# Patient Record
Sex: FEMALE | Race: WHITE | NOT HISPANIC OR LATINO
[De-identification: names, ages, dates, MRNs, and addresses within clinical notes are randomized per-mention and may not be internally consistent; named-entity substitution may affect disease eponyms.]

---

## 2017-02-22 ENCOUNTER — APPOINTMENT (OUTPATIENT)
Dept: PULMONOLOGY | Facility: CLINIC | Age: 62
End: 2017-02-22

## 2017-02-22 VITALS
HEART RATE: 76 BPM | DIASTOLIC BLOOD PRESSURE: 78 MMHG | WEIGHT: 229 LBS | HEIGHT: 68 IN | OXYGEN SATURATION: 95 % | BODY MASS INDEX: 34.71 KG/M2 | SYSTOLIC BLOOD PRESSURE: 130 MMHG

## 2017-02-22 DIAGNOSIS — Z82.3 FAMILY HISTORY OF STROKE: ICD-10-CM

## 2017-02-22 DIAGNOSIS — Z86.19 PERSONAL HISTORY OF OTHER INFECTIOUS AND PARASITIC DISEASES: ICD-10-CM

## 2017-02-22 DIAGNOSIS — Z86.39 PERSONAL HISTORY OF OTHER ENDOCRINE, NUTRITIONAL AND METABOLIC DISEASE: ICD-10-CM

## 2017-02-27 ENCOUNTER — APPOINTMENT (OUTPATIENT)
Dept: THORACIC SURGERY | Facility: CLINIC | Age: 62
End: 2017-02-27

## 2017-02-27 VITALS
BODY MASS INDEX: 34.71 KG/M2 | SYSTOLIC BLOOD PRESSURE: 135 MMHG | DIASTOLIC BLOOD PRESSURE: 76 MMHG | HEART RATE: 76 BPM | RESPIRATION RATE: 16 BRPM | HEIGHT: 68 IN | WEIGHT: 229 LBS | OXYGEN SATURATION: 97 %

## 2017-02-27 RX ORDER — BETAMETHASONE DIPROPIONATE 0.5 MG/G
0.05 CREAM TOPICAL
Qty: 15 | Refills: 0 | Status: DISCONTINUED | COMMUNITY
Start: 2016-09-06

## 2017-02-27 RX ORDER — CLARITHROMYCIN 500 MG/1
500 TABLET, FILM COATED ORAL
Qty: 14 | Refills: 0 | Status: DISCONTINUED | COMMUNITY
Start: 2016-11-01

## 2017-02-27 RX ORDER — BACITRACIN 500 [USP'U]/G
500 OINTMENT OPHTHALMIC
Qty: 4 | Refills: 0 | Status: DISCONTINUED | COMMUNITY
Start: 2017-02-13

## 2017-02-27 RX ORDER — DICLOFENAC SODIUM 75 MG/1
75 TABLET, DELAYED RELEASE ORAL
Qty: 30 | Refills: 0 | Status: DISCONTINUED | COMMUNITY
Start: 2016-12-05

## 2017-02-27 RX ORDER — DIAZEPAM 10 MG/1
10 TABLET ORAL
Qty: 20 | Refills: 0 | Status: DISCONTINUED | COMMUNITY
Start: 2017-02-20

## 2017-02-27 RX ORDER — AZITHROMYCIN 250 MG/1
250 TABLET, FILM COATED ORAL
Qty: 6 | Refills: 0 | Status: DISCONTINUED | COMMUNITY
Start: 2016-11-15

## 2017-02-27 RX ORDER — CLINDAMYCIN HYDROCHLORIDE 300 MG/1
300 CAPSULE ORAL
Qty: 14 | Refills: 0 | Status: DISCONTINUED | COMMUNITY
Start: 2016-12-21

## 2017-02-27 RX ORDER — DIAZEPAM 5 MG/1
5 TABLET ORAL
Qty: 2 | Refills: 0 | Status: DISCONTINUED | COMMUNITY
Start: 2017-02-08

## 2017-02-27 RX ORDER — VALACYCLOVIR 500 MG/1
500 TABLET, FILM COATED ORAL
Qty: 30 | Refills: 0 | Status: DISCONTINUED | COMMUNITY
Start: 2017-02-16

## 2017-02-27 RX ORDER — CLOTRIMAZOLE 10 MG/G
1 CREAM TOPICAL
Qty: 15 | Refills: 0 | Status: DISCONTINUED | COMMUNITY
Start: 2016-09-06

## 2017-02-27 RX ORDER — ACETAMINOPHEN AND CODEINE 300; 30 MG/1; MG/1
300-30 TABLET ORAL
Qty: 30 | Refills: 0 | Status: DISCONTINUED | COMMUNITY
Start: 2016-11-28

## 2017-03-01 ENCOUNTER — RECORD ABSTRACTING (OUTPATIENT)
Age: 62
End: 2017-03-01

## 2017-03-15 ENCOUNTER — OUTPATIENT (OUTPATIENT)
Dept: OUTPATIENT SERVICES | Facility: HOSPITAL | Age: 62
LOS: 1 days | End: 2017-03-15
Payer: COMMERCIAL

## 2017-03-15 VITALS
WEIGHT: 229.28 LBS | HEART RATE: 80 BPM | HEIGHT: 68 IN | DIASTOLIC BLOOD PRESSURE: 88 MMHG | RESPIRATION RATE: 20 BRPM | SYSTOLIC BLOOD PRESSURE: 150 MMHG | TEMPERATURE: 98 F

## 2017-03-15 DIAGNOSIS — E03.9 HYPOTHYROIDISM, UNSPECIFIED: ICD-10-CM

## 2017-03-15 DIAGNOSIS — R91.1 SOLITARY PULMONARY NODULE: ICD-10-CM

## 2017-03-15 DIAGNOSIS — Z01.818 ENCOUNTER FOR OTHER PREPROCEDURAL EXAMINATION: ICD-10-CM

## 2017-03-15 DIAGNOSIS — Z98.890 OTHER SPECIFIED POSTPROCEDURAL STATES: Chronic | ICD-10-CM

## 2017-03-15 LAB
ALBUMIN SERPL ELPH-MCNC: 4.4 G/DL — SIGNIFICANT CHANGE UP (ref 3.3–5.2)
ALP SERPL-CCNC: 111 U/L — SIGNIFICANT CHANGE UP (ref 40–120)
ALT FLD-CCNC: 35 U/L — HIGH
ANION GAP SERPL CALC-SCNC: 15 MMOL/L — SIGNIFICANT CHANGE UP (ref 5–17)
APTT BLD: 33.2 SEC — SIGNIFICANT CHANGE UP (ref 27.5–37.4)
AST SERPL-CCNC: 24 U/L — SIGNIFICANT CHANGE UP
BASOPHILS # BLD AUTO: 0 K/UL — SIGNIFICANT CHANGE UP (ref 0–0.2)
BASOPHILS NFR BLD AUTO: 0.3 % — SIGNIFICANT CHANGE UP (ref 0–2)
BILIRUB SERPL-MCNC: 0.4 MG/DL — SIGNIFICANT CHANGE UP (ref 0.4–2)
BLD GP AB SCN SERPL QL: SIGNIFICANT CHANGE UP
BUN SERPL-MCNC: 17 MG/DL — SIGNIFICANT CHANGE UP (ref 8–20)
CALCIUM SERPL-MCNC: 9.1 MG/DL — SIGNIFICANT CHANGE UP (ref 8.6–10.2)
CHLORIDE SERPL-SCNC: 101 MMOL/L — SIGNIFICANT CHANGE UP (ref 98–107)
CO2 SERPL-SCNC: 26 MMOL/L — SIGNIFICANT CHANGE UP (ref 22–29)
CREAT SERPL-MCNC: 0.58 MG/DL — SIGNIFICANT CHANGE UP (ref 0.5–1.3)
EOSINOPHIL # BLD AUTO: 0.1 K/UL — SIGNIFICANT CHANGE UP (ref 0–0.5)
EOSINOPHIL NFR BLD AUTO: 1.3 % — SIGNIFICANT CHANGE UP (ref 0–6)
GLUCOSE SERPL-MCNC: 117 MG/DL — HIGH (ref 70–115)
HCT VFR BLD CALC: 39.4 % — SIGNIFICANT CHANGE UP (ref 37–47)
HGB BLD-MCNC: 13.2 G/DL — SIGNIFICANT CHANGE UP (ref 12–16)
INR BLD: 1.01 RATIO — SIGNIFICANT CHANGE UP (ref 0.88–1.16)
LYMPHOCYTES # BLD AUTO: 1.9 K/UL — SIGNIFICANT CHANGE UP (ref 1–4.8)
LYMPHOCYTES # BLD AUTO: 26.7 % — SIGNIFICANT CHANGE UP (ref 20–55)
MCHC RBC-ENTMCNC: 31.8 PG — HIGH (ref 27–31)
MCHC RBC-ENTMCNC: 33.5 G/DL — SIGNIFICANT CHANGE UP (ref 32–36)
MCV RBC AUTO: 94.9 FL — SIGNIFICANT CHANGE UP (ref 81–99)
MONOCYTES # BLD AUTO: 0.4 K/UL — SIGNIFICANT CHANGE UP (ref 0–0.8)
MONOCYTES NFR BLD AUTO: 5.8 % — SIGNIFICANT CHANGE UP (ref 3–10)
NEUTROPHILS # BLD AUTO: 4.7 K/UL — SIGNIFICANT CHANGE UP (ref 1.8–8)
NEUTROPHILS NFR BLD AUTO: 65.5 % — SIGNIFICANT CHANGE UP (ref 37–73)
PLATELET # BLD AUTO: 233 K/UL — SIGNIFICANT CHANGE UP (ref 150–400)
POTASSIUM SERPL-MCNC: 4.1 MMOL/L — SIGNIFICANT CHANGE UP (ref 3.5–5.3)
POTASSIUM SERPL-SCNC: 4.1 MMOL/L — SIGNIFICANT CHANGE UP (ref 3.5–5.3)
PROT SERPL-MCNC: 7.7 G/DL — SIGNIFICANT CHANGE UP (ref 6.6–8.7)
PROTHROM AB SERPL-ACNC: 11.1 SEC — SIGNIFICANT CHANGE UP (ref 10–13.1)
RBC # BLD: 4.15 M/UL — LOW (ref 4.4–5.2)
RBC # FLD: 12.9 % — SIGNIFICANT CHANGE UP (ref 11–15.6)
SODIUM SERPL-SCNC: 142 MMOL/L — SIGNIFICANT CHANGE UP (ref 135–145)
TYPE + AB SCN PNL BLD: SIGNIFICANT CHANGE UP
WBC # BLD: 7.1 K/UL — SIGNIFICANT CHANGE UP (ref 4.8–10.8)
WBC # FLD AUTO: 7.1 K/UL — SIGNIFICANT CHANGE UP (ref 4.8–10.8)

## 2017-03-15 RX ORDER — SODIUM CHLORIDE 9 MG/ML
3 INJECTION INTRAMUSCULAR; INTRAVENOUS; SUBCUTANEOUS EVERY 8 HOURS
Qty: 0 | Refills: 0 | Status: DISCONTINUED | OUTPATIENT
Start: 2017-03-24 | End: 2017-03-24

## 2017-03-15 RX ORDER — VANCOMYCIN HCL 1 G
1250 VIAL (EA) INTRAVENOUS ONCE
Qty: 0 | Refills: 0 | Status: COMPLETED | OUTPATIENT
Start: 2017-03-24 | End: 2017-03-24

## 2017-03-15 NOTE — H&P PST ADULT - ASSESSMENT
61 year old female former smoker with PMX, hypothyroidism and anxiety schedule for Flexible bronchoscopy right Vats Lung resection with video assisted thoracoscopic surgery.

## 2017-03-15 NOTE — H&P PST ADULT - NSANTHOSAYNRD_GEN_A_CORE
No. LAMAR screening performed.  STOP BANG Legend: 0-2 = LOW Risk; 3-4 = INTERMEDIATE Risk; 5-8 = HIGH Risk

## 2017-03-15 NOTE — H&P PST ADULT - FAMILY HISTORY
Father  Still living? No  Family history of stroke, Age at diagnosis: 61-70  Family history of hypertension, Age at diagnosis: Age Unknown     Mother  Still living? No  Family history of hypothyroidism, Age at diagnosis: Age Unknown

## 2017-03-15 NOTE — H&P PST ADULT - PROBLEM SELECTOR PLAN 1
Flexible bronchoscopy right Vats Lung resection with video assisted thoracoscopic surgery.  Cardiac clearance with DR. Nice

## 2017-03-15 NOTE — H&P PST ADULT - HISTORY OF PRESENT ILLNESS
61 year old female with PMX, hypothyroidism and anxiety schedule for Flexible bronchoscop, right Vats Lung resection with [place] assistaned thoracoscopic surgery. 61 year old female former smoker with PMX, hypothyroidism and anxiety schedule for Flexible bronchoscopy right Vats Lung resection with video assistanted thoracoscopic surgery.   Patient state in December 2016, during a work up for clearance for foot surgery. They found a spot on her Lung on routine x - ray. She had a Chest CT and PET scan in february that confirmed a pulmonary lesion. Patient denies any SOB, cough, chest pain or  recent weight loss.

## 2017-03-15 NOTE — PATIENT PROFILE ADULT. - LEARNING ASSESSMENT (PATIENT) ADDITIONAL COMMENTS
presurgical and surgicalscrub instructions, pain management education given - verbalized understanding

## 2017-03-16 DIAGNOSIS — R91.1 SOLITARY PULMONARY NODULE: ICD-10-CM

## 2017-03-16 DIAGNOSIS — Z01.818 ENCOUNTER FOR OTHER PREPROCEDURAL EXAMINATION: ICD-10-CM

## 2017-03-23 ENCOUNTER — RESULT REVIEW (OUTPATIENT)
Age: 62
End: 2017-03-23

## 2017-03-24 ENCOUNTER — APPOINTMENT (OUTPATIENT)
Dept: THORACIC SURGERY | Facility: HOSPITAL | Age: 62
End: 2017-03-24
Payer: MEDICAID

## 2017-03-24 ENCOUNTER — INPATIENT (INPATIENT)
Facility: HOSPITAL | Age: 62
LOS: 1 days | Discharge: HOME | DRG: 168 | End: 2017-03-26
Attending: THORACIC SURGERY (CARDIOTHORACIC VASCULAR SURGERY) | Admitting: THORACIC SURGERY (CARDIOTHORACIC VASCULAR SURGERY)
Payer: COMMERCIAL

## 2017-03-24 VITALS
RESPIRATION RATE: 16 BRPM | HEIGHT: 68 IN | SYSTOLIC BLOOD PRESSURE: 131 MMHG | WEIGHT: 229.06 LBS | OXYGEN SATURATION: 96 % | TEMPERATURE: 97 F | DIASTOLIC BLOOD PRESSURE: 72 MMHG | HEART RATE: 86 BPM

## 2017-03-24 DIAGNOSIS — Z98.890 OTHER SPECIFIED POSTPROCEDURAL STATES: Chronic | ICD-10-CM

## 2017-03-24 DIAGNOSIS — R91.1 SOLITARY PULMONARY NODULE: ICD-10-CM

## 2017-03-24 LAB
ABO RH CONFIRMATION: SIGNIFICANT CHANGE UP
HCT VFR BLD CALC: 38.4 % — SIGNIFICANT CHANGE UP (ref 37–47)
HGB BLD-MCNC: 12.9 G/DL — SIGNIFICANT CHANGE UP (ref 12–16)

## 2017-03-24 PROCEDURE — 32663 THORACOSCOPY W/LOBECTOMY: CPT

## 2017-03-24 PROCEDURE — 88331 PATH CONSLTJ SURG 1 BLK 1SPC: CPT | Mod: 26

## 2017-03-24 PROCEDURE — 85610 PROTHROMBIN TIME: CPT

## 2017-03-24 PROCEDURE — 86850 RBC ANTIBODY SCREEN: CPT

## 2017-03-24 PROCEDURE — 32674 THORACOSCOPY LYMPH NODE EXC: CPT | Mod: AS

## 2017-03-24 PROCEDURE — 71010: CPT | Mod: 26

## 2017-03-24 PROCEDURE — 80053 COMPREHEN METABOLIC PANEL: CPT

## 2017-03-24 PROCEDURE — 88334 PATH CONSLTJ SURG CYTO XM EA: CPT | Mod: 26,59

## 2017-03-24 PROCEDURE — 85730 THROMBOPLASTIN TIME PARTIAL: CPT

## 2017-03-24 PROCEDURE — 32668 THORACOSCOPY W/W RESECT DIAG: CPT | Mod: AS

## 2017-03-24 PROCEDURE — 86901 BLOOD TYPING SEROLOGIC RH(D): CPT

## 2017-03-24 PROCEDURE — 88307 TISSUE EXAM BY PATHOLOGIST: CPT | Mod: 26

## 2017-03-24 PROCEDURE — 86900 BLOOD TYPING SEROLOGIC ABO: CPT

## 2017-03-24 PROCEDURE — 86920 COMPATIBILITY TEST SPIN: CPT

## 2017-03-24 PROCEDURE — 88342 IMHCHEM/IMCYTCHM 1ST ANTB: CPT | Mod: 26

## 2017-03-24 PROCEDURE — 88309 TISSUE EXAM BY PATHOLOGIST: CPT | Mod: 26

## 2017-03-24 PROCEDURE — 32668 THORACOSCOPY W/W RESECT DIAG: CPT

## 2017-03-24 PROCEDURE — 85027 COMPLETE CBC AUTOMATED: CPT

## 2017-03-24 PROCEDURE — 88333 PATH CONSLTJ SURG CYTO XM 1: CPT | Mod: 26

## 2017-03-24 PROCEDURE — 88305 TISSUE EXAM BY PATHOLOGIST: CPT | Mod: 26

## 2017-03-24 PROCEDURE — 32663 THORACOSCOPY W/LOBECTOMY: CPT | Mod: AS

## 2017-03-24 PROCEDURE — 88341 IMHCHEM/IMCYTCHM EA ADD ANTB: CPT | Mod: 26

## 2017-03-24 PROCEDURE — 32674 THORACOSCOPY LYMPH NODE EXC: CPT

## 2017-03-24 RX ORDER — DOCUSATE SODIUM 100 MG
100 CAPSULE ORAL THREE TIMES A DAY
Qty: 0 | Refills: 0 | Status: DISCONTINUED | OUTPATIENT
Start: 2017-03-24 | End: 2017-03-26

## 2017-03-24 RX ORDER — ENOXAPARIN SODIUM 100 MG/ML
40 INJECTION SUBCUTANEOUS DAILY
Qty: 0 | Refills: 0 | Status: DISCONTINUED | OUTPATIENT
Start: 2017-03-25 | End: 2017-03-26

## 2017-03-24 RX ORDER — ASPIRIN/CALCIUM CARB/MAGNESIUM 324 MG
81 TABLET ORAL DAILY
Qty: 0 | Refills: 0 | Status: DISCONTINUED | OUTPATIENT
Start: 2017-03-25 | End: 2017-03-26

## 2017-03-24 RX ORDER — ONDANSETRON 8 MG/1
4 TABLET, FILM COATED ORAL EVERY 6 HOURS
Qty: 0 | Refills: 0 | Status: DISCONTINUED | OUTPATIENT
Start: 2017-03-24 | End: 2017-03-26

## 2017-03-24 RX ORDER — ACETAMINOPHEN 500 MG
650 TABLET ORAL EVERY 6 HOURS
Qty: 0 | Refills: 0 | Status: DISCONTINUED | OUTPATIENT
Start: 2017-03-24 | End: 2017-03-26

## 2017-03-24 RX ORDER — HYDROMORPHONE HYDROCHLORIDE 2 MG/ML
0.5 INJECTION INTRAMUSCULAR; INTRAVENOUS; SUBCUTANEOUS
Qty: 0 | Refills: 0 | Status: DISCONTINUED | OUTPATIENT
Start: 2017-03-24 | End: 2017-03-25

## 2017-03-24 RX ORDER — KETOROLAC TROMETHAMINE 30 MG/ML
30 SYRINGE (ML) INJECTION EVERY 6 HOURS
Qty: 0 | Refills: 0 | Status: DISCONTINUED | OUTPATIENT
Start: 2017-03-24 | End: 2017-03-25

## 2017-03-24 RX ORDER — IPRATROPIUM/ALBUTEROL SULFATE 18-103MCG
3 AEROSOL WITH ADAPTER (GRAM) INHALATION EVERY 6 HOURS
Qty: 0 | Refills: 0 | Status: DISCONTINUED | OUTPATIENT
Start: 2017-03-24 | End: 2017-03-26

## 2017-03-24 RX ORDER — FENTANYL CITRATE 50 UG/ML
30 INJECTION INTRAVENOUS
Qty: 0 | Refills: 0 | Status: DISCONTINUED | OUTPATIENT
Start: 2017-03-24 | End: 2017-03-25

## 2017-03-24 RX ORDER — PANTOPRAZOLE SODIUM 20 MG/1
40 TABLET, DELAYED RELEASE ORAL
Qty: 0 | Refills: 0 | Status: DISCONTINUED | OUTPATIENT
Start: 2017-03-24 | End: 2017-03-26

## 2017-03-24 RX ORDER — LEVOTHYROXINE SODIUM 125 MCG
175 TABLET ORAL DAILY
Qty: 0 | Refills: 0 | Status: DISCONTINUED | OUTPATIENT
Start: 2017-03-25 | End: 2017-03-26

## 2017-03-24 RX ORDER — NALOXONE HYDROCHLORIDE 4 MG/.1ML
0.1 SPRAY NASAL
Qty: 0 | Refills: 0 | Status: DISCONTINUED | OUTPATIENT
Start: 2017-03-24 | End: 2017-03-25

## 2017-03-24 RX ADMIN — Medication 100 MILLIGRAM(S): at 22:20

## 2017-03-24 RX ADMIN — HYDROMORPHONE HYDROCHLORIDE 0.5 MILLIGRAM(S): 2 INJECTION INTRAMUSCULAR; INTRAVENOUS; SUBCUTANEOUS at 16:30

## 2017-03-24 RX ADMIN — HYDROMORPHONE HYDROCHLORIDE 0.5 MILLIGRAM(S): 2 INJECTION INTRAMUSCULAR; INTRAVENOUS; SUBCUTANEOUS at 19:04

## 2017-03-24 RX ADMIN — FENTANYL CITRATE 30 MILLILITER(S): 50 INJECTION INTRAVENOUS at 19:49

## 2017-03-24 RX ADMIN — HYDROMORPHONE HYDROCHLORIDE 0.5 MILLIGRAM(S): 2 INJECTION INTRAMUSCULAR; INTRAVENOUS; SUBCUTANEOUS at 19:20

## 2017-03-24 RX ADMIN — FENTANYL CITRATE 30 MILLILITER(S): 50 INJECTION INTRAVENOUS at 16:31

## 2017-03-24 RX ADMIN — HYDROMORPHONE HYDROCHLORIDE 0.5 MILLIGRAM(S): 2 INJECTION INTRAMUSCULAR; INTRAVENOUS; SUBCUTANEOUS at 18:14

## 2017-03-24 RX ADMIN — FENTANYL CITRATE 30 MILLILITER(S): 50 INJECTION INTRAVENOUS at 19:02

## 2017-03-24 RX ADMIN — Medication 166.67 MILLIGRAM(S): at 12:38

## 2017-03-25 ENCOUNTER — TRANSCRIPTION ENCOUNTER (OUTPATIENT)
Age: 62
End: 2017-03-25

## 2017-03-25 LAB
ANION GAP SERPL CALC-SCNC: 14 MMOL/L — SIGNIFICANT CHANGE UP (ref 5–17)
BUN SERPL-MCNC: 12 MG/DL — SIGNIFICANT CHANGE UP (ref 8–20)
CALCIUM SERPL-MCNC: 8.3 MG/DL — LOW (ref 8.6–10.2)
CHLORIDE SERPL-SCNC: 101 MMOL/L — SIGNIFICANT CHANGE UP (ref 98–107)
CO2 SERPL-SCNC: 24 MMOL/L — SIGNIFICANT CHANGE UP (ref 22–29)
CREAT SERPL-MCNC: 0.55 MG/DL — SIGNIFICANT CHANGE UP (ref 0.5–1.3)
GLUCOSE SERPL-MCNC: 177 MG/DL — HIGH (ref 70–115)
POTASSIUM SERPL-MCNC: 4 MMOL/L — SIGNIFICANT CHANGE UP (ref 3.5–5.3)
POTASSIUM SERPL-SCNC: 4 MMOL/L — SIGNIFICANT CHANGE UP (ref 3.5–5.3)
SODIUM SERPL-SCNC: 139 MMOL/L — SIGNIFICANT CHANGE UP (ref 135–145)

## 2017-03-25 PROCEDURE — 71010: CPT | Mod: 26

## 2017-03-25 RX ORDER — ALPRAZOLAM 0.25 MG
0.25 TABLET ORAL AT BEDTIME
Qty: 0 | Refills: 0 | Status: DISCONTINUED | OUTPATIENT
Start: 2017-03-25 | End: 2017-03-26

## 2017-03-25 RX ORDER — FENTANYL CITRATE 50 UG/ML
30 INJECTION INTRAVENOUS
Qty: 0 | Refills: 0 | Status: DISCONTINUED | OUTPATIENT
Start: 2017-03-25 | End: 2017-03-26

## 2017-03-25 RX ORDER — ACETAMINOPHEN 500 MG
1000 TABLET ORAL ONCE
Qty: 0 | Refills: 0 | Status: COMPLETED | OUTPATIENT
Start: 2017-03-25 | End: 2017-03-25

## 2017-03-25 RX ORDER — KETOROLAC TROMETHAMINE 30 MG/ML
15 SYRINGE (ML) INJECTION EVERY 6 HOURS
Qty: 0 | Refills: 0 | Status: DISCONTINUED | OUTPATIENT
Start: 2017-03-25 | End: 2017-03-26

## 2017-03-25 RX ORDER — PSYLLIUM SEED (WITH DEXTROSE)
1 POWDER (GRAM) ORAL DAILY
Qty: 0 | Refills: 0 | Status: DISCONTINUED | OUTPATIENT
Start: 2017-03-25 | End: 2017-03-26

## 2017-03-25 RX ORDER — ALPRAZOLAM 0.25 MG
0.25 TABLET ORAL ONCE
Qty: 0 | Refills: 0 | Status: DISCONTINUED | OUTPATIENT
Start: 2017-03-25 | End: 2017-03-25

## 2017-03-25 RX ORDER — BENZOCAINE AND MENTHOL 5; 1 G/100ML; G/100ML
1 LIQUID ORAL EVERY 6 HOURS
Qty: 0 | Refills: 0 | Status: DISCONTINUED | OUTPATIENT
Start: 2017-03-25 | End: 2017-03-26

## 2017-03-25 RX ADMIN — Medication 15 MILLIGRAM(S): at 18:21

## 2017-03-25 RX ADMIN — Medication 81 MILLIGRAM(S): at 12:44

## 2017-03-25 RX ADMIN — Medication 0.25 MILLIGRAM(S): at 22:45

## 2017-03-25 RX ADMIN — Medication 100 MILLIGRAM(S): at 05:46

## 2017-03-25 RX ADMIN — Medication 15 MILLIGRAM(S): at 12:42

## 2017-03-25 RX ADMIN — ENOXAPARIN SODIUM 40 MILLIGRAM(S): 100 INJECTION SUBCUTANEOUS at 12:45

## 2017-03-25 RX ADMIN — Medication 175 MICROGRAM(S): at 05:46

## 2017-03-25 RX ADMIN — Medication 0.25 MILLIGRAM(S): at 03:45

## 2017-03-25 RX ADMIN — Medication 15 MILLIGRAM(S): at 13:20

## 2017-03-25 RX ADMIN — Medication 100 MILLIGRAM(S): at 22:45

## 2017-03-25 RX ADMIN — FENTANYL CITRATE 30 MILLILITER(S): 50 INJECTION INTRAVENOUS at 00:53

## 2017-03-25 RX ADMIN — Medication 15 MILLIGRAM(S): at 23:07

## 2017-03-25 RX ADMIN — PANTOPRAZOLE SODIUM 40 MILLIGRAM(S): 20 TABLET, DELAYED RELEASE ORAL at 05:46

## 2017-03-25 RX ADMIN — Medication 15 MILLIGRAM(S): at 19:00

## 2017-03-25 RX ADMIN — Medication 650 MILLIGRAM(S): at 00:45

## 2017-03-25 RX ADMIN — FENTANYL CITRATE 30 MILLILITER(S): 50 INJECTION INTRAVENOUS at 04:22

## 2017-03-25 RX ADMIN — Medication 650 MILLIGRAM(S): at 00:09

## 2017-03-25 RX ADMIN — BENZOCAINE AND MENTHOL 1 LOZENGE: 5; 1 LIQUID ORAL at 10:05

## 2017-03-25 RX ADMIN — Medication 1000 MILLIGRAM(S): at 13:20

## 2017-03-25 RX ADMIN — FENTANYL CITRATE 30 MILLILITER(S): 50 INJECTION INTRAVENOUS at 19:15

## 2017-03-25 RX ADMIN — FENTANYL CITRATE 30 MILLILITER(S): 50 INJECTION INTRAVENOUS at 07:29

## 2017-03-25 RX ADMIN — BENZOCAINE AND MENTHOL 1 LOZENGE: 5; 1 LIQUID ORAL at 22:45

## 2017-03-25 RX ADMIN — Medication 400 MILLIGRAM(S): at 12:42

## 2017-03-25 NOTE — PROGRESS NOTE ADULT - SUBJECTIVE AND OBJECTIVE BOX
Subjective: "I feel constipated, can you order me something?" Pt. lying in bed, denies any SOB or chest pain, NAD noted.    Tele: Sinus rhythm  Vital Signs Last 24 Hrs  T(C): 36.6, Max: 36.9 (03-24 @ 18:00)  T(F): 97.9, Max: 98.4 (03-24 @ 18:00)  HR: 96 (68 - 96)  BP: 122/68 (122/68 - 151/76)  RR: 16 (12 - 17)  SpO2: 97% (95% - 100%)                      25 Mar 2017 05:20    139    |  101    |  12.0   ----------------------------<  177    4.0     |  24.0   |  0.55     Ca    8.3        25 Mar 2017 05:20                               12.9   x     )-----------( x        ( 24 Mar 2017 23:29 )             38.4               CAPILLARY BLOOD GLUCOSE            CXR:    I&O's Detail    I & Os for current day (as of 25 Mar 2017 09:41)  =============================================  IN:    Total IN: 0 ml  ---------------------------------------------  OUT:    Voided: 800 ml    Chest Tube: 190 ml    Total OUT: 990 ml  ---------------------------------------------  Total NET: -990 ml      RIGHT CHEST TUBE:  [x] YES [ ] NO  OUTPUT: 70/190    per 24 hours    AIR LEAKS:  [ ] YES [x] NO    BOWEL MOVEMENT:  [ ] YES [x] NO      MEDICATIONS  (STANDING):  docusate sodium 100milliGRAM(s) Oral three times a day  enoxaparin Injectable 40milliGRAM(s) SubCutaneous daily  levothyroxine 175MICROGram(s) Oral daily  aspirin enteric coated 81milliGRAM(s) Oral daily  pantoprazole    Tablet 40milliGRAM(s) Oral before breakfast    MEDICATIONS  (PRN):  ondansetron Injectable 4milliGRAM(s) IV Push every 6 hours PRN Nausea  ketorolac   Injectable 30milliGRAM(s) IV Push every 6 hours PRN Moderate Pain  acetaminophen   Tablet. 650milliGRAM(s) Oral every 6 hours PRN Mild Pain (1 - 3)  ALBUTerol/ipratropium for Nebulization 3milliLiter(s) Nebulizer every 6 hours PRN Shortness of Breath and/or Wheezing  oxyCODONE  5 mG/acetaminophen 325 mG 1Tablet(s) Oral every 4 hours PRN Moderate Pain (4 - 6)      Physical Exam:  Neuro: A&Ox4, no focal deficit noted.  Pulm: Clear bilaterally  CV: RRR, +S1, +S2.  Abd: soft, non-tender, non-distended, +BS, +BM 3/23/17.  Extremities: MAEx4, trace edema X2BLE, +PP, - calf tenderness.  Incision(s): Right thoracotomy incision CDI with dressing in place.           PAST MEDICAL & SURGICAL HISTORY:  Anxiety  Hypothyroid  S/P bunionectomy: with revision

## 2017-03-25 NOTE — PROGRESS NOTE ADULT - SUBJECTIVE AND OBJECTIVE BOX
Anesthesia Post op Note  62 y/o SP VATS Lobectomy with GETA  No anesthetic issues evident.  VSS Pain well controlled no N/V reported

## 2017-03-25 NOTE — PROGRESS NOTE ADULT - ASSESSMENT
61 year old female with PMH of hypothyroid & anxiety who failed cardiac clearance for left foot surgery.  Pt. is s/p right video assisted thoracic surgery.

## 2017-03-26 ENCOUNTER — TRANSCRIPTION ENCOUNTER (OUTPATIENT)
Age: 62
End: 2017-03-26

## 2017-03-26 VITALS
TEMPERATURE: 98 F | OXYGEN SATURATION: 97 % | HEART RATE: 68 BPM | SYSTOLIC BLOOD PRESSURE: 122 MMHG | DIASTOLIC BLOOD PRESSURE: 70 MMHG | RESPIRATION RATE: 16 BRPM

## 2017-03-26 LAB
ALBUMIN SERPL ELPH-MCNC: 3.6 G/DL — SIGNIFICANT CHANGE UP (ref 3.3–5.2)
ALP SERPL-CCNC: 74 U/L — SIGNIFICANT CHANGE UP (ref 40–120)
ALT FLD-CCNC: 28 U/L — SIGNIFICANT CHANGE UP
ANION GAP SERPL CALC-SCNC: 13 MMOL/L — SIGNIFICANT CHANGE UP (ref 5–17)
AST SERPL-CCNC: 19 U/L — SIGNIFICANT CHANGE UP
BILIRUB SERPL-MCNC: 0.5 MG/DL — SIGNIFICANT CHANGE UP (ref 0.4–2)
BUN SERPL-MCNC: 19 MG/DL — SIGNIFICANT CHANGE UP (ref 8–20)
CALCIUM SERPL-MCNC: 8.2 MG/DL — LOW (ref 8.6–10.2)
CHLORIDE SERPL-SCNC: 104 MMOL/L — SIGNIFICANT CHANGE UP (ref 98–107)
CO2 SERPL-SCNC: 26 MMOL/L — SIGNIFICANT CHANGE UP (ref 22–29)
CREAT SERPL-MCNC: 0.63 MG/DL — SIGNIFICANT CHANGE UP (ref 0.5–1.3)
GLUCOSE SERPL-MCNC: 114 MG/DL — SIGNIFICANT CHANGE UP (ref 70–115)
HCT VFR BLD CALC: 36.5 % — LOW (ref 37–47)
HGB BLD-MCNC: 11.7 G/DL — LOW (ref 12–16)
MAGNESIUM SERPL-MCNC: 2.1 MG/DL — SIGNIFICANT CHANGE UP (ref 1.8–2.5)
MCHC RBC-ENTMCNC: 31.1 PG — HIGH (ref 27–31)
MCHC RBC-ENTMCNC: 32.1 G/DL — SIGNIFICANT CHANGE UP (ref 32–36)
MCV RBC AUTO: 97.1 FL — SIGNIFICANT CHANGE UP (ref 81–99)
PHOSPHATE SERPL-MCNC: 2.4 MG/DL — SIGNIFICANT CHANGE UP (ref 2.4–4.7)
PLATELET # BLD AUTO: 177 K/UL — SIGNIFICANT CHANGE UP (ref 150–400)
POTASSIUM SERPL-MCNC: 3.9 MMOL/L — SIGNIFICANT CHANGE UP (ref 3.5–5.3)
POTASSIUM SERPL-SCNC: 3.9 MMOL/L — SIGNIFICANT CHANGE UP (ref 3.5–5.3)
PROT SERPL-MCNC: 6.6 G/DL — SIGNIFICANT CHANGE UP (ref 6.6–8.7)
RBC # BLD: 3.76 M/UL — LOW (ref 4.4–5.2)
RBC # FLD: 13.5 % — SIGNIFICANT CHANGE UP (ref 11–15.6)
SODIUM SERPL-SCNC: 143 MMOL/L — SIGNIFICANT CHANGE UP (ref 135–145)
WBC # BLD: 11 K/UL — HIGH (ref 4.8–10.8)
WBC # FLD AUTO: 11 K/UL — HIGH (ref 4.8–10.8)

## 2017-03-26 PROCEDURE — 88309 TISSUE EXAM BY PATHOLOGIST: CPT

## 2017-03-26 PROCEDURE — 88305 TISSUE EXAM BY PATHOLOGIST: CPT

## 2017-03-26 PROCEDURE — 85018 HEMOGLOBIN: CPT

## 2017-03-26 PROCEDURE — 83735 ASSAY OF MAGNESIUM: CPT

## 2017-03-26 PROCEDURE — 88334 PATH CONSLTJ SURG CYTO XM EA: CPT

## 2017-03-26 PROCEDURE — 80048 BASIC METABOLIC PNL TOTAL CA: CPT

## 2017-03-26 PROCEDURE — 36415 COLL VENOUS BLD VENIPUNCTURE: CPT

## 2017-03-26 PROCEDURE — 84100 ASSAY OF PHOSPHORUS: CPT

## 2017-03-26 PROCEDURE — 88333 PATH CONSLTJ SURG CYTO XM 1: CPT

## 2017-03-26 PROCEDURE — 71045 X-RAY EXAM CHEST 1 VIEW: CPT

## 2017-03-26 PROCEDURE — 85027 COMPLETE CBC AUTOMATED: CPT

## 2017-03-26 PROCEDURE — 88341 IMHCHEM/IMCYTCHM EA ADD ANTB: CPT

## 2017-03-26 PROCEDURE — 80053 COMPREHEN METABOLIC PANEL: CPT

## 2017-03-26 PROCEDURE — 88342 IMHCHEM/IMCYTCHM 1ST ANTB: CPT

## 2017-03-26 PROCEDURE — 71010: CPT | Mod: 26

## 2017-03-26 PROCEDURE — 88307 TISSUE EXAM BY PATHOLOGIST: CPT

## 2017-03-26 PROCEDURE — 71010: CPT | Mod: 26,77

## 2017-03-26 PROCEDURE — 88331 PATH CONSLTJ SURG 1 BLK 1SPC: CPT

## 2017-03-26 RX ORDER — DOCUSATE SODIUM 100 MG
1 CAPSULE ORAL
Qty: 0 | Refills: 0 | COMMUNITY
Start: 2017-03-26

## 2017-03-26 RX ORDER — GLUCOSAMINE/MSM/CHONDROITIN A 750-375MG
1 TABLET ORAL
Qty: 0 | Refills: 0 | COMMUNITY

## 2017-03-26 RX ORDER — PREGABALIN 225 MG/1
1 CAPSULE ORAL
Qty: 0 | Refills: 0 | COMMUNITY

## 2017-03-26 RX ORDER — OXYCODONE HYDROCHLORIDE 5 MG/1
1 TABLET ORAL
Qty: 50 | Refills: 0 | OUTPATIENT
Start: 2017-03-26

## 2017-03-26 RX ORDER — ACETAMINOPHEN 500 MG
2 TABLET ORAL
Qty: 0 | Refills: 0 | COMMUNITY
Start: 2017-03-26

## 2017-03-26 RX ORDER — OMEGA-3 ACID ETHYL ESTERS 1 G
1 CAPSULE ORAL
Qty: 0 | Refills: 0 | COMMUNITY

## 2017-03-26 RX ORDER — OXYCODONE HYDROCHLORIDE 5 MG/1
1 TABLET ORAL
Qty: 0 | Refills: 0 | COMMUNITY
Start: 2017-03-26

## 2017-03-26 RX ADMIN — Medication 15 MILLIGRAM(S): at 05:15

## 2017-03-26 RX ADMIN — FENTANYL CITRATE 30 MILLILITER(S): 50 INJECTION INTRAVENOUS at 04:16

## 2017-03-26 RX ADMIN — FENTANYL CITRATE 30 MILLILITER(S): 50 INJECTION INTRAVENOUS at 00:11

## 2017-03-26 RX ADMIN — Medication 15 MILLIGRAM(S): at 00:00

## 2017-03-26 RX ADMIN — Medication 100 MILLIGRAM(S): at 05:15

## 2017-03-26 RX ADMIN — Medication 175 MICROGRAM(S): at 05:15

## 2017-03-26 RX ADMIN — Medication 81 MILLIGRAM(S): at 11:15

## 2017-03-26 RX ADMIN — BENZOCAINE AND MENTHOL 1 LOZENGE: 5; 1 LIQUID ORAL at 05:15

## 2017-03-26 RX ADMIN — PANTOPRAZOLE SODIUM 40 MILLIGRAM(S): 20 TABLET, DELAYED RELEASE ORAL at 05:15

## 2017-03-26 RX ADMIN — Medication 15 MILLIGRAM(S): at 06:00

## 2017-03-26 RX ADMIN — FENTANYL CITRATE 30 MILLILITER(S): 50 INJECTION INTRAVENOUS at 07:14

## 2017-03-26 NOTE — DISCHARGE NOTE ADULT - CARE PROVIDERS DIRECT ADDRESSES
[email or web address]

## 2017-03-26 NOTE — PROGRESS NOTE ADULT - PROBLEM SELECTOR PLAN 1
Percocet PO PRN for pain management., d/c fentanyl PCA  Titrate oxygen to maintain SPO2 >90% on room air.  Encourage the use of incentive spirometry, cough & deep breath exercises, increase mobility, daily PT, OOB to chair.  D/C CT , follow up CXR  D/C home today
Percocet PO PRN for pain management.  Titrate oxygen to maintain SPO2 >90% on room air.  Encourage the use of incentive spirometry, cough & deep breath exercises, increase mobility, daily PT, OOB to chair.  Maintain right chest tube to water seal.  Repeat chest xray in morning, CBC & CMP in am.  Discussed plan with Dr. Bradley at bedside.

## 2017-03-26 NOTE — DISCHARGE NOTE ADULT - PLAN OF CARE
Recover from surgery Please come to ED or Call Cardio thoracic office at [phone] if Chest pain, Shortness of Breath, persistent Nausea & vomiting, oozing from wounds  Shower daily starting tomorrow  No driving until cleared by MD

## 2017-03-26 NOTE — DISCHARGE NOTE ADULT - NS AS ACTIVITY OBS
Walking-Outdoors allowed/Stairs allowed/No Heavy lifting/straining/Walking-Indoors allowed/Showering allowed

## 2017-03-26 NOTE — PROGRESS NOTE ADULT - SUBJECTIVE AND OBJECTIVE BOX
Subjective:  "  I did not sleep well last night"  Pt in bed NAD + CT no air leak     VITAL SIGNS  T(C): 36.7, Max: 37 (03-25 @ 11:00)  HR: 74 (72 - 89)  BP: 120/68 (104/52 - 130/68)  RR: 16 (16 - 18)  SpO2: 97% (94% - 97%)  Daily Weight in k.2 (26 Mar 2017 06:08)  Admit Wt: Drug Dosing Weight  Height (cm): 172.7 (24 Mar 2017 08:54)  Weight (kg): 103.9 (24 Mar 2017 08:54)  Telemetry:  SR    MEDICATIONS  ondansetron Injectable 4milliGRAM(s) IV Push every 6 hours PRN  docusate sodium 100milliGRAM(s) Oral three times a day  enoxaparin Injectable 40milliGRAM(s) SubCutaneous daily  acetaminophen   Tablet. 650milliGRAM(s) Oral every 6 hours PRN  levothyroxine 175MICROGram(s) Oral daily  aspirin enteric coated 81milliGRAM(s) Oral daily  ALBUTerol/ipratropium for Nebulization 3milliLiter(s) Nebulizer every 6 hours PRN  pantoprazole    Tablet 40milliGRAM(s) Oral before breakfast  benzocaine 15 mG/menthol 3.6 mG Lozenge 1Lozenge Oral every 6 hours PRN  psyllium Powder 1Packet(s) Oral daily  ALPRAZolam 0.25milliGRAM(s) Oral at bedtime PRN  ketorolac   Injectable 15milliGRAM(s) IV Push every 6 hours  oxyCODONE  5 mG/acetaminophen 325 mG 1Tablet(s) Oral every 6 hours PRN  oxyCODONE  5 mG/acetaminophen 325 mG 2Tablet(s) Oral every 6 hours PRN    MEDICATIONS  (PRN):  ondansetron Injectable 4milliGRAM(s) IV Push every 6 hours PRN Nausea  acetaminophen   Tablet. 650milliGRAM(s) Oral every 6 hours PRN Mild Pain (1 - 3)  ALBUTerol/ipratropium for Nebulization 3milliLiter(s) Nebulizer every 6 hours PRN Shortness of Breath and/or Wheezing  benzocaine 15 mG/menthol 3.6 mG Lozenge 1Lozenge Oral every 6 hours PRN Sore Throat  ALPRAZolam 0.25milliGRAM(s) Oral at bedtime PRN anxiety  oxyCODONE  5 mG/acetaminophen 325 mG 1Tablet(s) Oral every 6 hours PRN Moderate Pain (4 - 6)  oxyCODONE  5 mG/acetaminophen 325 mG 2Tablet(s) Oral every 6 hours PRN Severe Pain (7 - 10)        PHYSICAL EXAM  General: alert awake   Respiratory:  clear b/l decreased BS at base b/l   CV: RRR S1 S2   Abdomen: soft NT ND + BS   Extremities: no edema b/l LE   Incisions: Rt thoracic incision:  C/D/I + CT dressing C/D/I      Chest tubes: + Rt  x 24 hours   60 overnight       I&O's Detail    I & Os for current day (as of 26 Mar 2017 08:54)  =============================================  IN:    Oral Fluid: 480 ml    Total IN: 480 ml  ---------------------------------------------  OUT:    Voided: 950 ml    Chest Tube: 150 ml    Total OUT: 1100 ml  ---------------------------------------------  Total NET: -620 ml      LABS  26 Mar 2017 05:28    143    |  104    |  19.0   ----------------------------<  114    3.9     |  26.0   |  0.63     Ca    8.2        26 Mar 2017 05:28  Phos  2.4       26 Mar 2017 05:28  Mg     2.1       26 Mar 2017 05:28    TPro  6.6    /  Alb  3.6    /  TBili  0.5    /  DBili  x      /  AST  19     /  ALT  28     /  AlkPhos  74     26 Mar 2017 05:28                                 11.7   11.0  )-----------( 177      ( 26 Mar 2017 05:29 )             36.5                LIVER FUNCTIONS - ( 26 Mar 2017 05:28 )  Alb: 3.6 g/dL / Pro: 6.6 g/dL / ALK PHOS: 74 U/L / ALT: 28 U/L / AST: 19 U/L / GGT: x              CAPILLARY BLOOD GLUCOSE           Today's CXR:    PAST MEDICAL & SURGICAL HISTORY:  Anxiety  Hypothyroid  S/P bunionectomy: with revision

## 2017-03-26 NOTE — DISCHARGE NOTE ADULT - CARE PROVIDER_API CALL
Rohan Bradley), Surgery; Thoracic Surgery  [address] [place] [id number]  Phone: [phone]  Fax: [phone]

## 2017-03-26 NOTE — DISCHARGE NOTE ADULT - CARE PLAN
Principal Discharge DX:	Pulmonary nodule  Goal:	Recover from surgery  Instructions for follow-up, activity and diet:	Please come to ED or Call Cardio thoracic office at [phone] if Chest pain, Shortness of Breath, persistent Nausea & vomiting, oozing from wounds  Shower daily starting tomorrow  No driving until cleared by MD Principal Discharge DX:	Pulmonary nodule  Goal:	Recover from surgery  Instructions for follow-up, activity and diet:	Please come to ED or Call Cardio thoracic office at [phone] if Chest pain, Shortness of Breath, persistent Nausea & vomiting, oozing from wounds  Shower daily starting tomorrow  No driving until cleared by MD Principal Discharge DX:	Pulmonary nodule  Goal:	Recover from surgery  Instructions for follow-up, activity and diet:	Please come to ED or Call Cardio thoracic office at [phone] if Chest pain, Shortness of Breath, persistent Nausea & vomiting, oozing from wounds  Shower daily starting tomorrow  No driving until cleared by MD

## 2017-03-26 NOTE — DISCHARGE NOTE ADULT - PATIENT PORTAL LINK FT
“You can access the Follow[facility] Patient Portal, offered by [facility], by registering with the following website: [email or web address]”

## 2017-03-26 NOTE — PROGRESS NOTE ADULT - ASSESSMENT
ASSESSMENT  61 year old female with PMH of hypothyroid & anxiety who failed cardiac clearance for left foot surgery.  Pt. is s/p right video assisted thoracic surgery

## 2017-03-26 NOTE — DISCHARGE NOTE ADULT - MEDICATION SUMMARY - MEDICATIONS TO TAKE
I will START or STAY ON the medications listed below when I get home from the hospital:    acetaminophen 325 mg oral tablet  -- 2 tab(s) by mouth every 6 hours, As needed, Mild Pain (1 - 3)  -- Indication: For pain    acetaminophen-oxyCODONE 325 mg-5 mg oral tablet  -- 1 tab(s) by mouth every 6 hours, As needed, Moderate Pain (4 - 6) MDD:8  -- Indication: For pain    aspirin 81 mg oral tablet  -- 1 tab(s) by mouth 2 times a day  -- Indication: For Solitary pulmonary nodule    valACYclovir 500 mg oral tablet  -- 1 tab(s) by mouth every 12 hours, As Needed  -- Indication: For Antiviral    milk thistle oral capsule  --  by mouth once a day  -- Indication: For Herbal medicine    docusate sodium 100 mg oral capsule  -- 1 cap(s) by mouth 3 times a day  -- Indication: For Stool softener    levothyroxine 175 mcg (0.175 mg) oral tablet  -- 1 tab(s) by mouth once a day  -- Indication: For Hypothyroid

## 2017-03-31 LAB — SURGICAL PATHOLOGY FINAL REPORT - CH: SIGNIFICANT CHANGE UP

## 2017-04-03 ENCOUNTER — OUTPATIENT (OUTPATIENT)
Dept: OUTPATIENT SERVICES | Facility: HOSPITAL | Age: 62
LOS: 1 days | End: 2017-04-03
Payer: COMMERCIAL

## 2017-04-03 ENCOUNTER — APPOINTMENT (OUTPATIENT)
Dept: THORACIC SURGERY | Facility: CLINIC | Age: 62
End: 2017-04-03

## 2017-04-03 VITALS
BODY MASS INDEX: 34.71 KG/M2 | HEIGHT: 68 IN | SYSTOLIC BLOOD PRESSURE: 137 MMHG | HEART RATE: 76 BPM | DIASTOLIC BLOOD PRESSURE: 88 MMHG | RESPIRATION RATE: 18 BRPM | WEIGHT: 229 LBS | OXYGEN SATURATION: 99 %

## 2017-04-03 DIAGNOSIS — Z98.890 OTHER SPECIFIED POSTPROCEDURAL STATES: Chronic | ICD-10-CM

## 2017-04-03 DIAGNOSIS — R22.2 LOCALIZED SWELLING, MASS AND LUMP, TRUNK: ICD-10-CM

## 2017-04-03 PROCEDURE — 71046 X-RAY EXAM CHEST 2 VIEWS: CPT

## 2017-04-03 PROCEDURE — 71020: CPT | Mod: 26

## 2017-04-05 ENCOUNTER — TRANSCRIPTION ENCOUNTER (OUTPATIENT)
Age: 62
End: 2017-04-05

## 2017-04-06 ENCOUNTER — OUTPATIENT (OUTPATIENT)
Dept: OUTPATIENT SERVICES | Facility: HOSPITAL | Age: 62
LOS: 1 days | Discharge: HOME | End: 2017-04-06

## 2017-04-06 DIAGNOSIS — C34.90 MALIGNANT NEOPLASM OF UNSPECIFIED PART OF UNSPECIFIED BRONCHUS OR LUNG: ICD-10-CM

## 2017-04-06 DIAGNOSIS — Z98.890 OTHER SPECIFIED POSTPROCEDURAL STATES: Chronic | ICD-10-CM

## 2017-04-09 ENCOUNTER — RESULT REVIEW (OUTPATIENT)
Age: 62
End: 2017-04-09

## 2017-04-16 ENCOUNTER — RESULT REVIEW (OUTPATIENT)
Age: 62
End: 2017-04-16

## 2017-04-17 ENCOUNTER — FORM ENCOUNTER (OUTPATIENT)
Age: 62
End: 2017-04-17

## 2017-04-17 ENCOUNTER — APPOINTMENT (OUTPATIENT)
Dept: HEMATOLOGY ONCOLOGY | Facility: CLINIC | Age: 62
End: 2017-04-17

## 2017-04-17 VITALS
RESPIRATION RATE: 16 BRPM | SYSTOLIC BLOOD PRESSURE: 128 MMHG | HEIGHT: 68 IN | WEIGHT: 223.88 LBS | BODY MASS INDEX: 33.93 KG/M2 | DIASTOLIC BLOOD PRESSURE: 80 MMHG | TEMPERATURE: 98.6 F | HEART RATE: 83 BPM | OXYGEN SATURATION: 97 %

## 2017-04-17 DIAGNOSIS — Z80.1 FAMILY HISTORY OF MALIGNANT NEOPLASM OF TRACHEA, BRONCHUS AND LUNG: ICD-10-CM

## 2017-04-17 DIAGNOSIS — B37.2 CANDIDIASIS OF SKIN AND NAIL: ICD-10-CM

## 2017-04-17 DIAGNOSIS — Z87.891 PERSONAL HISTORY OF NICOTINE DEPENDENCE: ICD-10-CM

## 2017-04-17 DIAGNOSIS — Z78.9 OTHER SPECIFIED HEALTH STATUS: ICD-10-CM

## 2017-04-17 LAB
ALBUMIN SERPL ELPH-MCNC: 4.3 G/DL
ALP BLD-CCNC: 117 U/L
ALT SERPL-CCNC: 33 U/L
ANION GAP SERPL CALC-SCNC: 18 MMOL/L
APTT BLD: 33.3 SEC
AST SERPL-CCNC: 24 U/L
BASOPHILS # BLD AUTO: 0.1 K/UL — SIGNIFICANT CHANGE UP (ref 0–0.2)
BASOPHILS NFR BLD AUTO: 0.9 % — SIGNIFICANT CHANGE UP (ref 0–2)
BILIRUB SERPL-MCNC: 0.5 MG/DL
BUN SERPL-MCNC: 17 MG/DL
CALCIUM SERPL-MCNC: 9.9 MG/DL
CHLORIDE SERPL-SCNC: 99 MMOL/L
CO2 SERPL-SCNC: 22 MMOL/L
CREAT SERPL-MCNC: 0.69 MG/DL
EOSINOPHIL # BLD AUTO: 0.2 K/UL — SIGNIFICANT CHANGE UP (ref 0–0.5)
EOSINOPHIL NFR BLD AUTO: 3.6 % — SIGNIFICANT CHANGE UP (ref 0–6)
GLUCOSE SERPL-MCNC: 103 MG/DL
HCT VFR BLD CALC: 40.5 % — SIGNIFICANT CHANGE UP (ref 34.5–45)
HGB BLD-MCNC: 13.1 G/DL — SIGNIFICANT CHANGE UP (ref 11.5–15.5)
INR PPP: 0.92 RATIO
LYMPHOCYTES # BLD AUTO: 1.8 K/UL — SIGNIFICANT CHANGE UP (ref 1–3.3)
LYMPHOCYTES # BLD AUTO: 29 % — SIGNIFICANT CHANGE UP (ref 13–44)
MAGNESIUM SERPL-MCNC: 2.1 MG/DL
MCHC RBC-ENTMCNC: 30.4 PG — SIGNIFICANT CHANGE UP (ref 27–34)
MCHC RBC-ENTMCNC: 32.3 GM/DL — SIGNIFICANT CHANGE UP (ref 32–36)
MCV RBC AUTO: 94.2 FL — SIGNIFICANT CHANGE UP (ref 80–100)
MONOCYTES # BLD AUTO: 0.4 K/UL — SIGNIFICANT CHANGE UP (ref 0–0.9)
MONOCYTES NFR BLD AUTO: 7.4 % — SIGNIFICANT CHANGE UP (ref 2–14)
NEUTROPHILS # BLD AUTO: 3.6 K/UL — SIGNIFICANT CHANGE UP (ref 1.8–7.4)
NEUTROPHILS NFR BLD AUTO: 59 % — SIGNIFICANT CHANGE UP (ref 43–77)
PLATELET # BLD AUTO: 265 K/UL — SIGNIFICANT CHANGE UP (ref 150–400)
POTASSIUM SERPL-SCNC: 4.7 MMOL/L
PROT SERPL-MCNC: 7.3 G/DL
PT BLD: 10.4 SEC
RBC # BLD: 4.3 M/UL — SIGNIFICANT CHANGE UP (ref 3.8–5.2)
RBC # FLD: 12.1 % — SIGNIFICANT CHANGE UP (ref 10.3–14.5)
SODIUM SERPL-SCNC: 139 MMOL/L
WBC # BLD: 6.1 K/UL — SIGNIFICANT CHANGE UP (ref 3.8–10.5)
WBC # FLD AUTO: 6.1 K/UL — SIGNIFICANT CHANGE UP (ref 3.8–10.5)

## 2017-04-17 RX ORDER — OXYCODONE HYDROCHLORIDE AND ACETAMINOPHEN 5; 325 MG/1; MG/1
5-325 TABLET ORAL
Refills: 0 | Status: DISCONTINUED | COMMUNITY
End: 2017-04-17

## 2017-04-18 ENCOUNTER — OUTPATIENT (OUTPATIENT)
Dept: OUTPATIENT SERVICES | Facility: HOSPITAL | Age: 62
LOS: 1 days | End: 2017-04-18
Payer: MEDICAID

## 2017-04-18 ENCOUNTER — OUTPATIENT (OUTPATIENT)
Dept: OUTPATIENT SERVICES | Facility: HOSPITAL | Age: 62
LOS: 1 days | End: 2017-04-18

## 2017-04-18 DIAGNOSIS — Z98.890 OTHER SPECIFIED POSTPROCEDURAL STATES: Chronic | ICD-10-CM

## 2017-04-18 DIAGNOSIS — R06.09 OTHER FORMS OF DYSPNEA: ICD-10-CM

## 2017-04-18 DIAGNOSIS — G47.33 OBSTRUCTIVE SLEEP APNEA (ADULT) (PEDIATRIC): ICD-10-CM

## 2017-04-18 LAB
HBV CORE IGM SER QL: NONREACTIVE
HBV SURFACE AB SER QL: NONREACTIVE
HBV SURFACE AB SERPL IA-ACNC: <3 MIU/ML
HBV SURFACE AG SER QL: NONREACTIVE
HCV AB SER QL: NONREACTIVE
HCV S/CO RATIO: 0.13 S/CO

## 2017-04-18 PROCEDURE — 71020: CPT | Mod: 26

## 2017-04-19 ENCOUNTER — APPOINTMENT (OUTPATIENT)
Dept: PULMONOLOGY | Facility: CLINIC | Age: 62
End: 2017-04-19

## 2017-04-19 VITALS
HEART RATE: 85 BPM | BODY MASS INDEX: 34.82 KG/M2 | SYSTOLIC BLOOD PRESSURE: 140 MMHG | OXYGEN SATURATION: 96 % | WEIGHT: 229 LBS | DIASTOLIC BLOOD PRESSURE: 78 MMHG

## 2017-05-03 ENCOUNTER — OUTPATIENT (OUTPATIENT)
Dept: OUTPATIENT SERVICES | Facility: HOSPITAL | Age: 62
LOS: 1 days | End: 2017-05-03
Payer: COMMERCIAL

## 2017-05-03 ENCOUNTER — EMERGENCY (EMERGENCY)
Facility: HOSPITAL | Age: 62
LOS: 1 days | Discharge: HOME | End: 2017-05-03
Attending: EMERGENCY MEDICINE
Payer: COMMERCIAL

## 2017-05-03 VITALS — HEART RATE: 78 BPM | SYSTOLIC BLOOD PRESSURE: 132 MMHG | RESPIRATION RATE: 18 BRPM | DIASTOLIC BLOOD PRESSURE: 78 MMHG

## 2017-05-03 VITALS
OXYGEN SATURATION: 97 % | HEIGHT: 68 IN | SYSTOLIC BLOOD PRESSURE: 144 MMHG | DIASTOLIC BLOOD PRESSURE: 83 MMHG | HEART RATE: 83 BPM | WEIGHT: 229.06 LBS | RESPIRATION RATE: 18 BRPM | TEMPERATURE: 98 F

## 2017-05-03 DIAGNOSIS — G47.33 OBSTRUCTIVE SLEEP APNEA (ADULT) (PEDIATRIC): ICD-10-CM

## 2017-05-03 DIAGNOSIS — R07.89 OTHER CHEST PAIN: ICD-10-CM

## 2017-05-03 DIAGNOSIS — Z91.013 ALLERGY TO SEAFOOD: ICD-10-CM

## 2017-05-03 DIAGNOSIS — Z98.890 OTHER SPECIFIED POSTPROCEDURAL STATES: Chronic | ICD-10-CM

## 2017-05-03 DIAGNOSIS — Z79.82 LONG TERM (CURRENT) USE OF ASPIRIN: ICD-10-CM

## 2017-05-03 DIAGNOSIS — E03.9 HYPOTHYROIDISM, UNSPECIFIED: ICD-10-CM

## 2017-05-03 DIAGNOSIS — Z91.018 ALLERGY TO OTHER FOODS: ICD-10-CM

## 2017-05-03 DIAGNOSIS — M54.2 CERVICALGIA: ICD-10-CM

## 2017-05-03 DIAGNOSIS — Z88.0 ALLERGY STATUS TO PENICILLIN: ICD-10-CM

## 2017-05-03 DIAGNOSIS — Z79.899 OTHER LONG TERM (CURRENT) DRUG THERAPY: ICD-10-CM

## 2017-05-03 DIAGNOSIS — M79.1 MYALGIA: ICD-10-CM

## 2017-05-03 LAB
ANION GAP SERPL CALC-SCNC: 12 MMOL/L — SIGNIFICANT CHANGE UP (ref 5–17)
APTT BLD: 34.8 SEC — SIGNIFICANT CHANGE UP (ref 27.5–37.4)
BASOPHILS # BLD AUTO: 0 K/UL — SIGNIFICANT CHANGE UP (ref 0–0.2)
BASOPHILS NFR BLD AUTO: 0.2 % — SIGNIFICANT CHANGE UP (ref 0–2)
BUN SERPL-MCNC: 11 MG/DL — SIGNIFICANT CHANGE UP (ref 8–20)
CALCIUM SERPL-MCNC: 9.4 MG/DL — SIGNIFICANT CHANGE UP (ref 8.6–10.2)
CHLORIDE SERPL-SCNC: 100 MMOL/L — SIGNIFICANT CHANGE UP (ref 98–107)
CO2 SERPL-SCNC: 29 MMOL/L — SIGNIFICANT CHANGE UP (ref 22–29)
CREAT SERPL-MCNC: 0.55 MG/DL — SIGNIFICANT CHANGE UP (ref 0.5–1.3)
EOSINOPHIL # BLD AUTO: 0.1 K/UL — SIGNIFICANT CHANGE UP (ref 0–0.5)
EOSINOPHIL NFR BLD AUTO: 1.9 % — SIGNIFICANT CHANGE UP (ref 0–6)
GLUCOSE SERPL-MCNC: 105 MG/DL — SIGNIFICANT CHANGE UP (ref 70–115)
HCT VFR BLD CALC: 38.6 % — SIGNIFICANT CHANGE UP (ref 37–47)
HGB BLD-MCNC: 12.8 G/DL — SIGNIFICANT CHANGE UP (ref 12–16)
INR BLD: 1.04 RATIO — SIGNIFICANT CHANGE UP (ref 0.88–1.16)
LYMPHOCYTES # BLD AUTO: 1.5 K/UL — SIGNIFICANT CHANGE UP (ref 1–4.8)
LYMPHOCYTES # BLD AUTO: 24.5 % — SIGNIFICANT CHANGE UP (ref 20–55)
MCHC RBC-ENTMCNC: 31.2 PG — HIGH (ref 27–31)
MCHC RBC-ENTMCNC: 33.2 G/DL — SIGNIFICANT CHANGE UP (ref 32–36)
MCV RBC AUTO: 94.1 FL — SIGNIFICANT CHANGE UP (ref 81–99)
MONOCYTES # BLD AUTO: 0.3 K/UL — SIGNIFICANT CHANGE UP (ref 0–0.8)
MONOCYTES NFR BLD AUTO: 5.3 % — SIGNIFICANT CHANGE UP (ref 3–10)
NEUTROPHILS # BLD AUTO: 4.2 K/UL — SIGNIFICANT CHANGE UP (ref 1.8–8)
NEUTROPHILS NFR BLD AUTO: 67.8 % — SIGNIFICANT CHANGE UP (ref 37–73)
PLATELET # BLD AUTO: 218 K/UL — SIGNIFICANT CHANGE UP (ref 150–400)
POTASSIUM SERPL-MCNC: 3.8 MMOL/L — SIGNIFICANT CHANGE UP (ref 3.5–5.3)
POTASSIUM SERPL-SCNC: 3.8 MMOL/L — SIGNIFICANT CHANGE UP (ref 3.5–5.3)
PROTHROM AB SERPL-ACNC: 11.5 SEC — SIGNIFICANT CHANGE UP (ref 9.8–12.7)
RBC # BLD: 4.1 M/UL — LOW (ref 4.4–5.2)
RBC # FLD: 12.9 % — SIGNIFICANT CHANGE UP (ref 11–15.6)
SODIUM SERPL-SCNC: 141 MMOL/L — SIGNIFICANT CHANGE UP (ref 135–145)
TROPONIN T SERPL-MCNC: <0.01 NG/ML — SIGNIFICANT CHANGE UP (ref 0–0.06)
WBC # BLD: 6.2 K/UL — SIGNIFICANT CHANGE UP (ref 4.8–10.8)
WBC # FLD AUTO: 6.2 K/UL — SIGNIFICANT CHANGE UP (ref 4.8–10.8)

## 2017-05-03 PROCEDURE — 93005 ELECTROCARDIOGRAM TRACING: CPT

## 2017-05-03 PROCEDURE — 71010: CPT | Mod: 26

## 2017-05-03 PROCEDURE — 95806 SLEEP STUDY UNATT&RESP EFFT: CPT | Mod: 26

## 2017-05-03 PROCEDURE — 93010 ELECTROCARDIOGRAM REPORT: CPT

## 2017-05-03 PROCEDURE — 85027 COMPLETE CBC AUTOMATED: CPT

## 2017-05-03 PROCEDURE — 71045 X-RAY EXAM CHEST 1 VIEW: CPT

## 2017-05-03 PROCEDURE — 99285 EMERGENCY DEPT VISIT HI MDM: CPT

## 2017-05-03 PROCEDURE — 85610 PROTHROMBIN TIME: CPT

## 2017-05-03 PROCEDURE — 99283 EMERGENCY DEPT VISIT LOW MDM: CPT | Mod: 25

## 2017-05-03 PROCEDURE — 84484 ASSAY OF TROPONIN QUANT: CPT

## 2017-05-03 PROCEDURE — 85730 THROMBOPLASTIN TIME PARTIAL: CPT

## 2017-05-03 PROCEDURE — 95806 SLEEP STUDY UNATT&RESP EFFT: CPT

## 2017-05-03 PROCEDURE — 80048 BASIC METABOLIC PNL TOTAL CA: CPT

## 2017-05-03 NOTE — ED ADULT TRIAGE NOTE - CHIEF COMPLAINT QUOTE
Pt with hx of lobectomy by Dr. Bradley a couple of weeks ago. Pt c/o mid-sternal chest tightness which she describes may be "acid reflux" but feels different like a squeezing sensation.

## 2017-05-03 NOTE — ED ADULT NURSE NOTE - OBJECTIVE STATEMENT
1515  -  care of pt assumed at this time, ( pt assigned at 1212pm while this RN was in code ) a+ox 3 c/o sscp rad to l side of neck and to her back " in the wings area" + diaph , intermittently x a few weeks. denies nausea or sob. s/p lobectomy 5 weeks ago by Dr. Bradley, " since the procedure I've had a tight sensation in chest / neck " maybe it was the tube". bilateral clear breath sounds, r side incision well approximated , denies any drainage or fevers. no pedal edema noted.

## 2017-05-03 NOTE — ED PROVIDER NOTE - CARE PLAN
Principal Discharge DX:	Myalgia  Secondary Diagnosis:	Neck pain Principal Discharge DX:	Myalgia  Secondary Diagnosis:	Neck pain  Secondary Diagnosis:	Chest pain

## 2017-05-03 NOTE — ED PROVIDER NOTE - MEDICAL DECISION MAKING DETAILS
DDX considered: stable angina, aortic dissection, pericarditis, pneumonia, pe, pts, chest wall pain, esophageal spasm and abdominal emergencies. I have discussed with patient with negative troponin and normal ekg their 28 day cardiac risk and they have agreed to outpt cardio f/ut his week without fail. asa per day until then. return to the ed immediately for any intractable chest pain or sob. pt agrees to plan of care   repordicble pain over week  f/u with dr chavira without fail normal neuro exam presents

## 2017-05-03 NOTE — ED PROVIDER NOTE - OBJECTIVE STATEMENT
pt presents with intermittent achy tingling left sided neck pain into left chest wall x 2 weeks no hemoptysis. denies fever. denies HA or neck pain. no sob. no abd pain. no n/v/d. no urinary f/u/d. no back pain. no motor or sensory deficits. denies drug use. no recent travel. no rash. no other acute issues symptoms or concerns

## 2017-05-03 NOTE — ED ADULT TRIAGE NOTE - BSA (M2)
[facility]    [address] [place] [id number]    Phone:  [phone]    Fax:  [phone]       Thank You for choosing us for your health care visit. We are glad to serve you and happy to provide you with this summary of your visit. Please help us to ensure we have accurate records. If you find anything that needs to be changed, please let our staff know as soon as possible.          Your Demographic Information     Patient Name Sex Raciel Castro Male 1989       Ethnic Group Patient Race    Not of  or  Origin White      Your Visit Details     Date & Time Provider Department    2017 8:00 AM Robin Bob MD [facility]      Your Upcoming Appointment*(Max 10)     Friday May 12, 2017  8:30 AM CDT   Follow-up Visit with Robin Bob MD   [facility] ([facility])    [id number] [facility] [address] [phone]              Your To Do List     Future Orders Please Complete On or Around Expires    XR FINGER MIN 2 VW RIGHT        Conditions Discussed Today or Order-Related Diagnoses        Comments    Pain of right thumb    -  Primary       Your Vitals Were     Height Weight BMI Smoking Status          6' 6\" (1.981 m) 229 lb (103.9 kg) 26.46 kg/m2 Never Smoker        Medications Prescribed or Re-Ordered Today     None      Allergies     No Known Allergies            Patient Instructions     None       2.17

## 2017-06-02 ENCOUNTER — APPOINTMENT (OUTPATIENT)
Dept: PULMONOLOGY | Facility: CLINIC | Age: 62
End: 2017-06-02

## 2017-06-02 VITALS
OXYGEN SATURATION: 95 % | DIASTOLIC BLOOD PRESSURE: 74 MMHG | SYSTOLIC BLOOD PRESSURE: 136 MMHG | BODY MASS INDEX: 34.36 KG/M2 | HEART RATE: 93 BPM | WEIGHT: 226 LBS

## 2017-07-31 ENCOUNTER — APPOINTMENT (OUTPATIENT)
Dept: PULMONOLOGY | Facility: CLINIC | Age: 62
End: 2017-07-31
Payer: MEDICAID

## 2017-07-31 VITALS
BODY MASS INDEX: 34.82 KG/M2 | HEIGHT: 68 IN | OXYGEN SATURATION: 97 % | SYSTOLIC BLOOD PRESSURE: 130 MMHG | HEART RATE: 76 BPM | DIASTOLIC BLOOD PRESSURE: 80 MMHG

## 2017-07-31 VITALS — BODY MASS INDEX: 34.82 KG/M2 | WEIGHT: 229 LBS

## 2017-07-31 PROCEDURE — 94010 BREATHING CAPACITY TEST: CPT

## 2017-07-31 PROCEDURE — 99215 OFFICE O/P EST HI 40 MIN: CPT | Mod: 25

## 2017-08-09 ENCOUNTER — APPOINTMENT (OUTPATIENT)
Dept: CT IMAGING | Facility: CLINIC | Age: 62
End: 2017-08-09

## 2017-08-11 ENCOUNTER — OUTPATIENT (OUTPATIENT)
Dept: OUTPATIENT SERVICES | Facility: HOSPITAL | Age: 62
LOS: 1 days | Discharge: HOME | End: 2017-08-11

## 2017-08-11 DIAGNOSIS — C34.90 MALIGNANT NEOPLASM OF UNSPECIFIED PART OF UNSPECIFIED BRONCHUS OR LUNG: ICD-10-CM

## 2017-08-11 DIAGNOSIS — Z98.890 OTHER SPECIFIED POSTPROCEDURAL STATES: Chronic | ICD-10-CM

## 2017-08-13 ENCOUNTER — FORM ENCOUNTER (OUTPATIENT)
Age: 62
End: 2017-08-13

## 2017-08-14 ENCOUNTER — APPOINTMENT (OUTPATIENT)
Dept: CT IMAGING | Facility: CLINIC | Age: 62
End: 2017-08-14
Payer: MEDICAID

## 2017-08-14 ENCOUNTER — OUTPATIENT (OUTPATIENT)
Dept: OUTPATIENT SERVICES | Facility: HOSPITAL | Age: 62
LOS: 1 days | End: 2017-08-14
Payer: COMMERCIAL

## 2017-08-14 DIAGNOSIS — Z98.890 OTHER SPECIFIED POSTPROCEDURAL STATES: Chronic | ICD-10-CM

## 2017-08-14 DIAGNOSIS — Z00.8 ENCOUNTER FOR OTHER GENERAL EXAMINATION: ICD-10-CM

## 2017-08-14 DIAGNOSIS — C34.91 MALIGNANT NEOPLASM OF UNSPECIFIED PART OF RIGHT BRONCHUS OR LUNG: ICD-10-CM

## 2017-08-14 PROCEDURE — 71260 CT THORAX DX C+: CPT

## 2017-08-14 PROCEDURE — 71260 CT THORAX DX C+: CPT | Mod: 26

## 2017-08-14 PROCEDURE — 82565 ASSAY OF CREATININE: CPT

## 2017-08-16 ENCOUNTER — APPOINTMENT (OUTPATIENT)
Dept: HEMATOLOGY ONCOLOGY | Facility: CLINIC | Age: 62
End: 2017-08-16
Payer: MEDICAID

## 2017-08-16 ENCOUNTER — RESULT REVIEW (OUTPATIENT)
Age: 62
End: 2017-08-16

## 2017-08-16 VITALS
BODY MASS INDEX: 34.71 KG/M2 | SYSTOLIC BLOOD PRESSURE: 137 MMHG | RESPIRATION RATE: 16 BRPM | HEART RATE: 88 BPM | WEIGHT: 228.28 LBS | TEMPERATURE: 97.8 F | DIASTOLIC BLOOD PRESSURE: 86 MMHG | OXYGEN SATURATION: 97 %

## 2017-08-16 LAB
ALBUMIN SERPL ELPH-MCNC: 4.3 G/DL
ALP BLD-CCNC: 104 U/L
ALT SERPL-CCNC: 69 U/L
ANION GAP SERPL CALC-SCNC: 15 MMOL/L
AST SERPL-CCNC: 60 U/L
BASOPHILS # BLD AUTO: 0 K/UL — SIGNIFICANT CHANGE UP (ref 0–0.2)
BASOPHILS NFR BLD AUTO: 0.6 % — SIGNIFICANT CHANGE UP (ref 0–2)
BILIRUB SERPL-MCNC: 0.4 MG/DL
BUN SERPL-MCNC: 12 MG/DL
CALCIUM SERPL-MCNC: 9.3 MG/DL
CEA SERPL-MCNC: 4.2 NG/ML
CHLORIDE SERPL-SCNC: 104 MMOL/L
CO2 SERPL-SCNC: 24 MMOL/L
CREAT SERPL-MCNC: 0.82 MG/DL
EOSINOPHIL # BLD AUTO: 0.1 K/UL — SIGNIFICANT CHANGE UP (ref 0–0.5)
EOSINOPHIL NFR BLD AUTO: 1.7 % — SIGNIFICANT CHANGE UP (ref 0–6)
GLUCOSE SERPL-MCNC: 109 MG/DL
HCT VFR BLD CALC: 37.3 % — SIGNIFICANT CHANGE UP (ref 34.5–45)
HGB BLD-MCNC: 12 G/DL — SIGNIFICANT CHANGE UP (ref 11.5–15.5)
LYMPHOCYTES # BLD AUTO: 1.5 K/UL — SIGNIFICANT CHANGE UP (ref 1–3.3)
LYMPHOCYTES # BLD AUTO: 29.5 % — SIGNIFICANT CHANGE UP (ref 13–44)
MAGNESIUM SERPL-MCNC: 2 MG/DL
MCHC RBC-ENTMCNC: 30.5 PG — SIGNIFICANT CHANGE UP (ref 27–34)
MCHC RBC-ENTMCNC: 32.2 GM/DL — SIGNIFICANT CHANGE UP (ref 32–36)
MCV RBC AUTO: 94.8 FL — SIGNIFICANT CHANGE UP (ref 80–100)
MONOCYTES # BLD AUTO: 0.4 K/UL — SIGNIFICANT CHANGE UP (ref 0–0.9)
MONOCYTES NFR BLD AUTO: 7.7 % — SIGNIFICANT CHANGE UP (ref 2–14)
NEUTROPHILS # BLD AUTO: 3 K/UL — SIGNIFICANT CHANGE UP (ref 1.8–7.4)
NEUTROPHILS NFR BLD AUTO: 60.4 % — SIGNIFICANT CHANGE UP (ref 43–77)
PLATELET # BLD AUTO: 237 K/UL — SIGNIFICANT CHANGE UP (ref 150–400)
POTASSIUM SERPL-SCNC: 4.2 MMOL/L
PROT SERPL-MCNC: 7.2 G/DL
RBC # BLD: 3.94 M/UL — SIGNIFICANT CHANGE UP (ref 3.8–5.2)
RBC # FLD: 13.1 % — SIGNIFICANT CHANGE UP (ref 10.3–14.5)
SODIUM SERPL-SCNC: 143 MMOL/L
WBC # BLD: 5 K/UL — SIGNIFICANT CHANGE UP (ref 3.8–10.5)
WBC # FLD AUTO: 5 K/UL — SIGNIFICANT CHANGE UP (ref 3.8–10.5)

## 2017-08-16 PROCEDURE — 99215 OFFICE O/P EST HI 40 MIN: CPT

## 2017-08-16 RX ORDER — NYSTATIN 100000 [USP'U]/G
100000 CREAM TOPICAL TWICE DAILY
Qty: 1 | Refills: 0 | Status: DISCONTINUED | COMMUNITY
Start: 2017-04-17 | End: 2017-08-16

## 2017-08-18 ENCOUNTER — LABORATORY RESULT (OUTPATIENT)
Age: 62
End: 2017-08-18

## 2017-08-18 ENCOUNTER — APPOINTMENT (OUTPATIENT)
Dept: GASTROENTEROLOGY | Facility: CLINIC | Age: 62
End: 2017-08-18
Payer: MEDICAID

## 2017-08-18 VITALS
WEIGHT: 228 LBS | HEIGHT: 68 IN | SYSTOLIC BLOOD PRESSURE: 141 MMHG | RESPIRATION RATE: 16 BRPM | DIASTOLIC BLOOD PRESSURE: 96 MMHG | BODY MASS INDEX: 34.56 KG/M2 | HEART RATE: 72 BPM

## 2017-08-18 PROCEDURE — 99204 OFFICE O/P NEW MOD 45 MIN: CPT

## 2017-08-22 ENCOUNTER — APPOINTMENT (OUTPATIENT)
Dept: PULMONOLOGY | Facility: CLINIC | Age: 62
End: 2017-08-22
Payer: MEDICAID

## 2017-08-22 ENCOUNTER — FORM ENCOUNTER (OUTPATIENT)
Age: 62
End: 2017-08-22

## 2017-08-22 VITALS
OXYGEN SATURATION: 97 % | HEART RATE: 74 BPM | SYSTOLIC BLOOD PRESSURE: 120 MMHG | WEIGHT: 228 LBS | BODY MASS INDEX: 34.67 KG/M2 | DIASTOLIC BLOOD PRESSURE: 74 MMHG

## 2017-08-22 PROCEDURE — 99214 OFFICE O/P EST MOD 30 MIN: CPT

## 2017-08-23 ENCOUNTER — APPOINTMENT (OUTPATIENT)
Dept: NUCLEAR MEDICINE | Facility: CLINIC | Age: 62
End: 2017-08-23
Payer: MEDICAID

## 2017-08-23 ENCOUNTER — OUTPATIENT (OUTPATIENT)
Dept: OUTPATIENT SERVICES | Facility: HOSPITAL | Age: 62
LOS: 1 days | End: 2017-08-23

## 2017-08-23 ENCOUNTER — APPOINTMENT (OUTPATIENT)
Dept: HEMATOLOGY ONCOLOGY | Facility: CLINIC | Age: 62
End: 2017-08-23
Payer: MEDICAID

## 2017-08-23 VITALS
WEIGHT: 227.08 LBS | DIASTOLIC BLOOD PRESSURE: 87 MMHG | TEMPERATURE: 97.9 F | SYSTOLIC BLOOD PRESSURE: 142 MMHG | BODY MASS INDEX: 34.53 KG/M2 | HEART RATE: 85 BPM | RESPIRATION RATE: 16 BRPM | OXYGEN SATURATION: 100 %

## 2017-08-23 DIAGNOSIS — Z00.8 ENCOUNTER FOR OTHER GENERAL EXAMINATION: ICD-10-CM

## 2017-08-23 DIAGNOSIS — Z98.890 OTHER SPECIFIED POSTPROCEDURAL STATES: Chronic | ICD-10-CM

## 2017-08-23 PROCEDURE — 78999 UNLISTED MISC PX DX NUC MED: CPT | Mod: 26

## 2017-08-23 PROCEDURE — 78306 BONE IMAGING WHOLE BODY: CPT | Mod: 26

## 2017-08-23 PROCEDURE — 99215 OFFICE O/P EST HI 40 MIN: CPT

## 2017-08-29 ENCOUNTER — FORM ENCOUNTER (OUTPATIENT)
Age: 62
End: 2017-08-29

## 2017-08-30 ENCOUNTER — OUTPATIENT (OUTPATIENT)
Dept: OUTPATIENT SERVICES | Facility: HOSPITAL | Age: 62
LOS: 1 days | End: 2017-08-30

## 2017-08-30 ENCOUNTER — APPOINTMENT (OUTPATIENT)
Dept: NUCLEAR MEDICINE | Facility: CLINIC | Age: 62
End: 2017-08-30
Payer: MEDICAID

## 2017-08-30 DIAGNOSIS — Z00.8 ENCOUNTER FOR OTHER GENERAL EXAMINATION: ICD-10-CM

## 2017-08-30 DIAGNOSIS — Z98.890 OTHER SPECIFIED POSTPROCEDURAL STATES: Chronic | ICD-10-CM

## 2017-08-30 PROCEDURE — 78815 PET IMAGE W/CT SKULL-THIGH: CPT | Mod: 26,PS

## 2017-08-31 ENCOUNTER — APPOINTMENT (OUTPATIENT)
Dept: HEMATOLOGY ONCOLOGY | Facility: CLINIC | Age: 62
End: 2017-08-31
Payer: MEDICAID

## 2017-09-05 ENCOUNTER — APPOINTMENT (OUTPATIENT)
Dept: HEMATOLOGY ONCOLOGY | Facility: CLINIC | Age: 62
End: 2017-09-05
Payer: MEDICAID

## 2017-09-08 ENCOUNTER — OUTPATIENT (OUTPATIENT)
Dept: OUTPATIENT SERVICES | Facility: HOSPITAL | Age: 62
LOS: 1 days | Discharge: HOME | End: 2017-09-08

## 2017-09-08 ENCOUNTER — APPOINTMENT (OUTPATIENT)
Dept: OTOLARYNGOLOGY | Facility: CLINIC | Age: 62
End: 2017-09-08
Payer: MEDICAID

## 2017-09-08 VITALS
HEIGHT: 68 IN | BODY MASS INDEX: 34.4 KG/M2 | OXYGEN SATURATION: 98 % | DIASTOLIC BLOOD PRESSURE: 80 MMHG | SYSTOLIC BLOOD PRESSURE: 120 MMHG | HEART RATE: 83 BPM | RESPIRATION RATE: 16 BRPM | WEIGHT: 227 LBS

## 2017-09-08 DIAGNOSIS — K21.9 GASTRO-ESOPHAGEAL REFLUX DISEASE W/OUT ESOPHAGITIS: ICD-10-CM

## 2017-09-08 DIAGNOSIS — Z98.890 OTHER SPECIFIED POSTPROCEDURAL STATES: Chronic | ICD-10-CM

## 2017-09-08 DIAGNOSIS — F15.90 OTHER STIMULANT USE, UNSPECIFIED, UNCOMPLICATED: ICD-10-CM

## 2017-09-08 DIAGNOSIS — C34.90 MALIGNANT NEOPLASM OF UNSPECIFIED PART OF UNSPECIFIED BRONCHUS OR LUNG: ICD-10-CM

## 2017-09-08 PROCEDURE — 31575 DIAGNOSTIC LARYNGOSCOPY: CPT

## 2017-09-08 PROCEDURE — 99205 OFFICE O/P NEW HI 60 MIN: CPT | Mod: 25

## 2017-09-11 ENCOUNTER — RESULT REVIEW (OUTPATIENT)
Age: 62
End: 2017-09-11

## 2017-09-11 ENCOUNTER — APPOINTMENT (OUTPATIENT)
Dept: HEMATOLOGY ONCOLOGY | Facility: CLINIC | Age: 62
End: 2017-09-11
Payer: MEDICAID

## 2017-09-11 VITALS
TEMPERATURE: 98.1 F | SYSTOLIC BLOOD PRESSURE: 137 MMHG | WEIGHT: 229.17 LBS | DIASTOLIC BLOOD PRESSURE: 89 MMHG | OXYGEN SATURATION: 97 % | RESPIRATION RATE: 16 BRPM | BODY MASS INDEX: 34.85 KG/M2 | HEART RATE: 73 BPM

## 2017-09-11 VITALS — SYSTOLIC BLOOD PRESSURE: 139 MMHG | DIASTOLIC BLOOD PRESSURE: 77 MMHG

## 2017-09-11 LAB
BASOPHILS # BLD AUTO: 0 K/UL — SIGNIFICANT CHANGE UP (ref 0–0.2)
BASOPHILS NFR BLD AUTO: 0.6 % — SIGNIFICANT CHANGE UP (ref 0–2)
EOSINOPHIL # BLD AUTO: 0.1 K/UL — SIGNIFICANT CHANGE UP (ref 0–0.5)
EOSINOPHIL NFR BLD AUTO: 1.5 % — SIGNIFICANT CHANGE UP (ref 0–6)
HCT VFR BLD CALC: 36.8 % — SIGNIFICANT CHANGE UP (ref 34.5–45)
HGB BLD-MCNC: 11.8 G/DL — SIGNIFICANT CHANGE UP (ref 11.5–15.5)
LYMPHOCYTES # BLD AUTO: 1.7 K/UL — SIGNIFICANT CHANGE UP (ref 1–3.3)
LYMPHOCYTES # BLD AUTO: 25.9 % — SIGNIFICANT CHANGE UP (ref 13–44)
MCHC RBC-ENTMCNC: 30.9 PG — SIGNIFICANT CHANGE UP (ref 27–34)
MCHC RBC-ENTMCNC: 32.2 GM/DL — SIGNIFICANT CHANGE UP (ref 32–36)
MCV RBC AUTO: 95.9 FL — SIGNIFICANT CHANGE UP (ref 80–100)
MONOCYTES # BLD AUTO: 0.4 K/UL — SIGNIFICANT CHANGE UP (ref 0–0.9)
MONOCYTES NFR BLD AUTO: 6.2 % — SIGNIFICANT CHANGE UP (ref 2–14)
NEUTROPHILS # BLD AUTO: 4.3 K/UL — SIGNIFICANT CHANGE UP (ref 1.8–7.4)
NEUTROPHILS NFR BLD AUTO: 65.8 % — SIGNIFICANT CHANGE UP (ref 43–77)
PLATELET # BLD AUTO: 226 K/UL — SIGNIFICANT CHANGE UP (ref 150–400)
RBC # BLD: 3.83 M/UL — SIGNIFICANT CHANGE UP (ref 3.8–5.2)
RBC # FLD: 12.8 % — SIGNIFICANT CHANGE UP (ref 10.3–14.5)
WBC # BLD: 6.6 K/UL — SIGNIFICANT CHANGE UP (ref 3.8–10.5)
WBC # FLD AUTO: 6.6 K/UL — SIGNIFICANT CHANGE UP (ref 3.8–10.5)

## 2017-09-11 PROCEDURE — 99215 OFFICE O/P EST HI 40 MIN: CPT

## 2017-09-12 LAB
ALBUMIN SERPL ELPH-MCNC: 4.1 G/DL
ALP BLD-CCNC: 116 U/L
ALT SERPL-CCNC: 73 U/L
ANION GAP SERPL CALC-SCNC: 12 MMOL/L
AST SERPL-CCNC: 48 U/L
BILIRUB SERPL-MCNC: 0.5 MG/DL
BUN SERPL-MCNC: 14 MG/DL
CALCIUM SERPL-MCNC: 8.9 MG/DL
CHLORIDE SERPL-SCNC: 105 MMOL/L
CO2 SERPL-SCNC: 26 MMOL/L
CREAT SERPL-MCNC: 0.85 MG/DL
GLUCOSE SERPL-MCNC: 116 MG/DL
MAGNESIUM SERPL-MCNC: 2 MG/DL
POTASSIUM SERPL-SCNC: 4.4 MMOL/L
PROT SERPL-MCNC: 7 G/DL
SODIUM SERPL-SCNC: 143 MMOL/L

## 2017-09-19 ENCOUNTER — OUTPATIENT (OUTPATIENT)
Dept: OUTPATIENT SERVICES | Facility: HOSPITAL | Age: 62
LOS: 1 days | End: 2017-09-19
Payer: COMMERCIAL

## 2017-09-19 VITALS
DIASTOLIC BLOOD PRESSURE: 83 MMHG | WEIGHT: 229.28 LBS | HEART RATE: 75 BPM | TEMPERATURE: 99 F | HEIGHT: 68 IN | RESPIRATION RATE: 18 BRPM | SYSTOLIC BLOOD PRESSURE: 147 MMHG

## 2017-09-19 DIAGNOSIS — C34.90 MALIGNANT NEOPLASM OF UNSPECIFIED PART OF UNSPECIFIED BRONCHUS OR LUNG: ICD-10-CM

## 2017-09-19 DIAGNOSIS — Z98.890 OTHER SPECIFIED POSTPROCEDURAL STATES: Chronic | ICD-10-CM

## 2017-09-19 DIAGNOSIS — Z01.818 ENCOUNTER FOR OTHER PREPROCEDURAL EXAMINATION: ICD-10-CM

## 2017-09-19 DIAGNOSIS — Z90.2 ACQUIRED ABSENCE OF LUNG [PART OF]: Chronic | ICD-10-CM

## 2017-09-19 LAB
ALBUMIN SERPL ELPH-MCNC: 4.5 G/DL — SIGNIFICANT CHANGE UP (ref 3.3–5.2)
ALP SERPL-CCNC: 117 U/L — SIGNIFICANT CHANGE UP (ref 40–120)
ALT FLD-CCNC: 77 U/L — HIGH
ANION GAP SERPL CALC-SCNC: 12 MMOL/L — SIGNIFICANT CHANGE UP (ref 5–17)
APTT BLD: 33 SEC — SIGNIFICANT CHANGE UP (ref 27.5–37.4)
AST SERPL-CCNC: 71 U/L — HIGH
BILIRUB SERPL-MCNC: 0.6 MG/DL — SIGNIFICANT CHANGE UP (ref 0.4–2)
BUN SERPL-MCNC: 13 MG/DL — SIGNIFICANT CHANGE UP (ref 8–20)
CALCIUM SERPL-MCNC: 9.2 MG/DL — SIGNIFICANT CHANGE UP (ref 8.6–10.2)
CHLORIDE SERPL-SCNC: 101 MMOL/L — SIGNIFICANT CHANGE UP (ref 98–107)
CO2 SERPL-SCNC: 27 MMOL/L — SIGNIFICANT CHANGE UP (ref 22–29)
CREAT SERPL-MCNC: 0.6 MG/DL — SIGNIFICANT CHANGE UP (ref 0.5–1.3)
GLUCOSE SERPL-MCNC: 94 MG/DL — SIGNIFICANT CHANGE UP (ref 70–115)
HCT VFR BLD CALC: 37.1 % — SIGNIFICANT CHANGE UP (ref 37–47)
HGB BLD-MCNC: 12.3 G/DL — SIGNIFICANT CHANGE UP (ref 12–16)
INR BLD: 0.97 RATIO — SIGNIFICANT CHANGE UP (ref 0.88–1.16)
MCHC RBC-ENTMCNC: 31.1 PG — HIGH (ref 27–31)
MCHC RBC-ENTMCNC: 33.2 G/DL — SIGNIFICANT CHANGE UP (ref 32–36)
MCV RBC AUTO: 93.9 FL — SIGNIFICANT CHANGE UP (ref 81–99)
PLATELET # BLD AUTO: 236 K/UL — SIGNIFICANT CHANGE UP (ref 150–400)
POTASSIUM SERPL-MCNC: 4.6 MMOL/L — SIGNIFICANT CHANGE UP (ref 3.5–5.3)
POTASSIUM SERPL-SCNC: 4.6 MMOL/L — SIGNIFICANT CHANGE UP (ref 3.5–5.3)
PROT SERPL-MCNC: 7.7 G/DL — SIGNIFICANT CHANGE UP (ref 6.6–8.7)
PROTHROM AB SERPL-ACNC: 10.7 SEC — SIGNIFICANT CHANGE UP (ref 9.8–12.7)
RBC # BLD: 3.95 M/UL — LOW (ref 4.4–5.2)
RBC # FLD: 13.3 % — SIGNIFICANT CHANGE UP (ref 11–15.6)
SODIUM SERPL-SCNC: 140 MMOL/L — SIGNIFICANT CHANGE UP (ref 135–145)
WBC # BLD: 6.9 K/UL — SIGNIFICANT CHANGE UP (ref 4.8–10.8)
WBC # FLD AUTO: 6.9 K/UL — SIGNIFICANT CHANGE UP (ref 4.8–10.8)

## 2017-09-19 PROCEDURE — 85730 THROMBOPLASTIN TIME PARTIAL: CPT

## 2017-09-19 PROCEDURE — G0463: CPT

## 2017-09-19 PROCEDURE — 85027 COMPLETE CBC AUTOMATED: CPT

## 2017-09-19 PROCEDURE — 80053 COMPREHEN METABOLIC PANEL: CPT

## 2017-09-19 PROCEDURE — 36415 COLL VENOUS BLD VENIPUNCTURE: CPT

## 2017-09-19 PROCEDURE — 85610 PROTHROMBIN TIME: CPT

## 2017-09-19 RX ORDER — SODIUM CHLORIDE 9 MG/ML
3 INJECTION INTRAMUSCULAR; INTRAVENOUS; SUBCUTANEOUS ONCE
Qty: 0 | Refills: 0 | Status: DISCONTINUED | OUTPATIENT
Start: 2017-09-29 | End: 2017-10-14

## 2017-09-19 NOTE — H&P PST ADULT - MUSCULOSKELETAL
details… no joint swelling/ROM intact/no joint warmth/no calf tenderness/no joint erythema/normal strength/normal detailed exam

## 2017-09-19 NOTE — H&P PST ADULT - NEGATIVE OPHTHALMOLOGIC SYMPTOMS
no discharge R/no pain L/no blurred vision R/no irritation L/no scleral injection R/no photophobia/no lacrimation R/no blurred vision L/no discharge L/no pain R/no irritation R/no loss of vision L/no scleral injection L/no lacrimation L/no diplopia

## 2017-09-19 NOTE — H&P PST ADULT - NEGATIVE GENERAL GENITOURINARY SYMPTOMS
normal urinary frequency/no dysuria/normal libido/no nocturia/no incontinence/no bladder infections/no hematuria/no renal colic/no flank pain R/no urinary hesitancy/no flank pain L/no urine discoloration/no gas in urine

## 2017-09-19 NOTE — H&P PST ADULT - PSH
S/P bunionectomy  with revision S/P bunionectomy  with revision  Status post lobectomy of lung  3/2017

## 2017-09-19 NOTE — H&P PST ADULT - HISTORY OF PRESENT ILLNESS
62 y/o female 60 y/o female with h/o lung cancer patient had a right lower lobectomy with mediastinal lymph node dissection  and recent follow up had PET/Ct scan which showed possible metastatic disease patient now presents for Ct guided bone biopsy

## 2017-09-19 NOTE — H&P PST ADULT - NEGATIVE MUSCULOSKELETAL SYMPTOMS
no myalgia/no muscle cramps/no joint swelling/no muscle weakness/no back pain/no leg pain R/no arthritis/no stiffness/no arm pain L/no leg pain L/no neck pain/no arm pain R/no arthralgia

## 2017-09-19 NOTE — H&P PST ADULT - NEGATIVE ENMT SYMPTOMS
no abnormal taste sensation/no gum bleeding/no dry mouth/no nasal obstruction/no nasal discharge/no nasal congestion/no recurrent cold sores/no hearing difficulty/no tinnitus/no throat pain/no ear pain/no sinus symptoms/no post-nasal discharge/no dysphagia/no vertigo

## 2017-09-19 NOTE — H&P PST ADULT - NEGATIVE GASTROINTESTINAL SYMPTOMS
no diarrhea/no hematochezia/no steatorrhea/no jaundice/no hiccoughs/no nausea/no constipation/no abdominal pain/no melena/no vomiting/no change in bowel habits/no flatulence

## 2017-09-19 NOTE — H&P PST ADULT - NEGATIVE SKIN SYMPTOMS
no rash/no itching/no dryness/no hair loss/no brittle nails/no change in size/color of mole/no tumor/no pitted nails

## 2017-09-19 NOTE — H&P PST ADULT - NEGATIVE PSYCHIATRIC SYMPTOMS
no memory loss/no suicidal ideation/no visual hallucinations/no hyperactivity/no paranoia/no auditory hallucinations/no mood swings/no agitation/no depression/no insomnia

## 2017-09-19 NOTE — H&P PST ADULT - NEGATIVE FEMALE-SPECIFIC SYMPTOMS
no amenorrhea/no spotting/no pelvic pain/no irregular menses/not applicable/no vaginal discharge/no dysmenorrhea/no menorrhagia/no abnormal vaginal bleeding

## 2017-09-19 NOTE — H&P PST ADULT - NEUROLOGICAL DETAILS
responds to verbal commands/sensation intact/deep reflexes intact/cranial nerves intact/responds to pain/no spontaneous movement/superficial reflexes intact/normal strength/alert and oriented x 3

## 2017-09-19 NOTE — H&P PST ADULT - NEGATIVE CARDIOVASCULAR SYMPTOMS
no chest pain/no dyspnea on exertion/no orthopnea/no claudication/no peripheral edema/no paroxysmal nocturnal dyspnea/no palpitations

## 2017-09-19 NOTE — H&P PST ADULT - GASTROINTESTINAL DETAILS
no masses palpable/nontender/bowel sounds normal/no rebound tenderness/no guarding/no distention/normal/no rigidity/no organomegaly/soft/no bruit

## 2017-09-19 NOTE — H&P PST ADULT - NEGATIVE NEUROLOGICAL SYMPTOMS
no difficulty walking/no focal seizures/no confusion/no generalized seizures/no tremors/no headache/no paresthesias/no weakness/no vertigo/no loss of sensation/no facial palsy/no syncope/no transient paralysis/no loss of consciousness

## 2017-09-19 NOTE — H&P PST ADULT - NEGATIVE GENERAL SYMPTOMS
no malaise/no weight gain/no chills/no sweating/no anorexia/no polyuria/no fever/no weight loss/no polydipsia/no polyphagia/no fatigue

## 2017-09-19 NOTE — H&P PST ADULT - NEGATIVE BREAST SYMPTOMS
no nipple discharge R/no nipple discharge L/no breast lump L/no breast tenderness L/no breast tenderness R/no breast lump R

## 2017-09-22 PROBLEM — K21.9 LPRD (LARYNGOPHARYNGEAL REFLUX DISEASE): Status: ACTIVE | Noted: 2017-09-08

## 2017-09-28 ENCOUNTER — FORM ENCOUNTER (OUTPATIENT)
Age: 62
End: 2017-09-28

## 2017-09-29 ENCOUNTER — OUTPATIENT (OUTPATIENT)
Dept: INPATIENT UNIT | Facility: HOSPITAL | Age: 62
LOS: 1 days | End: 2017-09-29
Payer: COMMERCIAL

## 2017-09-29 ENCOUNTER — RESULT REVIEW (OUTPATIENT)
Age: 62
End: 2017-09-29

## 2017-09-29 VITALS
DIASTOLIC BLOOD PRESSURE: 69 MMHG | WEIGHT: 229.28 LBS | SYSTOLIC BLOOD PRESSURE: 142 MMHG | OXYGEN SATURATION: 99 % | HEIGHT: 68 IN | TEMPERATURE: 97 F | RESPIRATION RATE: 16 BRPM | HEART RATE: 72 BPM

## 2017-09-29 VITALS
RESPIRATION RATE: 14 BRPM | DIASTOLIC BLOOD PRESSURE: 68 MMHG | HEART RATE: 64 BPM | SYSTOLIC BLOOD PRESSURE: 135 MMHG | OXYGEN SATURATION: 100 %

## 2017-09-29 DIAGNOSIS — C34.91 MALIGNANT NEOPLASM OF UNSPECIFIED PART OF RIGHT BRONCHUS OR LUNG: ICD-10-CM

## 2017-09-29 DIAGNOSIS — Z90.2 ACQUIRED ABSENCE OF LUNG [PART OF]: Chronic | ICD-10-CM

## 2017-09-29 DIAGNOSIS — Z98.890 OTHER SPECIFIED POSTPROCEDURAL STATES: Chronic | ICD-10-CM

## 2017-09-29 PROCEDURE — 38221 DX BONE MARROW BIOPSIES: CPT

## 2017-09-29 PROCEDURE — 77012 CT SCAN FOR NEEDLE BIOPSY: CPT | Mod: 26

## 2017-09-29 PROCEDURE — 88305 TISSUE EXAM BY PATHOLOGIST: CPT | Mod: 26

## 2017-09-29 PROCEDURE — 77012 CT SCAN FOR NEEDLE BIOPSY: CPT

## 2017-09-29 PROCEDURE — 88305 TISSUE EXAM BY PATHOLOGIST: CPT

## 2017-09-29 PROCEDURE — 20225 BONE BIOPSY TROCAR/NDL DEEP: CPT

## 2017-09-29 NOTE — ASU DISCHARGE PLAN (ADULT/PEDIATRIC). - MEDICATION SUMMARY - MEDICATIONS TO TAKE
I will START or STAY ON the medications listed below when I get home from the hospital:    aspirin 81 mg oral tablet  -- 1 tab(s) by mouth 2 times a day  -- Indication: For per PMD    valACYclovir 500 mg oral tablet  -- 1 tab(s) by mouth every 12 hours, As Needed  -- Indication: For per PMD    milk thistle oral capsule  --  by mouth once a day  -- Indication: For per PMD    levothyroxine 175 mcg (0.175 mg) oral tablet  -- 1 tab(s) by mouth once a day  -- Indication: For per PMD

## 2017-10-02 LAB — SURGICAL PATHOLOGY FINAL REPORT - CH: SIGNIFICANT CHANGE UP

## 2017-10-03 ENCOUNTER — APPOINTMENT (OUTPATIENT)
Dept: GASTROENTEROLOGY | Facility: GI CENTER | Age: 62
End: 2017-10-03

## 2017-10-04 ENCOUNTER — APPOINTMENT (OUTPATIENT)
Dept: HEMATOLOGY ONCOLOGY | Facility: CLINIC | Age: 62
End: 2017-10-04
Payer: MEDICAID

## 2017-10-04 VITALS
DIASTOLIC BLOOD PRESSURE: 80 MMHG | TEMPERATURE: 97.4 F | BODY MASS INDEX: 34.9 KG/M2 | OXYGEN SATURATION: 96 % | SYSTOLIC BLOOD PRESSURE: 147 MMHG | WEIGHT: 229.5 LBS | HEART RATE: 85 BPM

## 2017-10-04 PROCEDURE — 99215 OFFICE O/P EST HI 40 MIN: CPT

## 2017-11-30 ENCOUNTER — APPOINTMENT (OUTPATIENT)
Dept: GASTROENTEROLOGY | Facility: CLINIC | Age: 62
End: 2017-11-30
Payer: MEDICAID

## 2017-11-30 VITALS
DIASTOLIC BLOOD PRESSURE: 87 MMHG | RESPIRATION RATE: 16 BRPM | HEIGHT: 68 IN | WEIGHT: 232 LBS | HEART RATE: 80 BPM | BODY MASS INDEX: 35.16 KG/M2 | SYSTOLIC BLOOD PRESSURE: 152 MMHG

## 2017-11-30 VITALS
WEIGHT: 230 LBS | BODY MASS INDEX: 34.86 KG/M2 | DIASTOLIC BLOOD PRESSURE: 80 MMHG | HEIGHT: 68 IN | SYSTOLIC BLOOD PRESSURE: 147 MMHG | HEART RATE: 84 BPM | RESPIRATION RATE: 12 BRPM

## 2017-11-30 DIAGNOSIS — G89.29 RIGHT LOWER QUADRANT PAIN: ICD-10-CM

## 2017-11-30 DIAGNOSIS — Z85.118 PERSONAL HISTORY OF OTHER MALIGNANT NEOPLASM OF BRONCHUS AND LUNG: ICD-10-CM

## 2017-11-30 DIAGNOSIS — C34.31 MALIGNANT NEOPLASM OF LOWER LOBE, RIGHT BRONCHUS OR LUNG: ICD-10-CM

## 2017-11-30 DIAGNOSIS — R10.31 RIGHT LOWER QUADRANT PAIN: ICD-10-CM

## 2017-11-30 PROCEDURE — 99214 OFFICE O/P EST MOD 30 MIN: CPT

## 2017-12-05 ENCOUNTER — FORM ENCOUNTER (OUTPATIENT)
Age: 62
End: 2017-12-05

## 2017-12-05 ENCOUNTER — OUTPATIENT (OUTPATIENT)
Dept: OUTPATIENT SERVICES | Facility: HOSPITAL | Age: 62
LOS: 1 days | Discharge: HOME | End: 2017-12-05

## 2017-12-05 DIAGNOSIS — C34.90 MALIGNANT NEOPLASM OF UNSPECIFIED PART OF UNSPECIFIED BRONCHUS OR LUNG: ICD-10-CM

## 2017-12-05 DIAGNOSIS — Z98.890 OTHER SPECIFIED POSTPROCEDURAL STATES: Chronic | ICD-10-CM

## 2017-12-05 DIAGNOSIS — Z90.2 ACQUIRED ABSENCE OF LUNG [PART OF]: Chronic | ICD-10-CM

## 2017-12-06 ENCOUNTER — APPOINTMENT (OUTPATIENT)
Dept: CT IMAGING | Facility: CLINIC | Age: 62
End: 2017-12-06

## 2017-12-06 ENCOUNTER — APPOINTMENT (OUTPATIENT)
Dept: NUCLEAR MEDICINE | Facility: CLINIC | Age: 62
End: 2017-12-06
Payer: MEDICAID

## 2017-12-06 ENCOUNTER — OUTPATIENT (OUTPATIENT)
Dept: OUTPATIENT SERVICES | Facility: HOSPITAL | Age: 62
LOS: 1 days | End: 2017-12-06

## 2017-12-06 DIAGNOSIS — Z98.890 OTHER SPECIFIED POSTPROCEDURAL STATES: Chronic | ICD-10-CM

## 2017-12-06 DIAGNOSIS — Z90.2 ACQUIRED ABSENCE OF LUNG [PART OF]: Chronic | ICD-10-CM

## 2017-12-06 DIAGNOSIS — Z00.8 ENCOUNTER FOR OTHER GENERAL EXAMINATION: ICD-10-CM

## 2017-12-06 PROCEDURE — 70491 CT SOFT TISSUE NECK W/DYE: CPT | Mod: 26

## 2017-12-06 PROCEDURE — 78815 PET IMAGE W/CT SKULL-THIGH: CPT | Mod: 26,PS

## 2017-12-11 ENCOUNTER — APPOINTMENT (OUTPATIENT)
Dept: HEMATOLOGY ONCOLOGY | Facility: CLINIC | Age: 62
End: 2017-12-11
Payer: MEDICAID

## 2017-12-11 ENCOUNTER — RESULT REVIEW (OUTPATIENT)
Age: 62
End: 2017-12-11

## 2017-12-11 VITALS
TEMPERATURE: 97.5 F | OXYGEN SATURATION: 96 % | WEIGHT: 223 LBS | SYSTOLIC BLOOD PRESSURE: 129 MMHG | DIASTOLIC BLOOD PRESSURE: 80 MMHG | BODY MASS INDEX: 33.91 KG/M2 | HEART RATE: 84 BPM

## 2017-12-11 LAB
BASOPHILS # BLD AUTO: 0.1 K/UL — SIGNIFICANT CHANGE UP (ref 0–0.2)
BASOPHILS NFR BLD AUTO: 1 % — SIGNIFICANT CHANGE UP (ref 0–2)
EOSINOPHIL # BLD AUTO: 0.1 K/UL — SIGNIFICANT CHANGE UP (ref 0–0.5)
EOSINOPHIL NFR BLD AUTO: 0.9 % — SIGNIFICANT CHANGE UP (ref 0–6)
HCT VFR BLD CALC: 35.3 % — SIGNIFICANT CHANGE UP (ref 34.5–45)
HGB BLD-MCNC: 11.3 G/DL — LOW (ref 11.5–15.5)
LYMPHOCYTES # BLD AUTO: 1.7 K/UL — SIGNIFICANT CHANGE UP (ref 1–3.3)
LYMPHOCYTES # BLD AUTO: 24.7 % — SIGNIFICANT CHANGE UP (ref 13–44)
MCHC RBC-ENTMCNC: 30.1 PG — SIGNIFICANT CHANGE UP (ref 27–34)
MCHC RBC-ENTMCNC: 32 GM/DL — SIGNIFICANT CHANGE UP (ref 32–36)
MCV RBC AUTO: 94.1 FL — SIGNIFICANT CHANGE UP (ref 80–100)
MONOCYTES # BLD AUTO: 0.5 K/UL — SIGNIFICANT CHANGE UP (ref 0–0.9)
MONOCYTES NFR BLD AUTO: 7.2 % — SIGNIFICANT CHANGE UP (ref 2–14)
NEUTROPHILS # BLD AUTO: 4.7 K/UL — SIGNIFICANT CHANGE UP (ref 1.8–7.4)
NEUTROPHILS NFR BLD AUTO: 66.2 % — SIGNIFICANT CHANGE UP (ref 43–77)
PLATELET # BLD AUTO: 248 K/UL — SIGNIFICANT CHANGE UP (ref 150–400)
RBC # BLD: 3.75 M/UL — LOW (ref 3.8–5.2)
RBC # FLD: 12.6 % — SIGNIFICANT CHANGE UP (ref 10.3–14.5)
WBC # BLD: 7 K/UL — SIGNIFICANT CHANGE UP (ref 3.8–10.5)
WBC # FLD AUTO: 7 K/UL — SIGNIFICANT CHANGE UP (ref 3.8–10.5)

## 2017-12-11 PROCEDURE — 99215 OFFICE O/P EST HI 40 MIN: CPT

## 2017-12-12 LAB
ALBUMIN SERPL ELPH-MCNC: 4.3 G/DL
ALP BLD-CCNC: 118 U/L
ALT SERPL-CCNC: 79 U/L
ANION GAP SERPL CALC-SCNC: 14 MMOL/L
AST SERPL-CCNC: 41 U/L
BILIRUB SERPL-MCNC: 0.5 MG/DL
BUN SERPL-MCNC: 14 MG/DL
CALCIUM SERPL-MCNC: 9.4 MG/DL
CEA SERPL-MCNC: 4.5 NG/ML
CHLORIDE SERPL-SCNC: 102 MMOL/L
CO2 SERPL-SCNC: 27 MMOL/L
CREAT SERPL-MCNC: 0.7 MG/DL
GLUCOSE SERPL-MCNC: 110 MG/DL
MAGNESIUM SERPL-MCNC: 2 MG/DL
POTASSIUM SERPL-SCNC: 4.8 MMOL/L
PROT SERPL-MCNC: 7.1 G/DL
SODIUM SERPL-SCNC: 143 MMOL/L

## 2017-12-17 ENCOUNTER — FORM ENCOUNTER (OUTPATIENT)
Age: 62
End: 2017-12-17

## 2017-12-18 ENCOUNTER — OUTPATIENT (OUTPATIENT)
Dept: OUTPATIENT SERVICES | Facility: HOSPITAL | Age: 62
LOS: 1 days | End: 2017-12-18

## 2017-12-18 ENCOUNTER — APPOINTMENT (OUTPATIENT)
Dept: ULTRASOUND IMAGING | Facility: CLINIC | Age: 62
End: 2017-12-18
Payer: MEDICAID

## 2017-12-18 DIAGNOSIS — Z98.890 OTHER SPECIFIED POSTPROCEDURAL STATES: Chronic | ICD-10-CM

## 2017-12-18 DIAGNOSIS — Z90.2 ACQUIRED ABSENCE OF LUNG [PART OF]: Chronic | ICD-10-CM

## 2017-12-18 DIAGNOSIS — Z00.8 ENCOUNTER FOR OTHER GENERAL EXAMINATION: ICD-10-CM

## 2017-12-18 PROCEDURE — 76536 US EXAM OF HEAD AND NECK: CPT | Mod: 26

## 2018-01-16 ENCOUNTER — OUTPATIENT (OUTPATIENT)
Dept: OUTPATIENT SERVICES | Facility: HOSPITAL | Age: 63
LOS: 1 days | Discharge: HOME | End: 2018-01-16

## 2018-01-16 DIAGNOSIS — C34.90 MALIGNANT NEOPLASM OF UNSPECIFIED PART OF UNSPECIFIED BRONCHUS OR LUNG: ICD-10-CM

## 2018-01-16 DIAGNOSIS — Z90.2 ACQUIRED ABSENCE OF LUNG [PART OF]: Chronic | ICD-10-CM

## 2018-01-16 DIAGNOSIS — Z98.890 OTHER SPECIFIED POSTPROCEDURAL STATES: Chronic | ICD-10-CM

## 2018-01-18 ENCOUNTER — APPOINTMENT (OUTPATIENT)
Dept: HEMATOLOGY ONCOLOGY | Facility: CLINIC | Age: 63
End: 2018-01-18

## 2018-01-29 ENCOUNTER — RESULT REVIEW (OUTPATIENT)
Age: 63
End: 2018-01-29

## 2018-01-29 ENCOUNTER — APPOINTMENT (OUTPATIENT)
Dept: GASTROENTEROLOGY | Facility: GI CENTER | Age: 63
End: 2018-01-29
Payer: MEDICAID

## 2018-01-29 ENCOUNTER — OUTPATIENT (OUTPATIENT)
Dept: OUTPATIENT SERVICES | Facility: HOSPITAL | Age: 63
LOS: 1 days | End: 2018-01-29
Payer: COMMERCIAL

## 2018-01-29 VITALS
HEART RATE: 80 BPM | WEIGHT: 223 LBS | SYSTOLIC BLOOD PRESSURE: 130 MMHG | RESPIRATION RATE: 12 BRPM | HEIGHT: 68 IN | TEMPERATURE: 98 F | BODY MASS INDEX: 33.8 KG/M2 | DIASTOLIC BLOOD PRESSURE: 80 MMHG

## 2018-01-29 DIAGNOSIS — K52.9 NONINFECTIVE GASTROENTERITIS AND COLITIS, UNSPECIFIED: ICD-10-CM

## 2018-01-29 DIAGNOSIS — Z98.890 OTHER SPECIFIED POSTPROCEDURAL STATES: Chronic | ICD-10-CM

## 2018-01-29 DIAGNOSIS — Z90.2 ACQUIRED ABSENCE OF LUNG [PART OF]: Chronic | ICD-10-CM

## 2018-01-29 PROCEDURE — 45380 COLONOSCOPY AND BIOPSY: CPT

## 2018-01-29 PROCEDURE — 88305 TISSUE EXAM BY PATHOLOGIST: CPT

## 2018-01-29 PROCEDURE — 88305 TISSUE EXAM BY PATHOLOGIST: CPT | Mod: 26

## 2018-01-31 LAB — SURGICAL PATHOLOGY FINAL REPORT - CH: SIGNIFICANT CHANGE UP

## 2018-03-01 ENCOUNTER — OUTPATIENT (OUTPATIENT)
Dept: OUTPATIENT SERVICES | Facility: HOSPITAL | Age: 63
LOS: 1 days | Discharge: HOME | End: 2018-03-01

## 2018-03-01 DIAGNOSIS — Z90.2 ACQUIRED ABSENCE OF LUNG [PART OF]: Chronic | ICD-10-CM

## 2018-03-01 DIAGNOSIS — C34.90 MALIGNANT NEOPLASM OF UNSPECIFIED PART OF UNSPECIFIED BRONCHUS OR LUNG: ICD-10-CM

## 2018-03-01 DIAGNOSIS — Z98.890 OTHER SPECIFIED POSTPROCEDURAL STATES: Chronic | ICD-10-CM

## 2018-03-04 ENCOUNTER — FORM ENCOUNTER (OUTPATIENT)
Age: 63
End: 2018-03-04

## 2018-03-05 ENCOUNTER — OUTPATIENT (OUTPATIENT)
Dept: OUTPATIENT SERVICES | Facility: HOSPITAL | Age: 63
LOS: 1 days | End: 2018-03-05

## 2018-03-05 ENCOUNTER — APPOINTMENT (OUTPATIENT)
Dept: NUCLEAR MEDICINE | Facility: CLINIC | Age: 63
End: 2018-03-05
Payer: MEDICAID

## 2018-03-05 DIAGNOSIS — Z90.2 ACQUIRED ABSENCE OF LUNG [PART OF]: Chronic | ICD-10-CM

## 2018-03-05 DIAGNOSIS — Z00.8 ENCOUNTER FOR OTHER GENERAL EXAMINATION: ICD-10-CM

## 2018-03-05 DIAGNOSIS — Z98.890 OTHER SPECIFIED POSTPROCEDURAL STATES: Chronic | ICD-10-CM

## 2018-03-05 PROCEDURE — 78815 PET IMAGE W/CT SKULL-THIGH: CPT | Mod: 26,PS

## 2018-03-06 ENCOUNTER — RECORD ABSTRACTING (OUTPATIENT)
Age: 63
End: 2018-03-06

## 2018-03-07 ENCOUNTER — RECORD ABSTRACTING (OUTPATIENT)
Age: 63
End: 2018-03-07

## 2018-03-07 ENCOUNTER — RESULT REVIEW (OUTPATIENT)
Age: 63
End: 2018-03-07

## 2018-03-07 ENCOUNTER — APPOINTMENT (OUTPATIENT)
Dept: HEMATOLOGY ONCOLOGY | Facility: CLINIC | Age: 63
End: 2018-03-07
Payer: MEDICAID

## 2018-03-07 VITALS
OXYGEN SATURATION: 95 % | TEMPERATURE: 97.9 F | DIASTOLIC BLOOD PRESSURE: 86 MMHG | SYSTOLIC BLOOD PRESSURE: 156 MMHG | HEART RATE: 80 BPM | HEIGHT: 68 IN | WEIGHT: 238.87 LBS | BODY MASS INDEX: 36.2 KG/M2

## 2018-03-07 LAB
BASOPHILS # BLD AUTO: 0.1 K/UL — SIGNIFICANT CHANGE UP (ref 0–0.2)
BASOPHILS NFR BLD AUTO: 0.8 % — SIGNIFICANT CHANGE UP (ref 0–2)
EOSINOPHIL # BLD AUTO: 0.1 K/UL — SIGNIFICANT CHANGE UP (ref 0–0.5)
EOSINOPHIL NFR BLD AUTO: 1 % — SIGNIFICANT CHANGE UP (ref 0–6)
HCT VFR BLD CALC: 36.3 % — SIGNIFICANT CHANGE UP (ref 34.5–45)
HGB BLD-MCNC: 12.1 G/DL — SIGNIFICANT CHANGE UP (ref 11.5–15.5)
LYMPHOCYTES # BLD AUTO: 1.5 K/UL — SIGNIFICANT CHANGE UP (ref 1–3.3)
LYMPHOCYTES # BLD AUTO: 22.2 % — SIGNIFICANT CHANGE UP (ref 13–44)
MCHC RBC-ENTMCNC: 31.8 PG — SIGNIFICANT CHANGE UP (ref 27–34)
MCHC RBC-ENTMCNC: 33.4 GM/DL — SIGNIFICANT CHANGE UP (ref 32–36)
MCV RBC AUTO: 95 FL — SIGNIFICANT CHANGE UP (ref 80–100)
MONOCYTES # BLD AUTO: 0.4 K/UL — SIGNIFICANT CHANGE UP (ref 0–0.9)
MONOCYTES NFR BLD AUTO: 6.3 % — SIGNIFICANT CHANGE UP (ref 2–14)
NEUTROPHILS # BLD AUTO: 4.9 K/UL — SIGNIFICANT CHANGE UP (ref 1.8–7.4)
NEUTROPHILS NFR BLD AUTO: 69.8 % — SIGNIFICANT CHANGE UP (ref 43–77)
PLATELET # BLD AUTO: 212 K/UL — SIGNIFICANT CHANGE UP (ref 150–400)
RBC # BLD: 3.82 M/UL — SIGNIFICANT CHANGE UP (ref 3.8–5.2)
RBC # FLD: 14.1 % — SIGNIFICANT CHANGE UP (ref 10.3–14.5)
WBC # BLD: 7 K/UL — SIGNIFICANT CHANGE UP (ref 3.8–10.5)
WBC # FLD AUTO: 7 K/UL — SIGNIFICANT CHANGE UP (ref 3.8–10.5)

## 2018-03-07 PROCEDURE — 99215 OFFICE O/P EST HI 40 MIN: CPT

## 2018-03-08 LAB
ALBUMIN SERPL ELPH-MCNC: 4.1 G/DL
ALP BLD-CCNC: 123 U/L
ALT SERPL-CCNC: 101 U/L
ANION GAP SERPL CALC-SCNC: 14 MMOL/L
AST SERPL-CCNC: 65 U/L
BILIRUB SERPL-MCNC: 0.4 MG/DL
BUN SERPL-MCNC: 12 MG/DL
CALCIUM SERPL-MCNC: 9.3 MG/DL
CHLORIDE SERPL-SCNC: 105 MMOL/L
CO2 SERPL-SCNC: 24 MMOL/L
CREAT SERPL-MCNC: 0.73 MG/DL
GLUCOSE SERPL-MCNC: 152 MG/DL
MAGNESIUM SERPL-MCNC: 1.8 MG/DL
POTASSIUM SERPL-SCNC: 4.4 MMOL/L
PROT SERPL-MCNC: 7 G/DL
SODIUM SERPL-SCNC: 143 MMOL/L

## 2018-07-25 PROBLEM — Z78.9 ALCOHOL USE: Status: ACTIVE | Noted: 2017-04-17

## 2018-08-08 PROBLEM — F41.9 ANXIETY DISORDER, UNSPECIFIED: Chronic | Status: ACTIVE | Noted: 2017-03-15

## 2018-08-08 PROBLEM — E03.9 HYPOTHYROIDISM, UNSPECIFIED: Chronic | Status: ACTIVE | Noted: 2017-03-15

## 2018-08-08 PROBLEM — G47.33 OBSTRUCTIVE SLEEP APNEA (ADULT) (PEDIATRIC): Chronic | Status: ACTIVE | Noted: 2017-09-19

## 2018-09-11 ENCOUNTER — OUTPATIENT (OUTPATIENT)
Dept: OUTPATIENT SERVICES | Facility: HOSPITAL | Age: 63
LOS: 1 days | Discharge: HOME | End: 2018-09-11

## 2018-09-11 DIAGNOSIS — Z98.890 OTHER SPECIFIED POSTPROCEDURAL STATES: Chronic | ICD-10-CM

## 2018-09-11 DIAGNOSIS — Z90.2 ACQUIRED ABSENCE OF LUNG [PART OF]: Chronic | ICD-10-CM

## 2018-09-11 DIAGNOSIS — C34.90 MALIGNANT NEOPLASM OF UNSPECIFIED PART OF UNSPECIFIED BRONCHUS OR LUNG: ICD-10-CM

## 2018-09-17 ENCOUNTER — APPOINTMENT (OUTPATIENT)
Dept: HEMATOLOGY ONCOLOGY | Facility: CLINIC | Age: 63
End: 2018-09-17

## 2018-10-01 ENCOUNTER — RECORD ABSTRACTING (OUTPATIENT)
Age: 63
End: 2018-10-01

## 2018-10-01 ENCOUNTER — APPOINTMENT (OUTPATIENT)
Dept: HEMATOLOGY ONCOLOGY | Facility: CLINIC | Age: 63
End: 2018-10-01

## 2018-10-22 ENCOUNTER — OUTPATIENT (OUTPATIENT)
Dept: OUTPATIENT SERVICES | Facility: HOSPITAL | Age: 63
LOS: 1 days | Discharge: HOME | End: 2018-10-22

## 2018-10-22 DIAGNOSIS — C34.90 MALIGNANT NEOPLASM OF UNSPECIFIED PART OF UNSPECIFIED BRONCHUS OR LUNG: ICD-10-CM

## 2018-10-22 DIAGNOSIS — Z90.2 ACQUIRED ABSENCE OF LUNG [PART OF]: Chronic | ICD-10-CM

## 2018-10-22 DIAGNOSIS — Z98.890 OTHER SPECIFIED POSTPROCEDURAL STATES: Chronic | ICD-10-CM

## 2018-10-24 ENCOUNTER — FORM ENCOUNTER (OUTPATIENT)
Age: 63
End: 2018-10-24

## 2018-10-25 ENCOUNTER — OUTPATIENT (OUTPATIENT)
Dept: OUTPATIENT SERVICES | Facility: HOSPITAL | Age: 63
LOS: 1 days | End: 2018-10-25

## 2018-10-25 ENCOUNTER — APPOINTMENT (OUTPATIENT)
Dept: CT IMAGING | Facility: CLINIC | Age: 63
End: 2018-10-25
Payer: MEDICAID

## 2018-10-25 DIAGNOSIS — C34.91 MALIGNANT NEOPLASM OF UNSPECIFIED PART OF RIGHT BRONCHUS OR LUNG: ICD-10-CM

## 2018-10-25 DIAGNOSIS — Z90.2 ACQUIRED ABSENCE OF LUNG [PART OF]: Chronic | ICD-10-CM

## 2018-10-25 DIAGNOSIS — Z98.890 OTHER SPECIFIED POSTPROCEDURAL STATES: Chronic | ICD-10-CM

## 2018-10-25 PROCEDURE — 71260 CT THORAX DX C+: CPT | Mod: 26

## 2018-10-29 ENCOUNTER — RESULT REVIEW (OUTPATIENT)
Age: 63
End: 2018-10-29

## 2018-10-29 ENCOUNTER — APPOINTMENT (OUTPATIENT)
Dept: HEMATOLOGY ONCOLOGY | Facility: CLINIC | Age: 63
End: 2018-10-29
Payer: MEDICAID

## 2018-10-29 ENCOUNTER — RECORD ABSTRACTING (OUTPATIENT)
Age: 63
End: 2018-10-29

## 2018-10-29 ENCOUNTER — APPOINTMENT (OUTPATIENT)
Dept: HEMATOLOGY ONCOLOGY | Facility: CLINIC | Age: 63
End: 2018-10-29

## 2018-10-29 VITALS
WEIGHT: 232.69 LBS | HEART RATE: 77 BPM | SYSTOLIC BLOOD PRESSURE: 135 MMHG | TEMPERATURE: 98.2 F | BODY MASS INDEX: 35.27 KG/M2 | DIASTOLIC BLOOD PRESSURE: 82 MMHG | OXYGEN SATURATION: 97 % | HEIGHT: 68 IN

## 2018-10-29 LAB
BASOPHILS # BLD AUTO: 0.1 K/UL — SIGNIFICANT CHANGE UP (ref 0–0.2)
BASOPHILS NFR BLD AUTO: 1 % — SIGNIFICANT CHANGE UP (ref 0–2)
EOSINOPHIL # BLD AUTO: 0.1 K/UL — SIGNIFICANT CHANGE UP (ref 0–0.5)
EOSINOPHIL NFR BLD AUTO: 1.7 % — SIGNIFICANT CHANGE UP (ref 0–6)
HCT VFR BLD CALC: 36.2 % — SIGNIFICANT CHANGE UP (ref 34.5–45)
HGB BLD-MCNC: 11.5 G/DL — SIGNIFICANT CHANGE UP (ref 11.5–15.5)
LYMPHOCYTES # BLD AUTO: 1.5 K/UL — SIGNIFICANT CHANGE UP (ref 1–3.3)
LYMPHOCYTES # BLD AUTO: 21.6 % — SIGNIFICANT CHANGE UP (ref 13–44)
MCHC RBC-ENTMCNC: 29.2 PG — SIGNIFICANT CHANGE UP (ref 27–34)
MCHC RBC-ENTMCNC: 31.9 GM/DL — LOW (ref 32–36)
MCV RBC AUTO: 91.8 FL — SIGNIFICANT CHANGE UP (ref 80–100)
MONOCYTES # BLD AUTO: 0.4 K/UL — SIGNIFICANT CHANGE UP (ref 0–0.9)
MONOCYTES NFR BLD AUTO: 5.7 % — SIGNIFICANT CHANGE UP (ref 2–14)
NEUTROPHILS # BLD AUTO: 4.9 K/UL — SIGNIFICANT CHANGE UP (ref 1.8–7.4)
NEUTROPHILS NFR BLD AUTO: 70.1 % — SIGNIFICANT CHANGE UP (ref 43–77)
PLATELET # BLD AUTO: 238 K/UL — SIGNIFICANT CHANGE UP (ref 150–400)
RBC # BLD: 3.94 M/UL — SIGNIFICANT CHANGE UP (ref 3.8–5.2)
RBC # FLD: 13.6 % — SIGNIFICANT CHANGE UP (ref 10.3–14.5)
WBC # BLD: 7 K/UL — SIGNIFICANT CHANGE UP (ref 3.8–10.5)
WBC # FLD AUTO: 7 K/UL — SIGNIFICANT CHANGE UP (ref 3.8–10.5)

## 2018-10-29 PROCEDURE — 99215 OFFICE O/P EST HI 40 MIN: CPT

## 2018-10-30 LAB
ALBUMIN SERPL ELPH-MCNC: 4.3 G/DL
ALP BLD-CCNC: 123 U/L
ALT SERPL-CCNC: 40 U/L
ANION GAP SERPL CALC-SCNC: 11 MMOL/L
AST SERPL-CCNC: 26 U/L
BILIRUB SERPL-MCNC: 0.5 MG/DL
BUN SERPL-MCNC: 14 MG/DL
CALCIUM SERPL-MCNC: 8.9 MG/DL
CHLORIDE SERPL-SCNC: 104 MMOL/L
CO2 SERPL-SCNC: 27 MMOL/L
CREAT SERPL-MCNC: 0.75 MG/DL
GLUCOSE SERPL-MCNC: 139 MG/DL
MAGNESIUM SERPL-MCNC: 2 MG/DL
POTASSIUM SERPL-SCNC: 5 MMOL/L
PROT SERPL-MCNC: 6.7 G/DL
SODIUM SERPL-SCNC: 142 MMOL/L

## 2018-11-02 NOTE — H&P PST ADULT - ENDOCRINE
54-year-old female seen in September 2014 for screening colonoscopy with a family history of colonic neoplasia.  Her examination was negative at that time.  Repeat screening examination is due in 2019.Patient reports that for 12-18 months she has had occasional abdominal symptoms though these have increased in frequency since September, now occurring approximately every 2 weeks.  Symptoms begin shortly after meals with a burning epigastric pain which then moved to the lower abdomen where the pain become sharp “like gas pains”.  She then experiences increase and flatulence followed by watery diarrhea that will last for 1-2 days.  There has been no blood in the stool.  She denies any fever, chills or unintentional weight loss.  She reports nausea with extreme lower abdominal pain but no emesis. Patient takes occasional Advil but otherwise was on no medications until recently when her primary care provider initiated proton pump inhibitor therapy.Patient has been under increased stress over the last 2 months.  She has been unable to identify any triggering factors/foods to her symptoms.  Laboratory studies performed at her primary care provider's office in August were within normal limits, including TSH.  
details…

## 2018-11-11 ENCOUNTER — FORM ENCOUNTER (OUTPATIENT)
Age: 63
End: 2018-11-11

## 2018-11-12 ENCOUNTER — APPOINTMENT (OUTPATIENT)
Dept: NUCLEAR MEDICINE | Facility: CLINIC | Age: 63
End: 2018-11-12
Payer: MEDICAID

## 2018-11-12 ENCOUNTER — OUTPATIENT (OUTPATIENT)
Dept: OUTPATIENT SERVICES | Facility: HOSPITAL | Age: 63
LOS: 1 days | End: 2018-11-12

## 2018-11-12 ENCOUNTER — APPOINTMENT (OUTPATIENT)
Dept: ULTRASOUND IMAGING | Facility: CLINIC | Age: 63
End: 2018-11-12
Payer: MEDICAID

## 2018-11-12 DIAGNOSIS — C34.91 MALIGNANT NEOPLASM OF UNSPECIFIED PART OF RIGHT BRONCHUS OR LUNG: ICD-10-CM

## 2018-11-12 DIAGNOSIS — Z90.2 ACQUIRED ABSENCE OF LUNG [PART OF]: Chronic | ICD-10-CM

## 2018-11-12 DIAGNOSIS — Z98.890 OTHER SPECIFIED POSTPROCEDURAL STATES: Chronic | ICD-10-CM

## 2018-11-12 PROCEDURE — 78815 PET IMAGE W/CT SKULL-THIGH: CPT | Mod: 26,PS

## 2018-11-12 PROCEDURE — 76536 US EXAM OF HEAD AND NECK: CPT | Mod: 26

## 2018-11-14 ENCOUNTER — APPOINTMENT (OUTPATIENT)
Dept: PULMONOLOGY | Facility: CLINIC | Age: 63
End: 2018-11-14
Payer: MEDICAID

## 2018-11-14 VITALS — WEIGHT: 232 LBS | BODY MASS INDEX: 35.28 KG/M2

## 2018-11-14 VITALS — SYSTOLIC BLOOD PRESSURE: 120 MMHG | DIASTOLIC BLOOD PRESSURE: 70 MMHG | HEART RATE: 74 BPM | OXYGEN SATURATION: 95 %

## 2018-11-14 PROCEDURE — 94727 GAS DIL/WSHOT DETER LNG VOL: CPT

## 2018-11-14 PROCEDURE — 94729 DIFFUSING CAPACITY: CPT

## 2018-11-14 PROCEDURE — 99214 OFFICE O/P EST MOD 30 MIN: CPT | Mod: 25

## 2018-11-14 PROCEDURE — 85018 HEMOGLOBIN: CPT | Mod: QW

## 2018-11-14 PROCEDURE — 94010 BREATHING CAPACITY TEST: CPT

## 2018-11-15 ENCOUNTER — RESULT REVIEW (OUTPATIENT)
Age: 63
End: 2018-11-15

## 2018-11-15 ENCOUNTER — LABORATORY RESULT (OUTPATIENT)
Age: 63
End: 2018-11-15

## 2018-11-15 ENCOUNTER — APPOINTMENT (OUTPATIENT)
Dept: HEMATOLOGY ONCOLOGY | Facility: CLINIC | Age: 63
End: 2018-11-15
Payer: MEDICAID

## 2018-11-15 VITALS
TEMPERATURE: 98 F | OXYGEN SATURATION: 95 % | BODY MASS INDEX: 35.16 KG/M2 | SYSTOLIC BLOOD PRESSURE: 124 MMHG | WEIGHT: 232 LBS | DIASTOLIC BLOOD PRESSURE: 83 MMHG | HEIGHT: 68 IN | HEART RATE: 76 BPM

## 2018-11-15 LAB
BASOPHILS # BLD AUTO: 0.1 K/UL — SIGNIFICANT CHANGE UP (ref 0–0.2)
BASOPHILS NFR BLD AUTO: 0.8 % — SIGNIFICANT CHANGE UP (ref 0–2)
EOSINOPHIL # BLD AUTO: 0.1 K/UL — SIGNIFICANT CHANGE UP (ref 0–0.5)
EOSINOPHIL NFR BLD AUTO: 1.3 % — SIGNIFICANT CHANGE UP (ref 0–6)
HCT VFR BLD CALC: 37.6 % — SIGNIFICANT CHANGE UP (ref 34.5–45)
HGB BLD-MCNC: 12.3 G/DL — SIGNIFICANT CHANGE UP (ref 11.5–15.5)
LYMPHOCYTES # BLD AUTO: 1.6 K/UL — SIGNIFICANT CHANGE UP (ref 1–3.3)
LYMPHOCYTES # BLD AUTO: 22.8 % — SIGNIFICANT CHANGE UP (ref 13–44)
MCHC RBC-ENTMCNC: 29.9 PG — SIGNIFICANT CHANGE UP (ref 27–34)
MCHC RBC-ENTMCNC: 32.7 GM/DL — SIGNIFICANT CHANGE UP (ref 32–36)
MCV RBC AUTO: 91.4 FL — SIGNIFICANT CHANGE UP (ref 80–100)
MONOCYTES # BLD AUTO: 0.4 K/UL — SIGNIFICANT CHANGE UP (ref 0–0.9)
MONOCYTES NFR BLD AUTO: 6 % — SIGNIFICANT CHANGE UP (ref 2–14)
NEUTROPHILS # BLD AUTO: 4.7 K/UL — SIGNIFICANT CHANGE UP (ref 1.8–7.4)
NEUTROPHILS NFR BLD AUTO: 69.1 % — SIGNIFICANT CHANGE UP (ref 43–77)
PLATELET # BLD AUTO: 204 K/UL — SIGNIFICANT CHANGE UP (ref 150–400)
RBC # BLD: 4.11 M/UL — SIGNIFICANT CHANGE UP (ref 3.8–5.2)
RBC # FLD: 13.1 % — SIGNIFICANT CHANGE UP (ref 10.3–14.5)
WBC # BLD: 6.9 K/UL — SIGNIFICANT CHANGE UP (ref 3.8–10.5)
WBC # FLD AUTO: 6.9 K/UL — SIGNIFICANT CHANGE UP (ref 3.8–10.5)

## 2018-11-15 PROCEDURE — 99215 OFFICE O/P EST HI 40 MIN: CPT

## 2018-11-15 NOTE — PHYSICAL EXAM
[Restricted in physically strenuous activity but ambulatory and able to carry out work of a light or sedentary nature] : Status 1- Restricted in physically strenuous activity but ambulatory and able to carry out work of a light or sedentary nature, e.g., light house work, office work [Normal] : affect appropriate [de-identified] : right side thorax 3 incisions well healed.   [de-identified] : candidiasis rash under right breast.

## 2018-11-15 NOTE — REVIEW OF SYSTEMS
[Shortness Of Breath] : shortness of breath [Cough] : cough [Negative] : Allergic/Immunologic [FreeTextEntry6] : with exertion [FreeTextEntry9] : foot surgery healed

## 2018-11-15 NOTE — HISTORY OF PRESENT ILLNESS
[T: ___] : T[unfilled] [N: ___] : N[unfilled] [AJCC Stage: ____] : AJCC Stage: [unfilled] [de-identified] : The patient was diagnosed with adenocarcinoma of the lung in March 2017 at the age of 61.  She presented with an incidental finding of a 2 cm RLL pulmonary nodule on routine CXR, prior to foot surgery. On February 17, 2017, a CT chest showed a lobulated lesion in the superior segment right lower lobe abutting the fissure along its anterior margin which measured 2.2 x 2.0 cm.  On February 24, 2017 a PET/CT showed the lobulated opacity in the superior segment of the right lower lobe, 1.9 x 1.9 cm with SUV 2.8.  On March 24, 2017 Dr. Bradley performed a right lower lobectomy and mediastinal lymph node dissection.  The tumor size from the right lower lobe wedge resection was 1.8 x 1.7 x 1.6cm.  Pathology showed adenocarcinoma, acinar type, moderately to  poorly differentiated, 1.8 x 1.7 x 1.6 cm with clear margins.  Mediastinal lymph nodes were negative for tumor.  Immunohistochemistry studies showed were positive for:  CK7, Napsin A and TTF-1; and negative for: CDX-2, CK20 and mammaglobin.  Smoking history since age 14, and quit 4 years ago, ~50 PY history.  Drinks ~ 2 glasses wine per day.   [de-identified] : Adenocarcinoma of lung [de-identified] : The patient presents for follow up.  Reports she is doing well.  + chronic non productive cough.  + OBREGON but no SOB at rest. 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 Randolph. No fevers, no chills. No hemoptysis. No changes in bowel habits. No fever or chills.  \par    \par 10/25/18 CT Chest: Right lower lobectomy with unchanged nodularity along the resection sutures. Since 8/14/17, unchanged mixed lytic and sclerotic lesion in the thoracici spine and sternum .\par \par 9/18/18 Bone Density: Normal

## 2018-11-15 NOTE — ADDENDUM
[FreeTextEntry1] : I, Dea Christiansen, acted solely as a scribe for Dr. Elvira Joseph on this date 11/14/18.

## 2018-11-15 NOTE — RESULTS/DATA
[FreeTextEntry1] : 11/12/18 US:   Nodule 1:   1.1 x 1.0 x 1.0 cm Right mid-upper pole nodule, unchanged in size Recommendation: Follow-up ultrasound.     Nodule 2:   1.4 x 1.0 x 1.1 cm Left upper pole nodule, unchanged in size.   Recommendation: Nodule does not meet size threshold for FNA or Follow-up.\par \par 11/9/18 PET:  HEAD/NECK: Unchanged asymmetric FDG avidity right thyroid lobe (SUV 7.1;  image 57), previous SUV 6.4.   LUNGS: Stable postsurgical changes of right lower lobe lobectomy.  Rounded, non-FDG avid soft tissue nodule adjacent to suture margin, 1.6 x  0.8 cm (image 87) unchanged. 0.4 cm left upper lobe groundglass nodule  (image 69), unchanged. No new FDG avid foci.    BONES:   New mild FDG avidity at T10 (SUV 4.9; image 121).  SOFT TISSUES:  No abnormal avidity.  \par \par 10/25/18 CT Chest: Right lower lobectomy with unchanged nodularity along the resection sutures. Since 8/14/17, unchanged mixed lytic and sclerotic lesion in the thoracici spine and sternum .\par \par 9/18/18 Bone Density: Normal\par \par 3/5/18 PET/CT: Unchanged focal right thyroid lobe FDG avidity (SUV 6.4) previous SUV 5.2. Unchanged symmetric FDG avidity in the bilateral tonsils. 1.2 x 0.6 cm mildly FDG avid left axillary lymph node is unchanged in size (SUV 2.4), previous SUV 2.6.  Stable postsurgical changes of right lower lobe wedge resection. Unchanged rounded soft tissue density without FDG avidity associated with the suture margin (SUV 2.3), previous SUV 2.5. No FDG avid foci. No new pulmonary nodules.   Unchanged areas of sclerosis involving the sternum, and several vertebral bodies including L4 as well as scattered foci in the pelvis, with unchanged low-grade FDG avidity. For example, at T7 (SUV 3.8) previous SUV 3.8. Unchanged non-FDG avid sclerotic focus in the left anterior sixth rib. \par \par 12/6/17 PET/CT: Previously seen small ill-defined focus along the right lateral posterior tongue is not appreciated on the current exam. Fairly symmetrical FDG activity in bilateral tonsillar regions is less prominent, right tonsil 5.6 (previous SUV 7.4), left tonsil 5.4 (previous SUV 6.9). There is no enlarged or hypermetabolic cervical lymph node. Again seen is diffuse asymmetrically increased right thyroid lobe activity, which is not significantly changed from prior exam, SUV 5.2 (previous SUV 6.4). For reference, left thyroid demonstrates SUV 3.5. A small mildly hypermetabolic left axillary lymph node measuring approximately 1.4 cm is not significantly changed, SUV 2.6 (previous SUV 2.7). Mediastinal, bilateral hilar, and right axillary regions demonstrate physiologic tracer activity. Patchy sclerosis of the sternum, vertebral bodies, and pelvis with variable \par low-level FDG activity is not significantly changed. For example :T7 vertebra SUV 3.4 (previous SUV 3.8). Non-FDG avid dense distal sternal sclerosis and tiny sclerotic focus in the left sixth rib anteriorly are also not significantly changed compared to prior study.\par \par 12/6/17 CT Neck: No abnormal enhancement within the bilateral palatine tonsils or right posterolateral tongue. No cervical lymphadenopathy. \par \par 9/29/17 Pathology - spine biopsy - L4:  bone with normocellular bone marrow, negative for metastatic carcinoma \par \par 8/30/17 PET/CT:  Small  ill-defined  focus  of  FDG  activity  along  the  right  lateral posterior  tongue,  SUV  4.6.  Prominent  fairly  symmetrical  FDG activity  in  bilateral  tonsillar  regions,  right  tonsil  SUV  7.4  and  left tonsil  SUV  6.9.  Diffuse  asymmetrically  increased  activity  in  the  right  thyroid  lobe, SUV  6.4  vs.  left  thyroid  SUV  4.0.  No  CT  correlate.  A  1.4  cm  FDG  avid  left  axillary  lymph  node,  unchanged  from  prior  PET CT  (SUV  2.7).  Linear  soft  tissue  activity  along  the  lateral  right  chest  wall,  new  compared to  prior  PET/CT,  likely  postsurgical.  A  3  mm  FDG  avid  left  posterior  chest wall  soft  tissue  nodule  at  the  level  of  the  left  fourth  rib,  unchanged  from prior  PET  CT  (previous  SUV  2.1).  Ill  defined  non  FDG  avid  ground  glass  opacity in  the  right  lower  lobe,  not  seen  on  prior  chest  CT  and  too  small  for  PET characterization.  Patchy sclerosis  of  the  sternum,  vertebral  bodies,  and pelvis  with  variable  low-level  FDG  activity.  \par \par 8/23/17 Bone scan:  There are several foci of abnormal radiopharmaceutical uptake in the sternum, mid and lower thoracic spine, lower lumbar spine, left upper sacrum, left upper medial iliac bone adjacent to the sacrum, and left superior pubic ramus. There are degenerative changes in the spine and major joints. There is physiologic distribution of \par radiopharmaceutical throughout the remainder of the imaged osseous structures.\par \par 8/14/17 CT:  No endobronchial lesions are noted. Patient is status post right lower lobectomy. A 0.5 cm groundglass nodule in the apical segment of the left upper lobe is unchanged when compared to previous exam. No pleural effusions are noted. Below the diaphragm, visualized portions of the abdomen demonstrate diffuse decreased attenuation of the liver suggestive of fatty infiltration. Visualized osseous structures demonstrate patchy areas of sclerosis.\par \par 9/14/17 March 24, 2017 Right lower lobectomy by bronchoscopy, and mediastinal lymph node dissection (Dr. Bradley):  The tumor size from the right lower lobe wedge resection was 1.8 x 1.7 x 1.6cm.  Pathology  showed adenocarcinoma, acinar type, moderately to  poorly differentiated, 1.8 x 1.7 x 1.6 cm. with clear margins.  Mediastinal lymph nodes were negative for tumor.  Immunohistochemistry studies showed were positive for:  CK7, Napsin A and TTF-1; and negative for: CDX-2, CK20 and mammaglobin.   pT1aN0\par \par February 24, 2017 PET/CT:  Lobulated opacity in the superior segment of the right lower lobe, just behind the major fissure measuring 1.9 x 1.9 cm with SUV 2.8.  Left axillary lymph nodes measures 1.5 x 1.0cm with SUV 2.6.  A tiny subcutaneous nodule at the posterior left chest wall at the level of the fourth rib measures 0.8 x 0.7cm with SUV 2.1.  \par \par February 17, 2017 CT chest:  Lobulated lesion in the superior segment right lower lobe abutting the fissure along its anterior margin.  The measures 2.2 x 2.0 cm.  A small vascular branch is noted abutting its posterior margin.  \par

## 2018-11-16 LAB
ALBUMIN SERPL ELPH-MCNC: 4.4 G/DL
ALP BLD-CCNC: 120 U/L
ALT SERPL-CCNC: 51 U/L
ANION GAP SERPL CALC-SCNC: 12 MMOL/L
AST SERPL-CCNC: 37 U/L
BILIRUB SERPL-MCNC: 0.5 MG/DL
BUN SERPL-MCNC: 10 MG/DL
CALCIUM SERPL-MCNC: 9.2 MG/DL
CHLORIDE SERPL-SCNC: 102 MMOL/L
CO2 SERPL-SCNC: 27 MMOL/L
CREAT SERPL-MCNC: 0.72 MG/DL
GLUCOSE SERPL-MCNC: 128 MG/DL
MAGNESIUM SERPL-MCNC: 2 MG/DL
POTASSIUM SERPL-SCNC: 4.7 MMOL/L
PROT SERPL-MCNC: 6.8 G/DL
SODIUM SERPL-SCNC: 141 MMOL/L

## 2019-01-01 ENCOUNTER — RESULT REVIEW (OUTPATIENT)
Age: 64
End: 2019-01-01

## 2019-01-01 ENCOUNTER — OUTPATIENT (OUTPATIENT)
Dept: OUTPATIENT SERVICES | Facility: HOSPITAL | Age: 64
LOS: 1 days | Discharge: HOME | End: 2019-01-01

## 2019-01-01 ENCOUNTER — APPOINTMENT (OUTPATIENT)
Dept: ENDOCRINOLOGY | Facility: CLINIC | Age: 64
End: 2019-01-01
Payer: MEDICAID

## 2019-01-01 ENCOUNTER — APPOINTMENT (OUTPATIENT)
Dept: HEMATOLOGY ONCOLOGY | Facility: CLINIC | Age: 64
End: 2019-01-01
Payer: COMMERCIAL

## 2019-01-01 ENCOUNTER — APPOINTMENT (OUTPATIENT)
Dept: ULTRASOUND IMAGING | Facility: CLINIC | Age: 64
End: 2019-01-01

## 2019-01-01 ENCOUNTER — APPOINTMENT (OUTPATIENT)
Dept: INTERNAL MEDICINE | Facility: CLINIC | Age: 64
End: 2019-01-01
Payer: COMMERCIAL

## 2019-01-01 ENCOUNTER — LABORATORY RESULT (OUTPATIENT)
Age: 64
End: 2019-01-01

## 2019-01-01 ENCOUNTER — APPOINTMENT (OUTPATIENT)
Dept: CT IMAGING | Facility: CLINIC | Age: 64
End: 2019-01-01

## 2019-01-01 ENCOUNTER — FORM ENCOUNTER (OUTPATIENT)
Age: 64
End: 2019-01-01

## 2019-01-01 ENCOUNTER — OUTPATIENT (OUTPATIENT)
Dept: OUTPATIENT SERVICES | Facility: HOSPITAL | Age: 64
LOS: 1 days | End: 2019-01-01
Payer: COMMERCIAL

## 2019-01-01 VITALS
WEIGHT: 221 LBS | SYSTOLIC BLOOD PRESSURE: 139 MMHG | OXYGEN SATURATION: 96 % | HEIGHT: 68 IN | TEMPERATURE: 97.9 F | HEART RATE: 76 BPM | BODY MASS INDEX: 33.49 KG/M2 | DIASTOLIC BLOOD PRESSURE: 81 MMHG

## 2019-01-01 VITALS
BODY MASS INDEX: 33.19 KG/M2 | OXYGEN SATURATION: 99 % | DIASTOLIC BLOOD PRESSURE: 76 MMHG | HEIGHT: 68 IN | WEIGHT: 219 LBS | TEMPERATURE: 98.2 F | HEART RATE: 80 BPM | SYSTOLIC BLOOD PRESSURE: 136 MMHG

## 2019-01-01 VITALS
DIASTOLIC BLOOD PRESSURE: 86 MMHG | OXYGEN SATURATION: 98 % | SYSTOLIC BLOOD PRESSURE: 140 MMHG | HEART RATE: 74 BPM | HEIGHT: 68 IN | RESPIRATION RATE: 13 BRPM | WEIGHT: 214 LBS | BODY MASS INDEX: 32.43 KG/M2

## 2019-01-01 VITALS
HEIGHT: 68 IN | BODY MASS INDEX: 32.43 KG/M2 | DIASTOLIC BLOOD PRESSURE: 80 MMHG | HEART RATE: 76 BPM | SYSTOLIC BLOOD PRESSURE: 132 MMHG | WEIGHT: 214 LBS

## 2019-01-01 DIAGNOSIS — Z98.890 OTHER SPECIFIED POSTPROCEDURAL STATES: Chronic | ICD-10-CM

## 2019-01-01 DIAGNOSIS — C34.91 MALIGNANT NEOPLASM OF UNSPECIFIED PART OF RIGHT BRONCHUS OR LUNG: ICD-10-CM

## 2019-01-01 DIAGNOSIS — Z00.00 ENCOUNTER FOR GENERAL ADULT MEDICAL EXAMINATION W/OUT ABNORMAL FINDINGS: ICD-10-CM

## 2019-01-01 DIAGNOSIS — C34.90 MALIGNANT NEOPLASM OF UNSPECIFIED PART OF UNSPECIFIED BRONCHUS OR LUNG: ICD-10-CM

## 2019-01-01 DIAGNOSIS — E78.00 PURE HYPERCHOLESTEROLEMIA, UNSPECIFIED: ICD-10-CM

## 2019-01-01 DIAGNOSIS — Z90.2 ACQUIRED ABSENCE OF LUNG [PART OF]: Chronic | ICD-10-CM

## 2019-01-01 DIAGNOSIS — Z01.811 ENCOUNTER FOR PREPROCEDURAL RESPIRATORY EXAMINATION: ICD-10-CM

## 2019-01-01 DIAGNOSIS — Z80.8 FAMILY HISTORY OF MALIGNANT NEOPLASM OF OTHER ORGANS OR SYSTEMS: ICD-10-CM

## 2019-01-01 LAB
ALBUMIN SERPL ELPH-MCNC: 4.4 G/DL
ALBUMIN SERPL ELPH-MCNC: 4.5 G/DL
ALP BLD-CCNC: 113 U/L
ALP BLD-CCNC: 119 U/L
ALT SERPL-CCNC: 29 U/L
ALT SERPL-CCNC: 29 U/L
ANION GAP SERPL CALC-SCNC: 11 MMOL/L
ANION GAP SERPL CALC-SCNC: 14 MMOL/L
AST SERPL-CCNC: 23 U/L
AST SERPL-CCNC: 25 U/L
BASOPHILS # BLD AUTO: 0 K/UL — SIGNIFICANT CHANGE UP (ref 0–0.2)
BASOPHILS # BLD AUTO: 0.1 K/UL — SIGNIFICANT CHANGE UP (ref 0–0.2)
BASOPHILS NFR BLD AUTO: 0.6 % — SIGNIFICANT CHANGE UP (ref 0–2)
BASOPHILS NFR BLD AUTO: 0.8 % — SIGNIFICANT CHANGE UP (ref 0–2)
BILIRUB SERPL-MCNC: 0.4 MG/DL
BILIRUB SERPL-MCNC: 0.5 MG/DL
BUN SERPL-MCNC: 12 MG/DL
BUN SERPL-MCNC: 16 MG/DL
CALCIUM SERPL-MCNC: 9.2 MG/DL
CALCIUM SERPL-MCNC: 9.6 MG/DL
CHLORIDE SERPL-SCNC: 102 MMOL/L
CHLORIDE SERPL-SCNC: 106 MMOL/L
CO2 SERPL-SCNC: 26 MMOL/L
CO2 SERPL-SCNC: 27 MMOL/L
CREAT SERPL-MCNC: 0.63 MG/DL
CREAT SERPL-MCNC: 0.67 MG/DL
EOSINOPHIL # BLD AUTO: 0.1 K/UL — SIGNIFICANT CHANGE UP (ref 0–0.5)
EOSINOPHIL # BLD AUTO: 0.1 K/UL — SIGNIFICANT CHANGE UP (ref 0–0.5)
EOSINOPHIL NFR BLD AUTO: 1.6 % — SIGNIFICANT CHANGE UP (ref 0–6)
EOSINOPHIL NFR BLD AUTO: 1.9 % — SIGNIFICANT CHANGE UP (ref 0–6)
GLUCOSE SERPL-MCNC: 95 MG/DL
GLUCOSE SERPL-MCNC: 98 MG/DL
HCT VFR BLD CALC: 36.7 % — SIGNIFICANT CHANGE UP (ref 34.5–45)
HCT VFR BLD CALC: 37 % — SIGNIFICANT CHANGE UP (ref 34.5–45)
HGB BLD-MCNC: 12.2 G/DL — SIGNIFICANT CHANGE UP (ref 11.5–15.5)
HGB BLD-MCNC: 12.3 G/DL — SIGNIFICANT CHANGE UP (ref 11.5–15.5)
LYMPHOCYTES # BLD AUTO: 1.8 K/UL — SIGNIFICANT CHANGE UP (ref 1–3.3)
LYMPHOCYTES # BLD AUTO: 2.2 K/UL — SIGNIFICANT CHANGE UP (ref 1–3.3)
LYMPHOCYTES # BLD AUTO: 24.9 % — SIGNIFICANT CHANGE UP (ref 13–44)
LYMPHOCYTES # BLD AUTO: 32.5 % — SIGNIFICANT CHANGE UP (ref 13–44)
MAGNESIUM SERPL-MCNC: 2 MG/DL
MAGNESIUM SERPL-MCNC: 2.1 MG/DL
MCHC RBC-ENTMCNC: 31.4 PG — SIGNIFICANT CHANGE UP (ref 27–34)
MCHC RBC-ENTMCNC: 31.9 PG — SIGNIFICANT CHANGE UP (ref 27–34)
MCHC RBC-ENTMCNC: 32.8 G/DL — SIGNIFICANT CHANGE UP (ref 32–36)
MCHC RBC-ENTMCNC: 33.5 G/DL — SIGNIFICANT CHANGE UP (ref 32–36)
MCV RBC AUTO: 95.1 FL — SIGNIFICANT CHANGE UP (ref 80–100)
MCV RBC AUTO: 95.8 FL — SIGNIFICANT CHANGE UP (ref 80–100)
MONOCYTES # BLD AUTO: 0.5 K/UL — SIGNIFICANT CHANGE UP (ref 0–0.9)
MONOCYTES # BLD AUTO: 0.6 K/UL — SIGNIFICANT CHANGE UP (ref 0–0.9)
MONOCYTES NFR BLD AUTO: 7.6 % — SIGNIFICANT CHANGE UP (ref 2–14)
MONOCYTES NFR BLD AUTO: 8 % — SIGNIFICANT CHANGE UP (ref 2–14)
NEUTROPHILS # BLD AUTO: 4 K/UL — SIGNIFICANT CHANGE UP (ref 1.8–7.4)
NEUTROPHILS # BLD AUTO: 4.6 K/UL — SIGNIFICANT CHANGE UP (ref 1.8–7.4)
NEUTROPHILS NFR BLD AUTO: 57.1 % — SIGNIFICANT CHANGE UP (ref 43–77)
NEUTROPHILS NFR BLD AUTO: 65 % — SIGNIFICANT CHANGE UP (ref 43–77)
PLATELET # BLD AUTO: 246 K/UL — SIGNIFICANT CHANGE UP (ref 150–400)
PLATELET # BLD AUTO: 248 K/UL — SIGNIFICANT CHANGE UP (ref 150–400)
POTASSIUM SERPL-SCNC: 4.6 MMOL/L
POTASSIUM SERPL-SCNC: 4.9 MMOL/L
PROT SERPL-MCNC: 7 G/DL
PROT SERPL-MCNC: 7.1 G/DL
RBC # BLD: 3.86 M/UL — SIGNIFICANT CHANGE UP (ref 3.8–5.2)
RBC # BLD: 3.87 M/UL — SIGNIFICANT CHANGE UP (ref 3.8–5.2)
RBC # FLD: 12.5 % — SIGNIFICANT CHANGE UP (ref 10.3–14.5)
RBC # FLD: 12.9 % — SIGNIFICANT CHANGE UP (ref 10.3–14.5)
SODIUM SERPL-SCNC: 140 MMOL/L
SODIUM SERPL-SCNC: 145 MMOL/L
T4 FREE SERPL-MCNC: 1.3 NG/DL
TSH SERPL-ACNC: 0.77 UIU/ML
WBC # BLD: 6.9 K/UL — SIGNIFICANT CHANGE UP (ref 3.8–10.5)
WBC # BLD: 7.2 K/UL — SIGNIFICANT CHANGE UP (ref 3.8–10.5)
WBC # FLD AUTO: 6.9 K/UL — SIGNIFICANT CHANGE UP (ref 3.8–10.5)
WBC # FLD AUTO: 7.2 K/UL — SIGNIFICANT CHANGE UP (ref 3.8–10.5)

## 2019-01-01 PROCEDURE — 99215 OFFICE O/P EST HI 40 MIN: CPT

## 2019-01-01 PROCEDURE — G0447 BEHAVIOR COUNSEL OBESITY 15M: CPT

## 2019-01-01 PROCEDURE — 76536 US EXAM OF HEAD AND NECK: CPT | Mod: 26

## 2019-01-01 PROCEDURE — 99386 PREV VISIT NEW AGE 40-64: CPT | Mod: 25

## 2019-01-01 PROCEDURE — 74177 CT ABD & PELVIS W/CONTRAST: CPT | Mod: 26

## 2019-01-01 PROCEDURE — 71260 CT THORAX DX C+: CPT | Mod: 26

## 2019-01-01 PROCEDURE — 99204 OFFICE O/P NEW MOD 45 MIN: CPT

## 2019-01-01 PROCEDURE — G0442 ANNUAL ALCOHOL SCREEN 15 MIN: CPT

## 2019-01-01 PROCEDURE — G0444 DEPRESSION SCREEN ANNUAL: CPT

## 2019-01-01 RX ORDER — NEOMYCIN/BACITRACIN/POLYMYXINB 3.5-400-5K
500 OINTMENT (GRAM) TOPICAL
Refills: 0 | Status: DISCONTINUED | COMMUNITY
End: 2019-01-01

## 2019-01-01 RX ORDER — DIAZEPAM 10 MG/1
10 TABLET ORAL
Qty: 30 | Refills: 0 | Status: DISCONTINUED | COMMUNITY
Start: 2017-09-11 | End: 2019-01-01

## 2019-01-01 RX ORDER — LEVOTHYROXINE SODIUM 0.17 MG/1
175 TABLET ORAL
Refills: 0 | Status: DISCONTINUED | COMMUNITY
End: 2019-01-01

## 2019-01-01 RX ORDER — IBUPROFEN 600 MG/1
600 TABLET, FILM COATED ORAL
Qty: 12 | Refills: 0 | Status: DISCONTINUED | COMMUNITY
Start: 2017-08-07 | End: 2019-01-01

## 2019-01-01 RX ORDER — UBIQUINOL 100 MG
CAPSULE ORAL
Refills: 0 | Status: DISCONTINUED | COMMUNITY
End: 2019-01-01

## 2019-01-01 RX ORDER — HYOSCYAMINE SULFATE 0.12 MG/1
0.12 TABLET ORAL
Qty: 90 | Refills: 0 | Status: DISCONTINUED | COMMUNITY
Start: 2017-11-30 | End: 2019-01-01

## 2019-02-26 ENCOUNTER — APPOINTMENT (OUTPATIENT)
Dept: PULMONOLOGY | Facility: CLINIC | Age: 64
End: 2019-02-26
Payer: MEDICAID

## 2019-02-26 VITALS — HEART RATE: 90 BPM | DIASTOLIC BLOOD PRESSURE: 82 MMHG | OXYGEN SATURATION: 95 % | SYSTOLIC BLOOD PRESSURE: 134 MMHG

## 2019-02-26 DIAGNOSIS — G47.33 OBSTRUCTIVE SLEEP APNEA (ADULT) (PEDIATRIC): ICD-10-CM

## 2019-02-26 PROCEDURE — 99214 OFFICE O/P EST MOD 30 MIN: CPT | Mod: 25

## 2019-02-26 PROCEDURE — 94010 BREATHING CAPACITY TEST: CPT

## 2019-02-26 RX ORDER — TRAMADOL HYDROCHLORIDE 50 MG/1
50 TABLET, COATED ORAL
Qty: 10 | Refills: 0 | Status: DISCONTINUED | COMMUNITY
Start: 2017-08-07 | End: 2019-02-26

## 2019-02-26 RX ORDER — NAPROXEN 500 MG/1
500 TABLET ORAL
Qty: 7 | Refills: 0 | Status: DISCONTINUED | COMMUNITY
Start: 2017-09-20 | End: 2019-02-26

## 2019-02-26 RX ORDER — OMEPRAZOLE 40 MG/1
40 CAPSULE, DELAYED RELEASE ORAL
Qty: 30 | Refills: 5 | Status: DISCONTINUED | COMMUNITY
Start: 2017-09-08 | End: 2019-02-26

## 2019-02-26 NOTE — CONSULT LETTER
[Dear  ___] : Dear  [unfilled], [Consult Letter:] : I had the pleasure of evaluating your patient, [unfilled]. [Please see my note below.] : Please see my note below. [Consult Closing:] : Thank you very much for allowing me to participate in the care of this patient.  If you have any questions, please do not hesitate to contact me. [Sincerely,] : Sincerely, [Rosalio Duran MD] : Rosalio Duran MD

## 2019-02-26 NOTE — ASSESSMENT
[FreeTextEntry1] : Lung Cancer resected for cure\par Moderate LAMAR\par Cough from GERD - resolved\par Compliant with and benefitting from nocturnal Nocturnal Full face autopap\par Dyspnea and funny chest sensation despite normal lung function - obesity and deconditioning

## 2019-02-26 NOTE — HISTORY OF PRESENT ILLNESS
[Snoring] : snoring [Frequent Nocturnal Awakening] : frequent nocturnal awakening [Daytime Somnolence] : daytime somnolence [ESS: ___] : ESS score [unfilled] [Unintentional Sleep While Inactive] : unintentional sleep while inactive [Awakes Unrefreshed] : awakening unrefreshed [FreeTextEntry1] : 60 yo overweight female former 60 pack year smoker (1.5 PPD for 40 years; DC in 2013) with incidental finding of a 2 cm RLL superior pulmonary nodule on routine CXR prior to foot surgery.\par Denies chest pain, fever, cough, sputum, orthopnea, sweat, weight loss or pedal edema\par Chronic, stable, moderate 2 flight OBREGON relieved with 4 minutes of rest. \par Post resection on 3/24/17 of a RLL Stage IA lung Ca (lobectomy).\par Mild recent cough\par CXR on 4/18/17: Post RLL lobectomy (not atelectasis) no infilt. \par CT of 8/14/17 OK but bone met to be R'd/O - Seeing Dr Joseph tomorrow (8/23/17\par \par 11/14/18\par The patient was diagnosed with adenocarcinoma of the lung in March 2017 at the age of 61\par On March 24, 2017 Dr. Bradley performed a right lower lobectomy and mediastinal lymph node dissection. \par Dx: Adenocarcinoma; TMN stage: T1a,NO\par 10/25/18 CT Chest: Right lower lobectomy with unchanged nodularity along the resection sutures. Since 8/14/17, unchanged mixed lytic and sclerotic lesion in the thoracici spine and sternum. \par Bone Biopsy was negative \par Chronic non-productive cough and OBREGON\par \par 2/26/19\par Compliance down due to torn mask\par OBREGON\par Weight gain and no exercise

## 2019-02-26 NOTE — PHYSICAL EXAM
[Normal Conjunctiva] : the conjunctiva exhibited no abnormalities [Normal Oropharynx] : normal oropharynx [Neck Appearance] : the appearance of the neck was normal [Jugular Venous Distention Increased] : there was no jugular-venous distention [Thyroid Diffuse Enlargement] : the thyroid was not enlarged [Heart Sounds] : normal S1 and S2 [Arterial Pulses Normal] : the arterial pulses were normal [Edema] : no peripheral edema present [Respiration, Rhythm And Depth] : normal respiratory rhythm and effort [Auscultation Breath Sounds / Voice Sounds] : lungs were clear to auscultation bilaterally [Lungs Percussion] : the lungs were normal to percussion [Bowel Sounds] : normal bowel sounds [Abdomen Soft] : soft [Abdomen Tenderness] : non-tender [Abnormal Walk] : normal gait [Nail Clubbing] : no clubbing of the fingernails [Cyanosis, Localized] : no localized cyanosis [No Focal Deficits] : no focal deficits [Oriented To Time, Place, And Person] : oriented to person, place, and time [Impaired Insight] : insight and judgment were intact [Affect] : the affect was normal [Memory Recent] : recent memory was not impaired [Skin Turgor] : normal skin turgor [] : no rash [FreeTextEntry1] : obese

## 2019-05-09 ENCOUNTER — OUTPATIENT (OUTPATIENT)
Dept: OUTPATIENT SERVICES | Facility: HOSPITAL | Age: 64
LOS: 1 days | Discharge: HOME | End: 2019-05-09

## 2019-05-09 DIAGNOSIS — Z98.890 OTHER SPECIFIED POSTPROCEDURAL STATES: Chronic | ICD-10-CM

## 2019-05-09 DIAGNOSIS — Z90.2 ACQUIRED ABSENCE OF LUNG [PART OF]: Chronic | ICD-10-CM

## 2019-05-09 DIAGNOSIS — C34.90 MALIGNANT NEOPLASM OF UNSPECIFIED PART OF UNSPECIFIED BRONCHUS OR LUNG: ICD-10-CM

## 2019-05-13 ENCOUNTER — FORM ENCOUNTER (OUTPATIENT)
Age: 64
End: 2019-05-13

## 2019-05-13 ENCOUNTER — APPOINTMENT (OUTPATIENT)
Dept: HEMATOLOGY ONCOLOGY | Facility: CLINIC | Age: 64
End: 2019-05-13

## 2019-05-14 ENCOUNTER — APPOINTMENT (OUTPATIENT)
Dept: ULTRASOUND IMAGING | Facility: CLINIC | Age: 64
End: 2019-05-14

## 2019-05-14 ENCOUNTER — OUTPATIENT (OUTPATIENT)
Dept: OUTPATIENT SERVICES | Facility: HOSPITAL | Age: 64
LOS: 1 days | End: 2019-05-14
Payer: MEDICAID

## 2019-05-14 DIAGNOSIS — Z98.890 OTHER SPECIFIED POSTPROCEDURAL STATES: Chronic | ICD-10-CM

## 2019-05-14 DIAGNOSIS — Z90.2 ACQUIRED ABSENCE OF LUNG [PART OF]: Chronic | ICD-10-CM

## 2019-05-14 DIAGNOSIS — E04.1 NONTOXIC SINGLE THYROID NODULE: ICD-10-CM

## 2019-05-14 PROCEDURE — 76536 US EXAM OF HEAD AND NECK: CPT | Mod: 26
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
## 2019-05-15 ENCOUNTER — FORM ENCOUNTER (OUTPATIENT)
Age: 64
End: 2019-05-15

## 2019-05-16 ENCOUNTER — OUTPATIENT (OUTPATIENT)
Dept: OUTPATIENT SERVICES | Facility: HOSPITAL | Age: 64
LOS: 1 days | End: 2019-05-16

## 2019-05-16 ENCOUNTER — APPOINTMENT (OUTPATIENT)
Dept: MRI IMAGING | Facility: CLINIC | Age: 64
End: 2019-05-16
Payer: MEDICAID

## 2019-05-16 DIAGNOSIS — Z98.890 OTHER SPECIFIED POSTPROCEDURAL STATES: Chronic | ICD-10-CM

## 2019-05-16 DIAGNOSIS — M89.9 DISORDER OF BONE, UNSPECIFIED: ICD-10-CM

## 2019-05-16 DIAGNOSIS — Z90.2 ACQUIRED ABSENCE OF LUNG [PART OF]: Chronic | ICD-10-CM

## 2019-05-16 PROCEDURE — 72157 MRI CHEST SPINE W/O & W/DYE: CPT | Mod: 26

## 2019-05-16 PROCEDURE — 72158 MRI LUMBAR SPINE W/O & W/DYE: CPT | Mod: 26

## 2019-05-17 ENCOUNTER — OTHER (OUTPATIENT)
Age: 64
End: 2019-05-17

## 2019-05-20 ENCOUNTER — APPOINTMENT (OUTPATIENT)
Dept: HEMATOLOGY ONCOLOGY | Facility: CLINIC | Age: 64
End: 2019-05-20
Payer: COMMERCIAL

## 2019-06-03 ENCOUNTER — TRANSCRIPTION ENCOUNTER (OUTPATIENT)
Age: 64
End: 2019-06-03

## 2019-06-03 PROBLEM — K14.8 TONGUE LESION: Status: ACTIVE | Noted: 2017-10-04

## 2019-06-03 PROBLEM — R91.1 PULMONARY NODULE SEEN ON IMAGING STUDY: Status: ACTIVE | Noted: 2017-02-22

## 2019-06-05 ENCOUNTER — APPOINTMENT (OUTPATIENT)
Dept: HEMATOLOGY ONCOLOGY | Facility: CLINIC | Age: 64
End: 2019-06-05
Payer: COMMERCIAL

## 2019-06-05 ENCOUNTER — RESULT REVIEW (OUTPATIENT)
Age: 64
End: 2019-06-05

## 2019-06-05 VITALS
WEIGHT: 225 LBS | OXYGEN SATURATION: 97 % | DIASTOLIC BLOOD PRESSURE: 76 MMHG | BODY MASS INDEX: 34.1 KG/M2 | HEIGHT: 68 IN | SYSTOLIC BLOOD PRESSURE: 123 MMHG | HEART RATE: 86 BPM | TEMPERATURE: 98.1 F

## 2019-06-05 DIAGNOSIS — K14.8 OTHER DISEASES OF TONGUE: ICD-10-CM

## 2019-06-05 DIAGNOSIS — R06.09 OTHER FORMS OF DYSPNEA: ICD-10-CM

## 2019-06-05 DIAGNOSIS — R91.1 SOLITARY PULMONARY NODULE: ICD-10-CM

## 2019-06-05 LAB
BASOPHILS # BLD AUTO: 0.1 K/UL — SIGNIFICANT CHANGE UP (ref 0–0.2)
BASOPHILS NFR BLD AUTO: 0.9 % — SIGNIFICANT CHANGE UP (ref 0–2)
EOSINOPHIL # BLD AUTO: 0.1 K/UL — SIGNIFICANT CHANGE UP (ref 0–0.5)
EOSINOPHIL NFR BLD AUTO: 1.1 % — SIGNIFICANT CHANGE UP (ref 0–6)
HCT VFR BLD CALC: 37.9 % — SIGNIFICANT CHANGE UP (ref 34.5–45)
HGB BLD-MCNC: 12.3 G/DL — SIGNIFICANT CHANGE UP (ref 11.5–15.5)
LYMPHOCYTES # BLD AUTO: 1.6 K/UL — SIGNIFICANT CHANGE UP (ref 1–3.3)
LYMPHOCYTES # BLD AUTO: 27.1 % — SIGNIFICANT CHANGE UP (ref 13–44)
MCHC RBC-ENTMCNC: 30.1 PG — SIGNIFICANT CHANGE UP (ref 27–34)
MCHC RBC-ENTMCNC: 32.5 G/DL — SIGNIFICANT CHANGE UP (ref 32–36)
MCV RBC AUTO: 92.5 FL — SIGNIFICANT CHANGE UP (ref 80–100)
MONOCYTES # BLD AUTO: 0.4 K/UL — SIGNIFICANT CHANGE UP (ref 0–0.9)
MONOCYTES NFR BLD AUTO: 6.1 % — SIGNIFICANT CHANGE UP (ref 2–14)
NEUTROPHILS # BLD AUTO: 3.9 K/UL — SIGNIFICANT CHANGE UP (ref 1.8–7.4)
NEUTROPHILS NFR BLD AUTO: 64.9 % — SIGNIFICANT CHANGE UP (ref 43–77)
PLATELET # BLD AUTO: 234 K/UL — SIGNIFICANT CHANGE UP (ref 150–400)
RBC # BLD: 4.1 M/UL — SIGNIFICANT CHANGE UP (ref 3.8–5.2)
RBC # FLD: 14 % — SIGNIFICANT CHANGE UP (ref 10.3–14.5)
WBC # BLD: 6 K/UL — SIGNIFICANT CHANGE UP (ref 3.8–10.5)
WBC # FLD AUTO: 6 K/UL — SIGNIFICANT CHANGE UP (ref 3.8–10.5)

## 2019-06-05 PROCEDURE — 99215 OFFICE O/P EST HI 40 MIN: CPT

## 2019-06-05 NOTE — RESULTS/DATA
[FreeTextEntry1] : 5/16/19 MRI Spine:\par THORACIC SPINE: Mild patchy ill-defined intermediate T1 signal within the thoracic spine related to red marrow reconversion or posttreatement change. No focal osseous lesion is demonstrated. Continue follow up is recommended. THere is no abnormal osseous enhancement or osseous edema on the STIR sequence. No correlate to the PET/CT abnormality is demonstrated at the T10 vertebral body. \par LUMBAR SPINE: Nonenhancing hypointense T1 and T2 signal within the L4 vertebral body and along the posterior inferior margin of the L1 vertebral body corresponding  to areas of non-FDG avid sclerosis on prior PET/CT. Findings are seen in the background of mild patchy intermediate T1 signal throughout the imaged lumbar spine and pelvis which may be related to posttreatment change and/or red marrow reconversion. \par Multilevel lumbar spondylosis. At L4/L4 there is severe spinal canal narrowing, there is effacement of the L4/L5 left lateral recess secondary to prominent exophytic left ligamentum flavum hypertrophy. \par \par \par 5/16/19 bone scan:  No evidence of metastatic disease.\par \par 5/14/19 US Thyroid: Heterogenous thyroid gland with bilateral nodules. US guided FNA biopsy of the 1.1 cm right lobe nodule 1.4 cm left lobe nodule is suggested. \par \par 11/12/18 US:   Nodule 1:   1.1 x 1.0 x 1.0 cm Right mid-upper pole nodule, unchanged in size Recommendation: Follow-up ultrasound.     Nodule 2:   1.4 x 1.0 x 1.1 cm Left upper pole nodule, unchanged in size.   Recommendation: Nodule does not meet size threshold for FNA or Follow-up.\par \par 11/9/18 PET:  HEAD/NECK: Unchanged asymmetric FDG avidity right thyroid lobe (SUV 7.1;  image 57), previous SUV 6.4.   LUNGS: Stable postsurgical changes of right lower lobe lobectomy.  Rounded, non-FDG avid soft tissue nodule adjacent to suture margin, 1.6 x  0.8 cm (image 87) unchanged. 0.4 cm left upper lobe groundglass nodule  (image 69), unchanged. No new FDG avid foci.    BONES:   New mild FDG avidity at T10 (SUV 4.9; image 121).  SOFT TISSUES:  No abnormal avidity.  \par \par 10/25/18 CT Chest: Right lower lobectomy with unchanged nodularity along the resection sutures. Since 8/14/17, unchanged mixed lytic and sclerotic lesion in the thoracici spine and sternum .\par \par 9/18/18 Bone Density: Normal\par \par 3/5/18 PET/CT: Unchanged focal right thyroid lobe FDG avidity (SUV 6.4) previous SUV 5.2. Unchanged symmetric FDG avidity in the bilateral tonsils. 1.2 x 0.6 cm mildly FDG avid left axillary lymph node is unchanged in size (SUV 2.4), previous SUV 2.6.  Stable postsurgical changes of right lower lobe wedge resection. Unchanged rounded soft tissue density without FDG avidity associated with the suture margin (SUV 2.3), previous SUV 2.5. No FDG avid foci. No new pulmonary nodules.   Unchanged areas of sclerosis involving the sternum, and several vertebral bodies including L4 as well as scattered foci in the pelvis, with unchanged low-grade FDG avidity. For example, at T7 (SUV 3.8) previous SUV 3.8. Unchanged non-FDG avid sclerotic focus in the left anterior sixth rib. \par \par 12/6/17 PET/CT: Previously seen small ill-defined focus along the right lateral posterior tongue is not appreciated on the current exam. Fairly symmetrical FDG activity in bilateral tonsillar regions is less prominent, right tonsil 5.6 (previous SUV 7.4), left tonsil 5.4 (previous SUV 6.9). There is no enlarged or hypermetabolic cervical lymph node. Again seen is diffuse asymmetrically increased right thyroid lobe activity, which is not significantly changed from prior exam, SUV 5.2 (previous SUV 6.4). For reference, left thyroid demonstrates SUV 3.5. A small mildly hypermetabolic left axillary lymph node measuring approximately 1.4 cm is not significantly changed, SUV 2.6 (previous SUV 2.7). Mediastinal, bilateral hilar, and right axillary regions demonstrate physiologic tracer activity. Patchy sclerosis of the sternum, vertebral bodies, and pelvis with variable \par low-level FDG activity is not significantly changed. For example :T7 vertebra SUV 3.4 (previous SUV 3.8). Non-FDG avid dense distal sternal sclerosis and tiny sclerotic focus in the left sixth rib anteriorly are also not significantly changed compared to prior study.\par \par 12/6/17 CT Neck: No abnormal enhancement within the bilateral palatine tonsils or right posterolateral tongue. No cervical lymphadenopathy. \par \par 9/29/17 Pathology - spine biopsy - L4:  bone with normocellular bone marrow, negative for metastatic carcinoma \par \par 8/30/17 PET/CT:  Small  ill-defined  focus  of  FDG  activity  along  the  right  lateral posterior  tongue,  SUV  4.6.  Prominent  fairly  symmetrical  FDG activity  in  bilateral  tonsillar  regions,  right  tonsil  SUV  7.4  and  left tonsil  SUV  6.9.  Diffuse  asymmetrically  increased  activity  in  the  right  thyroid  lobe, SUV  6.4  vs.  left  thyroid  SUV  4.0.  No  CT  correlate.  A  1.4  cm  FDG  avid  left  axillary  lymph  node,  unchanged  from  prior  PET CT  (SUV  2.7).  Linear  soft  tissue  activity  along  the  lateral  right  chest  wall,  new  compared to  prior  PET/CT,  likely  postsurgical.  A  3  mm  FDG  avid  left  posterior  chest wall  soft  tissue  nodule  at  the  level  of  the  left  fourth  rib,  unchanged  from prior  PET  CT  (previous  SUV  2.1).  Ill  defined  non  FDG  avid  ground  glass  opacity in  the  right  lower  lobe,  not  seen  on  prior  chest  CT  and  too  small  for  PET characterization.  Patchy sclerosis  of  the  sternum,  vertebral  bodies,  and pelvis  with  variable  low-level  FDG  activity.  \par \par 8/23/17 Bone scan:  There are several foci of abnormal radiopharmaceutical uptake in the sternum, mid and lower thoracic spine, lower lumbar spine, left upper sacrum, left upper medial iliac bone adjacent to the sacrum, and left superior pubic ramus. There are degenerative changes in the spine and major joints. There is physiologic distribution of \par radiopharmaceutical throughout the remainder of the imaged osseous structures.\par \par 8/14/17 CT:  No endobronchial lesions are noted. Patient is status post right lower lobectomy. A 0.5 cm groundglass nodule in the apical segment of the left upper lobe is unchanged when compared to previous exam. No pleural effusions are noted. Below the diaphragm, visualized portions of the abdomen demonstrate diffuse decreased attenuation of the liver suggestive of fatty infiltration. Visualized osseous structures demonstrate patchy areas of sclerosis.\par \par 9/14/17 March 24, 2017 Right lower lobectomy by bronchoscopy, and mediastinal lymph node dissection (Dr. Bradley):  The tumor size from the right lower lobe wedge resection was 1.8 x 1.7 x 1.6cm.  Pathology  showed adenocarcinoma, acinar type, moderately to  poorly differentiated, 1.8 x 1.7 x 1.6 cm. with clear margins.  Mediastinal lymph nodes were negative for tumor.  Immunohistochemistry studies showed were positive for:  CK7, Napsin A and TTF-1; and negative for: CDX-2, CK20 and mammaglobin.   pT1aN0\par \par February 24, 2017 PET/CT:  Lobulated opacity in the superior segment of the right lower lobe, just behind the major fissure measuring 1.9 x 1.9 cm with SUV 2.8.  Left axillary lymph nodes measures 1.5 x 1.0cm with SUV 2.6.  A tiny subcutaneous nodule at the posterior left chest wall at the level of the fourth rib measures 0.8 x 0.7cm with SUV 2.1.  \par \par February 17, 2017 CT chest:  Lobulated lesion in the superior segment right lower lobe abutting the fissure along its anterior margin.  The measures 2.2 x 2.0 cm.  A small vascular branch is noted abutting its posterior margin.  \par

## 2019-06-05 NOTE — HISTORY OF PRESENT ILLNESS
[T: ___] : T[unfilled] [AJCC Stage: ____] : AJCC Stage: [unfilled] [N: ___] : N[unfilled] [de-identified] : The patient was diagnosed with adenocarcinoma of the lung in March 2017 at the age of 61.  She presented with an incidental finding of a 2 cm RLL pulmonary nodule on routine CXR, prior to foot surgery. On February 17, 2017, a CT chest showed a lobulated lesion in the superior segment right lower lobe abutting the fissure along its anterior margin which measured 2.2 x 2.0 cm.  On February 24, 2017 a PET/CT showed the lobulated opacity in the superior segment of the right lower lobe, 1.9 x 1.9 cm with SUV 2.8.  On March 24, 2017 Dr. Bradley performed a right lower lobectomy and mediastinal lymph node dissection.  The tumor size from the right lower lobe wedge resection was 1.8 x 1.7 x 1.6cm.  Pathology showed adenocarcinoma, acinar type, moderately to  poorly differentiated, 1.8 x 1.7 x 1.6 cm with clear margins.  Mediastinal lymph nodes were negative for tumor.  Immunohistochemistry studies showed were positive for:  CK7, Napsin A and TTF-1; and negative for: CDX-2, CK20 and mammaglobin.  Smoking history since age 14, and quit 4 years ago, ~50 PY history.  Drinks ~ 2 glasses wine per day.   [de-identified] : Adenocarcinoma of lung [de-identified] : The patient presents for follow up.  Reports she is doing well.  Intentional healthy weight loss on [employer] diet. She has no chest pain, shortness of breath, headache, dizziness, GI or urinary complaints. No other complaints today. Health maintenance reviewed, patient advised to have yearly mammogram, gynecologic exam and colonoscopy as recommended by GI.

## 2019-06-05 NOTE — PHYSICAL EXAM
[Restricted in physically strenuous activity but ambulatory and able to carry out work of a light or sedentary nature] : Status 1- Restricted in physically strenuous activity but ambulatory and able to carry out work of a light or sedentary nature, e.g., light house work, office work [Normal] : clear to auscultation bilaterally, no dullness, no wheezing

## 2019-06-05 NOTE — ADDENDUM
[FreeTextEntry1] : I, Dea Christiansen, acted solely as a scribe for Dr. Elvira Joseph on this date 6/5/19.

## 2019-06-06 LAB
ALBUMIN SERPL ELPH-MCNC: 4.4 G/DL
ALP BLD-CCNC: 112 U/L
ALT SERPL-CCNC: 65 U/L
ANION GAP SERPL CALC-SCNC: 14 MMOL/L
AST SERPL-CCNC: 41 U/L
BILIRUB SERPL-MCNC: 0.6 MG/DL
BUN SERPL-MCNC: 16 MG/DL
CALCIUM SERPL-MCNC: 9.3 MG/DL
CHLORIDE SERPL-SCNC: 105 MMOL/L
CO2 SERPL-SCNC: 23 MMOL/L
CREAT SERPL-MCNC: 0.74 MG/DL
GLUCOSE SERPL-MCNC: 109 MG/DL
MAGNESIUM SERPL-MCNC: 1.9 MG/DL
POTASSIUM SERPL-SCNC: 4.5 MMOL/L
PROT SERPL-MCNC: 6.9 G/DL
SODIUM SERPL-SCNC: 142 MMOL/L

## 2019-06-19 ENCOUNTER — APPOINTMENT (OUTPATIENT)
Dept: FAMILY MEDICINE | Facility: CLINIC | Age: 64
End: 2019-06-19

## 2019-08-26 ENCOUNTER — APPOINTMENT (OUTPATIENT)
Dept: PULMONOLOGY | Facility: CLINIC | Age: 64
End: 2019-08-26

## 2019-10-25 NOTE — RESULTS/DATA
[FreeTextEntry1] : 10/16/19 MRI Brain: T2 hyperintensities in a pattern seen in associated with vasospastic phenomena, see discussion above.\par \par 5/16/19 MRI Spine:\par THORACIC SPINE: Mild patchy ill-defined intermediate T1 signal within the thoracic spine related to red marrow reconversion or posttreatement change. No focal osseous lesion is demonstrated. Continue follow up is recommended. THere is no abnormal osseous enhancement or osseous edema on the STIR sequence. No correlate to the PET/CT abnormality is demonstrated at the T10 vertebral body. \par LUMBAR SPINE: Nonenhancing hypointense T1 and T2 signal within the L4 vertebral body and along the posterior inferior margin of the L1 vertebral body corresponding  to areas of non-FDG avid sclerosis on prior PET/CT. Findings are seen in the background of mild patchy intermediate T1 signal throughout the imaged lumbar spine and pelvis which may be related to posttreatment change and/or red marrow reconversion. \par Multilevel lumbar spondylosis. At L4/L4 there is severe spinal canal narrowing, there is effacement of the L4/L5 left lateral recess secondary to prominent exophytic left ligamentum flavum hypertrophy. \par \par \par 5/16/19 bone scan:  No evidence of metastatic disease.\par \par 5/14/19 US Thyroid: Heterogenous thyroid gland with bilateral nodules. US guided FNA biopsy of the 1.1 cm right lobe nodule 1.4 cm left lobe nodule is suggested. \par \par 11/12/18 US:   Nodule 1:   1.1 x 1.0 x 1.0 cm Right mid-upper pole nodule, unchanged in size Recommendation: Follow-up ultrasound.     Nodule 2:   1.4 x 1.0 x 1.1 cm Left upper pole nodule, unchanged in size.   Recommendation: Nodule does not meet size threshold for FNA or Follow-up.\par \par 11/9/18 PET:  HEAD/NECK: Unchanged asymmetric FDG avidity right thyroid lobe (SUV 7.1;  image 57), previous SUV 6.4.   LUNGS: Stable postsurgical changes of right lower lobe lobectomy.  Rounded, non-FDG avid soft tissue nodule adjacent to suture margin, 1.6 x  0.8 cm (image 87) unchanged. 0.4 cm left upper lobe groundglass nodule  (image 69), unchanged. No new FDG avid foci.    BONES:   New mild FDG avidity at T10 (SUV 4.9; image 121).  SOFT TISSUES:  No abnormal avidity.  \par \par 10/25/18 CT Chest: Right lower lobectomy with unchanged nodularity along the resection sutures. Since 8/14/17, unchanged mixed lytic and sclerotic lesion in the thoracici spine and sternum .\par \par 9/18/18 Bone Density: Normal\par \par 3/5/18 PET/CT: Unchanged focal right thyroid lobe FDG avidity (SUV 6.4) previous SUV 5.2. Unchanged symmetric FDG avidity in the bilateral tonsils. 1.2 x 0.6 cm mildly FDG avid left axillary lymph node is unchanged in size (SUV 2.4), previous SUV 2.6.  Stable postsurgical changes of right lower lobe wedge resection. Unchanged rounded soft tissue density without FDG avidity associated with the suture margin (SUV 2.3), previous SUV 2.5. No FDG avid foci. No new pulmonary nodules.   Unchanged areas of sclerosis involving the sternum, and several vertebral bodies including L4 as well as scattered foci in the pelvis, with unchanged low-grade FDG avidity. For example, at T7 (SUV 3.8) previous SUV 3.8. Unchanged non-FDG avid sclerotic focus in the left anterior sixth rib. \par \par 12/6/17 PET/CT: Previously seen small ill-defined focus along the right lateral posterior tongue is not appreciated on the current exam. Fairly symmetrical FDG activity in bilateral tonsillar regions is less prominent, right tonsil 5.6 (previous SUV 7.4), left tonsil 5.4 (previous SUV 6.9). There is no enlarged or hypermetabolic cervical lymph node. Again seen is diffuse asymmetrically increased right thyroid lobe activity, which is not significantly changed from prior exam, SUV 5.2 (previous SUV 6.4). For reference, left thyroid demonstrates SUV 3.5. A small mildly hypermetabolic left axillary lymph node measuring approximately 1.4 cm is not significantly changed, SUV 2.6 (previous SUV 2.7). Mediastinal, bilateral hilar, and right axillary regions demonstrate physiologic tracer activity. Patchy sclerosis of the sternum, vertebral bodies, and pelvis with variable \par low-level FDG activity is not significantly changed. For example :T7 vertebra SUV 3.4 (previous SUV 3.8). Non-FDG avid dense distal sternal sclerosis and tiny sclerotic focus in the left sixth rib anteriorly are also not significantly changed compared to prior study.\par \par 12/6/17 CT Neck: No abnormal enhancement within the bilateral palatine tonsils or right posterolateral tongue. No cervical lymphadenopathy. \par \par 9/29/17 Pathology - spine biopsy - L4:  bone with normocellular bone marrow, negative for metastatic carcinoma \par \par 8/30/17 PET/CT:  Small  ill-defined  focus  of  FDG  activity  along  the  right  lateral posterior  tongue,  SUV  4.6.  Prominent  fairly  symmetrical  FDG activity  in  bilateral  tonsillar  regions,  right  tonsil  SUV  7.4  and  left tonsil  SUV  6.9.  Diffuse  asymmetrically  increased  activity  in  the  right  thyroid  lobe, SUV  6.4  vs.  left  thyroid  SUV  4.0.  No  CT  correlate.  A  1.4  cm  FDG  avid  left  axillary  lymph  node,  unchanged  from  prior  PET CT  (SUV  2.7).  Linear  soft  tissue  activity  along  the  lateral  right  chest  wall,  new  compared to  prior  PET/CT,  likely  postsurgical.  A  3  mm  FDG  avid  left  posterior  chest wall  soft  tissue  nodule  at  the  level  of  the  left  fourth  rib,  unchanged  from prior  PET  CT  (previous  SUV  2.1).  Ill  defined  non  FDG  avid  ground  glass  opacity in  the  right  lower  lobe,  not  seen  on  prior  chest  CT  and  too  small  for  PET characterization.  Patchy sclerosis  of  the  sternum,  vertebral  bodies,  and pelvis  with  variable  low-level  FDG  activity.  \par \par 8/23/17 Bone scan:  There are several foci of abnormal radiopharmaceutical uptake in the sternum, mid and lower thoracic spine, lower lumbar spine, left upper sacrum, left upper medial iliac bone adjacent to the sacrum, and left superior pubic ramus. There are degenerative changes in the spine and major joints. There is physiologic distribution of \par radiopharmaceutical throughout the remainder of the imaged osseous structures.\par \par 8/14/17 CT:  No endobronchial lesions are noted. Patient is status post right lower lobectomy. A 0.5 cm groundglass nodule in the apical segment of the left upper lobe is unchanged when compared to previous exam. No pleural effusions are noted. Below the diaphragm, visualized portions of the abdomen demonstrate diffuse decreased attenuation of the liver suggestive of fatty infiltration. Visualized osseous structures demonstrate patchy areas of sclerosis.\par \par 9/14/17 March 24, 2017 Right lower lobectomy by bronchoscopy, and mediastinal lymph node dissection (Dr. Bradley):  The tumor size from the right lower lobe wedge resection was 1.8 x 1.7 x 1.6cm.  Pathology  showed adenocarcinoma, acinar type, moderately to  poorly differentiated, 1.8 x 1.7 x 1.6 cm. with clear margins.  Mediastinal lymph nodes were negative for tumor.  Immunohistochemistry studies showed were positive for:  CK7, Napsin A and TTF-1; and negative for: CDX-2, CK20 and mammaglobin.   pT1aN0\par \par February 24, 2017 PET/CT:  Lobulated opacity in the superior segment of the right lower lobe, just behind the major fissure measuring 1.9 x 1.9 cm with SUV 2.8.  Left axillary lymph nodes measures 1.5 x 1.0cm with SUV 2.6.  A tiny subcutaneous nodule at the posterior left chest wall at the level of the fourth rib measures 0.8 x 0.7cm with SUV 2.1.  \par \par February 17, 2017 CT chest:  Lobulated lesion in the superior segment right lower lobe abutting the fissure along its anterior margin.  The measures 2.2 x 2.0 cm.  A small vascular branch is noted abutting its posterior margin.  \par

## 2019-10-25 NOTE — HISTORY OF PRESENT ILLNESS
[T: ___] : T[unfilled] [N: ___] : N[unfilled] [AJCC Stage: ____] : AJCC Stage: [unfilled] [de-identified] : The patient presents for follow up.  Reports she is doing well.  Intentional healthy weight loss on Optavia diet. She has no chest pain, shortness of breath, headache, dizziness, GI or urinary complaints. No other complaints today. Health maintenance reviewed, patient advised to have yearly mammogram, gynecologic exam and colonoscopy as recommended by GI.  [de-identified] : The patient was diagnosed with adenocarcinoma of the lung in March 2017 at the age of 61.  She presented with an incidental finding of a 2 cm RLL pulmonary nodule on routine CXR, prior to foot surgery. On February 17, 2017, a CT chest showed a lobulated lesion in the superior segment right lower lobe abutting the fissure along its anterior margin which measured 2.2 x 2.0 cm.  On February 24, 2017 a PET/CT showed the lobulated opacity in the superior segment of the right lower lobe, 1.9 x 1.9 cm with SUV 2.8.  On March 24, 2017 Dr. Bradley performed a right lower lobectomy and mediastinal lymph node dissection.  The tumor size from the right lower lobe wedge resection was 1.8 x 1.7 x 1.6cm.  Pathology showed adenocarcinoma, acinar type, moderately to  poorly differentiated, 1.8 x 1.7 x 1.6 cm with clear margins.  Mediastinal lymph nodes were negative for tumor.  Immunohistochemistry studies showed were positive for:  CK7, Napsin A and TTF-1; and negative for: CDX-2, CK20 and mammaglobin.  Smoking history since age 14, and quit 4 years ago, ~50 PY history.  Drinks ~ 2 glasses wine per day.   [de-identified] : Adenocarcinoma of lung

## 2019-10-31 PROBLEM — Z01.811 PREOP RESPIRATORY EXAM: Status: RESOLVED | Noted: 2017-07-31 | Resolved: 2019-01-01

## 2019-10-31 PROBLEM — E78.00 HYPERCHOLESTEREMIA: Status: ACTIVE | Noted: 2019-01-01

## 2019-10-31 NOTE — COUNSELING
[Potential consequences of obesity discussed] : Potential consequences of obesity discussed [Benefits of weight loss discussed] : Benefits of weight loss discussed [Encouraged to maintain food diary] : Encouraged to maintain food diary [Encouraged to increase physical activity] : Encouraged to increase physical activity [Encouraged to use exercise tracking device] : Encouraged to use exercise tracking device [Target Wt Loss Goal ___] : Weight Loss Goals: Target weight loss goal [unfilled] lbs [Weigh Self Weekly] : weigh self weekly [Decrease Portions] : decrease portions [____ min/wk Activity] : [unfilled] min/wk activity [Keep Food Diary] : keep food diary [Good understanding] : Patient has a good understanding of disease, goals and obesity follow-up plan [FreeTextEntry4] : 15

## 2019-10-31 NOTE — HISTORY OF PRESENT ILLNESS
[FreeTextEntry1] : Initial office visit [de-identified] : NADER is a 64 year F whom is here today for an initial office visit and well check\par Today, pt reports feeling well and is w/o complaints. 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 Former smoker (Quit 2014).\par \par Follows with the following Physicians: \par -OBGYN: Dr. Lord ([phone])\par -Onc: Dr. Vu ([phone])\par -Pulm: Dr. Duran ([phone])\par -Derm: Dr. Massey ([phone])\par \par -Vaccines: Declines all vaccines despite education. \par -DEXA: 9/2018. Due 2020\par -Mammogram: 2016. Given today. \par -Pap Smear: 2016. \par -Colonoscopy: 2016\par \par -Lung CA: S/p Resection RLL 2017. 5/16/19 Bone scan: No evidence of metastatic disease.\par -Thyroid Nodule: 5/14/19 US Thyroid: Heterogenous thyroid gland with bilateral nodules. US guided FNA biopsy of the 1.1 cm right lobe nodule and 1.4 cm left lobe nodule suggested as per Onc. Pt already has referral to Endo (Has first appointment tomorrow).

## 2019-10-31 NOTE — HEALTH RISK ASSESSMENT
[Very Good] : ~his/her~  mood as very good [] : Yes [Yes] : Yes [2 - 4 times a month (2 pts)] : 2-4 times a month (2 points) [1 or 2 (0 pts)] : 1 or 2 (0 points) [Never (0 pts)] : Never (0 points) [No] : In the past 12 months have you used drugs other than those required for medical reasons? No [No falls in past year] : Patient reported no falls in the past year [1] : 2) Feeling down, depressed, or hopeless for several days (1) [Patient reported mammogram was normal] : Patient reported mammogram was normal [Patient reported PAP Smear was normal] : Patient reported PAP Smear was normal [Patient reported bone density results were normal] : Patient reported bone density results were normal [Patient reported colonoscopy was normal] : Patient reported colonoscopy was normal [Hepatitis C test offered] : Hepatitis C test offered [None] : None [With Family] : lives with family [Employed] : employed [College] : College [] :  [# Of Children ___] : has [unfilled] children [Fully functional (bathing, dressing, toileting, transferring, walking, feeding)] : Fully functional (bathing, dressing, toileting, transferring, walking, feeding) [Fully functional (using the telephone, shopping, preparing meals, housekeeping, doing laundry, using] : Fully functional and needs no help or supervision to perform IADLs (using the telephone, shopping, preparing meals, housekeeping, doing laundry, using transportation, managing medications and managing finances) [Reviewed no changes] : Reviewed no changes [YearQuit] : 2014 [Audit-CScore] : 2 [[id number]] : 2 [TRB9Yehnq] : 12 [Reports changes in hearing] : Reports no changes in hearing [Reports changes in vision] : Reports no changes in vision [MammogramDate] : 01/16 [PapSmearDate] : 01/16 [BoneDensityDate] : 09/18 [ColonoscopyDate] : 01/16 [HepatitisCDate] : 10/19 [AdvancecareDate] : 10/19

## 2019-10-31 NOTE — ASSESSMENT
[FreeTextEntry1] : NADER is a 64 year F whom is here today for an initial office visit and well check\par Today, pt reports feeling well and is w/o complaints. 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 Former smoker (Quit 2014).\par \par Follows with the following Physicians: \par -OBGYN: Dr. Lord ([phone])\par -Onc: Dr. Vu ([phone])\par -Pulm: Dr. Duran ([phone])\par -Derm: Dr. Massey ([phone])\par \par -Vaccines: Declines all vaccines despite education. \par -DEXA: 9/2018. Due 2020\par -Mammogram: 2016. Given today. \par -Pap Smear: 2016. \par -Colonoscopy: 2016\par \par -Lung CA: S/p Resection RLL 2017. 5/16/19 Bone scan: No evidence of metastatic disease.\par -Thyroid Nodule: 5/14/19 US Thyroid: Heterogenous thyroid gland with bilateral nodules. US guided FNA biopsy of the 1.1 cm right lobe nodule and 1.4 cm left lobe nodule suggested as per Onc. Pt already has referral to Endo (Has first appointment tomorrow). \par \par -Labs & EKG from 10/2019 niko in with pt. Labs included CBC, CMP, A1c, Lipid panel, TSH all WNL except for elevated TSH. Pt has f/u w/ endo tomorrow\par -No new recs at this time\par -RTO 6mo for routine labs and to discuss management of anxiety and referral to dietary

## 2019-11-01 PROBLEM — Z80.8 FAMILY HISTORY OF MALIGNANT NEOPLASM OF THYROID: Status: ACTIVE | Noted: 2019-01-01

## 2019-11-01 NOTE — H&P PST ADULT - OPHTHALMOLOGIC
Patient Denies latex allergy.  Medications reviewed with patient.  Tobacco use verified.  Allergies verified.    REFERRING MD: Matthew Diallo PA-C  PRIMARY MEDICAL DOCTOR: Cathie Valdez MD  DATE OF INJURY/SURGERY/ONSET: 8/30/19  WORK RELATED: NO  PLACE OF EMPLOYMENT: STUDENT    Patient is here for left knee Pre op.   details…

## 2019-11-01 NOTE — REVIEW OF SYSTEMS
[Fatigue] : fatigue [Nocturia] : nocturia [Joint Pain] : joint pain [Anxiety] : anxiety [Blurry Vision] : no blurred vision [Dysphagia] : no dysphagia [Neck Pain] : no neck pain [Palpitations] : no palpitations [Shortness Of Breath] : no shortness of breath [Constipation] : no constipation [Hair Loss] : no hair loss [Tremors] : no tremors [Cold Intolerance] : cold tolerant

## 2019-11-01 NOTE — REASON FOR VISIT
[Initial Evaluation] : an initial evaluation [FreeTextEntry1] : Referred her by Dr. Joseph for evaluation of thyroid nodules and hypothyroidism

## 2019-11-01 NOTE — HISTORY OF PRESENT ILLNESS
[FreeTextEntry1] : Patient has a history of adenocarcinoma of the lung s/p resection in 2017.\par Imaging incidentally showed bilateral thyroid nodules.  PET scan showed diffuse uptake in right lobe, but does have a long standing hypothyroidism.  She reports that about 5 years ago she had 2 thyroid biopsies done, which were benign (one at [facility] and one at another MD office in [place]).   She denies any associated neck pain or dysphagia.  No history of XRT. \par \par She has longstanding moderate primary hypothyroidism diagnosed in her 20s.  She has improved with the use of LT4.  For the past month she has been on Synthroid 175 mcg daily and LT3 5 mcg daily.

## 2019-11-01 NOTE — PHYSICAL EXAM
[No Acute Distress] : no acute distress [Healthy Appearance] : healthy appearance [Normal Sclera/Conjunctiva] : normal sclera/conjunctiva [No Proptosis] : no proptosis [No Neck Mass] : no neck mass was observed [No LAD] : no lymphadenopathy [Thyroid Not Enlarged] : the thyroid was not enlarged [No Thyroid Nodules] : there were no palpable thyroid nodules [Normal Rate and Effort] : normal respiratory rhythm and effort [Clear to Auscultation] : lungs were clear to auscultation bilaterally [Normal Rate] : heart rate was normal  [Normal S1, S2] : normal S1 and S2 [Regular Rhythm] : with a regular rhythm [Murmurs] : no murmurs [No Edema] : there was no peripheral edema [Normal Gait] : normal gait [No Clubbing, Cyanosis] : no clubbing  or cyanosis of the fingernails [No Rash] : no rash [Acanthosis Nigricans] : no acanthosis nigricans [No Tremors] : no tremors [Normal Insight/Judgement] : insight and judgment were intact [Normal Affect] : the affect was normal [Normal Mood] : the mood was normal [de-identified] : Thyroid is firm in texture.   [de-identified] : biceps DTRs 2+ b/l

## 2019-11-01 NOTE — DATA REVIEWED
[FreeTextEntry1] : Thyroid US 3/14/2019:\par Right Lobe: 4.4 x 2.0 x 2.0 cm. Heterogeneous. There is a 1.1 x 0.8 x 0.9 cm solid mid gland nodule. \par Left Lobe: 4.3 x 1.3 x 1.3 cm. Heterogeneous. There is a 1.4 x 1.0 x 0.9 cm solid upper pole nodule. Just inferior \par to the left lobe are two perithyroidal lymph nodes or parathyroid lesions measuring up to 9 x 4 x 4 mm.

## 2019-11-01 NOTE — CONSULT LETTER
[Dear  ___] : Dear  [unfilled], [Consult Letter:] : I had the pleasure of evaluating your patient, [unfilled]. [Please see my note below.] : Please see my note below. [Consult Closing:] : Thank you very much for allowing me to participate in the care of this patient.  If you have any questions, please do not hesitate to contact me. [Sincerely,] : Sincerely, [FreeTextEntry3] : Jackson English MD, FACE\par  [DrBharti  ___] : Dr. PIERCE

## 2019-11-01 NOTE — ASSESSMENT
[FreeTextEntry1] : 64 year old female with longstanding hypothyroidism, likely due to underlying Hashimoto's Thyroiditis as well as multinodular goiter containing a right mid pole 1.1 cm nodule and a left upper pole 1.4 cm nodule, which were biopsied in the past with benign Cytology.\par \par 1.  Thyroid nodules-  will obtain old cytology records.  If both nodules were in fact biopsied and are stable, then further sampling would not be indicated at this time.  If the nodules have enlarged and were not clearly sampled, then will arrange for US guided FNA.  The diffuse uptake in the right lobe on PET is likely due to underlying Hashimoto's Thyroiditis.  \par 2.  Hypothyroidism-  currently euthyroid.  Continue Synthroid and LT3.  Patient is interested in possibly coming off LT3 in the future, which we can consider based on her next TFTs.  \par

## 2019-11-21 NOTE — HISTORY OF PRESENT ILLNESS
[T: ___] : T[unfilled] [N: ___] : N[unfilled] [AJCC Stage: ____] : AJCC Stage: [unfilled] [de-identified] : The patient was diagnosed with adenocarcinoma of the lung in March 2017 at the age of 61.  She presented with an incidental finding of a 2 cm RLL pulmonary nodule on routine CXR, prior to foot surgery. On February 17, 2017, a CT chest showed a lobulated lesion in the superior segment right lower lobe abutting the fissure along its anterior margin which measured 2.2 x 2.0 cm.  On February 24, 2017 a PET/CT showed the lobulated opacity in the superior segment of the right lower lobe, 1.9 x 1.9 cm with SUV 2.8.  On March 24, 2017 Dr. Bradley performed a right lower lobectomy and mediastinal lymph node dissection.  The tumor size from the right lower lobe wedge resection was 1.8 x 1.7 x 1.6cm.  Pathology showed adenocarcinoma, acinar type, moderately to  poorly differentiated, 1.8 x 1.7 x 1.6 cm with clear margins.  Mediastinal lymph nodes were negative for tumor.  Immunohistochemistry studies showed were positive for:  CK7, Napsin A and TTF-1; and negative for: CDX-2, CK20 and mammaglobin.  Smoking history since age 14, and quit 4 years ago, ~50 PY history.  Drinks ~ 2 glasses wine per day.   [de-identified] : Adenocarcinoma of lung [de-identified] : The patient presents for follow up.  Reports she is doing well.  Intentional healthy weight loss on Optavia diet. She has no chest pain, shortness of breath, headache, dizziness, GI or urinary complaints.  Health maintenance reviewed, patient advised to have yearly mammogram, gynecologic exam as recommended by GI. No other complaints today.

## 2019-11-21 NOTE — RESULTS/DATA
[FreeTextEntry1] : 11/12/19 CT C/A/P: Stable nodule adjacent to suture material in the right lung. No adenopathy in the thorax, abdomen or pelvis. Stable sclerotic areas within vertebral bodies and sternum.\par \par 11/12/19 US Thyroid: Thyroiditis. Left upper pole nodule without suspicious features unchanged from 12/18/2017\par \par 10/16/19 MRI Brain: T2 hyperintensities in a pattern seen in associated with vasospastic phenomena, see discussion above.\par \par 5/16/19 MRI Spine:\par THORACIC SPINE: Mild patchy ill-defined intermediate T1 signal within the thoracic spine related to red marrow reconversion or posttreatement change. No focal osseous lesion is demonstrated. Continue follow up is recommended. THere is no abnormal osseous enhancement or osseous edema on the STIR sequence. No correlate to the PET/CT abnormality is demonstrated at the T10 vertebral body. \par LUMBAR SPINE: Nonenhancing hypointense T1 and T2 signal within the L4 vertebral body and along the posterior inferior margin of the L1 vertebral body corresponding  to areas of non-FDG avid sclerosis on prior PET/CT. Findings are seen in the background of mild patchy intermediate T1 signal throughout the imaged lumbar spine and pelvis which may be related to posttreatment change and/or red marrow reconversion. \par Multilevel lumbar spondylosis. At L4/L4 there is severe spinal canal narrowing, there is effacement of the L4/L5 left lateral recess secondary to prominent exophytic left ligamentum flavum hypertrophy. \par \par \par 5/16/19 bone scan:  No evidence of metastatic disease.\par \par 5/14/19 US Thyroid: Heterogenous thyroid gland with bilateral nodules. US guided FNA biopsy of the 1.1 cm right lobe nodule 1.4 cm left lobe nodule is suggested. \par \par 11/12/18 US:   Nodule 1:   1.1 x 1.0 x 1.0 cm Right mid-upper pole nodule, unchanged in size Recommendation: Follow-up ultrasound.     Nodule 2:   1.4 x 1.0 x 1.1 cm Left upper pole nodule, unchanged in size.   Recommendation: Nodule does not meet size threshold for FNA or Follow-up.\par \par 11/9/18 PET:  HEAD/NECK: Unchanged asymmetric FDG avidity right thyroid lobe (SUV 7.1;  image 57), previous SUV 6.4.   LUNGS: Stable postsurgical changes of right lower lobe lobectomy.  Rounded, non-FDG avid soft tissue nodule adjacent to suture margin, 1.6 x  0.8 cm (image 87) unchanged. 0.4 cm left upper lobe groundglass nodule  (image 69), unchanged. No new FDG avid foci.    BONES:   New mild FDG avidity at T10 (SUV 4.9; image 121).  SOFT TISSUES:  No abnormal avidity.  \par \par 10/25/18 CT Chest: Right lower lobectomy with unchanged nodularity along the resection sutures. Since 8/14/17, unchanged mixed lytic and sclerotic lesion in the thoracici spine and sternum .\par \par 9/18/18 Bone Density: Normal\par \par 3/5/18 PET/CT: Unchanged focal right thyroid lobe FDG avidity (SUV 6.4) previous SUV 5.2. Unchanged symmetric FDG avidity in the bilateral tonsils. 1.2 x 0.6 cm mildly FDG avid left axillary lymph node is unchanged in size (SUV 2.4), previous SUV 2.6.  Stable postsurgical changes of right lower lobe wedge resection. Unchanged rounded soft tissue density without FDG avidity associated with the suture margin (SUV 2.3), previous SUV 2.5. No FDG avid foci. No new pulmonary nodules.   Unchanged areas of sclerosis involving the sternum, and several vertebral bodies including L4 as well as scattered foci in the pelvis, with unchanged low-grade FDG avidity. For example, at T7 (SUV 3.8) previous SUV 3.8. Unchanged non-FDG avid sclerotic focus in the left anterior sixth rib. \par \par 12/6/17 PET/CT: Previously seen small ill-defined focus along the right lateral posterior tongue is not appreciated on the current exam. Fairly symmetrical FDG activity in bilateral tonsillar regions is less prominent, right tonsil 5.6 (previous SUV 7.4), left tonsil 5.4 (previous SUV 6.9). There is no enlarged or hypermetabolic cervical lymph node. Again seen is diffuse asymmetrically increased right thyroid lobe activity, which is not significantly changed from prior exam, SUV 5.2 (previous SUV 6.4). For reference, left thyroid demonstrates SUV 3.5. A small mildly hypermetabolic left axillary lymph node measuring approximately 1.4 cm is not significantly changed, SUV 2.6 (previous SUV 2.7). Mediastinal, bilateral hilar, and right axillary regions demonstrate physiologic tracer activity. Patchy sclerosis of the sternum, vertebral bodies, and pelvis with variable \par low-level FDG activity is not significantly changed. For example :T7 vertebra SUV 3.4 (previous SUV 3.8). Non-FDG avid dense distal sternal sclerosis and tiny sclerotic focus in the left sixth rib anteriorly are also not significantly changed compared to prior study.\par \par 12/6/17 CT Neck: No abnormal enhancement within the bilateral palatine tonsils or right posterolateral tongue. No cervical lymphadenopathy. \par \par 9/29/17 Pathology - spine biopsy - L4:  bone with normocellular bone marrow, negative for metastatic carcinoma \par \par 8/30/17 PET/CT:  Small  ill-defined  focus  of  FDG  activity  along  the  right  lateral posterior  tongue,  SUV  4.6.  Prominent  fairly  symmetrical  FDG activity  in  bilateral  tonsillar  regions,  right  tonsil  SUV  7.4  and  left tonsil  SUV  6.9.  Diffuse  asymmetrically  increased  activity  in  the  right  thyroid  lobe, SUV  6.4  vs.  left  thyroid  SUV  4.0.  No  CT  correlate.  A  1.4  cm  FDG  avid  left  axillary  lymph  node,  unchanged  from  prior  PET CT  (SUV  2.7).  Linear  soft  tissue  activity  along  the  lateral  right  chest  wall,  new  compared to  prior  PET/CT,  likely  postsurgical.  A  3  mm  FDG  avid  left  posterior  chest wall  soft  tissue  nodule  at  the  level  of  the  left  fourth  rib,  unchanged  from prior  PET  CT  (previous  SUV  2.1).  Ill  defined  non  FDG  avid  ground  glass  opacity in  the  right  lower  lobe,  not  seen  on  prior  chest  CT  and  too  small  for  PET characterization.  Patchy sclerosis  of  the  sternum,  vertebral  bodies,  and pelvis  with  variable  low-level  FDG  activity.  \par \par 8/23/17 Bone scan:  There are several foci of abnormal radiopharmaceutical uptake in the sternum, mid and lower thoracic spine, lower lumbar spine, left upper sacrum, left upper medial iliac bone adjacent to the sacrum, and left superior pubic ramus. There are degenerative changes in the spine and major joints. There is physiologic distribution of \par radiopharmaceutical throughout the remainder of the imaged osseous structures.\par \par 8/14/17 CT:  No endobronchial lesions are noted. Patient is status post right lower lobectomy. A 0.5 cm groundglass nodule in the apical segment of the left upper lobe is unchanged when compared to previous exam. No pleural effusions are noted. Below the diaphragm, visualized portions of the abdomen demonstrate diffuse decreased attenuation of the liver suggestive of fatty infiltration. Visualized osseous structures demonstrate patchy areas of sclerosis.\par \par 9/14/17 March 24, 2017 Right lower lobectomy by bronchoscopy, and mediastinal lymph node dissection (Dr. Bradley):  The tumor size from the right lower lobe wedge resection was 1.8 x 1.7 x 1.6cm.  Pathology  showed adenocarcinoma, acinar type, moderately to  poorly differentiated, 1.8 x 1.7 x 1.6 cm. with clear margins.  Mediastinal lymph nodes were negative for tumor.  Immunohistochemistry studies showed were positive for:  CK7, Napsin A and TTF-1; and negative for: CDX-2, CK20 and mammaglobin.   pT1aN0\par \par February 24, 2017 PET/CT:  Lobulated opacity in the superior segment of the right lower lobe, just behind the major fissure measuring 1.9 x 1.9 cm with SUV 2.8.  Left axillary lymph nodes measures 1.5 x 1.0cm with SUV 2.6.  A tiny subcutaneous nodule at the posterior left chest wall at the level of the fourth rib measures 0.8 x 0.7cm with SUV 2.1.  \par \par February 17, 2017 CT chest:  Lobulated lesion in the superior segment right lower lobe abutting the fissure along its anterior margin.  The measures 2.2 x 2.0 cm.  A small vascular branch is noted abutting its posterior margin.  \par

## 2020-01-01 ENCOUNTER — EMERGENCY (EMERGENCY)
Facility: HOSPITAL | Age: 65
LOS: 1 days | Discharge: HOME | End: 2020-01-01
Attending: EMERGENCY MEDICINE
Payer: COMMERCIAL

## 2020-01-01 ENCOUNTER — INPATIENT (INPATIENT)
Facility: HOSPITAL | Age: 65
LOS: 11 days | Discharge: NURSING, REHAB OR LONG-TERM CARE | DRG: 542 | End: 2020-07-20
Attending: HOSPITALIST | Admitting: INTERNAL MEDICINE
Payer: COMMERCIAL

## 2020-01-01 ENCOUNTER — RX RENEWAL (OUTPATIENT)
Age: 65
End: 2020-01-01

## 2020-01-01 ENCOUNTER — APPOINTMENT (OUTPATIENT)
Dept: HEMATOLOGY ONCOLOGY | Facility: CLINIC | Age: 65
End: 2020-01-01

## 2020-01-01 ENCOUNTER — APPOINTMENT (OUTPATIENT)
Dept: DISASTER EMERGENCY | Facility: CLINIC | Age: 65
End: 2020-01-01

## 2020-01-01 ENCOUNTER — TRANSCRIPTION ENCOUNTER (OUTPATIENT)
Age: 65
End: 2020-01-01

## 2020-01-01 ENCOUNTER — OUTPATIENT (OUTPATIENT)
Dept: OUTPATIENT SERVICES | Facility: HOSPITAL | Age: 65
LOS: 1 days | Discharge: HOME | End: 2020-01-01

## 2020-01-01 ENCOUNTER — LABORATORY RESULT (OUTPATIENT)
Age: 65
End: 2020-01-01

## 2020-01-01 ENCOUNTER — RESULT REVIEW (OUTPATIENT)
Age: 65
End: 2020-01-01

## 2020-01-01 ENCOUNTER — APPOINTMENT (OUTPATIENT)
Dept: CT IMAGING | Facility: CLINIC | Age: 65
End: 2020-01-01
Payer: MEDICAID

## 2020-01-01 ENCOUNTER — APPOINTMENT (OUTPATIENT)
Age: 65
End: 2020-01-01

## 2020-01-01 ENCOUNTER — OUTPATIENT (OUTPATIENT)
Dept: OUTPATIENT SERVICES | Facility: HOSPITAL | Age: 65
LOS: 1 days | End: 2020-01-01
Payer: COMMERCIAL

## 2020-01-01 ENCOUNTER — APPOINTMENT (OUTPATIENT)
Dept: RADIATION ONCOLOGY | Facility: CLINIC | Age: 65
End: 2020-01-01

## 2020-01-01 ENCOUNTER — APPOINTMENT (OUTPATIENT)
Dept: MRI IMAGING | Facility: CLINIC | Age: 65
End: 2020-01-01

## 2020-01-01 ENCOUNTER — APPOINTMENT (OUTPATIENT)
Dept: HEMATOLOGY ONCOLOGY | Facility: CLINIC | Age: 65
End: 2020-01-01
Payer: MEDICAID

## 2020-01-01 ENCOUNTER — APPOINTMENT (OUTPATIENT)
Dept: NUCLEAR MEDICINE | Facility: CLINIC | Age: 65
End: 2020-01-01
Payer: MEDICAID

## 2020-01-01 ENCOUNTER — NON-APPOINTMENT (OUTPATIENT)
Age: 65
End: 2020-01-01

## 2020-01-01 ENCOUNTER — OUTPATIENT (OUTPATIENT)
Dept: OUTPATIENT SERVICES | Facility: HOSPITAL | Age: 65
LOS: 1 days | Discharge: HOME | End: 2020-01-01
Payer: MEDICAID

## 2020-01-01 ENCOUNTER — APPOINTMENT (OUTPATIENT)
Dept: ORTHOPEDIC SURGERY | Facility: CLINIC | Age: 65
End: 2020-01-01

## 2020-01-01 ENCOUNTER — APPOINTMENT (OUTPATIENT)
Dept: INTERNAL MEDICINE | Facility: CLINIC | Age: 65
End: 2020-01-01
Payer: MEDICAID

## 2020-01-01 ENCOUNTER — APPOINTMENT (OUTPATIENT)
Dept: INTERNAL MEDICINE | Facility: CLINIC | Age: 65
End: 2020-01-01

## 2020-01-01 ENCOUNTER — APPOINTMENT (OUTPATIENT)
Dept: ENDOCRINOLOGY | Facility: CLINIC | Age: 65
End: 2020-01-01
Payer: MEDICAID

## 2020-01-01 ENCOUNTER — INPATIENT (INPATIENT)
Facility: HOSPITAL | Age: 65
LOS: 3 days | Discharge: HOME | DRG: 543 | End: 2020-08-12
Attending: HOSPITALIST | Admitting: HOSPITALIST
Payer: COMMERCIAL

## 2020-01-01 ENCOUNTER — APPOINTMENT (OUTPATIENT)
Dept: INTERVENTIONAL RADIOLOGY/VASCULAR | Facility: CLINIC | Age: 65
End: 2020-01-01

## 2020-01-01 ENCOUNTER — INPATIENT (INPATIENT)
Facility: HOSPITAL | Age: 65
LOS: 3 days | Discharge: ANOTHER HOSPITAL | DRG: 299 | End: 2020-09-10
Attending: INTERNAL MEDICINE | Admitting: HOSPITALIST
Payer: COMMERCIAL

## 2020-01-01 ENCOUNTER — APPOINTMENT (OUTPATIENT)
Dept: ORTHOPEDIC SURGERY | Facility: CLINIC | Age: 65
End: 2020-01-01
Payer: MEDICAID

## 2020-01-01 ENCOUNTER — INPATIENT (INPATIENT)
Facility: HOSPITAL | Age: 65
LOS: 14 days | Discharge: NURSING, REHAB OR LONG-TERM CARE | End: 2020-09-25
Attending: INTERNAL MEDICINE | Admitting: INTERNAL MEDICINE
Payer: MEDICAID

## 2020-01-01 ENCOUNTER — APPOINTMENT (OUTPATIENT)
Dept: ORTHOPEDIC SURGERY | Facility: HOSPITAL | Age: 65
End: 2020-01-01

## 2020-01-01 ENCOUNTER — INPATIENT (INPATIENT)
Facility: HOSPITAL | Age: 65
LOS: 0 days | Discharge: ANOTHER HOSPITAL | End: 2020-08-14
Attending: HOSPITALIST | Admitting: HOSPITALIST
Payer: MEDICAID

## 2020-01-01 ENCOUNTER — APPOINTMENT (OUTPATIENT)
Dept: RADIATION ONCOLOGY | Facility: CLINIC | Age: 65
End: 2020-01-01
Payer: MEDICAID

## 2020-01-01 ENCOUNTER — INPATIENT (INPATIENT)
Facility: HOSPITAL | Age: 65
LOS: 13 days | Discharge: NURSING, REHAB OR LONG-TERM CARE | DRG: 469 | End: 2020-08-28
Attending: INTERNAL MEDICINE | Admitting: HOSPITALIST
Payer: COMMERCIAL

## 2020-01-01 ENCOUNTER — EMERGENCY (EMERGENCY)
Facility: HOSPITAL | Age: 65
LOS: 1 days | End: 2020-01-01
Payer: COMMERCIAL

## 2020-01-01 VITALS
SYSTOLIC BLOOD PRESSURE: 121 MMHG | HEART RATE: 98 BPM | DIASTOLIC BLOOD PRESSURE: 76 MMHG | OXYGEN SATURATION: 98 % | TEMPERATURE: 98 F | RESPIRATION RATE: 18 BRPM

## 2020-01-01 VITALS
DIASTOLIC BLOOD PRESSURE: 59 MMHG | WEIGHT: 194.01 LBS | TEMPERATURE: 99 F | OXYGEN SATURATION: 98 % | SYSTOLIC BLOOD PRESSURE: 128 MMHG | HEART RATE: 86 BPM | RESPIRATION RATE: 18 BRPM

## 2020-01-01 VITALS
TEMPERATURE: 97 F | RESPIRATION RATE: 18 BRPM | HEART RATE: 81 BPM | DIASTOLIC BLOOD PRESSURE: 78 MMHG | SYSTOLIC BLOOD PRESSURE: 138 MMHG | HEIGHT: 68 IN | WEIGHT: 218.26 LBS

## 2020-01-01 VITALS
WEIGHT: 212.97 LBS | DIASTOLIC BLOOD PRESSURE: 82 MMHG | RESPIRATION RATE: 18 BRPM | TEMPERATURE: 97 F | HEART RATE: 87 BPM | SYSTOLIC BLOOD PRESSURE: 132 MMHG | HEIGHT: 68 IN | OXYGEN SATURATION: 97 %

## 2020-01-01 VITALS
RESPIRATION RATE: 14 BRPM | DIASTOLIC BLOOD PRESSURE: 70 MMHG | OXYGEN SATURATION: 96 % | BODY MASS INDEX: 32.89 KG/M2 | HEIGHT: 68 IN | HEART RATE: 95 BPM | SYSTOLIC BLOOD PRESSURE: 126 MMHG | WEIGHT: 217 LBS

## 2020-01-01 VITALS
DIASTOLIC BLOOD PRESSURE: 80 MMHG | BODY MASS INDEX: 33.95 KG/M2 | WEIGHT: 224 LBS | HEIGHT: 68 IN | OXYGEN SATURATION: 98 % | HEART RATE: 76 BPM | SYSTOLIC BLOOD PRESSURE: 140 MMHG

## 2020-01-01 VITALS
DIASTOLIC BLOOD PRESSURE: 84 MMHG | HEIGHT: 68 IN | HEART RATE: 88 BPM | SYSTOLIC BLOOD PRESSURE: 129 MMHG | RESPIRATION RATE: 18 BRPM | OXYGEN SATURATION: 96 % | WEIGHT: 179.9 LBS | TEMPERATURE: 98 F

## 2020-01-01 VITALS
SYSTOLIC BLOOD PRESSURE: 136 MMHG | HEART RATE: 80 BPM | OXYGEN SATURATION: 97 % | DIASTOLIC BLOOD PRESSURE: 76 MMHG | RESPIRATION RATE: 19 BRPM

## 2020-01-01 VITALS
SYSTOLIC BLOOD PRESSURE: 137 MMHG | HEART RATE: 88 BPM | HEIGHT: 68 IN | WEIGHT: 212.97 LBS | RESPIRATION RATE: 16 BRPM | TEMPERATURE: 98 F | DIASTOLIC BLOOD PRESSURE: 89 MMHG | OXYGEN SATURATION: 99 %

## 2020-01-01 VITALS
DIASTOLIC BLOOD PRESSURE: 64 MMHG | HEIGHT: 68 IN | HEART RATE: 102 BPM | SYSTOLIC BLOOD PRESSURE: 126 MMHG | OXYGEN SATURATION: 97 % | WEIGHT: 217 LBS | RESPIRATION RATE: 14 BRPM | BODY MASS INDEX: 32.89 KG/M2

## 2020-01-01 VITALS — DIASTOLIC BLOOD PRESSURE: 86 MMHG | OXYGEN SATURATION: 100 % | SYSTOLIC BLOOD PRESSURE: 131 MMHG | HEART RATE: 84 BPM

## 2020-01-01 VITALS
RESPIRATION RATE: 16 BRPM | HEART RATE: 97 BPM | DIASTOLIC BLOOD PRESSURE: 78 MMHG | SYSTOLIC BLOOD PRESSURE: 138 MMHG | OXYGEN SATURATION: 96 %

## 2020-01-01 VITALS
HEART RATE: 93 BPM | DIASTOLIC BLOOD PRESSURE: 73 MMHG | SYSTOLIC BLOOD PRESSURE: 133 MMHG | OXYGEN SATURATION: 93 % | TEMPERATURE: 98 F | RESPIRATION RATE: 20 BRPM

## 2020-01-01 VITALS
HEART RATE: 84 BPM | HEIGHT: 68 IN | DIASTOLIC BLOOD PRESSURE: 74 MMHG | RESPIRATION RATE: 18 BRPM | TEMPERATURE: 98 F | WEIGHT: 216.93 LBS | OXYGEN SATURATION: 98 % | SYSTOLIC BLOOD PRESSURE: 152 MMHG

## 2020-01-01 VITALS
TEMPERATURE: 98 F | OXYGEN SATURATION: 95 % | RESPIRATION RATE: 18 BRPM | HEART RATE: 94 BPM | DIASTOLIC BLOOD PRESSURE: 83 MMHG | SYSTOLIC BLOOD PRESSURE: 138 MMHG

## 2020-01-01 VITALS
DIASTOLIC BLOOD PRESSURE: 80 MMHG | TEMPERATURE: 98 F | SYSTOLIC BLOOD PRESSURE: 149 MMHG | HEART RATE: 83 BPM | RESPIRATION RATE: 20 BRPM | OXYGEN SATURATION: 98 %

## 2020-01-01 VITALS
DIASTOLIC BLOOD PRESSURE: 66 MMHG | RESPIRATION RATE: 18 BRPM | OXYGEN SATURATION: 98 % | TEMPERATURE: 98 F | SYSTOLIC BLOOD PRESSURE: 133 MMHG | HEART RATE: 87 BPM

## 2020-01-01 VITALS
DIASTOLIC BLOOD PRESSURE: 75 MMHG | HEART RATE: 115 BPM | WEIGHT: 199.96 LBS | TEMPERATURE: 98 F | SYSTOLIC BLOOD PRESSURE: 131 MMHG | RESPIRATION RATE: 28 BRPM | HEIGHT: 68 IN | OXYGEN SATURATION: 100 %

## 2020-01-01 VITALS
HEIGHT: 68 IN | HEART RATE: 82 BPM | RESPIRATION RATE: 18 BRPM | DIASTOLIC BLOOD PRESSURE: 84 MMHG | OXYGEN SATURATION: 98 % | SYSTOLIC BLOOD PRESSURE: 129 MMHG | TEMPERATURE: 98 F

## 2020-01-01 VITALS
HEART RATE: 95 BPM | SYSTOLIC BLOOD PRESSURE: 162 MMHG | OXYGEN SATURATION: 97 % | HEIGHT: 68 IN | WEIGHT: 187.39 LBS | RESPIRATION RATE: 20 BRPM | DIASTOLIC BLOOD PRESSURE: 89 MMHG | TEMPERATURE: 99 F

## 2020-01-01 VITALS
RESPIRATION RATE: 18 BRPM | SYSTOLIC BLOOD PRESSURE: 155 MMHG | OXYGEN SATURATION: 97 % | TEMPERATURE: 98 F | HEART RATE: 96 BPM | HEIGHT: 67.32 IN | DIASTOLIC BLOOD PRESSURE: 87 MMHG

## 2020-01-01 VITALS
HEART RATE: 90 BPM | OXYGEN SATURATION: 93 % | RESPIRATION RATE: 18 BRPM | SYSTOLIC BLOOD PRESSURE: 116 MMHG | DIASTOLIC BLOOD PRESSURE: 76 MMHG | TEMPERATURE: 98 F

## 2020-01-01 VITALS
RESPIRATION RATE: 20 BRPM | TEMPERATURE: 99 F | SYSTOLIC BLOOD PRESSURE: 141 MMHG | OXYGEN SATURATION: 100 % | DIASTOLIC BLOOD PRESSURE: 79 MMHG | HEART RATE: 89 BPM

## 2020-01-01 VITALS — OXYGEN SATURATION: 99 % | RESPIRATION RATE: 16 BRPM

## 2020-01-01 VITALS — WEIGHT: 195.11 LBS

## 2020-01-01 DIAGNOSIS — T14.8XXA OTHER INJURY OF UNSPECIFIED BODY REGION, INITIAL ENCOUNTER: ICD-10-CM

## 2020-01-01 DIAGNOSIS — Z29.9 ENCOUNTER FOR PROPHYLACTIC MEASURES, UNSPECIFIED: ICD-10-CM

## 2020-01-01 DIAGNOSIS — C34.90 MALIGNANT NEOPLASM OF UNSPECIFIED PART OF UNSPECIFIED BRONCHUS OR LUNG: ICD-10-CM

## 2020-01-01 DIAGNOSIS — F41.9 ANXIETY DISORDER, UNSPECIFIED: ICD-10-CM

## 2020-01-01 DIAGNOSIS — R74.8 ABNORMAL LEVELS OF OTHER SERUM ENZYMES: ICD-10-CM

## 2020-01-01 DIAGNOSIS — Z90.2 ACQUIRED ABSENCE OF LUNG [PART OF]: Chronic | ICD-10-CM

## 2020-01-01 DIAGNOSIS — Z13.89 ENCOUNTER FOR SCREENING FOR OTHER DISORDER: ICD-10-CM

## 2020-01-01 DIAGNOSIS — G89.3 NEOPLASM RELATED PAIN (ACUTE) (CHRONIC): ICD-10-CM

## 2020-01-01 DIAGNOSIS — Z01.818 ENCOUNTER FOR OTHER PREPROCEDURAL EXAMINATION: ICD-10-CM

## 2020-01-01 DIAGNOSIS — Z02.9 ENCOUNTER FOR ADMINISTRATIVE EXAMINATIONS, UNSPECIFIED: ICD-10-CM

## 2020-01-01 DIAGNOSIS — C34.91 MALIGNANT NEOPLASM OF UNSPECIFIED PART OF RIGHT BRONCHUS OR LUNG: ICD-10-CM

## 2020-01-01 DIAGNOSIS — D69.6 THROMBOCYTOPENIA, UNSPECIFIED: ICD-10-CM

## 2020-01-01 DIAGNOSIS — Z98.890 OTHER SPECIFIED POSTPROCEDURAL STATES: Chronic | ICD-10-CM

## 2020-01-01 DIAGNOSIS — Z87.09 PERSONAL HISTORY OF OTHER DISEASES OF THE RESPIRATORY SYSTEM: ICD-10-CM

## 2020-01-01 DIAGNOSIS — G93.49 OTHER ENCEPHALOPATHY: ICD-10-CM

## 2020-01-01 DIAGNOSIS — Z96.642 PRESENCE OF LEFT ARTIFICIAL HIP JOINT: Chronic | ICD-10-CM

## 2020-01-01 DIAGNOSIS — R11.2 NAUSEA WITH VOMITING, UNSPECIFIED: ICD-10-CM

## 2020-01-01 DIAGNOSIS — E83.52 HYPERCALCEMIA: ICD-10-CM

## 2020-01-01 DIAGNOSIS — Z96.642 PRESENCE OF LEFT ARTIFICIAL HIP JOINT: ICD-10-CM

## 2020-01-01 DIAGNOSIS — R07.89 OTHER CHEST PAIN: ICD-10-CM

## 2020-01-01 DIAGNOSIS — Z51.5 ENCOUNTER FOR PALLIATIVE CARE: ICD-10-CM

## 2020-01-01 DIAGNOSIS — Z71.89 OTHER SPECIFIED COUNSELING: ICD-10-CM

## 2020-01-01 DIAGNOSIS — D64.9 ANEMIA, UNSPECIFIED: ICD-10-CM

## 2020-01-01 DIAGNOSIS — Z51.11 ENCOUNTER FOR ANTINEOPLASTIC CHEMOTHERAPY: ICD-10-CM

## 2020-01-01 DIAGNOSIS — J93.9 PNEUMOTHORAX, UNSPECIFIED: ICD-10-CM

## 2020-01-01 DIAGNOSIS — K59.00 CONSTIPATION, UNSPECIFIED: ICD-10-CM

## 2020-01-01 DIAGNOSIS — R21 RASH AND OTHER NONSPECIFIC SKIN ERUPTION: ICD-10-CM

## 2020-01-01 DIAGNOSIS — E03.9 HYPOTHYROIDISM, UNSPECIFIED: ICD-10-CM

## 2020-01-01 DIAGNOSIS — M84.452A PATHOLOGICAL FRACTURE, LEFT FEMUR, INITIAL ENCOUNTER FOR FRACTURE: ICD-10-CM

## 2020-01-01 DIAGNOSIS — M25.552 PAIN IN LEFT HIP: ICD-10-CM

## 2020-01-01 DIAGNOSIS — I82.409 ACUTE EMBOLISM AND THROMBOSIS OF UNSPECIFIED DEEP VEINS OF UNSPECIFIED LOWER EXTREMITY: ICD-10-CM

## 2020-01-01 DIAGNOSIS — I82.4Z9 ACUTE EMBOLISM AND THROMBOSIS OF UNSPECIFIED DEEP VEINS OF UNSPECIFIED DISTAL LOWER EXTREMITY: ICD-10-CM

## 2020-01-01 DIAGNOSIS — M54.5 LOW BACK PAIN: ICD-10-CM

## 2020-01-01 DIAGNOSIS — J96.01 ACUTE RESPIRATORY FAILURE WITH HYPOXIA: ICD-10-CM

## 2020-01-01 DIAGNOSIS — R00.0 TACHYCARDIA, UNSPECIFIED: ICD-10-CM

## 2020-01-01 DIAGNOSIS — D70.9 NEUTROPENIA, UNSPECIFIED: ICD-10-CM

## 2020-01-01 DIAGNOSIS — R09.89 OTHER SPECIFIED SYMPTOMS AND SIGNS INVOLVING THE CIRCULATORY AND RESPIRATORY SYSTEMS: ICD-10-CM

## 2020-01-01 DIAGNOSIS — R52 PAIN, UNSPECIFIED: ICD-10-CM

## 2020-01-01 DIAGNOSIS — Z79.899 OTHER LONG TERM (CURRENT) DRUG THERAPY: ICD-10-CM

## 2020-01-01 DIAGNOSIS — E83.39 OTHER DISORDERS OF PHOSPHORUS METABOLISM: ICD-10-CM

## 2020-01-01 DIAGNOSIS — I82.402 ACUTE EMBOLISM AND THROMBOSIS OF UNSPECIFIED DEEP VEINS OF LEFT LOWER EXTREMITY: ICD-10-CM

## 2020-01-01 DIAGNOSIS — C79.51 SECONDARY MALIGNANT NEOPLASM OF BONE: ICD-10-CM

## 2020-01-01 DIAGNOSIS — C79.9 SECONDARY MALIGNANT NEOPLASM OF UNSPECIFIED SITE: ICD-10-CM

## 2020-01-01 DIAGNOSIS — N39.0 URINARY TRACT INFECTION, SITE NOT SPECIFIED: ICD-10-CM

## 2020-01-01 DIAGNOSIS — Z45.2 ENCOUNTER FOR ADJUSTMENT AND MANAGEMENT OF VASCULAR ACCESS DEVICE: ICD-10-CM

## 2020-01-01 DIAGNOSIS — M79.605 PAIN IN LEFT LEG: ICD-10-CM

## 2020-01-01 DIAGNOSIS — F41.1 GENERALIZED ANXIETY DISORDER: ICD-10-CM

## 2020-01-01 DIAGNOSIS — M89.9 DISORDER OF BONE, UNSPECIFIED: ICD-10-CM

## 2020-01-01 DIAGNOSIS — M84.40XA PATHOLOGICAL FRACTURE, UNSPECIFIED SITE, INITIAL ENCOUNTER FOR FRACTURE: ICD-10-CM

## 2020-01-01 DIAGNOSIS — J96.21 ACUTE AND CHRONIC RESPIRATORY FAILURE WITH HYPOXIA: ICD-10-CM

## 2020-01-01 DIAGNOSIS — R65.10 SYSTEMIC INFLAMMATORY RESPONSE SYNDROME (SIRS) OF NON-INFECTIOUS ORIGIN WITHOUT ACUTE ORGAN DYSFUNCTION: ICD-10-CM

## 2020-01-01 DIAGNOSIS — K59.03 DRUG INDUCED CONSTIPATION: ICD-10-CM

## 2020-01-01 DIAGNOSIS — E04.2 NONTOXIC MULTINODULAR GOITER: ICD-10-CM

## 2020-01-01 DIAGNOSIS — R07.82 INTERCOSTAL PAIN: ICD-10-CM

## 2020-01-01 DIAGNOSIS — Z91.89 OTHER SPECIFIED PERSONAL RISK FACTORS, NOT ELSEWHERE CLASSIFIED: ICD-10-CM

## 2020-01-01 DIAGNOSIS — M79.651 PAIN IN RIGHT THIGH: ICD-10-CM

## 2020-01-01 DIAGNOSIS — M62.830 MUSCLE SPASM OF BACK: ICD-10-CM

## 2020-01-01 DIAGNOSIS — M54.12 RADICULOPATHY, CERVICAL REGION: ICD-10-CM

## 2020-01-01 DIAGNOSIS — S72.009A FRACTURE OF UNSPECIFIED PART OF NECK OF UNSPECIFIED FEMUR, INITIAL ENCOUNTER FOR CLOSED FRACTURE: ICD-10-CM

## 2020-01-01 LAB
-  AMIKACIN: SIGNIFICANT CHANGE UP
-  AMOXICILLIN/CLAVULANIC ACID: SIGNIFICANT CHANGE UP
-  AMPICILLIN/SULBACTAM: SIGNIFICANT CHANGE UP
-  AMPICILLIN: SIGNIFICANT CHANGE UP
-  AMPICILLIN: SIGNIFICANT CHANGE UP
-  AZTREONAM: SIGNIFICANT CHANGE UP
-  CEFAZOLIN: SIGNIFICANT CHANGE UP
-  CEFEPIME: SIGNIFICANT CHANGE UP
-  CEFOXITIN: SIGNIFICANT CHANGE UP
-  CEFTRIAXONE: SIGNIFICANT CHANGE UP
-  CIPROFLOXACIN: SIGNIFICANT CHANGE UP
-  CIPROFLOXACIN: SIGNIFICANT CHANGE UP
-  DAPTOMYCIN: SIGNIFICANT CHANGE UP
-  ERTAPENEM: SIGNIFICANT CHANGE UP
-  GENTAMICIN: SIGNIFICANT CHANGE UP
-  IMIPENEM: SIGNIFICANT CHANGE UP
-  LEVOFLOXACIN: SIGNIFICANT CHANGE UP
-  LEVOFLOXACIN: SIGNIFICANT CHANGE UP
-  LINEZOLID: SIGNIFICANT CHANGE UP
-  MEROPENEM: SIGNIFICANT CHANGE UP
-  NITROFURANTOIN: SIGNIFICANT CHANGE UP
-  NITROFURANTOIN: SIGNIFICANT CHANGE UP
-  PENICILLIN: SIGNIFICANT CHANGE UP
-  PIPERACILLIN/TAZOBACTAM: SIGNIFICANT CHANGE UP
-  RIFAMPIN: SIGNIFICANT CHANGE UP
-  TETRACYCLINE: SIGNIFICANT CHANGE UP
-  TIGECYCLINE: SIGNIFICANT CHANGE UP
-  TOBRAMYCIN: SIGNIFICANT CHANGE UP
-  TRIMETHOPRIM/SULFAMETHOXAZOLE: SIGNIFICANT CHANGE UP
-  VANCOMYCIN: SIGNIFICANT CHANGE UP
24R-OH-CALCIDIOL SERPL-MCNC: 33.4 NG/ML — SIGNIFICANT CHANGE UP (ref 30–80)
ALBUMIN FLD-MCNC: 2 G/DL — SIGNIFICANT CHANGE UP
ALBUMIN SERPL ELPH-MCNC: 2.4 G/DL — LOW (ref 3.3–5)
ALBUMIN SERPL ELPH-MCNC: 2.5 G/DL — LOW (ref 3.3–5)
ALBUMIN SERPL ELPH-MCNC: 2.7 G/DL — LOW (ref 3.3–5)
ALBUMIN SERPL ELPH-MCNC: 2.8 G/DL — LOW (ref 3.3–5)
ALBUMIN SERPL ELPH-MCNC: 2.9 G/DL — LOW (ref 3.3–5)
ALBUMIN SERPL ELPH-MCNC: 3 G/DL — LOW (ref 3.3–5)
ALBUMIN SERPL ELPH-MCNC: 3 G/DL — LOW (ref 3.3–5)
ALBUMIN SERPL ELPH-MCNC: 3.3 G/DL — SIGNIFICANT CHANGE UP (ref 3.3–5)
ALBUMIN SERPL ELPH-MCNC: 3.3 G/DL — SIGNIFICANT CHANGE UP (ref 3.3–5.2)
ALBUMIN SERPL ELPH-MCNC: 3.4 G/DL — SIGNIFICANT CHANGE UP (ref 3.3–5)
ALBUMIN SERPL ELPH-MCNC: 3.5 G/DL — SIGNIFICANT CHANGE UP (ref 3.3–5)
ALBUMIN SERPL ELPH-MCNC: 3.5 G/DL — SIGNIFICANT CHANGE UP (ref 3.3–5)
ALBUMIN SERPL ELPH-MCNC: 3.6 G/DL — SIGNIFICANT CHANGE UP (ref 3.3–5)
ALBUMIN SERPL ELPH-MCNC: 3.6 G/DL — SIGNIFICANT CHANGE UP (ref 3.3–5.2)
ALBUMIN SERPL ELPH-MCNC: 3.7 G/DL — SIGNIFICANT CHANGE UP (ref 3.3–5)
ALBUMIN SERPL ELPH-MCNC: 3.7 G/DL — SIGNIFICANT CHANGE UP (ref 3.3–5.2)
ALBUMIN SERPL ELPH-MCNC: 3.8 G/DL — SIGNIFICANT CHANGE UP (ref 3.3–5.2)
ALBUMIN SERPL ELPH-MCNC: 3.8 G/DL — SIGNIFICANT CHANGE UP (ref 3.3–5.2)
ALBUMIN SERPL ELPH-MCNC: 4.2 G/DL — SIGNIFICANT CHANGE UP (ref 3.3–5.2)
ALBUMIN SERPL ELPH-MCNC: 4.2 G/DL — SIGNIFICANT CHANGE UP (ref 3.3–5.2)
ALP SERPL-CCNC: 103 U/L — SIGNIFICANT CHANGE UP (ref 40–120)
ALP SERPL-CCNC: 110 U/L — SIGNIFICANT CHANGE UP (ref 40–120)
ALP SERPL-CCNC: 114 U/L — SIGNIFICANT CHANGE UP (ref 40–120)
ALP SERPL-CCNC: 126 U/L — HIGH (ref 40–120)
ALP SERPL-CCNC: 126 U/L — HIGH (ref 40–120)
ALP SERPL-CCNC: 127 U/L — HIGH (ref 40–120)
ALP SERPL-CCNC: 131 U/L — HIGH (ref 40–120)
ALP SERPL-CCNC: 132 U/L — HIGH (ref 40–120)
ALP SERPL-CCNC: 136 U/L — HIGH (ref 40–120)
ALP SERPL-CCNC: 141 U/L — HIGH (ref 40–120)
ALP SERPL-CCNC: 141 U/L — HIGH (ref 40–120)
ALP SERPL-CCNC: 145 U/L — HIGH (ref 40–120)
ALP SERPL-CCNC: 149 U/L — HIGH (ref 40–120)
ALP SERPL-CCNC: 149 U/L — HIGH (ref 40–120)
ALP SERPL-CCNC: 150 U/L — HIGH (ref 40–120)
ALP SERPL-CCNC: 151 U/L — HIGH (ref 40–120)
ALP SERPL-CCNC: 155 U/L — HIGH (ref 40–120)
ALP SERPL-CCNC: 157 U/L — HIGH (ref 40–120)
ALP SERPL-CCNC: 159 U/L — HIGH (ref 40–120)
ALP SERPL-CCNC: 162 U/L — HIGH (ref 40–120)
ALP SERPL-CCNC: 165 U/L — HIGH (ref 40–120)
ALP SERPL-CCNC: 168 U/L — HIGH (ref 40–120)
ALP SERPL-CCNC: 173 U/L — HIGH (ref 40–120)
ALP SERPL-CCNC: 173 U/L — HIGH (ref 40–120)
ALP SERPL-CCNC: 178 U/L — HIGH (ref 40–120)
ALP SERPL-CCNC: 179 U/L — HIGH (ref 40–120)
ALP SERPL-CCNC: 97 U/L — SIGNIFICANT CHANGE UP (ref 40–120)
ALT FLD-CCNC: 10 U/L — SIGNIFICANT CHANGE UP (ref 10–45)
ALT FLD-CCNC: 11 U/L — SIGNIFICANT CHANGE UP (ref 10–45)
ALT FLD-CCNC: 11 U/L — SIGNIFICANT CHANGE UP (ref 4–33)
ALT FLD-CCNC: 12 U/L — SIGNIFICANT CHANGE UP (ref 10–45)
ALT FLD-CCNC: 12 U/L — SIGNIFICANT CHANGE UP (ref 4–33)
ALT FLD-CCNC: 12 U/L — SIGNIFICANT CHANGE UP (ref 4–33)
ALT FLD-CCNC: 14 U/L — SIGNIFICANT CHANGE UP
ALT FLD-CCNC: 14 U/L — SIGNIFICANT CHANGE UP (ref 4–33)
ALT FLD-CCNC: 14 U/L — SIGNIFICANT CHANGE UP (ref 4–33)
ALT FLD-CCNC: 15 U/L — SIGNIFICANT CHANGE UP
ALT FLD-CCNC: 18 U/L — SIGNIFICANT CHANGE UP (ref 4–33)
ALT FLD-CCNC: 18 U/L — SIGNIFICANT CHANGE UP (ref 4–33)
ALT FLD-CCNC: 19 U/L — SIGNIFICANT CHANGE UP (ref 10–45)
ALT FLD-CCNC: 21 U/L — SIGNIFICANT CHANGE UP
ALT FLD-CCNC: 23 U/L — SIGNIFICANT CHANGE UP (ref 10–45)
ALT FLD-CCNC: 24 U/L — SIGNIFICANT CHANGE UP (ref 10–45)
ALT FLD-CCNC: 26 U/L — SIGNIFICANT CHANGE UP (ref 10–45)
ALT FLD-CCNC: 27 U/L — SIGNIFICANT CHANGE UP
ALT FLD-CCNC: 28 U/L — SIGNIFICANT CHANGE UP (ref 10–45)
ALT FLD-CCNC: 30 U/L — SIGNIFICANT CHANGE UP (ref 10–45)
ALT FLD-CCNC: 33 U/L — HIGH
ALT FLD-CCNC: 33 U/L — SIGNIFICANT CHANGE UP (ref 10–45)
ALT FLD-CCNC: 35 U/L — HIGH
ALT FLD-CCNC: 35 U/L — SIGNIFICANT CHANGE UP (ref 10–45)
ALT FLD-CCNC: 37 U/L — SIGNIFICANT CHANGE UP (ref 10–45)
ALT FLD-CCNC: 39 U/L — SIGNIFICANT CHANGE UP (ref 10–45)
ALT FLD-CCNC: 42 U/L — SIGNIFICANT CHANGE UP (ref 10–45)
ALT FLD-CCNC: 44 U/L — SIGNIFICANT CHANGE UP (ref 10–45)
ALT FLD-CCNC: 46 U/L — HIGH
ANION GAP SERPL CALC-SCNC: 10 MMOL/L — SIGNIFICANT CHANGE UP (ref 5–17)
ANION GAP SERPL CALC-SCNC: 11 MMO/L — SIGNIFICANT CHANGE UP (ref 7–14)
ANION GAP SERPL CALC-SCNC: 11 MMO/L — SIGNIFICANT CHANGE UP (ref 7–14)
ANION GAP SERPL CALC-SCNC: 11 MMOL/L — SIGNIFICANT CHANGE UP (ref 5–17)
ANION GAP SERPL CALC-SCNC: 12 MMO/L — SIGNIFICANT CHANGE UP (ref 7–14)
ANION GAP SERPL CALC-SCNC: 12 MMOL/L — SIGNIFICANT CHANGE UP (ref 5–17)
ANION GAP SERPL CALC-SCNC: 13 MMO/L — SIGNIFICANT CHANGE UP (ref 7–14)
ANION GAP SERPL CALC-SCNC: 13 MMO/L — SIGNIFICANT CHANGE UP (ref 7–14)
ANION GAP SERPL CALC-SCNC: 13 MMOL/L — SIGNIFICANT CHANGE UP (ref 5–17)
ANION GAP SERPL CALC-SCNC: 14 MMO/L — SIGNIFICANT CHANGE UP (ref 7–14)
ANION GAP SERPL CALC-SCNC: 14 MMOL/L — SIGNIFICANT CHANGE UP (ref 5–17)
ANION GAP SERPL CALC-SCNC: 15 MMO/L — HIGH (ref 7–14)
ANION GAP SERPL CALC-SCNC: 15 MMO/L — HIGH (ref 7–14)
ANION GAP SERPL CALC-SCNC: 15 MMOL/L — SIGNIFICANT CHANGE UP (ref 5–17)
ANION GAP SERPL CALC-SCNC: 16 MMO/L — HIGH (ref 7–14)
ANION GAP SERPL CALC-SCNC: 8 MMOL/L — SIGNIFICANT CHANGE UP (ref 5–17)
ANION GAP SERPL CALC-SCNC: 8 MMOL/L — SIGNIFICANT CHANGE UP (ref 5–17)
ANION GAP SERPL CALC-SCNC: 9 MMOL/L — SIGNIFICANT CHANGE UP (ref 5–17)
ANION GAP SERPL CALC-SCNC: 9 MMOL/L — SIGNIFICANT CHANGE UP (ref 5–17)
ANISOCYTOSIS BLD QL: SLIGHT — SIGNIFICANT CHANGE UP
APPEARANCE UR: ABNORMAL
APPEARANCE UR: ABNORMAL
APPEARANCE UR: CLEAR — SIGNIFICANT CHANGE UP
APPEARANCE UR: CLEAR — SIGNIFICANT CHANGE UP
APTT BLD: 126 SEC — CRITICAL HIGH (ref 27.5–35.5)
APTT BLD: 24.3 SEC — LOW (ref 27.5–35.5)
APTT BLD: 24.3 SEC — LOW (ref 27.5–35.5)
APTT BLD: 24.5 SEC — LOW (ref 27.5–35.5)
APTT BLD: 28.2 SEC — SIGNIFICANT CHANGE UP (ref 27.5–35.5)
APTT BLD: 30.1 SEC — SIGNIFICANT CHANGE UP (ref 27.5–35.5)
APTT BLD: 30.6 SEC — SIGNIFICANT CHANGE UP (ref 27.5–35.5)
APTT BLD: 30.8 SEC — SIGNIFICANT CHANGE UP (ref 27.5–35.5)
APTT BLD: 31.9 SEC — SIGNIFICANT CHANGE UP (ref 27.5–35.5)
APTT BLD: 32 SEC — SIGNIFICANT CHANGE UP (ref 27.5–35.5)
APTT BLD: 32.9 SEC — SIGNIFICANT CHANGE UP (ref 27.5–35.5)
APTT BLD: 32.9 SEC — SIGNIFICANT CHANGE UP (ref 27.5–35.5)
APTT BLD: 35 SEC — SIGNIFICANT CHANGE UP (ref 27.5–36.3)
APTT BLD: 35.6 SEC — HIGH (ref 27.5–35.5)
APTT BLD: 40.7 SEC — HIGH (ref 27.5–35.5)
APTT BLD: 62 SEC — HIGH (ref 27.5–35.5)
APTT BLD: 70.3 SEC — HIGH (ref 27.5–35.5)
APTT BLD: 76.3 SEC — HIGH (ref 27.5–35.5)
APTT BLD: 80.4 SEC — HIGH (ref 27.5–35.5)
APTT BLD: 83.8 SEC — HIGH (ref 27.5–35.5)
APTT BLD: 88.2 SEC — HIGH (ref 27.5–35.5)
AST SERPL-CCNC: 11 U/L — SIGNIFICANT CHANGE UP (ref 4–32)
AST SERPL-CCNC: 12 U/L — SIGNIFICANT CHANGE UP (ref 10–40)
AST SERPL-CCNC: 12 U/L — SIGNIFICANT CHANGE UP (ref 4–32)
AST SERPL-CCNC: 13 U/L — SIGNIFICANT CHANGE UP
AST SERPL-CCNC: 13 U/L — SIGNIFICANT CHANGE UP (ref 10–40)
AST SERPL-CCNC: 13 U/L — SIGNIFICANT CHANGE UP (ref 4–32)
AST SERPL-CCNC: 14 U/L — SIGNIFICANT CHANGE UP (ref 4–32)
AST SERPL-CCNC: 14 U/L — SIGNIFICANT CHANGE UP (ref 4–32)
AST SERPL-CCNC: 15 U/L — SIGNIFICANT CHANGE UP (ref 4–32)
AST SERPL-CCNC: 16 U/L — SIGNIFICANT CHANGE UP
AST SERPL-CCNC: 16 U/L — SIGNIFICANT CHANGE UP
AST SERPL-CCNC: 16 U/L — SIGNIFICANT CHANGE UP (ref 4–32)
AST SERPL-CCNC: 18 U/L — SIGNIFICANT CHANGE UP (ref 10–40)
AST SERPL-CCNC: 18 U/L — SIGNIFICANT CHANGE UP (ref 10–40)
AST SERPL-CCNC: 20 U/L — SIGNIFICANT CHANGE UP
AST SERPL-CCNC: 21 U/L — SIGNIFICANT CHANGE UP
AST SERPL-CCNC: 21 U/L — SIGNIFICANT CHANGE UP (ref 10–40)
AST SERPL-CCNC: 21 U/L — SIGNIFICANT CHANGE UP (ref 10–40)
AST SERPL-CCNC: 22 U/L — SIGNIFICANT CHANGE UP (ref 10–40)
AST SERPL-CCNC: 23 U/L — SIGNIFICANT CHANGE UP
AST SERPL-CCNC: 23 U/L — SIGNIFICANT CHANGE UP (ref 10–40)
AST SERPL-CCNC: 24 U/L — SIGNIFICANT CHANGE UP (ref 10–40)
AST SERPL-CCNC: 24 U/L — SIGNIFICANT CHANGE UP (ref 10–40)
AST SERPL-CCNC: 25 U/L — SIGNIFICANT CHANGE UP (ref 10–40)
AST SERPL-CCNC: 25 U/L — SIGNIFICANT CHANGE UP (ref 10–40)
AST SERPL-CCNC: 26 U/L — SIGNIFICANT CHANGE UP
AST SERPL-CCNC: 31 U/L — SIGNIFICANT CHANGE UP (ref 10–40)
AST SERPL-CCNC: 34 U/L — SIGNIFICANT CHANGE UP (ref 10–40)
AST SERPL-CCNC: 39 U/L — SIGNIFICANT CHANGE UP (ref 10–40)
B PERT IGG+IGM PNL SER: ABNORMAL
BACTERIA # UR AUTO: ABNORMAL
BACTERIA # UR AUTO: NEGATIVE — SIGNIFICANT CHANGE UP
BACTERIA # UR AUTO: NEGATIVE — SIGNIFICANT CHANGE UP
BASE EXCESS BLDA CALC-SCNC: 4.1 MMOL/L — HIGH (ref -2–2)
BASE EXCESS BLDV CALC-SCNC: 5.7 MMOL/L — HIGH (ref -2–2)
BASOPHILS # BLD AUTO: 0 K/UL — SIGNIFICANT CHANGE UP (ref 0–0.2)
BASOPHILS # BLD AUTO: 0.01 K/UL — SIGNIFICANT CHANGE UP (ref 0–0.2)
BASOPHILS # BLD AUTO: 0.02 K/UL — SIGNIFICANT CHANGE UP (ref 0–0.2)
BASOPHILS # BLD AUTO: 0.03 K/UL — SIGNIFICANT CHANGE UP (ref 0–0.2)
BASOPHILS # BLD AUTO: 0.04 K/UL — SIGNIFICANT CHANGE UP (ref 0–0.2)
BASOPHILS # BLD AUTO: 0.04 K/UL — SIGNIFICANT CHANGE UP (ref 0–0.2)
BASOPHILS # BLD AUTO: 0.05 K/UL — SIGNIFICANT CHANGE UP (ref 0–0.2)
BASOPHILS # BLD AUTO: 0.06 K/UL — SIGNIFICANT CHANGE UP (ref 0–0.2)
BASOPHILS # BLD AUTO: 0.06 K/UL — SIGNIFICANT CHANGE UP (ref 0–0.2)
BASOPHILS # BLD AUTO: 0.1 K/UL — SIGNIFICANT CHANGE UP (ref 0–0.2)
BASOPHILS NFR BLD AUTO: 0 % — SIGNIFICANT CHANGE UP (ref 0–2)
BASOPHILS NFR BLD AUTO: 0.2 % — SIGNIFICANT CHANGE UP (ref 0–2)
BASOPHILS NFR BLD AUTO: 0.3 % — SIGNIFICANT CHANGE UP (ref 0–2)
BASOPHILS NFR BLD AUTO: 0.4 % — SIGNIFICANT CHANGE UP (ref 0–2)
BASOPHILS NFR BLD AUTO: 0.6 % — SIGNIFICANT CHANGE UP (ref 0–2)
BASOPHILS NFR BLD AUTO: 0.6 % — SIGNIFICANT CHANGE UP (ref 0–2)
BASOPHILS NFR BLD AUTO: 0.7 % — SIGNIFICANT CHANGE UP (ref 0–2)
BASOPHILS NFR BLD AUTO: 0.8 % — SIGNIFICANT CHANGE UP (ref 0–2)
BASOPHILS NFR SPEC: 0 % — SIGNIFICANT CHANGE UP (ref 0–2)
BILIRUB SERPL-MCNC: 0.4 MG/DL — SIGNIFICANT CHANGE UP (ref 0.4–2)
BILIRUB SERPL-MCNC: 0.5 MG/DL — SIGNIFICANT CHANGE UP (ref 0.2–1.2)
BILIRUB SERPL-MCNC: 0.5 MG/DL — SIGNIFICANT CHANGE UP (ref 0.4–2)
BILIRUB SERPL-MCNC: 0.6 MG/DL — SIGNIFICANT CHANGE UP (ref 0.2–1.2)
BILIRUB SERPL-MCNC: 0.6 MG/DL — SIGNIFICANT CHANGE UP (ref 0.4–2)
BILIRUB SERPL-MCNC: 0.7 MG/DL — SIGNIFICANT CHANGE UP (ref 0.2–1.2)
BILIRUB SERPL-MCNC: 0.7 MG/DL — SIGNIFICANT CHANGE UP (ref 0.4–2)
BILIRUB SERPL-MCNC: 0.8 MG/DL — SIGNIFICANT CHANGE UP (ref 0.2–1.2)
BILIRUB SERPL-MCNC: 0.8 MG/DL — SIGNIFICANT CHANGE UP (ref 0.4–2)
BILIRUB SERPL-MCNC: 0.9 MG/DL — SIGNIFICANT CHANGE UP (ref 0.4–2)
BILIRUB SERPL-MCNC: 0.9 MG/DL — SIGNIFICANT CHANGE UP (ref 0.4–2)
BILIRUB SERPL-MCNC: 1 MG/DL — SIGNIFICANT CHANGE UP (ref 0.2–1.2)
BILIRUB SERPL-MCNC: 1 MG/DL — SIGNIFICANT CHANGE UP (ref 0.2–1.2)
BILIRUB SERPL-MCNC: 1.2 MG/DL — SIGNIFICANT CHANGE UP (ref 0.2–1.2)
BILIRUB SERPL-MCNC: 1.7 MG/DL — HIGH (ref 0.2–1.2)
BILIRUB SERPL-MCNC: 1.8 MG/DL — HIGH (ref 0.2–1.2)
BILIRUB UR-MCNC: NEGATIVE — SIGNIFICANT CHANGE UP
BLASTS # FLD: 0 % — SIGNIFICANT CHANGE UP (ref 0–0)
BLASTS # FLD: 1.7 % — HIGH (ref 0–0)
BLD GP AB SCN SERPL QL: NEGATIVE — SIGNIFICANT CHANGE UP
BLD GP AB SCN SERPL QL: SIGNIFICANT CHANGE UP
BLOOD GAS COMMENTS ARTERIAL: SIGNIFICANT CHANGE UP
BUN SERPL-MCNC: 10 MG/DL — SIGNIFICANT CHANGE UP (ref 7–23)
BUN SERPL-MCNC: 10 MG/DL — SIGNIFICANT CHANGE UP (ref 8–20)
BUN SERPL-MCNC: 11 MG/DL — SIGNIFICANT CHANGE UP (ref 7–23)
BUN SERPL-MCNC: 11 MG/DL — SIGNIFICANT CHANGE UP (ref 8–20)
BUN SERPL-MCNC: 12 MG/DL — SIGNIFICANT CHANGE UP (ref 7–23)
BUN SERPL-MCNC: 12 MG/DL — SIGNIFICANT CHANGE UP (ref 8–20)
BUN SERPL-MCNC: 13 MG/DL — SIGNIFICANT CHANGE UP (ref 7–23)
BUN SERPL-MCNC: 13 MG/DL — SIGNIFICANT CHANGE UP (ref 8–20)
BUN SERPL-MCNC: 17 MG/DL — SIGNIFICANT CHANGE UP (ref 8–20)
BUN SERPL-MCNC: 19 MG/DL — SIGNIFICANT CHANGE UP (ref 8–20)
BUN SERPL-MCNC: 20 MG/DL — SIGNIFICANT CHANGE UP (ref 7–23)
BUN SERPL-MCNC: 22 MG/DL — HIGH (ref 8–20)
BUN SERPL-MCNC: 23 MG/DL — SIGNIFICANT CHANGE UP (ref 7–23)
BUN SERPL-MCNC: 24 MG/DL — HIGH (ref 8–20)
BUN SERPL-MCNC: 24 MG/DL — HIGH (ref 8–20)
BUN SERPL-MCNC: 29 MG/DL — HIGH (ref 8–20)
BUN SERPL-MCNC: 4 MG/DL — LOW (ref 7–23)
BUN SERPL-MCNC: 5 MG/DL — LOW (ref 7–23)
BUN SERPL-MCNC: 6 MG/DL — LOW (ref 7–23)
BUN SERPL-MCNC: 6 MG/DL — LOW (ref 7–23)
BUN SERPL-MCNC: 7 MG/DL — SIGNIFICANT CHANGE UP (ref 7–23)
BUN SERPL-MCNC: 8 MG/DL — SIGNIFICANT CHANGE UP (ref 7–23)
BUN SERPL-MCNC: 9 MG/DL — SIGNIFICANT CHANGE UP (ref 7–23)
BUN SERPL-MCNC: 9 MG/DL — SIGNIFICANT CHANGE UP (ref 7–23)
BUN SERPL-MCNC: <4 MG/DL — LOW (ref 7–23)
CA-I SERPL-SCNC: 1.22 MMOL/L — SIGNIFICANT CHANGE UP (ref 1.12–1.3)
CALCIUM SERPL-MCNC: 10 MG/DL — SIGNIFICANT CHANGE UP (ref 8.4–10.5)
CALCIUM SERPL-MCNC: 10.1 MG/DL — SIGNIFICANT CHANGE UP (ref 8.4–10.5)
CALCIUM SERPL-MCNC: 10.2 MG/DL — SIGNIFICANT CHANGE UP (ref 8.4–10.5)
CALCIUM SERPL-MCNC: 10.3 MG/DL — HIGH (ref 8.6–10.2)
CALCIUM SERPL-MCNC: 10.7 MG/DL — HIGH (ref 8.4–10.5)
CALCIUM SERPL-MCNC: 10.8 MG/DL — HIGH (ref 8.4–10.5)
CALCIUM SERPL-MCNC: 11 MG/DL — HIGH (ref 8.4–10.5)
CALCIUM SERPL-MCNC: 8 MG/DL — LOW (ref 8.4–10.5)
CALCIUM SERPL-MCNC: 8 MG/DL — LOW (ref 8.4–10.5)
CALCIUM SERPL-MCNC: 8.1 MG/DL — LOW (ref 8.4–10.5)
CALCIUM SERPL-MCNC: 8.2 MG/DL — LOW (ref 8.4–10.5)
CALCIUM SERPL-MCNC: 8.2 MG/DL — LOW (ref 8.4–10.5)
CALCIUM SERPL-MCNC: 8.3 MG/DL — LOW (ref 8.4–10.5)
CALCIUM SERPL-MCNC: 8.4 MG/DL — SIGNIFICANT CHANGE UP (ref 8.4–10.5)
CALCIUM SERPL-MCNC: 8.4 MG/DL — SIGNIFICANT CHANGE UP (ref 8.4–10.5)
CALCIUM SERPL-MCNC: 8.5 MG/DL — SIGNIFICANT CHANGE UP (ref 8.4–10.5)
CALCIUM SERPL-MCNC: 8.7 MG/DL — SIGNIFICANT CHANGE UP (ref 8.6–10.2)
CALCIUM SERPL-MCNC: 8.8 MG/DL — SIGNIFICANT CHANGE UP (ref 8.4–10.5)
CALCIUM SERPL-MCNC: 8.9 MG/DL — SIGNIFICANT CHANGE UP (ref 8.4–10.5)
CALCIUM SERPL-MCNC: 8.9 MG/DL — SIGNIFICANT CHANGE UP (ref 8.4–10.5)
CALCIUM SERPL-MCNC: 8.9 MG/DL — SIGNIFICANT CHANGE UP (ref 8.6–10.2)
CALCIUM SERPL-MCNC: 8.9 MG/DL — SIGNIFICANT CHANGE UP (ref 8.6–10.2)
CALCIUM SERPL-MCNC: 9 MG/DL — SIGNIFICANT CHANGE UP (ref 8.4–10.5)
CALCIUM SERPL-MCNC: 9 MG/DL — SIGNIFICANT CHANGE UP (ref 8.6–10.2)
CALCIUM SERPL-MCNC: 9 MG/DL — SIGNIFICANT CHANGE UP (ref 8.6–10.2)
CALCIUM SERPL-MCNC: 9.1 MG/DL — SIGNIFICANT CHANGE UP (ref 8.6–10.2)
CALCIUM SERPL-MCNC: 9.3 MG/DL — SIGNIFICANT CHANGE UP (ref 8.4–10.5)
CALCIUM SERPL-MCNC: 9.3 MG/DL — SIGNIFICANT CHANGE UP (ref 8.6–10.2)
CALCIUM SERPL-MCNC: 9.3 MG/DL — SIGNIFICANT CHANGE UP (ref 8.6–10.2)
CALCIUM SERPL-MCNC: 9.4 MG/DL — SIGNIFICANT CHANGE UP (ref 8.4–10.5)
CALCIUM SERPL-MCNC: 9.4 MG/DL — SIGNIFICANT CHANGE UP (ref 8.4–10.5)
CALCIUM SERPL-MCNC: 9.4 MG/DL — SIGNIFICANT CHANGE UP (ref 8.6–10.2)
CALCIUM SERPL-MCNC: 9.5 MG/DL — SIGNIFICANT CHANGE UP (ref 8.6–10.2)
CALCIUM SERPL-MCNC: 9.6 MG/DL — SIGNIFICANT CHANGE UP (ref 8.4–10.5)
CALCIUM SERPL-MCNC: 9.6 MG/DL — SIGNIFICANT CHANGE UP (ref 8.6–10.2)
CALCIUM SERPL-MCNC: 9.7 MG/DL — SIGNIFICANT CHANGE UP (ref 8.4–10.5)
CALCIUM SERPL-MCNC: 9.8 MG/DL — SIGNIFICANT CHANGE UP (ref 8.4–10.5)
CALCIUM SERPL-MCNC: 9.9 MG/DL — SIGNIFICANT CHANGE UP (ref 8.4–10.5)
CHLORIDE BLDV-SCNC: 103 MMOL/L — SIGNIFICANT CHANGE UP (ref 96–108)
CHLORIDE SERPL-SCNC: 101 MMOL/L — SIGNIFICANT CHANGE UP (ref 98–107)
CHLORIDE SERPL-SCNC: 102 MMOL/L — SIGNIFICANT CHANGE UP (ref 96–108)
CHLORIDE SERPL-SCNC: 102 MMOL/L — SIGNIFICANT CHANGE UP (ref 96–108)
CHLORIDE SERPL-SCNC: 102 MMOL/L — SIGNIFICANT CHANGE UP (ref 98–107)
CHLORIDE SERPL-SCNC: 103 MMOL/L — SIGNIFICANT CHANGE UP (ref 96–108)
CHLORIDE SERPL-SCNC: 103 MMOL/L — SIGNIFICANT CHANGE UP (ref 96–108)
CHLORIDE SERPL-SCNC: 104 MMOL/L — SIGNIFICANT CHANGE UP (ref 96–108)
CHLORIDE SERPL-SCNC: 105 MMOL/L — SIGNIFICANT CHANGE UP (ref 96–108)
CHLORIDE SERPL-SCNC: 92 MMOL/L — LOW (ref 98–107)
CHLORIDE SERPL-SCNC: 93 MMOL/L — LOW (ref 96–108)
CHLORIDE SERPL-SCNC: 93 MMOL/L — LOW (ref 98–107)
CHLORIDE SERPL-SCNC: 94 MMOL/L — LOW (ref 98–107)
CHLORIDE SERPL-SCNC: 95 MMOL/L — LOW (ref 96–108)
CHLORIDE SERPL-SCNC: 95 MMOL/L — LOW (ref 98–107)
CHLORIDE SERPL-SCNC: 95 MMOL/L — LOW (ref 98–107)
CHLORIDE SERPL-SCNC: 96 MMOL/L — LOW (ref 98–107)
CHLORIDE SERPL-SCNC: 96 MMOL/L — SIGNIFICANT CHANGE UP (ref 96–108)
CHLORIDE SERPL-SCNC: 97 MMOL/L — LOW (ref 98–107)
CHLORIDE SERPL-SCNC: 97 MMOL/L — SIGNIFICANT CHANGE UP (ref 96–108)
CHLORIDE SERPL-SCNC: 97 MMOL/L — SIGNIFICANT CHANGE UP (ref 96–108)
CHLORIDE SERPL-SCNC: 98 MMOL/L — SIGNIFICANT CHANGE UP (ref 96–108)
CHLORIDE SERPL-SCNC: 98 MMOL/L — SIGNIFICANT CHANGE UP (ref 96–108)
CHLORIDE SERPL-SCNC: 98 MMOL/L — SIGNIFICANT CHANGE UP (ref 98–107)
CHLORIDE SERPL-SCNC: 98 MMOL/L — SIGNIFICANT CHANGE UP (ref 98–107)
CHLORIDE SERPL-SCNC: 99 MMOL/L — SIGNIFICANT CHANGE UP (ref 96–108)
CHLORIDE SERPL-SCNC: 99 MMOL/L — SIGNIFICANT CHANGE UP (ref 98–107)
CHLORIDE SERPL-SCNC: 99 MMOL/L — SIGNIFICANT CHANGE UP (ref 98–107)
CK MB CFR SERPL CALC: 1.1 NG/ML — SIGNIFICANT CHANGE UP (ref 0–3.8)
CK MB CFR SERPL CALC: 1.3 NG/ML — SIGNIFICANT CHANGE UP (ref 0–3.8)
CO2 BLDV-SCNC: 33 MMOL/L — HIGH (ref 22–30)
CO2 SERPL-SCNC: 24 MMOL/L — SIGNIFICANT CHANGE UP (ref 22–29)
CO2 SERPL-SCNC: 24 MMOL/L — SIGNIFICANT CHANGE UP (ref 22–31)
CO2 SERPL-SCNC: 25 MMOL/L — SIGNIFICANT CHANGE UP (ref 22–29)
CO2 SERPL-SCNC: 25 MMOL/L — SIGNIFICANT CHANGE UP (ref 22–31)
CO2 SERPL-SCNC: 26 MMOL/L — SIGNIFICANT CHANGE UP (ref 22–29)
CO2 SERPL-SCNC: 26 MMOL/L — SIGNIFICANT CHANGE UP (ref 22–31)
CO2 SERPL-SCNC: 27 MMOL/L — SIGNIFICANT CHANGE UP (ref 22–29)
CO2 SERPL-SCNC: 27 MMOL/L — SIGNIFICANT CHANGE UP (ref 22–29)
CO2 SERPL-SCNC: 27 MMOL/L — SIGNIFICANT CHANGE UP (ref 22–31)
CO2 SERPL-SCNC: 28 MMOL/L — SIGNIFICANT CHANGE UP (ref 22–29)
CO2 SERPL-SCNC: 28 MMOL/L — SIGNIFICANT CHANGE UP (ref 22–31)
CO2 SERPL-SCNC: 29 MMOL/L — SIGNIFICANT CHANGE UP (ref 22–29)
CO2 SERPL-SCNC: 29 MMOL/L — SIGNIFICANT CHANGE UP (ref 22–31)
CO2 SERPL-SCNC: 30 MMOL/L — HIGH (ref 22–29)
CO2 SERPL-SCNC: 30 MMOL/L — SIGNIFICANT CHANGE UP (ref 22–31)
CO2 SERPL-SCNC: 31 MMOL/L — HIGH (ref 22–29)
CO2 SERPL-SCNC: 34 MMOL/L — HIGH (ref 22–29)
COLOR FLD: SIGNIFICANT CHANGE UP
COLOR SPEC: ABNORMAL
COLOR SPEC: YELLOW — SIGNIFICANT CHANGE UP
CREAT SERPL-MCNC: 0.25 MG/DL — LOW (ref 0.5–1.3)
CREAT SERPL-MCNC: 0.29 MG/DL — LOW (ref 0.5–1.3)
CREAT SERPL-MCNC: 0.3 MG/DL — LOW (ref 0.5–1.3)
CREAT SERPL-MCNC: 0.31 MG/DL — LOW (ref 0.5–1.3)
CREAT SERPL-MCNC: 0.32 MG/DL — LOW (ref 0.5–1.3)
CREAT SERPL-MCNC: 0.33 MG/DL — LOW (ref 0.5–1.3)
CREAT SERPL-MCNC: 0.34 MG/DL — LOW (ref 0.5–1.3)
CREAT SERPL-MCNC: 0.35 MG/DL — LOW (ref 0.5–1.3)
CREAT SERPL-MCNC: 0.36 MG/DL — LOW (ref 0.5–1.3)
CREAT SERPL-MCNC: 0.37 MG/DL — LOW (ref 0.5–1.3)
CREAT SERPL-MCNC: 0.37 MG/DL — LOW (ref 0.5–1.3)
CREAT SERPL-MCNC: 0.38 MG/DL — LOW (ref 0.5–1.3)
CREAT SERPL-MCNC: 0.39 MG/DL — LOW (ref 0.5–1.3)
CREAT SERPL-MCNC: 0.4 MG/DL — LOW (ref 0.5–1.3)
CREAT SERPL-MCNC: 0.41 MG/DL — LOW (ref 0.5–1.3)
CREAT SERPL-MCNC: 0.43 MG/DL — LOW (ref 0.5–1.3)
CREAT SERPL-MCNC: 0.43 MG/DL — LOW (ref 0.5–1.3)
CREAT SERPL-MCNC: 0.46 MG/DL — LOW (ref 0.5–1.3)
CREAT SERPL-MCNC: 0.47 MG/DL — LOW (ref 0.5–1.3)
CREAT SERPL-MCNC: 0.48 MG/DL — LOW (ref 0.5–1.3)
CREAT SERPL-MCNC: 0.51 MG/DL — SIGNIFICANT CHANGE UP (ref 0.5–1.3)
CREAT SERPL-MCNC: 0.56 MG/DL — SIGNIFICANT CHANGE UP (ref 0.5–1.3)
CREAT SERPL-MCNC: 0.58 MG/DL — SIGNIFICANT CHANGE UP (ref 0.5–1.3)
CREAT SERPL-MCNC: 0.67 MG/DL — SIGNIFICANT CHANGE UP (ref 0.5–1.3)
CREAT SERPL-MCNC: 0.69 MG/DL — SIGNIFICANT CHANGE UP (ref 0.5–1.3)
CRP SERPL-MCNC: 22.72 MG/DL — HIGH (ref 0–0.4)
CULTURE RESULTS: NO GROWTH — SIGNIFICANT CHANGE UP
CULTURE RESULTS: SIGNIFICANT CHANGE UP
DIFF PNL FLD: NEGATIVE — SIGNIFICANT CHANGE UP
ELLIPTOCYTES BLD QL SMEAR: SLIGHT — SIGNIFICANT CHANGE UP
EOSINOPHIL # BLD AUTO: 0 K/UL — SIGNIFICANT CHANGE UP (ref 0–0.5)
EOSINOPHIL # BLD AUTO: 0.01 K/UL — SIGNIFICANT CHANGE UP (ref 0–0.5)
EOSINOPHIL # BLD AUTO: 0.02 K/UL — SIGNIFICANT CHANGE UP (ref 0–0.5)
EOSINOPHIL # BLD AUTO: 0.03 K/UL — SIGNIFICANT CHANGE UP (ref 0–0.5)
EOSINOPHIL # BLD AUTO: 0.04 K/UL — SIGNIFICANT CHANGE UP (ref 0–0.5)
EOSINOPHIL # BLD AUTO: 0.05 K/UL — SIGNIFICANT CHANGE UP (ref 0–0.5)
EOSINOPHIL # BLD AUTO: 0.05 K/UL — SIGNIFICANT CHANGE UP (ref 0–0.5)
EOSINOPHIL # BLD AUTO: 0.06 K/UL — SIGNIFICANT CHANGE UP (ref 0–0.5)
EOSINOPHIL # BLD AUTO: 0.06 K/UL — SIGNIFICANT CHANGE UP (ref 0–0.5)
EOSINOPHIL # BLD AUTO: 0.07 K/UL — SIGNIFICANT CHANGE UP (ref 0–0.5)
EOSINOPHIL # BLD AUTO: 0.09 K/UL — SIGNIFICANT CHANGE UP (ref 0–0.5)
EOSINOPHIL # FLD: 1 % — SIGNIFICANT CHANGE UP
EOSINOPHIL NFR BLD AUTO: 0 % — SIGNIFICANT CHANGE UP (ref 0–6)
EOSINOPHIL NFR BLD AUTO: 0.1 % — SIGNIFICANT CHANGE UP (ref 0–6)
EOSINOPHIL NFR BLD AUTO: 0.2 % — SIGNIFICANT CHANGE UP (ref 0–6)
EOSINOPHIL NFR BLD AUTO: 0.3 % — SIGNIFICANT CHANGE UP (ref 0–6)
EOSINOPHIL NFR BLD AUTO: 0.3 % — SIGNIFICANT CHANGE UP (ref 0–6)
EOSINOPHIL NFR BLD AUTO: 0.4 % — SIGNIFICANT CHANGE UP (ref 0–6)
EOSINOPHIL NFR BLD AUTO: 0.5 % — SIGNIFICANT CHANGE UP (ref 0–6)
EOSINOPHIL NFR BLD AUTO: 0.8 % — SIGNIFICANT CHANGE UP (ref 0–6)
EOSINOPHIL NFR BLD AUTO: 0.9 % — SIGNIFICANT CHANGE UP (ref 0–6)
EOSINOPHIL NFR BLD AUTO: 1 % — SIGNIFICANT CHANGE UP (ref 0–6)
EOSINOPHIL NFR BLD AUTO: 1 % — SIGNIFICANT CHANGE UP (ref 0–6)
EOSINOPHIL NFR FLD: 0 % — SIGNIFICANT CHANGE UP (ref 0–6)
EPI CELLS # UR: 5 /HPF — SIGNIFICANT CHANGE UP
EPI CELLS # UR: SIGNIFICANT CHANGE UP
EPI CELLS # UR: SIGNIFICANT CHANGE UP
ERYTHROCYTE [SEDIMENTATION RATE] IN BLOOD: 58 MM/HR — HIGH (ref 0–20)
FLUID INTAKE SUBSTANCE CLASS: SIGNIFICANT CHANGE UP
FLUID SEGMENTED GRANULOCYTES: 47 % — SIGNIFICANT CHANGE UP
FOLATE SERPL-MCNC: 15.7 NG/ML — SIGNIFICANT CHANGE UP
GAS PNL BLDA: SIGNIFICANT CHANGE UP
GAS PNL BLDA: SIGNIFICANT CHANGE UP
GAS PNL BLDV: 137 MMOL/L — SIGNIFICANT CHANGE UP (ref 135–145)
GAS PNL BLDV: SIGNIFICANT CHANGE UP
GAS PNL BLDV: SIGNIFICANT CHANGE UP
GIANT PLATELETS BLD QL SMEAR: PRESENT — SIGNIFICANT CHANGE UP
GLUCOSE BLDC GLUCOMTR-MCNC: 118 MG/DL — HIGH (ref 70–99)
GLUCOSE BLDC GLUCOMTR-MCNC: 123 MG/DL — HIGH (ref 70–99)
GLUCOSE BLDV-MCNC: 82 MG/DL — SIGNIFICANT CHANGE UP (ref 70–99)
GLUCOSE FLD-MCNC: 49 MG/DL — SIGNIFICANT CHANGE UP
GLUCOSE SERPL-MCNC: 100 MG/DL — HIGH (ref 70–99)
GLUCOSE SERPL-MCNC: 102 MG/DL — HIGH (ref 70–99)
GLUCOSE SERPL-MCNC: 102 MG/DL — HIGH (ref 70–99)
GLUCOSE SERPL-MCNC: 103 MG/DL — HIGH (ref 70–99)
GLUCOSE SERPL-MCNC: 105 MG/DL — HIGH (ref 70–99)
GLUCOSE SERPL-MCNC: 105 MG/DL — HIGH (ref 70–99)
GLUCOSE SERPL-MCNC: 106 MG/DL — HIGH (ref 70–99)
GLUCOSE SERPL-MCNC: 106 MG/DL — HIGH (ref 70–99)
GLUCOSE SERPL-MCNC: 107 MG/DL — HIGH (ref 70–99)
GLUCOSE SERPL-MCNC: 108 MG/DL — HIGH (ref 70–99)
GLUCOSE SERPL-MCNC: 109 MG/DL — HIGH (ref 70–99)
GLUCOSE SERPL-MCNC: 109 MG/DL — HIGH (ref 70–99)
GLUCOSE SERPL-MCNC: 110 MG/DL — HIGH (ref 70–99)
GLUCOSE SERPL-MCNC: 110 MG/DL — HIGH (ref 70–99)
GLUCOSE SERPL-MCNC: 111 MG/DL — HIGH (ref 70–99)
GLUCOSE SERPL-MCNC: 111 MG/DL — HIGH (ref 70–99)
GLUCOSE SERPL-MCNC: 113 MG/DL — HIGH (ref 70–99)
GLUCOSE SERPL-MCNC: 115 MG/DL — HIGH (ref 70–99)
GLUCOSE SERPL-MCNC: 119 MG/DL — HIGH (ref 70–99)
GLUCOSE SERPL-MCNC: 120 MG/DL — HIGH (ref 70–99)
GLUCOSE SERPL-MCNC: 120 MG/DL — HIGH (ref 70–99)
GLUCOSE SERPL-MCNC: 123 MG/DL — HIGH (ref 70–99)
GLUCOSE SERPL-MCNC: 124 MG/DL — HIGH (ref 70–99)
GLUCOSE SERPL-MCNC: 124 MG/DL — HIGH (ref 70–99)
GLUCOSE SERPL-MCNC: 127 MG/DL — HIGH (ref 70–99)
GLUCOSE SERPL-MCNC: 130 MG/DL — HIGH (ref 70–99)
GLUCOSE SERPL-MCNC: 131 MG/DL — HIGH (ref 70–99)
GLUCOSE SERPL-MCNC: 134 MG/DL — HIGH (ref 70–99)
GLUCOSE SERPL-MCNC: 135 MG/DL — HIGH (ref 70–99)
GLUCOSE SERPL-MCNC: 144 MG/DL — HIGH (ref 70–99)
GLUCOSE SERPL-MCNC: 145 MG/DL — HIGH (ref 70–99)
GLUCOSE SERPL-MCNC: 146 MG/DL — HIGH (ref 70–99)
GLUCOSE SERPL-MCNC: 149 MG/DL — HIGH (ref 70–99)
GLUCOSE SERPL-MCNC: 156 MG/DL — HIGH (ref 70–99)
GLUCOSE SERPL-MCNC: 157 MG/DL — HIGH (ref 70–99)
GLUCOSE SERPL-MCNC: 165 MG/DL — HIGH (ref 70–99)
GLUCOSE SERPL-MCNC: 165 MG/DL — HIGH (ref 70–99)
GLUCOSE SERPL-MCNC: 175 MG/DL — HIGH (ref 70–99)
GLUCOSE SERPL-MCNC: 85 MG/DL — SIGNIFICANT CHANGE UP (ref 70–99)
GLUCOSE SERPL-MCNC: 91 MG/DL — SIGNIFICANT CHANGE UP (ref 70–99)
GLUCOSE SERPL-MCNC: 92 MG/DL — SIGNIFICANT CHANGE UP (ref 70–99)
GLUCOSE SERPL-MCNC: 93 MG/DL — SIGNIFICANT CHANGE UP (ref 70–99)
GLUCOSE SERPL-MCNC: 93 MG/DL — SIGNIFICANT CHANGE UP (ref 70–99)
GLUCOSE SERPL-MCNC: 94 MG/DL — SIGNIFICANT CHANGE UP (ref 70–99)
GLUCOSE SERPL-MCNC: 98 MG/DL — SIGNIFICANT CHANGE UP (ref 70–99)
GLUCOSE UR QL: NEGATIVE MG/DL — SIGNIFICANT CHANGE UP
GLUCOSE UR QL: NEGATIVE MG/DL — SIGNIFICANT CHANGE UP
GLUCOSE UR QL: NEGATIVE — SIGNIFICANT CHANGE UP
GLUCOSE UR QL: NEGATIVE — SIGNIFICANT CHANGE UP
GRAM STN FLD: SIGNIFICANT CHANGE UP
HCO3 BLDA-SCNC: 28 MMOL/L — HIGH (ref 20–26)
HCO3 BLDV-SCNC: 31 MMOL/L — HIGH (ref 21–29)
HCT VFR BLD CALC: 24.2 % — LOW (ref 34.5–45)
HCT VFR BLD CALC: 24.3 % — LOW (ref 34.5–45)
HCT VFR BLD CALC: 24.9 % — LOW (ref 34.5–45)
HCT VFR BLD CALC: 26.2 % — LOW (ref 34.5–45)
HCT VFR BLD CALC: 26.3 % — LOW (ref 34.5–45)
HCT VFR BLD CALC: 27.3 % — LOW (ref 34.5–45)
HCT VFR BLD CALC: 27.3 % — LOW (ref 34.5–45)
HCT VFR BLD CALC: 27.6 % — LOW (ref 34.5–45)
HCT VFR BLD CALC: 27.9 % — LOW (ref 34.5–45)
HCT VFR BLD CALC: 28 % — LOW (ref 34.5–45)
HCT VFR BLD CALC: 28.1 % — LOW (ref 34.5–45)
HCT VFR BLD CALC: 28.1 % — LOW (ref 34.5–45)
HCT VFR BLD CALC: 28.4 % — LOW (ref 34.5–45)
HCT VFR BLD CALC: 28.5 % — LOW (ref 34.5–45)
HCT VFR BLD CALC: 28.7 % — LOW (ref 34.5–45)
HCT VFR BLD CALC: 29.2 % — LOW (ref 34.5–45)
HCT VFR BLD CALC: 29.3 % — LOW (ref 34.5–45)
HCT VFR BLD CALC: 29.4 % — LOW (ref 34.5–45)
HCT VFR BLD CALC: 29.5 % — LOW (ref 34.5–45)
HCT VFR BLD CALC: 29.9 % — LOW (ref 34.5–45)
HCT VFR BLD CALC: 30.4 % — LOW (ref 34.5–45)
HCT VFR BLD CALC: 30.6 % — LOW (ref 34.5–45)
HCT VFR BLD CALC: 30.8 % — LOW (ref 34.5–45)
HCT VFR BLD CALC: 30.9 % — LOW (ref 34.5–45)
HCT VFR BLD CALC: 31.1 % — LOW (ref 34.5–45)
HCT VFR BLD CALC: 31.3 % — LOW (ref 34.5–45)
HCT VFR BLD CALC: 31.3 % — LOW (ref 34.5–45)
HCT VFR BLD CALC: 32.1 % — LOW (ref 34.5–45)
HCT VFR BLD CALC: 32.7 % — LOW (ref 34.5–45)
HCT VFR BLD CALC: 32.8 % — LOW (ref 34.5–45)
HCT VFR BLD CALC: 33.6 % — LOW (ref 34.5–45)
HCT VFR BLD CALC: 33.9 % — LOW (ref 34.5–45)
HCT VFR BLD CALC: 34.1 % — LOW (ref 34.5–45)
HCT VFR BLD CALC: 34.1 % — LOW (ref 34.5–45)
HCT VFR BLD CALC: 34.4 % — LOW (ref 34.5–45)
HCT VFR BLD CALC: 34.7 % — SIGNIFICANT CHANGE UP (ref 34.5–45)
HCT VFR BLD CALC: 34.8 % — SIGNIFICANT CHANGE UP (ref 34.5–45)
HCT VFR BLD CALC: 35 % — SIGNIFICANT CHANGE UP (ref 34.5–45)
HCT VFR BLD CALC: 35 % — SIGNIFICANT CHANGE UP (ref 34.5–45)
HCT VFR BLD CALC: 35.4 % — SIGNIFICANT CHANGE UP (ref 34.5–45)
HCT VFR BLD CALC: 35.6 % — SIGNIFICANT CHANGE UP (ref 34.5–45)
HCT VFR BLD CALC: 35.7 % — SIGNIFICANT CHANGE UP (ref 34.5–45)
HCT VFR BLD CALC: 36.7 % — SIGNIFICANT CHANGE UP (ref 34.5–45)
HCT VFR BLD CALC: 36.9 % — SIGNIFICANT CHANGE UP (ref 34.5–45)
HCT VFR BLD CALC: 36.9 % — SIGNIFICANT CHANGE UP (ref 34.5–45)
HCT VFR BLD CALC: 37.1 % — SIGNIFICANT CHANGE UP (ref 34.5–45)
HCT VFR BLD CALC: 37.4 % — SIGNIFICANT CHANGE UP (ref 34.5–45)
HCT VFR BLD CALC: 37.5 % — SIGNIFICANT CHANGE UP (ref 34.5–45)
HCT VFR BLD CALC: 37.6 % — SIGNIFICANT CHANGE UP (ref 34.5–45)
HCT VFR BLDA CALC: 27 % — LOW (ref 39–50)
HCV AB S/CO SERPL IA: 0.11 S/CO — SIGNIFICANT CHANGE UP (ref 0–0.99)
HCV AB SERPL-IMP: SIGNIFICANT CHANGE UP
HGB BLD CALC-MCNC: 8.7 G/DL — LOW (ref 11.5–15.5)
HGB BLD-MCNC: 10 G/DL — LOW (ref 11.5–15.5)
HGB BLD-MCNC: 10 G/DL — LOW (ref 11.5–15.5)
HGB BLD-MCNC: 10.1 G/DL — LOW (ref 11.5–15.5)
HGB BLD-MCNC: 10.2 G/DL — LOW (ref 11.5–15.5)
HGB BLD-MCNC: 10.3 G/DL — LOW (ref 11.5–15.5)
HGB BLD-MCNC: 10.4 G/DL — LOW (ref 11.5–15.5)
HGB BLD-MCNC: 10.5 G/DL — LOW (ref 11.5–15.5)
HGB BLD-MCNC: 10.6 G/DL — LOW (ref 11.5–15.5)
HGB BLD-MCNC: 10.7 G/DL — LOW (ref 11.5–15.5)
HGB BLD-MCNC: 10.8 G/DL — LOW (ref 11.5–15.5)
HGB BLD-MCNC: 11 G/DL — LOW (ref 11.5–15.5)
HGB BLD-MCNC: 11.1 G/DL — LOW (ref 11.5–15.5)
HGB BLD-MCNC: 11.2 G/DL — LOW (ref 11.5–15.5)
HGB BLD-MCNC: 11.2 G/DL — LOW (ref 11.5–15.5)
HGB BLD-MCNC: 11.4 G/DL — LOW (ref 11.5–15.5)
HGB BLD-MCNC: 11.4 G/DL — LOW (ref 11.5–15.5)
HGB BLD-MCNC: 11.7 G/DL — SIGNIFICANT CHANGE UP (ref 11.5–15.5)
HGB BLD-MCNC: 11.8 G/DL — SIGNIFICANT CHANGE UP (ref 11.5–15.5)
HGB BLD-MCNC: 11.9 G/DL — SIGNIFICANT CHANGE UP (ref 11.5–15.5)
HGB BLD-MCNC: 12.2 G/DL — SIGNIFICANT CHANGE UP (ref 11.5–15.5)
HGB BLD-MCNC: 7.4 G/DL — LOW (ref 11.5–15.5)
HGB BLD-MCNC: 7.4 G/DL — LOW (ref 11.5–15.5)
HGB BLD-MCNC: 7.6 G/DL — LOW (ref 11.5–15.5)
HGB BLD-MCNC: 7.8 G/DL — LOW (ref 11.5–15.5)
HGB BLD-MCNC: 8.4 G/DL — LOW (ref 11.5–15.5)
HGB BLD-MCNC: 8.7 G/DL — LOW (ref 11.5–15.5)
HGB BLD-MCNC: 8.8 G/DL — LOW (ref 11.5–15.5)
HGB BLD-MCNC: 8.8 G/DL — LOW (ref 11.5–15.5)
HGB BLD-MCNC: 8.9 G/DL — LOW (ref 11.5–15.5)
HGB BLD-MCNC: 8.9 G/DL — LOW (ref 11.5–15.5)
HGB BLD-MCNC: 9.1 G/DL — LOW (ref 11.5–15.5)
HGB BLD-MCNC: 9.1 G/DL — LOW (ref 11.5–15.5)
HGB BLD-MCNC: 9.2 G/DL — LOW (ref 11.5–15.5)
HGB BLD-MCNC: 9.2 G/DL — LOW (ref 11.5–15.5)
HGB BLD-MCNC: 9.3 G/DL — LOW (ref 11.5–15.5)
HGB BLD-MCNC: 9.5 G/DL — LOW (ref 11.5–15.5)
HGB BLD-MCNC: 9.5 G/DL — LOW (ref 11.5–15.5)
HGB BLD-MCNC: 9.6 G/DL — LOW (ref 11.5–15.5)
HOROWITZ INDEX BLDA+IHG-RTO: SIGNIFICANT CHANGE UP
HYALINE CASTS # UR AUTO: 1 /LPF — SIGNIFICANT CHANGE UP (ref 0–2)
HYPOCHROMIA BLD QL: SLIGHT — SIGNIFICANT CHANGE UP
HYPOCHROMIA BLD QL: SLIGHT — SIGNIFICANT CHANGE UP
IMM GRANULOCYTES NFR BLD AUTO: 0.5 % — SIGNIFICANT CHANGE UP (ref 0–1.5)
IMM GRANULOCYTES NFR BLD AUTO: 0.6 % — SIGNIFICANT CHANGE UP (ref 0–1.5)
IMM GRANULOCYTES NFR BLD AUTO: 0.7 % — SIGNIFICANT CHANGE UP (ref 0–1.5)
IMM GRANULOCYTES NFR BLD AUTO: 1.3 % — SIGNIFICANT CHANGE UP (ref 0–1.5)
IMM GRANULOCYTES NFR BLD AUTO: 1.7 % — HIGH (ref 0–1.5)
IMM GRANULOCYTES NFR BLD AUTO: 2 % — HIGH (ref 0–1.5)
IMM GRANULOCYTES NFR BLD AUTO: 2.3 % — HIGH (ref 0–1.5)
IMM GRANULOCYTES NFR BLD AUTO: 3.5 % — HIGH (ref 0–1.5)
IMM GRANULOCYTES NFR BLD AUTO: 4.3 % — HIGH (ref 0–1.5)
IMM GRANULOCYTES NFR BLD AUTO: 4.4 % — HIGH (ref 0–1.5)
IMM GRANULOCYTES NFR BLD AUTO: 4.5 % — HIGH (ref 0–1.5)
IMM GRANULOCYTES NFR BLD AUTO: 4.6 % — HIGH (ref 0–1.5)
IMM GRANULOCYTES NFR BLD AUTO: 4.7 % — HIGH (ref 0–1.5)
IMM GRANULOCYTES NFR BLD AUTO: 5 % — HIGH (ref 0–1.5)
IMM GRANULOCYTES NFR BLD AUTO: 5.3 % — HIGH (ref 0–1.5)
IMM GRANULOCYTES NFR BLD AUTO: 5.3 % — HIGH (ref 0–1.5)
IMM GRANULOCYTES NFR BLD AUTO: 5.6 % — HIGH (ref 0–1.5)
IMM GRANULOCYTES NFR BLD AUTO: 5.6 % — HIGH (ref 0–1.5)
IMM GRANULOCYTES NFR BLD AUTO: 6.1 % — HIGH (ref 0–1.5)
IMM GRANULOCYTES NFR BLD AUTO: 6.4 % — HIGH (ref 0–1.5)
INR BLD: 0.96 RATIO — SIGNIFICANT CHANGE UP (ref 0.88–1.16)
INR BLD: 0.99 RATIO — SIGNIFICANT CHANGE UP (ref 0.88–1.16)
INR BLD: 1 RATIO — SIGNIFICANT CHANGE UP (ref 0.88–1.16)
INR BLD: 1.01 RATIO — SIGNIFICANT CHANGE UP (ref 0.88–1.16)
INR BLD: 1.04 RATIO — SIGNIFICANT CHANGE UP (ref 0.88–1.16)
INR BLD: 1.04 RATIO — SIGNIFICANT CHANGE UP (ref 0.88–1.16)
INR BLD: 1.05 RATIO — SIGNIFICANT CHANGE UP (ref 0.88–1.16)
INR BLD: 1.09 RATIO — SIGNIFICANT CHANGE UP (ref 0.88–1.16)
INR BLD: 1.1 RATIO — SIGNIFICANT CHANGE UP (ref 0.88–1.16)
INR BLD: 1.1 RATIO — SIGNIFICANT CHANGE UP (ref 0.88–1.16)
INR BLD: 1.13 RATIO — SIGNIFICANT CHANGE UP (ref 0.88–1.16)
INR BLD: 1.15 RATIO — SIGNIFICANT CHANGE UP (ref 0.88–1.16)
INR BLD: 1.15 RATIO — SIGNIFICANT CHANGE UP (ref 0.88–1.16)
INR BLD: 1.19 RATIO — HIGH (ref 0.88–1.16)
INR BLD: 1.24 RATIO — HIGH (ref 0.88–1.16)
INR BLD: 2.14 RATIO — HIGH (ref 0.88–1.16)
KETONES UR-MCNC: NEGATIVE — SIGNIFICANT CHANGE UP
LACTATE BLDV-MCNC: 1.2 MMOL/L — SIGNIFICANT CHANGE UP (ref 0.7–2)
LACTATE BLDV-MCNC: 2 MMOL/L — SIGNIFICANT CHANGE UP (ref 0.7–2)
LACTATE SERPL-SCNC: 0.8 MMOL/L — SIGNIFICANT CHANGE UP (ref 0.7–2)
LDH SERPL L TO P-CCNC: 175 U/L — SIGNIFICANT CHANGE UP (ref 50–242)
LDH SERPL L TO P-CCNC: 411 U/L — SIGNIFICANT CHANGE UP
LEUKOCYTE ESTERASE UR-ACNC: ABNORMAL
LEUKOCYTE ESTERASE UR-ACNC: ABNORMAL
LEUKOCYTE ESTERASE UR-ACNC: NEGATIVE — SIGNIFICANT CHANGE UP
LEUKOCYTE ESTERASE UR-ACNC: NEGATIVE — SIGNIFICANT CHANGE UP
LYMPHOCYTES # BLD AUTO: 0.13 K/UL — LOW (ref 1–3.3)
LYMPHOCYTES # BLD AUTO: 0.23 K/UL — LOW (ref 1–3.3)
LYMPHOCYTES # BLD AUTO: 0.24 K/UL — LOW (ref 1–3.3)
LYMPHOCYTES # BLD AUTO: 0.25 K/UL — LOW (ref 1–3.3)
LYMPHOCYTES # BLD AUTO: 0.27 K/UL — LOW (ref 1–3.3)
LYMPHOCYTES # BLD AUTO: 0.28 K/UL — LOW (ref 1–3.3)
LYMPHOCYTES # BLD AUTO: 0.29 K/UL — LOW (ref 1–3.3)
LYMPHOCYTES # BLD AUTO: 0.37 K/UL — LOW (ref 1–3.3)
LYMPHOCYTES # BLD AUTO: 0.37 K/UL — LOW (ref 1–3.3)
LYMPHOCYTES # BLD AUTO: 0.4 K/UL — LOW (ref 1–3.3)
LYMPHOCYTES # BLD AUTO: 0.41 K/UL — LOW (ref 1–3.3)
LYMPHOCYTES # BLD AUTO: 0.44 K/UL — LOW (ref 1–3.3)
LYMPHOCYTES # BLD AUTO: 0.49 K/UL — LOW (ref 1–3.3)
LYMPHOCYTES # BLD AUTO: 0.54 K/UL — LOW (ref 1–3.3)
LYMPHOCYTES # BLD AUTO: 0.58 K/UL — LOW (ref 1–3.3)
LYMPHOCYTES # BLD AUTO: 0.58 K/UL — LOW (ref 1–3.3)
LYMPHOCYTES # BLD AUTO: 0.6 K/UL — LOW (ref 1–3.3)
LYMPHOCYTES # BLD AUTO: 0.63 K/UL — LOW (ref 1–3.3)
LYMPHOCYTES # BLD AUTO: 0.66 K/UL — LOW (ref 1–3.3)
LYMPHOCYTES # BLD AUTO: 0.74 K/UL — LOW (ref 1–3.3)
LYMPHOCYTES # BLD AUTO: 0.74 K/UL — LOW (ref 1–3.3)
LYMPHOCYTES # BLD AUTO: 0.77 K/UL — LOW (ref 1–3.3)
LYMPHOCYTES # BLD AUTO: 0.8 K/UL — LOW (ref 1–3.3)
LYMPHOCYTES # BLD AUTO: 0.8 K/UL — LOW (ref 1–3.3)
LYMPHOCYTES # BLD AUTO: 1.14 K/UL — SIGNIFICANT CHANGE UP (ref 1–3.3)
LYMPHOCYTES # BLD AUTO: 1.3 K/UL — SIGNIFICANT CHANGE UP (ref 1–3.3)
LYMPHOCYTES # BLD AUTO: 1.46 K/UL — SIGNIFICANT CHANGE UP (ref 1–3.3)
LYMPHOCYTES # BLD AUTO: 11 % — LOW (ref 13–44)
LYMPHOCYTES # BLD AUTO: 14 % — SIGNIFICANT CHANGE UP (ref 13–44)
LYMPHOCYTES # BLD AUTO: 15 % — SIGNIFICANT CHANGE UP (ref 13–44)
LYMPHOCYTES # BLD AUTO: 16.7 % — SIGNIFICANT CHANGE UP (ref 13–44)
LYMPHOCYTES # BLD AUTO: 19 % — SIGNIFICANT CHANGE UP (ref 13–44)
LYMPHOCYTES # BLD AUTO: 19.5 % — SIGNIFICANT CHANGE UP (ref 13–44)
LYMPHOCYTES # BLD AUTO: 2.4 % — LOW (ref 13–44)
LYMPHOCYTES # BLD AUTO: 3.2 % — LOW (ref 13–44)
LYMPHOCYTES # BLD AUTO: 3.5 % — LOW (ref 13–44)
LYMPHOCYTES # BLD AUTO: 3.5 % — LOW (ref 13–44)
LYMPHOCYTES # BLD AUTO: 3.7 % — LOW (ref 13–44)
LYMPHOCYTES # BLD AUTO: 3.9 % — LOW (ref 13–44)
LYMPHOCYTES # BLD AUTO: 4.1 % — LOW (ref 13–44)
LYMPHOCYTES # BLD AUTO: 4.4 % — LOW (ref 13–44)
LYMPHOCYTES # BLD AUTO: 4.4 % — LOW (ref 13–44)
LYMPHOCYTES # BLD AUTO: 4.5 % — LOW (ref 13–44)
LYMPHOCYTES # BLD AUTO: 4.6 % — LOW (ref 13–44)
LYMPHOCYTES # BLD AUTO: 5.2 % — LOW (ref 13–44)
LYMPHOCYTES # BLD AUTO: 5.5 % — LOW (ref 13–44)
LYMPHOCYTES # BLD AUTO: 6.2 % — LOW (ref 13–44)
LYMPHOCYTES # BLD AUTO: 6.3 % — LOW (ref 13–44)
LYMPHOCYTES # BLD AUTO: 6.4 % — LOW (ref 13–44)
LYMPHOCYTES # BLD AUTO: 6.4 % — LOW (ref 13–44)
LYMPHOCYTES # BLD AUTO: 7.3 % — LOW (ref 13–44)
LYMPHOCYTES # BLD AUTO: 8 % — LOW (ref 13–44)
LYMPHOCYTES # BLD AUTO: 8.4 % — LOW (ref 13–44)
LYMPHOCYTES # BLD AUTO: 8.7 % — LOW (ref 13–44)
LYMPHOCYTES # FLD: 38 % — SIGNIFICANT CHANGE UP
LYMPHOCYTES NFR SPEC AUTO: 2.7 % — LOW (ref 13–44)
LYMPHOCYTES NFR SPEC AUTO: 4.4 % — LOW (ref 13–44)
LYMPHOCYTES NFR SPEC AUTO: 6.9 % — LOW (ref 13–44)
MACROCYTES BLD QL: SLIGHT — SIGNIFICANT CHANGE UP
MAGNESIUM SERPL-MCNC: 1.5 MG/DL — LOW (ref 1.6–2.6)
MAGNESIUM SERPL-MCNC: 1.7 MG/DL — SIGNIFICANT CHANGE UP (ref 1.6–2.6)
MAGNESIUM SERPL-MCNC: 1.8 MG/DL — SIGNIFICANT CHANGE UP (ref 1.6–2.6)
MAGNESIUM SERPL-MCNC: 1.8 MG/DL — SIGNIFICANT CHANGE UP (ref 1.6–2.6)
MAGNESIUM SERPL-MCNC: 1.9 MG/DL — SIGNIFICANT CHANGE UP (ref 1.6–2.6)
MAGNESIUM SERPL-MCNC: 2 MG/DL — SIGNIFICANT CHANGE UP (ref 1.6–2.6)
MAGNESIUM SERPL-MCNC: 2.1 MG/DL — SIGNIFICANT CHANGE UP (ref 1.6–2.6)
MAGNESIUM SERPL-MCNC: 2.1 MG/DL — SIGNIFICANT CHANGE UP (ref 1.6–2.6)
MAGNESIUM SERPL-MCNC: 2.2 MG/DL — SIGNIFICANT CHANGE UP (ref 1.6–2.6)
MANUAL DIF COMMENT BLD-IMP: SIGNIFICANT CHANGE UP
MANUAL SMEAR VERIFICATION: SIGNIFICANT CHANGE UP
MCHC RBC-ENTMCNC: 26.7 PG — LOW (ref 27–34)
MCHC RBC-ENTMCNC: 27.1 PG — SIGNIFICANT CHANGE UP (ref 27–34)
MCHC RBC-ENTMCNC: 27.2 PG — SIGNIFICANT CHANGE UP (ref 27–34)
MCHC RBC-ENTMCNC: 27.2 PG — SIGNIFICANT CHANGE UP (ref 27–34)
MCHC RBC-ENTMCNC: 27.4 PG — SIGNIFICANT CHANGE UP (ref 27–34)
MCHC RBC-ENTMCNC: 27.4 PG — SIGNIFICANT CHANGE UP (ref 27–34)
MCHC RBC-ENTMCNC: 27.5 PG — SIGNIFICANT CHANGE UP (ref 27–34)
MCHC RBC-ENTMCNC: 27.6 PG — SIGNIFICANT CHANGE UP (ref 27–34)
MCHC RBC-ENTMCNC: 27.7 PG — SIGNIFICANT CHANGE UP (ref 27–34)
MCHC RBC-ENTMCNC: 27.7 PG — SIGNIFICANT CHANGE UP (ref 27–34)
MCHC RBC-ENTMCNC: 27.8 PG — SIGNIFICANT CHANGE UP (ref 27–34)
MCHC RBC-ENTMCNC: 27.9 PG — SIGNIFICANT CHANGE UP (ref 27–34)
MCHC RBC-ENTMCNC: 28.1 PG — SIGNIFICANT CHANGE UP (ref 27–34)
MCHC RBC-ENTMCNC: 28.1 PG — SIGNIFICANT CHANGE UP (ref 27–34)
MCHC RBC-ENTMCNC: 28.2 PG — SIGNIFICANT CHANGE UP (ref 27–34)
MCHC RBC-ENTMCNC: 28.3 PG — SIGNIFICANT CHANGE UP (ref 27–34)
MCHC RBC-ENTMCNC: 28.4 PG — SIGNIFICANT CHANGE UP (ref 27–34)
MCHC RBC-ENTMCNC: 28.5 PG — SIGNIFICANT CHANGE UP (ref 27–34)
MCHC RBC-ENTMCNC: 28.6 PG — SIGNIFICANT CHANGE UP (ref 27–34)
MCHC RBC-ENTMCNC: 28.6 PG — SIGNIFICANT CHANGE UP (ref 27–34)
MCHC RBC-ENTMCNC: 28.7 PG — SIGNIFICANT CHANGE UP (ref 27–34)
MCHC RBC-ENTMCNC: 28.8 PG — SIGNIFICANT CHANGE UP (ref 27–34)
MCHC RBC-ENTMCNC: 28.9 PG — SIGNIFICANT CHANGE UP (ref 27–34)
MCHC RBC-ENTMCNC: 28.9 PG — SIGNIFICANT CHANGE UP (ref 27–34)
MCHC RBC-ENTMCNC: 29.1 PG — SIGNIFICANT CHANGE UP (ref 27–34)
MCHC RBC-ENTMCNC: 29.1 PG — SIGNIFICANT CHANGE UP (ref 27–34)
MCHC RBC-ENTMCNC: 29.3 PG — SIGNIFICANT CHANGE UP (ref 27–34)
MCHC RBC-ENTMCNC: 29.6 % — LOW (ref 32–36)
MCHC RBC-ENTMCNC: 29.6 % — LOW (ref 32–36)
MCHC RBC-ENTMCNC: 29.7 % — LOW (ref 32–36)
MCHC RBC-ENTMCNC: 29.7 % — LOW (ref 32–36)
MCHC RBC-ENTMCNC: 29.8 % — LOW (ref 32–36)
MCHC RBC-ENTMCNC: 29.8 % — LOW (ref 32–36)
MCHC RBC-ENTMCNC: 29.8 GM/DL — LOW (ref 32–36)
MCHC RBC-ENTMCNC: 29.8 PG — SIGNIFICANT CHANGE UP (ref 27–34)
MCHC RBC-ENTMCNC: 29.9 % — LOW (ref 32–36)
MCHC RBC-ENTMCNC: 30 GM/DL — LOW (ref 32–36)
MCHC RBC-ENTMCNC: 30.1 % — LOW (ref 32–36)
MCHC RBC-ENTMCNC: 30.3 % — LOW (ref 32–36)
MCHC RBC-ENTMCNC: 30.4 GM/DL — LOW (ref 32–36)
MCHC RBC-ENTMCNC: 30.4 GM/DL — LOW (ref 32–36)
MCHC RBC-ENTMCNC: 30.5 GM/DL — LOW (ref 32–36)
MCHC RBC-ENTMCNC: 30.6 % — LOW (ref 32–36)
MCHC RBC-ENTMCNC: 31 GM/DL — LOW (ref 32–36)
MCHC RBC-ENTMCNC: 31.1 % — LOW (ref 32–36)
MCHC RBC-ENTMCNC: 31.2 % — LOW (ref 32–36)
MCHC RBC-ENTMCNC: 31.2 GM/DL — LOW (ref 32–36)
MCHC RBC-ENTMCNC: 31.5 GM/DL — LOW (ref 32–36)
MCHC RBC-ENTMCNC: 31.6 GM/DL — LOW (ref 32–36)
MCHC RBC-ENTMCNC: 31.7 GM/DL — LOW (ref 32–36)
MCHC RBC-ENTMCNC: 31.9 GM/DL — LOW (ref 32–36)
MCHC RBC-ENTMCNC: 31.9 GM/DL — LOW (ref 32–36)
MCHC RBC-ENTMCNC: 32 GM/DL — SIGNIFICANT CHANGE UP (ref 32–36)
MCHC RBC-ENTMCNC: 32 GM/DL — SIGNIFICANT CHANGE UP (ref 32–36)
MCHC RBC-ENTMCNC: 32.2 GM/DL — SIGNIFICANT CHANGE UP (ref 32–36)
MCHC RBC-ENTMCNC: 32.2 GM/DL — SIGNIFICANT CHANGE UP (ref 32–36)
MCHC RBC-ENTMCNC: 32.3 GM/DL — SIGNIFICANT CHANGE UP (ref 32–36)
MCHC RBC-ENTMCNC: 32.4 GM/DL — SIGNIFICANT CHANGE UP (ref 32–36)
MCHC RBC-ENTMCNC: 32.5 G/DL — SIGNIFICANT CHANGE UP (ref 32–36)
MCHC RBC-ENTMCNC: 32.6 GM/DL — SIGNIFICANT CHANGE UP (ref 32–36)
MCHC RBC-ENTMCNC: 32.7 GM/DL — SIGNIFICANT CHANGE UP (ref 32–36)
MCHC RBC-ENTMCNC: 32.8 GM/DL — SIGNIFICANT CHANGE UP (ref 32–36)
MCHC RBC-ENTMCNC: 32.9 GM/DL — SIGNIFICANT CHANGE UP (ref 32–36)
MCHC RBC-ENTMCNC: 33.1 GM/DL — SIGNIFICANT CHANGE UP (ref 32–36)
MCHC RBC-ENTMCNC: 33.7 GM/DL — SIGNIFICANT CHANGE UP (ref 32–36)
MCV RBC AUTO: 84 FL — SIGNIFICANT CHANGE UP (ref 80–100)
MCV RBC AUTO: 84.1 FL — SIGNIFICANT CHANGE UP (ref 80–100)
MCV RBC AUTO: 84.6 FL — SIGNIFICANT CHANGE UP (ref 80–100)
MCV RBC AUTO: 85.6 FL — SIGNIFICANT CHANGE UP (ref 80–100)
MCV RBC AUTO: 85.9 FL — SIGNIFICANT CHANGE UP (ref 80–100)
MCV RBC AUTO: 86.3 FL — SIGNIFICANT CHANGE UP (ref 80–100)
MCV RBC AUTO: 86.7 FL — SIGNIFICANT CHANGE UP (ref 80–100)
MCV RBC AUTO: 86.9 FL — SIGNIFICANT CHANGE UP (ref 80–100)
MCV RBC AUTO: 87.2 FL — SIGNIFICANT CHANGE UP (ref 80–100)
MCV RBC AUTO: 87.8 FL — SIGNIFICANT CHANGE UP (ref 80–100)
MCV RBC AUTO: 87.8 FL — SIGNIFICANT CHANGE UP (ref 80–100)
MCV RBC AUTO: 87.9 FL — SIGNIFICANT CHANGE UP (ref 80–100)
MCV RBC AUTO: 87.9 FL — SIGNIFICANT CHANGE UP (ref 80–100)
MCV RBC AUTO: 88.2 FL — SIGNIFICANT CHANGE UP (ref 80–100)
MCV RBC AUTO: 88.3 FL — SIGNIFICANT CHANGE UP (ref 80–100)
MCV RBC AUTO: 88.6 FL — SIGNIFICANT CHANGE UP (ref 80–100)
MCV RBC AUTO: 88.6 FL — SIGNIFICANT CHANGE UP (ref 80–100)
MCV RBC AUTO: 88.8 FL — SIGNIFICANT CHANGE UP (ref 80–100)
MCV RBC AUTO: 88.8 FL — SIGNIFICANT CHANGE UP (ref 80–100)
MCV RBC AUTO: 88.9 FL — SIGNIFICANT CHANGE UP (ref 80–100)
MCV RBC AUTO: 89.3 FL — SIGNIFICANT CHANGE UP (ref 80–100)
MCV RBC AUTO: 89.7 FL — SIGNIFICANT CHANGE UP (ref 80–100)
MCV RBC AUTO: 89.9 FL — SIGNIFICANT CHANGE UP (ref 80–100)
MCV RBC AUTO: 90 FL — SIGNIFICANT CHANGE UP (ref 80–100)
MCV RBC AUTO: 90.1 FL — SIGNIFICANT CHANGE UP (ref 80–100)
MCV RBC AUTO: 90.6 FL — SIGNIFICANT CHANGE UP (ref 80–100)
MCV RBC AUTO: 90.7 FL — SIGNIFICANT CHANGE UP (ref 80–100)
MCV RBC AUTO: 91 FL — SIGNIFICANT CHANGE UP (ref 80–100)
MCV RBC AUTO: 91.1 FL — SIGNIFICANT CHANGE UP (ref 80–100)
MCV RBC AUTO: 91.2 FL — SIGNIFICANT CHANGE UP (ref 80–100)
MCV RBC AUTO: 91.3 FL — SIGNIFICANT CHANGE UP (ref 80–100)
MCV RBC AUTO: 91.3 FL — SIGNIFICANT CHANGE UP (ref 80–100)
MCV RBC AUTO: 91.4 FL — SIGNIFICANT CHANGE UP (ref 80–100)
MCV RBC AUTO: 91.5 FL — SIGNIFICANT CHANGE UP (ref 80–100)
MCV RBC AUTO: 91.7 FL — SIGNIFICANT CHANGE UP (ref 80–100)
MCV RBC AUTO: 92.3 FL — SIGNIFICANT CHANGE UP (ref 80–100)
MCV RBC AUTO: 92.5 FL — SIGNIFICANT CHANGE UP (ref 80–100)
MCV RBC AUTO: 92.5 FL — SIGNIFICANT CHANGE UP (ref 80–100)
MCV RBC AUTO: 92.7 FL — SIGNIFICANT CHANGE UP (ref 80–100)
MCV RBC AUTO: 92.8 FL — SIGNIFICANT CHANGE UP (ref 80–100)
MCV RBC AUTO: 93.4 FL — SIGNIFICANT CHANGE UP (ref 80–100)
MCV RBC AUTO: 94.6 FL — SIGNIFICANT CHANGE UP (ref 80–100)
METAMYELOCYTES # FLD: 0.9 % — SIGNIFICANT CHANGE UP (ref 0–1)
METAMYELOCYTES # FLD: 1 % — HIGH (ref 0–0)
METAMYELOCYTES # FLD: 1 % — HIGH (ref 0–0)
METAMYELOCYTES # FLD: 1.7 % — HIGH (ref 0–0)
METAMYELOCYTES # FLD: 1.8 % — HIGH (ref 0–1)
METAMYELOCYTES # FLD: 2.6 % — HIGH (ref 0–1)
METAMYELOCYTES # FLD: 4 % — HIGH (ref 0–0)
METAMYELOCYTES # FLD: 4.4 % — HIGH (ref 0–0)
METHOD TYPE: SIGNIFICANT CHANGE UP
METHOD TYPE: SIGNIFICANT CHANGE UP
MICROCYTES BLD QL: SLIGHT — SIGNIFICANT CHANGE UP
MONOCYTES # BLD AUTO: 0.06 K/UL — SIGNIFICANT CHANGE UP (ref 0–0.9)
MONOCYTES # BLD AUTO: 0.1 K/UL — SIGNIFICANT CHANGE UP (ref 0–0.9)
MONOCYTES # BLD AUTO: 0.12 K/UL — SIGNIFICANT CHANGE UP (ref 0–0.9)
MONOCYTES # BLD AUTO: 0.12 K/UL — SIGNIFICANT CHANGE UP (ref 0–0.9)
MONOCYTES # BLD AUTO: 0.16 K/UL — SIGNIFICANT CHANGE UP (ref 0–0.9)
MONOCYTES # BLD AUTO: 0.17 K/UL — SIGNIFICANT CHANGE UP (ref 0–0.9)
MONOCYTES # BLD AUTO: 0.24 K/UL — SIGNIFICANT CHANGE UP (ref 0–0.9)
MONOCYTES # BLD AUTO: 0.3 K/UL — SIGNIFICANT CHANGE UP (ref 0–0.9)
MONOCYTES # BLD AUTO: 0.48 K/UL — SIGNIFICANT CHANGE UP (ref 0–0.9)
MONOCYTES # BLD AUTO: 0.48 K/UL — SIGNIFICANT CHANGE UP (ref 0–0.9)
MONOCYTES # BLD AUTO: 0.59 K/UL — SIGNIFICANT CHANGE UP (ref 0–0.9)
MONOCYTES # BLD AUTO: 0.6 K/UL — SIGNIFICANT CHANGE UP (ref 0–0.9)
MONOCYTES # BLD AUTO: 0.6 K/UL — SIGNIFICANT CHANGE UP (ref 0–0.9)
MONOCYTES # BLD AUTO: 0.61 K/UL — SIGNIFICANT CHANGE UP (ref 0–0.9)
MONOCYTES # BLD AUTO: 0.64 K/UL — SIGNIFICANT CHANGE UP (ref 0–0.9)
MONOCYTES # BLD AUTO: 0.65 K/UL — SIGNIFICANT CHANGE UP (ref 0–0.9)
MONOCYTES # BLD AUTO: 0.72 K/UL — SIGNIFICANT CHANGE UP (ref 0–0.9)
MONOCYTES # BLD AUTO: 0.76 K/UL — SIGNIFICANT CHANGE UP (ref 0–0.9)
MONOCYTES # BLD AUTO: 0.79 K/UL — SIGNIFICANT CHANGE UP (ref 0–0.9)
MONOCYTES # BLD AUTO: 0.79 K/UL — SIGNIFICANT CHANGE UP (ref 0–0.9)
MONOCYTES # BLD AUTO: 0.82 K/UL — SIGNIFICANT CHANGE UP (ref 0–0.9)
MONOCYTES # BLD AUTO: 0.83 K/UL — SIGNIFICANT CHANGE UP (ref 0–0.9)
MONOCYTES # BLD AUTO: 0.84 K/UL — SIGNIFICANT CHANGE UP (ref 0–0.9)
MONOCYTES # BLD AUTO: 0.84 K/UL — SIGNIFICANT CHANGE UP (ref 0–0.9)
MONOCYTES # BLD AUTO: 0.85 K/UL — SIGNIFICANT CHANGE UP (ref 0–0.9)
MONOCYTES # BLD AUTO: 0.86 K/UL — SIGNIFICANT CHANGE UP (ref 0–0.9)
MONOCYTES # BLD AUTO: 0.88 K/UL — SIGNIFICANT CHANGE UP (ref 0–0.9)
MONOCYTES # BLD AUTO: 0.93 K/UL — HIGH (ref 0–0.9)
MONOCYTES # BLD AUTO: 0.95 K/UL — HIGH (ref 0–0.9)
MONOCYTES NFR BLD AUTO: 0.9 % — LOW (ref 2–14)
MONOCYTES NFR BLD AUTO: 10.5 % — SIGNIFICANT CHANGE UP (ref 2–14)
MONOCYTES NFR BLD AUTO: 11.9 % — SIGNIFICANT CHANGE UP (ref 2–14)
MONOCYTES NFR BLD AUTO: 12.3 % — SIGNIFICANT CHANGE UP (ref 2–14)
MONOCYTES NFR BLD AUTO: 3.3 % — SIGNIFICANT CHANGE UP (ref 2–14)
MONOCYTES NFR BLD AUTO: 3.6 % — SIGNIFICANT CHANGE UP (ref 2–14)
MONOCYTES NFR BLD AUTO: 3.8 % — SIGNIFICANT CHANGE UP (ref 2–14)
MONOCYTES NFR BLD AUTO: 4 % — SIGNIFICANT CHANGE UP (ref 2–14)
MONOCYTES NFR BLD AUTO: 4.4 % — SIGNIFICANT CHANGE UP (ref 2–14)
MONOCYTES NFR BLD AUTO: 4.6 % — SIGNIFICANT CHANGE UP (ref 2–14)
MONOCYTES NFR BLD AUTO: 5.2 % — SIGNIFICANT CHANGE UP (ref 2–14)
MONOCYTES NFR BLD AUTO: 5.5 % — SIGNIFICANT CHANGE UP (ref 2–14)
MONOCYTES NFR BLD AUTO: 5.8 % — SIGNIFICANT CHANGE UP (ref 2–14)
MONOCYTES NFR BLD AUTO: 5.9 % — SIGNIFICANT CHANGE UP (ref 2–14)
MONOCYTES NFR BLD AUTO: 6.1 % — SIGNIFICANT CHANGE UP (ref 2–14)
MONOCYTES NFR BLD AUTO: 6.3 % — SIGNIFICANT CHANGE UP (ref 2–14)
MONOCYTES NFR BLD AUTO: 6.3 % — SIGNIFICANT CHANGE UP (ref 2–14)
MONOCYTES NFR BLD AUTO: 6.4 % — SIGNIFICANT CHANGE UP (ref 2–14)
MONOCYTES NFR BLD AUTO: 6.7 % — SIGNIFICANT CHANGE UP (ref 2–14)
MONOCYTES NFR BLD AUTO: 7 % — SIGNIFICANT CHANGE UP (ref 2–14)
MONOCYTES NFR BLD AUTO: 7.1 % — SIGNIFICANT CHANGE UP (ref 2–14)
MONOCYTES NFR BLD AUTO: 7.7 % — SIGNIFICANT CHANGE UP (ref 2–14)
MONOCYTES NFR BLD AUTO: 8.5 % — SIGNIFICANT CHANGE UP (ref 2–14)
MONOCYTES NFR BLD AUTO: 8.7 % — SIGNIFICANT CHANGE UP (ref 2–14)
MONOCYTES NFR BLD: 4.4 % — SIGNIFICANT CHANGE UP (ref 2–9)
MONOCYTES NFR BLD: 7 % — SIGNIFICANT CHANGE UP (ref 2–9)
MONOCYTES NFR BLD: 7.2 % — SIGNIFICANT CHANGE UP (ref 2–9)
MONOS+MACROS # FLD: 14 % — SIGNIFICANT CHANGE UP
MYELOCYTES NFR BLD: 0 % — SIGNIFICANT CHANGE UP (ref 0–0)
MYELOCYTES NFR BLD: 0.9 % — HIGH (ref 0–0)
MYELOCYTES NFR BLD: 1 % — HIGH (ref 0–0)
MYELOCYTES NFR BLD: 1.7 % — HIGH (ref 0–0)
MYELOCYTES NFR BLD: 1.8 % — HIGH (ref 0–0)
MYELOCYTES NFR BLD: 3 % — HIGH (ref 0–0)
MYELOCYTES NFR BLD: 5.3 % — HIGH (ref 0–0)
NEUTROPHIL AB SER-ACNC: 83.8 % — HIGH (ref 43–77)
NEUTROPHIL AB SER-ACNC: 84.3 % — HIGH (ref 43–77)
NEUTROPHIL AB SER-ACNC: 86.8 % — HIGH (ref 43–77)
NEUTROPHILS # BLD AUTO: 1.14 K/UL — LOW (ref 1.8–7.4)
NEUTROPHILS # BLD AUTO: 1.3 K/UL — LOW (ref 1.8–7.4)
NEUTROPHILS # BLD AUTO: 1.34 K/UL — LOW (ref 1.8–7.4)
NEUTROPHILS # BLD AUTO: 1.46 K/UL — LOW (ref 1.8–7.4)
NEUTROPHILS # BLD AUTO: 10.12 K/UL — HIGH (ref 1.8–7.4)
NEUTROPHILS # BLD AUTO: 10.29 K/UL — HIGH (ref 1.8–7.4)
NEUTROPHILS # BLD AUTO: 10.46 K/UL — HIGH (ref 1.8–7.4)
NEUTROPHILS # BLD AUTO: 11.05 K/UL — HIGH (ref 1.8–7.4)
NEUTROPHILS # BLD AUTO: 11.14 K/UL — HIGH (ref 1.8–7.4)
NEUTROPHILS # BLD AUTO: 11.71 K/UL — HIGH (ref 1.8–7.4)
NEUTROPHILS # BLD AUTO: 11.88 K/UL — HIGH (ref 1.8–7.4)
NEUTROPHILS # BLD AUTO: 12.4 K/UL — HIGH (ref 1.8–7.4)
NEUTROPHILS # BLD AUTO: 14.3 K/UL — HIGH (ref 1.8–7.4)
NEUTROPHILS # BLD AUTO: 2.74 K/UL — SIGNIFICANT CHANGE UP (ref 1.8–7.4)
NEUTROPHILS # BLD AUTO: 3.24 K/UL — SIGNIFICANT CHANGE UP (ref 1.8–7.4)
NEUTROPHILS # BLD AUTO: 4.6 K/UL — SIGNIFICANT CHANGE UP (ref 1.8–7.4)
NEUTROPHILS # BLD AUTO: 4.72 K/UL — SIGNIFICANT CHANGE UP (ref 1.8–7.4)
NEUTROPHILS # BLD AUTO: 5.09 K/UL — SIGNIFICANT CHANGE UP (ref 1.8–7.4)
NEUTROPHILS # BLD AUTO: 5.26 K/UL — SIGNIFICANT CHANGE UP (ref 1.8–7.4)
NEUTROPHILS # BLD AUTO: 5.61 K/UL — SIGNIFICANT CHANGE UP (ref 1.8–7.4)
NEUTROPHILS # BLD AUTO: 5.85 K/UL — SIGNIFICANT CHANGE UP (ref 1.8–7.4)
NEUTROPHILS # BLD AUTO: 6.89 K/UL — SIGNIFICANT CHANGE UP (ref 1.8–7.4)
NEUTROPHILS # BLD AUTO: 7.64 K/UL — HIGH (ref 1.8–7.4)
NEUTROPHILS # BLD AUTO: 7.76 K/UL — HIGH (ref 1.8–7.4)
NEUTROPHILS # BLD AUTO: 8.01 K/UL — HIGH (ref 1.8–7.4)
NEUTROPHILS # BLD AUTO: 8.01 K/UL — HIGH (ref 1.8–7.4)
NEUTROPHILS # BLD AUTO: 8.03 K/UL — HIGH (ref 1.8–7.4)
NEUTROPHILS # BLD AUTO: 9.24 K/UL — HIGH (ref 1.8–7.4)
NEUTROPHILS # BLD AUTO: 9.56 K/UL — HIGH (ref 1.8–7.4)
NEUTROPHILS NFR BLD AUTO: 60 % — SIGNIFICANT CHANGE UP (ref 43–77)
NEUTROPHILS NFR BLD AUTO: 60 % — SIGNIFICANT CHANGE UP (ref 43–77)
NEUTROPHILS NFR BLD AUTO: 65 % — SIGNIFICANT CHANGE UP (ref 43–77)
NEUTROPHILS NFR BLD AUTO: 69 % — SIGNIFICANT CHANGE UP (ref 43–77)
NEUTROPHILS NFR BLD AUTO: 70.3 % — SIGNIFICANT CHANGE UP (ref 43–77)
NEUTROPHILS NFR BLD AUTO: 71.5 % — SIGNIFICANT CHANGE UP (ref 43–77)
NEUTROPHILS NFR BLD AUTO: 73.4 % — SIGNIFICANT CHANGE UP (ref 43–77)
NEUTROPHILS NFR BLD AUTO: 74.4 % — SIGNIFICANT CHANGE UP (ref 43–77)
NEUTROPHILS NFR BLD AUTO: 77.7 % — HIGH (ref 43–77)
NEUTROPHILS NFR BLD AUTO: 78.1 % — HIGH (ref 43–77)
NEUTROPHILS NFR BLD AUTO: 79.1 % — HIGH (ref 43–77)
NEUTROPHILS NFR BLD AUTO: 81 % — HIGH (ref 43–77)
NEUTROPHILS NFR BLD AUTO: 81.4 % — HIGH (ref 43–77)
NEUTROPHILS NFR BLD AUTO: 82.8 % — HIGH (ref 43–77)
NEUTROPHILS NFR BLD AUTO: 82.9 % — HIGH (ref 43–77)
NEUTROPHILS NFR BLD AUTO: 83 % — HIGH (ref 43–77)
NEUTROPHILS NFR BLD AUTO: 83.5 % — HIGH (ref 43–77)
NEUTROPHILS NFR BLD AUTO: 83.6 % — HIGH (ref 43–77)
NEUTROPHILS NFR BLD AUTO: 84.3 % — HIGH (ref 43–77)
NEUTROPHILS NFR BLD AUTO: 84.7 % — HIGH (ref 43–77)
NEUTROPHILS NFR BLD AUTO: 87.3 % — HIGH (ref 43–77)
NEUTROPHILS NFR BLD AUTO: 87.4 % — HIGH (ref 43–77)
NEUTROPHILS NFR BLD AUTO: 87.5 % — HIGH (ref 43–77)
NEUTROPHILS NFR BLD AUTO: 88.6 % — HIGH (ref 43–77)
NEUTROPHILS NFR BLD AUTO: 90.8 % — HIGH (ref 43–77)
NEUTROPHILS NFR BLD AUTO: 92 % — HIGH (ref 43–77)
NEUTROPHILS NFR BLD AUTO: 92.9 % — HIGH (ref 43–77)
NEUTS BAND # BLD: 0 % — SIGNIFICANT CHANGE UP (ref 0–6)
NEUTS BAND # BLD: 0.9 % — SIGNIFICANT CHANGE UP (ref 0–6)
NEUTS BAND # BLD: 0.9 % — SIGNIFICANT CHANGE UP (ref 0–6)
NEUTS BAND # BLD: 14 % — HIGH (ref 0–8)
NEUTS BAND # BLD: 3.5 % — SIGNIFICANT CHANGE UP (ref 0–8)
NEUTS BAND # BLD: 4.3 % — SIGNIFICANT CHANGE UP (ref 0–8)
NEUTS BAND # BLD: 6 % — SIGNIFICANT CHANGE UP (ref 0–8)
NEUTS BAND # BLD: 6 % — SIGNIFICANT CHANGE UP (ref 0–8)
NEUTS BAND # BLD: 8 % — SIGNIFICANT CHANGE UP (ref 0–8)
NEUTS HYPERSEG # BLD: PRESENT — SIGNIFICANT CHANGE UP
NITRITE UR-MCNC: NEGATIVE — SIGNIFICANT CHANGE UP
NITRITE UR-MCNC: POSITIVE
NON-GYNECOLOGICAL CYTOLOGY STUDY: SIGNIFICANT CHANGE UP
NRBC # BLD: 0 /100 WBCS — SIGNIFICANT CHANGE UP (ref 0–0)
NRBC # BLD: 1 /100 WBCS — HIGH (ref 0–0)
NRBC # BLD: 1 /100 — HIGH (ref 0–0)
NRBC # BLD: 1 /100WBC — SIGNIFICANT CHANGE UP
NRBC # BLD: 2 /100 WBCS — HIGH (ref 0–0)
NRBC # BLD: 2 /100 — HIGH (ref 0–0)
NRBC # BLD: 2 /100 — HIGH (ref 0–0)
NRBC # BLD: 3 /100 WBCS — HIGH (ref 0–0)
NRBC # BLD: 4 /100 WBCS — HIGH (ref 0–0)
NRBC # BLD: 4 /100 — HIGH (ref 0–0)
NRBC # FLD: 0 K/UL — SIGNIFICANT CHANGE UP (ref 0–0)
NRBC # FLD: 0.03 K/UL — SIGNIFICANT CHANGE UP (ref 0–0)
NRBC # FLD: 0.04 K/UL — SIGNIFICANT CHANGE UP (ref 0–0)
OB PNL STL: NEGATIVE — SIGNIFICANT CHANGE UP
ORGANISM # SPEC MICROSCOPIC CNT: SIGNIFICANT CHANGE UP
OTHER - HEMATOLOGY %: 0 — SIGNIFICANT CHANGE UP
OTHER CELLS CSF MANUAL: 12 ML/DL — LOW (ref 16–22)
OVALOCYTES BLD QL SMEAR: SLIGHT — SIGNIFICANT CHANGE UP
PCO2 BLDA: 53 MMHG — HIGH (ref 35–45)
PCO2 BLDV: 55 MMHG — HIGH (ref 35–50)
PH BLDA: 7.37 — SIGNIFICANT CHANGE UP (ref 7.35–7.45)
PH BLDV: 7.38 — SIGNIFICANT CHANGE UP (ref 7.35–7.45)
PH FLD: 7.29 — SIGNIFICANT CHANGE UP
PH UR: 6.5 — SIGNIFICANT CHANGE UP (ref 5–8)
PH UR: 6.5 — SIGNIFICANT CHANGE UP (ref 5–8)
PH UR: 7 — SIGNIFICANT CHANGE UP (ref 5–8)
PH UR: 8 — SIGNIFICANT CHANGE UP (ref 5–8)
PHOSPHATE SERPL-MCNC: 3.1 MG/DL — SIGNIFICANT CHANGE UP (ref 2.5–4.5)
PHOSPHATE SERPL-MCNC: 3.3 MG/DL — SIGNIFICANT CHANGE UP (ref 2.5–4.5)
PHOSPHATE SERPL-MCNC: 3.4 MG/DL — SIGNIFICANT CHANGE UP (ref 2.5–4.5)
PHOSPHATE SERPL-MCNC: 3.4 MG/DL — SIGNIFICANT CHANGE UP (ref 2.5–4.5)
PHOSPHATE SERPL-MCNC: 3.5 MG/DL — SIGNIFICANT CHANGE UP (ref 2.5–4.5)
PHOSPHATE SERPL-MCNC: 3.5 MG/DL — SIGNIFICANT CHANGE UP (ref 2.5–4.5)
PHOSPHATE SERPL-MCNC: 3.6 MG/DL — SIGNIFICANT CHANGE UP (ref 2.5–4.5)
PHOSPHATE SERPL-MCNC: 3.8 MG/DL — SIGNIFICANT CHANGE UP (ref 2.5–4.5)
PHOSPHATE SERPL-MCNC: 3.8 MG/DL — SIGNIFICANT CHANGE UP (ref 2.5–4.5)
PHOSPHATE SERPL-MCNC: 3.9 MG/DL — SIGNIFICANT CHANGE UP (ref 2.5–4.5)
PHOSPHATE SERPL-MCNC: 4 MG/DL — SIGNIFICANT CHANGE UP (ref 2.5–4.5)
PHOSPHATE SERPL-MCNC: 4.1 MG/DL — SIGNIFICANT CHANGE UP (ref 2.5–4.5)
PHOSPHATE SERPL-MCNC: 4.1 MG/DL — SIGNIFICANT CHANGE UP (ref 2.5–4.5)
PHOSPHATE SERPL-MCNC: 4.2 MG/DL — SIGNIFICANT CHANGE UP (ref 2.4–4.7)
PHOSPHATE SERPL-MCNC: 4.3 MG/DL — SIGNIFICANT CHANGE UP (ref 2.5–4.5)
PHOSPHATE SERPL-MCNC: 4.3 MG/DL — SIGNIFICANT CHANGE UP (ref 2.5–4.5)
PHOSPHATE SERPL-MCNC: 4.4 MG/DL — SIGNIFICANT CHANGE UP (ref 2.5–4.5)
PHOSPHATE SERPL-MCNC: 4.6 MG/DL — HIGH (ref 2.5–4.5)
PHOSPHATE SERPL-MCNC: 4.6 MG/DL — HIGH (ref 2.5–4.5)
PHOSPHATE SERPL-MCNC: 4.7 MG/DL — HIGH (ref 2.5–4.5)
PHOSPHATE SERPL-MCNC: 4.7 MG/DL — HIGH (ref 2.5–4.5)
PHOSPHATE SERPL-MCNC: 4.8 MG/DL — HIGH (ref 2.5–4.5)
PHOSPHATE SERPL-MCNC: 5 MG/DL — HIGH (ref 2.5–4.5)
PHOSPHATE SERPL-MCNC: 5.5 MG/DL — HIGH (ref 2.5–4.5)
PLAT MORPH BLD: NORMAL — SIGNIFICANT CHANGE UP
PLATELET # BLD AUTO: 105 K/UL — LOW (ref 150–400)
PLATELET # BLD AUTO: 119 K/UL — LOW (ref 150–400)
PLATELET # BLD AUTO: 121 K/UL — LOW (ref 150–400)
PLATELET # BLD AUTO: 127 K/UL — LOW (ref 150–400)
PLATELET # BLD AUTO: 129 K/UL — LOW (ref 150–400)
PLATELET # BLD AUTO: 136 K/UL — LOW (ref 150–400)
PLATELET # BLD AUTO: 185 K/UL — SIGNIFICANT CHANGE UP (ref 150–400)
PLATELET # BLD AUTO: 219 K/UL — SIGNIFICANT CHANGE UP (ref 150–400)
PLATELET # BLD AUTO: 235 K/UL — SIGNIFICANT CHANGE UP (ref 150–400)
PLATELET # BLD AUTO: 245 K/UL — SIGNIFICANT CHANGE UP (ref 150–400)
PLATELET # BLD AUTO: 252 K/UL — SIGNIFICANT CHANGE UP (ref 150–400)
PLATELET # BLD AUTO: 252 K/UL — SIGNIFICANT CHANGE UP (ref 150–400)
PLATELET # BLD AUTO: 254 K/UL — SIGNIFICANT CHANGE UP (ref 150–400)
PLATELET # BLD AUTO: 258 K/UL — SIGNIFICANT CHANGE UP (ref 150–400)
PLATELET # BLD AUTO: 261 K/UL — SIGNIFICANT CHANGE UP (ref 150–400)
PLATELET # BLD AUTO: 262 K/UL — SIGNIFICANT CHANGE UP (ref 150–400)
PLATELET # BLD AUTO: 265 K/UL — SIGNIFICANT CHANGE UP (ref 150–400)
PLATELET # BLD AUTO: 266 K/UL — SIGNIFICANT CHANGE UP (ref 150–400)
PLATELET # BLD AUTO: 268 K/UL — SIGNIFICANT CHANGE UP (ref 150–400)
PLATELET # BLD AUTO: 271 K/UL — SIGNIFICANT CHANGE UP (ref 150–400)
PLATELET # BLD AUTO: 274 K/UL — SIGNIFICANT CHANGE UP (ref 150–400)
PLATELET # BLD AUTO: 276 K/UL — SIGNIFICANT CHANGE UP (ref 150–400)
PLATELET # BLD AUTO: 277 K/UL — SIGNIFICANT CHANGE UP (ref 150–400)
PLATELET # BLD AUTO: 286 K/UL — SIGNIFICANT CHANGE UP (ref 150–400)
PLATELET # BLD AUTO: 289 K/UL — SIGNIFICANT CHANGE UP (ref 150–400)
PLATELET # BLD AUTO: 289 K/UL — SIGNIFICANT CHANGE UP (ref 150–400)
PLATELET # BLD AUTO: 294 K/UL — SIGNIFICANT CHANGE UP (ref 150–400)
PLATELET # BLD AUTO: 306 K/UL — SIGNIFICANT CHANGE UP (ref 150–400)
PLATELET # BLD AUTO: 311 K/UL — SIGNIFICANT CHANGE UP (ref 150–400)
PLATELET # BLD AUTO: 321 K/UL — SIGNIFICANT CHANGE UP (ref 150–400)
PLATELET # BLD AUTO: 324 K/UL — SIGNIFICANT CHANGE UP (ref 150–400)
PLATELET # BLD AUTO: 335 K/UL — SIGNIFICANT CHANGE UP (ref 150–400)
PLATELET # BLD AUTO: 352 K/UL — SIGNIFICANT CHANGE UP (ref 150–400)
PLATELET # BLD AUTO: 355 K/UL — SIGNIFICANT CHANGE UP (ref 150–400)
PLATELET # BLD AUTO: 366 K/UL — SIGNIFICANT CHANGE UP (ref 150–400)
PLATELET # BLD AUTO: 443 K/UL — HIGH (ref 150–400)
PLATELET # BLD AUTO: 48 K/UL — LOW (ref 150–400)
PLATELET # BLD AUTO: 49 K/UL — LOW (ref 150–400)
PLATELET # BLD AUTO: 53 K/UL — LOW (ref 150–400)
PLATELET # BLD AUTO: 55 K/UL — LOW (ref 150–400)
PLATELET # BLD AUTO: 56 K/UL — LOW (ref 150–400)
PLATELET # BLD AUTO: 56 K/UL — LOW (ref 150–400)
PLATELET # BLD AUTO: 62 K/UL — LOW (ref 150–400)
PLATELET # BLD AUTO: 62 K/UL — LOW (ref 150–400)
PLATELET # BLD AUTO: 73 K/UL — LOW (ref 150–400)
PLATELET # BLD AUTO: 80 K/UL — LOW (ref 150–400)
PLATELET # BLD AUTO: 83 K/UL — LOW (ref 150–400)
PLATELET # BLD AUTO: 86 K/UL — LOW (ref 150–400)
PLATELET # BLD AUTO: 99 K/UL — LOW (ref 150–400)
PLATELET COUNT - ESTIMATE: ABNORMAL
PLATELET COUNT - ESTIMATE: NORMAL — SIGNIFICANT CHANGE UP
PMV BLD: 10.4 FL — SIGNIFICANT CHANGE UP (ref 7–13)
PMV BLD: 8.8 FL — SIGNIFICANT CHANGE UP (ref 7–13)
PMV BLD: 8.9 FL — SIGNIFICANT CHANGE UP (ref 7–13)
PMV BLD: 8.9 FL — SIGNIFICANT CHANGE UP (ref 7–13)
PMV BLD: 9 FL — SIGNIFICANT CHANGE UP (ref 7–13)
PMV BLD: 9 FL — SIGNIFICANT CHANGE UP (ref 7–13)
PMV BLD: 9.1 FL — SIGNIFICANT CHANGE UP (ref 7–13)
PMV BLD: 9.1 FL — SIGNIFICANT CHANGE UP (ref 7–13)
PMV BLD: 9.2 FL — SIGNIFICANT CHANGE UP (ref 7–13)
PMV BLD: 9.5 FL — SIGNIFICANT CHANGE UP (ref 7–13)
PMV BLD: 9.6 FL — SIGNIFICANT CHANGE UP (ref 7–13)
PMV BLD: 9.6 FL — SIGNIFICANT CHANGE UP (ref 7–13)
PO2 BLDA: 81 MMHG — LOW (ref 83–108)
PO2 BLDV: 139 MMHG — HIGH (ref 25–45)
POIKILOCYTOSIS BLD QL AUTO: SLIGHT — SIGNIFICANT CHANGE UP
POLYCHROMASIA BLD QL SMEAR: SIGNIFICANT CHANGE UP
POLYCHROMASIA BLD QL SMEAR: SIGNIFICANT CHANGE UP
POLYCHROMASIA BLD QL SMEAR: SLIGHT — SIGNIFICANT CHANGE UP
POTASSIUM BLDV-SCNC: 3.4 MMOL/L — LOW (ref 3.5–5.3)
POTASSIUM SERPL-MCNC: 3.3 MMOL/L — LOW (ref 3.5–5.3)
POTASSIUM SERPL-MCNC: 3.4 MMOL/L — LOW (ref 3.5–5.3)
POTASSIUM SERPL-MCNC: 3.4 MMOL/L — LOW (ref 3.5–5.3)
POTASSIUM SERPL-MCNC: 3.5 MMOL/L — SIGNIFICANT CHANGE UP (ref 3.5–5.3)
POTASSIUM SERPL-MCNC: 3.6 MMOL/L — SIGNIFICANT CHANGE UP (ref 3.5–5.3)
POTASSIUM SERPL-MCNC: 3.7 MMOL/L — SIGNIFICANT CHANGE UP (ref 3.5–5.3)
POTASSIUM SERPL-MCNC: 3.7 MMOL/L — SIGNIFICANT CHANGE UP (ref 3.5–5.3)
POTASSIUM SERPL-MCNC: 3.8 MMOL/L — SIGNIFICANT CHANGE UP (ref 3.5–5.3)
POTASSIUM SERPL-MCNC: 3.9 MMOL/L — SIGNIFICANT CHANGE UP (ref 3.5–5.3)
POTASSIUM SERPL-MCNC: 4 MMOL/L — SIGNIFICANT CHANGE UP (ref 3.5–5.3)
POTASSIUM SERPL-MCNC: 4.1 MMOL/L — SIGNIFICANT CHANGE UP (ref 3.5–5.3)
POTASSIUM SERPL-MCNC: 4.2 MMOL/L — SIGNIFICANT CHANGE UP (ref 3.5–5.3)
POTASSIUM SERPL-MCNC: 4.3 MMOL/L — SIGNIFICANT CHANGE UP (ref 3.5–5.3)
POTASSIUM SERPL-MCNC: 4.3 MMOL/L — SIGNIFICANT CHANGE UP (ref 3.5–5.3)
POTASSIUM SERPL-MCNC: 4.4 MMOL/L — SIGNIFICANT CHANGE UP (ref 3.5–5.3)
POTASSIUM SERPL-MCNC: 4.5 MMOL/L — SIGNIFICANT CHANGE UP (ref 3.5–5.3)
POTASSIUM SERPL-MCNC: 4.6 MMOL/L — SIGNIFICANT CHANGE UP (ref 3.5–5.3)
POTASSIUM SERPL-MCNC: 4.7 MMOL/L — SIGNIFICANT CHANGE UP (ref 3.5–5.3)
POTASSIUM SERPL-MCNC: 4.8 MMOL/L — SIGNIFICANT CHANGE UP (ref 3.5–5.3)
POTASSIUM SERPL-MCNC: 5 MMOL/L — SIGNIFICANT CHANGE UP (ref 3.5–5.3)
POTASSIUM SERPL-MCNC: 5 MMOL/L — SIGNIFICANT CHANGE UP (ref 3.5–5.3)
POTASSIUM SERPL-MCNC: 5.1 MMOL/L — SIGNIFICANT CHANGE UP (ref 3.5–5.3)
POTASSIUM SERPL-MCNC: 5.3 MMOL/L — SIGNIFICANT CHANGE UP (ref 3.5–5.3)
POTASSIUM SERPL-SCNC: 3.3 MMOL/L — LOW (ref 3.5–5.3)
POTASSIUM SERPL-SCNC: 3.4 MMOL/L — LOW (ref 3.5–5.3)
POTASSIUM SERPL-SCNC: 3.4 MMOL/L — LOW (ref 3.5–5.3)
POTASSIUM SERPL-SCNC: 3.5 MMOL/L — SIGNIFICANT CHANGE UP (ref 3.5–5.3)
POTASSIUM SERPL-SCNC: 3.6 MMOL/L — SIGNIFICANT CHANGE UP (ref 3.5–5.3)
POTASSIUM SERPL-SCNC: 3.7 MMOL/L — SIGNIFICANT CHANGE UP (ref 3.5–5.3)
POTASSIUM SERPL-SCNC: 3.7 MMOL/L — SIGNIFICANT CHANGE UP (ref 3.5–5.3)
POTASSIUM SERPL-SCNC: 3.8 MMOL/L — SIGNIFICANT CHANGE UP (ref 3.5–5.3)
POTASSIUM SERPL-SCNC: 3.9 MMOL/L — SIGNIFICANT CHANGE UP (ref 3.5–5.3)
POTASSIUM SERPL-SCNC: 4 MMOL/L — SIGNIFICANT CHANGE UP (ref 3.5–5.3)
POTASSIUM SERPL-SCNC: 4.1 MMOL/L — SIGNIFICANT CHANGE UP (ref 3.5–5.3)
POTASSIUM SERPL-SCNC: 4.2 MMOL/L — SIGNIFICANT CHANGE UP (ref 3.5–5.3)
POTASSIUM SERPL-SCNC: 4.3 MMOL/L — SIGNIFICANT CHANGE UP (ref 3.5–5.3)
POTASSIUM SERPL-SCNC: 4.3 MMOL/L — SIGNIFICANT CHANGE UP (ref 3.5–5.3)
POTASSIUM SERPL-SCNC: 4.4 MMOL/L — SIGNIFICANT CHANGE UP (ref 3.5–5.3)
POTASSIUM SERPL-SCNC: 4.5 MMOL/L — SIGNIFICANT CHANGE UP (ref 3.5–5.3)
POTASSIUM SERPL-SCNC: 4.6 MMOL/L — SIGNIFICANT CHANGE UP (ref 3.5–5.3)
POTASSIUM SERPL-SCNC: 4.7 MMOL/L — SIGNIFICANT CHANGE UP (ref 3.5–5.3)
POTASSIUM SERPL-SCNC: 4.8 MMOL/L — SIGNIFICANT CHANGE UP (ref 3.5–5.3)
POTASSIUM SERPL-SCNC: 5 MMOL/L — SIGNIFICANT CHANGE UP (ref 3.5–5.3)
POTASSIUM SERPL-SCNC: 5 MMOL/L — SIGNIFICANT CHANGE UP (ref 3.5–5.3)
POTASSIUM SERPL-SCNC: 5.1 MMOL/L — SIGNIFICANT CHANGE UP (ref 3.5–5.3)
POTASSIUM SERPL-SCNC: 5.3 MMOL/L — SIGNIFICANT CHANGE UP (ref 3.5–5.3)
PROMYELOCYTES # FLD: 0 % — SIGNIFICANT CHANGE UP (ref 0–0)
PROMYELOCYTES # FLD: 0.9 % — HIGH (ref 0–0)
PROMYELOCYTES # FLD: 2.6 % — HIGH (ref 0–0)
PROT FLD-MCNC: 3.4 G/DL — SIGNIFICANT CHANGE UP
PROT SERPL-MCNC: 4.5 G/DL — LOW (ref 6–8.3)
PROT SERPL-MCNC: 4.6 G/DL — LOW (ref 6–8.3)
PROT SERPL-MCNC: 4.7 G/DL — LOW (ref 6–8.3)
PROT SERPL-MCNC: 4.8 G/DL — LOW (ref 6–8.3)
PROT SERPL-MCNC: 4.9 G/DL — LOW (ref 6–8.3)
PROT SERPL-MCNC: 5.1 G/DL — LOW (ref 6–8.3)
PROT SERPL-MCNC: 5.1 G/DL — LOW (ref 6–8.3)
PROT SERPL-MCNC: 5.4 G/DL — LOW (ref 6–8.3)
PROT SERPL-MCNC: 5.5 G/DL — LOW (ref 6.6–8.7)
PROT SERPL-MCNC: 5.5 G/DL — LOW (ref 6–8.3)
PROT SERPL-MCNC: 5.7 G/DL — LOW (ref 6–8.3)
PROT SERPL-MCNC: 5.8 G/DL — LOW (ref 6.6–8.7)
PROT SERPL-MCNC: 5.8 G/DL — LOW (ref 6–8.3)
PROT SERPL-MCNC: 5.8 G/DL — LOW (ref 6–8.3)
PROT SERPL-MCNC: 6.1 G/DL — LOW (ref 6.6–8.7)
PROT SERPL-MCNC: 6.1 G/DL — SIGNIFICANT CHANGE UP (ref 6–8.3)
PROT SERPL-MCNC: 6.1 G/DL — SIGNIFICANT CHANGE UP (ref 6–8.3)
PROT SERPL-MCNC: 6.2 G/DL — SIGNIFICANT CHANGE UP (ref 6–8.3)
PROT SERPL-MCNC: 6.3 G/DL — SIGNIFICANT CHANGE UP (ref 6–8.3)
PROT SERPL-MCNC: 6.5 G/DL — SIGNIFICANT CHANGE UP (ref 6–8.3)
PROT SERPL-MCNC: 7 G/DL — SIGNIFICANT CHANGE UP (ref 6.6–8.7)
PROT SERPL-MCNC: 7.3 G/DL — SIGNIFICANT CHANGE UP (ref 6.6–8.7)
PROT SERPL-MCNC: 7.3 G/DL — SIGNIFICANT CHANGE UP (ref 6.6–8.7)
PROT SERPL-MCNC: 7.3 G/DL — SIGNIFICANT CHANGE UP (ref 6–8.3)
PROT SERPL-MCNC: 7.4 G/DL — SIGNIFICANT CHANGE UP (ref 6–8.3)
PROT SERPL-MCNC: 7.5 G/DL — SIGNIFICANT CHANGE UP (ref 6–8.3)
PROT SERPL-MCNC: 7.6 G/DL — SIGNIFICANT CHANGE UP (ref 6.6–8.7)
PROT UR-MCNC: ABNORMAL
PROT UR-MCNC: ABNORMAL
PROT UR-MCNC: NEGATIVE MG/DL — SIGNIFICANT CHANGE UP
PROT UR-MCNC: NEGATIVE MG/DL — SIGNIFICANT CHANGE UP
PROTHROM AB SERPL-ACNC: 11.2 SEC — SIGNIFICANT CHANGE UP (ref 10.6–13.6)
PROTHROM AB SERPL-ACNC: 11.6 SEC — SIGNIFICANT CHANGE UP (ref 10.6–13.6)
PROTHROM AB SERPL-ACNC: 11.8 SEC — SIGNIFICANT CHANGE UP (ref 10.6–13.6)
PROTHROM AB SERPL-ACNC: 12 SEC — SIGNIFICANT CHANGE UP (ref 10.6–13.6)
PROTHROM AB SERPL-ACNC: 12.1 SEC — SIGNIFICANT CHANGE UP (ref 10.6–13.6)
PROTHROM AB SERPL-ACNC: 12.3 SEC — SIGNIFICANT CHANGE UP (ref 10.6–13.6)
PROTHROM AB SERPL-ACNC: 12.4 SEC — SIGNIFICANT CHANGE UP (ref 10.6–13.6)
PROTHROM AB SERPL-ACNC: 12.4 SEC — SIGNIFICANT CHANGE UP (ref 10–12.9)
PROTHROM AB SERPL-ACNC: 12.9 SEC — SIGNIFICANT CHANGE UP (ref 10.6–13.6)
PROTHROM AB SERPL-ACNC: 13 SEC — SIGNIFICANT CHANGE UP (ref 10.6–13.6)
PROTHROM AB SERPL-ACNC: 13.4 SEC — SIGNIFICANT CHANGE UP (ref 10.6–13.6)
PROTHROM AB SERPL-ACNC: 13.6 SEC — SIGNIFICANT CHANGE UP (ref 10.6–13.6)
PROTHROM AB SERPL-ACNC: 13.6 SEC — SIGNIFICANT CHANGE UP (ref 10.6–13.6)
PROTHROM AB SERPL-ACNC: 14.1 SEC — HIGH (ref 10.6–13.6)
PROTHROM AB SERPL-ACNC: 14.2 SEC — HIGH (ref 10.6–13.6)
PROTHROM AB SERPL-ACNC: 24.6 SEC — HIGH (ref 10.6–13.6)
PTH RELATED PROT SERPL-MCNC: <2 — SIGNIFICANT CHANGE UP
PTH-INTACT SERPL-MCNC: 1.2 PG/ML — LOW (ref 15–65)
RBC # BLD: 2.59 M/UL — LOW (ref 3.8–5.2)
RBC # BLD: 2.65 M/UL — LOW (ref 3.8–5.2)
RBC # BLD: 2.73 M/UL — LOW (ref 3.8–5.2)
RBC # BLD: 2.84 M/UL — LOW (ref 3.8–5.2)
RBC # BLD: 3.03 M/UL — LOW (ref 3.8–5.2)
RBC # BLD: 3.03 M/UL — LOW (ref 3.8–5.2)
RBC # BLD: 3.06 M/UL — LOW (ref 3.8–5.2)
RBC # BLD: 3.07 M/UL — LOW (ref 3.8–5.2)
RBC # BLD: 3.1 M/UL — LOW (ref 3.8–5.2)
RBC # BLD: 3.16 M/UL — LOW (ref 3.8–5.2)
RBC # BLD: 3.2 M/UL — LOW (ref 3.8–5.2)
RBC # BLD: 3.2 M/UL — LOW (ref 3.8–5.2)
RBC # BLD: 3.22 M/UL — LOW (ref 3.8–5.2)
RBC # BLD: 3.23 M/UL — LOW (ref 3.8–5.2)
RBC # BLD: 3.26 M/UL — LOW (ref 3.8–5.2)
RBC # BLD: 3.28 M/UL — LOW (ref 3.8–5.2)
RBC # BLD: 3.31 M/UL — LOW (ref 3.8–5.2)
RBC # BLD: 3.33 M/UL — LOW (ref 3.8–5.2)
RBC # BLD: 3.33 M/UL — LOW (ref 3.8–5.2)
RBC # BLD: 3.34 M/UL — LOW (ref 3.8–5.2)
RBC # BLD: 3.34 M/UL — LOW (ref 3.8–5.2)
RBC # BLD: 3.41 M/UL — LOW (ref 3.8–5.2)
RBC # BLD: 3.42 M/UL — LOW (ref 3.8–5.2)
RBC # BLD: 3.46 M/UL — LOW (ref 3.8–5.2)
RBC # BLD: 3.56 M/UL — LOW (ref 3.8–5.2)
RBC # BLD: 3.58 M/UL — LOW (ref 3.8–5.2)
RBC # BLD: 3.62 M/UL — LOW (ref 3.8–5.2)
RBC # BLD: 3.68 M/UL — LOW (ref 3.8–5.2)
RBC # BLD: 3.68 M/UL — LOW (ref 3.8–5.2)
RBC # BLD: 3.7 M/UL — LOW (ref 3.8–5.2)
RBC # BLD: 3.73 M/UL — LOW (ref 3.8–5.2)
RBC # BLD: 3.73 M/UL — LOW (ref 3.8–5.2)
RBC # BLD: 3.75 M/UL — LOW (ref 3.8–5.2)
RBC # BLD: 3.87 M/UL — SIGNIFICANT CHANGE UP (ref 3.8–5.2)
RBC # BLD: 3.87 M/UL — SIGNIFICANT CHANGE UP (ref 3.8–5.2)
RBC # BLD: 3.9 M/UL — SIGNIFICANT CHANGE UP (ref 3.8–5.2)
RBC # BLD: 3.91 M/UL — SIGNIFICANT CHANGE UP (ref 3.8–5.2)
RBC # BLD: 3.92 M/UL — SIGNIFICANT CHANGE UP (ref 3.8–5.2)
RBC # BLD: 3.92 M/UL — SIGNIFICANT CHANGE UP (ref 3.8–5.2)
RBC # BLD: 3.94 M/UL — SIGNIFICANT CHANGE UP (ref 3.8–5.2)
RBC # BLD: 3.95 M/UL — SIGNIFICANT CHANGE UP (ref 3.8–5.2)
RBC # BLD: 3.96 M/UL — SIGNIFICANT CHANGE UP (ref 3.8–5.2)
RBC # BLD: 4.02 M/UL — SIGNIFICANT CHANGE UP (ref 3.8–5.2)
RBC # BLD: 4.08 M/UL — SIGNIFICANT CHANGE UP (ref 3.8–5.2)
RBC # BLD: 4.13 M/UL — SIGNIFICANT CHANGE UP (ref 3.8–5.2)
RBC # BLD: 4.13 M/UL — SIGNIFICANT CHANGE UP (ref 3.8–5.2)
RBC # BLD: 4.14 M/UL — SIGNIFICANT CHANGE UP (ref 3.8–5.2)
RBC # BLD: 4.14 M/UL — SIGNIFICANT CHANGE UP (ref 3.8–5.2)
RBC # BLD: 4.39 M/UL — SIGNIFICANT CHANGE UP (ref 3.8–5.2)
RBC # FLD: 12.3 % — SIGNIFICANT CHANGE UP (ref 10.3–14.5)
RBC # FLD: 12.3 % — SIGNIFICANT CHANGE UP (ref 10.3–14.5)
RBC # FLD: 12.5 % — SIGNIFICANT CHANGE UP (ref 10.3–14.5)
RBC # FLD: 12.6 % — SIGNIFICANT CHANGE UP (ref 10.3–14.5)
RBC # FLD: 12.6 % — SIGNIFICANT CHANGE UP (ref 10.3–14.5)
RBC # FLD: 13.1 % — SIGNIFICANT CHANGE UP (ref 10.3–14.5)
RBC # FLD: 13.2 % — SIGNIFICANT CHANGE UP (ref 10.3–14.5)
RBC # FLD: 13.4 % — SIGNIFICANT CHANGE UP (ref 10.3–14.5)
RBC # FLD: 13.4 % — SIGNIFICANT CHANGE UP (ref 10.3–14.5)
RBC # FLD: 13.5 % — SIGNIFICANT CHANGE UP (ref 10.3–14.5)
RBC # FLD: 13.6 % — SIGNIFICANT CHANGE UP (ref 10.3–14.5)
RBC # FLD: 14 % — SIGNIFICANT CHANGE UP (ref 10.3–14.5)
RBC # FLD: 14.3 % — SIGNIFICANT CHANGE UP (ref 10.3–14.5)
RBC # FLD: 14.4 % — SIGNIFICANT CHANGE UP (ref 10.3–14.5)
RBC # FLD: 14.5 % — SIGNIFICANT CHANGE UP (ref 10.3–14.5)
RBC # FLD: 14.6 % — HIGH (ref 10.3–14.5)
RBC # FLD: 14.7 % — HIGH (ref 10.3–14.5)
RBC # FLD: 14.8 % — HIGH (ref 10.3–14.5)
RBC # FLD: 14.9 % — HIGH (ref 10.3–14.5)
RBC # FLD: 14.9 % — HIGH (ref 10.3–14.5)
RBC # FLD: 15 % — HIGH (ref 10.3–14.5)
RBC # FLD: 15.1 % — HIGH (ref 10.3–14.5)
RBC # FLD: 15.3 % — HIGH (ref 10.3–14.5)
RBC # FLD: 16.3 % — HIGH (ref 10.3–14.5)
RBC # FLD: 16.5 % — HIGH (ref 10.3–14.5)
RBC # FLD: 16.5 % — HIGH (ref 10.3–14.5)
RBC # FLD: 16.6 % — HIGH (ref 10.3–14.5)
RBC # FLD: 16.7 % — HIGH (ref 10.3–14.5)
RBC # FLD: 16.7 % — HIGH (ref 10.3–14.5)
RBC # FLD: 16.9 % — HIGH (ref 10.3–14.5)
RBC # FLD: 16.9 % — HIGH (ref 10.3–14.5)
RBC # FLD: 17 % — HIGH (ref 10.3–14.5)
RBC # FLD: 17 % — HIGH (ref 10.3–14.5)
RBC # FLD: 17.1 % — HIGH (ref 10.3–14.5)
RBC # FLD: 17.2 % — HIGH (ref 10.3–14.5)
RBC # FLD: 17.3 % — HIGH (ref 10.3–14.5)
RBC # FLD: 17.4 % — HIGH (ref 10.3–14.5)
RBC # FLD: 17.5 % — HIGH (ref 10.3–14.5)
RBC # FLD: 17.5 % — HIGH (ref 10.3–14.5)
RBC # FLD: 17.6 % — HIGH (ref 10.3–14.5)
RBC # FLD: 17.9 % — HIGH (ref 10.3–14.5)
RBC BLD AUTO: ABNORMAL
RBC BLD AUTO: NORMAL — SIGNIFICANT CHANGE UP
RBC BLD AUTO: SIGNIFICANT CHANGE UP
RBC CASTS # UR COMP ASSIST: 1 /HPF — SIGNIFICANT CHANGE UP (ref 0–4)
RBC CASTS # UR COMP ASSIST: NEGATIVE /HPF — SIGNIFICANT CHANGE UP (ref 0–4)
RBC CASTS # UR COMP ASSIST: SIGNIFICANT CHANGE UP /HPF (ref 0–4)
RCV VOL RI: HIGH /UL (ref 0–0)
RH IG SCN BLD-IMP: POSITIVE — SIGNIFICANT CHANGE UP
SAO2 % BLDA: 96 % — SIGNIFICANT CHANGE UP (ref 95–99)
SAO2 % BLDV: 99 % — HIGH (ref 67–88)
SARS-COV-2 IGG SERPL QL IA: NEGATIVE — SIGNIFICANT CHANGE UP
SARS-COV-2 IGM SERPL IA-ACNC: 0.08 INDEX — SIGNIFICANT CHANGE UP
SARS-COV-2 IGM SERPL IA-ACNC: <0.1 INDEX — SIGNIFICANT CHANGE UP
SARS-COV-2 IGM SERPL IA-ACNC: <0.1 INDEX — SIGNIFICANT CHANGE UP
SARS-COV-2 N GENE NPH QL NAA+PROBE: NOT DETECTED
SARS-COV-2 N GENE NPH QL NAA+PROBE: NOT DETECTED
SARS-COV-2 RNA SPEC QL NAA+PROBE: SIGNIFICANT CHANGE UP
SMUDGE CELLS # BLD: PRESENT — SIGNIFICANT CHANGE UP
SODIUM SERPL-SCNC: 131 MMOL/L — LOW (ref 135–145)
SODIUM SERPL-SCNC: 132 MMOL/L — LOW (ref 135–145)
SODIUM SERPL-SCNC: 133 MMOL/L — LOW (ref 135–145)
SODIUM SERPL-SCNC: 133 MMOL/L — LOW (ref 135–145)
SODIUM SERPL-SCNC: 134 MMOL/L — LOW (ref 135–145)
SODIUM SERPL-SCNC: 135 MMOL/L — SIGNIFICANT CHANGE UP (ref 135–145)
SODIUM SERPL-SCNC: 135 MMOL/L — SIGNIFICANT CHANGE UP (ref 135–145)
SODIUM SERPL-SCNC: 136 MMOL/L — SIGNIFICANT CHANGE UP (ref 135–145)
SODIUM SERPL-SCNC: 137 MMOL/L — SIGNIFICANT CHANGE UP (ref 135–145)
SODIUM SERPL-SCNC: 138 MMOL/L — SIGNIFICANT CHANGE UP (ref 135–145)
SODIUM SERPL-SCNC: 139 MMOL/L — SIGNIFICANT CHANGE UP (ref 135–145)
SODIUM SERPL-SCNC: 140 MMOL/L — SIGNIFICANT CHANGE UP (ref 135–145)
SODIUM SERPL-SCNC: 141 MMOL/L — SIGNIFICANT CHANGE UP (ref 135–145)
SODIUM SERPL-SCNC: 142 MMOL/L — SIGNIFICANT CHANGE UP (ref 135–145)
SP GR SPEC: 1.01 — SIGNIFICANT CHANGE UP (ref 1.01–1.02)
SP GR SPEC: 1.03 — HIGH (ref 1.01–1.02)
SPECIMEN SOURCE: SIGNIFICANT CHANGE UP
SURGICAL PATHOLOGY STUDY: SIGNIFICANT CHANGE UP
SURGICAL PATHOLOGY STUDY: SIGNIFICANT CHANGE UP
T3 SERPL-MCNC: 52 NG/DL — LOW (ref 80–200)
T3 SERPL-MCNC: 94 NG/DL — SIGNIFICANT CHANGE UP
T4 AB SER-ACNC: 5.6 UG/DL — SIGNIFICANT CHANGE UP (ref 4.6–12)
T4 FREE SERPL-MCNC: 1.4 NG/DL — SIGNIFICANT CHANGE UP (ref 0.9–1.8)
TOTAL NUCLEATED CELL COUNT, BODY FLUID: 98 /UL — SIGNIFICANT CHANGE UP
TOXIC GRANULES BLD QL SMEAR: PRESENT — SIGNIFICANT CHANGE UP
TRI-PHOS CRY UR QL COMP ASSIST: ABNORMAL
TRIGL FLD-MCNC: 90 MG/DL — SIGNIFICANT CHANGE UP
TROPONIN T, HIGH SENSITIVITY RESULT: 16 NG/L — SIGNIFICANT CHANGE UP (ref 0–51)
TROPONIN T, HIGH SENSITIVITY RESULT: 21 NG/L — SIGNIFICANT CHANGE UP (ref 0–51)
TROPONIN T, HIGH SENSITIVITY RESULT: 24 NG/L — SIGNIFICANT CHANGE UP (ref 0–51)
TSH SERPL-MCNC: 0.24 UIU/ML — LOW (ref 0.27–4.2)
TSH SERPL-MCNC: 1.64 UIU/ML — SIGNIFICANT CHANGE UP (ref 0.27–4.2)
TUBE TYPE: SIGNIFICANT CHANGE UP
UROBILINOGEN FLD QL: 1 MG/DL
UROBILINOGEN FLD QL: 1 MG/DL
UROBILINOGEN FLD QL: ABNORMAL
UROBILINOGEN FLD QL: ABNORMAL
VARIANT LYMPHS # BLD: 0 % — SIGNIFICANT CHANGE UP
VARIANT LYMPHS # BLD: 0.9 % — SIGNIFICANT CHANGE UP
VARIANT LYMPHS # BLD: 1.8 % — SIGNIFICANT CHANGE UP
VIT B12 SERPL-MCNC: >2000 PG/ML — HIGH (ref 232–1245)
WBC # BLD: 1.5 K/UL — LOW (ref 3.8–10.5)
WBC # BLD: 1.68 K/UL — LOW (ref 3.8–10.5)
WBC # BLD: 1.75 K/UL — LOW (ref 3.8–10.5)
WBC # BLD: 1.79 K/UL — LOW (ref 3.8–10.5)
WBC # BLD: 10.13 K/UL — SIGNIFICANT CHANGE UP (ref 3.8–10.5)
WBC # BLD: 10.43 K/UL — SIGNIFICANT CHANGE UP (ref 3.8–10.5)
WBC # BLD: 11.75 K/UL — HIGH (ref 3.8–10.5)
WBC # BLD: 11.78 K/UL — HIGH (ref 3.8–10.5)
WBC # BLD: 12.49 K/UL — HIGH (ref 3.8–10.5)
WBC # BLD: 12.61 K/UL — HIGH (ref 3.8–10.5)
WBC # BLD: 13.1 K/UL — HIGH (ref 3.8–10.5)
WBC # BLD: 13.43 K/UL — HIGH (ref 3.8–10.5)
WBC # BLD: 14.02 K/UL — HIGH (ref 3.8–10.5)
WBC # BLD: 14.03 K/UL — HIGH (ref 3.8–10.5)
WBC # BLD: 14.17 K/UL — HIGH (ref 3.8–10.5)
WBC # BLD: 15.4 K/UL — HIGH (ref 3.8–10.5)
WBC # BLD: 3.15 K/UL — LOW (ref 3.8–10.5)
WBC # BLD: 3.18 K/UL — LOW (ref 3.8–10.5)
WBC # BLD: 3.35 K/UL — LOW (ref 3.8–10.5)
WBC # BLD: 3.38 K/UL — LOW (ref 3.8–10.5)
WBC # BLD: 3.69 K/UL — LOW (ref 3.8–10.5)
WBC # BLD: 4.31 K/UL — SIGNIFICANT CHANGE UP (ref 3.8–10.5)
WBC # BLD: 4.7 K/UL — SIGNIFICANT CHANGE UP (ref 3.8–10.5)
WBC # BLD: 5.2 K/UL — SIGNIFICANT CHANGE UP (ref 3.8–10.5)
WBC # BLD: 5.22 K/UL — SIGNIFICANT CHANGE UP (ref 3.8–10.5)
WBC # BLD: 5.3 K/UL — SIGNIFICANT CHANGE UP (ref 3.8–10.5)
WBC # BLD: 5.44 K/UL — SIGNIFICANT CHANGE UP (ref 3.8–10.5)
WBC # BLD: 5.49 K/UL — SIGNIFICANT CHANGE UP (ref 3.8–10.5)
WBC # BLD: 5.74 K/UL — SIGNIFICANT CHANGE UP (ref 3.8–10.5)
WBC # BLD: 5.86 K/UL — SIGNIFICANT CHANGE UP (ref 3.8–10.5)
WBC # BLD: 5.94 K/UL — SIGNIFICANT CHANGE UP (ref 3.8–10.5)
WBC # BLD: 5.95 K/UL — SIGNIFICANT CHANGE UP (ref 3.8–10.5)
WBC # BLD: 6.37 K/UL — SIGNIFICANT CHANGE UP (ref 3.8–10.5)
WBC # BLD: 6.43 K/UL — SIGNIFICANT CHANGE UP (ref 3.8–10.5)
WBC # BLD: 6.45 K/UL — SIGNIFICANT CHANGE UP (ref 3.8–10.5)
WBC # BLD: 6.5 K/UL — SIGNIFICANT CHANGE UP (ref 3.8–10.5)
WBC # BLD: 6.84 K/UL — SIGNIFICANT CHANGE UP (ref 3.8–10.5)
WBC # BLD: 6.88 K/UL — SIGNIFICANT CHANGE UP (ref 3.8–10.5)
WBC # BLD: 6.99 K/UL — SIGNIFICANT CHANGE UP (ref 3.8–10.5)
WBC # BLD: 7.29 K/UL — SIGNIFICANT CHANGE UP (ref 3.8–10.5)
WBC # BLD: 7.49 K/UL — SIGNIFICANT CHANGE UP (ref 3.8–10.5)
WBC # BLD: 7.55 K/UL — SIGNIFICANT CHANGE UP (ref 3.8–10.5)
WBC # BLD: 7.66 K/UL — SIGNIFICANT CHANGE UP (ref 3.8–10.5)
WBC # BLD: 7.97 K/UL — SIGNIFICANT CHANGE UP (ref 3.8–10.5)
WBC # BLD: 8.43 K/UL — SIGNIFICANT CHANGE UP (ref 3.8–10.5)
WBC # BLD: 8.87 K/UL — SIGNIFICANT CHANGE UP (ref 3.8–10.5)
WBC # BLD: 9.18 K/UL — SIGNIFICANT CHANGE UP (ref 3.8–10.5)
WBC # BLD: 9.23 K/UL — SIGNIFICANT CHANGE UP (ref 3.8–10.5)
WBC # BLD: 9.88 K/UL — SIGNIFICANT CHANGE UP (ref 3.8–10.5)
WBC # FLD AUTO: 1.5 K/UL — LOW (ref 3.8–10.5)
WBC # FLD AUTO: 1.68 K/UL — LOW (ref 3.8–10.5)
WBC # FLD AUTO: 1.75 K/UL — LOW (ref 3.8–10.5)
WBC # FLD AUTO: 1.79 K/UL — LOW (ref 3.8–10.5)
WBC # FLD AUTO: 10.13 K/UL — SIGNIFICANT CHANGE UP (ref 3.8–10.5)
WBC # FLD AUTO: 10.43 K/UL — SIGNIFICANT CHANGE UP (ref 3.8–10.5)
WBC # FLD AUTO: 11.75 K/UL — HIGH (ref 3.8–10.5)
WBC # FLD AUTO: 11.78 K/UL — HIGH (ref 3.8–10.5)
WBC # FLD AUTO: 12.49 K/UL — HIGH (ref 3.8–10.5)
WBC # FLD AUTO: 12.61 K/UL — HIGH (ref 3.8–10.5)
WBC # FLD AUTO: 13.1 K/UL — HIGH (ref 3.8–10.5)
WBC # FLD AUTO: 13.43 K/UL — HIGH (ref 3.8–10.5)
WBC # FLD AUTO: 14.02 K/UL — HIGH (ref 3.8–10.5)
WBC # FLD AUTO: 14.03 K/UL — HIGH (ref 3.8–10.5)
WBC # FLD AUTO: 14.17 K/UL — HIGH (ref 3.8–10.5)
WBC # FLD AUTO: 15.4 K/UL — HIGH (ref 3.8–10.5)
WBC # FLD AUTO: 3.15 K/UL — LOW (ref 3.8–10.5)
WBC # FLD AUTO: 3.18 K/UL — LOW (ref 3.8–10.5)
WBC # FLD AUTO: 3.35 K/UL — LOW (ref 3.8–10.5)
WBC # FLD AUTO: 3.38 K/UL — LOW (ref 3.8–10.5)
WBC # FLD AUTO: 3.69 K/UL — LOW (ref 3.8–10.5)
WBC # FLD AUTO: 4.31 K/UL — SIGNIFICANT CHANGE UP (ref 3.8–10.5)
WBC # FLD AUTO: 4.7 K/UL — SIGNIFICANT CHANGE UP (ref 3.8–10.5)
WBC # FLD AUTO: 5.2 K/UL — SIGNIFICANT CHANGE UP (ref 3.8–10.5)
WBC # FLD AUTO: 5.22 K/UL — SIGNIFICANT CHANGE UP (ref 3.8–10.5)
WBC # FLD AUTO: 5.3 K/UL — SIGNIFICANT CHANGE UP (ref 3.8–10.5)
WBC # FLD AUTO: 5.44 K/UL — SIGNIFICANT CHANGE UP (ref 3.8–10.5)
WBC # FLD AUTO: 5.49 K/UL — SIGNIFICANT CHANGE UP (ref 3.8–10.5)
WBC # FLD AUTO: 5.74 K/UL — SIGNIFICANT CHANGE UP (ref 3.8–10.5)
WBC # FLD AUTO: 5.86 K/UL — SIGNIFICANT CHANGE UP (ref 3.8–10.5)
WBC # FLD AUTO: 5.94 K/UL — SIGNIFICANT CHANGE UP (ref 3.8–10.5)
WBC # FLD AUTO: 5.95 K/UL — SIGNIFICANT CHANGE UP (ref 3.8–10.5)
WBC # FLD AUTO: 6.37 K/UL — SIGNIFICANT CHANGE UP (ref 3.8–10.5)
WBC # FLD AUTO: 6.43 K/UL — SIGNIFICANT CHANGE UP (ref 3.8–10.5)
WBC # FLD AUTO: 6.45 K/UL — SIGNIFICANT CHANGE UP (ref 3.8–10.5)
WBC # FLD AUTO: 6.5 K/UL — SIGNIFICANT CHANGE UP (ref 3.8–10.5)
WBC # FLD AUTO: 6.84 K/UL — SIGNIFICANT CHANGE UP (ref 3.8–10.5)
WBC # FLD AUTO: 6.88 K/UL — SIGNIFICANT CHANGE UP (ref 3.8–10.5)
WBC # FLD AUTO: 6.99 K/UL — SIGNIFICANT CHANGE UP (ref 3.8–10.5)
WBC # FLD AUTO: 7.29 K/UL — SIGNIFICANT CHANGE UP (ref 3.8–10.5)
WBC # FLD AUTO: 7.49 K/UL — SIGNIFICANT CHANGE UP (ref 3.8–10.5)
WBC # FLD AUTO: 7.55 K/UL — SIGNIFICANT CHANGE UP (ref 3.8–10.5)
WBC # FLD AUTO: 7.66 K/UL — SIGNIFICANT CHANGE UP (ref 3.8–10.5)
WBC # FLD AUTO: 7.97 K/UL — SIGNIFICANT CHANGE UP (ref 3.8–10.5)
WBC # FLD AUTO: 8.43 K/UL — SIGNIFICANT CHANGE UP (ref 3.8–10.5)
WBC # FLD AUTO: 8.87 K/UL — SIGNIFICANT CHANGE UP (ref 3.8–10.5)
WBC # FLD AUTO: 9.18 K/UL — SIGNIFICANT CHANGE UP (ref 3.8–10.5)
WBC # FLD AUTO: 9.23 K/UL — SIGNIFICANT CHANGE UP (ref 3.8–10.5)
WBC # FLD AUTO: 9.88 K/UL — SIGNIFICANT CHANGE UP (ref 3.8–10.5)
WBC UR QL: 2 /HPF — SIGNIFICANT CHANGE UP (ref 0–5)
WBC UR QL: NEGATIVE — SIGNIFICANT CHANGE UP
WBC UR QL: SIGNIFICANT CHANGE UP

## 2020-01-01 PROCEDURE — 86769 SARS-COV-2 COVID-19 ANTIBODY: CPT

## 2020-01-01 PROCEDURE — 99233 SBSQ HOSP IP/OBS HIGH 50: CPT

## 2020-01-01 PROCEDURE — 99232 SBSQ HOSP IP/OBS MODERATE 35: CPT

## 2020-01-01 PROCEDURE — 78306 BONE IMAGING WHOLE BODY: CPT | Mod: 26

## 2020-01-01 PROCEDURE — 99223 1ST HOSP IP/OBS HIGH 75: CPT | Mod: GC

## 2020-01-01 PROCEDURE — 73502 X-RAY EXAM HIP UNI 2-3 VIEWS: CPT

## 2020-01-01 PROCEDURE — 99233 SBSQ HOSP IP/OBS HIGH 50: CPT | Mod: GC

## 2020-01-01 PROCEDURE — 99358 PROLONG SERVICE W/O CONTACT: CPT

## 2020-01-01 PROCEDURE — 78815 PET IMAGE W/CT SKULL-THIGH: CPT | Mod: PI

## 2020-01-01 PROCEDURE — 72149 MRI LUMBAR SPINE W/DYE: CPT

## 2020-01-01 PROCEDURE — 73000 X-RAY EXAM OF COLLAR BONE: CPT | Mod: 26,LT

## 2020-01-01 PROCEDURE — 71275 CT ANGIOGRAPHY CHEST: CPT

## 2020-01-01 PROCEDURE — 83735 ASSAY OF MAGNESIUM: CPT

## 2020-01-01 PROCEDURE — 93010 ELECTROCARDIOGRAM REPORT: CPT

## 2020-01-01 PROCEDURE — 85027 COMPLETE CBC AUTOMATED: CPT

## 2020-01-01 PROCEDURE — 93970 EXTREMITY STUDY: CPT | Mod: 26

## 2020-01-01 PROCEDURE — 88112 CYTOPATH CELL ENHANCE TECH: CPT | Mod: 26

## 2020-01-01 PROCEDURE — 99213 OFFICE O/P EST LOW 20 MIN: CPT

## 2020-01-01 PROCEDURE — 99285 EMERGENCY DEPT VISIT HI MDM: CPT

## 2020-01-01 PROCEDURE — 88341 IMHCHEM/IMCYTCHM EA ADD ANTB: CPT | Mod: 26

## 2020-01-01 PROCEDURE — 72192 CT PELVIS W/O DYE: CPT

## 2020-01-01 PROCEDURE — 99441: CPT

## 2020-01-01 PROCEDURE — 81001 URINALYSIS AUTO W/SCOPE: CPT

## 2020-01-01 PROCEDURE — 77280 THER RAD SIMULAJ FIELD SMPL: CPT | Mod: 26

## 2020-01-01 PROCEDURE — 71046 X-RAY EXAM CHEST 2 VIEWS: CPT | Mod: 26

## 2020-01-01 PROCEDURE — 36415 COLL VENOUS BLD VENIPUNCTURE: CPT

## 2020-01-01 PROCEDURE — 73552 X-RAY EXAM OF FEMUR 2/>: CPT

## 2020-01-01 PROCEDURE — 88305 TISSUE EXAM BY PATHOLOGIST: CPT

## 2020-01-01 PROCEDURE — 93010 ELECTROCARDIOGRAM REPORT: CPT | Mod: 77

## 2020-01-01 PROCEDURE — 73552 X-RAY EXAM OF FEMUR 2/>: CPT | Mod: 26,LT

## 2020-01-01 PROCEDURE — 73562 X-RAY EXAM OF KNEE 3: CPT | Mod: 26,LT

## 2020-01-01 PROCEDURE — 77307 TELETHX ISODOSE PLAN CPLX: CPT | Mod: 26

## 2020-01-01 PROCEDURE — 88305 TISSUE EXAM BY PATHOLOGIST: CPT | Mod: 26

## 2020-01-01 PROCEDURE — 96375 TX/PRO/DX INJ NEW DRUG ADDON: CPT

## 2020-01-01 PROCEDURE — 99223 1ST HOSP IP/OBS HIGH 75: CPT

## 2020-01-01 PROCEDURE — 72142 MRI NECK SPINE W/DYE: CPT | Mod: 26

## 2020-01-01 PROCEDURE — 99232 SBSQ HOSP IP/OBS MODERATE 35: CPT | Mod: GC

## 2020-01-01 PROCEDURE — 77290 THER RAD SIMULAJ FIELD CPLX: CPT | Mod: 26

## 2020-01-01 PROCEDURE — 73502 X-RAY EXAM HIP UNI 2-3 VIEWS: CPT | Mod: 26,LT

## 2020-01-01 PROCEDURE — 85610 PROTHROMBIN TIME: CPT

## 2020-01-01 PROCEDURE — 73501 X-RAY EXAM HIP UNI 1 VIEW: CPT | Mod: 26,RT

## 2020-01-01 PROCEDURE — U0003: CPT

## 2020-01-01 PROCEDURE — 85730 THROMBOPLASTIN TIME PARTIAL: CPT

## 2020-01-01 PROCEDURE — 71046 X-RAY EXAM CHEST 2 VIEWS: CPT

## 2020-01-01 PROCEDURE — 99255 IP/OBS CONSLTJ NEW/EST HI 80: CPT

## 2020-01-01 PROCEDURE — 99222 1ST HOSP IP/OBS MODERATE 55: CPT

## 2020-01-01 PROCEDURE — 99218: CPT

## 2020-01-01 PROCEDURE — 99497 ADVNCD CARE PLAN 30 MIN: CPT

## 2020-01-01 PROCEDURE — 99239 HOSP IP/OBS DSCHRG MGMT >30: CPT

## 2020-01-01 PROCEDURE — 71275 CT ANGIOGRAPHY CHEST: CPT | Mod: 26

## 2020-01-01 PROCEDURE — 96374 THER/PROPH/DIAG INJ IV PUSH: CPT

## 2020-01-01 PROCEDURE — 93306 TTE W/DOPPLER COMPLETE: CPT | Mod: 26

## 2020-01-01 PROCEDURE — 97163 PT EVAL HIGH COMPLEX 45 MIN: CPT

## 2020-01-01 PROCEDURE — 99024 POSTOP FOLLOW-UP VISIT: CPT

## 2020-01-01 PROCEDURE — 93005 ELECTROCARDIOGRAM TRACING: CPT

## 2020-01-01 PROCEDURE — 76377 3D RENDER W/INTRP POSTPROCES: CPT | Mod: 26

## 2020-01-01 PROCEDURE — 77012 CT SCAN FOR NEEDLE BIOPSY: CPT

## 2020-01-01 PROCEDURE — 74018 RADEX ABDOMEN 1 VIEW: CPT

## 2020-01-01 PROCEDURE — 99497 ADVNCD CARE PLAN 30 MIN: CPT | Mod: 25

## 2020-01-01 PROCEDURE — 80053 COMPREHEN METABOLIC PANEL: CPT

## 2020-01-01 PROCEDURE — 94760 N-INVAS EAR/PLS OXIMETRY 1: CPT

## 2020-01-01 PROCEDURE — 36600 WITHDRAWAL OF ARTERIAL BLOOD: CPT

## 2020-01-01 PROCEDURE — 99204 OFFICE O/P NEW MOD 45 MIN: CPT | Mod: 95

## 2020-01-01 PROCEDURE — 77334 RADIATION TREATMENT AID(S): CPT | Mod: 26

## 2020-01-01 PROCEDURE — 70552 MRI BRAIN STEM W/DYE: CPT

## 2020-01-01 PROCEDURE — 99284 EMERGENCY DEPT VISIT MOD MDM: CPT

## 2020-01-01 PROCEDURE — 97530 THERAPEUTIC ACTIVITIES: CPT

## 2020-01-01 PROCEDURE — 70450 CT HEAD/BRAIN W/O DYE: CPT | Mod: 26

## 2020-01-01 PROCEDURE — 71045 X-RAY EXAM CHEST 1 VIEW: CPT | Mod: 26

## 2020-01-01 PROCEDURE — 97116 GAIT TRAINING THERAPY: CPT

## 2020-01-01 PROCEDURE — 86850 RBC ANTIBODY SCREEN: CPT

## 2020-01-01 PROCEDURE — 99217: CPT

## 2020-01-01 PROCEDURE — 70450 CT HEAD/BRAIN W/O DYE: CPT

## 2020-01-01 PROCEDURE — 99498 ADVNCD CARE PLAN ADDL 30 MIN: CPT | Mod: 25

## 2020-01-01 PROCEDURE — 99284 EMERGENCY DEPT VISIT MOD MDM: CPT | Mod: 25

## 2020-01-01 PROCEDURE — A9552B: CUSTOM

## 2020-01-01 PROCEDURE — 99214 OFFICE O/P EST MOD 30 MIN: CPT

## 2020-01-01 PROCEDURE — 86900 BLOOD TYPING SEROLOGIC ABO: CPT

## 2020-01-01 PROCEDURE — 99231 SBSQ HOSP IP/OBS SF/LOW 25: CPT

## 2020-01-01 PROCEDURE — 76377 3D RENDER W/INTRP POSTPROCES: CPT

## 2020-01-01 PROCEDURE — 99239 HOSP IP/OBS DSCHRG MGMT >30: CPT | Mod: GC

## 2020-01-01 PROCEDURE — 32405: CPT | Mod: RT

## 2020-01-01 PROCEDURE — 73700 CT LOWER EXTREMITY W/O DYE: CPT | Mod: 26,LT

## 2020-01-01 PROCEDURE — 99442: CPT

## 2020-01-01 PROCEDURE — 82803 BLOOD GASES ANY COMBINATION: CPT

## 2020-01-01 PROCEDURE — 86901 BLOOD TYPING SEROLOGIC RH(D): CPT

## 2020-01-01 PROCEDURE — 72192 CT PELVIS W/O DYE: CPT | Mod: 26,59

## 2020-01-01 PROCEDURE — 74018 RADEX ABDOMEN 1 VIEW: CPT | Mod: 26

## 2020-01-01 PROCEDURE — G9005: CPT

## 2020-01-01 PROCEDURE — 72192 CT PELVIS W/O DYE: CPT | Mod: 26

## 2020-01-01 PROCEDURE — 32555 ASPIRATE PLEURA W/ IMAGING: CPT | Mod: GC

## 2020-01-01 PROCEDURE — 88307 TISSUE EXAM BY PATHOLOGIST: CPT | Mod: 26

## 2020-01-01 PROCEDURE — 80048 BASIC METABOLIC PNL TOTAL CA: CPT

## 2020-01-01 PROCEDURE — 72170 X-RAY EXAM OF PELVIS: CPT | Mod: 26,59

## 2020-01-01 PROCEDURE — 73562 X-RAY EXAM OF KNEE 3: CPT

## 2020-01-01 PROCEDURE — 27365 RESECT FEMUR/KNEE TUMOR: CPT | Mod: LT

## 2020-01-01 PROCEDURE — 73552 X-RAY EXAM OF FEMUR 2/>: CPT | Mod: 26,RT

## 2020-01-01 PROCEDURE — 77262 THER RADIOLOGY TX PLNG INTRM: CPT

## 2020-01-01 PROCEDURE — 70551 MRI BRAIN STEM W/O DYE: CPT

## 2020-01-01 PROCEDURE — 71045 X-RAY EXAM CHEST 1 VIEW: CPT

## 2020-01-01 PROCEDURE — G0378: CPT

## 2020-01-01 PROCEDURE — 77427 RADIATION TX MANAGEMENT X5: CPT

## 2020-01-01 PROCEDURE — 70552 MRI BRAIN STEM W/DYE: CPT | Mod: 26

## 2020-01-01 PROCEDURE — 99233 SBSQ HOSP IP/OBS HIGH 50: CPT | Mod: GC,25

## 2020-01-01 PROCEDURE — 73000 X-RAY EXAM OF COLLAR BONE: CPT

## 2020-01-01 PROCEDURE — 72170 X-RAY EXAM OF PELVIS: CPT | Mod: 26

## 2020-01-01 PROCEDURE — 99443: CPT

## 2020-01-01 PROCEDURE — 72147 MRI CHEST SPINE W/DYE: CPT | Mod: 26

## 2020-01-01 PROCEDURE — 76604 US EXAM CHEST: CPT | Mod: 26

## 2020-01-01 PROCEDURE — 88342 IMHCHEM/IMCYTCHM 1ST ANTB: CPT | Mod: 26

## 2020-01-01 PROCEDURE — 71250 CT THORAX DX C-: CPT

## 2020-01-01 PROCEDURE — G0463: CPT

## 2020-01-01 PROCEDURE — 73700 CT LOWER EXTREMITY W/O DYE: CPT

## 2020-01-01 PROCEDURE — 88311 DECALCIFY TISSUE: CPT | Mod: 26

## 2020-01-01 PROCEDURE — 72149 MRI LUMBAR SPINE W/DYE: CPT | Mod: 26

## 2020-01-01 PROCEDURE — 99283 EMERGENCY DEPT VISIT LOW MDM: CPT

## 2020-01-01 PROCEDURE — 27130 TOTAL HIP ARTHROPLASTY: CPT | Mod: LT

## 2020-01-01 PROCEDURE — 27076 RESECT HIP TUM INCL ACETABUL: CPT | Mod: LT

## 2020-01-01 PROCEDURE — 78830 RP LOCLZJ TUM SPECT W/CT 1: CPT | Mod: 26

## 2020-01-01 PROCEDURE — 99255 IP/OBS CONSLTJ NEW/EST HI 80: CPT | Mod: GC

## 2020-01-01 PROCEDURE — 74177 CT ABD & PELVIS W/CONTRAST: CPT | Mod: 26

## 2020-01-01 PROCEDURE — 99221 1ST HOSP IP/OBS SF/LOW 40: CPT | Mod: 57

## 2020-01-01 PROCEDURE — 70551 MRI BRAIN STEM W/O DYE: CPT | Mod: 26

## 2020-01-01 PROCEDURE — 72157 MRI CHEST SPINE W/O & W/DYE: CPT | Mod: 26

## 2020-01-01 PROCEDURE — 72147 MRI CHEST SPINE W/DYE: CPT

## 2020-01-01 PROCEDURE — 71250 CT THORAX DX C-: CPT | Mod: 26

## 2020-01-01 PROCEDURE — 97110 THERAPEUTIC EXERCISES: CPT

## 2020-01-01 PROCEDURE — 85060 BLOOD SMEAR INTERPRETATION: CPT

## 2020-01-01 PROCEDURE — 77263 THER RADIOLOGY TX PLNG CPLX: CPT

## 2020-01-01 PROCEDURE — 84100 ASSAY OF PHOSPHORUS: CPT

## 2020-01-01 PROCEDURE — 72158 MRI LUMBAR SPINE W/O & W/DYE: CPT | Mod: 26

## 2020-01-01 PROCEDURE — 99285 EMERGENCY DEPT VISIT HI MDM: CPT | Mod: 25

## 2020-01-01 PROCEDURE — 99254 IP/OBS CNSLTJ NEW/EST MOD 60: CPT | Mod: GC

## 2020-01-01 PROCEDURE — 86803 HEPATITIS C AB TEST: CPT

## 2020-01-01 PROCEDURE — 99251: CPT

## 2020-01-01 PROCEDURE — 99253 IP/OBS CNSLTJ NEW/EST LOW 45: CPT

## 2020-01-01 PROCEDURE — 72141 MRI NECK SPINE W/O DYE: CPT | Mod: 26

## 2020-01-01 PROCEDURE — 99223 1ST HOSP IP/OBS HIGH 75: CPT | Mod: AI

## 2020-01-01 RX ORDER — POTASSIUM CHLORIDE 20 MEQ
20 PACKET (EA) ORAL ONCE
Refills: 0 | Status: COMPLETED | OUTPATIENT
Start: 2020-01-01 | End: 2020-01-01

## 2020-01-01 RX ORDER — SENNA PLUS 8.6 MG/1
2 TABLET ORAL AT BEDTIME
Refills: 0 | Status: DISCONTINUED | OUTPATIENT
Start: 2020-01-01 | End: 2020-01-01

## 2020-01-01 RX ORDER — SENNA PLUS 8.6 MG/1
1 TABLET ORAL
Qty: 0 | Refills: 0 | DISCHARGE
Start: 2020-01-01

## 2020-01-01 RX ORDER — DEXAMETHASONE 0.5 MG/5ML
2 ELIXIR ORAL
Refills: 0 | Status: DISCONTINUED | OUTPATIENT
Start: 2020-01-01 | End: 2020-01-01

## 2020-01-01 RX ORDER — LACTULOSE 10 G/15ML
200 SOLUTION ORAL ONCE
Refills: 0 | Status: COMPLETED | OUTPATIENT
Start: 2020-01-01 | End: 2020-01-01

## 2020-01-01 RX ORDER — HYDROMORPHONE HYDROCHLORIDE 2 MG/ML
0.5 INJECTION INTRAMUSCULAR; INTRAVENOUS; SUBCUTANEOUS ONCE
Refills: 0 | Status: DISCONTINUED | OUTPATIENT
Start: 2020-01-01 | End: 2020-01-01

## 2020-01-01 RX ORDER — FENTANYL CITRATE 50 UG/ML
1 INJECTION INTRAVENOUS
Qty: 0 | Refills: 0 | DISCHARGE
Start: 2020-01-01

## 2020-01-01 RX ORDER — KETOROLAC TROMETHAMINE 30 MG/ML
15 SYRINGE (ML) INJECTION ONCE
Refills: 0 | Status: DISCONTINUED | OUTPATIENT
Start: 2020-01-01 | End: 2020-01-01

## 2020-01-01 RX ORDER — LIOTHYRONINE SODIUM 25 UG/1
5 TABLET ORAL DAILY
Refills: 0 | Status: DISCONTINUED | OUTPATIENT
Start: 2020-01-01 | End: 2020-01-01

## 2020-01-01 RX ORDER — LIDOCAINE 4 G/100G
1 CREAM TOPICAL
Qty: 0 | Refills: 0 | DISCHARGE
Start: 2020-01-01

## 2020-01-01 RX ORDER — LEVOTHYROXINE SODIUM 125 MCG
1 TABLET ORAL
Qty: 0 | Refills: 0 | DISCHARGE

## 2020-01-01 RX ORDER — MORPHINE SULFATE 50 MG/1
1 CAPSULE, EXTENDED RELEASE ORAL
Qty: 0 | Refills: 0 | DISCHARGE
Start: 2020-01-01

## 2020-01-01 RX ORDER — SENNA PLUS 8.6 MG/1
1 TABLET ORAL
Refills: 0 | Status: DISCONTINUED | OUTPATIENT
Start: 2020-01-01 | End: 2020-01-01

## 2020-01-01 RX ORDER — HYDROMORPHONE HYDROCHLORIDE 2 MG/ML
1 INJECTION INTRAMUSCULAR; INTRAVENOUS; SUBCUTANEOUS
Qty: 0 | Refills: 0 | DISCHARGE
Start: 2020-01-01

## 2020-01-01 RX ORDER — HYDROMORPHONE HYDROCHLORIDE 2 MG/ML
1 INJECTION INTRAMUSCULAR; INTRAVENOUS; SUBCUTANEOUS EVERY 4 HOURS
Refills: 0 | Status: DISCONTINUED | OUTPATIENT
Start: 2020-01-01 | End: 2020-01-01

## 2020-01-01 RX ORDER — FENTANYL 50 UG/H
50 PATCH, EXTENDED RELEASE TRANSDERMAL
Qty: 10 | Refills: 0 | Status: ACTIVE | COMMUNITY
Start: 2020-01-01 | End: 1900-01-01

## 2020-01-01 RX ORDER — ENOXAPARIN SODIUM 100 MG/ML
40 INJECTION SUBCUTANEOUS DAILY
Refills: 0 | Status: DISCONTINUED | OUTPATIENT
Start: 2020-01-01 | End: 2020-01-01

## 2020-01-01 RX ORDER — HYDROMORPHONE HYDROCHLORIDE 2 MG/ML
1 INJECTION INTRAMUSCULAR; INTRAVENOUS; SUBCUTANEOUS EVERY 6 HOURS
Refills: 0 | Status: DISCONTINUED | OUTPATIENT
Start: 2020-01-01 | End: 2020-01-01

## 2020-01-01 RX ORDER — POTASSIUM CHLORIDE 20 MEQ
40 PACKET (EA) ORAL ONCE
Refills: 0 | Status: COMPLETED | OUTPATIENT
Start: 2020-01-01 | End: 2020-01-01

## 2020-01-01 RX ORDER — LANOLIN ALCOHOL/MO/W.PET/CERES
3 CREAM (GRAM) TOPICAL AT BEDTIME
Refills: 0 | Status: DISCONTINUED | OUTPATIENT
Start: 2020-01-01 | End: 2020-01-01

## 2020-01-01 RX ORDER — ALPRAZOLAM 0.25 MG
1 TABLET ORAL
Qty: 0 | Refills: 0 | DISCHARGE
Start: 2020-01-01

## 2020-01-01 RX ORDER — POLYETHYLENE GLYCOL 3350 17 G/17G
17 POWDER, FOR SOLUTION ORAL DAILY
Refills: 0 | Status: DISCONTINUED | OUTPATIENT
Start: 2020-01-01 | End: 2020-01-01

## 2020-01-01 RX ORDER — LIOTHYRONINE SODIUM 25 UG/1
1 TABLET ORAL
Qty: 0 | Refills: 0 | DISCHARGE
Start: 2020-01-01

## 2020-01-01 RX ORDER — AZTREONAM 2 G
1000 VIAL (EA) INJECTION EVERY 8 HOURS
Refills: 0 | Status: DISCONTINUED | OUTPATIENT
Start: 2020-01-01 | End: 2020-01-01

## 2020-01-01 RX ORDER — ATORVASTATIN CALCIUM 80 MG/1
1 TABLET, FILM COATED ORAL
Qty: 0 | Refills: 0 | DISCHARGE
Start: 2020-01-01

## 2020-01-01 RX ORDER — HYDROMORPHONE HYDROCHLORIDE 2 MG/ML
4 INJECTION INTRAMUSCULAR; INTRAVENOUS; SUBCUTANEOUS EVERY 6 HOURS
Refills: 0 | Status: DISCONTINUED | OUTPATIENT
Start: 2020-01-01 | End: 2020-01-01

## 2020-01-01 RX ORDER — FENTANYL CITRATE 50 UG/ML
1 INJECTION INTRAVENOUS
Refills: 0 | Status: DISCONTINUED | OUTPATIENT
Start: 2020-01-01 | End: 2020-01-01

## 2020-01-01 RX ORDER — ONDANSETRON 8 MG/1
1 TABLET, FILM COATED ORAL
Qty: 0 | Refills: 0 | DISCHARGE

## 2020-01-01 RX ORDER — SODIUM CHLORIDE 0.65 %
1 AEROSOL, SPRAY (ML) NASAL EVERY 6 HOURS
Refills: 0 | Status: DISCONTINUED | OUTPATIENT
Start: 2020-01-01 | End: 2020-01-01

## 2020-01-01 RX ORDER — HYDROMORPHONE HYDROCHLORIDE 2 MG/ML
4 INJECTION INTRAMUSCULAR; INTRAVENOUS; SUBCUTANEOUS EVERY 4 HOURS
Refills: 0 | Status: DISCONTINUED | OUTPATIENT
Start: 2020-01-01 | End: 2020-01-01

## 2020-01-01 RX ORDER — METOPROLOL TARTRATE 50 MG
1 TABLET ORAL
Qty: 0 | Refills: 0 | DISCHARGE
Start: 2020-01-01

## 2020-01-01 RX ORDER — MORPHINE SULFATE 50 MG/1
2 CAPSULE, EXTENDED RELEASE ORAL ONCE
Refills: 0 | Status: DISCONTINUED | OUTPATIENT
Start: 2020-01-01 | End: 2020-01-01

## 2020-01-01 RX ORDER — HYDROMORPHONE HYDROCHLORIDE 2 MG/ML
6 INJECTION INTRAMUSCULAR; INTRAVENOUS; SUBCUTANEOUS
Refills: 0 | Status: DISCONTINUED | OUTPATIENT
Start: 2020-01-01 | End: 2020-01-01

## 2020-01-01 RX ORDER — VANCOMYCIN HCL 1 G
1500 VIAL (EA) INTRAVENOUS EVERY 12 HOURS
Refills: 0 | Status: DISCONTINUED | OUTPATIENT
Start: 2020-01-01 | End: 2020-01-01

## 2020-01-01 RX ORDER — LIDOCAINE HCL 20 MG/ML
10 VIAL (ML) INJECTION ONCE
Refills: 0 | Status: COMPLETED | OUTPATIENT
Start: 2020-01-01 | End: 2020-01-01

## 2020-01-01 RX ORDER — HEPARIN SODIUM 5000 [USP'U]/ML
3500 INJECTION INTRAVENOUS; SUBCUTANEOUS EVERY 6 HOURS
Refills: 0 | Status: DISCONTINUED | OUTPATIENT
Start: 2020-01-01 | End: 2020-01-01

## 2020-01-01 RX ORDER — CHLORHEXIDINE GLUCONATE, 0.12% ORAL RINSE 1.2 MG/ML
0.12 SOLUTION DENTAL
Qty: 473 | Refills: 0 | Status: DISCONTINUED | COMMUNITY
Start: 2019-01-01

## 2020-01-01 RX ORDER — SODIUM CHLORIDE 9 MG/ML
1000 INJECTION INTRAMUSCULAR; INTRAVENOUS; SUBCUTANEOUS
Refills: 0 | Status: DISCONTINUED | OUTPATIENT
Start: 2020-01-01 | End: 2020-01-01

## 2020-01-01 RX ORDER — METOPROLOL TARTRATE 50 MG
25 TABLET ORAL ONCE
Refills: 0 | Status: COMPLETED | OUTPATIENT
Start: 2020-01-01 | End: 2020-01-01

## 2020-01-01 RX ORDER — ALPRAZOLAM 0.25 MG
0.25 TABLET ORAL
Refills: 0 | Status: DISCONTINUED | OUTPATIENT
Start: 2020-01-01 | End: 2020-01-01

## 2020-01-01 RX ORDER — HYDROMORPHONE HYDROCHLORIDE 2 MG/ML
4 INJECTION INTRAMUSCULAR; INTRAVENOUS; SUBCUTANEOUS
Refills: 0 | Status: DISCONTINUED | OUTPATIENT
Start: 2020-01-01 | End: 2020-01-01

## 2020-01-01 RX ORDER — POLYETHYLENE GLYCOL 3350 17 G/17G
17 POWDER, FOR SOLUTION ORAL
Qty: 0 | Refills: 0 | DISCHARGE
Start: 2020-01-01

## 2020-01-01 RX ORDER — DIPHENHYDRAMINE HCL 50 MG
25 CAPSULE ORAL EVERY 6 HOURS
Refills: 0 | Status: DISCONTINUED | OUTPATIENT
Start: 2020-01-01 | End: 2020-01-01

## 2020-01-01 RX ORDER — HYDROMORPHONE HYDROCHLORIDE 2 MG/ML
3 INJECTION INTRAMUSCULAR; INTRAVENOUS; SUBCUTANEOUS EVERY 4 HOURS
Refills: 0 | Status: DISCONTINUED | OUTPATIENT
Start: 2020-01-01 | End: 2020-01-01

## 2020-01-01 RX ORDER — ACETAMINOPHEN 500 MG
1000 TABLET ORAL ONCE
Refills: 0 | Status: COMPLETED | OUTPATIENT
Start: 2020-01-01 | End: 2020-01-01

## 2020-01-01 RX ORDER — METHADONE HYDROCHLORIDE 40 MG/1
1 TABLET ORAL
Qty: 0 | Refills: 0 | DISCHARGE
Start: 2020-01-01

## 2020-01-01 RX ORDER — NALOXONE HYDROCHLORIDE 4 MG/.1ML
0 SPRAY NASAL
Qty: 0 | Refills: 0 | DISCHARGE
Start: 2020-01-01

## 2020-01-01 RX ORDER — SIMETHICONE 80 MG/1
80 TABLET, CHEWABLE ORAL ONCE
Refills: 0 | Status: COMPLETED | OUTPATIENT
Start: 2020-01-01 | End: 2020-01-01

## 2020-01-01 RX ORDER — IBUPROFEN 200 MG
600 TABLET ORAL THREE TIMES A DAY
Refills: 0 | Status: DISCONTINUED | OUTPATIENT
Start: 2020-01-01 | End: 2020-01-01

## 2020-01-01 RX ORDER — CELECOXIB 200 MG/1
1 CAPSULE ORAL
Qty: 0 | Refills: 0 | DISCHARGE
Start: 2020-01-01

## 2020-01-01 RX ORDER — DEXAMETHASONE 0.5 MG/5ML
2 ELIXIR ORAL DAILY
Refills: 0 | Status: DISCONTINUED | OUTPATIENT
Start: 2020-01-01 | End: 2020-01-01

## 2020-01-01 RX ORDER — CIPROFLOXACIN LACTATE 400MG/40ML
500 VIAL (ML) INTRAVENOUS EVERY 12 HOURS
Refills: 0 | Status: COMPLETED | OUTPATIENT
Start: 2020-01-01 | End: 2020-01-01

## 2020-01-01 RX ORDER — DIPHENHYDRAMINE HCL 50 MG
25 CAPSULE ORAL ONCE
Refills: 0 | Status: COMPLETED | OUTPATIENT
Start: 2020-01-01 | End: 2020-01-01

## 2020-01-01 RX ORDER — DIAZEPAM 5 MG
10 TABLET ORAL ONCE
Refills: 0 | Status: DISCONTINUED | OUTPATIENT
Start: 2020-01-01 | End: 2020-01-01

## 2020-01-01 RX ORDER — HYDROMORPHONE HYDROCHLORIDE 2 MG/ML
2 INJECTION INTRAMUSCULAR; INTRAVENOUS; SUBCUTANEOUS EVERY 6 HOURS
Refills: 0 | Status: DISCONTINUED | OUTPATIENT
Start: 2020-01-01 | End: 2020-01-01

## 2020-01-01 RX ORDER — MINERAL OIL
133 OIL (ML) MISCELLANEOUS ONCE
Refills: 0 | Status: COMPLETED | OUTPATIENT
Start: 2020-01-01 | End: 2020-01-01

## 2020-01-01 RX ORDER — LIOTHYRONINE SODIUM 25 UG/1
1 TABLET ORAL
Qty: 0 | Refills: 0 | DISCHARGE

## 2020-01-01 RX ORDER — NYSTATIN CREAM 100000 [USP'U]/G
1 CREAM TOPICAL
Refills: 0 | Status: DISCONTINUED | OUTPATIENT
Start: 2020-01-01 | End: 2020-01-01

## 2020-01-01 RX ORDER — FOLIC ACID 0.8 MG
1 TABLET ORAL
Qty: 0 | Refills: 0 | DISCHARGE

## 2020-01-01 RX ORDER — ACETAMINOPHEN 500 MG
650 TABLET ORAL ONCE
Refills: 0 | Status: COMPLETED | OUTPATIENT
Start: 2020-01-01 | End: 2020-01-01

## 2020-01-01 RX ORDER — HYDROMORPHONE HYDROCHLORIDE 2 MG/ML
1 INJECTION INTRAMUSCULAR; INTRAVENOUS; SUBCUTANEOUS
Qty: 0 | Refills: 0 | DISCHARGE

## 2020-01-01 RX ORDER — LEVOTHYROXINE SODIUM 125 MCG
175 TABLET ORAL DAILY
Refills: 0 | Status: DISCONTINUED | OUTPATIENT
Start: 2020-01-01 | End: 2020-01-01

## 2020-01-01 RX ORDER — RIVAROXABAN 15 MG-20MG
1 KIT ORAL
Qty: 0 | Refills: 0 | DISCHARGE
Start: 2020-01-01 | End: 2020-01-01

## 2020-01-01 RX ORDER — MORPHINE SULFATE 50 MG/1
4 CAPSULE, EXTENDED RELEASE ORAL ONCE
Refills: 0 | Status: DISCONTINUED | OUTPATIENT
Start: 2020-01-01 | End: 2020-01-01

## 2020-01-01 RX ORDER — RIVAROXABAN 15 MG-20MG
1 KIT ORAL
Qty: 0 | Refills: 0 | DISCHARGE
Start: 2020-01-01

## 2020-01-01 RX ORDER — LIOTHYRONINE SODIUM 5 UG/1
5 TABLET ORAL DAILY
Qty: 90 | Refills: 1 | Status: ACTIVE | COMMUNITY
Start: 1900-01-01 | End: 1900-01-01

## 2020-01-01 RX ORDER — HEPARIN SODIUM 5000 [USP'U]/ML
7000 INJECTION INTRAVENOUS; SUBCUTANEOUS ONCE
Refills: 0 | Status: COMPLETED | OUTPATIENT
Start: 2020-01-01 | End: 2020-01-01

## 2020-01-01 RX ORDER — ALPRAZOLAM 0.25 MG
0.25 TABLET ORAL AT BEDTIME
Refills: 0 | Status: DISCONTINUED | OUTPATIENT
Start: 2020-01-01 | End: 2020-01-01

## 2020-01-01 RX ORDER — LACTULOSE 10 G/15ML
20 SOLUTION ORAL
Refills: 0 | Status: DISCONTINUED | OUTPATIENT
Start: 2020-01-01 | End: 2020-01-01

## 2020-01-01 RX ORDER — HYDROMORPHONE HYDROCHLORIDE 2 MG/ML
0.5 INJECTION INTRAMUSCULAR; INTRAVENOUS; SUBCUTANEOUS
Refills: 0 | Status: DISCONTINUED | OUTPATIENT
Start: 2020-01-01 | End: 2020-01-01

## 2020-01-01 RX ORDER — AZTREONAM 2 G
1000 VIAL (EA) INJECTION ONCE
Refills: 0 | Status: COMPLETED | OUTPATIENT
Start: 2020-01-01 | End: 2020-01-01

## 2020-01-01 RX ORDER — HYDROMORPHONE HYDROCHLORIDE 2 MG/ML
1 INJECTION INTRAMUSCULAR; INTRAVENOUS; SUBCUTANEOUS ONCE
Refills: 0 | Status: DISCONTINUED | OUTPATIENT
Start: 2020-01-01 | End: 2020-01-01

## 2020-01-01 RX ORDER — KETOROLAC TROMETHAMINE 30 MG/ML
15 SYRINGE (ML) INJECTION EVERY 6 HOURS
Refills: 0 | Status: DISCONTINUED | OUTPATIENT
Start: 2020-01-01 | End: 2020-01-01

## 2020-01-01 RX ORDER — SODIUM CHLORIDE 9 MG/ML
1000 INJECTION, SOLUTION INTRAVENOUS
Refills: 0 | Status: DISCONTINUED | OUTPATIENT
Start: 2020-01-01 | End: 2020-01-01

## 2020-01-01 RX ORDER — OXYCODONE HYDROCHLORIDE 5 MG/1
5 TABLET ORAL EVERY 4 HOURS
Refills: 0 | Status: DISCONTINUED | OUTPATIENT
Start: 2020-01-01 | End: 2020-01-01

## 2020-01-01 RX ORDER — ENOXAPARIN SODIUM 100 MG/ML
90 INJECTION SUBCUTANEOUS
Qty: 0 | Refills: 0 | DISCHARGE
Start: 2020-01-01

## 2020-01-01 RX ORDER — FOLIC ACID 1 MG/1
1 TABLET ORAL DAILY
Qty: 90 | Refills: 1 | Status: ACTIVE | COMMUNITY
Start: 2020-01-01 | End: 1900-01-01

## 2020-01-01 RX ORDER — ALPRAZOLAM 0.25 MG/1
0.25 TABLET ORAL
Qty: 30 | Refills: 0 | Status: ACTIVE | COMMUNITY
Start: 2020-01-01 | End: 1900-01-01

## 2020-01-01 RX ORDER — HYDROMORPHONE HYDROCHLORIDE 2 MG/ML
30 INJECTION INTRAMUSCULAR; INTRAVENOUS; SUBCUTANEOUS
Refills: 0 | Status: DISCONTINUED | OUTPATIENT
Start: 2020-01-01 | End: 2020-01-01

## 2020-01-01 RX ORDER — HYDROMORPHONE HYDROCHLORIDE 2 MG/ML
1 INJECTION INTRAMUSCULAR; INTRAVENOUS; SUBCUTANEOUS
Refills: 0 | Status: DISCONTINUED | OUTPATIENT
Start: 2020-01-01 | End: 2020-01-01

## 2020-01-01 RX ORDER — HYDROMORPHONE HYDROCHLORIDE 2 MG/ML
2 INJECTION INTRAMUSCULAR; INTRAVENOUS; SUBCUTANEOUS EVERY 4 HOURS
Refills: 0 | Status: DISCONTINUED | OUTPATIENT
Start: 2020-01-01 | End: 2020-01-01

## 2020-01-01 RX ORDER — SODIUM CHLORIDE 9 MG/ML
2000 INJECTION INTRAMUSCULAR; INTRAVENOUS; SUBCUTANEOUS ONCE
Refills: 0 | Status: COMPLETED | OUTPATIENT
Start: 2020-01-01 | End: 2020-01-01

## 2020-01-01 RX ORDER — ALPRAZOLAM 0.25 MG
1 TABLET ORAL ONCE
Refills: 0 | Status: DISCONTINUED | OUTPATIENT
Start: 2020-01-01 | End: 2020-01-01

## 2020-01-01 RX ORDER — ASPIRIN/CALCIUM CARB/MAGNESIUM 324 MG
81 TABLET ORAL DAILY
Refills: 0 | Status: DISCONTINUED | OUTPATIENT
Start: 2020-01-01 | End: 2020-01-01

## 2020-01-01 RX ORDER — LEVOTHYROXINE SODIUM 175 UG/1
175 TABLET ORAL DAILY
Qty: 30 | Refills: 3 | Status: ACTIVE | COMMUNITY
Start: 2019-01-01 | End: 1900-01-01

## 2020-01-01 RX ORDER — MAGNESIUM HYDROXIDE 400 MG/1
30 TABLET, CHEWABLE ORAL
Qty: 0 | Refills: 0 | DISCHARGE
Start: 2020-01-01

## 2020-01-01 RX ORDER — POTASSIUM CHLORIDE 20 MEQ
10 PACKET (EA) ORAL
Refills: 0 | Status: COMPLETED | OUTPATIENT
Start: 2020-01-01 | End: 2020-01-01

## 2020-01-01 RX ORDER — ALPRAZOLAM 0.25 MG
1 TABLET ORAL
Qty: 0 | Refills: 0 | DISCHARGE
Start: 2020-01-01 | End: 2020-01-01

## 2020-01-01 RX ORDER — SODIUM CHLORIDE 9 MG/ML
500 INJECTION INTRAMUSCULAR; INTRAVENOUS; SUBCUTANEOUS ONCE
Refills: 0 | Status: COMPLETED | OUTPATIENT
Start: 2020-01-01 | End: 2020-01-01

## 2020-01-01 RX ORDER — FOLIC ACID 0.8 MG
1 TABLET ORAL DAILY
Refills: 0 | Status: DISCONTINUED | OUTPATIENT
Start: 2020-01-01 | End: 2020-01-01

## 2020-01-01 RX ORDER — SIMETHICONE 80 MG/1
80 TABLET, CHEWABLE ORAL EVERY 6 HOURS
Refills: 0 | Status: DISCONTINUED | OUTPATIENT
Start: 2020-01-01 | End: 2020-01-01

## 2020-01-01 RX ORDER — MAGNESIUM HYDROXIDE 400 MG/1
30 TABLET, CHEWABLE ORAL DAILY
Refills: 0 | Status: DISCONTINUED | OUTPATIENT
Start: 2020-01-01 | End: 2020-01-01

## 2020-01-01 RX ORDER — METOCLOPRAMIDE HCL 10 MG
5 TABLET ORAL EVERY 6 HOURS
Refills: 0 | Status: DISCONTINUED | OUTPATIENT
Start: 2020-01-01 | End: 2020-01-01

## 2020-01-01 RX ORDER — LIDOCAINE 4 G/100G
1 CREAM TOPICAL DAILY
Refills: 0 | Status: DISCONTINUED | OUTPATIENT
Start: 2020-01-01 | End: 2020-01-01

## 2020-01-01 RX ORDER — DIAZEPAM 5 MG/1
5 TABLET ORAL
Qty: 10 | Refills: 0 | Status: DISCONTINUED | COMMUNITY
Start: 2020-01-01 | End: 2020-01-01

## 2020-01-01 RX ORDER — ASPIRIN ENTERIC COATED TABLETS 81 MG 81 MG/1
81 TABLET, DELAYED RELEASE ORAL
Refills: 0 | Status: DISCONTINUED | COMMUNITY
End: 2020-01-01

## 2020-01-01 RX ORDER — METOPROLOL TARTRATE 50 MG
25 TABLET ORAL
Refills: 0 | Status: DISCONTINUED | OUTPATIENT
Start: 2020-01-01 | End: 2020-01-01

## 2020-01-01 RX ORDER — ATORVASTATIN CALCIUM 80 MG/1
10 TABLET, FILM COATED ORAL AT BEDTIME
Refills: 0 | Status: DISCONTINUED | OUTPATIENT
Start: 2020-01-01 | End: 2020-01-01

## 2020-01-01 RX ORDER — AMIODARONE HYDROCHLORIDE 400 MG/1
TABLET ORAL
Refills: 0 | Status: DISCONTINUED | OUTPATIENT
Start: 2020-01-01 | End: 2020-01-01

## 2020-01-01 RX ORDER — DEXAMETHASONE 0.5 MG/5ML
4 ELIXIR ORAL EVERY 6 HOURS
Refills: 0 | Status: DISCONTINUED | OUTPATIENT
Start: 2020-01-01 | End: 2020-01-01

## 2020-01-01 RX ORDER — TIZANIDINE 4 MG/1
4 TABLET ORAL EVERY 8 HOURS
Refills: 0 | Status: DISCONTINUED | OUTPATIENT
Start: 2020-01-01 | End: 2020-01-01

## 2020-01-01 RX ORDER — LANOLIN ALCOHOL/MO/W.PET/CERES
1 CREAM (GRAM) TOPICAL AT BEDTIME
Refills: 0 | Status: COMPLETED | OUTPATIENT
Start: 2020-01-01 | End: 2020-01-01

## 2020-01-01 RX ORDER — GABAPENTIN 400 MG/1
300 CAPSULE ORAL
Refills: 0 | Status: DISCONTINUED | OUTPATIENT
Start: 2020-01-01 | End: 2020-01-01

## 2020-01-01 RX ORDER — HYDROMORPHONE HYDROCHLORIDE 2 MG/ML
2 INJECTION INTRAMUSCULAR; INTRAVENOUS; SUBCUTANEOUS ONCE
Refills: 0 | Status: DISCONTINUED | OUTPATIENT
Start: 2020-01-01 | End: 2020-01-01

## 2020-01-01 RX ORDER — NALOXONE HYDROCHLORIDE 4 MG/.1ML
1 SPRAY NASAL
Refills: 0 | Status: DISCONTINUED | OUTPATIENT
Start: 2020-01-01 | End: 2020-01-01

## 2020-01-01 RX ORDER — LEVOTHYROXINE SODIUM 125 MCG
100 TABLET ORAL DAILY
Refills: 0 | Status: DISCONTINUED | OUTPATIENT
Start: 2020-01-01 | End: 2020-01-01

## 2020-01-01 RX ORDER — POTASSIUM CHLORIDE 20 MEQ
10 PACKET (EA) ORAL ONCE
Refills: 0 | Status: COMPLETED | OUTPATIENT
Start: 2020-01-01 | End: 2020-01-01

## 2020-01-01 RX ORDER — RIVAROXABAN 15 MG-20MG
10 KIT ORAL DAILY
Refills: 0 | Status: DISCONTINUED | OUTPATIENT
Start: 2020-01-01 | End: 2020-01-01

## 2020-01-01 RX ORDER — PANTOPRAZOLE SODIUM 20 MG/1
40 TABLET, DELAYED RELEASE ORAL
Refills: 0 | Status: DISCONTINUED | OUTPATIENT
Start: 2020-01-01 | End: 2020-01-01

## 2020-01-01 RX ORDER — HEPARIN SODIUM 5000 [USP'U]/ML
7000 INJECTION INTRAVENOUS; SUBCUTANEOUS EVERY 6 HOURS
Refills: 0 | Status: DISCONTINUED | OUTPATIENT
Start: 2020-01-01 | End: 2020-01-01

## 2020-01-01 RX ORDER — PROCHLORPERAZINE MALEATE 10 MG/1
10 TABLET ORAL EVERY 6 HOURS
Qty: 120 | Refills: 0 | Status: ACTIVE | COMMUNITY
Start: 2020-01-01 | End: 1900-01-01

## 2020-01-01 RX ORDER — ACETAMINOPHEN 500 MG
650 TABLET ORAL EVERY 6 HOURS
Refills: 0 | Status: DISCONTINUED | OUTPATIENT
Start: 2020-01-01 | End: 2020-01-01

## 2020-01-01 RX ORDER — POLYETHYLENE GLYCOL 3350 17 G/17G
17 POWDER, FOR SOLUTION ORAL
Qty: 510 | Refills: 0
Start: 2020-01-01 | End: 2020-01-01

## 2020-01-01 RX ORDER — LIDOCAINE 4 G/100G
2 CREAM TOPICAL DAILY
Refills: 0 | Status: DISCONTINUED | OUTPATIENT
Start: 2020-01-01 | End: 2020-01-01

## 2020-01-01 RX ORDER — DEXAMETHASONE 0.5 MG/5ML
2 ELIXIR ORAL
Qty: 64 | Refills: 0
Start: 2020-01-01 | End: 2020-01-01

## 2020-01-01 RX ORDER — ONDANSETRON 8 MG/1
0 TABLET, FILM COATED ORAL
Qty: 0 | Refills: 0 | DISCHARGE
Start: 2020-01-01

## 2020-01-01 RX ORDER — L.ACIDOPH/B.ANIMALIS/B.LONGUM 15B CELL
1 CAPSULE ORAL
Qty: 0 | Refills: 0 | DISCHARGE

## 2020-01-01 RX ORDER — TIZANIDINE 4 MG/1
2 TABLET ORAL EVERY 8 HOURS
Refills: 0 | Status: DISCONTINUED | OUTPATIENT
Start: 2020-01-01 | End: 2020-01-01

## 2020-01-01 RX ORDER — TRAMADOL HYDROCHLORIDE 50 MG/1
1 TABLET ORAL
Qty: 30 | Refills: 0
Start: 2020-01-01

## 2020-01-01 RX ORDER — MAGNESIUM SULFATE 500 MG/ML
1 VIAL (ML) INJECTION ONCE
Refills: 0 | Status: DISCONTINUED | OUTPATIENT
Start: 2020-01-01 | End: 2020-01-01

## 2020-01-01 RX ORDER — ALPRAZOLAM 0.25 MG
0.5 TABLET ORAL AT BEDTIME
Refills: 0 | Status: DISCONTINUED | OUTPATIENT
Start: 2020-01-01 | End: 2020-01-01

## 2020-01-01 RX ORDER — MAGNESIUM SULFATE 500 MG/ML
2 VIAL (ML) INJECTION ONCE
Refills: 0 | Status: COMPLETED | OUTPATIENT
Start: 2020-01-01 | End: 2020-01-01

## 2020-01-01 RX ORDER — CHLORHEXIDINE GLUCONATE 213 G/1000ML
1 SOLUTION TOPICAL ONCE
Refills: 0 | Status: COMPLETED | OUTPATIENT
Start: 2020-01-01 | End: 2020-01-01

## 2020-01-01 RX ORDER — METOPROLOL TARTRATE 50 MG
50 TABLET ORAL DAILY
Refills: 0 | Status: DISCONTINUED | OUTPATIENT
Start: 2020-01-01 | End: 2020-01-01

## 2020-01-01 RX ORDER — LANOLIN ALCOHOL/MO/W.PET/CERES
5 CREAM (GRAM) TOPICAL AT BEDTIME
Refills: 0 | Status: DISCONTINUED | OUTPATIENT
Start: 2020-01-01 | End: 2020-01-01

## 2020-01-01 RX ORDER — CELECOXIB 200 MG/1
200 CAPSULE ORAL
Refills: 0 | Status: DISCONTINUED | OUTPATIENT
Start: 2020-01-01 | End: 2020-01-01

## 2020-01-01 RX ORDER — HYDROMORPHONE HYDROCHLORIDE 2 MG/ML
2 INJECTION INTRAMUSCULAR; INTRAVENOUS; SUBCUTANEOUS
Refills: 0 | Status: DISCONTINUED | OUTPATIENT
Start: 2020-01-01 | End: 2020-01-01

## 2020-01-01 RX ORDER — ATORVASTATIN CALCIUM 10 MG/1
10 TABLET, FILM COATED ORAL
Qty: 90 | Refills: 0 | Status: ACTIVE | COMMUNITY
Start: 2019-01-01 | End: 1900-01-01

## 2020-01-01 RX ORDER — NALOXONE HYDROCHLORIDE 4 MG/.1ML
0.2 SPRAY NASAL
Qty: 0 | Refills: 0 | DISCHARGE
Start: 2020-01-01

## 2020-01-01 RX ORDER — ALPRAZOLAM 0.25 MG
0.25 TABLET ORAL THREE TIMES A DAY
Refills: 0 | Status: DISCONTINUED | OUTPATIENT
Start: 2020-01-01 | End: 2020-01-01

## 2020-01-01 RX ORDER — ACETAMINOPHEN 500 MG
975 TABLET ORAL ONCE
Refills: 0 | Status: COMPLETED | OUTPATIENT
Start: 2020-01-01 | End: 2020-01-01

## 2020-01-01 RX ORDER — PANTOPRAZOLE SODIUM 20 MG/1
1 TABLET, DELAYED RELEASE ORAL
Qty: 0 | Refills: 0 | DISCHARGE

## 2020-01-01 RX ORDER — SENNA PLUS 8.6 MG/1
1 TABLET ORAL ONCE
Refills: 0 | Status: COMPLETED | OUTPATIENT
Start: 2020-01-01 | End: 2020-01-01

## 2020-01-01 RX ORDER — ALPRAZOLAM 0.25 MG
1 TABLET ORAL
Qty: 0 | Refills: 0 | DISCHARGE

## 2020-01-01 RX ORDER — DIPHENHYDRAMINE HCL 50 MG
25 CAPSULE ORAL EVERY 4 HOURS
Refills: 0 | Status: DISCONTINUED | OUTPATIENT
Start: 2020-01-01 | End: 2020-01-01

## 2020-01-01 RX ORDER — ENOXAPARIN SODIUM 100 MG/ML
40 INJECTION SUBCUTANEOUS AT BEDTIME
Refills: 0 | Status: DISCONTINUED | OUTPATIENT
Start: 2020-01-01 | End: 2020-01-01

## 2020-01-01 RX ORDER — HEPARIN SODIUM 5000 [USP'U]/ML
7000 INJECTION INTRAVENOUS; SUBCUTANEOUS ONCE
Refills: 0 | Status: DISCONTINUED | OUTPATIENT
Start: 2020-01-01 | End: 2020-01-01

## 2020-01-01 RX ORDER — SENNA PLUS 8.6 MG/1
2 TABLET ORAL
Qty: 0 | Refills: 0 | DISCHARGE
Start: 2020-01-01

## 2020-01-01 RX ORDER — METOPROLOL TARTRATE 50 MG
5 TABLET ORAL ONCE
Refills: 0 | Status: COMPLETED | OUTPATIENT
Start: 2020-01-01 | End: 2020-01-01

## 2020-01-01 RX ORDER — CALCIUM GLUCONATE 100 MG/ML
2 VIAL (ML) INTRAVENOUS ONCE
Refills: 0 | Status: COMPLETED | OUTPATIENT
Start: 2020-01-01 | End: 2020-01-01

## 2020-01-01 RX ORDER — LACTULOSE 10 G/15ML
200 SOLUTION ORAL DAILY
Refills: 0 | Status: DISCONTINUED | OUTPATIENT
Start: 2020-01-01 | End: 2020-01-01

## 2020-01-01 RX ORDER — HYDROMORPHONE HYDROCHLORIDE 2 MG/ML
6 INJECTION INTRAMUSCULAR; INTRAVENOUS; SUBCUTANEOUS EVERY 4 HOURS
Refills: 0 | Status: DISCONTINUED | OUTPATIENT
Start: 2020-01-01 | End: 2020-01-01

## 2020-01-01 RX ORDER — HYDROMORPHONE HYDROCHLORIDE 2 MG/ML
3 INJECTION INTRAMUSCULAR; INTRAVENOUS; SUBCUTANEOUS
Qty: 0 | Refills: 0 | DISCHARGE
Start: 2020-01-01

## 2020-01-01 RX ORDER — HYDROMORPHONE HYDROCHLORIDE 2 MG/ML
0 INJECTION INTRAMUSCULAR; INTRAVENOUS; SUBCUTANEOUS
Qty: 0 | Refills: 0 | DISCHARGE
Start: 2020-01-01

## 2020-01-01 RX ORDER — CLINDAMYCIN HYDROCHLORIDE 150 MG/1
150 CAPSULE ORAL
Qty: 28 | Refills: 0 | Status: DISCONTINUED | COMMUNITY
Start: 2019-01-01

## 2020-01-01 RX ORDER — HEPARIN SODIUM 5000 [USP'U]/ML
INJECTION INTRAVENOUS; SUBCUTANEOUS
Qty: 25000 | Refills: 0 | Status: DISCONTINUED | OUTPATIENT
Start: 2020-01-01 | End: 2020-01-01

## 2020-01-01 RX ORDER — LACTULOSE 10 G/15ML
30 SOLUTION ORAL
Qty: 0 | Refills: 0 | DISCHARGE
Start: 2020-01-01

## 2020-01-01 RX ORDER — ENOXAPARIN SODIUM 100 MG/ML
90 INJECTION SUBCUTANEOUS
Refills: 0 | Status: DISCONTINUED | OUTPATIENT
Start: 2020-01-01 | End: 2020-01-01

## 2020-01-01 RX ORDER — HYDROMORPHONE HYDROCHLORIDE 2 MG/ML
3 INJECTION INTRAMUSCULAR; INTRAVENOUS; SUBCUTANEOUS
Refills: 0 | Status: DISCONTINUED | OUTPATIENT
Start: 2020-01-01 | End: 2020-01-01

## 2020-01-01 RX ORDER — CYCLOBENZAPRINE HYDROCHLORIDE 10 MG/1
10 TABLET, FILM COATED ORAL
Qty: 30 | Refills: 0 | Status: ACTIVE | COMMUNITY
Start: 2020-01-01 | End: 1900-01-01

## 2020-01-01 RX ORDER — ACETAMINOPHEN 500 MG
2 TABLET ORAL
Qty: 0 | Refills: 0 | DISCHARGE
Start: 2020-01-01

## 2020-01-01 RX ORDER — TRAMADOL HYDROCHLORIDE 50 MG/1
50 TABLET ORAL ONCE
Refills: 0 | Status: DISCONTINUED | OUTPATIENT
Start: 2020-01-01 | End: 2020-01-01

## 2020-01-01 RX ORDER — NALOXONE HYDROCHLORIDE 4 MG/.1ML
1 SPRAY NASAL
Qty: 0 | Refills: 0 | DISCHARGE

## 2020-01-01 RX ORDER — PREDNISONE 20 MG/1
20 TABLET ORAL
Qty: 12 | Refills: 0 | Status: DISCONTINUED | COMMUNITY
Start: 2020-01-01 | End: 2020-01-01

## 2020-01-01 RX ORDER — METOPROLOL TARTRATE 50 MG
12.5 TABLET ORAL ONCE
Refills: 0 | Status: COMPLETED | OUTPATIENT
Start: 2020-01-01 | End: 2020-01-01

## 2020-01-01 RX ORDER — IPRATROPIUM/ALBUTEROL SULFATE 18-103MCG
3 AEROSOL WITH ADAPTER (GRAM) INHALATION
Qty: 0 | Refills: 0 | DISCHARGE
Start: 2020-01-01

## 2020-01-01 RX ORDER — HYDROMORPHONE HYDROCHLORIDE 2 MG/ML
3 INJECTION INTRAMUSCULAR; INTRAVENOUS; SUBCUTANEOUS
Qty: 0 | Refills: 0 | DISCHARGE

## 2020-01-01 RX ORDER — DEXAMETHASONE 0.5 MG/5ML
8 ELIXIR ORAL ONCE
Refills: 0 | Status: COMPLETED | OUTPATIENT
Start: 2020-01-01 | End: 2020-01-01

## 2020-01-01 RX ORDER — SODIUM CHLORIDE 9 MG/ML
500 INJECTION INTRAMUSCULAR; INTRAVENOUS; SUBCUTANEOUS ONCE
Refills: 0 | Status: DISCONTINUED | OUTPATIENT
Start: 2020-01-01 | End: 2020-01-01

## 2020-01-01 RX ORDER — MORPHINE SULFATE 15 MG/1
15 TABLET ORAL
Qty: 56 | Refills: 0 | Status: ACTIVE | COMMUNITY
Start: 2020-01-01 | End: 1900-01-01

## 2020-01-01 RX ORDER — DOXYCYCLINE 100 MG/1
100 CAPSULE ORAL
Qty: 2 | Refills: 0 | Status: DISCONTINUED | COMMUNITY
Start: 2020-01-01 | End: 2020-01-01

## 2020-01-01 RX ORDER — MAGNESIUM SULFATE 500 MG/ML
1 VIAL (ML) INJECTION ONCE
Refills: 0 | Status: COMPLETED | OUTPATIENT
Start: 2020-01-01 | End: 2020-01-01

## 2020-01-01 RX ORDER — POLYETHYLENE GLYCOL 3350 17 G/17G
17 POWDER, FOR SOLUTION ORAL
Refills: 0 | Status: DISCONTINUED | OUTPATIENT
Start: 2020-01-01 | End: 2020-01-01

## 2020-01-01 RX ORDER — POLYETHYLENE GLYCOL 3350 17 G/17G
17 POWDER, FOR SOLUTION ORAL AT BEDTIME
Refills: 0 | Status: DISCONTINUED | OUTPATIENT
Start: 2020-01-01 | End: 2020-01-01

## 2020-01-01 RX ORDER — VALACYCLOVIR 500 MG/1
1 TABLET, FILM COATED ORAL
Qty: 0 | Refills: 0 | DISCHARGE

## 2020-01-01 RX ORDER — ENOXAPARIN SODIUM 100 MG/ML
1 INJECTION SUBCUTANEOUS
Qty: 0 | Refills: 0 | DISCHARGE
Start: 2020-01-01

## 2020-01-01 RX ORDER — OXYCODONE HYDROCHLORIDE 5 MG/1
5 TABLET ORAL ONCE
Refills: 0 | Status: DISCONTINUED | OUTPATIENT
Start: 2020-01-01 | End: 2020-01-01

## 2020-01-01 RX ORDER — PANTOPRAZOLE SODIUM 20 MG/1
1 TABLET, DELAYED RELEASE ORAL
Qty: 0 | Refills: 0 | DISCHARGE
Start: 2020-01-01

## 2020-01-01 RX ORDER — GABAPENTIN 400 MG/1
300 CAPSULE ORAL EVERY 8 HOURS
Refills: 0 | Status: DISCONTINUED | OUTPATIENT
Start: 2020-01-01 | End: 2020-01-01

## 2020-01-01 RX ORDER — HEPARIN SODIUM 5000 [USP'U]/ML
1400 INJECTION INTRAVENOUS; SUBCUTANEOUS
Qty: 25000 | Refills: 0 | Status: DISCONTINUED | OUTPATIENT
Start: 2020-01-01 | End: 2020-01-01

## 2020-01-01 RX ORDER — ASPIRIN/CALCIUM CARB/MAGNESIUM 324 MG
1 TABLET ORAL
Qty: 0 | Refills: 0 | DISCHARGE

## 2020-01-01 RX ORDER — METHYLNALTREXONE BROMIDE 12 MG/.6ML
12 INJECTION, SOLUTION SUBCUTANEOUS ONCE
Refills: 0 | Status: COMPLETED | OUTPATIENT
Start: 2020-01-01 | End: 2020-01-01

## 2020-01-01 RX ORDER — AMIODARONE HYDROCHLORIDE 400 MG/1
400 TABLET ORAL EVERY 8 HOURS
Refills: 0 | Status: DISCONTINUED | OUTPATIENT
Start: 2020-01-01 | End: 2020-01-01

## 2020-01-01 RX ORDER — HYDROMORPHONE HYDROCHLORIDE 2 MG/ML
6 INJECTION INTRAMUSCULAR; INTRAVENOUS; SUBCUTANEOUS EVERY 6 HOURS
Refills: 0 | Status: DISCONTINUED | OUTPATIENT
Start: 2020-01-01 | End: 2020-01-01

## 2020-01-01 RX ORDER — MORPHINE SULFATE 50 MG/1
30 CAPSULE, EXTENDED RELEASE ORAL
Refills: 0 | Status: DISCONTINUED | OUTPATIENT
Start: 2020-01-01 | End: 2020-01-01

## 2020-01-01 RX ORDER — ATORVASTATIN CALCIUM 80 MG/1
20 TABLET, FILM COATED ORAL AT BEDTIME
Refills: 0 | Status: DISCONTINUED | OUTPATIENT
Start: 2020-01-01 | End: 2020-01-01

## 2020-01-01 RX ORDER — VANCOMYCIN HCL 1 G
1250 VIAL (EA) INTRAVENOUS ONCE
Refills: 0 | Status: COMPLETED | OUTPATIENT
Start: 2020-01-01 | End: 2020-01-01

## 2020-01-01 RX ORDER — IPRATROPIUM/ALBUTEROL SULFATE 18-103MCG
3 AEROSOL WITH ADAPTER (GRAM) INHALATION EVERY 6 HOURS
Refills: 0 | Status: DISCONTINUED | OUTPATIENT
Start: 2020-01-01 | End: 2020-01-01

## 2020-01-01 RX ORDER — HYDROMORPHONE HYDROCHLORIDE 2 MG/ML
2 INJECTION INTRAMUSCULAR; INTRAVENOUS; SUBCUTANEOUS
Qty: 180 | Refills: 0
Start: 2020-01-01 | End: 2020-01-01

## 2020-01-01 RX ORDER — LIDOCAINE 4 G/100G
0 CREAM TOPICAL
Qty: 0 | Refills: 0 | DISCHARGE
Start: 2020-01-01

## 2020-01-01 RX ORDER — ALPRAZOLAM 0.25 MG
1 TABLET ORAL
Qty: 60 | Refills: 0
Start: 2020-01-01 | End: 2020-01-01

## 2020-01-01 RX ORDER — ENOXAPARIN SODIUM 100 MG/ML
40 INJECTION SUBCUTANEOUS ONCE
Refills: 0 | Status: COMPLETED | OUTPATIENT
Start: 2020-01-01 | End: 2020-01-01

## 2020-01-01 RX ORDER — FILGRASTIM 480MCG/1.6
480 VIAL (ML) INJECTION ONCE
Refills: 0 | Status: COMPLETED | OUTPATIENT
Start: 2020-01-01 | End: 2020-01-01

## 2020-01-01 RX ORDER — SIMETHICONE 80 MG/1
1 TABLET, CHEWABLE ORAL
Qty: 0 | Refills: 0 | DISCHARGE
Start: 2020-01-01

## 2020-01-01 RX ORDER — CELECOXIB 200 MG/1
200 CAPSULE ORAL EVERY 12 HOURS
Refills: 0 | Status: DISCONTINUED | OUTPATIENT
Start: 2020-01-01 | End: 2020-01-01

## 2020-01-01 RX ORDER — LABETALOL HCL 100 MG
5 TABLET ORAL ONCE
Refills: 0 | Status: COMPLETED | OUTPATIENT
Start: 2020-01-01 | End: 2020-01-01

## 2020-01-01 RX ORDER — NALOXONE HYDROCHLORIDE 4 MG/.1ML
0.1 SPRAY NASAL
Refills: 0 | Status: DISCONTINUED | OUTPATIENT
Start: 2020-01-01 | End: 2020-01-01

## 2020-01-01 RX ORDER — ONDANSETRON 8 MG/1
4 TABLET, FILM COATED ORAL EVERY 6 HOURS
Refills: 0 | Status: DISCONTINUED | OUTPATIENT
Start: 2020-01-01 | End: 2020-01-01

## 2020-01-01 RX ORDER — LANOLIN ALCOHOL/MO/W.PET/CERES
1 CREAM (GRAM) TOPICAL
Qty: 0 | Refills: 0 | DISCHARGE
Start: 2020-01-01

## 2020-01-01 RX ORDER — CHLORHEXIDINE GLUCONATE 213 G/1000ML
1 SOLUTION TOPICAL DAILY
Refills: 0 | Status: DISCONTINUED | OUTPATIENT
Start: 2020-01-01 | End: 2020-01-01

## 2020-01-01 RX ORDER — VANCOMYCIN HCL 1 G
1000 VIAL (EA) INTRAVENOUS ONCE
Refills: 0 | Status: COMPLETED | OUTPATIENT
Start: 2020-01-01 | End: 2020-01-01

## 2020-01-01 RX ORDER — METHADONE HYDROCHLORIDE 40 MG/1
5 TABLET ORAL
Refills: 0 | Status: DISCONTINUED | OUTPATIENT
Start: 2020-01-01 | End: 2020-01-01

## 2020-01-01 RX ORDER — DIPHENHYDRAMINE HCL 50 MG
1 CAPSULE ORAL
Qty: 0 | Refills: 0 | DISCHARGE
Start: 2020-01-01

## 2020-01-01 RX ORDER — MORPHINE SULFATE 50 MG/1
30 CAPSULE, EXTENDED RELEASE ORAL EVERY 6 HOURS
Refills: 0 | Status: DISCONTINUED | OUTPATIENT
Start: 2020-01-01 | End: 2020-01-01

## 2020-01-01 RX ORDER — DEXAMETHASONE 0.5 MG/5ML
2 ELIXIR ORAL
Qty: 0 | Refills: 0 | DISCHARGE
Start: 2020-01-01

## 2020-01-01 RX ORDER — HYDROMORPHONE HYDROCHLORIDE 2 MG/ML
4 INJECTION INTRAMUSCULAR; INTRAVENOUS; SUBCUTANEOUS AT BEDTIME
Refills: 0 | Status: DISCONTINUED | OUTPATIENT
Start: 2020-01-01 | End: 2020-01-01

## 2020-01-01 RX ORDER — NAPROXEN 500 MG/1
500 TABLET ORAL
Qty: 60 | Refills: 0 | Status: ACTIVE | COMMUNITY
Start: 2020-01-01 | End: 1900-01-01

## 2020-01-01 RX ORDER — DEXAMETHASONE 0.5 MG/5ML
1 ELIXIR ORAL
Qty: 0 | Refills: 0 | DISCHARGE
Start: 2020-01-01

## 2020-01-01 RX ORDER — LACTULOSE 10 G/15ML
30 SOLUTION ORAL
Qty: 0 | Refills: 0 | DISCHARGE

## 2020-01-01 RX ORDER — LEVOTHYROXINE SODIUM 125 MCG
1 TABLET ORAL
Qty: 0 | Refills: 0 | DISCHARGE
Start: 2020-01-01

## 2020-01-01 RX ORDER — AMIODARONE HYDROCHLORIDE 400 MG/1
150 TABLET ORAL ONCE
Refills: 0 | Status: COMPLETED | OUTPATIENT
Start: 2020-01-01 | End: 2020-01-01

## 2020-01-01 RX ORDER — FENTANYL CITRATE 50 UG/ML
50 INJECTION INTRAVENOUS
Refills: 0 | Status: DISCONTINUED | OUTPATIENT
Start: 2020-01-01 | End: 2020-01-01

## 2020-01-01 RX ORDER — MILK THISTLE 180 MG
0 CAPSULE ORAL
Qty: 0 | Refills: 0 | DISCHARGE

## 2020-01-01 RX ORDER — NALOXEGOL OXALATE 12.5 MG/1
1 TABLET, FILM COATED ORAL
Qty: 0 | Refills: 0 | DISCHARGE
Start: 2020-01-01

## 2020-01-01 RX ORDER — NALOXEGOL OXALATE 12.5 MG/1
25 TABLET, FILM COATED ORAL DAILY
Refills: 0 | Status: DISCONTINUED | OUTPATIENT
Start: 2020-01-01 | End: 2020-01-01

## 2020-01-01 RX ORDER — MULTIVIT WITH MIN/MFOLATE/K2 340-15/3 G
1 POWDER (GRAM) ORAL ONCE
Refills: 0 | Status: COMPLETED | OUTPATIENT
Start: 2020-01-01 | End: 2020-01-01

## 2020-01-01 RX ORDER — GABAPENTIN 400 MG/1
1 CAPSULE ORAL
Qty: 0 | Refills: 0 | DISCHARGE
Start: 2020-01-01

## 2020-01-01 RX ORDER — FENTANYL CITRATE 50 UG/ML
1 INJECTION INTRAVENOUS ONCE
Refills: 0 | Status: DISCONTINUED | OUTPATIENT
Start: 2020-01-01 | End: 2020-01-01

## 2020-01-01 RX ORDER — LACTULOSE 10 G/15ML
0 SOLUTION ORAL
Qty: 0 | Refills: 0 | DISCHARGE
Start: 2020-01-01

## 2020-01-01 RX ORDER — METOCLOPRAMIDE HCL 10 MG
0 TABLET ORAL
Qty: 0 | Refills: 0 | DISCHARGE
Start: 2020-01-01

## 2020-01-01 RX ORDER — HYDROMORPHONE HYDROCHLORIDE 2 MG/ML
1 INJECTION INTRAMUSCULAR; INTRAVENOUS; SUBCUTANEOUS DAILY
Refills: 0 | Status: DISCONTINUED | OUTPATIENT
Start: 2020-01-01 | End: 2020-01-01

## 2020-01-01 RX ORDER — ENOXAPARIN SODIUM 100 MG/ML
40 INJECTION SUBCUTANEOUS
Qty: 1200 | Refills: 0
Start: 2020-01-01 | End: 2020-01-01

## 2020-01-01 RX ORDER — AZTREONAM 2 G
VIAL (EA) INJECTION
Refills: 0 | Status: DISCONTINUED | OUTPATIENT
Start: 2020-01-01 | End: 2020-01-01

## 2020-01-01 RX ORDER — ONDANSETRON 8 MG/1
4 TABLET, FILM COATED ORAL ONCE
Refills: 0 | Status: DISCONTINUED | OUTPATIENT
Start: 2020-01-01 | End: 2020-01-01

## 2020-01-01 RX ORDER — MINERAL OIL
133 OIL (ML) MISCELLANEOUS ONCE
Refills: 0 | Status: DISCONTINUED | OUTPATIENT
Start: 2020-01-01 | End: 2020-01-01

## 2020-01-01 RX ORDER — SODIUM CHLORIDE 0.65 %
1 AEROSOL, SPRAY (ML) NASAL
Qty: 0 | Refills: 0 | DISCHARGE
Start: 2020-01-01

## 2020-01-01 RX ORDER — TIZANIDINE 4 MG/1
1 TABLET ORAL
Qty: 0 | Refills: 0 | DISCHARGE
Start: 2020-01-01

## 2020-01-01 RX ORDER — HYDROMORPHONE HYDROCHLORIDE 2 MG/ML
3 INJECTION INTRAMUSCULAR; INTRAVENOUS; SUBCUTANEOUS
Qty: 50 | Refills: 0
Start: 2020-01-01

## 2020-01-01 RX ORDER — ALPRAZOLAM 0.25 MG
0.25 TABLET ORAL ONCE
Refills: 0 | Status: DISCONTINUED | OUTPATIENT
Start: 2020-01-01 | End: 2020-01-01

## 2020-01-01 RX ORDER — NALOXONE HYDROCHLORIDE 4 MG/.1ML
0.2 SPRAY NASAL
Refills: 0 | Status: DISCONTINUED | OUTPATIENT
Start: 2020-01-01 | End: 2020-01-01

## 2020-01-01 RX ORDER — DIAZEPAM 5 MG
5 TABLET ORAL ONCE
Refills: 0 | Status: DISCONTINUED | OUTPATIENT
Start: 2020-01-01 | End: 2020-01-01

## 2020-01-01 RX ORDER — ATORVASTATIN CALCIUM 80 MG/1
1 TABLET, FILM COATED ORAL
Qty: 0 | Refills: 0 | DISCHARGE

## 2020-01-01 RX ORDER — LEVOFLOXACIN 500 MG/1
500 TABLET, FILM COATED ORAL DAILY
Qty: 5 | Refills: 0 | Status: DISCONTINUED | COMMUNITY
Start: 2020-01-01 | End: 2020-01-01

## 2020-01-01 RX ORDER — HYDROMORPHONE HYDROCHLORIDE 2 MG/ML
3 INJECTION INTRAMUSCULAR; INTRAVENOUS; SUBCUTANEOUS ONCE
Refills: 0 | Status: DISCONTINUED | OUTPATIENT
Start: 2020-01-01 | End: 2020-01-01

## 2020-01-01 RX ORDER — ALBUTEROL SULFATE 90 UG/1
108 (90 BASE) INHALANT RESPIRATORY (INHALATION)
Qty: 1 | Refills: 1 | Status: ACTIVE | COMMUNITY
Start: 2020-01-01 | End: 1900-01-01

## 2020-01-01 RX ORDER — ONDANSETRON 8 MG/1
8 TABLET, ORALLY DISINTEGRATING ORAL EVERY 8 HOURS
Qty: 90 | Refills: 0 | Status: ACTIVE | COMMUNITY
Start: 2020-01-01 | End: 1900-01-01

## 2020-01-01 RX ORDER — ACETAMINOPHEN 500 MG
975 TABLET ORAL EVERY 8 HOURS
Refills: 0 | Status: DISCONTINUED | OUTPATIENT
Start: 2020-01-01 | End: 2020-01-01

## 2020-01-01 RX ORDER — LIDOCAINE 4 G/100G
1 CREAM TOPICAL
Qty: 3 | Refills: 0
Start: 2020-01-01 | End: 2020-01-01

## 2020-01-01 RX ORDER — HYDROMORPHONE HYDROCHLORIDE 2 MG/ML
1 INJECTION INTRAMUSCULAR; INTRAVENOUS; SUBCUTANEOUS
Qty: 90 | Refills: 0
Start: 2020-01-01 | End: 2020-01-01

## 2020-01-01 RX ADMIN — Medication 3 MILLIGRAM(S): at 21:19

## 2020-01-01 RX ADMIN — MORPHINE SULFATE 4 MILLIGRAM(S): 50 CAPSULE, EXTENDED RELEASE ORAL at 17:24

## 2020-01-01 RX ADMIN — HYDROMORPHONE HYDROCHLORIDE 1 MILLIGRAM(S): 2 INJECTION INTRAMUSCULAR; INTRAVENOUS; SUBCUTANEOUS at 15:21

## 2020-01-01 RX ADMIN — Medication 25 MILLIGRAM(S): at 20:43

## 2020-01-01 RX ADMIN — Medication 10 MILLIGRAM(S): at 17:17

## 2020-01-01 RX ADMIN — Medication 975 MILLIGRAM(S): at 17:34

## 2020-01-01 RX ADMIN — Medication 50 MILLIGRAM(S): at 05:47

## 2020-01-01 RX ADMIN — CELECOXIB 200 MILLIGRAM(S): 200 CAPSULE ORAL at 18:16

## 2020-01-01 RX ADMIN — Medication 975 MILLIGRAM(S): at 22:00

## 2020-01-01 RX ADMIN — HYDROMORPHONE HYDROCHLORIDE 6 MILLIGRAM(S): 2 INJECTION INTRAMUSCULAR; INTRAVENOUS; SUBCUTANEOUS at 21:39

## 2020-01-01 RX ADMIN — FENTANYL CITRATE 1 PATCH: 50 INJECTION INTRAVENOUS at 07:35

## 2020-01-01 RX ADMIN — HYDROMORPHONE HYDROCHLORIDE 4 MILLIGRAM(S): 2 INJECTION INTRAMUSCULAR; INTRAVENOUS; SUBCUTANEOUS at 06:54

## 2020-01-01 RX ADMIN — Medication 0.25 MILLIGRAM(S): at 10:17

## 2020-01-01 RX ADMIN — SENNA PLUS 1 TABLET(S): 8.6 TABLET ORAL at 06:48

## 2020-01-01 RX ADMIN — Medication 0.25 MILLIGRAM(S): at 17:13

## 2020-01-01 RX ADMIN — RIVAROXABAN 10 MILLIGRAM(S): KIT at 13:59

## 2020-01-01 RX ADMIN — PANTOPRAZOLE SODIUM 40 MILLIGRAM(S): 20 TABLET, DELAYED RELEASE ORAL at 06:01

## 2020-01-01 RX ADMIN — HYDROMORPHONE HYDROCHLORIDE 3 MILLIGRAM(S): 2 INJECTION INTRAMUSCULAR; INTRAVENOUS; SUBCUTANEOUS at 02:43

## 2020-01-01 RX ADMIN — Medication 1 SPRAY(S): at 18:39

## 2020-01-01 RX ADMIN — Medication 650 MILLIGRAM(S): at 11:22

## 2020-01-01 RX ADMIN — HYDROMORPHONE HYDROCHLORIDE 3 MILLIGRAM(S): 2 INJECTION INTRAMUSCULAR; INTRAVENOUS; SUBCUTANEOUS at 11:08

## 2020-01-01 RX ADMIN — PANTOPRAZOLE SODIUM 40 MILLIGRAM(S): 20 TABLET, DELAYED RELEASE ORAL at 05:25

## 2020-01-01 RX ADMIN — Medication 0.25 MILLIGRAM(S): at 07:20

## 2020-01-01 RX ADMIN — Medication 175 MICROGRAM(S): at 05:07

## 2020-01-01 RX ADMIN — LIDOCAINE 1 PATCH: 4 CREAM TOPICAL at 23:51

## 2020-01-01 RX ADMIN — HYDROMORPHONE HYDROCHLORIDE 3 MILLIGRAM(S): 2 INJECTION INTRAMUSCULAR; INTRAVENOUS; SUBCUTANEOUS at 20:15

## 2020-01-01 RX ADMIN — NALOXEGOL OXALATE 25 MILLIGRAM(S): 12.5 TABLET, FILM COATED ORAL at 11:20

## 2020-01-01 RX ADMIN — LACTULOSE 200 GRAM(S): 10 SOLUTION ORAL at 17:43

## 2020-01-01 RX ADMIN — LIOTHYRONINE SODIUM 5 MICROGRAM(S): 25 TABLET ORAL at 06:01

## 2020-01-01 RX ADMIN — LIOTHYRONINE SODIUM 5 MICROGRAM(S): 25 TABLET ORAL at 05:11

## 2020-01-01 RX ADMIN — HYDROMORPHONE HYDROCHLORIDE 4 MILLIGRAM(S): 2 INJECTION INTRAMUSCULAR; INTRAVENOUS; SUBCUTANEOUS at 05:54

## 2020-01-01 RX ADMIN — Medication 975 MILLIGRAM(S): at 18:51

## 2020-01-01 RX ADMIN — FENTANYL CITRATE 1 PATCH: 50 INJECTION INTRAVENOUS at 05:59

## 2020-01-01 RX ADMIN — Medication 50 MILLIGRAM(S): at 05:08

## 2020-01-01 RX ADMIN — MAGNESIUM HYDROXIDE 30 MILLILITER(S): 400 TABLET, CHEWABLE ORAL at 12:52

## 2020-01-01 RX ADMIN — Medication 600 MILLIGRAM(S): at 14:31

## 2020-01-01 RX ADMIN — Medication 50 MILLIGRAM(S): at 21:20

## 2020-01-01 RX ADMIN — Medication 4 MILLIGRAM(S): at 05:13

## 2020-01-01 RX ADMIN — FENTANYL CITRATE 1 PATCH: 50 INJECTION INTRAVENOUS at 05:36

## 2020-01-01 RX ADMIN — HYDROMORPHONE HYDROCHLORIDE 1 MILLIGRAM(S): 2 INJECTION INTRAMUSCULAR; INTRAVENOUS; SUBCUTANEOUS at 15:11

## 2020-01-01 RX ADMIN — ENOXAPARIN SODIUM 40 MILLIGRAM(S): 100 INJECTION SUBCUTANEOUS at 11:59

## 2020-01-01 RX ADMIN — PANTOPRAZOLE SODIUM 40 MILLIGRAM(S): 20 TABLET, DELAYED RELEASE ORAL at 05:27

## 2020-01-01 RX ADMIN — HYDROMORPHONE HYDROCHLORIDE 3 MILLIGRAM(S): 2 INJECTION INTRAMUSCULAR; INTRAVENOUS; SUBCUTANEOUS at 14:38

## 2020-01-01 RX ADMIN — HYDROMORPHONE HYDROCHLORIDE 3 MILLIGRAM(S): 2 INJECTION INTRAMUSCULAR; INTRAVENOUS; SUBCUTANEOUS at 11:31

## 2020-01-01 RX ADMIN — HYDROMORPHONE HYDROCHLORIDE 1 MILLIGRAM(S): 2 INJECTION INTRAMUSCULAR; INTRAVENOUS; SUBCUTANEOUS at 17:54

## 2020-01-01 RX ADMIN — HYDROMORPHONE HYDROCHLORIDE 1 MILLIGRAM(S): 2 INJECTION INTRAMUSCULAR; INTRAVENOUS; SUBCUTANEOUS at 11:27

## 2020-01-01 RX ADMIN — HYDROMORPHONE HYDROCHLORIDE 3 MILLIGRAM(S): 2 INJECTION INTRAMUSCULAR; INTRAVENOUS; SUBCUTANEOUS at 11:29

## 2020-01-01 RX ADMIN — LIOTHYRONINE SODIUM 5 MICROGRAM(S): 25 TABLET ORAL at 12:33

## 2020-01-01 RX ADMIN — Medication 0.25 MILLIGRAM(S): at 20:39

## 2020-01-01 RX ADMIN — HYDROMORPHONE HYDROCHLORIDE 3 MILLIGRAM(S): 2 INJECTION INTRAMUSCULAR; INTRAVENOUS; SUBCUTANEOUS at 15:26

## 2020-01-01 RX ADMIN — HYDROMORPHONE HYDROCHLORIDE 3 MILLIGRAM(S): 2 INJECTION INTRAMUSCULAR; INTRAVENOUS; SUBCUTANEOUS at 22:25

## 2020-01-01 RX ADMIN — GABAPENTIN 300 MILLIGRAM(S): 400 CAPSULE ORAL at 06:27

## 2020-01-01 RX ADMIN — HYDROMORPHONE HYDROCHLORIDE 4 MILLIGRAM(S): 2 INJECTION INTRAMUSCULAR; INTRAVENOUS; SUBCUTANEOUS at 13:24

## 2020-01-01 RX ADMIN — HYDROMORPHONE HYDROCHLORIDE 4 MILLIGRAM(S): 2 INJECTION INTRAMUSCULAR; INTRAVENOUS; SUBCUTANEOUS at 23:30

## 2020-01-01 RX ADMIN — Medication 4 MILLIGRAM(S): at 17:34

## 2020-01-01 RX ADMIN — FENTANYL CITRATE 1 PATCH: 50 INJECTION INTRAVENOUS at 20:00

## 2020-01-01 RX ADMIN — HYDROMORPHONE HYDROCHLORIDE 3 MILLIGRAM(S): 2 INJECTION INTRAMUSCULAR; INTRAVENOUS; SUBCUTANEOUS at 08:59

## 2020-01-01 RX ADMIN — HYDROMORPHONE HYDROCHLORIDE 6 MILLIGRAM(S): 2 INJECTION INTRAMUSCULAR; INTRAVENOUS; SUBCUTANEOUS at 18:47

## 2020-01-01 RX ADMIN — HYDROMORPHONE HYDROCHLORIDE 2 MILLIGRAM(S): 2 INJECTION INTRAMUSCULAR; INTRAVENOUS; SUBCUTANEOUS at 06:01

## 2020-01-01 RX ADMIN — LIDOCAINE 2 PATCH: 4 CREAM TOPICAL at 11:47

## 2020-01-01 RX ADMIN — Medication 0.25 MILLIGRAM(S): at 06:38

## 2020-01-01 RX ADMIN — Medication 175 MICROGRAM(S): at 05:39

## 2020-01-01 RX ADMIN — HYDROMORPHONE HYDROCHLORIDE 3 MILLIGRAM(S): 2 INJECTION INTRAMUSCULAR; INTRAVENOUS; SUBCUTANEOUS at 15:35

## 2020-01-01 RX ADMIN — Medication 175 MICROGRAM(S): at 05:27

## 2020-01-01 RX ADMIN — HYDROMORPHONE HYDROCHLORIDE 2 MILLIGRAM(S): 2 INJECTION INTRAMUSCULAR; INTRAVENOUS; SUBCUTANEOUS at 13:59

## 2020-01-01 RX ADMIN — LACTULOSE 20 GRAM(S): 10 SOLUTION ORAL at 07:32

## 2020-01-01 RX ADMIN — FENTANYL CITRATE 1 PATCH: 50 INJECTION INTRAVENOUS at 19:22

## 2020-01-01 RX ADMIN — SENNA PLUS 2 TABLET(S): 8.6 TABLET ORAL at 21:42

## 2020-01-01 RX ADMIN — CELECOXIB 200 MILLIGRAM(S): 200 CAPSULE ORAL at 06:19

## 2020-01-01 RX ADMIN — HYDROMORPHONE HYDROCHLORIDE 6 MILLIGRAM(S): 2 INJECTION INTRAMUSCULAR; INTRAVENOUS; SUBCUTANEOUS at 02:29

## 2020-01-01 RX ADMIN — ENOXAPARIN SODIUM 40 MILLIGRAM(S): 100 INJECTION SUBCUTANEOUS at 12:37

## 2020-01-01 RX ADMIN — CELECOXIB 200 MILLIGRAM(S): 200 CAPSULE ORAL at 06:33

## 2020-01-01 RX ADMIN — LIDOCAINE 2 PATCH: 4 CREAM TOPICAL at 23:25

## 2020-01-01 RX ADMIN — MORPHINE SULFATE 4 MILLIGRAM(S): 50 CAPSULE, EXTENDED RELEASE ORAL at 09:30

## 2020-01-01 RX ADMIN — FENTANYL CITRATE 1 PATCH: 50 INJECTION INTRAVENOUS at 18:54

## 2020-01-01 RX ADMIN — PANTOPRAZOLE SODIUM 40 MILLIGRAM(S): 20 TABLET, DELAYED RELEASE ORAL at 04:54

## 2020-01-01 RX ADMIN — Medication 975 MILLIGRAM(S): at 17:45

## 2020-01-01 RX ADMIN — ENOXAPARIN SODIUM 90 MILLIGRAM(S): 100 INJECTION SUBCUTANEOUS at 05:18

## 2020-01-01 RX ADMIN — SODIUM CHLORIDE 75 MILLILITER(S): 9 INJECTION INTRAMUSCULAR; INTRAVENOUS; SUBCUTANEOUS at 00:36

## 2020-01-01 RX ADMIN — HYDROMORPHONE HYDROCHLORIDE 6 MILLIGRAM(S): 2 INJECTION INTRAMUSCULAR; INTRAVENOUS; SUBCUTANEOUS at 23:19

## 2020-01-01 RX ADMIN — Medication 650 MILLIGRAM(S): at 14:21

## 2020-01-01 RX ADMIN — HYDROMORPHONE HYDROCHLORIDE 1 MILLIGRAM(S): 2 INJECTION INTRAMUSCULAR; INTRAVENOUS; SUBCUTANEOUS at 16:46

## 2020-01-01 RX ADMIN — Medication 175 MICROGRAM(S): at 05:33

## 2020-01-01 RX ADMIN — HYDROMORPHONE HYDROCHLORIDE 3 MILLIGRAM(S): 2 INJECTION INTRAMUSCULAR; INTRAVENOUS; SUBCUTANEOUS at 11:51

## 2020-01-01 RX ADMIN — CELECOXIB 200 MILLIGRAM(S): 200 CAPSULE ORAL at 22:08

## 2020-01-01 RX ADMIN — HYDROMORPHONE HYDROCHLORIDE 1 MILLIGRAM(S): 2 INJECTION INTRAMUSCULAR; INTRAVENOUS; SUBCUTANEOUS at 06:40

## 2020-01-01 RX ADMIN — FENTANYL CITRATE 1 PATCH: 50 INJECTION INTRAVENOUS at 15:37

## 2020-01-01 RX ADMIN — Medication 975 MILLIGRAM(S): at 01:52

## 2020-01-01 RX ADMIN — Medication 500 MILLIGRAM(S): at 17:05

## 2020-01-01 RX ADMIN — Medication 10 MILLIGRAM(S): at 09:44

## 2020-01-01 RX ADMIN — FENTANYL CITRATE 1 PATCH: 50 INJECTION INTRAVENOUS at 06:41

## 2020-01-01 RX ADMIN — HYDROMORPHONE HYDROCHLORIDE 3 MILLIGRAM(S): 2 INJECTION INTRAMUSCULAR; INTRAVENOUS; SUBCUTANEOUS at 22:47

## 2020-01-01 RX ADMIN — HYDROMORPHONE HYDROCHLORIDE 1 MILLIGRAM(S): 2 INJECTION INTRAMUSCULAR; INTRAVENOUS; SUBCUTANEOUS at 09:21

## 2020-01-01 RX ADMIN — Medication 3 MILLIGRAM(S): at 22:42

## 2020-01-01 RX ADMIN — Medication 1000 MILLIGRAM(S): at 21:41

## 2020-01-01 RX ADMIN — HYDROMORPHONE HYDROCHLORIDE 6 MILLIGRAM(S): 2 INJECTION INTRAMUSCULAR; INTRAVENOUS; SUBCUTANEOUS at 18:12

## 2020-01-01 RX ADMIN — Medication 975 MILLIGRAM(S): at 01:00

## 2020-01-01 RX ADMIN — FENTANYL CITRATE 1 PATCH: 50 INJECTION INTRAVENOUS at 20:47

## 2020-01-01 RX ADMIN — HYDROMORPHONE HYDROCHLORIDE 1 MILLIGRAM(S): 2 INJECTION INTRAMUSCULAR; INTRAVENOUS; SUBCUTANEOUS at 21:08

## 2020-01-01 RX ADMIN — Medication 0.5 MILLIGRAM(S): at 21:00

## 2020-01-01 RX ADMIN — Medication 500 MILLIGRAM(S): at 05:25

## 2020-01-01 RX ADMIN — Medication 975 MILLIGRAM(S): at 11:56

## 2020-01-01 RX ADMIN — HYDROMORPHONE HYDROCHLORIDE 3 MILLIGRAM(S): 2 INJECTION INTRAMUSCULAR; INTRAVENOUS; SUBCUTANEOUS at 10:55

## 2020-01-01 RX ADMIN — Medication 975 MILLIGRAM(S): at 14:54

## 2020-01-01 RX ADMIN — Medication 2 MILLIGRAM(S): at 17:19

## 2020-01-01 RX ADMIN — GABAPENTIN 300 MILLIGRAM(S): 400 CAPSULE ORAL at 22:32

## 2020-01-01 RX ADMIN — Medication 100 GRAM(S): at 12:18

## 2020-01-01 RX ADMIN — HYDROMORPHONE HYDROCHLORIDE 1 MILLIGRAM(S): 2 INJECTION INTRAMUSCULAR; INTRAVENOUS; SUBCUTANEOUS at 22:08

## 2020-01-01 RX ADMIN — HYDROMORPHONE HYDROCHLORIDE 4 MILLIGRAM(S): 2 INJECTION INTRAMUSCULAR; INTRAVENOUS; SUBCUTANEOUS at 15:04

## 2020-01-01 RX ADMIN — HYDROMORPHONE HYDROCHLORIDE 2 MILLIGRAM(S): 2 INJECTION INTRAMUSCULAR; INTRAVENOUS; SUBCUTANEOUS at 07:21

## 2020-01-01 RX ADMIN — HYDROMORPHONE HYDROCHLORIDE 6 MILLIGRAM(S): 2 INJECTION INTRAMUSCULAR; INTRAVENOUS; SUBCUTANEOUS at 05:02

## 2020-01-01 RX ADMIN — HYDROMORPHONE HYDROCHLORIDE 3 MILLIGRAM(S): 2 INJECTION INTRAMUSCULAR; INTRAVENOUS; SUBCUTANEOUS at 20:28

## 2020-01-01 RX ADMIN — Medication 175 MICROGRAM(S): at 08:04

## 2020-01-01 RX ADMIN — ENOXAPARIN SODIUM 40 MILLIGRAM(S): 100 INJECTION SUBCUTANEOUS at 01:06

## 2020-01-01 RX ADMIN — FENTANYL CITRATE 1 PATCH: 50 INJECTION INTRAVENOUS at 06:27

## 2020-01-01 RX ADMIN — Medication 50 MILLIGRAM(S): at 06:21

## 2020-01-01 RX ADMIN — Medication 175 MICROGRAM(S): at 06:38

## 2020-01-01 RX ADMIN — FENTANYL CITRATE 1 PATCH: 50 INJECTION INTRAVENOUS at 21:16

## 2020-01-01 RX ADMIN — LIOTHYRONINE SODIUM 5 MICROGRAM(S): 25 TABLET ORAL at 06:03

## 2020-01-01 RX ADMIN — HYDROMORPHONE HYDROCHLORIDE 1 MILLIGRAM(S): 2 INJECTION INTRAMUSCULAR; INTRAVENOUS; SUBCUTANEOUS at 18:00

## 2020-01-01 RX ADMIN — HYDROMORPHONE HYDROCHLORIDE 30 MILLILITER(S): 2 INJECTION INTRAMUSCULAR; INTRAVENOUS; SUBCUTANEOUS at 06:14

## 2020-01-01 RX ADMIN — HYDROMORPHONE HYDROCHLORIDE 3 MILLIGRAM(S): 2 INJECTION INTRAMUSCULAR; INTRAVENOUS; SUBCUTANEOUS at 23:45

## 2020-01-01 RX ADMIN — HYDROMORPHONE HYDROCHLORIDE 3 MILLIGRAM(S): 2 INJECTION INTRAMUSCULAR; INTRAVENOUS; SUBCUTANEOUS at 15:08

## 2020-01-01 RX ADMIN — Medication 2 MILLIGRAM(S): at 05:38

## 2020-01-01 RX ADMIN — Medication 175 MICROGRAM(S): at 05:47

## 2020-01-01 RX ADMIN — Medication 50 MILLIGRAM(S): at 06:26

## 2020-01-01 RX ADMIN — GABAPENTIN 300 MILLIGRAM(S): 400 CAPSULE ORAL at 21:26

## 2020-01-01 RX ADMIN — HYDROMORPHONE HYDROCHLORIDE 6 MILLIGRAM(S): 2 INJECTION INTRAMUSCULAR; INTRAVENOUS; SUBCUTANEOUS at 16:19

## 2020-01-01 RX ADMIN — Medication 175 MICROGRAM(S): at 05:58

## 2020-01-01 RX ADMIN — Medication 600 MILLIGRAM(S): at 21:42

## 2020-01-01 RX ADMIN — Medication 40 MILLIEQUIVALENT(S): at 08:50

## 2020-01-01 RX ADMIN — GABAPENTIN 300 MILLIGRAM(S): 400 CAPSULE ORAL at 22:59

## 2020-01-01 RX ADMIN — HYDROMORPHONE HYDROCHLORIDE 3 MILLIGRAM(S): 2 INJECTION INTRAMUSCULAR; INTRAVENOUS; SUBCUTANEOUS at 16:08

## 2020-01-01 RX ADMIN — HYDROMORPHONE HYDROCHLORIDE 3 MILLIGRAM(S): 2 INJECTION INTRAMUSCULAR; INTRAVENOUS; SUBCUTANEOUS at 13:10

## 2020-01-01 RX ADMIN — Medication 975 MILLIGRAM(S): at 00:21

## 2020-01-01 RX ADMIN — SODIUM CHLORIDE 100 MILLILITER(S): 9 INJECTION INTRAMUSCULAR; INTRAVENOUS; SUBCUTANEOUS at 10:15

## 2020-01-01 RX ADMIN — LIOTHYRONINE SODIUM 5 MICROGRAM(S): 25 TABLET ORAL at 05:07

## 2020-01-01 RX ADMIN — HYDROMORPHONE HYDROCHLORIDE 6 MILLIGRAM(S): 2 INJECTION INTRAMUSCULAR; INTRAVENOUS; SUBCUTANEOUS at 06:18

## 2020-01-01 RX ADMIN — HYDROMORPHONE HYDROCHLORIDE 0.5 MILLIGRAM(S): 2 INJECTION INTRAMUSCULAR; INTRAVENOUS; SUBCUTANEOUS at 06:05

## 2020-01-01 RX ADMIN — METHADONE HYDROCHLORIDE 5 MILLIGRAM(S): 40 TABLET ORAL at 18:04

## 2020-01-01 RX ADMIN — Medication 3 MILLIGRAM(S): at 21:45

## 2020-01-01 RX ADMIN — METHADONE HYDROCHLORIDE 5 MILLIGRAM(S): 40 TABLET ORAL at 06:21

## 2020-01-01 RX ADMIN — CELECOXIB 200 MILLIGRAM(S): 200 CAPSULE ORAL at 23:10

## 2020-01-01 RX ADMIN — Medication 4 MILLIGRAM(S): at 06:57

## 2020-01-01 RX ADMIN — LACTULOSE 20 GRAM(S): 10 SOLUTION ORAL at 19:19

## 2020-01-01 RX ADMIN — HYDROMORPHONE HYDROCHLORIDE 4 MILLIGRAM(S): 2 INJECTION INTRAMUSCULAR; INTRAVENOUS; SUBCUTANEOUS at 17:53

## 2020-01-01 RX ADMIN — HYDROMORPHONE HYDROCHLORIDE 3 MILLIGRAM(S): 2 INJECTION INTRAMUSCULAR; INTRAVENOUS; SUBCUTANEOUS at 10:08

## 2020-01-01 RX ADMIN — HYDROMORPHONE HYDROCHLORIDE 3 MILLIGRAM(S): 2 INJECTION INTRAMUSCULAR; INTRAVENOUS; SUBCUTANEOUS at 16:54

## 2020-01-01 RX ADMIN — Medication 25 MILLIGRAM(S): at 12:36

## 2020-01-01 RX ADMIN — MORPHINE SULFATE 30 MILLIGRAM(S): 50 CAPSULE, EXTENDED RELEASE ORAL at 23:01

## 2020-01-01 RX ADMIN — HYDROMORPHONE HYDROCHLORIDE 6 MILLIGRAM(S): 2 INJECTION INTRAMUSCULAR; INTRAVENOUS; SUBCUTANEOUS at 23:00

## 2020-01-01 RX ADMIN — ONDANSETRON 4 MILLIGRAM(S): 8 TABLET, FILM COATED ORAL at 18:02

## 2020-01-01 RX ADMIN — CELECOXIB 200 MILLIGRAM(S): 200 CAPSULE ORAL at 16:43

## 2020-01-01 RX ADMIN — LIDOCAINE 1 PATCH: 4 CREAM TOPICAL at 11:35

## 2020-01-01 RX ADMIN — FENTANYL CITRATE 1 PATCH: 50 INJECTION INTRAVENOUS at 10:11

## 2020-01-01 RX ADMIN — ENOXAPARIN SODIUM 90 MILLIGRAM(S): 100 INJECTION SUBCUTANEOUS at 19:21

## 2020-01-01 RX ADMIN — LIDOCAINE 2 PATCH: 4 CREAM TOPICAL at 12:32

## 2020-01-01 RX ADMIN — LIDOCAINE 1 PATCH: 4 CREAM TOPICAL at 12:37

## 2020-01-01 RX ADMIN — LIDOCAINE 1 PATCH: 4 CREAM TOPICAL at 20:02

## 2020-01-01 RX ADMIN — MORPHINE SULFATE 30 MILLIGRAM(S): 50 CAPSULE, EXTENDED RELEASE ORAL at 05:59

## 2020-01-01 RX ADMIN — ENOXAPARIN SODIUM 90 MILLIGRAM(S): 100 INJECTION SUBCUTANEOUS at 11:35

## 2020-01-01 RX ADMIN — HYDROMORPHONE HYDROCHLORIDE 30 MILLILITER(S): 2 INJECTION INTRAMUSCULAR; INTRAVENOUS; SUBCUTANEOUS at 23:36

## 2020-01-01 RX ADMIN — HYDROMORPHONE HYDROCHLORIDE 1 MILLIGRAM(S): 2 INJECTION INTRAMUSCULAR; INTRAVENOUS; SUBCUTANEOUS at 08:48

## 2020-01-01 RX ADMIN — HYDROMORPHONE HYDROCHLORIDE 1 MILLIGRAM(S): 2 INJECTION INTRAMUSCULAR; INTRAVENOUS; SUBCUTANEOUS at 03:39

## 2020-01-01 RX ADMIN — HYDROMORPHONE HYDROCHLORIDE 1 MILLIGRAM(S): 2 INJECTION INTRAMUSCULAR; INTRAVENOUS; SUBCUTANEOUS at 13:00

## 2020-01-01 RX ADMIN — Medication 100 MICROGRAM(S): at 06:19

## 2020-01-01 RX ADMIN — ENOXAPARIN SODIUM 90 MILLIGRAM(S): 100 INJECTION SUBCUTANEOUS at 06:51

## 2020-01-01 RX ADMIN — GABAPENTIN 300 MILLIGRAM(S): 400 CAPSULE ORAL at 14:05

## 2020-01-01 RX ADMIN — Medication 600 MILLIGRAM(S): at 15:19

## 2020-01-01 RX ADMIN — LIOTHYRONINE SODIUM 5 MICROGRAM(S): 25 TABLET ORAL at 05:25

## 2020-01-01 RX ADMIN — ENOXAPARIN SODIUM 90 MILLIGRAM(S): 100 INJECTION SUBCUTANEOUS at 18:07

## 2020-01-01 RX ADMIN — HYDROMORPHONE HYDROCHLORIDE 6 MILLIGRAM(S): 2 INJECTION INTRAMUSCULAR; INTRAVENOUS; SUBCUTANEOUS at 15:49

## 2020-01-01 RX ADMIN — Medication 50 MILLIGRAM(S): at 05:58

## 2020-01-01 RX ADMIN — HYDROMORPHONE HYDROCHLORIDE 1 MILLIGRAM(S): 2 INJECTION INTRAMUSCULAR; INTRAVENOUS; SUBCUTANEOUS at 11:52

## 2020-01-01 RX ADMIN — LIDOCAINE 1 PATCH: 4 CREAM TOPICAL at 22:59

## 2020-01-01 RX ADMIN — Medication 50 MILLIGRAM(S): at 06:01

## 2020-01-01 RX ADMIN — METHADONE HYDROCHLORIDE 5 MILLIGRAM(S): 40 TABLET ORAL at 06:00

## 2020-01-01 RX ADMIN — Medication 1000 MILLIGRAM(S): at 07:27

## 2020-01-01 RX ADMIN — LIDOCAINE 1 PATCH: 4 CREAM TOPICAL at 20:22

## 2020-01-01 RX ADMIN — Medication 975 MILLIGRAM(S): at 15:57

## 2020-01-01 RX ADMIN — HYDROMORPHONE HYDROCHLORIDE 3 MILLIGRAM(S): 2 INJECTION INTRAMUSCULAR; INTRAVENOUS; SUBCUTANEOUS at 14:30

## 2020-01-01 RX ADMIN — CELECOXIB 200 MILLIGRAM(S): 200 CAPSULE ORAL at 17:01

## 2020-01-01 RX ADMIN — HYDROMORPHONE HYDROCHLORIDE 4 MILLIGRAM(S): 2 INJECTION INTRAMUSCULAR; INTRAVENOUS; SUBCUTANEOUS at 10:25

## 2020-01-01 RX ADMIN — FENTANYL CITRATE 1 PATCH: 50 INJECTION INTRAVENOUS at 18:34

## 2020-01-01 RX ADMIN — HYDROMORPHONE HYDROCHLORIDE 6 MILLIGRAM(S): 2 INJECTION INTRAMUSCULAR; INTRAVENOUS; SUBCUTANEOUS at 23:18

## 2020-01-01 RX ADMIN — HYDROMORPHONE HYDROCHLORIDE 1 MILLIGRAM(S): 2 INJECTION INTRAMUSCULAR; INTRAVENOUS; SUBCUTANEOUS at 23:11

## 2020-01-01 RX ADMIN — Medication 2 MILLIGRAM(S): at 17:34

## 2020-01-01 RX ADMIN — ENOXAPARIN SODIUM 90 MILLIGRAM(S): 100 INJECTION SUBCUTANEOUS at 23:02

## 2020-01-01 RX ADMIN — ENOXAPARIN SODIUM 90 MILLIGRAM(S): 100 INJECTION SUBCUTANEOUS at 11:30

## 2020-01-01 RX ADMIN — TIZANIDINE 2 MILLIGRAM(S): 4 TABLET ORAL at 05:17

## 2020-01-01 RX ADMIN — Medication 25 MILLIGRAM(S): at 17:45

## 2020-01-01 RX ADMIN — Medication 650 MILLIGRAM(S): at 11:25

## 2020-01-01 RX ADMIN — HYDROMORPHONE HYDROCHLORIDE 6 MILLIGRAM(S): 2 INJECTION INTRAMUSCULAR; INTRAVENOUS; SUBCUTANEOUS at 19:34

## 2020-01-01 RX ADMIN — LIOTHYRONINE SODIUM 5 MICROGRAM(S): 25 TABLET ORAL at 05:09

## 2020-01-01 RX ADMIN — METHADONE HYDROCHLORIDE 5 MILLIGRAM(S): 40 TABLET ORAL at 05:43

## 2020-01-01 RX ADMIN — HYDROMORPHONE HYDROCHLORIDE 3 MILLIGRAM(S): 2 INJECTION INTRAMUSCULAR; INTRAVENOUS; SUBCUTANEOUS at 13:17

## 2020-01-01 RX ADMIN — ENOXAPARIN SODIUM 90 MILLIGRAM(S): 100 INJECTION SUBCUTANEOUS at 06:47

## 2020-01-01 RX ADMIN — GABAPENTIN 300 MILLIGRAM(S): 400 CAPSULE ORAL at 21:02

## 2020-01-01 RX ADMIN — LIOTHYRONINE SODIUM 5 MICROGRAM(S): 25 TABLET ORAL at 05:37

## 2020-01-01 RX ADMIN — HYDROMORPHONE HYDROCHLORIDE 3 MILLIGRAM(S): 2 INJECTION INTRAMUSCULAR; INTRAVENOUS; SUBCUTANEOUS at 05:38

## 2020-01-01 RX ADMIN — GABAPENTIN 300 MILLIGRAM(S): 400 CAPSULE ORAL at 20:55

## 2020-01-01 RX ADMIN — FENTANYL CITRATE 1 PATCH: 50 INJECTION INTRAVENOUS at 06:11

## 2020-01-01 RX ADMIN — PANTOPRAZOLE SODIUM 40 MILLIGRAM(S): 20 TABLET, DELAYED RELEASE ORAL at 06:21

## 2020-01-01 RX ADMIN — Medication 0.25 MILLIGRAM(S): at 21:12

## 2020-01-01 RX ADMIN — Medication 175 MICROGRAM(S): at 06:21

## 2020-01-01 RX ADMIN — HYDROMORPHONE HYDROCHLORIDE 1 MILLIGRAM(S): 2 INJECTION INTRAMUSCULAR; INTRAVENOUS; SUBCUTANEOUS at 14:18

## 2020-01-01 RX ADMIN — Medication 975 MILLIGRAM(S): at 13:35

## 2020-01-01 RX ADMIN — ENOXAPARIN SODIUM 90 MILLIGRAM(S): 100 INJECTION SUBCUTANEOUS at 06:00

## 2020-01-01 RX ADMIN — LIDOCAINE 1 PATCH: 4 CREAM TOPICAL at 00:00

## 2020-01-01 RX ADMIN — POLYETHYLENE GLYCOL 3350 17 GRAM(S): 17 POWDER, FOR SOLUTION ORAL at 11:30

## 2020-01-01 RX ADMIN — Medication 175 MICROGRAM(S): at 05:09

## 2020-01-01 RX ADMIN — Medication 25 MILLIGRAM(S): at 05:33

## 2020-01-01 RX ADMIN — HYDROMORPHONE HYDROCHLORIDE 4 MILLIGRAM(S): 2 INJECTION INTRAMUSCULAR; INTRAVENOUS; SUBCUTANEOUS at 15:20

## 2020-01-01 RX ADMIN — Medication 50 MILLIGRAM(S): at 06:51

## 2020-01-01 RX ADMIN — NYSTATIN CREAM 1 APPLICATION(S): 100000 CREAM TOPICAL at 17:13

## 2020-01-01 RX ADMIN — CELECOXIB 200 MILLIGRAM(S): 200 CAPSULE ORAL at 23:00

## 2020-01-01 RX ADMIN — ENOXAPARIN SODIUM 90 MILLIGRAM(S): 100 INJECTION SUBCUTANEOUS at 17:21

## 2020-01-01 RX ADMIN — Medication 975 MILLIGRAM(S): at 22:44

## 2020-01-01 RX ADMIN — ONDANSETRON 4 MILLIGRAM(S): 8 TABLET, FILM COATED ORAL at 17:19

## 2020-01-01 RX ADMIN — HYDROMORPHONE HYDROCHLORIDE 3 MILLIGRAM(S): 2 INJECTION INTRAMUSCULAR; INTRAVENOUS; SUBCUTANEOUS at 23:30

## 2020-01-01 RX ADMIN — Medication 3 MILLIGRAM(S): at 22:47

## 2020-01-01 RX ADMIN — RIVAROXABAN 10 MILLIGRAM(S): KIT at 11:26

## 2020-01-01 RX ADMIN — Medication 15 MILLIGRAM(S): at 09:37

## 2020-01-01 RX ADMIN — HYDROMORPHONE HYDROCHLORIDE 4 MILLIGRAM(S): 2 INJECTION INTRAMUSCULAR; INTRAVENOUS; SUBCUTANEOUS at 07:57

## 2020-01-01 RX ADMIN — CELECOXIB 200 MILLIGRAM(S): 200 CAPSULE ORAL at 05:12

## 2020-01-01 RX ADMIN — METHADONE HYDROCHLORIDE 5 MILLIGRAM(S): 40 TABLET ORAL at 07:09

## 2020-01-01 RX ADMIN — HYDROMORPHONE HYDROCHLORIDE 3 MILLIGRAM(S): 2 INJECTION INTRAMUSCULAR; INTRAVENOUS; SUBCUTANEOUS at 15:20

## 2020-01-01 RX ADMIN — POLYETHYLENE GLYCOL 3350 17 GRAM(S): 17 POWDER, FOR SOLUTION ORAL at 18:08

## 2020-01-01 RX ADMIN — Medication 100 MICROGRAM(S): at 06:39

## 2020-01-01 RX ADMIN — HYDROMORPHONE HYDROCHLORIDE 1 MILLIGRAM(S): 2 INJECTION INTRAMUSCULAR; INTRAVENOUS; SUBCUTANEOUS at 19:47

## 2020-01-01 RX ADMIN — HYDROMORPHONE HYDROCHLORIDE 3 MILLIGRAM(S): 2 INJECTION INTRAMUSCULAR; INTRAVENOUS; SUBCUTANEOUS at 15:44

## 2020-01-01 RX ADMIN — Medication 4 MILLIGRAM(S): at 18:00

## 2020-01-01 RX ADMIN — ENOXAPARIN SODIUM 90 MILLIGRAM(S): 100 INJECTION SUBCUTANEOUS at 06:21

## 2020-01-01 RX ADMIN — Medication 175 MICROGRAM(S): at 05:37

## 2020-01-01 RX ADMIN — Medication 50 GRAM(S): at 09:16

## 2020-01-01 RX ADMIN — CELECOXIB 200 MILLIGRAM(S): 200 CAPSULE ORAL at 22:07

## 2020-01-01 RX ADMIN — Medication 50 MILLIGRAM(S): at 05:33

## 2020-01-01 RX ADMIN — HYDROMORPHONE HYDROCHLORIDE 2 MILLIGRAM(S): 2 INJECTION INTRAMUSCULAR; INTRAVENOUS; SUBCUTANEOUS at 08:51

## 2020-01-01 RX ADMIN — HYDROMORPHONE HYDROCHLORIDE 1 MILLIGRAM(S): 2 INJECTION INTRAMUSCULAR; INTRAVENOUS; SUBCUTANEOUS at 06:28

## 2020-01-01 RX ADMIN — PANTOPRAZOLE SODIUM 40 MILLIGRAM(S): 20 TABLET, DELAYED RELEASE ORAL at 05:09

## 2020-01-01 RX ADMIN — Medication 5 MILLIGRAM(S): at 20:58

## 2020-01-01 RX ADMIN — HYDROMORPHONE HYDROCHLORIDE 6 MILLIGRAM(S): 2 INJECTION INTRAMUSCULAR; INTRAVENOUS; SUBCUTANEOUS at 05:45

## 2020-01-01 RX ADMIN — MORPHINE SULFATE 30 MILLIGRAM(S): 50 CAPSULE, EXTENDED RELEASE ORAL at 00:01

## 2020-01-01 RX ADMIN — Medication 0.25 MILLIGRAM(S): at 18:24

## 2020-01-01 RX ADMIN — Medication 975 MILLIGRAM(S): at 07:30

## 2020-01-01 RX ADMIN — TIZANIDINE 4 MILLIGRAM(S): 4 TABLET ORAL at 21:38

## 2020-01-01 RX ADMIN — HYDROMORPHONE HYDROCHLORIDE 3 MILLIGRAM(S): 2 INJECTION INTRAMUSCULAR; INTRAVENOUS; SUBCUTANEOUS at 19:00

## 2020-01-01 RX ADMIN — Medication 1 ENEMA: at 23:40

## 2020-01-01 RX ADMIN — Medication 400 MILLIGRAM(S): at 06:48

## 2020-01-01 RX ADMIN — HEPARIN SODIUM 1400 UNIT(S)/HR: 5000 INJECTION INTRAVENOUS; SUBCUTANEOUS at 12:10

## 2020-01-01 RX ADMIN — Medication 975 MILLIGRAM(S): at 14:16

## 2020-01-01 RX ADMIN — Medication 25 MILLIGRAM(S): at 13:05

## 2020-01-01 RX ADMIN — Medication 81 MILLIGRAM(S): at 08:45

## 2020-01-01 RX ADMIN — HYDROMORPHONE HYDROCHLORIDE 1 MILLIGRAM(S): 2 INJECTION INTRAMUSCULAR; INTRAVENOUS; SUBCUTANEOUS at 09:48

## 2020-01-01 RX ADMIN — HEPARIN SODIUM 1400 UNIT(S)/HR: 5000 INJECTION INTRAVENOUS; SUBCUTANEOUS at 07:35

## 2020-01-01 RX ADMIN — Medication 0.5 MILLIGRAM(S): at 01:06

## 2020-01-01 RX ADMIN — ENOXAPARIN SODIUM 90 MILLIGRAM(S): 100 INJECTION SUBCUTANEOUS at 05:43

## 2020-01-01 RX ADMIN — HYDROMORPHONE HYDROCHLORIDE 30 MILLILITER(S): 2 INJECTION INTRAMUSCULAR; INTRAVENOUS; SUBCUTANEOUS at 07:23

## 2020-01-01 RX ADMIN — HYDROMORPHONE HYDROCHLORIDE 1 MILLIGRAM(S): 2 INJECTION INTRAMUSCULAR; INTRAVENOUS; SUBCUTANEOUS at 14:47

## 2020-01-01 RX ADMIN — Medication 2 MILLIGRAM(S): at 17:41

## 2020-01-01 RX ADMIN — ATORVASTATIN CALCIUM 10 MILLIGRAM(S): 80 TABLET, FILM COATED ORAL at 23:13

## 2020-01-01 RX ADMIN — Medication 1000 MILLIGRAM(S): at 20:07

## 2020-01-01 RX ADMIN — GABAPENTIN 300 MILLIGRAM(S): 400 CAPSULE ORAL at 06:31

## 2020-01-01 RX ADMIN — HYDROMORPHONE HYDROCHLORIDE 3 MILLIGRAM(S): 2 INJECTION INTRAMUSCULAR; INTRAVENOUS; SUBCUTANEOUS at 11:04

## 2020-01-01 RX ADMIN — HEPARIN SODIUM 1600 UNIT(S)/HR: 5000 INJECTION INTRAVENOUS; SUBCUTANEOUS at 19:40

## 2020-01-01 RX ADMIN — Medication 175 MICROGRAM(S): at 05:31

## 2020-01-01 RX ADMIN — HYDROMORPHONE HYDROCHLORIDE 2 MILLIGRAM(S): 2 INJECTION INTRAMUSCULAR; INTRAVENOUS; SUBCUTANEOUS at 13:52

## 2020-01-01 RX ADMIN — NALOXEGOL OXALATE 25 MILLIGRAM(S): 12.5 TABLET, FILM COATED ORAL at 16:38

## 2020-01-01 RX ADMIN — METHADONE HYDROCHLORIDE 5 MILLIGRAM(S): 40 TABLET ORAL at 05:21

## 2020-01-01 RX ADMIN — Medication 4 MILLIGRAM(S): at 08:48

## 2020-01-01 RX ADMIN — HYDROMORPHONE HYDROCHLORIDE 3 MILLIGRAM(S): 2 INJECTION INTRAMUSCULAR; INTRAVENOUS; SUBCUTANEOUS at 06:07

## 2020-01-01 RX ADMIN — Medication 2 MILLIGRAM(S): at 05:59

## 2020-01-01 RX ADMIN — Medication 2 MILLIGRAM(S): at 05:47

## 2020-01-01 RX ADMIN — FENTANYL CITRATE 1 PATCH: 50 INJECTION INTRAVENOUS at 11:20

## 2020-01-01 RX ADMIN — Medication 975 MILLIGRAM(S): at 07:50

## 2020-01-01 RX ADMIN — Medication 4 MILLIGRAM(S): at 12:36

## 2020-01-01 RX ADMIN — Medication 0.25 MILLIGRAM(S): at 13:05

## 2020-01-01 RX ADMIN — FENTANYL CITRATE 1 PATCH: 50 INJECTION INTRAVENOUS at 19:39

## 2020-01-01 RX ADMIN — LIDOCAINE 1 PATCH: 4 CREAM TOPICAL at 11:11

## 2020-01-01 RX ADMIN — ONDANSETRON 4 MILLIGRAM(S): 8 TABLET, FILM COATED ORAL at 13:37

## 2020-01-01 RX ADMIN — CELECOXIB 200 MILLIGRAM(S): 200 CAPSULE ORAL at 18:31

## 2020-01-01 RX ADMIN — Medication 175 MICROGRAM(S): at 06:33

## 2020-01-01 RX ADMIN — GABAPENTIN 300 MILLIGRAM(S): 400 CAPSULE ORAL at 06:20

## 2020-01-01 RX ADMIN — HYDROMORPHONE HYDROCHLORIDE 4 MILLIGRAM(S): 2 INJECTION INTRAMUSCULAR; INTRAVENOUS; SUBCUTANEOUS at 22:03

## 2020-01-01 RX ADMIN — Medication 975 MILLIGRAM(S): at 07:03

## 2020-01-01 RX ADMIN — HYDROMORPHONE HYDROCHLORIDE 2 MILLIGRAM(S): 2 INJECTION INTRAMUSCULAR; INTRAVENOUS; SUBCUTANEOUS at 06:50

## 2020-01-01 RX ADMIN — Medication 50 MILLIGRAM(S): at 14:34

## 2020-01-01 RX ADMIN — Medication 975 MILLIGRAM(S): at 15:29

## 2020-01-01 RX ADMIN — HYDROMORPHONE HYDROCHLORIDE 1 MILLIGRAM(S): 2 INJECTION INTRAMUSCULAR; INTRAVENOUS; SUBCUTANEOUS at 21:45

## 2020-01-01 RX ADMIN — ENOXAPARIN SODIUM 90 MILLIGRAM(S): 100 INJECTION SUBCUTANEOUS at 18:33

## 2020-01-01 RX ADMIN — OXYCODONE HYDROCHLORIDE 5 MILLIGRAM(S): 5 TABLET ORAL at 06:19

## 2020-01-01 RX ADMIN — Medication 2 MILLIGRAM(S): at 18:07

## 2020-01-01 RX ADMIN — LIOTHYRONINE SODIUM 5 MICROGRAM(S): 25 TABLET ORAL at 05:33

## 2020-01-01 RX ADMIN — HYDROMORPHONE HYDROCHLORIDE 2 MILLIGRAM(S): 2 INJECTION INTRAMUSCULAR; INTRAVENOUS; SUBCUTANEOUS at 12:53

## 2020-01-01 RX ADMIN — PANTOPRAZOLE SODIUM 40 MILLIGRAM(S): 20 TABLET, DELAYED RELEASE ORAL at 05:03

## 2020-01-01 RX ADMIN — LIOTHYRONINE SODIUM 5 MICROGRAM(S): 25 TABLET ORAL at 06:21

## 2020-01-01 RX ADMIN — LIDOCAINE 1 PATCH: 4 CREAM TOPICAL at 22:31

## 2020-01-01 RX ADMIN — HYDROMORPHONE HYDROCHLORIDE 3 MILLIGRAM(S): 2 INJECTION INTRAMUSCULAR; INTRAVENOUS; SUBCUTANEOUS at 19:41

## 2020-01-01 RX ADMIN — HYDROMORPHONE HYDROCHLORIDE 3 MILLIGRAM(S): 2 INJECTION INTRAMUSCULAR; INTRAVENOUS; SUBCUTANEOUS at 05:19

## 2020-01-01 RX ADMIN — FENTANYL CITRATE 1 PATCH: 50 INJECTION INTRAVENOUS at 19:44

## 2020-01-01 RX ADMIN — HYDROMORPHONE HYDROCHLORIDE 1 MILLIGRAM(S): 2 INJECTION INTRAMUSCULAR; INTRAVENOUS; SUBCUTANEOUS at 15:02

## 2020-01-01 RX ADMIN — HYDROMORPHONE HYDROCHLORIDE 3 MILLIGRAM(S): 2 INJECTION INTRAMUSCULAR; INTRAVENOUS; SUBCUTANEOUS at 07:35

## 2020-01-01 RX ADMIN — Medication 1 MILLIGRAM(S): at 12:38

## 2020-01-01 RX ADMIN — Medication 975 MILLIGRAM(S): at 13:05

## 2020-01-01 RX ADMIN — FENTANYL CITRATE 1 PATCH: 50 INJECTION INTRAVENOUS at 07:59

## 2020-01-01 RX ADMIN — HYDROMORPHONE HYDROCHLORIDE 4 MILLIGRAM(S): 2 INJECTION INTRAMUSCULAR; INTRAVENOUS; SUBCUTANEOUS at 20:13

## 2020-01-01 RX ADMIN — Medication 15 MILLIGRAM(S): at 00:40

## 2020-01-01 RX ADMIN — Medication 0.25 MILLIGRAM(S): at 08:54

## 2020-01-01 RX ADMIN — Medication 50 MILLIGRAM(S): at 05:25

## 2020-01-01 RX ADMIN — HYDROMORPHONE HYDROCHLORIDE 6 MILLIGRAM(S): 2 INJECTION INTRAMUSCULAR; INTRAVENOUS; SUBCUTANEOUS at 20:46

## 2020-01-01 RX ADMIN — HYDROMORPHONE HYDROCHLORIDE 3 MILLIGRAM(S): 2 INJECTION INTRAMUSCULAR; INTRAVENOUS; SUBCUTANEOUS at 06:35

## 2020-01-01 RX ADMIN — METHADONE HYDROCHLORIDE 5 MILLIGRAM(S): 40 TABLET ORAL at 17:21

## 2020-01-01 RX ADMIN — Medication 133 MILLILITER(S): at 16:15

## 2020-01-01 RX ADMIN — PANTOPRAZOLE SODIUM 40 MILLIGRAM(S): 20 TABLET, DELAYED RELEASE ORAL at 06:47

## 2020-01-01 RX ADMIN — Medication 4 MILLIGRAM(S): at 05:58

## 2020-01-01 RX ADMIN — METHADONE HYDROCHLORIDE 5 MILLIGRAM(S): 40 TABLET ORAL at 06:52

## 2020-01-01 RX ADMIN — HYDROMORPHONE HYDROCHLORIDE 2 MILLIGRAM(S): 2 INJECTION INTRAMUSCULAR; INTRAVENOUS; SUBCUTANEOUS at 14:25

## 2020-01-01 RX ADMIN — GABAPENTIN 300 MILLIGRAM(S): 400 CAPSULE ORAL at 05:06

## 2020-01-01 RX ADMIN — ENOXAPARIN SODIUM 90 MILLIGRAM(S): 100 INJECTION SUBCUTANEOUS at 22:21

## 2020-01-01 RX ADMIN — HYDROMORPHONE HYDROCHLORIDE 4 MILLIGRAM(S): 2 INJECTION INTRAMUSCULAR; INTRAVENOUS; SUBCUTANEOUS at 20:43

## 2020-01-01 RX ADMIN — Medication 175 MICROGRAM(S): at 01:49

## 2020-01-01 RX ADMIN — HYDROMORPHONE HYDROCHLORIDE 3 MILLIGRAM(S): 2 INJECTION INTRAMUSCULAR; INTRAVENOUS; SUBCUTANEOUS at 16:36

## 2020-01-01 RX ADMIN — Medication 25 MILLIGRAM(S): at 08:45

## 2020-01-01 RX ADMIN — LIOTHYRONINE SODIUM 5 MICROGRAM(S): 25 TABLET ORAL at 07:02

## 2020-01-01 RX ADMIN — FENTANYL CITRATE 1 PATCH: 50 INJECTION INTRAVENOUS at 20:01

## 2020-01-01 RX ADMIN — METHADONE HYDROCHLORIDE 5 MILLIGRAM(S): 40 TABLET ORAL at 06:34

## 2020-01-01 RX ADMIN — ENOXAPARIN SODIUM 90 MILLIGRAM(S): 100 INJECTION SUBCUTANEOUS at 05:38

## 2020-01-01 RX ADMIN — AMIODARONE HYDROCHLORIDE 400 MILLIGRAM(S): 400 TABLET ORAL at 21:20

## 2020-01-01 RX ADMIN — FENTANYL CITRATE 1 PATCH: 50 INJECTION INTRAVENOUS at 20:51

## 2020-01-01 RX ADMIN — SENNA PLUS 2 TABLET(S): 8.6 TABLET ORAL at 22:45

## 2020-01-01 RX ADMIN — Medication 3 MILLIGRAM(S): at 21:56

## 2020-01-01 RX ADMIN — HYDROMORPHONE HYDROCHLORIDE 1 MILLIGRAM(S): 2 INJECTION INTRAMUSCULAR; INTRAVENOUS; SUBCUTANEOUS at 06:03

## 2020-01-01 RX ADMIN — METHADONE HYDROCHLORIDE 5 MILLIGRAM(S): 40 TABLET ORAL at 18:32

## 2020-01-01 RX ADMIN — NYSTATIN CREAM 1 APPLICATION(S): 100000 CREAM TOPICAL at 17:02

## 2020-01-01 RX ADMIN — FENTANYL CITRATE 1 PATCH: 50 INJECTION INTRAVENOUS at 09:20

## 2020-01-01 RX ADMIN — METHADONE HYDROCHLORIDE 5 MILLIGRAM(S): 40 TABLET ORAL at 19:19

## 2020-01-01 RX ADMIN — CELECOXIB 200 MILLIGRAM(S): 200 CAPSULE ORAL at 17:58

## 2020-01-01 RX ADMIN — HYDROMORPHONE HYDROCHLORIDE 3 MILLIGRAM(S): 2 INJECTION INTRAMUSCULAR; INTRAVENOUS; SUBCUTANEOUS at 14:34

## 2020-01-01 RX ADMIN — HYDROMORPHONE HYDROCHLORIDE 3 MILLIGRAM(S): 2 INJECTION INTRAMUSCULAR; INTRAVENOUS; SUBCUTANEOUS at 00:08

## 2020-01-01 RX ADMIN — Medication 50 MILLIGRAM(S): at 21:28

## 2020-01-01 RX ADMIN — Medication 50 MILLIGRAM(S): at 05:07

## 2020-01-01 RX ADMIN — FENTANYL CITRATE 1 PATCH: 50 INJECTION INTRAVENOUS at 06:39

## 2020-01-01 RX ADMIN — HYDROMORPHONE HYDROCHLORIDE 3 MILLIGRAM(S): 2 INJECTION INTRAMUSCULAR; INTRAVENOUS; SUBCUTANEOUS at 05:03

## 2020-01-01 RX ADMIN — FENTANYL CITRATE 1 PATCH: 50 INJECTION INTRAVENOUS at 20:41

## 2020-01-01 RX ADMIN — HYDROMORPHONE HYDROCHLORIDE 4 MILLIGRAM(S): 2 INJECTION INTRAMUSCULAR; INTRAVENOUS; SUBCUTANEOUS at 00:36

## 2020-01-01 RX ADMIN — GABAPENTIN 300 MILLIGRAM(S): 400 CAPSULE ORAL at 14:27

## 2020-01-01 RX ADMIN — HYDROMORPHONE HYDROCHLORIDE 3 MILLIGRAM(S): 2 INJECTION INTRAMUSCULAR; INTRAVENOUS; SUBCUTANEOUS at 13:36

## 2020-01-01 RX ADMIN — HYDROMORPHONE HYDROCHLORIDE 1 MILLIGRAM(S): 2 INJECTION INTRAMUSCULAR; INTRAVENOUS; SUBCUTANEOUS at 08:26

## 2020-01-01 RX ADMIN — MORPHINE SULFATE 4 MILLIGRAM(S): 50 CAPSULE, EXTENDED RELEASE ORAL at 21:17

## 2020-01-01 RX ADMIN — Medication 975 MILLIGRAM(S): at 23:46

## 2020-01-01 RX ADMIN — HYDROMORPHONE HYDROCHLORIDE 3 MILLIGRAM(S): 2 INJECTION INTRAMUSCULAR; INTRAVENOUS; SUBCUTANEOUS at 10:26

## 2020-01-01 RX ADMIN — Medication 50 MILLIGRAM(S): at 14:06

## 2020-01-01 RX ADMIN — FENTANYL CITRATE 1 PATCH: 50 INJECTION INTRAVENOUS at 17:07

## 2020-01-01 RX ADMIN — Medication 25 MILLIGRAM(S): at 05:31

## 2020-01-01 RX ADMIN — Medication 10 MILLILITER(S): at 18:05

## 2020-01-01 RX ADMIN — ENOXAPARIN SODIUM 90 MILLIGRAM(S): 100 INJECTION SUBCUTANEOUS at 17:35

## 2020-01-01 RX ADMIN — FENTANYL CITRATE 1 PATCH: 50 INJECTION INTRAVENOUS at 06:38

## 2020-01-01 RX ADMIN — Medication 81 MILLIGRAM(S): at 12:53

## 2020-01-01 RX ADMIN — LIDOCAINE 1 PATCH: 4 CREAM TOPICAL at 12:51

## 2020-01-01 RX ADMIN — Medication 0.25 MILLIGRAM(S): at 07:09

## 2020-01-01 RX ADMIN — Medication 600 MILLIGRAM(S): at 06:48

## 2020-01-01 RX ADMIN — HYDROMORPHONE HYDROCHLORIDE 3 MILLIGRAM(S): 2 INJECTION INTRAMUSCULAR; INTRAVENOUS; SUBCUTANEOUS at 03:43

## 2020-01-01 RX ADMIN — HYDROMORPHONE HYDROCHLORIDE 1 MILLIGRAM(S): 2 INJECTION INTRAMUSCULAR; INTRAVENOUS; SUBCUTANEOUS at 23:00

## 2020-01-01 RX ADMIN — Medication 2 MILLIGRAM(S): at 18:06

## 2020-01-01 RX ADMIN — TIZANIDINE 4 MILLIGRAM(S): 4 TABLET ORAL at 11:45

## 2020-01-01 RX ADMIN — Medication 4 MILLIGRAM(S): at 08:45

## 2020-01-01 RX ADMIN — POLYETHYLENE GLYCOL 3350 17 GRAM(S): 17 POWDER, FOR SOLUTION ORAL at 10:18

## 2020-01-01 RX ADMIN — MORPHINE SULFATE 30 MILLIGRAM(S): 50 CAPSULE, EXTENDED RELEASE ORAL at 05:02

## 2020-01-01 RX ADMIN — Medication 0.25 MILLIGRAM(S): at 23:25

## 2020-01-01 RX ADMIN — CELECOXIB 200 MILLIGRAM(S): 200 CAPSULE ORAL at 06:39

## 2020-01-01 RX ADMIN — Medication 600 MILLIGRAM(S): at 21:12

## 2020-01-01 RX ADMIN — GABAPENTIN 300 MILLIGRAM(S): 400 CAPSULE ORAL at 12:50

## 2020-01-01 RX ADMIN — Medication 25 MILLIGRAM(S): at 23:19

## 2020-01-01 RX ADMIN — Medication 2 MILLIGRAM(S): at 06:01

## 2020-01-01 RX ADMIN — HEPARIN SODIUM 1400 UNIT(S)/HR: 5000 INJECTION INTRAVENOUS; SUBCUTANEOUS at 18:55

## 2020-01-01 RX ADMIN — GABAPENTIN 300 MILLIGRAM(S): 400 CAPSULE ORAL at 23:38

## 2020-01-01 RX ADMIN — HYDROMORPHONE HYDROCHLORIDE 3 MILLIGRAM(S): 2 INJECTION INTRAMUSCULAR; INTRAVENOUS; SUBCUTANEOUS at 22:40

## 2020-01-01 RX ADMIN — HYDROMORPHONE HYDROCHLORIDE 2 MILLIGRAM(S): 2 INJECTION INTRAMUSCULAR; INTRAVENOUS; SUBCUTANEOUS at 14:10

## 2020-01-01 RX ADMIN — HYDROMORPHONE HYDROCHLORIDE 6 MILLIGRAM(S): 2 INJECTION INTRAMUSCULAR; INTRAVENOUS; SUBCUTANEOUS at 22:21

## 2020-01-01 RX ADMIN — HYDROMORPHONE HYDROCHLORIDE 3 MILLIGRAM(S): 2 INJECTION INTRAMUSCULAR; INTRAVENOUS; SUBCUTANEOUS at 19:36

## 2020-01-01 RX ADMIN — GABAPENTIN 300 MILLIGRAM(S): 400 CAPSULE ORAL at 06:57

## 2020-01-01 RX ADMIN — Medication 400 MILLIGRAM(S): at 18:41

## 2020-01-01 RX ADMIN — MORPHINE SULFATE 2 MILLIGRAM(S): 50 CAPSULE, EXTENDED RELEASE ORAL at 16:55

## 2020-01-01 RX ADMIN — HYDROMORPHONE HYDROCHLORIDE 3 MILLIGRAM(S): 2 INJECTION INTRAMUSCULAR; INTRAVENOUS; SUBCUTANEOUS at 06:46

## 2020-01-01 RX ADMIN — HYDROMORPHONE HYDROCHLORIDE 1 MILLIGRAM(S): 2 INJECTION INTRAMUSCULAR; INTRAVENOUS; SUBCUTANEOUS at 14:23

## 2020-01-01 RX ADMIN — HYDROMORPHONE HYDROCHLORIDE 4 MILLIGRAM(S): 2 INJECTION INTRAMUSCULAR; INTRAVENOUS; SUBCUTANEOUS at 20:30

## 2020-01-01 RX ADMIN — HYDROMORPHONE HYDROCHLORIDE 4 MILLIGRAM(S): 2 INJECTION INTRAMUSCULAR; INTRAVENOUS; SUBCUTANEOUS at 09:44

## 2020-01-01 RX ADMIN — SENNA PLUS 2 TABLET(S): 8.6 TABLET ORAL at 22:21

## 2020-01-01 RX ADMIN — HEPARIN SODIUM 1400 UNIT(S)/HR: 5000 INJECTION INTRAVENOUS; SUBCUTANEOUS at 19:38

## 2020-01-01 RX ADMIN — Medication 0.25 MILLIGRAM(S): at 14:27

## 2020-01-01 RX ADMIN — Medication 0.25 MILLIGRAM(S): at 10:12

## 2020-01-01 RX ADMIN — LIOTHYRONINE SODIUM 5 MICROGRAM(S): 25 TABLET ORAL at 06:32

## 2020-01-01 RX ADMIN — ONDANSETRON 4 MILLIGRAM(S): 8 TABLET, FILM COATED ORAL at 18:05

## 2020-01-01 RX ADMIN — LIDOCAINE 2 PATCH: 4 CREAM TOPICAL at 23:22

## 2020-01-01 RX ADMIN — HYDROMORPHONE HYDROCHLORIDE 1 MILLIGRAM(S): 2 INJECTION INTRAMUSCULAR; INTRAVENOUS; SUBCUTANEOUS at 22:50

## 2020-01-01 RX ADMIN — HYDROMORPHONE HYDROCHLORIDE 3 MILLIGRAM(S): 2 INJECTION INTRAMUSCULAR; INTRAVENOUS; SUBCUTANEOUS at 11:09

## 2020-01-01 RX ADMIN — Medication 50 MILLIGRAM(S): at 06:44

## 2020-01-01 RX ADMIN — HYDROMORPHONE HYDROCHLORIDE 3 MILLIGRAM(S): 2 INJECTION INTRAMUSCULAR; INTRAVENOUS; SUBCUTANEOUS at 02:02

## 2020-01-01 RX ADMIN — Medication 175 MICROGRAM(S): at 05:29

## 2020-01-01 RX ADMIN — NYSTATIN CREAM 1 APPLICATION(S): 100000 CREAM TOPICAL at 06:38

## 2020-01-01 RX ADMIN — HYDROMORPHONE HYDROCHLORIDE 3 MILLIGRAM(S): 2 INJECTION INTRAMUSCULAR; INTRAVENOUS; SUBCUTANEOUS at 21:30

## 2020-01-01 RX ADMIN — HYDROMORPHONE HYDROCHLORIDE 3 MILLIGRAM(S): 2 INJECTION INTRAMUSCULAR; INTRAVENOUS; SUBCUTANEOUS at 21:08

## 2020-01-01 RX ADMIN — FENTANYL CITRATE 1 PATCH: 50 INJECTION INTRAVENOUS at 22:12

## 2020-01-01 RX ADMIN — HYDROMORPHONE HYDROCHLORIDE 6 MILLIGRAM(S): 2 INJECTION INTRAMUSCULAR; INTRAVENOUS; SUBCUTANEOUS at 22:40

## 2020-01-01 RX ADMIN — Medication 300 MILLIGRAM(S): at 05:06

## 2020-01-01 RX ADMIN — RIVAROXABAN 10 MILLIGRAM(S): KIT at 11:21

## 2020-01-01 RX ADMIN — Medication 1 MILLIGRAM(S): at 12:48

## 2020-01-01 RX ADMIN — Medication 15 MILLIGRAM(S): at 00:08

## 2020-01-01 RX ADMIN — HYDROMORPHONE HYDROCHLORIDE 1 MILLIGRAM(S): 2 INJECTION INTRAMUSCULAR; INTRAVENOUS; SUBCUTANEOUS at 07:15

## 2020-01-01 RX ADMIN — HYDROMORPHONE HYDROCHLORIDE 3 MILLIGRAM(S): 2 INJECTION INTRAMUSCULAR; INTRAVENOUS; SUBCUTANEOUS at 20:46

## 2020-01-01 RX ADMIN — Medication 175 MICROGRAM(S): at 06:51

## 2020-01-01 RX ADMIN — Medication 1000 MILLIGRAM(S): at 22:41

## 2020-01-01 RX ADMIN — Medication 50 MILLIGRAM(S): at 05:43

## 2020-01-01 RX ADMIN — Medication 975 MILLIGRAM(S): at 00:52

## 2020-01-01 RX ADMIN — Medication 50 MILLIGRAM(S): at 05:19

## 2020-01-01 RX ADMIN — Medication 100 MILLIEQUIVALENT(S): at 09:58

## 2020-01-01 RX ADMIN — HYDROMORPHONE HYDROCHLORIDE 6 MILLIGRAM(S): 2 INJECTION INTRAMUSCULAR; INTRAVENOUS; SUBCUTANEOUS at 06:52

## 2020-01-01 RX ADMIN — Medication 975 MILLIGRAM(S): at 22:56

## 2020-01-01 RX ADMIN — LIOTHYRONINE SODIUM 5 MICROGRAM(S): 25 TABLET ORAL at 06:26

## 2020-01-01 RX ADMIN — Medication 50 MILLIGRAM(S): at 07:08

## 2020-01-01 RX ADMIN — HYDROMORPHONE HYDROCHLORIDE 2 MILLIGRAM(S): 2 INJECTION INTRAMUSCULAR; INTRAVENOUS; SUBCUTANEOUS at 21:50

## 2020-01-01 RX ADMIN — FENTANYL CITRATE 1 PATCH: 50 INJECTION INTRAVENOUS at 20:32

## 2020-01-01 RX ADMIN — CELECOXIB 200 MILLIGRAM(S): 200 CAPSULE ORAL at 13:17

## 2020-01-01 RX ADMIN — MORPHINE SULFATE 30 MILLIGRAM(S): 50 CAPSULE, EXTENDED RELEASE ORAL at 22:20

## 2020-01-01 RX ADMIN — HYDROMORPHONE HYDROCHLORIDE 3 MILLIGRAM(S): 2 INJECTION INTRAMUSCULAR; INTRAVENOUS; SUBCUTANEOUS at 15:00

## 2020-01-01 RX ADMIN — FENTANYL CITRATE 1 PATCH: 50 INJECTION INTRAVENOUS at 06:49

## 2020-01-01 RX ADMIN — HYDROMORPHONE HYDROCHLORIDE 3 MILLIGRAM(S): 2 INJECTION INTRAMUSCULAR; INTRAVENOUS; SUBCUTANEOUS at 02:04

## 2020-01-01 RX ADMIN — HYDROMORPHONE HYDROCHLORIDE 1 MILLIGRAM(S): 2 INJECTION INTRAMUSCULAR; INTRAVENOUS; SUBCUTANEOUS at 19:44

## 2020-01-01 RX ADMIN — Medication 5 MILLIGRAM(S): at 21:16

## 2020-01-01 RX ADMIN — HYDROMORPHONE HYDROCHLORIDE 1 MILLIGRAM(S): 2 INJECTION INTRAMUSCULAR; INTRAVENOUS; SUBCUTANEOUS at 19:59

## 2020-01-01 RX ADMIN — LIDOCAINE 1 PATCH: 4 CREAM TOPICAL at 23:00

## 2020-01-01 RX ADMIN — POLYETHYLENE GLYCOL 3350 17 GRAM(S): 17 POWDER, FOR SOLUTION ORAL at 05:20

## 2020-01-01 RX ADMIN — Medication 25 MILLIGRAM(S): at 22:35

## 2020-01-01 RX ADMIN — SODIUM CHLORIDE 2000 MILLILITER(S): 9 INJECTION INTRAMUSCULAR; INTRAVENOUS; SUBCUTANEOUS at 02:11

## 2020-01-01 RX ADMIN — Medication 50 MILLIGRAM(S): at 05:39

## 2020-01-01 RX ADMIN — HYDROMORPHONE HYDROCHLORIDE 3 MILLIGRAM(S): 2 INJECTION INTRAMUSCULAR; INTRAVENOUS; SUBCUTANEOUS at 22:46

## 2020-01-01 RX ADMIN — LIOTHYRONINE SODIUM 5 MICROGRAM(S): 25 TABLET ORAL at 05:47

## 2020-01-01 RX ADMIN — Medication 2 MILLIGRAM(S): at 18:49

## 2020-01-01 RX ADMIN — PANTOPRAZOLE SODIUM 40 MILLIGRAM(S): 20 TABLET, DELAYED RELEASE ORAL at 06:51

## 2020-01-01 RX ADMIN — ATORVASTATIN CALCIUM 20 MILLIGRAM(S): 80 TABLET, FILM COATED ORAL at 21:02

## 2020-01-01 RX ADMIN — Medication 133 MILLILITER(S): at 16:01

## 2020-01-01 RX ADMIN — AMIODARONE HYDROCHLORIDE 400 MILLIGRAM(S): 400 TABLET ORAL at 14:02

## 2020-01-01 RX ADMIN — ATORVASTATIN CALCIUM 20 MILLIGRAM(S): 80 TABLET, FILM COATED ORAL at 20:55

## 2020-01-01 RX ADMIN — Medication 3 MILLIGRAM(S): at 21:08

## 2020-01-01 RX ADMIN — FENTANYL CITRATE 1 PATCH: 50 INJECTION INTRAVENOUS at 19:05

## 2020-01-01 RX ADMIN — SENNA PLUS 2 TABLET(S): 8.6 TABLET ORAL at 21:12

## 2020-01-01 RX ADMIN — HYDROMORPHONE HYDROCHLORIDE 2 MILLIGRAM(S): 2 INJECTION INTRAMUSCULAR; INTRAVENOUS; SUBCUTANEOUS at 23:00

## 2020-01-01 RX ADMIN — HYDROMORPHONE HYDROCHLORIDE 1 MILLIGRAM(S): 2 INJECTION INTRAMUSCULAR; INTRAVENOUS; SUBCUTANEOUS at 14:45

## 2020-01-01 RX ADMIN — MORPHINE SULFATE 2 MILLIGRAM(S): 50 CAPSULE, EXTENDED RELEASE ORAL at 17:11

## 2020-01-01 RX ADMIN — Medication 25 MILLIGRAM(S): at 05:25

## 2020-01-01 RX ADMIN — RIVAROXABAN 10 MILLIGRAM(S): KIT at 11:42

## 2020-01-01 RX ADMIN — HYDROMORPHONE HYDROCHLORIDE 3 MILLIGRAM(S): 2 INJECTION INTRAMUSCULAR; INTRAVENOUS; SUBCUTANEOUS at 13:49

## 2020-01-01 RX ADMIN — HYDROMORPHONE HYDROCHLORIDE 3 MILLIGRAM(S): 2 INJECTION INTRAMUSCULAR; INTRAVENOUS; SUBCUTANEOUS at 09:39

## 2020-01-01 RX ADMIN — GABAPENTIN 300 MILLIGRAM(S): 400 CAPSULE ORAL at 14:58

## 2020-01-01 RX ADMIN — Medication 81 MILLIGRAM(S): at 12:36

## 2020-01-01 RX ADMIN — ATORVASTATIN CALCIUM 10 MILLIGRAM(S): 80 TABLET, FILM COATED ORAL at 20:34

## 2020-01-01 RX ADMIN — FENTANYL CITRATE 1 PATCH: 50 INJECTION INTRAVENOUS at 21:46

## 2020-01-01 RX ADMIN — HEPARIN SODIUM 1400 UNIT(S)/HR: 5000 INJECTION INTRAVENOUS; SUBCUTANEOUS at 02:37

## 2020-01-01 RX ADMIN — FENTANYL CITRATE 1 PATCH: 50 INJECTION INTRAVENOUS at 16:55

## 2020-01-01 RX ADMIN — HYDROMORPHONE HYDROCHLORIDE 3 MILLIGRAM(S): 2 INJECTION INTRAMUSCULAR; INTRAVENOUS; SUBCUTANEOUS at 07:00

## 2020-01-01 RX ADMIN — Medication 2 MILLIGRAM(S): at 06:44

## 2020-01-01 RX ADMIN — Medication 101.6 MILLIGRAM(S): at 06:07

## 2020-01-01 RX ADMIN — Medication 2 MILLIGRAM(S): at 06:27

## 2020-01-01 RX ADMIN — HYDROMORPHONE HYDROCHLORIDE 3 MILLIGRAM(S): 2 INJECTION INTRAMUSCULAR; INTRAVENOUS; SUBCUTANEOUS at 07:21

## 2020-01-01 RX ADMIN — Medication 4 MILLIGRAM(S): at 17:09

## 2020-01-01 RX ADMIN — Medication 600 MILLIGRAM(S): at 21:18

## 2020-01-01 RX ADMIN — HYDROMORPHONE HYDROCHLORIDE 1 MILLIGRAM(S): 2 INJECTION INTRAMUSCULAR; INTRAVENOUS; SUBCUTANEOUS at 15:35

## 2020-01-01 RX ADMIN — FENTANYL CITRATE 1 PATCH: 50 INJECTION INTRAVENOUS at 08:58

## 2020-01-01 RX ADMIN — SODIUM CHLORIDE 75 MILLILITER(S): 9 INJECTION INTRAMUSCULAR; INTRAVENOUS; SUBCUTANEOUS at 19:42

## 2020-01-01 RX ADMIN — HYDROMORPHONE HYDROCHLORIDE 6 MILLIGRAM(S): 2 INJECTION INTRAMUSCULAR; INTRAVENOUS; SUBCUTANEOUS at 13:51

## 2020-01-01 RX ADMIN — HYDROMORPHONE HYDROCHLORIDE 3 MILLIGRAM(S): 2 INJECTION INTRAMUSCULAR; INTRAVENOUS; SUBCUTANEOUS at 16:42

## 2020-01-01 RX ADMIN — GABAPENTIN 300 MILLIGRAM(S): 400 CAPSULE ORAL at 17:17

## 2020-01-01 RX ADMIN — MORPHINE SULFATE 2 MILLIGRAM(S): 50 CAPSULE, EXTENDED RELEASE ORAL at 15:35

## 2020-01-01 RX ADMIN — Medication 133 MILLILITER(S): at 17:58

## 2020-01-01 RX ADMIN — Medication 15 MILLIGRAM(S): at 13:17

## 2020-01-01 RX ADMIN — CELECOXIB 200 MILLIGRAM(S): 200 CAPSULE ORAL at 05:59

## 2020-01-01 RX ADMIN — LIOTHYRONINE SODIUM 5 MICROGRAM(S): 25 TABLET ORAL at 05:39

## 2020-01-01 RX ADMIN — FENTANYL CITRATE 1 PATCH: 50 INJECTION INTRAVENOUS at 19:30

## 2020-01-01 RX ADMIN — Medication 2 MILLIGRAM(S): at 06:51

## 2020-01-01 RX ADMIN — FENTANYL CITRATE 1 PATCH: 50 INJECTION INTRAVENOUS at 19:00

## 2020-01-01 RX ADMIN — HYDROMORPHONE HYDROCHLORIDE 3 MILLIGRAM(S): 2 INJECTION INTRAMUSCULAR; INTRAVENOUS; SUBCUTANEOUS at 15:42

## 2020-01-01 RX ADMIN — Medication 10 MILLIGRAM(S): at 18:05

## 2020-01-01 RX ADMIN — SENNA PLUS 2 TABLET(S): 8.6 TABLET ORAL at 23:01

## 2020-01-01 RX ADMIN — HYDROMORPHONE HYDROCHLORIDE 1 MILLIGRAM(S): 2 INJECTION INTRAMUSCULAR; INTRAVENOUS; SUBCUTANEOUS at 06:25

## 2020-01-01 RX ADMIN — FENTANYL CITRATE 1 PATCH: 50 INJECTION INTRAVENOUS at 14:23

## 2020-01-01 RX ADMIN — HYDROMORPHONE HYDROCHLORIDE 3 MILLIGRAM(S): 2 INJECTION INTRAMUSCULAR; INTRAVENOUS; SUBCUTANEOUS at 16:20

## 2020-01-01 RX ADMIN — Medication 4 MILLIGRAM(S): at 17:52

## 2020-01-01 RX ADMIN — Medication 975 MILLIGRAM(S): at 06:33

## 2020-01-01 RX ADMIN — HYDROMORPHONE HYDROCHLORIDE 4 MILLIGRAM(S): 2 INJECTION INTRAMUSCULAR; INTRAVENOUS; SUBCUTANEOUS at 22:32

## 2020-01-01 RX ADMIN — FENTANYL CITRATE 1 PATCH: 50 INJECTION INTRAVENOUS at 07:32

## 2020-01-01 RX ADMIN — HYDROMORPHONE HYDROCHLORIDE 1 MILLIGRAM(S): 2 INJECTION INTRAMUSCULAR; INTRAVENOUS; SUBCUTANEOUS at 15:03

## 2020-01-01 RX ADMIN — RIVAROXABAN 10 MILLIGRAM(S): KIT at 11:22

## 2020-01-01 RX ADMIN — HYDROMORPHONE HYDROCHLORIDE 1 MILLIGRAM(S): 2 INJECTION INTRAMUSCULAR; INTRAVENOUS; SUBCUTANEOUS at 13:49

## 2020-01-01 RX ADMIN — HYDROMORPHONE HYDROCHLORIDE 3 MILLIGRAM(S): 2 INJECTION INTRAMUSCULAR; INTRAVENOUS; SUBCUTANEOUS at 04:58

## 2020-01-01 RX ADMIN — PANTOPRAZOLE SODIUM 40 MILLIGRAM(S): 20 TABLET, DELAYED RELEASE ORAL at 05:39

## 2020-01-01 RX ADMIN — LIOTHYRONINE SODIUM 5 MICROGRAM(S): 25 TABLET ORAL at 06:51

## 2020-01-01 RX ADMIN — Medication 2 MILLIGRAM(S): at 07:30

## 2020-01-01 RX ADMIN — Medication 5 MILLIGRAM(S): at 02:36

## 2020-01-01 RX ADMIN — CELECOXIB 200 MILLIGRAM(S): 200 CAPSULE ORAL at 17:33

## 2020-01-01 RX ADMIN — ONDANSETRON 4 MILLIGRAM(S): 8 TABLET, FILM COATED ORAL at 15:11

## 2020-01-01 RX ADMIN — Medication 0.25 MILLIGRAM(S): at 17:02

## 2020-01-01 RX ADMIN — POLYETHYLENE GLYCOL 3350 17 GRAM(S): 17 POWDER, FOR SOLUTION ORAL at 18:30

## 2020-01-01 RX ADMIN — Medication 1 MILLIGRAM(S): at 22:16

## 2020-01-01 RX ADMIN — FENTANYL CITRATE 1 PATCH: 50 INJECTION INTRAVENOUS at 07:30

## 2020-01-01 RX ADMIN — HYDROMORPHONE HYDROCHLORIDE 1 MILLIGRAM(S): 2 INJECTION INTRAMUSCULAR; INTRAVENOUS; SUBCUTANEOUS at 05:25

## 2020-01-01 RX ADMIN — FENTANYL CITRATE 1 PATCH: 50 INJECTION INTRAVENOUS at 16:33

## 2020-01-01 RX ADMIN — HYDROMORPHONE HYDROCHLORIDE 3 MILLIGRAM(S): 2 INJECTION INTRAMUSCULAR; INTRAVENOUS; SUBCUTANEOUS at 10:23

## 2020-01-01 RX ADMIN — HYDROMORPHONE HYDROCHLORIDE 3 MILLIGRAM(S): 2 INJECTION INTRAMUSCULAR; INTRAVENOUS; SUBCUTANEOUS at 11:30

## 2020-01-01 RX ADMIN — Medication 4 MILLIGRAM(S): at 22:32

## 2020-01-01 RX ADMIN — METHADONE HYDROCHLORIDE 5 MILLIGRAM(S): 40 TABLET ORAL at 05:17

## 2020-01-01 RX ADMIN — SENNA PLUS 2 TABLET(S): 8.6 TABLET ORAL at 22:19

## 2020-01-01 RX ADMIN — LIOTHYRONINE SODIUM 5 MICROGRAM(S): 25 TABLET ORAL at 07:29

## 2020-01-01 RX ADMIN — FENTANYL CITRATE 1 PATCH: 50 INJECTION INTRAVENOUS at 07:15

## 2020-01-01 RX ADMIN — LIOTHYRONINE SODIUM 5 MICROGRAM(S): 25 TABLET ORAL at 06:33

## 2020-01-01 RX ADMIN — AMIODARONE HYDROCHLORIDE 600 MILLIGRAM(S): 400 TABLET ORAL at 15:08

## 2020-01-01 RX ADMIN — LIDOCAINE 1 PATCH: 4 CREAM TOPICAL at 12:49

## 2020-01-01 RX ADMIN — HYDROMORPHONE HYDROCHLORIDE 1 MILLIGRAM(S): 2 INJECTION INTRAMUSCULAR; INTRAVENOUS; SUBCUTANEOUS at 15:57

## 2020-01-01 RX ADMIN — Medication 1 ENEMA: at 06:39

## 2020-01-01 RX ADMIN — Medication 25 MILLIGRAM(S): at 06:00

## 2020-01-01 RX ADMIN — HYDROMORPHONE HYDROCHLORIDE 2 MILLIGRAM(S): 2 INJECTION INTRAMUSCULAR; INTRAVENOUS; SUBCUTANEOUS at 23:08

## 2020-01-01 RX ADMIN — MORPHINE SULFATE 30 MILLIGRAM(S): 50 CAPSULE, EXTENDED RELEASE ORAL at 20:34

## 2020-01-01 RX ADMIN — MAGNESIUM HYDROXIDE 30 MILLILITER(S): 400 TABLET, CHEWABLE ORAL at 18:39

## 2020-01-01 RX ADMIN — Medication 50 MILLIGRAM(S): at 08:04

## 2020-01-01 RX ADMIN — Medication 2 MILLIGRAM(S): at 05:39

## 2020-01-01 RX ADMIN — PANTOPRAZOLE SODIUM 40 MILLIGRAM(S): 20 TABLET, DELAYED RELEASE ORAL at 05:43

## 2020-01-01 RX ADMIN — HYDROMORPHONE HYDROCHLORIDE 30 MILLILITER(S): 2 INJECTION INTRAMUSCULAR; INTRAVENOUS; SUBCUTANEOUS at 23:06

## 2020-01-01 RX ADMIN — FENTANYL CITRATE 1 PATCH: 50 INJECTION INTRAVENOUS at 07:56

## 2020-01-01 RX ADMIN — Medication 500 MILLIGRAM(S): at 05:29

## 2020-01-01 RX ADMIN — Medication 25 MILLIGRAM(S): at 10:11

## 2020-01-01 RX ADMIN — SODIUM CHLORIDE 2000 MILLILITER(S): 9 INJECTION INTRAMUSCULAR; INTRAVENOUS; SUBCUTANEOUS at 14:58

## 2020-01-01 RX ADMIN — Medication 15 MILLIGRAM(S): at 09:06

## 2020-01-01 RX ADMIN — Medication 3 MILLIGRAM(S): at 22:25

## 2020-01-01 RX ADMIN — HYDROMORPHONE HYDROCHLORIDE 3 MILLIGRAM(S): 2 INJECTION INTRAMUSCULAR; INTRAVENOUS; SUBCUTANEOUS at 04:05

## 2020-01-01 RX ADMIN — FENTANYL CITRATE 1 PATCH: 50 INJECTION INTRAVENOUS at 11:02

## 2020-01-01 RX ADMIN — FENTANYL CITRATE 1 PATCH: 50 INJECTION INTRAVENOUS at 11:29

## 2020-01-01 RX ADMIN — Medication 250 MILLIGRAM(S): at 17:29

## 2020-01-01 RX ADMIN — Medication 975 MILLIGRAM(S): at 08:46

## 2020-01-01 RX ADMIN — ENOXAPARIN SODIUM 90 MILLIGRAM(S): 100 INJECTION SUBCUTANEOUS at 07:29

## 2020-01-01 RX ADMIN — MORPHINE SULFATE 30 MILLIGRAM(S): 50 CAPSULE, EXTENDED RELEASE ORAL at 06:38

## 2020-01-01 RX ADMIN — Medication 975 MILLIGRAM(S): at 06:13

## 2020-01-01 RX ADMIN — Medication 1000 MILLIGRAM(S): at 19:09

## 2020-01-01 RX ADMIN — FENTANYL CITRATE 1 PATCH: 50 INJECTION INTRAVENOUS at 17:30

## 2020-01-01 RX ADMIN — LIDOCAINE 2 PATCH: 4 CREAM TOPICAL at 11:59

## 2020-01-01 RX ADMIN — FENTANYL CITRATE 1 PATCH: 50 INJECTION INTRAVENOUS at 20:06

## 2020-01-01 RX ADMIN — HYDROMORPHONE HYDROCHLORIDE 4 MILLIGRAM(S): 2 INJECTION INTRAMUSCULAR; INTRAVENOUS; SUBCUTANEOUS at 20:54

## 2020-01-01 RX ADMIN — LIOTHYRONINE SODIUM 5 MICROGRAM(S): 25 TABLET ORAL at 05:02

## 2020-01-01 RX ADMIN — Medication 975 MILLIGRAM(S): at 06:48

## 2020-01-01 RX ADMIN — HYDROMORPHONE HYDROCHLORIDE 6 MILLIGRAM(S): 2 INJECTION INTRAMUSCULAR; INTRAVENOUS; SUBCUTANEOUS at 05:10

## 2020-01-01 RX ADMIN — HYDROMORPHONE HYDROCHLORIDE 0.5 MILLIGRAM(S): 2 INJECTION INTRAMUSCULAR; INTRAVENOUS; SUBCUTANEOUS at 13:44

## 2020-01-01 RX ADMIN — HYDROMORPHONE HYDROCHLORIDE 30 MILLILITER(S): 2 INJECTION INTRAMUSCULAR; INTRAVENOUS; SUBCUTANEOUS at 00:30

## 2020-01-01 RX ADMIN — ATORVASTATIN CALCIUM 20 MILLIGRAM(S): 80 TABLET, FILM COATED ORAL at 22:32

## 2020-01-01 RX ADMIN — ATORVASTATIN CALCIUM 10 MILLIGRAM(S): 80 TABLET, FILM COATED ORAL at 21:38

## 2020-01-01 RX ADMIN — ENOXAPARIN SODIUM 90 MILLIGRAM(S): 100 INJECTION SUBCUTANEOUS at 17:19

## 2020-01-01 RX ADMIN — LIOTHYRONINE SODIUM 5 MICROGRAM(S): 25 TABLET ORAL at 06:34

## 2020-01-01 RX ADMIN — Medication 100 MILLIEQUIVALENT(S): at 12:18

## 2020-01-01 RX ADMIN — Medication 2 MILLIGRAM(S): at 06:20

## 2020-01-01 RX ADMIN — Medication 975 MILLIGRAM(S): at 00:00

## 2020-01-01 RX ADMIN — Medication 975 MILLIGRAM(S): at 15:06

## 2020-01-01 RX ADMIN — Medication 4 MILLIGRAM(S): at 12:48

## 2020-01-01 RX ADMIN — Medication 975 MILLIGRAM(S): at 04:55

## 2020-01-01 RX ADMIN — HYDROMORPHONE HYDROCHLORIDE 1 MILLIGRAM(S): 2 INJECTION INTRAMUSCULAR; INTRAVENOUS; SUBCUTANEOUS at 07:44

## 2020-01-01 RX ADMIN — Medication 975 MILLIGRAM(S): at 23:06

## 2020-01-01 RX ADMIN — METHADONE HYDROCHLORIDE 5 MILLIGRAM(S): 40 TABLET ORAL at 07:29

## 2020-01-01 RX ADMIN — ENOXAPARIN SODIUM 90 MILLIGRAM(S): 100 INJECTION SUBCUTANEOUS at 05:47

## 2020-01-01 RX ADMIN — HYDROMORPHONE HYDROCHLORIDE 3 MILLIGRAM(S): 2 INJECTION INTRAMUSCULAR; INTRAVENOUS; SUBCUTANEOUS at 17:30

## 2020-01-01 RX ADMIN — Medication 15 MILLIGRAM(S): at 14:00

## 2020-01-01 RX ADMIN — HYDROMORPHONE HYDROCHLORIDE 0.5 MILLIGRAM(S): 2 INJECTION INTRAMUSCULAR; INTRAVENOUS; SUBCUTANEOUS at 14:28

## 2020-01-01 RX ADMIN — Medication 0.25 MILLIGRAM(S): at 05:07

## 2020-01-01 RX ADMIN — Medication 25 MILLIGRAM(S): at 05:58

## 2020-01-01 RX ADMIN — RIVAROXABAN 10 MILLIGRAM(S): KIT at 12:17

## 2020-01-01 RX ADMIN — HYDROMORPHONE HYDROCHLORIDE 3 MILLIGRAM(S): 2 INJECTION INTRAMUSCULAR; INTRAVENOUS; SUBCUTANEOUS at 23:08

## 2020-01-01 RX ADMIN — Medication 50 GRAM(S): at 01:46

## 2020-01-01 RX ADMIN — CELECOXIB 200 MILLIGRAM(S): 200 CAPSULE ORAL at 18:00

## 2020-01-01 RX ADMIN — ENOXAPARIN SODIUM 90 MILLIGRAM(S): 100 INJECTION SUBCUTANEOUS at 19:19

## 2020-01-01 RX ADMIN — CELECOXIB 200 MILLIGRAM(S): 200 CAPSULE ORAL at 12:17

## 2020-01-01 RX ADMIN — Medication 25 MILLIGRAM(S): at 17:12

## 2020-01-01 RX ADMIN — CELECOXIB 200 MILLIGRAM(S): 200 CAPSULE ORAL at 11:42

## 2020-01-01 RX ADMIN — NALOXEGOL OXALATE 25 MILLIGRAM(S): 12.5 TABLET, FILM COATED ORAL at 17:19

## 2020-01-01 RX ADMIN — Medication 0.25 MILLIGRAM(S): at 07:37

## 2020-01-01 RX ADMIN — Medication 25 MILLIGRAM(S): at 13:24

## 2020-01-01 RX ADMIN — LIDOCAINE 1 PATCH: 4 CREAM TOPICAL at 21:08

## 2020-01-01 RX ADMIN — Medication 50 MILLIGRAM(S): at 06:02

## 2020-01-01 RX ADMIN — Medication 50 MILLIGRAM(S): at 17:03

## 2020-01-01 RX ADMIN — HYDROMORPHONE HYDROCHLORIDE 4 MILLIGRAM(S): 2 INJECTION INTRAMUSCULAR; INTRAVENOUS; SUBCUTANEOUS at 06:48

## 2020-01-01 RX ADMIN — Medication 5 MILLIGRAM(S): at 10:19

## 2020-01-01 RX ADMIN — HYDROMORPHONE HYDROCHLORIDE 2 MILLIGRAM(S): 2 INJECTION INTRAMUSCULAR; INTRAVENOUS; SUBCUTANEOUS at 07:06

## 2020-01-01 RX ADMIN — ENOXAPARIN SODIUM 40 MILLIGRAM(S): 100 INJECTION SUBCUTANEOUS at 12:51

## 2020-01-01 RX ADMIN — TIZANIDINE 4 MILLIGRAM(S): 4 TABLET ORAL at 06:31

## 2020-01-01 RX ADMIN — Medication 2 MILLIGRAM(S): at 17:20

## 2020-01-01 RX ADMIN — HYDROMORPHONE HYDROCHLORIDE 3 MILLIGRAM(S): 2 INJECTION INTRAMUSCULAR; INTRAVENOUS; SUBCUTANEOUS at 18:41

## 2020-01-01 RX ADMIN — ENOXAPARIN SODIUM 40 MILLIGRAM(S): 100 INJECTION SUBCUTANEOUS at 08:44

## 2020-01-01 RX ADMIN — GABAPENTIN 300 MILLIGRAM(S): 400 CAPSULE ORAL at 12:49

## 2020-01-01 RX ADMIN — CELECOXIB 200 MILLIGRAM(S): 200 CAPSULE ORAL at 06:38

## 2020-01-01 RX ADMIN — Medication 975 MILLIGRAM(S): at 18:25

## 2020-01-01 RX ADMIN — ENOXAPARIN SODIUM 90 MILLIGRAM(S): 100 INJECTION SUBCUTANEOUS at 06:34

## 2020-01-01 RX ADMIN — HYDROMORPHONE HYDROCHLORIDE 3 MILLIGRAM(S): 2 INJECTION INTRAMUSCULAR; INTRAVENOUS; SUBCUTANEOUS at 22:30

## 2020-01-01 RX ADMIN — Medication 175 MICROGRAM(S): at 06:44

## 2020-01-01 RX ADMIN — Medication 3 MILLIGRAM(S): at 21:14

## 2020-01-01 RX ADMIN — HYDROMORPHONE HYDROCHLORIDE 3 MILLIGRAM(S): 2 INJECTION INTRAMUSCULAR; INTRAVENOUS; SUBCUTANEOUS at 09:44

## 2020-01-01 RX ADMIN — HYDROMORPHONE HYDROCHLORIDE 2 MILLIGRAM(S): 2 INJECTION INTRAMUSCULAR; INTRAVENOUS; SUBCUTANEOUS at 05:58

## 2020-01-01 RX ADMIN — METHADONE HYDROCHLORIDE 5 MILLIGRAM(S): 40 TABLET ORAL at 17:24

## 2020-01-01 RX ADMIN — GABAPENTIN 300 MILLIGRAM(S): 400 CAPSULE ORAL at 23:33

## 2020-01-01 RX ADMIN — HYDROMORPHONE HYDROCHLORIDE 30 MILLILITER(S): 2 INJECTION INTRAMUSCULAR; INTRAVENOUS; SUBCUTANEOUS at 19:47

## 2020-01-01 RX ADMIN — HYDROMORPHONE HYDROCHLORIDE 0.5 MILLIGRAM(S): 2 INJECTION INTRAMUSCULAR; INTRAVENOUS; SUBCUTANEOUS at 09:52

## 2020-01-01 RX ADMIN — Medication 2 MILLIGRAM(S): at 17:35

## 2020-01-01 RX ADMIN — METHADONE HYDROCHLORIDE 5 MILLIGRAM(S): 40 TABLET ORAL at 17:35

## 2020-01-01 RX ADMIN — Medication 10 MILLIGRAM(S): at 23:40

## 2020-01-01 RX ADMIN — HYDROMORPHONE HYDROCHLORIDE 3 MILLIGRAM(S): 2 INJECTION INTRAMUSCULAR; INTRAVENOUS; SUBCUTANEOUS at 19:25

## 2020-01-01 RX ADMIN — LIDOCAINE 1 PATCH: 4 CREAM TOPICAL at 12:52

## 2020-01-01 RX ADMIN — Medication 1000 MILLIGRAM(S): at 15:00

## 2020-01-01 RX ADMIN — PANTOPRAZOLE SODIUM 40 MILLIGRAM(S): 20 TABLET, DELAYED RELEASE ORAL at 05:20

## 2020-01-01 RX ADMIN — HYDROMORPHONE HYDROCHLORIDE 3 MILLIGRAM(S): 2 INJECTION INTRAMUSCULAR; INTRAVENOUS; SUBCUTANEOUS at 19:46

## 2020-01-01 RX ADMIN — GABAPENTIN 300 MILLIGRAM(S): 400 CAPSULE ORAL at 13:54

## 2020-01-01 RX ADMIN — GABAPENTIN 300 MILLIGRAM(S): 400 CAPSULE ORAL at 06:40

## 2020-01-01 RX ADMIN — Medication 975 MILLIGRAM(S): at 07:33

## 2020-01-01 RX ADMIN — FENTANYL CITRATE 1 PATCH: 50 INJECTION INTRAVENOUS at 07:29

## 2020-01-01 RX ADMIN — HYDROMORPHONE HYDROCHLORIDE 6 MILLIGRAM(S): 2 INJECTION INTRAMUSCULAR; INTRAVENOUS; SUBCUTANEOUS at 17:17

## 2020-01-01 RX ADMIN — ENOXAPARIN SODIUM 90 MILLIGRAM(S): 100 INJECTION SUBCUTANEOUS at 17:40

## 2020-01-01 RX ADMIN — Medication 175 MICROGRAM(S): at 05:59

## 2020-01-01 RX ADMIN — GABAPENTIN 300 MILLIGRAM(S): 400 CAPSULE ORAL at 17:00

## 2020-01-01 RX ADMIN — Medication 175 MICROGRAM(S): at 05:18

## 2020-01-01 RX ADMIN — HYDROMORPHONE HYDROCHLORIDE 3 MILLIGRAM(S): 2 INJECTION INTRAMUSCULAR; INTRAVENOUS; SUBCUTANEOUS at 22:56

## 2020-01-01 RX ADMIN — Medication 5 MILLIGRAM(S): at 14:21

## 2020-01-01 RX ADMIN — METHADONE HYDROCHLORIDE 5 MILLIGRAM(S): 40 TABLET ORAL at 05:39

## 2020-01-01 RX ADMIN — LACTULOSE 20 GRAM(S): 10 SOLUTION ORAL at 06:51

## 2020-01-01 RX ADMIN — PANTOPRAZOLE SODIUM 40 MILLIGRAM(S): 20 TABLET, DELAYED RELEASE ORAL at 05:59

## 2020-01-01 RX ADMIN — HYDROMORPHONE HYDROCHLORIDE 1 MILLIGRAM(S): 2 INJECTION INTRAMUSCULAR; INTRAVENOUS; SUBCUTANEOUS at 19:00

## 2020-01-01 RX ADMIN — METHADONE HYDROCHLORIDE 5 MILLIGRAM(S): 40 TABLET ORAL at 05:47

## 2020-01-01 RX ADMIN — Medication 3 MILLIGRAM(S): at 22:46

## 2020-01-01 RX ADMIN — HYDROMORPHONE HYDROCHLORIDE 4 MILLIGRAM(S): 2 INJECTION INTRAMUSCULAR; INTRAVENOUS; SUBCUTANEOUS at 20:10

## 2020-01-01 RX ADMIN — PANTOPRAZOLE SODIUM 40 MILLIGRAM(S): 20 TABLET, DELAYED RELEASE ORAL at 07:30

## 2020-01-01 RX ADMIN — METHADONE HYDROCHLORIDE 5 MILLIGRAM(S): 40 TABLET ORAL at 17:19

## 2020-01-01 RX ADMIN — LIOTHYRONINE SODIUM 5 MICROGRAM(S): 25 TABLET ORAL at 07:09

## 2020-01-01 RX ADMIN — HYDROMORPHONE HYDROCHLORIDE 1 MILLIGRAM(S): 2 INJECTION INTRAMUSCULAR; INTRAVENOUS; SUBCUTANEOUS at 08:44

## 2020-01-01 RX ADMIN — HYDROMORPHONE HYDROCHLORIDE 6 MILLIGRAM(S): 2 INJECTION INTRAMUSCULAR; INTRAVENOUS; SUBCUTANEOUS at 11:29

## 2020-01-01 RX ADMIN — METHADONE HYDROCHLORIDE 5 MILLIGRAM(S): 40 TABLET ORAL at 18:21

## 2020-01-01 RX ADMIN — Medication 3 MILLIGRAM(S): at 21:43

## 2020-01-01 RX ADMIN — HYDROMORPHONE HYDROCHLORIDE 1 MILLIGRAM(S): 2 INJECTION INTRAMUSCULAR; INTRAVENOUS; SUBCUTANEOUS at 04:08

## 2020-01-01 RX ADMIN — METHADONE HYDROCHLORIDE 5 MILLIGRAM(S): 40 TABLET ORAL at 06:25

## 2020-01-01 RX ADMIN — CHLORHEXIDINE GLUCONATE 1 APPLICATION(S): 213 SOLUTION TOPICAL at 11:04

## 2020-01-01 RX ADMIN — HYDROMORPHONE HYDROCHLORIDE 6 MILLIGRAM(S): 2 INJECTION INTRAMUSCULAR; INTRAVENOUS; SUBCUTANEOUS at 19:12

## 2020-01-01 RX ADMIN — HYDROMORPHONE HYDROCHLORIDE 1 MILLIGRAM(S): 2 INJECTION INTRAMUSCULAR; INTRAVENOUS; SUBCUTANEOUS at 12:33

## 2020-01-01 RX ADMIN — HYDROMORPHONE HYDROCHLORIDE 4 MILLIGRAM(S): 2 INJECTION INTRAMUSCULAR; INTRAVENOUS; SUBCUTANEOUS at 14:10

## 2020-01-01 RX ADMIN — LIOTHYRONINE SODIUM 5 MICROGRAM(S): 25 TABLET ORAL at 05:32

## 2020-01-01 RX ADMIN — Medication 975 MILLIGRAM(S): at 23:50

## 2020-01-01 RX ADMIN — HYDROMORPHONE HYDROCHLORIDE 4 MILLIGRAM(S): 2 INJECTION INTRAMUSCULAR; INTRAVENOUS; SUBCUTANEOUS at 00:06

## 2020-01-01 RX ADMIN — Medication 975 MILLIGRAM(S): at 15:39

## 2020-01-01 RX ADMIN — Medication 0.25 MILLIGRAM(S): at 14:18

## 2020-01-01 RX ADMIN — ENOXAPARIN SODIUM 90 MILLIGRAM(S): 100 INJECTION SUBCUTANEOUS at 18:21

## 2020-01-01 RX ADMIN — GABAPENTIN 300 MILLIGRAM(S): 400 CAPSULE ORAL at 12:20

## 2020-01-01 RX ADMIN — Medication 480 MICROGRAM(S): at 13:49

## 2020-01-01 RX ADMIN — HYDROMORPHONE HYDROCHLORIDE 2 MILLIGRAM(S): 2 INJECTION INTRAMUSCULAR; INTRAVENOUS; SUBCUTANEOUS at 12:52

## 2020-01-01 RX ADMIN — FENTANYL CITRATE 1 PATCH: 50 INJECTION INTRAVENOUS at 23:09

## 2020-01-01 RX ADMIN — RIVAROXABAN 10 MILLIGRAM(S): KIT at 12:31

## 2020-01-01 RX ADMIN — FENTANYL CITRATE 1 PATCH: 50 INJECTION INTRAVENOUS at 20:11

## 2020-01-01 RX ADMIN — Medication 2 MILLIGRAM(S): at 05:17

## 2020-01-01 RX ADMIN — METHADONE HYDROCHLORIDE 5 MILLIGRAM(S): 40 TABLET ORAL at 06:47

## 2020-01-01 RX ADMIN — HYDROMORPHONE HYDROCHLORIDE 6 MILLIGRAM(S): 2 INJECTION INTRAMUSCULAR; INTRAVENOUS; SUBCUTANEOUS at 10:30

## 2020-01-01 RX ADMIN — Medication 975 MILLIGRAM(S): at 16:47

## 2020-01-01 RX ADMIN — HYDROMORPHONE HYDROCHLORIDE 3 MILLIGRAM(S): 2 INJECTION INTRAMUSCULAR; INTRAVENOUS; SUBCUTANEOUS at 15:25

## 2020-01-01 RX ADMIN — HYDROMORPHONE HYDROCHLORIDE 1 MILLIGRAM(S): 2 INJECTION INTRAMUSCULAR; INTRAVENOUS; SUBCUTANEOUS at 05:27

## 2020-01-01 RX ADMIN — HYDROMORPHONE HYDROCHLORIDE 3 MILLIGRAM(S): 2 INJECTION INTRAMUSCULAR; INTRAVENOUS; SUBCUTANEOUS at 06:19

## 2020-01-01 RX ADMIN — METHADONE HYDROCHLORIDE 5 MILLIGRAM(S): 40 TABLET ORAL at 19:20

## 2020-01-01 RX ADMIN — Medication 0.25 MILLIGRAM(S): at 21:13

## 2020-01-01 RX ADMIN — Medication 40 MILLIEQUIVALENT(S): at 12:17

## 2020-01-01 RX ADMIN — FENTANYL CITRATE 1 PATCH: 50 INJECTION INTRAVENOUS at 19:58

## 2020-01-01 RX ADMIN — GABAPENTIN 300 MILLIGRAM(S): 400 CAPSULE ORAL at 21:40

## 2020-01-01 RX ADMIN — POLYETHYLENE GLYCOL 3350 17 GRAM(S): 17 POWDER, FOR SOLUTION ORAL at 11:33

## 2020-01-01 RX ADMIN — HYDROMORPHONE HYDROCHLORIDE 3 MILLIGRAM(S): 2 INJECTION INTRAMUSCULAR; INTRAVENOUS; SUBCUTANEOUS at 16:45

## 2020-01-01 RX ADMIN — Medication 25 MILLIGRAM(S): at 18:26

## 2020-01-01 RX ADMIN — HYDROMORPHONE HYDROCHLORIDE 3 MILLIGRAM(S): 2 INJECTION INTRAMUSCULAR; INTRAVENOUS; SUBCUTANEOUS at 10:53

## 2020-01-01 RX ADMIN — Medication 0.25 MILLIGRAM(S): at 05:58

## 2020-01-01 RX ADMIN — Medication 0.25 MILLIGRAM(S): at 04:22

## 2020-01-01 RX ADMIN — AMIODARONE HYDROCHLORIDE 400 MILLIGRAM(S): 400 TABLET ORAL at 21:35

## 2020-01-01 RX ADMIN — HYDROMORPHONE HYDROCHLORIDE 0.5 MILLIGRAM(S): 2 INJECTION INTRAMUSCULAR; INTRAVENOUS; SUBCUTANEOUS at 02:40

## 2020-01-01 RX ADMIN — CELECOXIB 200 MILLIGRAM(S): 200 CAPSULE ORAL at 12:33

## 2020-01-01 RX ADMIN — CELECOXIB 200 MILLIGRAM(S): 200 CAPSULE ORAL at 11:23

## 2020-01-01 RX ADMIN — SENNA PLUS 1 TABLET(S): 8.6 TABLET ORAL at 18:00

## 2020-01-01 RX ADMIN — SENNA PLUS 2 TABLET(S): 8.6 TABLET ORAL at 21:13

## 2020-01-01 RX ADMIN — HYDROMORPHONE HYDROCHLORIDE 1 MILLIGRAM(S): 2 INJECTION INTRAMUSCULAR; INTRAVENOUS; SUBCUTANEOUS at 21:23

## 2020-01-01 RX ADMIN — PANTOPRAZOLE SODIUM 40 MILLIGRAM(S): 20 TABLET, DELAYED RELEASE ORAL at 05:33

## 2020-01-01 RX ADMIN — LIDOCAINE 1 PATCH: 4 CREAM TOPICAL at 19:30

## 2020-01-01 RX ADMIN — Medication 1 BOTTLE: at 17:59

## 2020-01-01 RX ADMIN — ENOXAPARIN SODIUM 90 MILLIGRAM(S): 100 INJECTION SUBCUTANEOUS at 05:39

## 2020-01-01 RX ADMIN — HYDROMORPHONE HYDROCHLORIDE 1 MILLIGRAM(S): 2 INJECTION INTRAMUSCULAR; INTRAVENOUS; SUBCUTANEOUS at 10:20

## 2020-01-01 RX ADMIN — HYDROMORPHONE HYDROCHLORIDE 3 MILLIGRAM(S): 2 INJECTION INTRAMUSCULAR; INTRAVENOUS; SUBCUTANEOUS at 14:08

## 2020-01-01 RX ADMIN — Medication 25 MILLIGRAM(S): at 06:20

## 2020-01-01 RX ADMIN — Medication 50 MILLIGRAM(S): at 05:37

## 2020-01-01 RX ADMIN — PANTOPRAZOLE SODIUM 40 MILLIGRAM(S): 20 TABLET, DELAYED RELEASE ORAL at 06:33

## 2020-01-01 RX ADMIN — MORPHINE SULFATE 30 MILLIGRAM(S): 50 CAPSULE, EXTENDED RELEASE ORAL at 07:40

## 2020-01-01 RX ADMIN — HYDROMORPHONE HYDROCHLORIDE 2 MILLIGRAM(S): 2 INJECTION INTRAMUSCULAR; INTRAVENOUS; SUBCUTANEOUS at 08:28

## 2020-01-01 RX ADMIN — GABAPENTIN 300 MILLIGRAM(S): 400 CAPSULE ORAL at 13:39

## 2020-01-01 RX ADMIN — FENTANYL CITRATE 1 PATCH: 50 INJECTION INTRAVENOUS at 22:11

## 2020-01-01 RX ADMIN — LIOTHYRONINE SODIUM 5 MICROGRAM(S): 25 TABLET ORAL at 05:20

## 2020-01-01 RX ADMIN — POLYETHYLENE GLYCOL 3350 17 GRAM(S): 17 POWDER, FOR SOLUTION ORAL at 11:39

## 2020-01-01 RX ADMIN — HYDROMORPHONE HYDROCHLORIDE 1 MILLIGRAM(S): 2 INJECTION INTRAMUSCULAR; INTRAVENOUS; SUBCUTANEOUS at 15:32

## 2020-01-01 RX ADMIN — POLYETHYLENE GLYCOL 3350 17 GRAM(S): 17 POWDER, FOR SOLUTION ORAL at 19:21

## 2020-01-01 RX ADMIN — HYDROMORPHONE HYDROCHLORIDE 2 MILLIGRAM(S): 2 INJECTION INTRAMUSCULAR; INTRAVENOUS; SUBCUTANEOUS at 15:00

## 2020-01-01 RX ADMIN — HYDROMORPHONE HYDROCHLORIDE 1 MILLIGRAM(S): 2 INJECTION INTRAMUSCULAR; INTRAVENOUS; SUBCUTANEOUS at 18:06

## 2020-01-01 RX ADMIN — HYDROMORPHONE HYDROCHLORIDE 1 MILLIGRAM(S): 2 INJECTION INTRAMUSCULAR; INTRAVENOUS; SUBCUTANEOUS at 10:16

## 2020-01-01 RX ADMIN — Medication 600 MILLIGRAM(S): at 05:31

## 2020-01-01 RX ADMIN — HYDROMORPHONE HYDROCHLORIDE 3 MILLIGRAM(S): 2 INJECTION INTRAMUSCULAR; INTRAVENOUS; SUBCUTANEOUS at 10:04

## 2020-01-01 RX ADMIN — HYDROMORPHONE HYDROCHLORIDE 1 MILLIGRAM(S): 2 INJECTION INTRAMUSCULAR; INTRAVENOUS; SUBCUTANEOUS at 16:12

## 2020-01-01 RX ADMIN — HYDROMORPHONE HYDROCHLORIDE 6 MILLIGRAM(S): 2 INJECTION INTRAMUSCULAR; INTRAVENOUS; SUBCUTANEOUS at 20:06

## 2020-01-01 RX ADMIN — Medication 2 MILLIGRAM(S): at 05:08

## 2020-01-01 RX ADMIN — PANTOPRAZOLE SODIUM 40 MILLIGRAM(S): 20 TABLET, DELAYED RELEASE ORAL at 08:04

## 2020-01-01 RX ADMIN — Medication 4 MILLIGRAM(S): at 23:33

## 2020-01-01 RX ADMIN — HYDROMORPHONE HYDROCHLORIDE 1 MILLIGRAM(S): 2 INJECTION INTRAMUSCULAR; INTRAVENOUS; SUBCUTANEOUS at 15:45

## 2020-01-01 RX ADMIN — Medication 175 MICROGRAM(S): at 06:26

## 2020-01-01 RX ADMIN — Medication 100 MICROGRAM(S): at 05:59

## 2020-01-01 RX ADMIN — Medication 100 MILLIEQUIVALENT(S): at 02:06

## 2020-01-01 RX ADMIN — FENTANYL CITRATE 1 PATCH: 50 INJECTION INTRAVENOUS at 06:48

## 2020-01-01 RX ADMIN — LIOTHYRONINE SODIUM 5 MICROGRAM(S): 25 TABLET ORAL at 05:29

## 2020-01-01 RX ADMIN — HYDROMORPHONE HYDROCHLORIDE 3 MILLIGRAM(S): 2 INJECTION INTRAMUSCULAR; INTRAVENOUS; SUBCUTANEOUS at 15:19

## 2020-01-01 RX ADMIN — HYDROMORPHONE HYDROCHLORIDE 2 MILLIGRAM(S): 2 INJECTION INTRAMUSCULAR; INTRAVENOUS; SUBCUTANEOUS at 06:05

## 2020-01-01 RX ADMIN — HYDROMORPHONE HYDROCHLORIDE 1 MILLIGRAM(S): 2 INJECTION INTRAMUSCULAR; INTRAVENOUS; SUBCUTANEOUS at 19:46

## 2020-01-01 RX ADMIN — ENOXAPARIN SODIUM 40 MILLIGRAM(S): 100 INJECTION SUBCUTANEOUS at 12:29

## 2020-01-01 RX ADMIN — HEPARIN SODIUM 7000 UNIT(S): 5000 INJECTION INTRAVENOUS; SUBCUTANEOUS at 19:39

## 2020-01-01 RX ADMIN — HYDROMORPHONE HYDROCHLORIDE 3 MILLIGRAM(S): 2 INJECTION INTRAMUSCULAR; INTRAVENOUS; SUBCUTANEOUS at 14:00

## 2020-01-01 RX ADMIN — HYDROMORPHONE HYDROCHLORIDE 1 MILLIGRAM(S): 2 INJECTION INTRAMUSCULAR; INTRAVENOUS; SUBCUTANEOUS at 13:44

## 2020-01-01 RX ADMIN — Medication 50 MILLIGRAM(S): at 06:33

## 2020-01-01 RX ADMIN — METHADONE HYDROCHLORIDE 5 MILLIGRAM(S): 40 TABLET ORAL at 17:40

## 2020-01-01 RX ADMIN — HYDROMORPHONE HYDROCHLORIDE 3 MILLIGRAM(S): 2 INJECTION INTRAMUSCULAR; INTRAVENOUS; SUBCUTANEOUS at 14:10

## 2020-01-01 RX ADMIN — Medication 25 MILLIGRAM(S): at 20:34

## 2020-01-01 RX ADMIN — Medication 175 MICROGRAM(S): at 07:29

## 2020-01-01 RX ADMIN — MORPHINE SULFATE 30 MILLIGRAM(S): 50 CAPSULE, EXTENDED RELEASE ORAL at 06:59

## 2020-01-01 RX ADMIN — LIOTHYRONINE SODIUM 5 MICROGRAM(S): 25 TABLET ORAL at 06:38

## 2020-01-01 RX ADMIN — Medication 15 MILLIGRAM(S): at 12:31

## 2020-01-01 RX ADMIN — Medication 975 MILLIGRAM(S): at 23:31

## 2020-01-01 RX ADMIN — FENTANYL CITRATE 1 PATCH: 50 INJECTION INTRAVENOUS at 07:50

## 2020-01-01 RX ADMIN — Medication 975 MILLIGRAM(S): at 06:39

## 2020-01-01 RX ADMIN — Medication 10 MILLIGRAM(S): at 14:54

## 2020-01-01 RX ADMIN — HYDROMORPHONE HYDROCHLORIDE 4 MILLIGRAM(S): 2 INJECTION INTRAMUSCULAR; INTRAVENOUS; SUBCUTANEOUS at 15:00

## 2020-01-01 RX ADMIN — FENTANYL CITRATE 1 PATCH: 50 INJECTION INTRAVENOUS at 07:26

## 2020-01-01 RX ADMIN — Medication 0.25 MILLIGRAM(S): at 05:38

## 2020-01-01 RX ADMIN — MORPHINE SULFATE 4 MILLIGRAM(S): 50 CAPSULE, EXTENDED RELEASE ORAL at 08:46

## 2020-01-01 RX ADMIN — Medication 175 MICROGRAM(S): at 05:20

## 2020-01-01 RX ADMIN — HYDROMORPHONE HYDROCHLORIDE 1 MILLIGRAM(S): 2 INJECTION INTRAMUSCULAR; INTRAVENOUS; SUBCUTANEOUS at 05:48

## 2020-01-01 RX ADMIN — FENTANYL CITRATE 1 PATCH: 50 INJECTION INTRAVENOUS at 08:32

## 2020-01-01 RX ADMIN — HYDROMORPHONE HYDROCHLORIDE 2 MILLIGRAM(S): 2 INJECTION INTRAMUSCULAR; INTRAVENOUS; SUBCUTANEOUS at 13:34

## 2020-01-01 RX ADMIN — AMIODARONE HYDROCHLORIDE 400 MILLIGRAM(S): 400 TABLET ORAL at 13:56

## 2020-01-01 RX ADMIN — HYDROMORPHONE HYDROCHLORIDE 3 MILLIGRAM(S): 2 INJECTION INTRAMUSCULAR; INTRAVENOUS; SUBCUTANEOUS at 17:00

## 2020-01-01 RX ADMIN — HYDROMORPHONE HYDROCHLORIDE 1 MILLIGRAM(S): 2 INJECTION INTRAMUSCULAR; INTRAVENOUS; SUBCUTANEOUS at 10:01

## 2020-01-01 RX ADMIN — HYDROMORPHONE HYDROCHLORIDE 2 MILLIGRAM(S): 2 INJECTION INTRAMUSCULAR; INTRAVENOUS; SUBCUTANEOUS at 06:38

## 2020-01-01 RX ADMIN — METHADONE HYDROCHLORIDE 5 MILLIGRAM(S): 40 TABLET ORAL at 18:07

## 2020-01-01 RX ADMIN — Medication 0.25 MILLIGRAM(S): at 19:35

## 2020-01-01 RX ADMIN — HYDROMORPHONE HYDROCHLORIDE 30 MILLILITER(S): 2 INJECTION INTRAMUSCULAR; INTRAVENOUS; SUBCUTANEOUS at 18:06

## 2020-01-01 RX ADMIN — LIOTHYRONINE SODIUM 5 MICROGRAM(S): 25 TABLET ORAL at 05:56

## 2020-01-01 RX ADMIN — HYDROMORPHONE HYDROCHLORIDE 1 MILLIGRAM(S): 2 INJECTION INTRAMUSCULAR; INTRAVENOUS; SUBCUTANEOUS at 21:30

## 2020-01-01 RX ADMIN — HYDROMORPHONE HYDROCHLORIDE 1 MILLIGRAM(S): 2 INJECTION INTRAMUSCULAR; INTRAVENOUS; SUBCUTANEOUS at 17:45

## 2020-01-01 RX ADMIN — NALOXEGOL OXALATE 25 MILLIGRAM(S): 12.5 TABLET, FILM COATED ORAL at 10:33

## 2020-01-01 RX ADMIN — HYDROMORPHONE HYDROCHLORIDE 4 MILLIGRAM(S): 2 INJECTION INTRAMUSCULAR; INTRAVENOUS; SUBCUTANEOUS at 18:32

## 2020-01-01 RX ADMIN — TIZANIDINE 2 MILLIGRAM(S): 4 TABLET ORAL at 05:06

## 2020-01-01 RX ADMIN — GABAPENTIN 300 MILLIGRAM(S): 400 CAPSULE ORAL at 06:37

## 2020-01-01 RX ADMIN — POLYETHYLENE GLYCOL 3350 17 GRAM(S): 17 POWDER, FOR SOLUTION ORAL at 11:24

## 2020-01-01 RX ADMIN — Medication 0.25 MILLIGRAM(S): at 12:58

## 2020-01-01 RX ADMIN — FENTANYL CITRATE 1 PATCH: 50 INJECTION INTRAVENOUS at 19:46

## 2020-01-01 RX ADMIN — Medication 2 MILLIGRAM(S): at 06:34

## 2020-01-01 RX ADMIN — HYDROMORPHONE HYDROCHLORIDE 6 MILLIGRAM(S): 2 INJECTION INTRAMUSCULAR; INTRAVENOUS; SUBCUTANEOUS at 16:13

## 2020-01-01 RX ADMIN — HYDROMORPHONE HYDROCHLORIDE 4 MILLIGRAM(S): 2 INJECTION INTRAMUSCULAR; INTRAVENOUS; SUBCUTANEOUS at 10:03

## 2020-01-01 RX ADMIN — CELECOXIB 200 MILLIGRAM(S): 200 CAPSULE ORAL at 05:13

## 2020-01-01 RX ADMIN — FENTANYL CITRATE 1 PATCH: 50 INJECTION INTRAVENOUS at 11:33

## 2020-01-01 RX ADMIN — HYDROMORPHONE HYDROCHLORIDE 3 MILLIGRAM(S): 2 INJECTION INTRAMUSCULAR; INTRAVENOUS; SUBCUTANEOUS at 04:03

## 2020-01-01 RX ADMIN — HYDROMORPHONE HYDROCHLORIDE 4 MILLIGRAM(S): 2 INJECTION INTRAMUSCULAR; INTRAVENOUS; SUBCUTANEOUS at 20:00

## 2020-01-01 RX ADMIN — GABAPENTIN 300 MILLIGRAM(S): 400 CAPSULE ORAL at 05:09

## 2020-01-01 RX ADMIN — Medication 500 MILLIGRAM(S): at 06:03

## 2020-01-01 RX ADMIN — Medication 100 MICROGRAM(S): at 05:13

## 2020-01-01 RX ADMIN — TIZANIDINE 4 MILLIGRAM(S): 4 TABLET ORAL at 23:13

## 2020-01-01 RX ADMIN — LIDOCAINE 1 PATCH: 4 CREAM TOPICAL at 11:22

## 2020-01-01 RX ADMIN — HYDROMORPHONE HYDROCHLORIDE 3 MILLIGRAM(S): 2 INJECTION INTRAMUSCULAR; INTRAVENOUS; SUBCUTANEOUS at 16:41

## 2020-01-01 RX ADMIN — Medication 50 MILLIGRAM(S): at 05:17

## 2020-01-01 RX ADMIN — Medication 4 MILLIGRAM(S): at 17:58

## 2020-01-01 RX ADMIN — CELECOXIB 200 MILLIGRAM(S): 200 CAPSULE ORAL at 17:17

## 2020-01-01 RX ADMIN — HYDROMORPHONE HYDROCHLORIDE 6 MILLIGRAM(S): 2 INJECTION INTRAMUSCULAR; INTRAVENOUS; SUBCUTANEOUS at 06:31

## 2020-01-01 RX ADMIN — CELECOXIB 200 MILLIGRAM(S): 200 CAPSULE ORAL at 00:20

## 2020-01-01 RX ADMIN — Medication 1 MILLIGRAM(S): at 08:45

## 2020-01-01 RX ADMIN — RIVAROXABAN 10 MILLIGRAM(S): KIT at 11:33

## 2020-01-01 RX ADMIN — HYDROMORPHONE HYDROCHLORIDE 6 MILLIGRAM(S): 2 INJECTION INTRAMUSCULAR; INTRAVENOUS; SUBCUTANEOUS at 05:48

## 2020-01-01 RX ADMIN — FENTANYL CITRATE 1 PATCH: 50 INJECTION INTRAVENOUS at 12:52

## 2020-01-01 RX ADMIN — Medication 0.25 MILLIGRAM(S): at 07:56

## 2020-01-01 RX ADMIN — AMIODARONE HYDROCHLORIDE 400 MILLIGRAM(S): 400 TABLET ORAL at 05:29

## 2020-01-01 RX ADMIN — HYDROMORPHONE HYDROCHLORIDE 1 MILLIGRAM(S): 2 INJECTION INTRAMUSCULAR; INTRAVENOUS; SUBCUTANEOUS at 11:45

## 2020-01-01 RX ADMIN — SENNA PLUS 2 TABLET(S): 8.6 TABLET ORAL at 21:20

## 2020-01-01 RX ADMIN — Medication 4 MILLIGRAM(S): at 23:00

## 2020-01-01 RX ADMIN — PANTOPRAZOLE SODIUM 40 MILLIGRAM(S): 20 TABLET, DELAYED RELEASE ORAL at 06:07

## 2020-01-01 RX ADMIN — FENTANYL CITRATE 1 PATCH: 50 INJECTION INTRAVENOUS at 15:38

## 2020-01-01 RX ADMIN — MORPHINE SULFATE 30 MILLIGRAM(S): 50 CAPSULE, EXTENDED RELEASE ORAL at 22:50

## 2020-01-01 RX ADMIN — Medication 975 MILLIGRAM(S): at 16:58

## 2020-01-01 RX ADMIN — GABAPENTIN 300 MILLIGRAM(S): 400 CAPSULE ORAL at 12:56

## 2020-01-01 RX ADMIN — FENTANYL CITRATE 1 PATCH: 50 INJECTION INTRAVENOUS at 17:14

## 2020-01-01 RX ADMIN — HYDROMORPHONE HYDROCHLORIDE 1 MILLIGRAM(S): 2 INJECTION INTRAMUSCULAR; INTRAVENOUS; SUBCUTANEOUS at 09:19

## 2020-01-01 RX ADMIN — ENOXAPARIN SODIUM 90 MILLIGRAM(S): 100 INJECTION SUBCUTANEOUS at 05:19

## 2020-01-01 RX ADMIN — FENTANYL CITRATE 1 PATCH: 50 INJECTION INTRAVENOUS at 22:59

## 2020-01-01 RX ADMIN — Medication 1 MILLIGRAM(S): at 18:20

## 2020-01-01 RX ADMIN — LIOTHYRONINE SODIUM 5 MICROGRAM(S): 25 TABLET ORAL at 06:44

## 2020-01-01 RX ADMIN — GABAPENTIN 300 MILLIGRAM(S): 400 CAPSULE ORAL at 05:17

## 2020-01-01 RX ADMIN — Medication 175 MICROGRAM(S): at 07:09

## 2020-01-01 RX ADMIN — Medication 50 MILLIGRAM(S): at 05:27

## 2020-01-01 RX ADMIN — Medication 50 MILLIGRAM(S): at 03:02

## 2020-01-01 RX ADMIN — LIDOCAINE 1 PATCH: 4 CREAM TOPICAL at 23:30

## 2020-01-01 RX ADMIN — Medication 50 MILLIGRAM(S): at 17:01

## 2020-01-01 RX ADMIN — Medication 20 MILLIEQUIVALENT(S): at 08:37

## 2020-01-01 RX ADMIN — Medication 175 MICROGRAM(S): at 06:01

## 2020-01-01 RX ADMIN — FENTANYL CITRATE 1 PATCH: 50 INJECTION INTRAVENOUS at 20:07

## 2020-01-01 RX ADMIN — Medication 2 MILLIGRAM(S): at 18:30

## 2020-01-01 RX ADMIN — Medication 10 MILLIGRAM(S): at 12:47

## 2020-01-01 RX ADMIN — SENNA PLUS 1 TABLET(S): 8.6 TABLET ORAL at 05:59

## 2020-01-01 RX ADMIN — ENOXAPARIN SODIUM 90 MILLIGRAM(S): 100 INJECTION SUBCUTANEOUS at 18:49

## 2020-01-01 RX ADMIN — Medication 3 MILLIGRAM(S): at 22:19

## 2020-01-01 RX ADMIN — HYDROMORPHONE HYDROCHLORIDE 3 MILLIGRAM(S): 2 INJECTION INTRAMUSCULAR; INTRAVENOUS; SUBCUTANEOUS at 10:18

## 2020-01-01 RX ADMIN — Medication 2 MILLIGRAM(S): at 18:04

## 2020-01-01 RX ADMIN — HYDROMORPHONE HYDROCHLORIDE 3 MILLIGRAM(S): 2 INJECTION INTRAMUSCULAR; INTRAVENOUS; SUBCUTANEOUS at 18:09

## 2020-01-01 RX ADMIN — HYDROMORPHONE HYDROCHLORIDE 1 MILLIGRAM(S): 2 INJECTION INTRAMUSCULAR; INTRAVENOUS; SUBCUTANEOUS at 07:30

## 2020-01-01 RX ADMIN — HYDROMORPHONE HYDROCHLORIDE 2 MILLIGRAM(S): 2 INJECTION INTRAMUSCULAR; INTRAVENOUS; SUBCUTANEOUS at 07:00

## 2020-01-01 RX ADMIN — Medication 0.25 MILLIGRAM(S): at 22:32

## 2020-01-01 RX ADMIN — HYDROMORPHONE HYDROCHLORIDE 4 MILLIGRAM(S): 2 INJECTION INTRAMUSCULAR; INTRAVENOUS; SUBCUTANEOUS at 08:01

## 2020-01-01 RX ADMIN — HYDROMORPHONE HYDROCHLORIDE 4 MILLIGRAM(S): 2 INJECTION INTRAMUSCULAR; INTRAVENOUS; SUBCUTANEOUS at 21:21

## 2020-01-01 RX ADMIN — HYDROMORPHONE HYDROCHLORIDE 3 MILLIGRAM(S): 2 INJECTION INTRAMUSCULAR; INTRAVENOUS; SUBCUTANEOUS at 05:48

## 2020-01-01 RX ADMIN — FENTANYL CITRATE 1 PATCH: 50 INJECTION INTRAVENOUS at 20:40

## 2020-01-01 RX ADMIN — Medication 175 MICROGRAM(S): at 05:02

## 2020-01-01 RX ADMIN — Medication 25 MILLIGRAM(S): at 17:35

## 2020-01-01 RX ADMIN — PANTOPRAZOLE SODIUM 40 MILLIGRAM(S): 20 TABLET, DELAYED RELEASE ORAL at 06:03

## 2020-01-01 RX ADMIN — Medication 4 MILLIGRAM(S): at 06:38

## 2020-01-01 RX ADMIN — HYDROMORPHONE HYDROCHLORIDE 1 MILLIGRAM(S): 2 INJECTION INTRAMUSCULAR; INTRAVENOUS; SUBCUTANEOUS at 00:25

## 2020-01-01 RX ADMIN — Medication 975 MILLIGRAM(S): at 20:59

## 2020-01-01 RX ADMIN — TIZANIDINE 2 MILLIGRAM(S): 4 TABLET ORAL at 20:26

## 2020-01-01 RX ADMIN — LIOTHYRONINE SODIUM 5 MICROGRAM(S): 25 TABLET ORAL at 05:19

## 2020-01-01 RX ADMIN — HYDROMORPHONE HYDROCHLORIDE 3 MILLIGRAM(S): 2 INJECTION INTRAMUSCULAR; INTRAVENOUS; SUBCUTANEOUS at 21:57

## 2020-01-01 RX ADMIN — Medication 975 MILLIGRAM(S): at 08:03

## 2020-01-01 RX ADMIN — LIDOCAINE 2 PATCH: 4 CREAM TOPICAL at 19:53

## 2020-01-01 RX ADMIN — CELECOXIB 200 MILLIGRAM(S): 200 CAPSULE ORAL at 12:40

## 2020-01-01 RX ADMIN — HEPARIN SODIUM 1400 UNIT(S)/HR: 5000 INJECTION INTRAVENOUS; SUBCUTANEOUS at 02:01

## 2020-01-01 RX ADMIN — Medication 0.25 MILLIGRAM(S): at 19:43

## 2020-01-01 RX ADMIN — Medication 975 MILLIGRAM(S): at 23:21

## 2020-01-01 RX ADMIN — HYDROMORPHONE HYDROCHLORIDE 1 MILLIGRAM(S): 2 INJECTION INTRAMUSCULAR; INTRAVENOUS; SUBCUTANEOUS at 08:49

## 2020-01-01 RX ADMIN — SODIUM CHLORIDE 125 MILLILITER(S): 9 INJECTION, SOLUTION INTRAVENOUS at 05:07

## 2020-01-01 RX ADMIN — ENOXAPARIN SODIUM 90 MILLIGRAM(S): 100 INJECTION SUBCUTANEOUS at 07:08

## 2020-01-01 RX ADMIN — HYDROMORPHONE HYDROCHLORIDE 1 MILLIGRAM(S): 2 INJECTION INTRAMUSCULAR; INTRAVENOUS; SUBCUTANEOUS at 03:09

## 2020-01-01 RX ADMIN — Medication 650 MILLIGRAM(S): at 01:39

## 2020-01-01 RX ADMIN — FENTANYL CITRATE 1 PATCH: 50 INJECTION INTRAVENOUS at 05:16

## 2020-01-01 RX ADMIN — Medication 15 MILLIGRAM(S): at 23:11

## 2020-01-01 RX ADMIN — Medication 200 GRAM(S): at 01:05

## 2020-01-01 RX ADMIN — SIMETHICONE 80 MILLIGRAM(S): 80 TABLET, CHEWABLE ORAL at 18:39

## 2020-01-01 RX ADMIN — NALOXEGOL OXALATE 25 MILLIGRAM(S): 12.5 TABLET, FILM COATED ORAL at 11:21

## 2020-01-01 RX ADMIN — POLYETHYLENE GLYCOL 3350 17 GRAM(S): 17 POWDER, FOR SOLUTION ORAL at 11:26

## 2020-01-01 RX ADMIN — GABAPENTIN 300 MILLIGRAM(S): 400 CAPSULE ORAL at 05:59

## 2020-01-01 RX ADMIN — HYDROMORPHONE HYDROCHLORIDE 2 MILLIGRAM(S): 2 INJECTION INTRAMUSCULAR; INTRAVENOUS; SUBCUTANEOUS at 06:31

## 2020-01-01 RX ADMIN — Medication 175 MICROGRAM(S): at 05:25

## 2020-01-01 RX ADMIN — Medication 12.5 MILLIGRAM(S): at 03:48

## 2020-01-01 RX ADMIN — METHADONE HYDROCHLORIDE 5 MILLIGRAM(S): 40 TABLET ORAL at 05:37

## 2020-01-01 RX ADMIN — Medication 3 MILLIGRAM(S): at 00:16

## 2020-01-01 RX ADMIN — Medication 400 MILLIGRAM(S): at 14:33

## 2020-01-01 RX ADMIN — HYDROMORPHONE HYDROCHLORIDE 4 MILLIGRAM(S): 2 INJECTION INTRAMUSCULAR; INTRAVENOUS; SUBCUTANEOUS at 07:02

## 2020-01-01 RX ADMIN — LIDOCAINE 1 PATCH: 4 CREAM TOPICAL at 19:00

## 2020-01-01 RX ADMIN — HYDROMORPHONE HYDROCHLORIDE 1 MILLIGRAM(S): 2 INJECTION INTRAMUSCULAR; INTRAVENOUS; SUBCUTANEOUS at 11:54

## 2020-01-01 RX ADMIN — FENTANYL CITRATE 1 PATCH: 50 INJECTION INTRAVENOUS at 08:34

## 2020-01-01 RX ADMIN — PANTOPRAZOLE SODIUM 40 MILLIGRAM(S): 20 TABLET, DELAYED RELEASE ORAL at 17:34

## 2020-01-01 RX ADMIN — ATORVASTATIN CALCIUM 10 MILLIGRAM(S): 80 TABLET, FILM COATED ORAL at 23:19

## 2020-01-01 RX ADMIN — Medication 3 MILLIGRAM(S): at 21:37

## 2020-01-01 RX ADMIN — HYDROMORPHONE HYDROCHLORIDE 1 MILLIGRAM(S): 2 INJECTION INTRAMUSCULAR; INTRAVENOUS; SUBCUTANEOUS at 11:12

## 2020-01-01 RX ADMIN — LIDOCAINE 2 PATCH: 4 CREAM TOPICAL at 20:08

## 2020-01-01 RX ADMIN — POLYETHYLENE GLYCOL 3350 17 GRAM(S): 17 POWDER, FOR SOLUTION ORAL at 06:47

## 2020-01-01 RX ADMIN — ENOXAPARIN SODIUM 90 MILLIGRAM(S): 100 INJECTION SUBCUTANEOUS at 06:25

## 2020-01-01 RX ADMIN — Medication 4 MILLIGRAM(S): at 06:20

## 2020-01-01 RX ADMIN — Medication 0.25 MILLIGRAM(S): at 14:30

## 2020-01-01 RX ADMIN — HYDROMORPHONE HYDROCHLORIDE 0.5 MILLIGRAM(S): 2 INJECTION INTRAMUSCULAR; INTRAVENOUS; SUBCUTANEOUS at 01:39

## 2020-01-01 RX ADMIN — Medication 15 MILLIGRAM(S): at 23:26

## 2020-01-01 RX ADMIN — FENTANYL CITRATE 1 PATCH: 50 INJECTION INTRAVENOUS at 08:31

## 2020-01-01 RX ADMIN — HYDROMORPHONE HYDROCHLORIDE 1 MILLIGRAM(S): 2 INJECTION INTRAMUSCULAR; INTRAVENOUS; SUBCUTANEOUS at 18:30

## 2020-01-01 RX ADMIN — HYDROMORPHONE HYDROCHLORIDE 3 MILLIGRAM(S): 2 INJECTION INTRAMUSCULAR; INTRAVENOUS; SUBCUTANEOUS at 12:25

## 2020-01-01 RX ADMIN — Medication 600 MILLIGRAM(S): at 05:55

## 2020-01-01 RX ADMIN — Medication 3 MILLIGRAM(S): at 21:55

## 2020-01-01 RX ADMIN — PANTOPRAZOLE SODIUM 40 MILLIGRAM(S): 20 TABLET, DELAYED RELEASE ORAL at 05:08

## 2020-01-01 RX ADMIN — NYSTATIN CREAM 1 APPLICATION(S): 100000 CREAM TOPICAL at 05:43

## 2020-01-01 RX ADMIN — Medication 500 MILLIGRAM(S): at 17:03

## 2020-01-01 RX ADMIN — Medication 600 MILLIGRAM(S): at 21:14

## 2020-01-01 RX ADMIN — Medication 4 MILLIGRAM(S): at 12:29

## 2020-01-01 RX ADMIN — ENOXAPARIN SODIUM 90 MILLIGRAM(S): 100 INJECTION SUBCUTANEOUS at 18:29

## 2020-01-01 RX ADMIN — CELECOXIB 200 MILLIGRAM(S): 200 CAPSULE ORAL at 22:00

## 2020-01-01 RX ADMIN — Medication 25 MILLIGRAM(S): at 05:09

## 2020-01-01 RX ADMIN — FENTANYL CITRATE 1 PATCH: 50 INJECTION INTRAVENOUS at 00:15

## 2020-01-01 RX ADMIN — Medication 25 MILLIGRAM(S): at 19:59

## 2020-01-01 RX ADMIN — METHADONE HYDROCHLORIDE 5 MILLIGRAM(S): 40 TABLET ORAL at 18:49

## 2020-01-01 RX ADMIN — HYDROMORPHONE HYDROCHLORIDE 3 MILLIGRAM(S): 2 INJECTION INTRAMUSCULAR; INTRAVENOUS; SUBCUTANEOUS at 18:44

## 2020-01-01 RX ADMIN — Medication 4 MILLIGRAM(S): at 16:43

## 2020-01-01 RX ADMIN — HYDROMORPHONE HYDROCHLORIDE 1 MILLIGRAM(S): 2 INJECTION INTRAMUSCULAR; INTRAVENOUS; SUBCUTANEOUS at 20:45

## 2020-01-01 RX ADMIN — Medication 600 MILLIGRAM(S): at 14:30

## 2020-01-01 RX ADMIN — Medication 25 MILLIGRAM(S): at 21:16

## 2020-01-01 RX ADMIN — Medication 4 MILLIGRAM(S): at 12:51

## 2020-01-01 RX ADMIN — Medication 2 MILLIGRAM(S): at 05:20

## 2020-01-01 RX ADMIN — Medication 2 MILLIGRAM(S): at 05:43

## 2020-01-01 RX ADMIN — HYDROMORPHONE HYDROCHLORIDE 6 MILLIGRAM(S): 2 INJECTION INTRAMUSCULAR; INTRAVENOUS; SUBCUTANEOUS at 22:25

## 2020-01-01 RX ADMIN — TRAMADOL HYDROCHLORIDE 50 MILLIGRAM(S): 50 TABLET ORAL at 14:24

## 2020-01-01 RX ADMIN — LACTULOSE 200 GRAM(S): 10 SOLUTION ORAL at 14:06

## 2020-01-01 RX ADMIN — HYDROMORPHONE HYDROCHLORIDE 3 MILLIGRAM(S): 2 INJECTION INTRAMUSCULAR; INTRAVENOUS; SUBCUTANEOUS at 14:09

## 2020-01-01 RX ADMIN — HYDROMORPHONE HYDROCHLORIDE 3 MILLIGRAM(S): 2 INJECTION INTRAMUSCULAR; INTRAVENOUS; SUBCUTANEOUS at 09:45

## 2020-01-01 RX ADMIN — HYDROMORPHONE HYDROCHLORIDE 3 MILLIGRAM(S): 2 INJECTION INTRAMUSCULAR; INTRAVENOUS; SUBCUTANEOUS at 14:17

## 2020-01-01 RX ADMIN — Medication 500 MILLIGRAM(S): at 17:04

## 2020-01-01 RX ADMIN — HYDROMORPHONE HYDROCHLORIDE 1 MILLIGRAM(S): 2 INJECTION INTRAMUSCULAR; INTRAVENOUS; SUBCUTANEOUS at 23:26

## 2020-01-01 RX ADMIN — HYDROMORPHONE HYDROCHLORIDE 6 MILLIGRAM(S): 2 INJECTION INTRAMUSCULAR; INTRAVENOUS; SUBCUTANEOUS at 11:39

## 2020-01-01 RX ADMIN — HYDROMORPHONE HYDROCHLORIDE 3 MILLIGRAM(S): 2 INJECTION INTRAMUSCULAR; INTRAVENOUS; SUBCUTANEOUS at 21:40

## 2020-01-01 RX ADMIN — HYDROMORPHONE HYDROCHLORIDE 4 MILLIGRAM(S): 2 INJECTION INTRAMUSCULAR; INTRAVENOUS; SUBCUTANEOUS at 22:59

## 2020-01-01 RX ADMIN — ENOXAPARIN SODIUM 90 MILLIGRAM(S): 100 INJECTION SUBCUTANEOUS at 17:25

## 2020-01-01 RX ADMIN — ENOXAPARIN SODIUM 90 MILLIGRAM(S): 100 INJECTION SUBCUTANEOUS at 18:04

## 2020-01-01 RX ADMIN — Medication 975 MILLIGRAM(S): at 16:57

## 2020-01-01 RX ADMIN — NALOXEGOL OXALATE 25 MILLIGRAM(S): 12.5 TABLET, FILM COATED ORAL at 12:55

## 2020-01-01 RX ADMIN — HYDROMORPHONE HYDROCHLORIDE 4 MILLIGRAM(S): 2 INJECTION INTRAMUSCULAR; INTRAVENOUS; SUBCUTANEOUS at 19:40

## 2020-01-01 RX ADMIN — Medication 25 MILLIGRAM(S): at 14:24

## 2020-01-01 RX ADMIN — Medication 50 MILLIGRAM(S): at 05:10

## 2020-01-01 RX ADMIN — LIOTHYRONINE SODIUM 5 MICROGRAM(S): 25 TABLET ORAL at 05:27

## 2020-01-01 RX ADMIN — Medication 175 MICROGRAM(S): at 05:32

## 2020-01-01 RX ADMIN — HYDROMORPHONE HYDROCHLORIDE 1 MILLIGRAM(S): 2 INJECTION INTRAMUSCULAR; INTRAVENOUS; SUBCUTANEOUS at 14:16

## 2020-01-01 RX ADMIN — FENTANYL CITRATE 1 PATCH: 50 INJECTION INTRAVENOUS at 08:56

## 2020-01-01 RX ADMIN — RIVAROXABAN 10 MILLIGRAM(S): KIT at 13:05

## 2020-01-01 RX ADMIN — HYDROMORPHONE HYDROCHLORIDE 1 MILLIGRAM(S): 2 INJECTION INTRAMUSCULAR; INTRAVENOUS; SUBCUTANEOUS at 14:22

## 2020-01-01 RX ADMIN — HYDROMORPHONE HYDROCHLORIDE 6 MILLIGRAM(S): 2 INJECTION INTRAMUSCULAR; INTRAVENOUS; SUBCUTANEOUS at 10:11

## 2020-01-01 RX ADMIN — HYDROMORPHONE HYDROCHLORIDE 3 MILLIGRAM(S): 2 INJECTION INTRAMUSCULAR; INTRAVENOUS; SUBCUTANEOUS at 22:08

## 2020-01-01 RX ADMIN — Medication 175 MICROGRAM(S): at 05:43

## 2020-01-01 RX ADMIN — ENOXAPARIN SODIUM 40 MILLIGRAM(S): 100 INJECTION SUBCUTANEOUS at 11:46

## 2020-01-01 RX ADMIN — PANTOPRAZOLE SODIUM 40 MILLIGRAM(S): 20 TABLET, DELAYED RELEASE ORAL at 05:29

## 2020-01-01 RX ADMIN — HYDROMORPHONE HYDROCHLORIDE 6 MILLIGRAM(S): 2 INJECTION INTRAMUSCULAR; INTRAVENOUS; SUBCUTANEOUS at 17:01

## 2020-01-01 RX ADMIN — Medication 25 MILLIGRAM(S): at 21:00

## 2020-01-01 RX ADMIN — FENTANYL CITRATE 1 PATCH: 50 INJECTION INTRAVENOUS at 17:01

## 2020-01-01 RX ADMIN — HYDROMORPHONE HYDROCHLORIDE 6 MILLIGRAM(S): 2 INJECTION INTRAMUSCULAR; INTRAVENOUS; SUBCUTANEOUS at 17:27

## 2020-01-01 RX ADMIN — HYDROMORPHONE HYDROCHLORIDE 1 MILLIGRAM(S): 2 INJECTION INTRAMUSCULAR; INTRAVENOUS; SUBCUTANEOUS at 21:59

## 2020-01-01 RX ADMIN — AMIODARONE HYDROCHLORIDE 400 MILLIGRAM(S): 400 TABLET ORAL at 06:03

## 2020-01-01 RX ADMIN — Medication 4 MILLIGRAM(S): at 21:02

## 2020-01-01 RX ADMIN — HYDROMORPHONE HYDROCHLORIDE 6 MILLIGRAM(S): 2 INJECTION INTRAMUSCULAR; INTRAVENOUS; SUBCUTANEOUS at 16:58

## 2020-01-01 RX ADMIN — NALOXEGOL OXALATE 25 MILLIGRAM(S): 12.5 TABLET, FILM COATED ORAL at 12:30

## 2020-01-01 RX ADMIN — LIOTHYRONINE SODIUM 5 MICROGRAM(S): 25 TABLET ORAL at 05:31

## 2020-01-01 RX ADMIN — Medication 1 MILLIGRAM(S): at 12:51

## 2020-01-01 RX ADMIN — HYDROMORPHONE HYDROCHLORIDE 4 MILLIGRAM(S): 2 INJECTION INTRAMUSCULAR; INTRAVENOUS; SUBCUTANEOUS at 17:09

## 2020-01-01 RX ADMIN — HYDROMORPHONE HYDROCHLORIDE 3 MILLIGRAM(S): 2 INJECTION INTRAMUSCULAR; INTRAVENOUS; SUBCUTANEOUS at 10:20

## 2020-01-01 RX ADMIN — HYDROMORPHONE HYDROCHLORIDE 2 MILLIGRAM(S): 2 INJECTION INTRAMUSCULAR; INTRAVENOUS; SUBCUTANEOUS at 06:19

## 2020-01-01 RX ADMIN — HYDROMORPHONE HYDROCHLORIDE 3 MILLIGRAM(S): 2 INJECTION INTRAMUSCULAR; INTRAVENOUS; SUBCUTANEOUS at 06:25

## 2020-01-01 RX ADMIN — HYDROMORPHONE HYDROCHLORIDE 1 MILLIGRAM(S): 2 INJECTION INTRAMUSCULAR; INTRAVENOUS; SUBCUTANEOUS at 11:33

## 2020-01-01 RX ADMIN — LACTULOSE 200 GRAM(S): 10 SOLUTION ORAL at 05:49

## 2020-01-01 RX ADMIN — SENNA PLUS 2 TABLET(S): 8.6 TABLET ORAL at 22:42

## 2020-01-01 RX ADMIN — HYDROMORPHONE HYDROCHLORIDE 1 MILLIGRAM(S): 2 INJECTION INTRAMUSCULAR; INTRAVENOUS; SUBCUTANEOUS at 00:06

## 2020-01-01 RX ADMIN — TIZANIDINE 4 MILLIGRAM(S): 4 TABLET ORAL at 13:54

## 2020-01-01 RX ADMIN — HYDROMORPHONE HYDROCHLORIDE 1 MILLIGRAM(S): 2 INJECTION INTRAMUSCULAR; INTRAVENOUS; SUBCUTANEOUS at 12:07

## 2020-01-01 RX ADMIN — HYDROMORPHONE HYDROCHLORIDE 1 MILLIGRAM(S): 2 INJECTION INTRAMUSCULAR; INTRAVENOUS; SUBCUTANEOUS at 23:20

## 2020-01-01 RX ADMIN — LIOTHYRONINE SODIUM 5 MICROGRAM(S): 25 TABLET ORAL at 05:43

## 2020-01-01 RX ADMIN — NYSTATIN CREAM 1 APPLICATION(S): 100000 CREAM TOPICAL at 06:00

## 2020-01-01 RX ADMIN — HYDROMORPHONE HYDROCHLORIDE 3 MILLIGRAM(S): 2 INJECTION INTRAMUSCULAR; INTRAVENOUS; SUBCUTANEOUS at 04:37

## 2020-01-01 RX ADMIN — HYDROMORPHONE HYDROCHLORIDE 1 MILLIGRAM(S): 2 INJECTION INTRAMUSCULAR; INTRAVENOUS; SUBCUTANEOUS at 05:40

## 2020-01-01 RX ADMIN — GABAPENTIN 300 MILLIGRAM(S): 400 CAPSULE ORAL at 05:12

## 2020-01-01 RX ADMIN — Medication 25 MILLIGRAM(S): at 12:54

## 2020-01-01 RX ADMIN — Medication 25 MILLIGRAM(S): at 12:52

## 2020-01-01 RX ADMIN — Medication 175 MICROGRAM(S): at 06:03

## 2020-01-01 RX ADMIN — Medication 400 MILLIGRAM(S): at 14:04

## 2020-01-01 RX ADMIN — HYDROMORPHONE HYDROCHLORIDE 6 MILLIGRAM(S): 2 INJECTION INTRAMUSCULAR; INTRAVENOUS; SUBCUTANEOUS at 05:08

## 2020-01-01 RX ADMIN — HYDROMORPHONE HYDROCHLORIDE 1 MILLIGRAM(S): 2 INJECTION INTRAMUSCULAR; INTRAVENOUS; SUBCUTANEOUS at 15:16

## 2020-01-01 RX ADMIN — HYDROMORPHONE HYDROCHLORIDE 2 MILLIGRAM(S): 2 INJECTION INTRAMUSCULAR; INTRAVENOUS; SUBCUTANEOUS at 21:00

## 2020-01-01 RX ADMIN — SIMETHICONE 80 MILLIGRAM(S): 80 TABLET, CHEWABLE ORAL at 07:56

## 2020-01-01 RX ADMIN — HYDROMORPHONE HYDROCHLORIDE 2 MILLIGRAM(S): 2 INJECTION INTRAMUSCULAR; INTRAVENOUS; SUBCUTANEOUS at 13:55

## 2020-01-01 RX ADMIN — CELECOXIB 200 MILLIGRAM(S): 200 CAPSULE ORAL at 23:20

## 2020-01-01 RX ADMIN — GABAPENTIN 300 MILLIGRAM(S): 400 CAPSULE ORAL at 18:31

## 2020-01-01 RX ADMIN — Medication 2 MILLIGRAM(S): at 17:24

## 2020-01-01 RX ADMIN — PANTOPRAZOLE SODIUM 40 MILLIGRAM(S): 20 TABLET, DELAYED RELEASE ORAL at 07:09

## 2020-01-01 RX ADMIN — HYDROMORPHONE HYDROCHLORIDE 6 MILLIGRAM(S): 2 INJECTION INTRAMUSCULAR; INTRAVENOUS; SUBCUTANEOUS at 12:31

## 2020-01-01 RX ADMIN — Medication 50 MILLIGRAM(S): at 07:29

## 2020-01-01 RX ADMIN — NYSTATIN CREAM 1 APPLICATION(S): 100000 CREAM TOPICAL at 18:25

## 2020-01-01 RX ADMIN — Medication 25 MILLIGRAM(S): at 06:37

## 2020-01-01 RX ADMIN — HYDROMORPHONE HYDROCHLORIDE 4 MILLIGRAM(S): 2 INJECTION INTRAMUSCULAR; INTRAVENOUS; SUBCUTANEOUS at 06:32

## 2020-01-01 RX ADMIN — GABAPENTIN 300 MILLIGRAM(S): 400 CAPSULE ORAL at 22:11

## 2020-01-01 RX ADMIN — POLYETHYLENE GLYCOL 3350 17 GRAM(S): 17 POWDER, FOR SOLUTION ORAL at 17:35

## 2020-01-01 RX ADMIN — PANTOPRAZOLE SODIUM 40 MILLIGRAM(S): 20 TABLET, DELAYED RELEASE ORAL at 06:26

## 2020-01-01 RX ADMIN — NALOXEGOL OXALATE 25 MILLIGRAM(S): 12.5 TABLET, FILM COATED ORAL at 13:07

## 2020-01-01 RX ADMIN — Medication 975 MILLIGRAM(S): at 17:26

## 2020-01-01 RX ADMIN — Medication 2 MILLIGRAM(S): at 07:08

## 2020-01-01 RX ADMIN — Medication 175 MICROGRAM(S): at 06:31

## 2020-01-01 RX ADMIN — HYDROMORPHONE HYDROCHLORIDE 4 MILLIGRAM(S): 2 INJECTION INTRAMUSCULAR; INTRAVENOUS; SUBCUTANEOUS at 14:23

## 2020-01-01 RX ADMIN — HYDROMORPHONE HYDROCHLORIDE 30 MILLILITER(S): 2 INJECTION INTRAMUSCULAR; INTRAVENOUS; SUBCUTANEOUS at 01:24

## 2020-01-01 RX ADMIN — Medication 50 MILLIGRAM(S): at 21:44

## 2020-01-01 RX ADMIN — POLYETHYLENE GLYCOL 3350 17 GRAM(S): 17 POWDER, FOR SOLUTION ORAL at 11:12

## 2020-01-01 RX ADMIN — LIDOCAINE 2 PATCH: 4 CREAM TOPICAL at 19:40

## 2020-01-01 RX ADMIN — OXYCODONE HYDROCHLORIDE 5 MILLIGRAM(S): 5 TABLET ORAL at 06:16

## 2020-01-01 RX ADMIN — METHADONE HYDROCHLORIDE 5 MILLIGRAM(S): 40 TABLET ORAL at 18:29

## 2020-01-01 RX ADMIN — Medication 0.25 MILLIGRAM(S): at 08:44

## 2020-01-01 RX ADMIN — Medication 25 MILLIGRAM(S): at 17:17

## 2020-01-01 RX ADMIN — Medication 10 MILLIGRAM(S): at 23:13

## 2020-01-01 RX ADMIN — FENTANYL CITRATE 1 PATCH: 50 INJECTION INTRAVENOUS at 20:05

## 2020-01-01 RX ADMIN — LIOTHYRONINE SODIUM 5 MICROGRAM(S): 25 TABLET ORAL at 04:54

## 2020-01-01 RX ADMIN — HYDROMORPHONE HYDROCHLORIDE 2 MILLIGRAM(S): 2 INJECTION INTRAMUSCULAR; INTRAVENOUS; SUBCUTANEOUS at 13:05

## 2020-01-01 RX ADMIN — Medication 2 MILLIGRAM(S): at 18:33

## 2020-01-01 RX ADMIN — SODIUM CHLORIDE 2000 MILLILITER(S): 9 INJECTION INTRAMUSCULAR; INTRAVENOUS; SUBCUTANEOUS at 06:09

## 2020-01-01 RX ADMIN — PANTOPRAZOLE SODIUM 40 MILLIGRAM(S): 20 TABLET, DELAYED RELEASE ORAL at 05:37

## 2020-01-01 RX ADMIN — Medication 0.25 MILLIGRAM(S): at 20:27

## 2020-01-01 RX ADMIN — OXYCODONE HYDROCHLORIDE 5 MILLIGRAM(S): 5 TABLET ORAL at 04:53

## 2020-01-01 RX ADMIN — HYDROMORPHONE HYDROCHLORIDE 1 MILLIGRAM(S): 2 INJECTION INTRAMUSCULAR; INTRAVENOUS; SUBCUTANEOUS at 05:19

## 2020-01-01 RX ADMIN — Medication 25 MILLIGRAM(S): at 05:08

## 2020-01-01 RX ADMIN — PANTOPRAZOLE SODIUM 40 MILLIGRAM(S): 20 TABLET, DELAYED RELEASE ORAL at 05:47

## 2020-01-01 RX ADMIN — HYDROMORPHONE HYDROCHLORIDE 1 MILLIGRAM(S): 2 INJECTION INTRAMUSCULAR; INTRAVENOUS; SUBCUTANEOUS at 19:29

## 2020-01-01 RX ADMIN — POLYETHYLENE GLYCOL 3350 17 GRAM(S): 17 POWDER, FOR SOLUTION ORAL at 05:39

## 2020-01-01 RX ADMIN — HYDROMORPHONE HYDROCHLORIDE 4 MILLIGRAM(S): 2 INJECTION INTRAMUSCULAR; INTRAVENOUS; SUBCUTANEOUS at 17:12

## 2020-01-01 RX ADMIN — HYDROMORPHONE HYDROCHLORIDE 6 MILLIGRAM(S): 2 INJECTION INTRAMUSCULAR; INTRAVENOUS; SUBCUTANEOUS at 21:53

## 2020-01-01 RX ADMIN — Medication 0.25 MILLIGRAM(S): at 20:02

## 2020-01-01 RX ADMIN — Medication 4 MILLIGRAM(S): at 12:55

## 2020-01-01 RX ADMIN — POLYETHYLENE GLYCOL 3350 17 GRAM(S): 17 POWDER, FOR SOLUTION ORAL at 17:59

## 2020-01-01 RX ADMIN — Medication 50 MILLIGRAM(S): at 05:02

## 2020-01-01 RX ADMIN — SENNA PLUS 2 TABLET(S): 8.6 TABLET ORAL at 21:17

## 2020-01-01 RX ADMIN — Medication 1 MILLIGRAM(S): at 21:33

## 2020-01-01 RX ADMIN — FENTANYL CITRATE 1 PATCH: 50 INJECTION INTRAVENOUS at 07:00

## 2020-01-01 RX ADMIN — NYSTATIN CREAM 1 APPLICATION(S): 100000 CREAM TOPICAL at 05:07

## 2020-01-01 RX ADMIN — HYDROMORPHONE HYDROCHLORIDE 1 MILLIGRAM(S): 2 INJECTION INTRAMUSCULAR; INTRAVENOUS; SUBCUTANEOUS at 13:22

## 2020-01-01 RX ADMIN — POLYETHYLENE GLYCOL 3350 17 GRAM(S): 17 POWDER, FOR SOLUTION ORAL at 12:31

## 2020-01-01 RX ADMIN — Medication 975 MILLIGRAM(S): at 14:02

## 2020-01-01 RX ADMIN — ENOXAPARIN SODIUM 90 MILLIGRAM(S): 100 INJECTION SUBCUTANEOUS at 07:02

## 2020-01-01 RX ADMIN — HYDROMORPHONE HYDROCHLORIDE 1 MILLIGRAM(S): 2 INJECTION INTRAMUSCULAR; INTRAVENOUS; SUBCUTANEOUS at 20:17

## 2020-01-01 RX ADMIN — Medication 3 MILLIGRAM(S): at 23:02

## 2020-01-01 RX ADMIN — LIDOCAINE 1 PATCH: 4 CREAM TOPICAL at 11:30

## 2020-01-01 RX ADMIN — Medication 0.25 MILLIGRAM(S): at 10:07

## 2020-01-01 RX ADMIN — HYDROMORPHONE HYDROCHLORIDE 1 MILLIGRAM(S): 2 INJECTION INTRAMUSCULAR; INTRAVENOUS; SUBCUTANEOUS at 11:39

## 2020-01-01 RX ADMIN — HYDROMORPHONE HYDROCHLORIDE 3 MILLIGRAM(S): 2 INJECTION INTRAMUSCULAR; INTRAVENOUS; SUBCUTANEOUS at 21:20

## 2020-01-01 RX ADMIN — ENOXAPARIN SODIUM 90 MILLIGRAM(S): 100 INJECTION SUBCUTANEOUS at 17:39

## 2020-01-28 PROBLEM — Z87.09 HISTORY OF CHRONIC COUGH: Status: RESOLVED | Noted: 2018-03-07 | Resolved: 2020-01-01

## 2020-01-28 NOTE — ASSESSMENT
[FreeTextEntry1] : NRD. speaking in full sentences. RLL WHeezing \par Viral vs Bacterial URI.\par Given the length of symptoms and patient's past medical history we'll treat for presumed bacterial URI.\par -Start Levaquin x5 days\par -Prednisone taper as instructed\par -Albuterol inhaler q.4 to 6 hours p.r.n.\par -RTO is symptoms don't improve or worsen (May need chest x-ray)

## 2020-01-28 NOTE — PHYSICAL EXAM
[No Acute Distress] : no acute distress [Well Nourished] : well nourished [Well Developed] : well developed [Well-Appearing] : well-appearing [Normal Sclera/Conjunctiva] : normal sclera/conjunctiva [PERRL] : pupils equal round and reactive to light [EOMI] : extraocular movements intact [Normal Outer Ear/Nose] : the outer ears and nose were normal in appearance [Normal Oropharynx] : the oropharynx was normal [No JVD] : no jugular venous distention [No Lymphadenopathy] : no lymphadenopathy [Supple] : supple [Thyroid Normal, No Nodules] : the thyroid was normal and there were no nodules present [No Accessory Muscle Use] : no accessory muscle use [No Respiratory Distress] : no respiratory distress  [Normal Rate] : normal rate  [Regular Rhythm] : with a regular rhythm [Normal S1, S2] : normal S1 and S2 [No Murmur] : no murmur heard [No Carotid Bruits] : no carotid bruits [No Abdominal Bruit] : a ~M bruit was not heard ~T in the abdomen [No Varicosities] : no varicosities [Pedal Pulses Present] : the pedal pulses are present [No Edema] : there was no peripheral edema [Soft] : abdomen soft [No Extremity Clubbing/Cyanosis] : no extremity clubbing/cyanosis [No Palpable Aorta] : no palpable aorta [Non-distended] : non-distended [Non Tender] : non-tender [No Masses] : no abdominal mass palpated [No HSM] : no HSM [Normal Bowel Sounds] : normal bowel sounds [Normal Posterior Cervical Nodes] : no posterior cervical lymphadenopathy [Normal Anterior Cervical Nodes] : no anterior cervical lymphadenopathy [No CVA Tenderness] : no CVA  tenderness [No Spinal Tenderness] : no spinal tenderness [Grossly Normal Strength/Tone] : grossly normal strength/tone [No Joint Swelling] : no joint swelling [No Rash] : no rash [Coordination Grossly Intact] : coordination grossly intact [No Focal Deficits] : no focal deficits [Deep Tendon Reflexes (DTR)] : deep tendon reflexes were 2+ and symmetric [Normal Gait] : normal gait [Normal Affect] : the affect was normal [Normal Insight/Judgement] : insight and judgment were intact [de-identified] : RLL Wheezing. No Rhonchi.

## 2020-04-28 PROBLEM — Z87.09 HISTORY OF BRONCHITIS: Status: RESOLVED | Noted: 2020-01-01 | Resolved: 2020-01-01

## 2020-04-28 PROBLEM — R07.89 ATYPICAL CHEST PAIN: Status: RESOLVED | Noted: 2017-08-18 | Resolved: 2020-01-01

## 2020-04-28 PROBLEM — M62.830 BACK MUSCLE SPASM: Status: ACTIVE | Noted: 2020-01-01

## 2020-04-28 PROBLEM — M54.5 LUMBAGO: Status: ACTIVE | Noted: 2020-01-01

## 2020-04-28 NOTE — ASSESSMENT
[FreeTextEntry1] : History and physical exam findings most consistent with soft tissue injury/musculoskeletal spasm likely from overuse injury. No skin rash and lungs are clear to auscultation\par -Rest and avoidance of heavy lifting times one week with slow return to light activity and mild to light stretches as tolerated.\par -Naproxen 500 mg p.o. b.i.d. x2 weeks\par -Cyclobenzaprine one tab p.o. q.h.s. p.r.n. muscle spasm\par -If failing to improve patient to call RTO for further evaluation which may include a left-sided rib x-ray

## 2020-04-28 NOTE — HISTORY OF PRESENT ILLNESS
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 . 1 child (). Former smoker (Quit 2014).\par \par NADER is a 64 year F whom is here today for c/o left sided flank pain that began saturday while gardening outside. Pain was sudden in onset. Pain comes and goes and radiates to left middle back. Pain is sharp and shooting in nature. Pain made worse with movement and better with rest. Denies any SOB, cough, bloating, diarrhea, fever, chills, skin rashes, dysuria. Pt reports that that taking Naproxen has helped relieve pain.

## 2020-04-28 NOTE — PHYSICAL EXAM
[No Acute Distress] : no acute distress [Well Nourished] : well nourished [Well Developed] : well developed [Well-Appearing] : well-appearing [Normal Sclera/Conjunctiva] : normal sclera/conjunctiva [PERRL] : pupils equal round and reactive to light [EOMI] : extraocular movements intact [Normal Outer Ear/Nose] : the outer ears and nose were normal in appearance [Normal Oropharynx] : the oropharynx was normal [No JVD] : no jugular venous distention [No Lymphadenopathy] : no lymphadenopathy [Supple] : supple [Thyroid Normal, No Nodules] : the thyroid was normal and there were no nodules present [No Respiratory Distress] : no respiratory distress  [No Accessory Muscle Use] : no accessory muscle use [Clear to Auscultation] : lungs were clear to auscultation bilaterally [Normal Rate] : normal rate  [Regular Rhythm] : with a regular rhythm [Normal S1, S2] : normal S1 and S2 [No Carotid Bruits] : no carotid bruits [No Murmur] : no murmur heard [No Abdominal Bruit] : a ~M bruit was not heard ~T in the abdomen [No Varicosities] : no varicosities [Pedal Pulses Present] : the pedal pulses are present [No Edema] : there was no peripheral edema [No Palpable Aorta] : no palpable aorta [No Extremity Clubbing/Cyanosis] : no extremity clubbing/cyanosis [Soft] : abdomen soft [Non Tender] : non-tender [Non-distended] : non-distended [No Masses] : no abdominal mass palpated [No HSM] : no HSM [Normal Bowel Sounds] : normal bowel sounds [Normal Posterior Cervical Nodes] : no posterior cervical lymphadenopathy [Normal Anterior Cervical Nodes] : no anterior cervical lymphadenopathy [No CVA Tenderness] : no CVA  tenderness [No Spinal Tenderness] : no spinal tenderness [No Joint Swelling] : no joint swelling [Grossly Normal Strength/Tone] : grossly normal strength/tone [No Rash] : no rash [Coordination Grossly Intact] : coordination grossly intact [Normal Gait] : normal gait [No Focal Deficits] : no focal deficits [Normal Affect] : the affect was normal [Deep Tendon Reflexes (DTR)] : deep tendon reflexes were 2+ and symmetric [Normal Insight/Judgement] : insight and judgment were intact [de-identified] : Left sided flank tenderness to palpation superficially

## 2020-05-01 PROBLEM — E04.2 NONTOXIC MULTINODULAR GOITER: Status: ACTIVE | Noted: 2019-01-01

## 2020-05-01 PROBLEM — E03.9 HYPOTHYROIDISM, UNSPECIFIED TYPE: Status: ACTIVE | Noted: 2019-01-01

## 2020-05-04 PROBLEM — Z91.89 RISK OF EXPOSURE TO LYME DISEASE: Status: ACTIVE | Noted: 2020-01-01

## 2020-05-06 NOTE — DISCHARGE NOTE ADULT - VISION (WITH CORRECTIVE LENSES IF THE PATIENT USUALLY WEARS THEM):
Detail Level: Detailed Detail Level: Zone Normal vision: sees adequately in most situations; can see medication labels, newsprint

## 2020-05-18 NOTE — RESULTS/DATA
[FreeTextEntry1] : 5/12/20 CT Patient is status post right lower lobectomy with increased focal thickening along the suture line, up to 3 cm in maximal dimension (previously 1.1 cm). Findings are concerning for recurrence.\par \par 11/12/19 CT C/A/P: Stable nodule adjacent to suture material in the right lung. No adenopathy in the thorax, abdomen or pelvis. Stable sclerotic areas within vertebral bodies and sternum.\par \par 11/12/19 US Thyroid: Thyroiditis. Left upper pole nodule without suspicious features unchanged from 12/18/2017\par \par 10/16/19 MRI Brain: T2 hyperintensities in a pattern seen in associated with vasospastic phenomena, see discussion above.\par \par 5/16/19 MRI Spine:\par THORACIC SPINE: Mild patchy ill-defined intermediate T1 signal within the thoracic spine related to red marrow reconversion or posttreatement change. No focal osseous lesion is demonstrated. Continue follow up is recommended. THere is no abnormal osseous enhancement or osseous edema on the STIR sequence. No correlate to the PET/CT abnormality is demonstrated at the T10 vertebral body. \par LUMBAR SPINE: Nonenhancing hypointense T1 and T2 signal within the L4 vertebral body and along the posterior inferior margin of the L1 vertebral body corresponding  to areas of non-FDG avid sclerosis on prior PET/CT. Findings are seen in the background of mild patchy intermediate T1 signal throughout the imaged lumbar spine and pelvis which may be related to posttreatment change and/or red marrow reconversion. \par Multilevel lumbar spondylosis. At L4/L4 there is severe spinal canal narrowing, there is effacement of the L4/L5 left lateral recess secondary to prominent exophytic left ligamentum flavum hypertrophy. \par \par \par 5/16/19 bone scan:  No evidence of metastatic disease.\par \par 5/14/19 US Thyroid: Heterogenous thyroid gland with bilateral nodules. US guided FNA biopsy of the 1.1 cm right lobe nodule 1.4 cm left lobe nodule is suggested. \par \par 11/12/18 US:   Nodule 1:   1.1 x 1.0 x 1.0 cm Right mid-upper pole nodule, unchanged in size Recommendation: Follow-up ultrasound.     Nodule 2:   1.4 x 1.0 x 1.1 cm Left upper pole nodule, unchanged in size.   Recommendation: Nodule does not meet size threshold for FNA or Follow-up.\par \par 11/9/18 PET:  HEAD/NECK: Unchanged asymmetric FDG avidity right thyroid lobe (SUV 7.1;  image 57), previous SUV 6.4.   LUNGS: Stable postsurgical changes of right lower lobe lobectomy.  Rounded, non-FDG avid soft tissue nodule adjacent to suture margin, 1.6 x  0.8 cm (image 87) unchanged. 0.4 cm left upper lobe groundglass nodule  (image 69), unchanged. No new FDG avid foci.    BONES:   New mild FDG avidity at T10 (SUV 4.9; image 121).  SOFT TISSUES:  No abnormal avidity.  \par \par 10/25/18 CT Chest: Right lower lobectomy with unchanged nodularity along the resection sutures. Since 8/14/17, unchanged mixed lytic and sclerotic lesion in the thoracici spine and sternum .\par \par 9/18/18 Bone Density: Normal\par \par 3/5/18 PET/CT: Unchanged focal right thyroid lobe FDG avidity (SUV 6.4) previous SUV 5.2. Unchanged symmetric FDG avidity in the bilateral tonsils. 1.2 x 0.6 cm mildly FDG avid left axillary lymph node is unchanged in size (SUV 2.4), previous SUV 2.6.  Stable postsurgical changes of right lower lobe wedge resection. Unchanged rounded soft tissue density without FDG avidity associated with the suture margin (SUV 2.3), previous SUV 2.5. No FDG avid foci. No new pulmonary nodules.   Unchanged areas of sclerosis involving the sternum, and several vertebral bodies including L4 as well as scattered foci in the pelvis, with unchanged low-grade FDG avidity. For example, at T7 (SUV 3.8) previous SUV 3.8. Unchanged non-FDG avid sclerotic focus in the left anterior sixth rib. \par \par 12/6/17 PET/CT: Previously seen small ill-defined focus along the right lateral posterior tongue is not appreciated on the current exam. Fairly symmetrical FDG activity in bilateral tonsillar regions is less prominent, right tonsil 5.6 (previous SUV 7.4), left tonsil 5.4 (previous SUV 6.9). There is no enlarged or hypermetabolic cervical lymph node. Again seen is diffuse asymmetrically increased right thyroid lobe activity, which is not significantly changed from prior exam, SUV 5.2 (previous SUV 6.4). For reference, left thyroid demonstrates SUV 3.5. A small mildly hypermetabolic left axillary lymph node measuring approximately 1.4 cm is not significantly changed, SUV 2.6 (previous SUV 2.7). Mediastinal, bilateral hilar, and right axillary regions demonstrate physiologic tracer activity. Patchy sclerosis of the sternum, vertebral bodies, and pelvis with variable \par low-level FDG activity is not significantly changed. For example :T7 vertebra SUV 3.4 (previous SUV 3.8). Non-FDG avid dense distal sternal sclerosis and tiny sclerotic focus in the left sixth rib anteriorly are also not significantly changed compared to prior study.\par \par 12/6/17 CT Neck: No abnormal enhancement within the bilateral palatine tonsils or right posterolateral tongue. No cervical lymphadenopathy. \par \par 9/29/17 Pathology - spine biopsy - L4:  bone with normocellular bone marrow, negative for metastatic carcinoma \par \par 8/30/17 PET/CT:  Small  ill-defined  focus  of  FDG  activity  along  the  right  lateral posterior  tongue,  SUV  4.6.  Prominent  fairly  symmetrical  FDG activity  in  bilateral  tonsillar  regions,  right  tonsil  SUV  7.4  and  left tonsil  SUV  6.9.  Diffuse  asymmetrically  increased  activity  in  the  right  thyroid  lobe, SUV  6.4  vs.  left  thyroid  SUV  4.0.  No  CT  correlate.  A  1.4  cm  FDG  avid  left  axillary  lymph  node,  unchanged  from  prior  PET CT  (SUV  2.7).  Linear  soft  tissue  activity  along  the  lateral  right  chest  wall,  new  compared to  prior  PET/CT,  likely  postsurgical.  A  3  mm  FDG  avid  left  posterior  chest wall  soft  tissue  nodule  at  the  level  of  the  left  fourth  rib,  unchanged  from prior  PET  CT  (previous  SUV  2.1).  Ill  defined  non  FDG  avid  ground  glass  opacity in  the  right  lower  lobe,  not  seen  on  prior  chest  CT  and  too  small  for  PET characterization.  Patchy sclerosis  of  the  sternum,  vertebral  bodies,  and pelvis  with  variable  low-level  FDG  activity.  \par \par 8/23/17 Bone scan:  There are several foci of abnormal radiopharmaceutical uptake in the sternum, mid and lower thoracic spine, lower lumbar spine, left upper sacrum, left upper medial iliac bone adjacent to the sacrum, and left superior pubic ramus. There are degenerative changes in the spine and major joints. There is physiologic distribution of \par radiopharmaceutical throughout the remainder of the imaged osseous structures.\par \par 8/14/17 CT:  No endobronchial lesions are noted. Patient is status post right lower lobectomy. A 0.5 cm groundglass nodule in the apical segment of the left upper lobe is unchanged when compared to previous exam. No pleural effusions are noted. Below the diaphragm, visualized portions of the abdomen demonstrate diffuse decreased attenuation of the liver suggestive of fatty infiltration. Visualized osseous structures demonstrate patchy areas of sclerosis.\par \par 9/14/17 March 24, 2017 Right lower lobectomy by bronchoscopy, and mediastinal lymph node dissection (Dr. Bradley):  The tumor size from the right lower lobe wedge resection was 1.8 x 1.7 x 1.6cm.  Pathology  showed adenocarcinoma, acinar type, moderately to  poorly differentiated, 1.8 x 1.7 x 1.6 cm. with clear margins.  Mediastinal lymph nodes were negative for tumor.  Immunohistochemistry studies showed were positive for:  CK7, Napsin A and TTF-1; and negative for: CDX-2, CK20 and mammaglobin.   pT1aN0\par \par February 24, 2017 PET/CT:  Lobulated opacity in the superior segment of the right lower lobe, just behind the major fissure measuring 1.9 x 1.9 cm with SUV 2.8.  Left axillary lymph nodes measures 1.5 x 1.0cm with SUV 2.6.  A tiny subcutaneous nodule at the posterior left chest wall at the level of the fourth rib measures 0.8 x 0.7cm with SUV 2.1.  \par \par February 17, 2017 CT chest:  Lobulated lesion in the superior segment right lower lobe abutting the fissure along its anterior margin.  The measures 2.2 x 2.0 cm.  A small vascular branch is noted abutting its posterior margin.  \par

## 2020-05-18 NOTE — HISTORY OF PRESENT ILLNESS
[T: ___] : T[unfilled] [N: ___] : N[unfilled] [AJCC Stage: ____] : AJCC Stage: [unfilled] [de-identified] : The patient was diagnosed with adenocarcinoma of the lung in March 2017 at the age of 61.  She presented with an incidental finding of a 2 cm RLL pulmonary nodule on routine CXR, prior to foot surgery. On February 17, 2017, a CT chest showed a lobulated lesion in the superior segment right lower lobe abutting the fissure along its anterior margin which measured 2.2 x 2.0 cm.  On February 24, 2017 a PET/CT showed the lobulated opacity in the superior segment of the right lower lobe, 1.9 x 1.9 cm with SUV 2.8.  On March 24, 2017 Dr. Bradley performed a right lower lobectomy and mediastinal lymph node dissection.  The tumor size from the right lower lobe wedge resection was 1.8 x 1.7 x 1.6cm.  Pathology showed adenocarcinoma, acinar type, moderately to  poorly differentiated, 1.8 x 1.7 x 1.6 cm with clear margins.  Mediastinal lymph nodes were negative for tumor.  Immunohistochemistry studies showed were positive for:  CK7, Napsin A and TTF-1; and negative for: CDX-2, CK20 and mammaglobin.  Smoking history since age 14, and quit 4 years ago, ~50 PY history.  Drinks ~ 2 glasses wine per day.   [de-identified] : Adenocarcinoma of lung [de-identified] : The patient presents for follow up.  Reports she is doing well.  Intentional healthy weight loss on Optavia diet. She has no chest pain, shortness of breath, headache, dizziness, GI or urinary complaints.  Health maintenance reviewed, patient advised to have yearly mammogram, gynecologic exam as recommended by GI. No other complaints today.

## 2020-05-18 NOTE — ADDENDUM
[FreeTextEntry1] : Telehealth consult conducted for patient in lieu of scheduled in person visit in order to minimize nonessential travel/exposure for the patient during COVID 19 pandemic.  The patient gave verbal consent.\par

## 2020-06-03 NOTE — RESULTS/DATA
[FreeTextEntry1] : 5/28/20 PET:  FDG avid focal masslike soft tissue thickening along the right lower lobectomy suture margin 3 x 1.8 cm with FDG 13, suspicious for recurrent malignancy.FDG avid right hilar and subcarinal lymph nodes probably metastatic. Multiple FDG avid lytic lesions as delineated above, suspicious for metastatic disease.\par \par 5/12/20 CT Patient is status post right lower lobectomy with increased focal thickening along the suture line, up to 3 cm in maximal dimension (previously 1.1 cm). Findings are concerning for recurrence.\par \par 11/12/19 CT C/A/P: Stable nodule adjacent to suture material in the right lung. No adenopathy in the thorax, abdomen or pelvis. Stable sclerotic areas within vertebral bodies and sternum.\par \par 11/12/19 US Thyroid: Thyroiditis. Left upper pole nodule without suspicious features unchanged from 12/18/2017\par \par 10/16/19 MRI Brain: T2 hyperintensities in a pattern seen in associated with vasospastic phenomena, see discussion above.\par \par 5/16/19 MRI Spine:\par THORACIC SPINE: Mild patchy ill-defined intermediate T1 signal within the thoracic spine related to red marrow reconversion or posttreatement change. No focal osseous lesion is demonstrated. Continue follow up is recommended. THere is no abnormal osseous enhancement or osseous edema on the STIR sequence. No correlate to the PET/CT abnormality is demonstrated at the T10 vertebral body. \par LUMBAR SPINE: Nonenhancing hypointense T1 and T2 signal within the L4 vertebral body and along the posterior inferior margin of the L1 vertebral body corresponding  to areas of non-FDG avid sclerosis on prior PET/CT. Findings are seen in the background of mild patchy intermediate T1 signal throughout the imaged lumbar spine and pelvis which may be related to posttreatment change and/or red marrow reconversion. \par Multilevel lumbar spondylosis. At L4/L4 there is severe spinal canal narrowing, there is effacement of the L4/L5 left lateral recess secondary to prominent exophytic left ligamentum flavum hypertrophy. \par \par 5/16/19 bone scan:  No evidence of metastatic disease.\par \par 5/14/19 US Thyroid: Heterogenous thyroid gland with bilateral nodules. US guided FNA biopsy of the 1.1 cm right lobe nodule 1.4 cm left lobe nodule is suggested. \par \par 11/12/18 US:   Nodule 1:   1.1 x 1.0 x 1.0 cm Right mid-upper pole nodule, unchanged in size Recommendation: Follow-up ultrasound.     Nodule 2:   1.4 x 1.0 x 1.1 cm Left upper pole nodule, unchanged in size.   Recommendation: Nodule does not meet size threshold for FNA or Follow-up.\par \par 11/9/18 PET:  HEAD/NECK: Unchanged asymmetric FDG avidity right thyroid lobe (SUV 7.1;  image 57), previous SUV 6.4.   LUNGS: Stable postsurgical changes of right lower lobe lobectomy.  Rounded, non-FDG avid soft tissue nodule adjacent to suture margin, 1.6 x  0.8 cm (image 87) unchanged. 0.4 cm left upper lobe groundglass nodule  (image 69), unchanged. No new FDG avid foci.    BONES:   New mild FDG avidity at T10 (SUV 4.9; image 121).  SOFT TISSUES:  No abnormal avidity.  \par \par 10/25/18 CT Chest: Right lower lobectomy with unchanged nodularity along the resection sutures. Since 8/14/17, unchanged mixed lytic and sclerotic lesion in the thoracici spine and sternum .\par \par 9/18/18 Bone Density: Normal\par \par 3/5/18 PET/CT: Unchanged focal right thyroid lobe FDG avidity (SUV 6.4) previous SUV 5.2. Unchanged symmetric FDG avidity in the bilateral tonsils. 1.2 x 0.6 cm mildly FDG avid left axillary lymph node is unchanged in size (SUV 2.4), previous SUV 2.6.  Stable postsurgical changes of right lower lobe wedge resection. Unchanged rounded soft tissue density without FDG avidity associated with the suture margin (SUV 2.3), previous SUV 2.5. No FDG avid foci. No new pulmonary nodules.   Unchanged areas of sclerosis involving the sternum, and several vertebral bodies including L4 as well as scattered foci in the pelvis, with unchanged low-grade FDG avidity. For example, at T7 (SUV 3.8) previous SUV 3.8. Unchanged non-FDG avid sclerotic focus in the left anterior sixth rib. \par \par 12/6/17 PET/CT: Previously seen small ill-defined focus along the right lateral posterior tongue is not appreciated on the current exam. Fairly symmetrical FDG activity in bilateral tonsillar regions is less prominent, right tonsil 5.6 (previous SUV 7.4), left tonsil 5.4 (previous SUV 6.9). There is no enlarged or hypermetabolic cervical lymph node. Again seen is diffuse asymmetrically increased right thyroid lobe activity, which is not significantly changed from prior exam, SUV 5.2 (previous SUV 6.4). For reference, left thyroid demonstrates SUV 3.5. A small mildly hypermetabolic left axillary lymph node measuring approximately 1.4 cm is not significantly changed, SUV 2.6 (previous SUV 2.7). Mediastinal, bilateral hilar, and right axillary regions demonstrate physiologic tracer activity. Patchy sclerosis of the sternum, vertebral bodies, and pelvis with variable \par low-level FDG activity is not significantly changed. For example :T7 vertebra SUV 3.4 (previous SUV 3.8). Non-FDG avid dense distal sternal sclerosis and tiny sclerotic focus in the left sixth rib anteriorly are also not significantly changed compared to prior study.\par \par 12/6/17 CT Neck: No abnormal enhancement within the bilateral palatine tonsils or right posterolateral tongue. No cervical lymphadenopathy. \par \par 9/29/17 Pathology - spine biopsy - L4:  bone with normocellular bone marrow, negative for metastatic carcinoma \par \par 8/30/17 PET/CT:  Small  ill-defined  focus  of  FDG  activity  along  the  right  lateral posterior  tongue,  SUV  4.6.  Prominent  fairly  symmetrical  FDG activity  in  bilateral  tonsillar  regions,  right  tonsil  SUV  7.4  and  left tonsil  SUV  6.9.  Diffuse  asymmetrically  increased  activity  in  the  right  thyroid  lobe, SUV  6.4  vs.  left  thyroid  SUV  4.0.  No  CT  correlate.  A  1.4  cm  FDG  avid  left  axillary  lymph  node,  unchanged  from  prior  PET CT  (SUV  2.7).  Linear  soft  tissue  activity  along  the  lateral  right  chest  wall,  new  compared to  prior  PET/CT,  likely  postsurgical.  A  3  mm  FDG  avid  left  posterior  chest wall  soft  tissue  nodule  at  the  level  of  the  left  fourth  rib,  unchanged  from prior  PET  CT  (previous  SUV  2.1).  Ill  defined  non  FDG  avid  ground  glass  opacity in  the  right  lower  lobe,  not  seen  on  prior  chest  CT  and  too  small  for  PET characterization.  Patchy sclerosis  of  the  sternum,  vertebral  bodies,  and pelvis  with  variable  low-level  FDG  activity.  \par \par 8/23/17 Bone scan:  There are several foci of abnormal radiopharmaceutical uptake in the sternum, mid and lower thoracic spine, lower lumbar spine, left upper sacrum, left upper medial iliac bone adjacent to the sacrum, and left superior pubic ramus. There are degenerative changes in the spine and major joints. There is physiologic distribution of \par radiopharmaceutical throughout the remainder of the imaged osseous structures.\par \par 8/14/17 CT:  No endobronchial lesions are noted. Patient is status post right lower lobectomy. A 0.5 cm groundglass nodule in the apical segment of the left upper lobe is unchanged when compared to previous exam. No pleural effusions are noted. Below the diaphragm, visualized portions of the abdomen demonstrate diffuse decreased attenuation of the liver suggestive of fatty infiltration. Visualized osseous structures demonstrate patchy areas of sclerosis.\par \par 9/14/17 March 24, 2017 Right lower lobectomy by bronchoscopy, and mediastinal lymph node dissection (Dr. Bradley):  The tumor size from the right lower lobe wedge resection was 1.8 x 1.7 x 1.6cm.  Pathology  showed adenocarcinoma, acinar type, moderately to  poorly differentiated, 1.8 x 1.7 x 1.6 cm. with clear margins.  Mediastinal lymph nodes were negative for tumor.  Immunohistochemistry studies showed were positive for:  CK7, Napsin A and TTF-1; and negative for: CDX-2, CK20 and mammaglobin.   pT1aN0\par \par February 24, 2017 PET/CT:  Lobulated opacity in the superior segment of the right lower lobe, just behind the major fissure measuring 1.9 x 1.9 cm with SUV 2.8.  Left axillary lymph nodes measures 1.5 x 1.0cm with SUV 2.6.  A tiny subcutaneous nodule at the posterior left chest wall at the level of the fourth rib measures 0.8 x 0.7cm with SUV 2.1.  \par \par February 17, 2017 CT chest:  Lobulated lesion in the superior segment right lower lobe abutting the fissure along its anterior margin.  The measures 2.2 x 2.0 cm.  A small vascular branch is noted abutting its posterior margin.  \par

## 2020-06-03 NOTE — REVIEW OF SYSTEMS
[Cough] : cough [Shortness Of Breath] : shortness of breath [Negative] : Heme/Lymph [FreeTextEntry6] : with exertion [FreeTextEntry9] : foot surgery healed

## 2020-06-03 NOTE — HISTORY OF PRESENT ILLNESS
[T: ___] : T[unfilled] [N: ___] : N[unfilled] [AJCC Stage: ____] : AJCC Stage: [unfilled] [de-identified] : Adenocarcinoma of lung [de-identified] : The patient was diagnosed with adenocarcinoma of the lung in March 2017 at the age of 61.  She presented with an incidental finding of a 2 cm RLL pulmonary nodule on routine CXR, prior to foot surgery. On February 17, 2017, a CT chest showed a lobulated lesion in the superior segment right lower lobe abutting the fissure along its anterior margin which measured 2.2 x 2.0 cm.  On February 24, 2017 a PET/CT showed the lobulated opacity in the superior segment of the right lower lobe, 1.9 x 1.9 cm with SUV 2.8.  On March 24, 2017 Dr. Bradley performed a right lower lobectomy and mediastinal lymph node dissection.  The tumor size from the right lower lobe wedge resection was 1.8 x 1.7 x 1.6cm.  Pathology showed adenocarcinoma, acinar type, moderately to  poorly differentiated, 1.8 x 1.7 x 1.6 cm with clear margins.  Mediastinal lymph nodes were negative for tumor.  Immunohistochemistry studies showed were positive for:  CK7, Napsin A and TTF-1; and negative for: CDX-2, CK20 and mammaglobin.  Smoking history since age 14, and quit 4 years ago, ~50 PY history.  Drinks ~ 2 glasses wine per day.   [de-identified] : The patient presents for follow up.  Reports she is doing well.  Intentional healthy weight loss on [employer] diet. She has no chest pain, shortness of breath, headache, dizziness, GI or urinary complaints.  Health maintenance reviewed, patient advised to have yearly mammogram, gynecologic exam as recommended by GI. No other complaints today.

## 2020-06-10 NOTE — H&P PST ADULT - NSICDXPASTSURGICALHX_GEN_ALL_CORE_FT
PAST SURGICAL HISTORY:  S/P bunionectomy with revision    Status post lobectomy of lung 3/2017 PAST SURGICAL HISTORY:  S/P bunionectomy with revision    Status post lobectomy of lung 3/2017 (Right lung)

## 2020-06-10 NOTE — H&P PST ADULT - NSICDXPASTMEDICALHX_GEN_ALL_CORE_FT
PAST MEDICAL HISTORY:  Anxiety     Hypothyroid     Lung cancer     LAMAR on CPAP PAST MEDICAL HISTORY:  Anxiety     Drug abuse Back in the 70's and 80's (Cocaine)    Genital herpes     Hypothyroid     Lung cancer     LAMAR on CPAP

## 2020-06-10 NOTE — H&P PST ADULT - HISTORY OF PRESENT ILLNESS
This is a 64 y.o female who presents to [facility] today. This is a 64 y.o female who presents to PST today.  The pt reports she has a partial right lung removal in the past.  She undergoes routine CT's and MRI's and the most recent one demonstrated that where the prior incision was made it was "glowing in there and it may have spread to the bone."   She is scheduled for a CT Guided Right Lung Biopsy in the near future.

## 2020-06-10 NOTE — H&P PST ADULT - ASSESSMENT
CAPRINI VTE 2.0 SCORE [CLOT updated 2019]    AGE RELATED RISK FACTORS                                                       MOBILITY RELATED FACTORS  [ ] Age 41-60 years                                            (1 Point)                    [ ] Bed rest                                                        (1 Point)  [ ] Age: 61-74 years                                           (2 Points)                  [ ] Plaster cast                                                   (2 Points)  [ ] Age= 75 years                                              (3 Points)                    [ ] Bed bound for more than 72 hours                 (2 Points)    DISEASE RELATED RISK FACTORS                                               GENDER SPECIFIC FACTORS  [ ] Edema in the lower extremities                       (1 Point)              [ ] Pregnancy                                                     (1 Point)  [ ] Varicose veins                                               (1 Point)                     [ ] Post-partum < 6 weeks                                   (1 Point)             [ ] BMI > 25 Kg/m2                                            (1 Point)                     [ ] Hormonal therapy  or oral contraception          (1 Point)                 [ ] Sepsis (in the previous month)                        (1 Point)               [ ] History of pregnancy complications                 (1 point)  [ ] Pneumonia or serious lung disease                                               [ ] Unexplained or recurrent                     (1 Point)           (in the previous month)                               (1 Point)  [ ] Abnormal pulmonary function test                     (1 Point)                 SURGERY RELATED RISK FACTORS  [ ] Acute myocardial infarction                              (1 Point)               [ ]  Section                                             (1 Point)  [ ] Congestive heart failure (in the previous month)  (1 Point)      [ ] Minor surgery                                                  (1 Point)   [ ] Inflammatory bowel disease                             (1 Point)               [ ] Arthroscopic surgery                                        (2 Points)  [ ] Central venous access                                      (2 Points)                [ ] General surgery lasting more than 45 minutes (2 points)  [ ] Malignancy- Present or previous                   (2 Points)                [ ] Elective arthroplasty                                         (5 points)    [ ] Stroke (in the previous month)                          (5 Points)                                                                                                                                                           HEMATOLOGY RELATED FACTORS                                                 TRAUMA RELATED RISK FACTORS  [ ] Prior episodes of VTE                                     (3 Points)                [ ] Fracture of the hip, pelvis, or leg                       (5 Points)  [ ] Positive family history for VTE                         (3 Points)             [ ] Acute spinal cord injury (in the previous month)  (5 Points)  [ ] Prothrombin 85435 A                                     (3 Points)               [ ] Paralysis  (less than 1 month)                             (5 Points)  [ ] Factor V Leiden                                             (3 Points)                  [ ] Multiple Trauma within 1 month                        (5 Points)  [ ] Lupus anticoagulants                                     (3 Points)                                                           [ ] Anticardiolipin antibodies                               (3 Points)                                                       [ ] High homocysteine in the blood                      (3 Points)                                             [ ] Other congenital or acquired thrombophilia      (3 Points)                                                [ ] Heparin induced thrombocytopenia                  (3 Points)                                     Total Score [          ]    OPIOID RISK TOOL    ROD EACH BOX THAT APPLIES AND ADD TOTALS AT THE END    FAMILY HISTORY OF SUBSTANCE ABUSE                 FEMALE         MALE                                                Alcohol                            [    ] 1 pt         [     ] 3pts                                               Illegal Drugs                    [    ] 2 pts       [     ] 3pts                                               Rx Drugs                          [      ]4 pts      [     ] 4 pts    PERSONAL HISTORY OF SUBSTANCE ABUSE                                                                                          Alcohol                            [     ] 3 pts       [     ] 3 pts                                               Illegal Drugs                    [     ] 4 pts       [     ] 4 pts                                               Rx Drugs                          [     ] 5 pts       [     ] 5 pts    AGE BETWEEN 16-45 YEARS                                     [     ] 1 pt         [     ] 1 pt    HISTORY OF PREADOLESCENT   SEXUAL ABUSE                                                            [     ] 3 pts        [     ] 0pts    PSYCHOLOGICAL DISEASE                     ADD, OCD, Bipolar, Schizophrenia        [     ] 2 pts        [     ] 2 pts                      Depression                                               [     ] 1 pt          [     ] 1 pt           SCORING TOTAL   (add numbers and type here)              (      )                                     A score of 3 or lower indicated LOW risk for future opioid abuse  A score of 4 to 7 indicated moderate risk for future opioid abuse  A score of 8 or higher indicates a high risk for opioid abuse CAPRINI VTE 2.0 SCORE [CLOT updated 2019]    AGE RELATED RISK FACTORS                                                       MOBILITY RELATED FACTORS  [ ] Age 41-60 years                                            (1 Point)                    [ ] Bed rest                                                        (1 Point)  [ x] Age: 61-74 years                                           (2 Points)                  [ ] Plaster cast                                                   (2 Points)  [ ] Age= 75 years                                              (3 Points)                    [ ] Bed bound for more than 72 hours                 (2 Points)    DISEASE RELATED RISK FACTORS                                               GENDER SPECIFIC FACTORS  [ ] Edema in the lower extremities                       (1 Point)              [ ] Pregnancy                                                     (1 Point)  [ ] Varicose veins                                               (1 Point)                     [ ] Post-partum < 6 weeks                                   (1 Point)             [x ] BMI > 25 Kg/m2                                            (1 Point)                     [ ] Hormonal therapy  or oral contraception          (1 Point)                 [ ] Sepsis (in the previous month)                        (1 Point)               [ ] History of pregnancy complications                 (1 point)  [ ] Pneumonia or serious lung disease                                               [ ] Unexplained or recurrent                     (1 Point)           (in the previous month)                               (1 Point)  [ ] Abnormal pulmonary function test                     (1 Point)                 SURGERY RELATED RISK FACTORS  [ ] Acute myocardial infarction                              (1 Point)               [ ]  Section                                             (1 Point)  [ ] Congestive heart failure (in the previous month)  (1 Point)      [ ] Minor surgery                                                  (1 Point)   [ ] Inflammatory bowel disease                             (1 Point)               [ ] Arthroscopic surgery                                        (2 Points)  [ ] Central venous access                                      (2 Points)                [x ] General surgery lasting more than 45 minutes (2 points)  [x ] Malignancy- Present or previous                   (2 Points)                [ ] Elective arthroplasty                                         (5 points)    [ ] Stroke (in the previous month)                          (5 Points)                                                                                                                                                           HEMATOLOGY RELATED FACTORS                                                 TRAUMA RELATED RISK FACTORS  [ ] Prior episodes of VTE                                     (3 Points)                [ ] Fracture of the hip, pelvis, or leg                       (5 Points)  [ ] Positive family history for VTE                         (3 Points)             [ ] Acute spinal cord injury (in the previous month)  (5 Points)  [ ] Prothrombin 85871 A                                     (3 Points)               [ ] Paralysis  (less than 1 month)                             (5 Points)  [ ] Factor V Leiden                                             (3 Points)                  [ ] Multiple Trauma within 1 month                        (5 Points)  [ ] Lupus anticoagulants                                     (3 Points)                                                           [ ] Anticardiolipin antibodies                               (3 Points)                                                       [ ] High homocysteine in the blood                      (3 Points)                                             [ ] Other congenital or acquired thrombophilia      (3 Points)                                                [ ] Heparin induced thrombocytopenia                  (3 Points)                                     Total Score [    7     ]    OPIOID RISK TOOL    ROD EACH BOX THAT APPLIES AND ADD TOTALS AT THE END    FAMILY HISTORY OF SUBSTANCE ABUSE                 FEMALE         MALE                                                Alcohol                            [  x  ] 1 pt         [     ] 3pts                                               Illegal Drugs                    [    ] 2 pts       [     ] 3pts                                               Rx Drugs                          [   x   ]4 pts      [     ] 4 pts    PERSONAL HISTORY OF SUBSTANCE ABUSE                                                                                          Alcohol                            [   x  ] 3 pts       [     ] 3 pts                                               Illegal Drugs                    [    x ] 4 pts       [    ] 4 pts                                               Rx Drugs                          [     ] 5 pts       [     ] 5 pts    AGE BETWEEN 16-45 YEARS                                     [     ] 1 pt         [     ] 1 pt    HISTORY OF PREADOLESCENT   SEXUAL ABUSE                                                            [     ] 3 pts        [     ] 0pts    PSYCHOLOGICAL DISEASE                     ADD, OCD, Bipolar, Schizophrenia        [     ] 2 pts        [     ] 2 pts                      Depression                                               [     ] 1 pt          [     ] 1 pt           SCORING TOTAL   (add numbers and type here)              (     12 )                                     A score of 3 or lower indicated LOW risk for future opioid abuse  A score of 4 to 7 indicated moderate risk for future opioid abuse  A score of 8 or higher indicates a high risk for opioid abuse

## 2020-06-10 NOTE — H&P PST ADULT - MALLAMPATI CLASS
Class III - visualization of the soft palate and the base of the uvula Attended and evaluated by Dr. Miller (Anesthesia)/Class II - visualization of the soft palate, fauces, and uvula

## 2020-06-10 NOTE — H&P PST ADULT - NSICDXPROBLEM_GEN_ALL_CORE_FT
PROBLEM DIAGNOSES  Problem: Other chest pain  Assessment and Plan: CT Guided Right Lung Biopsy  Medical Clearance    Problem: Need for prophylactic measure  Assessment and Plan:     Problem: Screening for substance abuse  Assessment and Plan: PROBLEM DIAGNOSES  Problem: Other chest pain  Assessment and Plan: CT Guided Right Lung Biopsy  Medical Clearance    Problem: Lung cancer  Assessment and Plan: Follows with Oncologist    Problem: Need for prophylactic measure  Assessment and Plan: High risk.  Surgical team to evaluate need for pharmacologic VTE Prophylaxis    Problem: Screening for substance abuse  Assessment and Plan: Opioid screening tool score =12.  High risk for potential abuse

## 2020-06-10 NOTE — H&P PST ADULT - NSICDXFAMILYHX_GEN_ALL_CORE_FT
FAMILY HISTORY:  Father  Still living? No  Family history of hypertension, Age at diagnosis: Age Unknown  Family history of stroke, Age at diagnosis: 61-70    Mother  Still living? No  Family history of hypothyroidism, Age at diagnosis: Age Unknown

## 2020-06-12 PROBLEM — F41.1 GENERALIZED ANXIETY DISORDER: Status: ACTIVE | Noted: 2019-01-01

## 2020-06-12 PROBLEM — R74.8 ELEVATED ALKALINE PHOSPHATASE LEVEL: Status: ACTIVE | Noted: 2020-01-01

## 2020-06-12 NOTE — PHYSICAL EXAM
[No Acute Distress] : no acute distress [Well Nourished] : well nourished [Well Developed] : well developed [Well-Appearing] : well-appearing [Normal Sclera/Conjunctiva] : normal sclera/conjunctiva [PERRL] : pupils equal round and reactive to light [EOMI] : extraocular movements intact [Normal Outer Ear/Nose] : the outer ears and nose were normal in appearance [Normal Oropharynx] : the oropharynx was normal [No Lymphadenopathy] : no lymphadenopathy [No JVD] : no jugular venous distention [Thyroid Normal, No Nodules] : the thyroid was normal and there were no nodules present [Supple] : supple [No Respiratory Distress] : no respiratory distress  [Clear to Auscultation] : lungs were clear to auscultation bilaterally [No Accessory Muscle Use] : no accessory muscle use [Normal Rate] : normal rate  [Regular Rhythm] : with a regular rhythm [Normal S1, S2] : normal S1 and S2 [No Murmur] : no murmur heard [No Carotid Bruits] : no carotid bruits [No Abdominal Bruit] : a ~M bruit was not heard ~T in the abdomen [No Varicosities] : no varicosities [Pedal Pulses Present] : the pedal pulses are present [No Palpable Aorta] : no palpable aorta [No Edema] : there was no peripheral edema [No Extremity Clubbing/Cyanosis] : no extremity clubbing/cyanosis [Soft] : abdomen soft [Non Tender] : non-tender [Non-distended] : non-distended [No HSM] : no HSM [No Masses] : no abdominal mass palpated [Normal Bowel Sounds] : normal bowel sounds [Normal Posterior Cervical Nodes] : no posterior cervical lymphadenopathy [Normal Anterior Cervical Nodes] : no anterior cervical lymphadenopathy [No Spinal Tenderness] : no spinal tenderness [No CVA Tenderness] : no CVA  tenderness [Grossly Normal Strength/Tone] : grossly normal strength/tone [No Joint Swelling] : no joint swelling [No Rash] : no rash [No Focal Deficits] : no focal deficits [Coordination Grossly Intact] : coordination grossly intact [Normal Gait] : normal gait [Deep Tendon Reflexes (DTR)] : deep tendon reflexes were 2+ and symmetric [Normal Affect] : the affect was normal [Normal Insight/Judgement] : insight and judgment were intact

## 2020-06-12 NOTE — RESULTS/DATA
[de-identified] : WNL [] : results reviewed [de-identified] : WNL [de-identified] : WNL Except for elevated ALP which will need f/u in future [de-identified] : NSR. Normal axis/Intervals. Good R-wave progression. Normal EKG

## 2020-06-12 NOTE — ASSESSMENT
[Patient Optimized for Surgery] : Patient optimized for surgery [No Further Testing Recommended] : no further testing recommended [As per surgery] : as per surgery [FreeTextEntry4] : This is a 63yo Woman w/ a PMH of Stage 1A Lung CA (S/p RLL Resection), LAMAR, HLD, Hypothyroidism whom is here today for medical clearance\par Pre-Surgical Testing Lab & EKG Results reviewed and all normal \par The only risk factor that she poses is a h/o LAMAR. \par At this time, patient is medically optimized for upcoming procedure. \par She is to avoid use of NSAIDs or ASA from now until procure\par I am giving her 15 tabs Xanax 0.25mg for increased anxiety related to upcoming procedure and then pending results

## 2020-06-12 NOTE — HISTORY OF PRESENT ILLNESS
[Sleep Apnea] : sleep apnea [No Adverse Anesthesia Reaction] : no adverse anesthesia reaction in self or family member [(Patient denies any chest pain, claudication, dyspnea on exertion, orthopnea, palpitations or syncope)] : Patient denies any chest pain, claudication, dyspnea on exertion, orthopnea, palpitations or syncope [Excellent (>10 METs)] : Excellent (>10 METs) [Aortic Stenosis] : no aortic stenosis [Recent Myocardial Infarction] : no recent myocardial infarction [Atrial Fibrillation] : no atrial fibrillation [Coronary Artery Disease] : no coronary artery disease [Asthma] : no asthma [Implantable Device/Pacemaker] : no implantable device/pacemaker [COPD] : no COPD [Smoker] : not a smoker [Chronic Anticoagulation] : no chronic anticoagulation [FreeTextEntry1] : CT Guided Right Lung Bx [Diabetes] : no diabetes [Chronic Kidney Disease] : no chronic kidney disease [FreeTextEntry4] : -PMH: Stage 1A Lung CA (S/p RLL Resection), Thyroid Nodule, LAMAR, HLD, Hypothyroidism, GDM \par -SH: Self employed Dog treat maker. . 1 child (). Former smoker (Quit 2014).\par \par NADER is a 64 year F whom is here today for medical clearance\par Today, pt reports feeling well and is w/o complaints. \par \par Pre-Surgical Testing Lab & EKG Results reviewed\par -CBC: WNL\par -CMP: WNL Except for elevated ALP which will need f/u in future\par -EKG: NSR. Normal axis/Intervals. Good R-wave progression. Normal EKG\par \par No reported changes since last f/u\par \par She does report feeling more anxious given her concern for the possible return of her Lung CA  [FreeTextEntry2] : 6/16/20 [FreeTextEntry3] : Dr. Elvira Joseph

## 2020-06-19 PROBLEM — F19.10 OTHER PSYCHOACTIVE SUBSTANCE ABUSE, UNCOMPLICATED: Chronic | Status: ACTIVE | Noted: 2020-01-01

## 2020-06-19 PROBLEM — A60.00 HERPESVIRAL INFECTION OF UROGENITAL SYSTEM, UNSPECIFIED: Chronic | Status: ACTIVE | Noted: 2020-01-01

## 2020-06-22 NOTE — PROGRESS NOTE ADULT - SUBJECTIVE AND OBJECTIVE BOX
Vascular & Interventional Radiology Post-Procedure Note    Pre-Procedure Diagnosis: right lung mass  Post-Procedure Diagnosis: Same as pre.  Indications for Procedure: r/o recurrence    : edgar  Assistant(s): ____    Procedure Details/Findings:     4 coaxial 18 G cores of right lung mass.    biosentry tract sealant  no PTX    Access (if applicable): ____    Complications: 0  Estimated Blood Loss: Minimal  Anethesia: 1% Lidocane SQ  Specimen: 4 cores in formalin  Contrast: 0  Sedation: MAC  Patient Condition/Disposition: Stable    Plan:     f/u cxr in two hrs  f/u with dr thorpe for results 1-2 weeks

## 2020-06-22 NOTE — ASU DISCHARGE PLAN (ADULT/PEDIATRIC) - CALL YOUR DOCTOR IF YOU HAVE ANY OF THE FOLLOWING:
Pain not relieved by Medications/Fever greater than (need to indicate Fahrenheit or Celsius)/chest pain or short of breath, go to nearest emergency department/Bleeding that does not stop

## 2020-06-23 PROBLEM — Z01.818 PREOPERATIVE CLEARANCE: Status: RESOLVED | Noted: 2020-01-01 | Resolved: 2020-01-01

## 2020-06-23 PROBLEM — M54.12 CERVICAL RADICULOPATHY: Status: ACTIVE | Noted: 2020-01-01

## 2020-06-23 NOTE — HISTORY OF PRESENT ILLNESS
[___ Weeks ago] : [unfilled] weeks ago [Episodic] : episodic [Moderate] : moderate [Activity] : with activity [Stable] : stable [FreeTextEntry7] : Left sided neck pain with radiating pain down left shoulder. Denies loss of sensation and motor weakness.  [FreeTextEntry5] : Fails to improve with any OTC therapy

## 2020-06-23 NOTE — ASSESSMENT
[FreeTextEntry1] : F/u MRI Cervical spine to evaluate for disc disease and nerve root compression\par Recommend Pain Mgt referral for possible epidural pending mri results

## 2020-06-23 NOTE — PHYSICAL EXAM
[Normal] : no acute distress, well nourished, well developed and well-appearing [de-identified] : left paraspinal muscle spasm. Negative spurlings. 5/5/ UE strength b/l.

## 2020-07-01 NOTE — ED STATDOCS - PROGRESS NOTE DETAILS
63 y/o F pt with significant PMHx of Cancer resection in 2017, IR biopsy with PTX (performed 06/22/2020) presents to the ED c/o continuous right sided pain. Patient came in for evaluation of the etiology of her pain. Focused eval, protocol orders entered. Pt to be moved to main ED for complete evaluation by another provider.

## 2020-07-01 NOTE — ED ADULT NURSE NOTE - CHIEF COMPLAINT QUOTE
Patient A&Ox4 complaining of pain to right side & left shoulder x3 days. Stated was recently diagnosed with lung cancer. Had biopsy performed on right side last week. Stated is under a lot of stress. Describes as "soreness'. Stated has been taking Motrin for pain. Respirations even & unlabored. Estimated Blood Loss (Cc): minimal

## 2020-07-01 NOTE — ED PROVIDER NOTE - ATTENDING CONTRIBUTION TO CARE
HPI: 65yo F with PMh hypothyroid, lung ca s/p biopsy 1 week ago by IR presenting with R posterior chest pain. Patient states that pain started suddenly today. Went to chiropractor yesterday. no neck pain. No chest pain or shortness of breath. No nausea/vomiting.     PE:  Gen: NAD  Head: NCAT  HEENT: Oral mucosa moist, normal conjunctiva  CV: RRR no murmurs, normal perfusion  Resp: CTA b/l, no wheezing, rales, rhonchi, no respiratory distress, no crepitus  GI: Abd Soft NTND  Neuro: No focal neuro deficits  MSK: FROM all 4 extremities, no deformity  Skin: No rash, no bruising  Psych: Normal affect    MDM: 65yo F with h/o anxiety, hypothyroid, lung ca p/w R chest wall pain s/p biopsy 1 week ago- check cxr, ct chest, d/w ct surgery, give analgesia and reassess. Christy Bonilla DO         I performed a history and physical exam of the patient and discussed their management with the resident. I reviewed the resident's note and agree with the documented findings and plan of care. My medical decision making and observations are found above.

## 2020-07-01 NOTE — ED ADULT TRIAGE NOTE - CHIEF COMPLAINT QUOTE
Patient A&Ox4 complaining of pain to right side & left shoulder x3 days. Stated was recently diagnosed with lung cancer. Had biopsy performed on right side last week. Stated is under a lot of stress. Describes as "soreness'. Stated has been taking Motrin for pain. Respirations even & unlabored.

## 2020-07-01 NOTE — ED PROVIDER NOTE - PHYSICAL EXAMINATION
General: Well appearing female in no acute distress  HEENT: Normocephalic, atraumatic. Moist mucous membranes. Oropharynx clear. No lymphadenopathy.  Eyes: No scleral icterus. EOMI. DONNA.  Neck:. Soft and supple. Full ROM without pain. No midline tenderness  Cardiac: Regular rate and regular rhythm. No murmurs, rubs, gallops. Peripheral pulses 2+ and symmetric. No LE edema.  Resp: Mild TTP R mid lateral chest. Lungs CTAB. Speaking in full sentences. No wheezes, rales or rhonchi.  Abd: Soft, non-tender, non-distended. Normal bowel sounds in all quadrants. No guarding or rebound. No scars, masses, or lesions.  Back: Spine midline and non-tender. No CVA tenderness.    Skin: No no erythema, ecchymosis, rashes, abrasions, or lacerations.  Neuro: AO x 3. Moves all extremities symmetrically. Motor strength and sensation grossly intact.

## 2020-07-01 NOTE — ED PROVIDER NOTE - PMH
Anxiety    Drug abuse  Back in the 70's and 80's (Cocaine)  Genital herpes    Hypothyroid    Lung cancer    LAMAR on CPAP

## 2020-07-01 NOTE — CONSULT NOTE ADULT - ASSESSMENT
63 y/o F with h/o lung ca s/p Rt lung biopsy last week with a small residual pneumothorax now with Right thoracic chest pain and SOB. Chest Xray noted to be unchanged since last week post procedure. No surgical intervention needed.  Pain management  and CXR in one month.  LEEANN Bradley

## 2020-07-01 NOTE — ED PROVIDER NOTE - PROGRESS NOTE DETAILS
Spoke with CT surgery regarding patient. They state CXR unchanged from prior, patient not requiring consult as long as patient VSS and saturation normal on RA. Patient can be CT scanned at discretion of ED. Spoke with CT surgery regarding CT scan. They state patient can follow up, can be safely discharged to home at this time. Spoke with CT surgery regarding CT scan. They state patient can follow up, can be safely discharged to home at this time. No acute intervention needed. Patient is pain controlled, with normal vitals, and in no acute distress at this time. Given a copy of all test results. Will follow up with CT surgery as outpatient.

## 2020-07-01 NOTE — ED PROVIDER NOTE - CLINICAL SUMMARY MEDICAL DECISION MAKING FREE TEXT BOX
Patient presenting with lateral chest pain after a biopsy. Patient presenting with lateral chest pain after a biopsy. No signs of obvious external injury seen. Patient imaging unremarkable when compared to prior. CT surgery consulted regarding patient. Recommendations appreciated. Patient to be discharged to home. Conversation had with patient regarding results of testing. Patient agrees with plan for discharge at this time. Patient agrees to comply with follow up with CT surg. Return to ED precautions and discharge instructions given to patient.

## 2020-07-01 NOTE — ED ADULT NURSE NOTE - OBJECTIVE STATEMENT
Pt A&Ox4. Pt stated that she had a lung biopsy Monday.  As per Pt she cannot take a deep breath and she feels very 'tight'.  However at this time no SOB.

## 2020-07-01 NOTE — ED PROVIDER NOTE - PATIENT PORTAL LINK FT
You can access the FollowMyHealth Patient Portal offered by [facility] by registering at the following website: [email or web address]. By joining [employer]’s FollowMyHealth portal, you will also be able to view your health information using other applications (apps) compatible with our system.

## 2020-07-01 NOTE — ED PROVIDER NOTE - CARE PROVIDER_API CALL
Rohan Bradley  SURGERY  [address] [place] [id number]  Phone: [phone]  Fax: [phone]  Follow Up Time: 7-10 Days

## 2020-07-01 NOTE — CONSULT NOTE ADULT - SUBJECTIVE AND OBJECTIVE BOX
Surgeon: Mirna    Consult requesting by: ER     HISTORY OF PRESENT ILLNESS:  This is a 65 y/o F with a h/o lung cancer in the past s/p wedge resection in 2017 had a Rt lung biopsy last week with a small residual right apical PTX for which it came back positive for lung cancer pw c/o SOB and pain to right side & left shoulder x3 days.  Pt states that shes been under alot of stress and she went to the chiropractor.  Pt did not have any medical clearance prior to the chiropractor appointment.  CXR today showed unchanged PTX. 	      PAST MEDICAL & SURGICAL HISTORY:  Genital herpes  Drug abuse: Back in the 70&#x27;s and 80&#x27;s (Cocaine)  Lung cancer  LAMAR on CPAP  Anxiety  Hypothyroid  Status post lobectomy of lung: 3/2017 (Right lung)  S/P bunionectomy: with revision      MEDICATIONS  (STANDING):    MEDICATIONS  (PRN):    Antiplatelet therapy:   none                        Last dose/amt:    Allergies    Bananas (Headache)  latex (Rash (Mild))  penicillin (Swelling (Mild to Mod))    Intolerances        SOCIAL HISTORY:  Smoker: [ ] Yes  [x ] No        PACK YEARS:                         WHEN QUIT?  ETOH use: [ ] Yes  [x ] No              FREQUENCY / QUANTITY:  Ilicit Drug use:  [ ] Yes  [x ] No  Occupation:  Live with: family   Assisted device use:    FAMILY HISTORY:  Family history of hypothyroidism (Mother)  Family history of hypertension (Father)  Family history of stroke (Father)      Review of Systems  CONSTITUTIONAL:  Fevers[ ] chills[ ] sweats[ ] fatigue[ ] weight loss[ ] weight gain [ ]                                     NEGATIVE [x ]   NEURO:  parathesias[ ] seizures [ ]  syncope [ ]  confusion [ ]                                                                                NEGATIVE[ x]   EYES: glasses[ ]  blurry vision[ ]  discharge[ ] pain[ ] glaucoma [ ]                                                                          NEGATIVE[x ]   ENMT:  difficulty hearing [ ]  vertigo[ ]  dysphagia[ ] epistaxis[ ] recent dental work [ ]                                    NEGATIVE[x ]   CV:  chest pain[ ] palpitations[ ] OBREGON [ ] diaphoresis [ ]                                                                                           NEGATIVE[x ]   RESPIRATORY:  wheezing[ ] SOB[x ] cough [ ] sputum[ ] hemoptysis[ ]                                                                  NEGATIVE[ ]   GI:  nausea[ ]  vommiting [ ]  diarrhea[ ] constipation [ ] melena [ ]                                                                      NEGATIVE[x ]   : hematuria[ ]  dysuria[ ] urgency[ ] incontinence[ ]                                                                                            NEGATIVE[x ]   MUSKULOSKELETAL:  arthritis[ ]  joint swelling [ ] muscle weakness [ ]                                                                NEGATIVE[x ]   SKIN/BREAST:  rash[ ] itching [ ]  hair loss[ ] masses[ ]                                                                                              NEGATIVE[x ]   PSYCH:  dementia [ ] depresion [ ] anxiety[ ]                                                                                                               NEGATIVE[x ]   HEME/LYMPH:  bruises easily[ ] enlarged lymph nodes[ ] tender lymph nodes[ ]                                               NEGATIVE[x ]   ENDOCRINE:  cold intolerance[ ] heat intolerance[ ] polydipsia[ ]                                                                          NEGATIVE[x ]     PHYSICAL EXAM  Vital Signs Last 24 Hrs  T(C): 36.7 (01 Jul 2020 11:34), Max: 36.7 (01 Jul 2020 11:34)  T(F): 98 (01 Jul 2020 11:34), Max: 98 (01 Jul 2020 11:34)  HR: 88 (01 Jul 2020 11:34) (88 - 88)  BP: 137/89 (01 Jul 2020 11:34) (137/89 - 137/89)  BP(mean): --  RR: 16 (01 Jul 2020 11:34) (16 - 16)  SpO2: 99% (01 Jul 2020 11:34) (99% - 99%)    CONSTITUTIONAL:                                                                          WNL[x ]   Neuro: WNL[ x] Normal exam oriented to person/place & time with no focal motor or sensory  deficits. Other                     Eyes: WNL[x ]   Normal exam of conjunctiva & lids, pupils equally reactive. Other     ENT: WNL[x ]    Normal exam of nasal/oral mucosa with absence of cyanosis. Other  Neck: WNL[x ]  Normal exam of jugular veins, trachea & thyroid. Other  Chest: WNL[x ] Normal lung exam with good air movement absence of wheezes, rales, or rhonchi: Other                                                                                 CV:  Auscultation: normal [x ] S3[ ] S4[ ] Irregular [ ] Rub[ ] Clicks[ ]    Murmurs none:[x ]systolic [ ]  diastolic [ ] holosystolic [ ]  Carotids: No Bruits[x ] Other                 Abdominal Aorta: normal [ ] nonpalpable[ x]Other                                                                                      GI:           WNL[ x] Normal exam of abdomen, liver & spleen with no noted masses or tenderness. Other                                                                                                        Extremities: WNL[x ] Normal no evidence of cyanosis or deformity Edema: none[x ]trace[ ]1+[ ]2+[ ]3+[ ]4+[ ]  Lower Extremity Pulses: Right[2+] Left[2+ ]Varicosities[ ]  SKIN :WNL[x ] Normal exam to inspection & palation. Other:                                                          LABS:                        11.8   7.49  )-----------( 286      ( 01 Jul 2020 13:15 )             37.4     07-01    139  |  101  |  13.0  ----------------------------<  105<H>  4.8   |  28.0  |  0.67    Ca    10.3<H>      01 Jul 2020 13:15    TPro  7.6  /  Alb  4.2  /  TBili  0.6  /  DBili  x   /  AST  23  /  ALT  21  /  AlkPhos  136<H>  07-01    PT/INR - ( 01 Jul 2020 13:15 )   PT: 14.2 sec;   INR: 1.24 ratio         PTT - ( 01 Jul 2020 13:15 )  PTT:35.6 sec          < from: Xray Chest 2 Views PA/Lat (07.01.20 @ 12:37) >  INTERPRETATION:    CHEST X-RAY PA AND LATERAL:    History: Follow-up pneumothorax.    Date and time of exam: 7/1/2020 12:20 PM.    Comparison exam: 6/23/2020.    Technique: PA and lateral views of the chest were obtained.    Findings:  There is a small right apical pneumothorax unchanged since the prior examination. The right lung is otherwise clear. The left lung is clear. The heart is not enlarged. No hilar or mediastinal abnormality. No evidence of a pleural effusion.    Impression:  Small right apical pneumothorax demonstrating no change since 6/23/2020..    < end of copied text >

## 2020-07-01 NOTE — ED PROVIDER NOTE - OBJECTIVE STATEMENT
Pt is a 63yo F presenting with shoulder and side pain. Patient has a history of lung cancer s/p RLL resection 3 years ago. She states that she was informed her cancer is recurring and she had a biopsy done last week. She reports that yesterday she started developing R sided pain and mild dyspnea. She states she went to a chiropractor and masseur for te pain but was not relieved. Patient denies any f/c/n/v/cp, cough. VAGINAL BLEEDING

## 2020-07-01 NOTE — ED PROVIDER NOTE - NS ED ROS FT
General: Denies fever, chills  HEENT: Denies sensory changes, sore throat  Neck: Denies neck pain, neck stiffness  Resp: Endorses SOB  Cardiovascular: Endorses lateral CP. Denies palpitations, LE edema  GI: Denies nausea, vomiting, abdominal pain, diarrhea, constipation, blood in stool  : Denies dysuria, hematuria, frequency, incontinence  MSK: Denies back pain  Neuro: Denies HA, dizziness, numbness, weakness  Skin: Denies rashes

## 2020-07-06 NOTE — ED PROVIDER NOTE - CLINICAL SUMMARY MEDICAL DECISION MAKING FREE TEXT BOX
65yo F with left clavicle pain, chronic R hip pain. Concern for pathologic fx of left clavicle given recent ct imaging on 7/1 showing metastatic bone lesions. Plan to check xrays, pain meds and call trinidad Sy/onc

## 2020-07-06 NOTE — ED ADULT TRIAGE NOTE - CHIEF COMPLAINT QUOTE
pt c/o "right hip and left collar bone pain" "I am stressed out bc I need cancer treatment", resp even and unlabored no distress noted, pt denies recent fall or injury

## 2020-07-06 NOTE — ED PROVIDER NOTE - PATIENT PORTAL LINK FT
You can access the FollowMyHealth Patient Portal offered by [facility] by registering at the following website: [email or web address]. By joining [employer]’s FollowMyHealth portal, you will also be able to view your health information using other applications (apps) compatible with our system.

## 2020-07-06 NOTE — ED PROVIDER NOTE - ATTENDING CONTRIBUTION TO CARE
sudden pain and swelling of left clavicle area after reaching up to 2nd Watch of vehicle.  H/O lung cancer with recent CT demonstrating bone metastasis.  Has been having back pain for several weeks and went to chiropracter today.  PE:  STS and TTP midhshaft left clavicle with distal N/V intact.

## 2020-07-06 NOTE — ED PROVIDER NOTE - PHYSICAL EXAMINATION
Gen: WD/WN, anxious appearing  Head: NCAT  Neck:. Soft and supple. FROM, mild ttp left paracervical. no midline ttp  Cardiac: RRR +S1S2.   Resp: No evidence of respiratory distress. No wheezes, rales or rhonchi.  MSK: LUE: Holding left arm in at side, unable to actively range at shoulder 2/2 pain. ttp over mid clavicle with small deformity noted. ROM at wrist and elbow intact. Radial pulses 2+ b/l. Cap refill < 2sec          R hip: FROM b/l LE, ttp over R hip, pt FWB and ambulatory.  Skin: Warm, dry  Neuro: Awake, alert & oriented x 3. No focal deficits, Sensorimotor intact x 4. ambulatory with steady gait

## 2020-07-06 NOTE — HISTORY OF PRESENT ILLNESS
[T: ___] : T[unfilled] [N: ___] : N[unfilled] [AJCC Stage: ____] : AJCC Stage: [unfilled] [Home] : at home, [unfilled] , at the time of the visit. [Medical Office: ([facility])___] : at the medical office located in  [Spouse] : spouse [Other:____] : [unfilled] [Verbal consent obtained from patient] : the patient, [unfilled] [de-identified] : The patient was diagnosed with adenocarcinoma of the lung in March 2017 at the age of 61.  She presented with an incidental finding of a 2 cm RLL pulmonary nodule on routine CXR, prior to foot surgery. On February 17, 2017, a CT chest showed a lobulated lesion in the superior segment right lower lobe abutting the fissure along its anterior margin which measured 2.2 x 2.0 cm.  On February 24, 2017 a PET/CT showed the lobulated opacity in the superior segment of the right lower lobe, 1.9 x 1.9 cm with SUV 2.8.  On March 24, 2017 Dr. Bradley performed a right lower lobectomy and mediastinal lymph node dissection.  The tumor size from the right lower lobe wedge resection was 1.8 x 1.7 x 1.6cm.  Pathology showed adenocarcinoma, acinar type, moderately to  poorly differentiated, 1.8 x 1.7 x 1.6 cm with clear margins.  Mediastinal lymph nodes were negative for tumor.  Immunohistochemistry studies showed were positive for:  CK7, Napsin A and TTF-1; and negative for: CDX-2, CK20 and mammaglobin.  Smoking history since age 14, and quit 4 years ago, ~50 PY history.  Drinks ~ 2 glasses wine per day.   [de-identified] : Adenocarcinoma of lung [de-identified] : The patient presents for follow up.  Reports she is doing well.  Intentional healthy weight loss on [employer] diet. She has no chest pain, shortness of breath, headache, dizziness, GI or urinary complaints. No headaches. No double vision. No vomiting. Health maintenance reviewed, patient advised to have yearly mammogram, gynecologic exam as recommended by GI. No other complaints today.

## 2020-07-06 NOTE — ED PROVIDER NOTE - NSFOLLOWUPCLINICS_GEN_ALL_ED_FT
[facility]  Orthopedics  [address] [place] [id number]  Phone: [phone]  Fax:   Follow Up Time: 4-6 Days

## 2020-07-06 NOTE — ED PROVIDER NOTE - OBJECTIVE STATEMENT
63yo F pmhx lung CA, anxiety, hypothyroid presents to ED c/o R hip pain and left clavicle pain. Pt notes hx of lung ca, states had biopsy done a few weeks ago, biopsy caused small right sided ptx which has been followed by cts, seen in ED 5 days ago and as per cts pt was stable for dc as ptx was unchanged. States she went to chiropractor for her chronic R hip pain and left sided neck/shoulder pain. Pt notes after chiropractor she was exiting her car, was trying to get out carefully 2/2 hip pain so she held top of car door with left hand and heard a loud "crack" and noted pain to her left clavicle. Pt noting significant pain with ROM of left upper extremity. Pt has been under a lot of stress recently as she is getting her biopsy results today at 1600pm. CT results from previous visit showing multiple metastatic bone lesions, pt unaware of findings. Denies fever, cp, difficulty breathing, sob, palpitations, fall, injury, blood thinner use.   Onc: Jake  Thoracic: Mirna

## 2020-07-06 NOTE — ED PROVIDER NOTE - NSFOLLOWUPINSTRUCTIONS_ED_ALL_ED_FT
Apply ice to affected area for 15-20 minutes every few hours for the next few days.  Use as much or as frequently as desired. Sling to arm for comfort and avoid weight bearing with affected extremity for 1-2 weeks. Tylenol extra strength 2 tablets every 4 hours or Ibuprofen 600mg (3 tablets) every 6 hours as needed for aches, pains. May take tramadol 1-2 tablets every 6 hours as needed for severe pain.  Follow-up with Dr Aguilar as scheduled today.

## 2020-07-08 PROBLEM — Z01.818 PRE-OP TESTING: Status: ACTIVE | Noted: 2020-01-01

## 2020-07-08 NOTE — ED CDU PROVIDER INITIAL DAY NOTE - ATTENDING CONTRIBUTION TO CARE
I agree with the PA's note and was available for any issues/concerns. I was directly involved in patient care. My brief overall assessment is as follows:    64 year old female PMHx metastatic lung cancer c/o generalized pain x few days; current analgesic regimen not working, of note patient is chronically has POx=80s%. PE: NAD, + generalized thoracolumbar tenderness, neurovascularly intact. I&P: pain due to metastatic disease, will try to control pain

## 2020-07-08 NOTE — ED ADULT TRIAGE NOTE - CHIEF COMPLAINT QUOTE
Pt. BIBA complaining of right mid back pain radiating to right hip.   Pt. recently d/c from [facility] ED 2 days ago with clavicle fracture; pt. was complaining of right hip pain 2 days ago as well but pain has not resolved.  Pt. denies any further trauma/injury.  Pt. received 100mcg of fentanyl by EMS en route to [facility] ED.  Pt. with history of lung CA with mets.

## 2020-07-08 NOTE — ED CDU PROVIDER INITIAL DAY NOTE - OBJECTIVE STATEMENT
65 yo female hx of stage 4 lung ca with hx of RLL resection remission and reoccurrence on latest CT with METs to SPINE  and ribs . PT followed by Dr Aguilar. presenting to ER with increased pain, limiting movement and ADL ability. pt prescribed PO morphine and fentanyl patch that patient states has not been helping, as well as tramadol for newly dx clavicle fracture. pt recently scheduled for FIRST round of chemotherapy on friday. pt has never had chemo or radiation for lung ca. pt states pain is worse to the lower back and worse with movement, limiting her mobility at home.   Pt. denies any chest pain. NO SOB. No cough. No fever. Pt. denies any hip pain. NO trauma.  former smoker former illicit drug use   no family hx of Cancer known

## 2020-07-08 NOTE — ED CDU PROVIDER INITIAL DAY NOTE - PROGRESS NOTE DETAILS
Dr freire discussed case with Dr Joseph. recommending pain control.   pain management consult called for AM Dr freire discussed case with Dr Joseph. recommending pain control.   pain management consult called for AM  desaturation to 84 no distress no shortness of breath. improvement of saturation to mid 90's with NC spoke with daughter Josef. vocalizes concerns about mother going home and fracturing something else and concerned about transporting home then back for chemotherapy on friday.. vocalized plan of care and that this point will be staying in observation overnight unless desaturates on NC or pain not controlled. daughter requesting to be kept abreast of alterations in care plan. pt is being discharged home on oxygen. pt is in a chronic stable state of lung cancer with METs.   pulse ox of 88% or less was noted on room air at rest. pt currently on 2 lpm continuous oxygen use via nasal cannula. Pt is mobile within the [facility] and needs portability as well   SW aware pt requesting for staff to speak with daughter diana [phone]  spoke with daughter Diana. vocalizes concerns about mother going home and fracturing something else and concerned about transporting home then back for chemotherapy on friday.. vocalized plan of care and that this point will be staying in observation overnight unless desaturates on NC or pain not controlled. daughter requesting to be kept abreast of alterations in care plan.

## 2020-07-08 NOTE — ED ADULT NURSE NOTE - OBJECTIVE STATEMENT
Pt c/o R sided lower back pain that worsened over the last two days. Pt states the pain has been present for about 2 weeks but only when walking, bending, moving etc. Pt states pain is located in the lower right side of the back with no radiation. Pt states pain is relieved when laying down. Pt states she has not been OOB for last 2 days because pain has been to much. Pt was seen recently for a fracture to L clavicle and mentioned the back pain but said nothing was done about it. Pt PMHx of Lung ca with mets, hypothyroidism, HLD.

## 2020-07-08 NOTE — ED PROVIDER NOTE - PROGRESS NOTE DETAILS
spoke to Dr. Aguilar(oncologist) regarding pt's pain status. Pt's pain has not been controlled despite taking Morphine PO and Fentanyl patch. Pt. also noted to be hypoxic(89-91%RA) in the ED. Pt. has no prior hx of hypoxia. Given pt's hx of lung CA and hypoxia, we will obtain CTA of chest to r/o PE. Pt. also will be admitted for pain control and palliative care. Pt. is scheduled for chemotherapy this friday. Pt. awake and alert. Pt. with oxygen level 83-84% on RA. Pt. with no respiratory distress. Pt. will be admitted to our observation unit.

## 2020-07-08 NOTE — ED CDU PROVIDER INITIAL DAY NOTE - MEDICAL DECISION MAKING DETAILS
feamle hx of reoccuring lung ca with mets to bone presenting with increased pain limiting mobility and decreasing adl. will treat pain and re-eval. pain management consult in am

## 2020-07-08 NOTE — ED ADULT NURSE NOTE - CHIEF COMPLAINT QUOTE
Pt. BIBA complaining of right mid back pain radiating to right hip.   Pt. recently d/c from [facility] ED 2 days ago with clavicle fracture; pt. was complaining of right hip pain 2 days ago as well but pain has not resolved.  Pt. denies any further trauma/injury.  Pt. received 100mcg of fentanyl by EMS en route to [facility] ED.  Pt. with history of lung CA with mets.

## 2020-07-08 NOTE — ED PROVIDER NOTE - CLINICAL SUMMARY MEDICAL DECISION MAKING FREE TEXT BOX
PT. with worsening back pain. No trauma. Pt. with stage 4 Lung CA with mets to her bone. Pt. sent in by her oncologist for inpatient work up. Will treat her pain and check labs and contact Dr. SAMANTHA Aguilar.

## 2020-07-08 NOTE — ED PROVIDER NOTE - COVID-19 RESULT
[FreeTextEntry1] : Maxim Armstrong presented to the office today for a cardiovascular evaluation. He was last seen in the office in July.\par \par He is now 41 years old, with a history of cognitive delay. He has a family history of congenital heart disease, noting that his mother had what may have been a ventricular septal defect, in addition to a history of valve replacement and a defibrillator. He has a history of hypothyroidism.  Maxim himself does not have any known structural heart disease. He's been treated with crestor, TriCor and fish oil. His triglycerides have been labile, ranging from the 170's to the  7-800 range at times. They have been most recently under 200.\par \par He continues to watch his diet, and he has continued to exercise, at least intermittently. He does not report any cardiovascular symptoms, including symptoms of angina, heart failure or arrhythmia.   
NEGATIVE

## 2020-07-08 NOTE — ED PROVIDER NOTE - OBJECTIVE STATEMENT
Pt. with hx of stage 4 recurrent lung cancer with present to ED persistent and worsening Right lower back pain for the past 2 days. Pt. has not been able to walk for the past 2 days. Pt. states that her oncologist told her to come to the hospital. Pt. is scheduled to have chemotherapy and radiation. Pt. had a CT scan of her chest on 7/1 that showed a NEW LUNG MASS with mets to her bones(L1, T7 and 6th rib). Pt. taking morphine tabs for pain with no significant improvement. Pt. denies any chest pain. NO SOB. No cough. No fever. Pt. denies any hip pain. NO trauma. Pt. also has an acute Left Clavicular fracture.

## 2020-07-09 NOTE — ED ADULT NURSE REASSESSMENT NOTE - NS ED NURSE REASSESS COMMENT FT1
Early meal tray ordered from diet office. Care endorsed to Lina LEO ESSU. Patient to be moved to CDU 14R. VSS. Patient in NAD.
received pt alert and orientedx4,  in no apparent signs of distress. Pt remains stable on monitor, sating well on 02,2L nc, pt has no pain while laying flat,  PIV site WNL. Pt status unchanged, refer to flowsheet and chart, pt ID band in place, pt safety maintained, pt hemodynamically stable, updated on plan of care. Awaiting  on MRI . Call light provided, fall safety reinforced. Will continue to monitor
Assumed care of the patient at 0739. Patient A&Ox4. No s/s of distress. Patient with consistent pain to right lower back, medicated with Dilaudid as per orders, will re-assess. Patient with excruciating pain with turning.  NSR on CM. Patient on 3L O2 via NC, O2 sat 95%. VSS. Patient T&P. PIV patent, +blood return. Patient pending PT eval and palliative consult. Patient in understanding of plan of care. Patient with no further questions for the RN. Resting in comfort. Call bell within reach and encouraged to use when assistance needed. Will continue to monitor.

## 2020-07-09 NOTE — CONSULT NOTE ADULT - ATTENDING COMMENTS
COUNSELING:    Face to face meeting to discuss Advanced Care Planning - Time Spent ______ Minutes.  See goals of care note.    More than 50% time spent in counseling and coordinating care. ___40___ Minutes.     Thank you for the opportunity to assist with the care of this patient.   [facility] Consult [facility] [phone]. COUNSELING:    Face to face meeting to discuss Advanced Care Planning - Time Spent ___35___ Minutes.   goals of care documentation above  More than 50% time spent in counseling and coordinating care. ___40___ Minutes.     Thank you for the opportunity to assist with the care of this patient.   [facility] Consult Service [phone].

## 2020-07-09 NOTE — DISCHARGE NOTE NURSING/CASE MANAGEMENT/SOCIAL WORK - PATIENT PORTAL LINK FT
You can access the FollowMyHealth Patient Portal offered by [facility] by registering at the following website: [email or web address]. By joining [facility]’s FollowMyHealth portal, you will also be able to view your health information using other applications (apps) compatible with our system.

## 2020-07-09 NOTE — CONSULT NOTE ADULT - ASSESSMENT
64y Female wit history of lung cancer metastasized to bone, now presents with worsening R lower back and Left clavicular pain. She was found awake, supine on stretcher in ED. She is drowsy, s/p medication; ongoing pain appreciated. Discussed an opioid rotation with Palliative Care Service consultation for additional input. The patient verbalizes understanding. Plan reviewed with ED NICOLE Nagy.

## 2020-07-09 NOTE — H&P ADULT - NSHPSOCIALHISTORY_GEN_ALL_CORE
Quit smoking cigarettes 8 years ago (prior 1.5ppd for 35 years)  Drinks alcohol socially  Prior drug abuse

## 2020-07-09 NOTE — CONSULT NOTE ADULT - SUBJECTIVE AND OBJECTIVE BOX
HPI: This is a 63yo F with PMH of Lung cancer admitted with worsening pain in lower back, and L clavicular pathologic  Fracture pain.     65 y/o female with hx of adenocarcinoma lung cancer s/p right lower lobectomy and mediastinal lymph node dissection in 2017 by Dr. Alex comes to the ED persistent and worsening Right lower back pain for the past few days with difficulty to walk due to pain. Per pt her oncologist told her to come to the hospital. Pt. is scheduled to have chemotherapy and radiation. Pt. had a CT scan of her chest on 7/1 that showed new lung mass with mets to her bones (L1, T7 and 6th rib). Pt. taking morphine tabs for pain with no significant improvement. Pt. denies any chest pain. NO SOB. No cough. No fever. Pt. denies any hip pain. NO trauma. Pt. also has an acute Left Clavicular fracture that was seen in ED a few days prior. In ED today pt pain is not under control. MRI brain is negative for mets. Pt seen by heme/onc and rec radiation/oncology consult. Pt also requiring O2 supplementation. CT chest negative for PE. Pt admitted to medicine for pain control and rad/onc. (09 Jul 2020 10:22)    PERTINENT PMH REVIEWED: Yes   PAST MEDICAL & SURGICAL HISTORY:  Genital herpes  Drug abuse: Back in the 70&#x27;s and 80&#x27;s (Cocaine)  Lung cancer: with mets  LAMAR on CPAP  Anxiety  Hypothyroid  Status post lobectomy of lung: 3/2017 (Right lung)  S/P bunionectomy: with revision    PAST MEDICAL & SURGICAL HISTORY:  Genital herpes  Drug abuse: Back in the 70&#x27;s and 80&#x27;s (Cocaine)  Lung cancer: with mets  LAMAR on CPAP  Anxiety  Hypothyroid  Status post lobectomy of lung: 3/2017 (Right lung)  S/P bunionectomy: with revision      SOCIAL HISTORY:  EtOH   No                                    Drugs  Yes  PMH see above                                   nonsmoker                                    Admitted from: home   Yehuda [phone]/HCP adding daughter Kena Phone#:    FAMILY HISTORY:  Family history of hypothyroidism  Family history of hypertension  Family history of stroke    Allergies  Bananas (Headache)  latex (Rash (Mild))  penicillin (Swelling (Mild to Mod))    Intolerances    Baseline ADLs (prior to admission):  Independent    Present Symptoms:   Dyspnea:  1 -2 O2 NC 2L  Nausea/Vomiting:  No not at present  Anxiety:  Yes   Depression:  No  Fatigue: Yes   Loss of appetite: No asking for food    Pain:             Character-stabbing sharp            Duration-intermittent exacerbates to constant with movement            Effect-            Factors-            Frequency-            Location-L Clavicle RLower lumbar to superior hip            Severity-moderate to severe- unable to ambulate at home due to poor analgesia    Review of Systems: Reviewed                     All others negative    MEDICATIONS  (STANDING):  atorvastatin 10 milliGRAM(s) Oral at bedtime  bisacodyl Suppository 10 milliGRAM(s) Rectal once  celecoxib 200 milliGRAM(s) Oral two times a day  enoxaparin Injectable 40 milliGRAM(s) SubCutaneous daily  fentaNYL   Patch  50 MICROgram(s)/Hr 1 Patch Transdermal every 72 hours  gabapentin 300 milliGRAM(s) Oral two times a day  HYDROmorphone   Tablet 6 milliGRAM(s) Oral every 6 hours  levothyroxine 175 MICROGram(s) Oral daily  lidocaine   Patch 2 Patch Transdermal daily  liothyronine 5 MICROGram(s) Oral daily  polyethylene glycol 3350 17 Gram(s) Oral at bedtime    MEDICATIONS  (PRN):  diphenhydrAMINE 25 milliGRAM(s) Oral every 6 hours PRN Rash and/or Itching  HYDROmorphone   Tablet 4 milliGRAM(s) Oral every 3 hours PRN Moderate Pain (4 - 6)  HYDROmorphone   Tablet 6 milliGRAM(s) Oral every 3 hours PRN Severe Pain (7 - 10)  HYDROmorphone  Injectable 1 milliGRAM(s) IV Push every 6 hours PRN Moderate Pain (4 - 6)      PHYSICAL EXAM:    Vital Signs Last 24 Hrs  T(C): 36.8 (09 Jul 2020 07:29), Max: 37.1 (08 Jul 2020 20:10)  T(F): 98.3 (09 Jul 2020 07:29), Max: 98.7 (08 Jul 2020 20:10)  HR: 86 (09 Jul 2020 07:29) (86 - 90)  BP: 126/78 (09 Jul 2020 07:29) (121/62 - 144/85)  BP(mean): --  RR: 18 (09 Jul 2020 07:29) (18 - 18)  SpO2: 98% (09 Jul 2020 07:29) (84% - 98%)    General: alert  oriented x __3__ lethargic just medicated for pain                 HEENT:  dry mouth      Lungs: comfortable at rest    CV: normal      GI: distended  + Flatus                 constipation  last BM: 4 days ago    : normal      MSK:  weakness              ambulatory unsteady gait due to pain     Skin: normal  _  no rash    LABS:                        11.9   9.23  )-----------( 258      ( 08 Jul 2020 15:33 )             37.5     07-08    136  |  94<L>  |  11.0  ----------------------------<  109<H>  3.8   |  27.0  |  0.56    Ca    9.3      08 Jul 2020 15:33    TPro  7.3  /  Alb  3.8  /  TBili  0.8  /  DBili  x   /  AST  21  /  ALT  15  /  AlkPhos  126<H>  07-08    I&O's Summary    RADIOLOGY & ADDITIONAL STUDIES:  < from: CT Angio Chest w/ IV Cont (07.08.20 @ 18:03) >  IMPRESSION:   Right hilar mass encasing the right main bronchus associated with paratracheal and subcarinal lymph nodes, similar to prior.    No residual pneumothorax.    New small bilateral effusion with passive atelectasis, more in the right.    Bronchial wall thickening with bronchovascular crowding in the left lower lobe, new since prior-likely indicating developing atelectasis with infection.    Known extensive osseous metastasis.  < from: MR Head No Cont (07.08.20 @ 22:37) >    IMPRESSION:   No intracranial metastasis is visualized, within limits of a noncontrast MRI.  Follow-up contrast-enhanced imaging is recommended for complete evaluation.    Patchy white matter lesions are nonspecific in etiology but likely represent chronic microvascular ischemic disease in this age group.  < end of copied text >      ADVANCE DIRECTIVES: HCP will be established formerly today  DNR NO  Completed on:                     MOLST   NO   Completed on:  Living Will  ?  Completed on: HPI: This is a 63yo F with PMH of Lung cancer admitted with worsening pain in lower back, and L clavicular pathologic  Fracture pain; having difficulty ambulating due to pain. Site of worst pain L shoulder, intermittent spinal back pain.  Last BM at least 4 days ago. Appetite fair requesting soft consistency diet-denies trouble swallowing. Denies CP, palpitations N/V/D  dizziness, numbness, tingling change in bowel or bladder control. Exacerbation of pain in her back after spending lengthy time on bedpan.  Radiation Oncologist Dr. Keith did consult at her bedside- scheduling Mapping at [facility]    65 y/o female with hx of adenocarcinoma lung cancer s/p right lower lobectomy and mediastinal lymph node dissection in 2017 by Dr. Alex comes to the ED persistent and worsening Right lower back pain for the past few days with difficulty to walk due to pain. Per pt her oncologist told her to come to the hospital. Pt. is scheduled to have chemotherapy and radiation. Pt. had a CT scan of her chest on 7/1 that showed new lung mass with mets to her bones (L1, T7 and 6th rib). Pt. taking morphine tabs for pain with no significant improvement. Pt. denies any chest pain. NO SOB. No cough. No fever. Pt. denies any hip pain. NO trauma. Pt. also has an acute Left Clavicular fracture that was seen in ED a few days prior. In ED today pt pain is not under control. MRI brain is negative for mets. Pt seen by heme/onc and rec radiation/oncology consult. Pt also requiring O2 supplementation. CT chest negative for PE. Pt admitted to medicine for pain control and rad/onc. (09 Jul 2020 10:22)    PERTINENT PMH REVIEWED: Yes   PAST MEDICAL & SURGICAL HISTORY:  Genital herpes  Drug abuse: Back in the 70&#x27;s and 80&#x27;s (Cocaine)  Lung cancer: with mets  LAMAR on CPAP  Anxiety  Hypothyroid  Status post lobectomy of lung: 3/2017 (Right lung)  S/P bunionectomy: with revision    PAST MEDICAL & SURGICAL HISTORY:  Genital herpes  Drug abuse: Back in the 70&#x27;s and 80&#x27;s (Cocaine)  Lung cancer: with mets  LAMAR on CPAP  Anxiety  Hypothyroid  Status post lobectomy of lung: 3/2017 (Right lung)  S/P bunionectomy: with revision      SOCIAL HISTORY:  EtOH   No                                    Drugs  Yes  PMH see above                                   nonsmoker                                    Admitted from: home   Yehuda [phone]/HCP adding daughter Kena Phone#:    FAMILY HISTORY:  Family history of hypothyroidism  Family history of hypertension  Family history of stroke    Allergies  Bananas (Headache)  latex (Rash (Mild))  penicillin (Swelling (Mild to Mod))    Intolerances    Baseline ADLs (prior to admission):  Independent    Present Symptoms:   Dyspnea:  1 -2 O2 NC 2L  Nausea/Vomiting:  No not at present  Anxiety:  Yes   Depression:  No  Fatigue: Yes   Loss of appetite: No asking for food    Pain:             Character-stabbing sharp            Duration-intermittent exacerbates to constant with movement            Effect-            Factors-            Frequency-            Location-L Clavicle RLower lumbar to superior hip            Severity-moderate to severe- unable to ambulate at home due to poor analgesia    Review of Systems: Reviewed                     All others negative    MEDICATIONS  (STANDING):  atorvastatin 10 milliGRAM(s) Oral at bedtime  bisacodyl Suppository 10 milliGRAM(s) Rectal once  celecoxib 200 milliGRAM(s) Oral two times a day  enoxaparin Injectable 40 milliGRAM(s) SubCutaneous daily  fentaNYL   Patch  50 MICROgram(s)/Hr 1 Patch Transdermal every 72 hours  gabapentin 300 milliGRAM(s) Oral two times a day  HYDROmorphone   Tablet 6 milliGRAM(s) Oral every 6 hours  levothyroxine 175 MICROGram(s) Oral daily  lidocaine   Patch 2 Patch Transdermal daily  liothyronine 5 MICROGram(s) Oral daily  polyethylene glycol 3350 17 Gram(s) Oral at bedtime    MEDICATIONS  (PRN):  diphenhydrAMINE 25 milliGRAM(s) Oral every 6 hours PRN Rash and/or Itching  HYDROmorphone   Tablet 4 milliGRAM(s) Oral every 3 hours PRN Moderate Pain (4 - 6)  HYDROmorphone   Tablet 6 milliGRAM(s) Oral every 3 hours PRN Severe Pain (7 - 10)  HYDROmorphone  Injectable 1 milliGRAM(s) IV Push every 6 hours PRN Moderate Pain (4 - 6)      PHYSICAL EXAM:    Vital Signs Last 24 Hrs  T(C): 36.8 (09 Jul 2020 07:29), Max: 37.1 (08 Jul 2020 20:10)  T(F): 98.3 (09 Jul 2020 07:29), Max: 98.7 (08 Jul 2020 20:10)  HR: 86 (09 Jul 2020 07:29) (86 - 90)  BP: 126/78 (09 Jul 2020 07:29) (121/62 - 144/85)  BP(mean): --  RR: 18 (09 Jul 2020 07:29) (18 - 18)  SpO2: 98% (09 Jul 2020 07:29) (84% - 98%)    General: alert  oriented x __3__ lethargic just medicated for pain  more alert later in afternoon                 HEENT:  dry mouth      Lungs: comfortable at rest-must lie flat because of pain    CV: normal      GI: distended  + Flatus                 constipation  last BM: 4 days ago    : normal      MSK:  weakness              ambulatory unsteady gait due to pain     Skin: normal  _  no rash    LABS:                        11.9   9.23  )-----------( 258      ( 08 Jul 2020 15:33 )             37.5     07-08    136  |  94<L>  |  11.0  ----------------------------<  109<H>  3.8   |  27.0  |  0.56    Ca    9.3      08 Jul 2020 15:33    TPro  7.3  /  Alb  3.8  /  TBili  0.8  /  DBili  x   /  AST  21  /  ALT  15  /  AlkPhos  126<H>  07-08    I&O's Summary    RADIOLOGY & ADDITIONAL STUDIES:  < from: CT Angio Chest w/ IV Cont (07.08.20 @ 18:03) >  IMPRESSION:   Right hilar mass encasing the right main bronchus associated with paratracheal and subcarinal lymph nodes, similar to prior.    No residual pneumothorax.    New small bilateral effusion with passive atelectasis, more in the right.    Bronchial wall thickening with bronchovascular crowding in the left lower lobe, new since prior-likely indicating developing atelectasis with infection.    Known extensive osseous metastasis.  < from: MR Head No Cont (07.08.20 @ 22:37) >    IMPRESSION:   No intracranial metastasis is visualized, within limits of a noncontrast MRI.  Follow-up contrast-enhanced imaging is recommended for complete evaluation.    Patchy white matter lesions are nonspecific in etiology but likely represent chronic microvascular ischemic disease in this age group.  < end of copied text >      ADVANCE DIRECTIVES: HCP will be established formerly today  DNR NO  Completed on:                     MOLST   NO   Completed on:  Living Will  ?  Completed on:

## 2020-07-09 NOTE — CONSULT NOTE ADULT - PROBLEM SELECTOR RECOMMENDATION 9
-plan for outpatient systemic tx given PDL1 < 1%: carbo AUC 6, pemetrexed 500 mg/m2,and pembrolizumab 200 mg q3 weeks + xgeva q6 weeks x 4 cycles, followed by maintenance pembrolizumab and pemetrexed.  -[facility] sent for molecular analysis    -will need MRI brain to r/o brain metastases
1. Palliative Care Consultation recommended  2. Continue Fentanyl Patch for baseline analgesia  - Allow sufficient time (72hrs) for medication to take full effect  3. Discontinue Morphine IR tablets  - Offer Dilaudid 4mg PO q3hrs PRN moderate pain  - Dilaudid 1mg q3hrs PRN severe pain  - Hold PRN analgesics for oversedation  4. Add Gabapentin 300mg PO BID  5. Robaxin 500mg PO q6hrs PRN spasms  6. Lidoderm Patches to right lower back and left clavicular regions

## 2020-07-09 NOTE — ED CDU PROVIDER SUBSEQUENT DAY NOTE - PROGRESS NOTE DETAILS
Dr freire discussed case with Dr Joseph. recommending pain control.   pain management consult called for AM  desaturation to 84 no distress no shortness of breath. improvement of saturation to mid 90's with NC pt requesting for staff to speak with daughter diana [phone]  spoke with daughter Diana. vocalizes concerns about mother going home and fracturing something else and concerned about transporting home then back for chemotherapy on friday.. vocalized plan of care and that this point will be staying in observation overnight unless desaturates on NC or pain not controlled. daughter requesting to be kept abreast of alterations in care plan. pt is being discharged home on oxygen. pt is in a chronic stable state of lung cancer with METs.   pulse ox of 88% or less was noted on room air at rest. pt currently on 2 lpm continuous oxygen use via nasal cannula. Pt is mobile within the [facility] and needs portability as well   SW aware

## 2020-07-09 NOTE — CONSULT NOTE ADULT - ASSESSMENT
63 y/o female with hx of adenocarcinoma lung cancer s/p right lower lobectomy and mediastinal lymph node dissection in 2017 by Dr. Alex comes to the ED persistent and worsening Right lower back pain for the past few days with difficulty to walk due to pain. Per pt her oncologist told her to come to the hospital. Pt. is scheduled to have chemotherapy and radiation. Pt. had a CT scan of her chest on 7/1 that showed new lung mass with mets to her bones (L1, T7 and 6th rib). Pt. taking morphine tabs for pain with no significant improvement. Pt. denies any chest pain. NO SOB. No cough. No fever. Pt. denies any hip pain. NO trauma. Pt. also has an acute Left Clavicular fracture that was seen in ED a few days prior. In ED today pt pain is not under control. MRI brain is negative for mets. Pt seen by heme/onc and rec radiation/oncology consult. Pt also requiring O2 supplementation. CT chest negative for PE. Pt admitted to medicine for pain control and rad/onc.      1) Lung adenocarcinoma with mets to bone  Agress with the following:  - admit to medicine  - rad/onc Dr. Keith consult   Based on currently available clinical and radiographic findings, I recommend a short course of palliative radiotherapy of external photon beam to most symptomatic areas of metastatic disease, in particular the left clavicle, mid thoracic spine, proximal lumbar spine, possibly right 6th rib and right hilar disease. I have discussed the rationale for , the mechanics of , and the potential acute and late side effects of this proposed radiation . The patient  and her family appear to understand these considerations and I believe I have answered all of their questions to their satisfaction. I propose transporting patient to [facility] ([address] [place] [address], [phone])  tomorrow per protocol to include sanctioning from [facility], for treatment planning. I anticipate we would begin a 5 consecutive day course of treatment   Monday 7/13.  Would recommend MRI of spine with contrast to further characterize extent of potential compromise of cord and spinal roots.    - Heme/onc Dr. Joseph following    2) Back pain due to mets  Agree with the following:  - pain management consult  - on iv Dilaudid prn for break through   - morphine IR 30mg Q6hrs & fentanyl patch    3) Hypoxemia likely related to lung cancer  Agree with the following:  - on 2lNC  - negative for PE and PNA  - has small b/l effusion on CT  - will monitor and wean oxygen as tolerated  - on pulse ox    4) Hypothyroidism   Agree with the following:  - continue synthroid and cytomel    5) HLD  Agree with the following:  - on statin    6) Prophylactic measure  Agree with the following:  - DVT ppx: Lovenox SQ

## 2020-07-09 NOTE — H&P ADULT - NSICDXPASTSURGICALHX_GEN_ALL_CORE_FT
PAST SURGICAL HISTORY:  S/P bunionectomy with revision    Status post lobectomy of lung 3/2017 (Right lung)

## 2020-07-09 NOTE — ED CDU PROVIDER SUBSEQUENT DAY NOTE - HISTORY
lung ca with mets to bone negative mri brain, holding saturation well on 2 L NC no further desaturations. pending pain management consultation

## 2020-07-09 NOTE — ED CDU PROVIDER DISPOSITION NOTE - CLINICAL COURSE
This is a 64 year old female with pmhx of adenocarcinoma of the lung in March 2017 at the age of 61.  On February 24, 2017 a  On March 24, 2017 Dr. Bradley performed a right lower lobectomy and mediastinal lymph node dissection. The tumor size from the right lower lobe wedge resection was 1.8 x 1.7 x 1.6cm. Pathology showed adenocarcinoma, acinar type, moderately to poorly differentiated, 1.8 x 1.7 x 1.6 cm with clear margins. Mediastinal lymph nodes were negative for tumor.  Patient was found to have mets of lung CA, and had biopsy done x 2 weeks ago, and had pathological fracture of clavicle x 2 days ago.  She notes worsening pain along R side of body.  In ED had CTA + pleural effusions and worsening lung CA.  Found to be hypoxic in ED O2 Sats 85% on RA, requiring oxygen 2L NC.  Seen by PT.  Requiring several dosages of narcotics IR morphine and dilaudid, plan to be seen by pain management and radiology oncology.  Case discussed with MD Waldrop, advised IV dilaudid.

## 2020-07-09 NOTE — PHYSICAL THERAPY INITIAL EVALUATION ADULT - ADDITIONAL COMMENTS
Pt reports living with  in a 1 story house with 2 steps to enter.  Reports being independent with all prior to about 2 days ago.

## 2020-07-09 NOTE — CONSULT NOTE ADULT - SUBJECTIVE AND OBJECTIVE BOX
HPI:  The patient was diagnosed with adenocarcinoma of the lung in March 2017 at the age of 61. She presented with an incidental finding of a 2 cm RLL pulmonary nodule on routine CXR, prior to foot surgery. On February 17, 2017, a CT chest showed a lobulated lesion in the superior segment right lower lobe abutting the fissure along its anterior margin which measured 2.2 x 2.0 cm. On February 24, 2017 a PET/CT showed the lobulated opacity in the superior segment of the right lower lobe, 1.9 x 1.9 cm with SUV 2.8. On March 24, 2017 Dr. Bradley performed a right lower lobectomy and mediastinal lymph node dissection. The tumor size from the right lower lobe wedge resection was 1.8 x 1.7 x 1.6cm. Pathology showed adenocarcinoma, acinar type, moderately to poorly differentiated, 1.8 x 1.7 x 1.6 cm with clear margins. Mediastinal lymph nodes were negative for tumor.     -5/12/20 CT: increased focal thickening along the suture line, up to 3 cm in maximal      dimension (previously 1.1 cm). Findings are concerning for recurrence.      -discussed CT chest with Dr. rBadley. Given this is a difficult area to      biopsy, he recommended PET/CT       5/28/20 PET: FDG avid focal masslike soft tissue thickening along the right lower      lobectomy suture margin, 3 x 1.8 cm with FDG 13, suspicious for recurrent malignancy.      FDG avid right hilar and subcarinal lymph nodes probably metastatic. Multiple FDG avid      lytic lesions as delineated above, suspicious for metastatic disease.      -6/23/20 right lung biopsy: adenocarcinoma, PDL1 < 1%, no expression.    PAST MEDICAL & SURGICAL HISTORY:  Genital herpes  Drug abuse: Back in the 70&#x27;s and 80&#x27;s (Cocaine)  Lung cancer  LAMAR on CPAP  Anxiety  Hypothyroid  Status post lobectomy of lung: 3/2017 (Right lung)  S/P bunionectomy: with revision      MEDICATIONS  (STANDING):  atorvastatin 10 milliGRAM(s) Oral at bedtime  enoxaparin Injectable 40 milliGRAM(s) SubCutaneous daily  fentaNYL   Patch  50 MICROgram(s)/Hr 1 Patch Transdermal every 72 hours  levothyroxine 175 MICROGram(s) Oral daily  morphine  IR 30 milliGRAM(s) Oral every 6 hours    MEDICATIONS  (PRN):  diphenhydrAMINE 25 milliGRAM(s) Oral every 6 hours PRN Rash and/or Itching  HYDROmorphone   Tablet 4 milliGRAM(s) Oral every 3 hours PRN Severe Pain (7 - 10)      Allergies    Bananas (Headache)  latex (Rash (Mild))  penicillin (Swelling (Mild to Mod))    Intolerances        FAMILY HISTORY:  Family history of hypothyroidism (Mother)  Family history of hypertension (Father)  Family history of stroke (Father)      Review of Systems    Constitutional, Eyes, ENT, Cardiovascular, Respiratory, Gastrointestinal, Genitourinary, Musculoskeletal, Integumentary, Neurological, Psychiatric, Endocrine, Heme/Lymph and Allergic/Immunologic review of systems are otherwise negative except as noted in HPI.     Vital Signs Last 24 Hrs  T(C): 36.8 (09 Jul 2020 07:29), Max: 37.1 (08 Jul 2020 20:10)  T(F): 98.3 (09 Jul 2020 07:29), Max: 98.7 (08 Jul 2020 20:10)  HR: 86 (09 Jul 2020 07:29) (86 - 90)  BP: 126/78 (09 Jul 2020 07:29) (121/62 - 144/85)  BP(mean): --  RR: 18 (09 Jul 2020 07:29) (18 - 18)  SpO2: 98% (09 Jul 2020 07:29) (84% - 98%)    Physical Exam  Constitutional: well developed, well nourished  Eyes: PERRL, EOMI, no conjunctival infection, anicteric.   ENT: pharynx is unremarkable, moist mucus membrane, no oral lesions.   Neck: supple without JVD, no thyromegaly or masses appreciated.   Pulmonary: clear to auscultation bilaterally  Cardiac: RRR, normal S1S2, no murmurs, rubs, gallops.   Vascular: no JVD, no calf tenderness, venous stasis changes, varices.   Abdomen: normoactive bowel sounds, soft and nontender, no hepatosplenomegaly or masses appreciated.   Lymphatic: no peripheral adenopathy appreciated.   Musculoskeletal: full range of motion and no deformities appreciated.   Skin: normal appearance, no rash, nodules, vesicles, ulcers, erythema.   Neurology: grossly intact.   Psychiatric: affect appropriate.       LABS:  CBC Full  -  ( 08 Jul 2020 15:33 )  WBC Count : 9.23 K/uL  RBC Count : 4.14 M/uL  Hemoglobin : 11.9 g/dL  Hematocrit : 37.5 %  Platelet Count - Automated : 258 K/uL  Mean Cell Volume : 90.6 fl  Mean Cell Hemoglobin : 28.7 pg  Mean Cell Hemoglobin Concentration : 31.7 gm/dL  Auto Neutrophil # : 7.64 K/uL  Auto Lymphocyte # : 0.80 K/uL  Auto Monocyte # : 0.65 K/uL  Auto Eosinophil # : 0.05 K/uL  Auto Basophil # : 0.03 K/uL  Auto Neutrophil % : 82.8 %  Auto Lymphocyte % : 8.7 %  Auto Monocyte % : 7.0 %  Auto Eosinophil % : 0.5 %  Auto Basophil % : 0.3 %    07-08    136  |  94<L>  |  11.0  ----------------------------<  109<H>  3.8   |  27.0  |  0.56    Ca    9.3      08 Jul 2020 15:33    TPro  7.3  /  Alb  3.8  /  TBili  0.8  /  DBili  x   /  AST  21  /  ALT  15  /  AlkPhos  126<H>  07-08          RADIOLOGY & ADDITIONAL STUDIES:

## 2020-07-09 NOTE — PHYSICAL THERAPY INITIAL EVALUATION ADULT - RANGE OF MOTION EXAMINATION, REHAB EVAL
Left shoulder n/a due to pain/Right UE ROM was WFL (within functional limits)/bilateral lower extremity ROM was WFL (within functional limits)

## 2020-07-09 NOTE — ED ADULT NURSE REASSESSMENT NOTE - COMFORT CARE
wait time explained/meal provided/plan of care explained/po fluids offered/repositioned/darkened lights/side rails up

## 2020-07-09 NOTE — CHART NOTE - NSCHARTNOTEFT_GEN_A_CORE
SOCIAL WORK NOTE:  REVIEWED CHART EXTENSIVELY.  NO INTERVENTION NEEDED AT THIS TIME AS PATIENT AS PATIENT IS BEING ADMITTED FOR HYPOXIA, PAIN MANAGEMENT AND NEW METASTATIC FINDINGS.

## 2020-07-09 NOTE — CONSULT NOTE ADULT - SUBJECTIVE AND OBJECTIVE BOX
VERBAL REPORT: "The Morphine isn't helping enough."  The patient describes right lower back pain, superior of her hip. She also has pain in the left shoulder from known clavicular fracture. She reports use of 3 Morphing tablets at a time (total 45mg ) at home, without sufficient relief. She was recently started on Fentanyl Patch, but discontinued use less than 1 day later due to ineffectiveness.   PAIN SCORE: 5/10 at rest                 SCALE USED: VNRS    Allergies  Bananas (Headache)  latex (Rash (Mild))  penicillin (Swelling (Mild to Mod))      PAST MEDICAL & SURGICAL HISTORY:  Genital herpes  Drug abuse: Back in the 70&#x27;s and 80&#x27;s (Cocaine)  Lung cancer  LAMAR on CPAP  Anxiety  Hypothyroid  Status post lobectomy of lung: 3/2017 (Right lung)  S/P bunionectomy: with revision      MEDICATIONS  (STANDING):  atorvastatin 10 milliGRAM(s) Oral at bedtime  enoxaparin Injectable 40 milliGRAM(s) SubCutaneous daily  fentaNYL   Patch  50 MICROgram(s)/Hr 1 Patch Transdermal every 72 hours  HYDROmorphone  Injectable 1 milliGRAM(s) IV Push every 4 hours  levothyroxine 175 MICROGram(s) Oral daily  morphine  IR 30 milliGRAM(s) Oral every 6 hours    MEDICATIONS  (PRN):  diphenhydrAMINE 25 milliGRAM(s) Oral every 6 hours PRN Rash and/or Itching      PHYSICAL EXAM:  GENERAL: NAD, well-developed  HEAD:  Atraumatic, Normocephalic  EYES: EOMI, PERRLA, conjunctiva and sclera clear  NERVOUS SYSTEM:  Drowsy & Oriented X3, Good concentration; Motor Strength 5/5 B/L upper and lower extremities  CHEST/LUNG: Clear speech, no SOB evident at rest  ABDOMEN: Nontender  EXTREMITIES: No clubbing, cyanosis, or edema      Vital Signs Last 24 Hrs  T(C): 36.8 (09 Jul 2020 07:29), Max: 37.1 (08 Jul 2020 20:10)  T(F): 98.3 (09 Jul 2020 07:29), Max: 98.7 (08 Jul 2020 20:10)  HR: 86 (09 Jul 2020 07:29) (86 - 90)  BP: 126/78 (09 Jul 2020 07:29) (121/62 - 144/85)  BP(mean): --  RR: 18 (09 Jul 2020 07:29) (18 - 18)  SpO2: 98% (09 Jul 2020 07:29) (84% - 98%)                          11.9   9.23  )-----------( 258      ( 08 Jul 2020 15:33 )             37.5       LIVER FUNCTIONS - ( 08 Jul 2020 15:33 )  Alb: 3.8 g/dL / Pro: 7.3 g/dL / ALK PHOS: 126 U/L / ALT: 15 U/L / AST: 21 U/L / GGT: x           CT CHEST IMPRESSION:   Right hilar mass encasing the right main bronchus associated with paratracheal and subcarinal lymph nodes, similar to prior.  No residual pneumothorax.  New small bilateral effusion with passive atelectasis, more in the right.  Bronchial wall thickening with bronchovascular crowding in the left lower lobe, new since prior-likely indicating developing atelectasis with infection.  Known extensive osseous metastasis.    Pain Service   Text Page: [phone]

## 2020-07-09 NOTE — H&P ADULT - HISTORY OF PRESENT ILLNESS
63 y/o female with hx of adenocarcinoma lung cancer s/p right lower lobectomy and mediastinal lymph node dissection in 2017 by Dr. Alex comes to the ED persistent and worsening Right lower back pain for the past few days with difficulty to walk due to pain. Per pt her oncologist told her to come to the hospital. Pt. is scheduled to have chemotherapy and radiation. Pt. had a CT scan of her chest on 7/1 that showed new lung mass with mets to her bones (L1, T7 and 6th rib). Pt. taking morphine tabs for pain with no significant improvement. Pt. denies any chest pain. NO SOB. No cough. No fever. Pt. denies any hip pain. NO trauma. Pt. also has an acute Left Clavicular fracture that was seen in ED a few days prior. In ED today pt pain is not under control. MRI brain is negative for mets. Pt seen by heme/onc and rec radiation/oncology consult. Pt also requiring O2 supplementation. CT chest negative for PE. Pt admitted to medicine for pain control and rad/onc.

## 2020-07-09 NOTE — PHYSICAL THERAPY INITIAL EVALUATION ADULT - DISCHARGE DISPOSITION, PT EVAL
Patient stopped by the clinic to speak with EM. Explained that provider is in with patients at this time and we have no results yet from the US, not even preliminary. I informed her I will speak with EM and let her know that we will get in touch with her as soon as we have more information on what the next step will be. Patient expressed understanding.     Blas Nicole,      Home with 24/7 assist if family willing and able to provide level of assist, vs [facility]/home w/ assist/rehabilitation facility/home w/ home PT

## 2020-07-09 NOTE — ED CDU PROVIDER SUBSEQUENT DAY NOTE - ATTENDING CONTRIBUTION TO CARE
I, Pascual Calderon, performed a face to face bedside interview with this patient regarding history of present illness, review of symptoms and relevant past medical, social and family history.  I completed an independent physical examination. I have communicated the patient’s plan of care and disposition with the ACP.    pt placed in observation with history of metastatic lung disease with worsening pain; pt followed at [facility] and to be scheduled for chemotherapy;  pt to be seen by pain management;   pt with continued; pain unrelieved with iv morphine;  pe awake in nad heent ncat neck supple cor s1 s2 lung clear abd soft nontender;  dx lung ca; ;

## 2020-07-09 NOTE — H&P ADULT - ASSESSMENT
65 y/o female with hx of adenocarcinoma lung cancer s/p right lower lobectomy and mediastinal lymph node dissection in 2017 by Dr. Alex comes to the ED persistent and worsening Right lower back pain for the past few days with difficulty to walk due to pain. Per pt her oncologist told her to come to the hospital. Pt. is scheduled to have chemotherapy and radiation. Pt. had a CT scan of her chest on 7/1 that showed new lung mass with mets to her bones (L1, T7 and 6th rib). Pt. taking morphine tabs for pain with no significant improvement. Pt. denies any chest pain. NO SOB. No cough. No fever. Pt. denies any hip pain. NO trauma. Pt. also has an acute Left Clavicular fracture that was seen in ED a few days prior. In ED today pt pain is not under control. MRI brain is negative for mets. Pt seen by heme/onc and rec radiation/oncology consult. Pt also requiring O2 supplementation. CT chest negative for PE. Pt admitted to medicine for pain control and rad/onc.      1) Lung adenocarcinoma with mets to bone  - admit to medicine  - rad/onc Dr. Keith consulted  - Heme/onc Dr. Joseph following    2) Back pain due to mets  - pain management consult  - on iv Dilaudid prn for break through   - morphine IR 30mg Q6hrs & fentanyl patch    3) Hypoxemia likely related to lung cancer  - on 2lNC  - negative for PE and PNA  - has small b/l effusion on CT  - will monitor and wean oxygen as tolerated  - on pulse ox    4) Hypothyroidism   - continue synthroid and cytomel    5) HLD  - on statin    6) Prophylactic measure  - DVT ppx: Lovenox SQ

## 2020-07-09 NOTE — CONSULT NOTE ADULT - CONSULT REASON
HPI:  The patient is a 65 yo female hx of stage 4 lung ca with hx of RLL resection remission and reoccurrence on latest CT with METs to SPINE  and ribs . PT followed by Dr Aguilar. presenting to ER with increased pain, limiting movement and ADL ability. pt prescribed PO morphine and fentanyl patch that patient states has not been helping, as well as tramadol for newly dx clavicle fracture. pt recently scheduled for FIRST round of chemotherapy on friday. pt has never had chemo or radiation for lung ca. pt states pain is worse to the lower back and worse with movement, limiting her mobility at home. Pt. denies any chest pain. NO SOB. No cough. No fever. Pt. denies any hip pain. NO trauma. former smoker former illicit drug use. no family hx of Cancer known    Pain Service:  ED team requests suggestions for pain management.

## 2020-07-09 NOTE — PHARMACY COMMUNICATION NOTE - COMMENTS
Order for hydromorphone 1 mg q4h atc iv.  This patient has CA with bone mets and needs pain regimen adjusted as current regimen not working. Provider aware and approved

## 2020-07-09 NOTE — CONSULT NOTE ADULT - ASSESSMENT
65 yo F with recurrent Lung cancer with extensive Bone metastasis and worsening pain.    Recurrent Lung Cancer  Increased R Hilar Mass  Multiple Lytic Bone Lesions  SOB with Hypoxia on admission  Continuous O2 NC and POX  Radiation Oncology consult for Palliative RT    Bone Mets  Treat pain aggressively  Opioid Rotation as per Pain Mgmt NP  Rad Onc Consulted to be seen today for Palliative RT for L Clavicle and lower spine  Dr. Joseph Oncologist already following; expected to start chemotherapy treatment tomorrow    Metastatic Bone Pain  DC Morphine 30 mg IR Q6 ATC  Continue Fentanyl 50 mcg/hr patch titrate as needed  Dialudid 6mg PO Q6 ATC x 3 doses then DC for prn administration  Dilaudid 4mg-6mg sliding scale Q3 prn for persistent pain  Dilaudid 1mg IVP for severe breakthrough pain and before transport for RT  Celebrex 200mg BID  Lidoderm patches x 2 to L clavicle and R lower lumbar  Gabapentin 300 mg BID    Constipation  Dulcolkax suppository MO tonight  Miralax at bedtime  Would recommend Relistor 12 mg SC for Opioid Induced Constipation if no BM within 24 hrs  of bowel regime ordered    GOC  I spoke to both Patient and her daughter  We discussed immediate plan-during this hospitalization  Reviewed Pain Medication plan as recommended by Pain Mgmt Service  Palliative RT for bone mets-Dr. Keith to see Patient later today  Chemotherapy will be initiated tomorrow with Dr. Joseph  Establishing HCP today- has designated  and daughter  Did not speak about MOLST DNR today-will broach subject before discharge 65 yo F with recurrent Lung cancer with extensive Bone metastasis and worsening pain.    Recurrent Lung Cancer  Increased R Hilar Mass  Multiple Lytic Bone Lesions  SOB with Hypoxia on admission  Continuous O2 NC and POX  Radiation Oncology consult for Palliative RT today (see Dr. Keith consult)    Bone Mets  Treat pain aggressively  Opioid Rotation as per Pain Mgmt NP  Rad Onc Consulted  seen today for Palliative RT for L Clavicle and lower spine  Will be taken to [facility] in AM for mapping and chemotherapy  Dilaudid 1mg IVP to be given before going to [facility] facility   Dr. Joseph Oncologist already following; expected to start chemotherapy treatment tomorrow    Metastatic Bone Pain  Agree with pain Mgmt NP plan, holding off on Robaxin while we work through opioid lethargy  DC Morphine 30 mg IR Q6 ATC  Continue Fentanyl 50 mcg/hr patch titrate as needed  Dialudid 6mg PO Q6 ATC x 3 doses then DC for prn administration-provide analgesia during gap while Fentanyl reaches therapeutic effect  Dilaudid 4mg-6mg sliding scale Q3 prn for persistent pain  Dilaudid 1mg IVP for severe breakthrough pain and before transport for RT  Celebrex 200mg BID  Lidoderm patches x 2 to L clavicle and R lower lumbar  Gabapentin 300 mg BID    Constipation  Dulcolax suppository TN tonight  Miralax at bedtime  Would recommend Relistor 12 mg SC for Opioid Induced Constipation if no BM within 24 hrs   bowel regime ordered    GOC   I spoke to both Patient and her daughter  Opting for immediate Chemo/Radiation treatment to be started tomorrow while IN-PT and pain mgmt plan is settled and effective  We discussed immediate plan-during this hospitalization  Reviewed Pain Medication plan as recommended by Pain Mgmt Service  Palliative RT for bone mets-Dr. Keith did consult late afternoon  today.  I remained with Patient during his conversation to provide information for Patient or daughter  Chemotherapy will be initiated tomorrow with Dr. Joseph  Establishing HCP today- has designated  and daughter  Did not speak about MOLST DNR today-will broach subject before discharge

## 2020-07-09 NOTE — H&P ADULT - NSICDXFAMILYHX_GEN_ALL_CORE_FT
FAMILY HISTORY:  Family history of hypertension  Family history of hypothyroidism  Family history of stroke

## 2020-07-09 NOTE — H&P ADULT - NSHPPHYSICALEXAM_GEN_ALL_CORE
PHYSICAL EXAM:  Vital Signs Last 24 Hrs  T(C): 36.8 (09 Jul 2020 07:29), Max: 37.1 (08 Jul 2020 20:10)  T(F): 98.3 (09 Jul 2020 07:29), Max: 98.7 (08 Jul 2020 20:10)  HR: 86 (09 Jul 2020 07:29) (86 - 90)  BP: 126/78 (09 Jul 2020 07:29) (121/62 - 144/85)  BP(mean): --  RR: 18 (09 Jul 2020 07:29) (18 - 18)  SpO2: 98% (09 Jul 2020 07:29) (84% - 98%)    GENERAL: NAD, well-groomed, well-developed  HEAD:  Atraumatic, Normocephalic  EYES: EOMI, PERRLA, conjunctiva and sclera clear  ENMT: No tonsillar erythema, exudates, or enlargement; Moist mucous membranes  NERVOUS SYSTEM:  Alert & Oriented X3, Good concentration; Motor Strength 5/5 B/L upper and lower extremities  CHEST/LUNG: Clear to auscultation bilaterally; No rales, rhonchi, wheezing  HEART: Regular rate and rhythm; No murmurs  ABDOMEN: Soft, Nontender, Nondistended; Bowel sounds present  EXTREMITIES:  2+ Peripheral Pulses, No clubbing, cyanosis, or edema

## 2020-07-09 NOTE — H&P ADULT - NSICDXPASTMEDICALHX_GEN_ALL_CORE_FT
PAST MEDICAL HISTORY:  Anxiety     Drug abuse Back in the 70's and 80's (Cocaine)    Genital herpes     Hypothyroid     Lung cancer with mets    LAMAR on CPAP

## 2020-07-09 NOTE — H&P ADULT - NSHPLABSRESULTS_GEN_ALL_CORE
11.9   9.23  )-----------( 258      ( 08 Jul 2020 15:33 )             37.5       07-08    136  |  94<L>  |  11.0  ----------------------------<  109<H>  3.8   |  27.0  |  0.56    Ca    9.3      08 Jul 2020 15:33    TPro  7.3  /  Alb  3.8  /  TBili  0.8  /  DBili  x   /  AST  21  /  ALT  15  /  AlkPhos  126<H>  07-08      < from: MR Head No Cont (07.08.20 @ 22:37) >    IMPRESSION:   No intracranial metastasis is visualized, within limits of a noncontrast MRI.  Follow-up contrast-enhanced imaging is recommended for complete evaluation.    Patchy white matter lesions are nonspecific in etiology but likely represent chronic microvascular ischemic disease in this age group.      WEI LAWS M.D.,ATTENDING RADIOLOGIST  This document has been electronically signed. Jul 9 2020  2:31AM    < end of copied text >          < from: CT Angio Chest w/ IV Cont (07.08.20 @ 18:03) >      IMPRESSION:   Right hilar mass encasing the right main bronchus associated with paratracheal and subcarinal lymph nodes, similar to prior.    No residual pneumothorax.    New small bilateral effusion with passive atelectasis, more in the right.    Bronchial wall thickening with bronchovascular crowding in the left lower lobe, new since prior-likely indicating developing atelectasis with infection.    Known extensive osseous metastasis.      COLEMAN QUINN M.D., ATTENDING RADIOLOGIST  This document has been electronically signed. Jul 8 2020  6:46PM        < end of copied text >

## 2020-07-09 NOTE — CONSULT NOTE ADULT - SUBJECTIVE AND OBJECTIVE BOX
History of Present Illness: 	  63 y/o female with hx of adenocarcinoma lung cancer s/p right lower lobectomy and mediastinal lymph node dissection in 2017 by Dr. Alex comes to the ED persistent and worsening Right lower back pain for the past few days with difficulty to walk due to pain. Per pt her oncologist told her to come to the hospital. Pt. is scheduled to have chemotherapy and radiation. Pt. had a CT scan of her chest on 7/1 that showed new lung mass with mets to her bones (L1, T7 and 6th rib). Pt. taking morphine tabs for pain with no significant improvement. Pt. denies any chest pain. NO SOB. No cough. No fever. Pt. denies any hip pain. NO trauma. Pt. also has an acute Left Clavicular fracture that was seen in ED a few days prior. In ED today pt pain is not under control. MRI brain is negative for mets. Pt seen by heme/onc and rec radiation/oncology consult. Pt also requiring O2 supplementation. CT chest negative for PE. Pt admitted to medicine for pain control and rad/onc.        Review of Systems:  Other Review of Systems: All other review of systems negative, except as noted in HPI	      Allergies and Intolerances:        Allergies:  	latex: Latex, Rash (Mild)  	penicillin: Drug, Swelling (Mild to Mod), tongue swelling  	Bananas: Food, Headache  Home Medications:   * Patient Currently Takes Medications as of 06-Jul-2020 14:27 documented in Structured Notes  · 	traMADol 50 mg oral tablet: 1 tab(s) orally every 6 hours, As Needed - for moderate pain MDD:4 tablets  · 	naproxen 500 mg oral tablet: 1 tab(s) orally 2 times a day  · 	Lidoderm 5% topical film: Apply topically to affected area once a day MDD:12 HOURS on 12 HOURS off  · 	levothyroxine 175 mcg (0.175 mg) oral tablet: 1 tab(s) orally once a day  · 	milk thistle oral capsule:  orally once a day  · 	aspirin 81 mg oral tablet: 1 tab(s) orally 2 times a day  · 	atorvastatin 10 mg oral tablet: 1 tab(s) orally once a day  · 	liothyronine 5 mcg oral tablet: 1 tab(s) orally once a day  · 	Probiotic Formula oral capsule: 1 cap(s) orally once a day  · 	naproxen 500 mg oral tablet:   · 	collagen protein: orally once a day  · 	CBD oil:     Patient History:    Past Medical, Past Surgical, and Family History:  PAST MEDICAL HISTORY:  Anxiety     Drug abuse Back in the 70's and 80's (Cocaine)    Genital herpes     Hypothyroid     Lung cancer with mets    LAMAR on CPAP.     PAST SURGICAL HISTORY:  S/P bunionectomy with revision    Status post lobectomy of lung 3/2017 (Right lung).     FAMILY HISTORY:  Family history of hypertension  Family history of hypothyroidism  Family history of stroke.     Social History:  Social History (marital status, living situation, occupation, tobacco use, alcohol and drug use, and sexual history): Quit smoking cigarettes 8 years ago (prior 1.5ppd for 35 years)  Drinks alcohol socially Prior drug abuse	       Physical Exam:    Vital Signs Last 24 Hrs  T(C): 36.8 (09 Jul 2020 07:29), Max: 37.1 (08 Jul 2020 20:10)  T(F): 98.3 (09 Jul 2020 07:29), Max: 98.7 (08 Jul 2020 20:10)  HR: 86 (09 Jul 2020 07:29) (86 - 90)  BP: 126/78 (09 Jul 2020 07:29) (121/62 - 144/85)  BP(mean): --  RR: 18 (09 Jul 2020 07:29) (18 - 18)  SpO2: 98% (09 Jul 2020 07:29) (84% - 98%)   GENERAL: NAD, well-groomed, well-developed  HEAD:  Atraumatic, Normocephalic  EYES: EOMI, PERRLA, conjunctiva and sclera clear  ENMT: No tonsillar erythema, exudates, or enlargement; Moist mucous membranes  NERVOUS SYSTEM:  Alert & Oriented X3, Good concentration; Motor Strength 5/5 B/L upper and lower extremities  CHEST/LUNG: Clear to auscultation bilaterally; No rales, rhonchi, wheezing  HEART: Regular rate and rhythm; No murmurs  ABDOMEN: Soft, Nontender, Nondistended; Bowel sounds present EXTREMITIES:  2+ Peripheral Pulses, No clubbing, cyanosis, or edema	     Labs and Results:  Labs, Radiology, Cardiology, and Other Results: 11.9   9.23  )-----------( 258      ( 08 Jul 2020 15:33 )             37.5     07-08   136  |  94<L>  |  11.0  ----------------------------<  109<H>  3.8   |  27.0  |  0.56   Ca    9.3      08 Jul 2020 15:33   TPro  7.3  /  Alb  3.8  /  TBili  0.8  /  DBili  x   /  AST  21  /  ALT  15  /  AlkPhos  126<H>  07-08    < from: MR Head No Cont (07.08.20 @ 22:37) >   IMPRESSION:   No intracranial metastasis is visualized, within limits of a noncontrast MRI.  Follow-up contrast-enhanced imaging is recommended for complete evaluation.   Patchy white matter lesions are nonspecific in etiology but likely represent chronic microvascular ischemic disease in this age group.    WEI LAWS M.D.,ATTENDING RADIOLOGIST  This document has been electronically signed. Jul 9 2020  2:31AM    < from: CT Angio Chest w/ IV Cont (07.08.20 @ 18:03) >    IMPRESSION:   Right hilar mass encasing the right main bronchus associated with paratracheal and subcarinal lymph nodes, similar to prior.   No residual pneumothorax.   New small bilateral effusion with passive atelectasis, more in the right.   Bronchial wall thickening with bronchovascular crowding in the left lower lobe, new since prior-likely indicating developing atelectasis with infection.   Known extensive osseous metastasis.    COLEMAN QUINN M.D., ATTENDING RADIOLOGIST  This document has been electronically signed. Jul 8 2020  6:46PM       	  EXAM: CT CHEST     PROCEDURE DATE: 07/01/2020         INTERPRETATION: CLINICAL INFORMATION: Recent chest biopsy. Right chest   pain.     COMPARISON: 5/11/2020     PROCEDURE:   CT of the Chest was performed without intravenous contrast.   Sagittal and coronal reformats were performed.     FINDINGS:     No evidence of superior mediastinal lymphadenopathy. Subcarinal   lymphadenopathy is now seen, larger since 5/11/2020. There is a right hilar   mass which is either new or larger since the prior examination. This mass   surrounds the right mainstem bronchus. This mass extends posterior to the   right mainstem bronchus, a new finding since 5/11/2020.     No evidence of pleural or pericardial effusion. The heart is not enlarged.     Images obtained through the upper abdomen demonstrate no significant   abnormality.     Evaluation of the soft tissues of the chest wall are unremarkable.     There is a small right pneumothorax, approximately 15%. This is a new   finding. The left lung is clear without evidence of pneumothorax.     There is a sclerotic focus in the sternum, possibly a blastic metastasis,   unchanged from the prior study. There is a lytic metastasis in the right   pedicle of T7 which is new since the prior exam. There is a large lytic   metastasis in the L1 vertebral body, significantly larger since the prior   exam. There is a lytic metastasis in the right sixth rib which is a new   finding since the prior exam.     IMPRESSION:   Right hilar mass, larger since the prior study.     15% right pneumothorax, a new finding.     Multiple lytic bone metastases, larger and more numerous since 5/11/2020.     UMA COON M.D., ATTENDING RADIOLOGIST   This document has been electronically signed. Jul 1 2020 4:40PM

## 2020-07-10 NOTE — PROGRESS NOTE ADULT - SUBJECTIVE AND OBJECTIVE BOX
Patient is a 64y old  Female who presents with a chief complaint of Lung metastasis with hypoxemia (10 Jul 2020 11:09)      HPI:  63 y/o female with hx of adenocarcinoma lung cancer s/p right lower lobectomy and mediastinal lymph node dissection in 2017 by Dr. Alex comes to the ED persistent and worsening Right lower back pain for the past few days with difficulty to walk due to pain. Per pt her oncologist told her to come to the hospital. Pt. is scheduled to have chemotherapy and radiation. Pt. had a CT scan of her chest on 7/1 that showed new lung mass with mets to her bones (L1, T7 and 6th rib). Pt. taking morphine tabs for pain with no significant improvement. Pt. denies any chest pain. NO SOB. No cough. No fever. Pt. denies any hip pain. NO trauma. Pt. also has an acute Left Clavicular fracture that was seen in ED a few days prior. In ED today pt pain is not under control. MRI brain is negative for mets. Pt seen by heme/onc and rec radiation/oncology consult. Pt also requiring O2 supplementation. CT chest negative for PE. Pt admitted to medicine for pain control and rad/onc. (09 Jul 2020 10:22)      VERBAL REPORT:    PAIN SCORE:                   SCALE USED: VNRS    Allergies    Bananas (Headache)  latex (Rash (Mild))  penicillin (Swelling (Mild to Mod))    Intolerances        PAST MEDICAL & SURGICAL HISTORY:  Genital herpes  Drug abuse: Back in the 70&#x27;s and 80&#x27;s (Cocaine)  Lung cancer: with mets  LAMAR on CPAP  Anxiety  Hypothyroid  Status post lobectomy of lung: 3/2017 (Right lung)  S/P bunionectomy: with revision      MEDICATIONS  (STANDING):  atorvastatin 10 milliGRAM(s) Oral at bedtime  bisacodyl Suppository 10 milliGRAM(s) Rectal daily  celecoxib 200 milliGRAM(s) Oral two times a day  enoxaparin Injectable 40 milliGRAM(s) SubCutaneous daily  fentaNYL   Patch  50 MICROgram(s)/Hr 1 Patch Transdermal every 72 hours  gabapentin 300 milliGRAM(s) Oral two times a day  HYDROmorphone  Injectable 1 milliGRAM(s) IV Push daily  levothyroxine 175 MICROGram(s) Oral daily  lidocaine   Patch 2 Patch Transdermal daily  liothyronine 5 MICROGram(s) Oral daily  methylnaltrexone Injectable 12 milliGRAM(s) SubCutaneous once  polyethylene glycol 3350 17 Gram(s) Oral at bedtime  tiZANidine 4 milliGRAM(s) Oral every 8 hours    MEDICATIONS  (PRN):  diphenhydrAMINE 25 milliGRAM(s) Oral every 6 hours PRN Rash and/or Itching  HYDROmorphone   Tablet 4 milliGRAM(s) Oral every 3 hours PRN Moderate Pain (4 - 6)  HYDROmorphone   Tablet 6 milliGRAM(s) Oral every 3 hours PRN Severe Pain (7 - 10)  HYDROmorphone  Injectable 1 milliGRAM(s) IV Push every 6 hours PRN Moderate Pain (4 - 6)  mineral oil enema 133 milliLiter(s) Rectal once PRN persistent constipation  mineral oil enema 133 milliLiter(s) Rectal once PRN if no BM 2hrs after suppository      PHYSICAL EXAM:    GENERAL: NAD, well-groomed, well-developed  HEAD:  Atraumatic, Normocephalic  EYES: EOMI, PERRLA, conjunctiva and sclera clear  ENMT: No tonsillar erythema, exudates, or enlargement; Moist mucous membranes, Good dentition, No lesions  NECK: Supple, No JVD, Normal thyroid  NERVOUS SYSTEM:  Alert & Oriented X3, Good concentration; Motor Strength 5/5 B/L upper and lower extremities; DTRs 2+ intact and symmetric  CHEST/LUNG: Clear to percussion bilaterally; No rales, rhonchi, wheezing, or rubs  HEART: Regular rate and rhythm; No murmurs, rubs, or gallops  ABDOMEN: Soft, Nontender, Nondistended; Bowel sounds present  EXTREMITIES:  2+ Peripheral Pulses, No clubbing, cyanosis, or edema  LYMPH: No lymphadenopathy noted  SKIN: No rashes or lesions      Vital Signs Last 24 Hrs  T(C): 36.5 (10 Jul 2020 07:54), Max: 37 (10 Jul 2020 05:00)  T(F): 97.7 (10 Jul 2020 07:54), Max: 98.6 (10 Jul 2020 05:00)  HR: 95 (10 Jul 2020 07:54) (90 - 99)  BP: 130/78 (10 Jul 2020 07:54) (114/75 - 130/80)  BP(mean): --  RR: 19 (10 Jul 2020 07:54) (18 - 19)  SpO2: 96% (10 Jul 2020 05:00) (96% - 97%)                          11.4   7.29  )-----------( 271      ( 10 Jul 2020 07:14 )             35.7       LIVER FUNCTIONS - ( 08 Jul 2020 15:33 )  Alb: 3.8 g/dL / Pro: 7.3 g/dL / ALK PHOS: 126 U/L / ALT: 15 U/L / AST: 21 U/L / GGT: x                 Pain Service   Text Page via [employer]  PIN: [phone] HPI:  Patient is a 64y old  Female who presents with a chief complaint of Lung metastasis with hypoxemia (10 Jul 2020 11:09)    Pain Service:  ED team requested suggestions for pain management. Continuing Duragesic, with rotation to oral Dilaudid from Morphine, plus Palliative care consult recommended yesterday.      VERBAL REPORT: "The Dilaudid isn't helping.'  The patient describes ongoing right lower back pain, superior of her hip, as well as left shoulder pain from clavicular fracture. She denies any difference in her relief with use of Dilaudid, compared to Morphine tablets.   PAIN SCORE:  5/10 at rest                 SCALE USED: VNRS    Allergies  Bananas (Headache)  latex (Rash (Mild))  penicillin (Swelling (Mild to Mod))      PAST MEDICAL & SURGICAL HISTORY:  Genital herpes  Drug abuse: Back in the 70&#x27;s and 80&#x27;s (Cocaine)  Lung cancer: with mets  LAMAR on CPAP  Anxiety  Hypothyroid  Status post lobectomy of lung: 3/2017 (Right lung)  S/P bunionectomy: with revision      MEDICATIONS  (STANDING):  atorvastatin 10 milliGRAM(s) Oral at bedtime  bisacodyl Suppository 10 milliGRAM(s) Rectal daily  celecoxib 200 milliGRAM(s) Oral two times a day  enoxaparin Injectable 40 milliGRAM(s) SubCutaneous daily  fentaNYL   Patch  50 MICROgram(s)/Hr 1 Patch Transdermal every 72 hours  gabapentin 300 milliGRAM(s) Oral two times a day  HYDROmorphone  Injectable 1 milliGRAM(s) IV Push daily  levothyroxine 175 MICROGram(s) Oral daily  lidocaine   Patch 2 Patch Transdermal daily  liothyronine 5 MICROGram(s) Oral daily  methylnaltrexone Injectable 12 milliGRAM(s) SubCutaneous once  polyethylene glycol 3350 17 Gram(s) Oral at bedtime  tiZANidine 4 milliGRAM(s) Oral every 8 hours    MEDICATIONS  (PRN):  diphenhydrAMINE 25 milliGRAM(s) Oral every 6 hours PRN Rash and/or Itching  HYDROmorphone   Tablet 4 milliGRAM(s) Oral every 3 hours PRN Moderate Pain (4 - 6)  HYDROmorphone   Tablet 6 milliGRAM(s) Oral every 3 hours PRN Severe Pain (7 - 10)  HYDROmorphone  Injectable 1 milliGRAM(s) IV Push every 6 hours PRN Moderate Pain (4 - 6)  mineral oil enema 133 milliLiter(s) Rectal once PRN persistent constipation  mineral oil enema 133 milliLiter(s) Rectal once PRN if no BM 2hrs after suppository      PHYSICAL EXAM:  GENERAL: NAD, well-developed  HEAD:  Atraumatic, Normocephalic  EYES: EOMI, PERRLA, conjunctiva and sclera clear  NERVOUS SYSTEM:  Drowsy & Oriented X3, Good concentration; Motor Strength 5/5 B/L upper and lower extremities  CHEST/LUNG: Clear speech, no SOB evident at rest  ABDOMEN: Nontender  EXTREMITIES: No clubbing, cyanosis, or edema      Vital Signs Last 24 Hrs  T(C): 36.5 (10 Jul 2020 07:54), Max: 37 (10 Jul 2020 05:00)  T(F): 97.7 (10 Jul 2020 07:54), Max: 98.6 (10 Jul 2020 05:00)  HR: 95 (10 Jul 2020 07:54) (90 - 99)  BP: 130/78 (10 Jul 2020 07:54) (114/75 - 130/80)  BP(mean): --  RR: 19 (10 Jul 2020 07:54) (18 - 19)  SpO2: 96% (10 Jul 2020 05:00) (96% - 97%)                          11.4   7.29  )-----------( 271      ( 10 Jul 2020 07:14 )             35.7       LIVER FUNCTIONS - ( 08 Jul 2020 15:33 )  Alb: 3.8 g/dL / Pro: 7.3 g/dL / ALK PHOS: 126 U/L / ALT: 15 U/L / AST: 21 U/L / GGT: x           CT CHEST IMPRESSION:   Right hilar mass encasing the right main bronchus associated with paratracheal and subcarinal lymph nodes, similar to prior.  No residual pneumothorax.  New small bilateral effusion with passive atelectasis, more in the right.  Bronchial wall thickening with bronchovascular crowding in the left lower lobe, new since prior-likely indicating developing atelectasis with infection.  Known extensive osseous metastasis.    Pain Service   Text Page: [phone]

## 2020-07-10 NOTE — PROGRESS NOTE ADULT - ASSESSMENT
64y Female with history of lung cancer metastasized to bone, now presents with worsening R lower back and Left clavicular pain. She was found awake, supine in bed. She is drowsy, s/p medication. Ongoing pain appreciated, with inadequate response to multiple opioid rotations. She is being followed by the Palliative and oncology teams. She is presently preparing for transfer to [facility]. Discussed continuing the current with Palliative Care Service ongoing input. As she is not readily responding to opioid therapy, palliative radiation may prove beneficial for her pain. The patient verbalizes understanding.

## 2020-07-10 NOTE — PROGRESS NOTE ADULT - ASSESSMENT
65 yo F with Metastatic Lung Cancer to Bone and spine with Intractable pain    Lung Ca with Bone Mets  Initiating RT today-to be transported to [facility] for mapping  Should also be getting first chemotherapy treatment with Dr. Joseph  Spinal Mets causing intractable pain    Intractable Metastatic Bone Pain  L Clavicle and lumbar spine  Fentanyl 50 mcg/hr titrated yesterday  Dilaudid oral 4-6mg PO Q3 prn mod-severe pain  Dilaudid 1mg IV before transport to [facility] and for severe breakthrough pain  Added Zanaflex 4 mg Q8h for back  spasm  Celebrex  Lidoderm    Opioid Induced Constipation  Dulcolax suppository  follow with fleets oil enema after 2 hrs if no BM  Relistor 12 mg SC ordered to be given following bowel regime after treatment at [facility]  Treat acutely including Palliative RT  Chemotherapy  HCP obtained yesterday

## 2020-07-10 NOTE — PROGRESS NOTE ADULT - SUBJECTIVE AND OBJECTIVE BOX
Patient is a 64y old  Female who presents with a chief complaint of Lung metastasis with hypoxemia (10 Jul 2020 11:39)      INTERVAL HPI/OVERNIGHT EVENTS:  Patient seen awake in bed. She reports back pain 5/10 after pain meds and shooting across her back. Denies numbness of legs, shooting pain down legs, or incontinence. Reports pain in L shoulder, and back muscle spasms. Patient was able to get out of bed to bathroom with assistance. Patient's friend or relative in room who provided some of the history as patient became lethargic at points during the conversation, and reports that patient became lightheaded when she took her oxygen off and walked to bathroom, with SpO2 <88% on monitor. Reports constipation and reports no BMs in 4 days.       MEDICATIONS  (STANDING):  atorvastatin 10 milliGRAM(s) Oral at bedtime  bisacodyl Suppository 10 milliGRAM(s) Rectal daily  celecoxib 200 milliGRAM(s) Oral two times a day  enoxaparin Injectable 40 milliGRAM(s) SubCutaneous daily  fentaNYL   Patch  50 MICROgram(s)/Hr 1 Patch Transdermal every 72 hours  gabapentin 300 milliGRAM(s) Oral two times a day  HYDROmorphone  Injectable 1 milliGRAM(s) IV Push daily  levothyroxine 175 MICROGram(s) Oral daily  lidocaine   Patch 2 Patch Transdermal daily  liothyronine 5 MICROGram(s) Oral daily  methylnaltrexone Injectable 12 milliGRAM(s) SubCutaneous once  polyethylene glycol 3350 17 Gram(s) Oral at bedtime  tiZANidine 4 milliGRAM(s) Oral every 8 hours    MEDICATIONS  (PRN):  diphenhydrAMINE 25 milliGRAM(s) Oral every 6 hours PRN Rash and/or Itching  HYDROmorphone   Tablet 4 milliGRAM(s) Oral every 3 hours PRN Moderate Pain (4 - 6)  HYDROmorphone   Tablet 6 milliGRAM(s) Oral every 3 hours PRN Severe Pain (7 - 10)  HYDROmorphone  Injectable 1 milliGRAM(s) IV Push every 6 hours PRN Breakthrough pain not relieved by oral Dilaudid  mineral oil enema 133 milliLiter(s) Rectal once PRN persistent constipation      Allergies    Bananas (Headache)  latex (Rash (Mild))  penicillin (Swelling (Mild to Mod))    Intolerances        REVIEW OF SYSTEMS:  CONSTITUTIONAL: No fever, weight loss, or fatigue  EYES: No eye pain, visual disturbances, or discharge  ENMT:  No difficulty hearing, tinnitus, vertigo; No sinus or throat pain  NECK: No pain or stiffness  BREASTS: No pain, masses, or nipple discharge  RESPIRATORY: No cough, wheezing, chills or hemoptysis; No shortness of breath  CARDIOVASCULAR: No chest pain, palpitations, or lightheadedness  GASTROINTESTINAL: No abdominal or epigastric pain. No nausea, vomiting, or hematemesis; Constipation, No diarrhea. No melena or hematochezia.  GENITOURINARY: No dysuria, frequency, hematuria, or incontinence  NEUROLOGICAL: No headaches, vertigo, memory loss, loss of strength, numbness, or tremors  SKIN: No itching, burning, rashes, or lesions   LYMPH NODES: No enlarged glands  ENDOCRINE: No heat or cold intolerance; No hair loss; No polydipsia or polyuria  MUSCULOSKELETAL: back pain, L shoulder pain  PSYCHIATRIC: No depression, anxiety, or mood swings  HEME/LYMPH: No easy bruising, or bleeding gums  ALLERGY AND IMMUNOLOGIC: No hives or eczema    PHYSICAL EXAM:  GENERAL: Adult female, well-groomed, well-developed, intermittent bursts of pain in room, intermittent episodes of lethargy where patient becomes unresponsive for periods of 10 seconds.  HEAD:  Atraumatic, Normocephalic  EYES: EOMI, conjunctiva and sclera clear  ENMT: Moist mucous membranes  NECK: Supple, No JVD appreciated, Normal thyroid  NERVOUS SYSTEM:  Alert & Oriented X3, Good concentration; Bilateral LE mobile, sensation to light touch intact  CHEST/LUNG: Clear to auscultation bilaterally; No rales, rhonchi, wheezing, or rubs  HEART: Regular rate and rhythm; No murmurs, rubs, or gallops  ABDOMEN: Nontender, Mildly distended w firmness on L side consistent with stool burden. Bowel sounds present.  EXTREMITIES:  2+ Peripheral Pulses, Able to move all limbs. No clubbing or cyanosis  SKIN: No rashes or lesions      Vital Signs Last 24 Hrs  T(C): 36.3 (10 Jul 2020 15:27), Max: 37 (10 Jul 2020 05:00)  T(F): 97.4 (10 Jul 2020 15:27), Max: 98.6 (10 Jul 2020 05:00)  HR: 89 (10 Jul 2020 15:27) (89 - 99)  BP: 112/75 (10 Jul 2020 15:27) (112/75 - 130/78)  BP(mean): --  RR: 19 (10 Jul 2020 15:27) (18 - 19)  SpO2: 97% (10 Jul 2020 15:27) (96% - 97%)    LABS:                        11.4   7.29  )-----------( 271      ( 10 Jul 2020 07:14 )             35.7     10 Jul 2020 07:14    134    |  93     |  12.0   ----------------------------<  110    4.1     |  29.0   |  0.48     Ca    9.3        10 Jul 2020 07:14  Mg     1.7       10 Jul 2020 07:14          CAPILLARY BLOOD GLUCOSE               [x] Consultant(s) Notes Reviewed

## 2020-07-10 NOTE — PROGRESS NOTE ADULT - PROBLEM SELECTOR PLAN 1
1. Palliative Care ongoing involvement  2. Continue Fentanyl Patch for baseline analgesia  - Allow sufficient time (72hrs) for medication to take full effect  3. Continue Dilaudid 4/6mg PO q3hrs PRN moderate/severe pain, respectively  - Dilaudid 1mg q3hrs PRN severe persistent pain 1 hour after oral opioids  - Hold PRN analgesics for oversedation  4. Continue Gabapentin 300mg PO BID  5. Continue Tizanidine 4mg PO q6hrs for spasms  6. Lidoderm Patches to right lower back and left clavicular regions  7. Palliative radiation therapy at discretion of oncology team.

## 2020-07-10 NOTE — PROGRESS NOTE ADULT - SUBJECTIVE AND OBJECTIVE BOX
INTERVAL HPI/OVERNIGHT EVENTS: 63 yo Female Patient admitted with PMH of Lung Ca with new bone Metastasis, intractable back pain.  F/U Note  No new events overnight  Patient sleepy easily arouseable  VSS Lying flat on back due to intractable back pain whenever she moves. 10/10 described as sharp stabbing associated with spasm  Unable to have a BM, due to pain and spasm when trying to ambulate to BR earlier.  Waiting for transport to [facility] for RT Mapping and chemotherapy    64y old  Female who presents with a chief complaint of Lung metastasis with hypoxemia (09 Jul 2020 19:25)    Present Symptoms:   PAST MEDICAL & SURGICAL HISTORY:  Genital herpes  Drug abuse: Back in the 70&#x27;s and 80&#x27;s (Cocaine)  Lung cancer: with mets  LAMAR on CPAP  Anxiety  Hypothyroid  Status post lobectomy of lung: 3/2017 (Right lung)  S/P bunionectomy: with revision    Dyspnea: 1    Nausea/Vomiting:s No  Anxiety:  No  Depression:  No  Fatigue: Yes   Loss of appetite: Yes     Pain: severe lumbar back pain and L Clavicle Fx pain            Character-sharp stabbing throbbing            Duration-intermittent            Effect-            Factors-associated with spasm            Frequency-            Location-            Severity-8/10 severe- Present opioid plan not working for acute pain exacerbations    Review of Systems: Reviewed                    All others negative    MEDICATIONS  (STANDING):  atorvastatin 10 milliGRAM(s) Oral at bedtime  bisacodyl Suppository 10 milliGRAM(s) Rectal daily  celecoxib 200 milliGRAM(s) Oral two times a day  enoxaparin Injectable 40 milliGRAM(s) SubCutaneous daily  fentaNYL   Patch  50 MICROgram(s)/Hr 1 Patch Transdermal every 72 hours  gabapentin 300 milliGRAM(s) Oral two times a day  HYDROmorphone  Injectable 1 milliGRAM(s) IV Push daily  levothyroxine 175 MICROGram(s) Oral daily  lidocaine   Patch 2 Patch Transdermal daily  liothyronine 5 MICROGram(s) Oral daily  methylnaltrexone Injectable 12 milliGRAM(s) SubCutaneous once  polyethylene glycol 3350 17 Gram(s) Oral at bedtime  tiZANidine 4 milliGRAM(s) Oral every 8 hours    MEDICATIONS  (PRN):  diphenhydrAMINE 25 milliGRAM(s) Oral every 6 hours PRN Rash and/or Itching  HYDROmorphone   Tablet 4 milliGRAM(s) Oral every 3 hours PRN Moderate Pain (4 - 6)  HYDROmorphone   Tablet 6 milliGRAM(s) Oral every 3 hours PRN Severe Pain (7 - 10)  HYDROmorphone  Injectable 1 milliGRAM(s) IV Push every 6 hours PRN Moderate Pain (4 - 6)  mineral oil enema 133 milliLiter(s) Rectal once PRN persistent constipation  mineral oil enema 133 milliLiter(s) Rectal once PRN if no BM 2hrs after suppository    LABS:                        11.4   7.29  )-----------( 271      ( 10 Jul 2020 07:14 )             35.7     07-10    134<L>  |  93<L>  |  12.0  ----------------------------<  110<H>  4.1   |  29.0  |  0.48<L>    Ca    9.3      10 Jul 2020 07:14  Mg     1.7     07-10    TPro  7.3  /  Alb  3.8  /  TBili  0.8  /  DBili  x   /  AST  21  /  ALT  15  /  AlkPhos  126<H>  07-08    I&O's Summary    09 Jul 2020 07:01  -  10 Jul 2020 07:00  --------------------------------------------------------  IN: 0 mL / OUT: 400 mL / NET: -400 mL    RADIOLOGY & ADDITIONAL STUDIES:  < from: CT Angio Chest w/ IV Cont (07.08.20 @ 18:03) >    IMPRESSION:   Right hilar mass encasing the right main bronchus associated with paratracheal and subcarinal lymph nodes, similar to prior.    No residual pneumothorax.    New small bilateral effusion with passive atelectasis, more in the right.    Bronchial wall thickening with bronchovascular crowding in the left lower lobe, new since prior-likely indicating developing atelectasis with infection.    Known extensive osseous metastasis.    < end of copied text >  < from: MR Head No Cont (07.08.20 @ 22:37) >    IMPRESSION:   No intracranial metastasis is visualized, within limits of a noncontrast MRI.  Follow-up contrast-enhanced imaging is recommended for complete evaluation.    Patchy white matter lesions are nonspecific in etiology but likely represent chronic microvascular ischemic disease in this age group.      ADVANCE DIRECTIVES:   DNR  NO  Completed on:                     MOLST   NO   Completed on:  Living Will   NO   Completed on:    COUNSELING:    Face to face meeting to discuss Advanced Care Planning - Time Spent ______ Minutes.  See goals of care note.    More than 50% time spent in counseling and coordinating care. __30____ Minutes.     Thank you for the opportunity to assist with the care of this patient.   [facility] [phone].

## 2020-07-10 NOTE — PROGRESS NOTE ADULT - ASSESSMENT
65 y/o female with hx of adenocarcinoma lung cancer s/p right lower lobectomy and mediastinal lymph node dissection in 2017 by Dr. Alex presented with persistent and worsening Right lower back pain for the past few days with difficulty to walk due to pain. Per pt her oncologist told her to come to the hospital. Pt. is scheduled to have chemotherapy and radiation. Pt. had a CT scan of her chest on 7/1 that showed new lung mass with mets to her bones (L1, T7 and 6th rib). Pt. taking morphine tabs for pain with no significant improvement. Pt. denies any chest pain. NO SOB. No cough. No fever. Pt. denies any hip pain. NO trauma. Pt. also has an acute Left Clavicular fracture that was seen in ED a few days prior. In ED today pt pain is not under control. MRI brain is negative for mets. Pt seen by heme/onc and rec radiation/oncology consult. Pt also requiring O2 supplementation. CT chest negative for PE. Pt admitted to medicine for pain control and rad/onc.      1) Lung adenocarcinoma with mets to bone  - rad/onc Dr. Keith consulted, plans to start palliative radiotherapy Monday. Recommended transfer to cancer center for treatment planning. MRI ordered w contrast  - Heme/onc Dr. Joseph following    2) Back pain due to mets  - pain management following,   - fentanyl patch, dilaudid IV PRN with additional doses for breakthrough pain  - continue gabapentin  - tizanidine added for back muscle spasms, will monitor and consider reduction given pt's lethargy if pain brought under control  - PT/OT following  - bowel regimen for likely opioid-induced constipation. Will consider MoM tomorrow if miralax not helping    3) Hypoxemia likely related to lung cancer  - on 2L NC  - negative for PE and PNA  - small b/l effusion on CT  - will monitor and wean oxygen as tolerated  - on pulse ox    4) Hypothyroidism   - continue synthroid and cytomel    5) HLD  - on statin    6) Prophylactic measure  - DVT ppx: Lovenox SQ

## 2020-07-11 NOTE — PROGRESS NOTE ADULT - SUBJECTIVE AND OBJECTIVE BOX
CC: back pain     INTERVAL HPI/OVERNIGHT EVENTS: seen and examined , stated that pain is better but she is feeling anxious , however patient did fall asleep twice during interview , as per staff patient refused laxatives and stool softeners , detailed discussion held with patient , she is agreeable for enema , although she did have one small watery BM yesterday             Vital Signs Last 24 Hrs  T(C): 36.9 (11 Jul 2020 07:49), Max: 36.9 (11 Jul 2020 07:49)  T(F): 98.4 (11 Jul 2020 07:49), Max: 98.4 (11 Jul 2020 07:49)  HR: 92 (11 Jul 2020 07:49) (89 - 92)  BP: 120/72 (11 Jul 2020 07:49) (112/75 - 121/74)  BP(mean): --  RR: 19 (11 Jul 2020 07:49) (19 - 19)  SpO2: 95% (10 Jul 2020 23:37) (95% - 97%)    PHYSICAL EXAM:    GENERAL: drowsy but arousable   CHEST/LUNG: diminished air entry    HEART: S1S2+, Regular rate and rhythm  ABDOMEN: Soft, Nontender, Nondistended; Bowel sounds present  EXTREMITIES:  no pitting edema , pulses palpated BL.        LABS:                        11.2   6.37  )-----------( 276      ( 11 Jul 2020 09:52 )             35.0     07-11    137  |  94<L>  |  12.0  ----------------------------<  120<H>  4.1   |  31.0<H>  |  0.39<L>    Ca    9.4      11 Jul 2020 09:52  Mg     1.8     07-11    TPro  7.0  /  Alb  3.6  /  TBili  0.5  /  DBili  x   /  AST  16  /  ALT  14  /  AlkPhos  126<H>  07-11            MEDICATIONS  (STANDING):  atorvastatin 10 milliGRAM(s) Oral at bedtime  bisacodyl Suppository 10 milliGRAM(s) Rectal daily  celecoxib 200 milliGRAM(s) Oral two times a day  enoxaparin Injectable 40 milliGRAM(s) SubCutaneous daily  fentaNYL   Patch  50 MICROgram(s)/Hr 1 Patch Transdermal every 72 hours  gabapentin 300 milliGRAM(s) Oral two times a day  HYDROmorphone  Injectable 1 milliGRAM(s) IV Push daily  levothyroxine 175 MICROGram(s) Oral daily  lidocaine   Patch 2 Patch Transdermal daily  liothyronine 5 MICROGram(s) Oral daily  polyethylene glycol 3350 17 Gram(s) Oral at bedtime  tiZANidine 4 milliGRAM(s) Oral every 8 hours    MEDICATIONS  (PRN):  diphenhydrAMINE 25 milliGRAM(s) Oral every 6 hours PRN Rash and/or Itching  HYDROmorphone   Tablet 4 milliGRAM(s) Oral every 3 hours PRN Moderate Pain (4 - 6)  HYDROmorphone   Tablet 6 milliGRAM(s) Oral every 3 hours PRN Severe Pain (7 - 10)  HYDROmorphone  Injectable 1 milliGRAM(s) IV Push every 6 hours PRN Breakthrough pain not relieved by oral Dilaudid  mineral oil enema 133 milliLiter(s) Rectal once PRN persistent constipation      RADIOLOGY & ADDITIONAL TESTS:

## 2020-07-11 NOTE — PROGRESS NOTE ADULT - ASSESSMENT
# Generalized pain / back pain   due to fractures and metastatic disease to bones   remains on multimodal pain control approach   being followed by pain management   will continue with same regimen.  for possible anxiety will avoid adding BZS as patient is already drowsy   eventual plan is to start palliative RT     # Acute hypoxic respiratory failure   probably due to lung malignancy   will probably need oxygen after discharge as well  will monitor     # opioid induced constipation   discussed with patient , she is agreeable to try enema   continue with miralax   naltrexone was ordered yesterday but patient refused it   if fails to improve will order again     # Hypothyroidism   on levo     # DVT prophylaxis  lovenox    code status discussed , but patient is under effect of narcotics   palliative to see  patient carries guarded prognosis   family called and updated

## 2020-07-12 NOTE — PROGRESS NOTE ADULT - ASSESSMENT
# Generalized pain / back pain   due to fractures and metastatic disease to bones   remains on multimodal pain control approach   being followed by pain management   will continue with same regimen.  eventual plan is to start palliative RT   pain is better controlled today     # Acute hypoxic respiratory failure   probably due to lung malignancy   will probably need oxygen after discharge as well  will monitor     # opioid induced constipation   Movantik started today   continue with miralax , MOM , lactulose enema   no nausea vomiting , no bloating     # Hypothyroidism   on levo     # DVT prophylaxis  lovenox    code status discussed , with patient and  wants to be full code   carries guarded prognosis

## 2020-07-12 NOTE — CHART NOTE - NSCHARTNOTEFT_GEN_A_CORE
PA NOTE-MEDICINE    Called by RN due to Pts Right forearm IV Insertion site + erythema   RN removed iv  Pt Denies any pain to area/Right arm    T(F): 98.7 (11 Jul 2020 23:35), Max: 98.7 (11 Jul 2020 23:35)    Right Arm: + sl erythema ( approx 1 cm x 1 cm ) + small yellow dot of ? exudate   No streaking no pain palp no edema     A/P Eval Pt 2/2 erythema/sl exudate to insertion site of IV Right Forearm  IV removed by RN  Bacitracin to area bid x 5 days  Continue to Observe  Call PA/MD if worsening

## 2020-07-12 NOTE — PROCEDURE NOTE - NSPROCDETAILS_GEN_ALL_CORE
dressing applied/flushes easily/secured in place/location identified, draped/prepped, sterile technique used/sterile technique, catheter placed/blood seen on insertion

## 2020-07-12 NOTE — PROGRESS NOTE ADULT - SUBJECTIVE AND OBJECTIVE BOX
CC: pain     INTERVAL HPI/OVERNIGHT EVENTS: seen and examined , more awake today , still constipated , pain is controlled if she lays in certain position   pending MRI , plan is for RT tomorrow           Vital Signs Last 24 Hrs  T(C): 36.8 (12 Jul 2020 08:12), Max: 37.1 (11 Jul 2020 23:35)  T(F): 98.2 (12 Jul 2020 08:12), Max: 98.7 (11 Jul 2020 23:35)  HR: 78 (12 Jul 2020 08:12) (73 - 81)  BP: 96/58 (12 Jul 2020 08:12) (96/58 - 135/74)  BP(mean): --  RR: 19 (12 Jul 2020 08:12) (18 - 19)  SpO2: 96% (11 Jul 2020 23:35) (96% - 100%)    PHYSICAL EXAM:    GENERAL: NAD, resting comfortable in bed , awake conversant   CHEST/LUNG: CTAB , no wheezing , rales, rhonchi heard   HEART: S1S2+, Regular rate and rhythm; No murmurs, rubs, or gallops  ABDOMEN: Soft, Nontender, Nondistended; Bowel sounds present  EXTREMITIES:  no pitting edema , pulses palpated BL.        LABS:                        11.2   6.37  )-----------( 276      ( 11 Jul 2020 09:52 )             35.0     07-11    137  |  94<L>  |  12.0  ----------------------------<  120<H>  4.1   |  31.0<H>  |  0.39<L>    Ca    9.4      11 Jul 2020 09:52  Mg     1.8     07-11    TPro  7.0  /  Alb  3.6  /  TBili  0.5  /  DBili  x   /  AST  16  /  ALT  14  /  AlkPhos  126<H>  07-11            MEDICATIONS  (STANDING):  atorvastatin 10 milliGRAM(s) Oral at bedtime  BACItracin   Ointment 1 Application(s) Topical two times a day  bisacodyl Suppository 10 milliGRAM(s) Rectal daily  celecoxib 200 milliGRAM(s) Oral two times a day  enoxaparin Injectable 40 milliGRAM(s) SubCutaneous daily  fentaNYL   Patch  50 MICROgram(s)/Hr 1 Patch Transdermal every 72 hours  gabapentin 300 milliGRAM(s) Oral two times a day  HYDROmorphone  Injectable 1 milliGRAM(s) IV Push daily  lactulose Retention Enema 200 Gram(s) Rectal once  levothyroxine 175 MICROGram(s) Oral daily  lidocaine   Patch 2 Patch Transdermal daily  liothyronine 5 MICROGram(s) Oral daily  magnesium hydroxide Suspension 30 milliLiter(s) Oral daily  polyethylene glycol 3350 17 Gram(s) Oral at bedtime  tiZANidine 4 milliGRAM(s) Oral every 8 hours    MEDICATIONS  (PRN):  diphenhydrAMINE 25 milliGRAM(s) Oral every 6 hours PRN Rash and/or Itching  HYDROmorphone   Tablet 4 milliGRAM(s) Oral every 3 hours PRN Moderate Pain (4 - 6)  HYDROmorphone   Tablet 6 milliGRAM(s) Oral every 3 hours PRN Severe Pain (7 - 10)  HYDROmorphone  Injectable 1 milliGRAM(s) IV Push every 6 hours PRN Breakthrough pain not relieved by oral Dilaudid      RADIOLOGY & ADDITIONAL TESTS:

## 2020-07-13 NOTE — PROGRESS NOTE ADULT - ASSESSMENT
63 yo F with Recurrent Lung Cancer with Extensive Arnel and Spinal Metastasis- Intractable Pain    Recurrent Lung Cancer with Metastasis  Hematology Oncology consulted and planning chemotherapy  RT treatments to L clavicle and spinal metastatic arnel lesions and tumor pressure  Using Nasal O2- breathing pattern even and non labored  MRI of Thoracic Lumbar Spine appreciated and copied above  No Metastasis to Brain seen on MR    Intractable L Clavicle and Spinal Bone Pain  Palliative RT- started treatment today  Pre-medicated before transporting to facility  Tolerated well  Fentanyl 50 mcg/hr continue  Dilaudid IVP pre-med for RT  Dilaudid oral 4-6mg PO sliding scale prn  Increasing Neurontin to 300 mg from BID to TID  Lidoderm patches for topical relief    Opioid Induced Constipation  Retroperitoneal lymphadenopathy may be a factor  Patient not able to sit up or ambulate to BR  Very uncomfortable on bedpan  Aggressive bowel regime  Relistor ordered Friday-NO BM  Oral laxatives administered  Movantic PO started + Flatus not passing stool yet  ***Would administer Fleets oil enema tonight follow with plain saline laxative    GOC  Acute treatment  RT/Chemotherapy  Will address MOLST when ready for discharge

## 2020-07-13 NOTE — DISEASE MANAGEMENT
[de-identified] : 800 - 2000 cGy [LEIDA] : LEIDA [de-identified] : left clavicle, T-5 thru T-8, T12 thru L2

## 2020-07-13 NOTE — PROGRESS NOTE ADULT - SUBJECTIVE AND OBJECTIVE BOX
CC: pain     INTERVAL HPI/OVERNIGHT EVENTS: seen and examined , had some bowel movement today more watery , still has significant pain   will be transferred for RT today , had MRI yesterday , shows extensive disease , discussed with Dr. Joseph no chemo while inpatient , also discussed with Dr. Keith , will need 5 days of RT   denies SOB , no bloating , no nausea vomiting             Vital Signs Last 24 Hrs  T(C): 36.6 (13 Jul 2020 07:39), Max: 36.6 (13 Jul 2020 07:39)  T(F): 97.9 (13 Jul 2020 07:39), Max: 97.9 (13 Jul 2020 07:39)  HR: 80 (13 Jul 2020 07:39) (80 - 84)  BP: 127/74 (13 Jul 2020 07:39) (118/64 - 127/74)  BP(mean): --  RR: 18 (13 Jul 2020 07:39) (18 - 18)  SpO2: 94% (13 Jul 2020 07:39) (94% - 94%)    PHYSICAL EXAM:    GENERAL: awake , restless while moving   CHEST/LUNG: CTAB , no wheezing , rales, rhonchi heard   HEART: S1S2+, Regular rate and rhythm; No murmurs, rubs, or gallops  ABDOMEN: Soft, Nontender, Nondistended; Bowel sounds present  EXTREMITIES:  no pitting edema , pulses palpated BL.        LABS:                  MEDICATIONS  (STANDING):  atorvastatin 10 milliGRAM(s) Oral at bedtime  BACItracin   Ointment 1 Application(s) Topical two times a day  bisacodyl Suppository 10 milliGRAM(s) Rectal daily  celecoxib 200 milliGRAM(s) Oral two times a day  enoxaparin Injectable 40 milliGRAM(s) SubCutaneous daily  fentaNYL   Patch  50 MICROgram(s)/Hr 1 Patch Transdermal every 72 hours  gabapentin 300 milliGRAM(s) Oral two times a day  HYDROmorphone  Injectable 1 milliGRAM(s) IV Push daily  levothyroxine 175 MICROGram(s) Oral daily  lidocaine   Patch 2 Patch Transdermal daily  liothyronine 5 MICROGram(s) Oral daily  magnesium hydroxide Suspension 30 milliLiter(s) Oral daily  naloxegol 25 milliGRAM(s) Oral daily  polyethylene glycol 3350 17 Gram(s) Oral at bedtime  tiZANidine 4 milliGRAM(s) Oral every 8 hours    MEDICATIONS  (PRN):  diphenhydrAMINE 25 milliGRAM(s) Oral every 6 hours PRN Rash and/or Itching  HYDROmorphone   Tablet 4 milliGRAM(s) Oral every 3 hours PRN Moderate Pain (4 - 6)  HYDROmorphone   Tablet 6 milliGRAM(s) Oral every 3 hours PRN Severe Pain (7 - 10)  HYDROmorphone  Injectable 1 milliGRAM(s) IV Push every 6 hours PRN Breakthrough pain not relieved by oral Dilaudid      RADIOLOGY & ADDITIONAL TESTS:

## 2020-07-13 NOTE — VITALS
[Least Pain Intensity: 2/10] : 2/10 [Maximal Pain Intensity: 7/10] : 7/10 [Pain Description/Quality: ___] : Pain description/quality: [unfilled] [Pain Location: ___] : Pain Location: [unfilled] [Pain Duration: ___] : Pain duration: [unfilled] [Pain Interferes with ADLs] : Pain interferes with activities of daily living. [70: Cares for self; unalbe to carry on normal activity or do active work.] : 70: Cares for self; unable to carry on normal activity or do active work. [ECOG Performance Status: 2 - Ambulatory and capable of all self care but unable to carry out any work activities] : Performance Status: 2 - Ambulatory and capable of all self care but unable to carry out any work activities. Up and about more than 50% of waking hours

## 2020-07-13 NOTE — PROGRESS NOTE ADULT - ASSESSMENT
# Generalized pain / back pain   due to fractures and metastatic disease to bones  MRI done shows extensive disease T7,T9,lesions in right hepatic lobe , 1.6 cm adrenal nodule , lesion in spleen , L1 , L5, left sacrum and iliac bone , also showed lung mass 	   remains on multimodal pain control approach   being followed by pain management   will continue with same regimen.  plan is to start RT today , discussed with Dr. Keith     # Widely metastatic lung cancer   no chemo for now as per Dr. Joseph   chemo will be outpatient   patient carries guarded prognosis       # Acute hypoxic respiratory failure   probably due to lung malignancy   will probably need oxygen after discharge as well  will monitor     # opioid induced constipation   Movantik on board    continue with miralax , MOM , lactulose enema   no nausea vomiting , no bloating   discussed with palliative care , will try relistor     # Hypothyroidism   on levo     # DVT prophylaxis  lovenox    code status discussed , with patient and  wants to be full code   carries guarded prognosis

## 2020-07-13 NOTE — PROGRESS NOTE ADULT - SUBJECTIVE AND OBJECTIVE BOX
Patient remains off floor to [facility].  Per RN, she continues to have pain when moved, but is doing well overall with the oral and IV Dilaudid PRN.  - Continue with Palliative care services recommendations and Oncology ongoing management

## 2020-07-13 NOTE — PROGRESS NOTE ADULT - SUBJECTIVE AND OBJECTIVE BOX
INTERVAL HPI/OVERNIGHT EVENTS: 63 yo Female Patient with PMH of Lung cancer admitted with severe intractable pain found to have extensive spinal bone mets and tumor.    F/U Note  Patient lying flat-best position of comfort- on bedpan  Continues to have difficulty with Constipation- no formed BM for many days  Abdomen distended, + flatus intermittent gas pains  Not eating due to persistent constipation  Tolerating transport to and Radiation treatment at [facility]  Continues to have severe Burning pain in her back-but does report less pain intensity.  Denies CP, palpitations, N/V dizziness change in bowel or bladder control     64y old  Female who presents with a chief complaint of Lung metastasis with hypoxemia (13 Jul 2020 13:34)    Present Symptoms:     Dyspnea:1 2   Nausea/Vomiting: Not at present  Anxiety:  Yes   Depression:  No  Fatigue: Yes   Loss of appetite: Yes     Pain: Thoracic Lumbar Spine and Left Clavicle            Character-            Duration-            Effect-            Factors-            Frequency-            Location-            Severity-moderate    Review of Systems: Reviewed                   All others negative    MEDICATIONS  (STANDING):  atorvastatin 10 milliGRAM(s) Oral at bedtime  BACItracin   Ointment 1 Application(s) Topical two times a day  bisacodyl Suppository 10 milliGRAM(s) Rectal daily  celecoxib 200 milliGRAM(s) Oral two times a day  enoxaparin Injectable 40 milliGRAM(s) SubCutaneous daily  fentaNYL   Patch  50 MICROgram(s)/Hr 1 Patch Transdermal every 72 hours  gabapentin 300 milliGRAM(s) Oral two times a day  HYDROmorphone  Injectable 1 milliGRAM(s) IV Push daily  levothyroxine 175 MICROGram(s) Oral daily  lidocaine   Patch 2 Patch Transdermal daily  liothyronine 5 MICROGram(s) Oral daily  magnesium hydroxide Suspension 30 milliLiter(s) Oral daily  mineral oil enema 133 milliLiter(s) Rectal once  naloxegol 25 milliGRAM(s) Oral daily  polyethylene glycol 3350 17 Gram(s) Oral at bedtime  saline laxative (FLEET) Rectal Enema 1 Enema Rectal once  tiZANidine 4 milliGRAM(s) Oral every 8 hours    MEDICATIONS  (PRN):  diphenhydrAMINE 25 milliGRAM(s) Oral every 6 hours PRN Rash and/or Itching  HYDROmorphone   Tablet 4 milliGRAM(s) Oral every 3 hours PRN Moderate Pain (4 - 6)  HYDROmorphone   Tablet 6 milliGRAM(s) Oral every 3 hours PRN Severe Pain (7 - 10)  HYDROmorphone  Injectable 1 milliGRAM(s) IV Push every 6 hours PRN Breakthrough pain not relieved by oral Dilaudid  metoclopramide  IVPB 5 milliGRAM(s) IV Intermittent every 6 hours PRN nausea vomiting  ondansetron Injectable 4 milliGRAM(s) IV Push every 6 hours PRN Nausea and/or Vomiting    LABS:    I&O's Summary    12 Jul 2020 07:01  -  13 Jul 2020 07:00  --------------------------------------------------------  IN: 0 mL / OUT: 400 mL / NET: -400 mL    RADIOLOGY & ADDITIONAL STUDIES:  < from: MR Lumbar Spine w/ IV Cont (07.12.20 @ 16:18) >    IMPRESSION:     Thoracic spine:  1.  Enhancing mass involving the right posterior T7 vertebral body and posterior elements consistent with metastasis. Tumor extends into the right T6-T7 and T7-T8 neural foramina resulting in mild neuroforaminal narrowing.  2.  6 mm enhancing lesion in the posterior T9 vertebral body suspicious for metastasis.  3.  Right lung mass. Bilateral pleural effusions and atelectasis. Lesion in the right hepatic lobe. Lesions versus artifact in the spleen. 1.6 cm left adrenal nodule.    Lumbar spine:  1.  Enhancing lesion involving the L1 vertebral body consistent with metastasis. There is associated loss of height of the L1 inferior endplate. Ventral epidural tumor is noted at this level, resulting in moderate spinal canal narrowing. Prevertebral enhancing soft tissue consistent with tumor. Enhancement of bilateral psoas muscles for which tumor invasion cannot be excluded.  2.  2.2 cm enhancing lesion in the left L5 vertebral body and posterior elements suggesting metastasis. Additional enhancing lesions in the left sacrum and iliac bone suggesting metastases.  3.  Abnormal [id number] signal in the L4 vertebral body without associated enhancement is again seen.  4.  Linear dural enhancement extending from the T10 level into the lumbar spine. Differential diagnosis includes postprocedural change versus tumor.  5.  Retroperitoneal lymphadenopathy.      ADVANCE DIRECTIVES: Seeking  Palliative RT and Chemotherapy  DNR  NO  Completed on:                     MOLST   NO   Completed on:  Living Will   NO   Completed on:

## 2020-07-14 NOTE — PROGRESS NOTE ADULT - ASSESSMENT
Assessment and Plan:   · Assessment		  65 yo F with Recurrent Lung Cancer with Extensive Arnel and Spinal Metastasis- Intractable Pain    Recurrent Lung Cancer with Metastasis  Hematology Oncology consulted and planning chemotherapy  RT treatments to L clavicle and spinal metastatic arnel lesions and tumor pressure  Using Nasal O2- breathing pattern even and non labored  MRI of Thoracic Lumbar Spine appreciated and copied above  No Metastasis to Brain seen on MR    Intractable L Clavicle and Spinal Bone Pain  Palliative RT- started treatment today  Pre-medicated before transporting to facility  Tolerated treatment well- pain exacerbation during transport back to [facility] after  Fentanyl 50 mcg/hr continue  Dilaudid IVP pre-med for RT   Dilaudid 1mg IVP added for severe pain exacerbation, to use for immediate relief  Dilaudid oral 4-6mg PO sliding scale prn  Increasing Neurontin to 300 mg from BID to TID  Lidoderm patches for topical relief    Opioid Induced Constipation  Abdominal Xray copied above No Obstruction  May need digital disimpaction  Retroperitoneal lymphadenopathy may be a factor  Patient not able to sit up or ambulate to BR  Very uncomfortable on bedpan  Aggressive bowel regime  Relistor ordered Friday-NO BM  Oral laxatives administered  Movantic 25 mg PO started + Flatus not passing stool yet  ***Would administer Dulcolax suppository> leets oil enema tonight follow with plain saline laxative    GOC  Acute treatment  RT/Chemotherapy  Will address MOLST when ready for discharge

## 2020-07-14 NOTE — PROGRESS NOTE ADULT - SUBJECTIVE AND OBJECTIVE BOX
INTERVAL HPI/OVERNIGHT EVENTS: 63yo Female Patient PMH of Lung Cancer admitted with intractable back and L clavicle pain from Metastasis to Spine  F/U Note  Patient went to Palliative RT today  C/O severe pain exacerbation from the ambulance ride [facility] back to Hospital. Stat Dilaudid 1mg IVP ordered at that time  Patient was admitting her pain seemed to be improving until that time  Awake alert and oriented x 3  Eating her dinner  Expelling gas- still constipated unable to have bowel movement since before admission  Continues to remain in bed, sitting upright or attempting to get OOB to commode causes severe pain and spasm.  Denies N/V/D CP. Palpitations SOB dizziness any new Numbness or tingling     64y old  Female who presents with a chief complaint of Lung metastasis with hypoxemia (14 Jul 2020 12:33)    Present Symptoms:     Dyspnea: 0 1    Nausea/Vomiting:  No  Anxiety:  Yes   Depression:no  Fatigue: Yes   Loss of appetite: fair beginning to eat again    Pain:             Character-sharp pulling twisting stabbing spastic            Duration-            Effect-            Factors-            Frequency-            Location-            Severity-severe    Review of Systems: Reviewed                       All others negative    MEDICATIONS  (STANDING):  atorvastatin 10 milliGRAM(s) Oral at bedtime  BACItracin   Ointment 1 Application(s) Topical two times a day  bisacodyl Suppository 10 milliGRAM(s) Rectal daily  bisacodyl Suppository 10 milliGRAM(s) Rectal once  celecoxib 200 milliGRAM(s) Oral two times a day  enoxaparin Injectable 40 milliGRAM(s) SubCutaneous daily  gabapentin 300 milliGRAM(s) Oral every 8 hours  HYDROmorphone  Injectable 1 milliGRAM(s) IV Push daily  levothyroxine 175 MICROGram(s) Oral daily  lidocaine   Patch 2 Patch Transdermal daily  liothyronine 5 MICROGram(s) Oral daily  magnesium hydroxide Suspension 30 milliLiter(s) Oral daily  mineral oil enema 133 milliLiter(s) Rectal once  naloxegol 25 milliGRAM(s) Oral daily  polyethylene glycol 3350 17 Gram(s) Oral at bedtime  saline laxative (FLEET) Rectal Enema 1 Enema Rectal once  tiZANidine 4 milliGRAM(s) Oral every 8 hours    MEDICATIONS  (PRN):  diphenhydrAMINE 25 milliGRAM(s) Oral every 6 hours PRN Rash and/or Itching  HYDROmorphone   Tablet 4 milliGRAM(s) Oral every 3 hours PRN Moderate Pain (4 - 6)  HYDROmorphone   Tablet 6 milliGRAM(s) Oral every 3 hours PRN Severe Pain (7 - 10)  HYDROmorphone  Injectable 1 milliGRAM(s) IV Push four times a day PRN Severe Pain (7 - 10)  metoclopramide  IVPB 5 milliGRAM(s) IV Intermittent every 6 hours PRN nausea vomiting  ondansetron Injectable 4 milliGRAM(s) IV Push every 6 hours PRN Nausea and/or Vomiting    LABS:                        10.8   5.95  )-----------( 265      ( 14 Jul 2020 08:16 )             34.8     07-14    139  |  94<L>  |  10.0  ----------------------------<  111<H>  5.0   |  34.0<H>  |  0.43<L>    Ca    9.6      14 Jul 2020 08:16    I&O's Summary    RADIOLOGY & ADDITIONAL STUDIES:  < from: Xray Abdomen 1 View PORTABLE -Urgent (07.13.20 @ 10:07) >  CLINICAL INFORMATION: Constipation.    FINDINGS:  There is moderate stool load within the proximal transverse colon and RIGHT colon. Distal transverse colon and LEFT colon show mild air distention. No bowel obstruction.  There is no free intra-abdominal air.  There are no obvious intra-abdominal masses.  There are no abnormal calcifications.   The osseous structures are intact.     IMPRESSION:    No acute intra-abdominal findings.     ADVANCE DIRECTIVES:   DNR NO  Completed on:                     MOLST   NO   Completed on:  Living Will   NO   Completed on:

## 2020-07-14 NOTE — PROGRESS NOTE ADULT - ASSESSMENT
# Generalized pain / back pain   due to fractures and metastatic disease to bones  MRI done shows extensive disease T7,T9,lesions in right hepatic lobe , 1.6 cm adrenal nodule , lesion in spleen , L1 , L5, left sacrum and iliac bone , also showed lung mass 	   remains on multimodal pain control approach   being followed by pain management , seems to be gradually improving   will continue with same regimen. but try to de escalate once pain is improved   status post radiation therapy yesterday will need 5 days of therapy  , discussed with Dr. Keith     # Widely metastatic lung cancer   no chemo for now as per Dr. Joseph   chemo will be outpatient   patient carries guarded prognosis       # Acute hypoxic respiratory failure   probably due to lung malignancy   will probably need oxygen after discharge as well  will monitor     # opioid induced constipation   Movantik on board    continue with miralax , MOM , lactulose enema   no nausea vomiting , no bloating   discussed with palliative care , will try relistor   fleet enema ordered , will get it after RT     # Hypothyroidism   on levo     # DVT prophylaxis  lovenox    details discussed with patient and her daughter via phone , all concerns answered   since patient has been refusing enemas , extensive discussion held regarding compliance with medications

## 2020-07-14 NOTE — PROGRESS NOTE ADULT - SUBJECTIVE AND OBJECTIVE BOX
CC: back pain     INTERVAL HPI/OVERNIGHT EVENTS: seen and examined , feels better , she was able to get up from bed last night   she is still constipated , denies SOB , no chest pain , able to move to some extent           Vital Signs Last 24 Hrs  T(C): 36.5 (14 Jul 2020 07:41), Max: 36.6 (13 Jul 2020 16:22)  T(F): 97.7 (14 Jul 2020 07:41), Max: 97.9 (13 Jul 2020 16:22)  HR: 80 (14 Jul 2020 07:41) (77 - 81)  BP: 107/66 (14 Jul 2020 07:41) (107/66 - 129/75)  BP(mean): --  RR: 18 (14 Jul 2020 07:41) (18 - 18)  SpO2: 96% (14 Jul 2020 07:41) (96% - 99%)    PHYSICAL EXAM:    GENERAL: NAD, resting comfortable in bed   CHEST/LUNG: CTAB , no wheezing , rales, rhonchi heard   HEART: S1S2+, Regular rate and rhythm; No murmurs, rubs, or gallops  ABDOMEN: Soft, Nontender, Nondistended; Bowel sounds present  EXTREMITIES:  no pitting edema , pulses palpated BL.        LABS:                        10.8   5.95  )-----------( 265      ( 14 Jul 2020 08:16 )             34.8     07-14    139  |  94<L>  |  10.0  ----------------------------<  111<H>  5.0   |  34.0<H>  |  0.43<L>    Ca    9.6      14 Jul 2020 08:16              MEDICATIONS  (STANDING):  atorvastatin 10 milliGRAM(s) Oral at bedtime  BACItracin   Ointment 1 Application(s) Topical two times a day  bisacodyl Suppository 10 milliGRAM(s) Rectal daily  celecoxib 200 milliGRAM(s) Oral two times a day  enoxaparin Injectable 40 milliGRAM(s) SubCutaneous daily  gabapentin 300 milliGRAM(s) Oral two times a day  HYDROmorphone  Injectable 1 milliGRAM(s) IV Push daily  levothyroxine 175 MICROGram(s) Oral daily  lidocaine   Patch 2 Patch Transdermal daily  liothyronine 5 MICROGram(s) Oral daily  magnesium hydroxide Suspension 30 milliLiter(s) Oral daily  mineral oil enema 133 milliLiter(s) Rectal once  naloxegol 25 milliGRAM(s) Oral daily  polyethylene glycol 3350 17 Gram(s) Oral at bedtime  saline laxative (FLEET) Rectal Enema 1 Enema Rectal once  tiZANidine 4 milliGRAM(s) Oral every 8 hours    MEDICATIONS  (PRN):  diphenhydrAMINE 25 milliGRAM(s) Oral every 6 hours PRN Rash and/or Itching  HYDROmorphone   Tablet 4 milliGRAM(s) Oral every 3 hours PRN Moderate Pain (4 - 6)  HYDROmorphone   Tablet 6 milliGRAM(s) Oral every 3 hours PRN Severe Pain (7 - 10)  HYDROmorphone  Injectable 1 milliGRAM(s) IV Push every 6 hours PRN Breakthrough pain not relieved by oral Dilaudid  metoclopramide  IVPB 5 milliGRAM(s) IV Intermittent every 6 hours PRN nausea vomiting  ondansetron Injectable 4 milliGRAM(s) IV Push every 6 hours PRN Nausea and/or Vomiting      RADIOLOGY & ADDITIONAL TESTS: CC: back pain     INTERVAL HPI/OVERNIGHT EVENTS: seen and examined , feels better , she was able to get up from bed last night   she is still constipated , denies SOB , no chest pain , able to move to some extent           Vital Signs Last 24 Hrs  T(C): 36.5 (14 Jul 2020 07:41), Max: 36.6 (13 Jul 2020 16:22)  T(F): 97.7 (14 Jul 2020 07:41), Max: 97.9 (13 Jul 2020 16:22)  HR: 80 (14 Jul 2020 07:41) (77 - 81)  BP: 107/66 (14 Jul 2020 07:41) (107/66 - 129/75)  BP(mean): --  RR: 18 (14 Jul 2020 07:41) (18 - 18)  SpO2: 96% (14 Jul 2020 07:41) (96% - 99%)    PHYSICAL EXAM:    GENERAL: NAD, resting comfortable in bed , pleasant speaking in full sentences   CHEST/LUNG: CTAB , no wheezing , rales, rhonchi heard   HEART: S1S2+, Regular rate and rhythm; No murmurs, rubs, or gallops  ABDOMEN: Soft, Nontender, distended; Bowel sounds present  EXTREMITIES:  no pitting edema , pulses palpated BL.        LABS:                        10.8   5.95  )-----------( 265      ( 14 Jul 2020 08:16 )             34.8     07-14    139  |  94<L>  |  10.0  ----------------------------<  111<H>  5.0   |  34.0<H>  |  0.43<L>    Ca    9.6      14 Jul 2020 08:16              MEDICATIONS  (STANDING):  atorvastatin 10 milliGRAM(s) Oral at bedtime  BACItracin   Ointment 1 Application(s) Topical two times a day  bisacodyl Suppository 10 milliGRAM(s) Rectal daily  celecoxib 200 milliGRAM(s) Oral two times a day  enoxaparin Injectable 40 milliGRAM(s) SubCutaneous daily  gabapentin 300 milliGRAM(s) Oral two times a day  HYDROmorphone  Injectable 1 milliGRAM(s) IV Push daily  levothyroxine 175 MICROGram(s) Oral daily  lidocaine   Patch 2 Patch Transdermal daily  liothyronine 5 MICROGram(s) Oral daily  magnesium hydroxide Suspension 30 milliLiter(s) Oral daily  mineral oil enema 133 milliLiter(s) Rectal once  naloxegol 25 milliGRAM(s) Oral daily  polyethylene glycol 3350 17 Gram(s) Oral at bedtime  saline laxative (FLEET) Rectal Enema 1 Enema Rectal once  tiZANidine 4 milliGRAM(s) Oral every 8 hours    MEDICATIONS  (PRN):  diphenhydrAMINE 25 milliGRAM(s) Oral every 6 hours PRN Rash and/or Itching  HYDROmorphone   Tablet 4 milliGRAM(s) Oral every 3 hours PRN Moderate Pain (4 - 6)  HYDROmorphone   Tablet 6 milliGRAM(s) Oral every 3 hours PRN Severe Pain (7 - 10)  HYDROmorphone  Injectable 1 milliGRAM(s) IV Push every 6 hours PRN Breakthrough pain not relieved by oral Dilaudid  metoclopramide  IVPB 5 milliGRAM(s) IV Intermittent every 6 hours PRN nausea vomiting  ondansetron Injectable 4 milliGRAM(s) IV Push every 6 hours PRN Nausea and/or Vomiting      RADIOLOGY & ADDITIONAL TESTS:

## 2020-07-15 NOTE — GOALS OF CARE CONVERSATION - ADVANCED CARE PLANNING - CONVERSATION DETAILS
KERRY contacted patients spouse Yehuda to offer support in coping with patients catastrophic dx and hospitalization. Spouse appreciative of SW call and reviewed his concerns for planning for when patient discharged. Spouse requesting to speak to hospitalist regarding time frame for discharge since he is aware Friday is last palliative RT tx. KERRY contacted hospitalist to request follow up with spouse.

## 2020-07-15 NOTE — PROGRESS NOTE ADULT - SUBJECTIVE AND OBJECTIVE BOX
CC: pain     INTERVAL HPI/OVERNIGHT EVENTS: seen and examined , feels better , pain is much improved   wants to back off on pain meds   denies SOB , no abdominal pain , still constipated           Vital Signs Last 24 Hrs  T(C): 36.8 (15 Jul 2020 04:58), Max: 36.8 (14 Jul 2020 15:24)  T(F): 98.2 (15 Jul 2020 04:58), Max: 98.3 (14 Jul 2020 15:24)  HR: 88 (15 Jul 2020 04:58) (88 - 95)  BP: 134/79 (15 Jul 2020 04:58) (134/79 - 135/79)  BP(mean): --  RR: 19 (15 Jul 2020 04:58) (19 - 20)  SpO2: 97% (15 Jul 2020 04:58) (96% - 97%)    PHYSICAL EXAM:    GENERAL: NAD, resting comfortable in bed   CHEST/LUNG: fair air entry   HEART: S1S2+, Regular rate and rhythm  ABDOMEN: Soft, Nontender, distended; Bowel sounds present  EXTREMITIES:  no pitting edema , pulses palpated BL.        LABS:                        10.8   5.95  )-----------( 265      ( 14 Jul 2020 08:16 )             34.8     07-14    139  |  94<L>  |  10.0  ----------------------------<  111<H>  5.0   |  34.0<H>  |  0.43<L>    Ca    9.6      14 Jul 2020 08:16              MEDICATIONS  (STANDING):  atorvastatin 10 milliGRAM(s) Oral at bedtime  BACItracin   Ointment 1 Application(s) Topical two times a day  bisacodyl Suppository 10 milliGRAM(s) Rectal daily  celecoxib 200 milliGRAM(s) Oral two times a day  enoxaparin Injectable 40 milliGRAM(s) SubCutaneous daily  gabapentin 300 milliGRAM(s) Oral every 8 hours  HYDROmorphone  Injectable 1 milliGRAM(s) IV Push daily  lactulose Retention Enema 200 Gram(s) Rectal once  levothyroxine 175 MICROGram(s) Oral daily  lidocaine   Patch 2 Patch Transdermal daily  liothyronine 5 MICROGram(s) Oral daily  magnesium hydroxide Suspension 30 milliLiter(s) Oral daily  mineral oil enema 133 milliLiter(s) Rectal once  naloxegol 25 milliGRAM(s) Oral daily  polyethylene glycol 3350 17 Gram(s) Oral at bedtime  tiZANidine 4 milliGRAM(s) Oral every 8 hours    MEDICATIONS  (PRN):  diphenhydrAMINE 25 milliGRAM(s) Oral every 6 hours PRN Rash and/or Itching  HYDROmorphone   Tablet 4 milliGRAM(s) Oral every 3 hours PRN Moderate Pain (4 - 6)  HYDROmorphone   Tablet 6 milliGRAM(s) Oral every 3 hours PRN Severe Pain (7 - 10)  HYDROmorphone  Injectable 1 milliGRAM(s) IV Push four times a day PRN Severe Pain (7 - 10)  metoclopramide  IVPB 5 milliGRAM(s) IV Intermittent every 6 hours PRN nausea vomiting  ondansetron Injectable 4 milliGRAM(s) IV Push every 6 hours PRN Nausea and/or Vomiting      RADIOLOGY & ADDITIONAL TESTS:

## 2020-07-15 NOTE — PROGRESS NOTE ADULT - ASSESSMENT
# Generalized pain / back pain   due to fractures and metastatic disease to bones  MRI done shows extensive disease T7,T9,lesions in right hepatic lobe , 1.6 cm adrenal nodule , lesion in spleen , L1 , L5, left sacrum and iliac bone , also showed lung mass 	   remains on multimodal pain control approach   being followed by pain management , pain is much improved   patient wants to back off on meds , currently on gabapentin , celebrex , zanaflex and lidoderm rest are PRN   will decrease gradually   currently undergoing RT day 3 today     # Widely metastatic lung cancer   no chemo for now as per Dr. Joseph   chemo will be outpatient   patient carries guarded prognosis       # Acute hypoxic respiratory failure   probably due to lung malignancy   will probably need oxygen after discharge as well  will monitor     # opioid induced constipation   multiple enemas , movantik , laxatives   still no formed BM   non obstructive on XR   will decrease narcotics now       # Hypothyroidism   on levo     # DVT prophylaxis  lovenox    details discussed with patient and her daughter via phone , all concerns answered   being followed by PT # Generalized pain / back pain   due to fractures and metastatic disease to bones  MRI done shows extensive disease T7,T9,lesions in right hepatic lobe , 1.6 cm adrenal nodule , lesion in spleen , L1 , L5, left sacrum and iliac bone , also showed lung mass 	   remains on multimodal pain control approach   being followed by pain management , pain is much improved   patient wants to back off on meds , currently on gabapentin , celebrex , zanaflex and lidoderm rest are PRN   will decrease zenaflex , also decrease dose of fentanyl patch to 25   will decrease gradually   currently undergoing RT day 3 today     # Widely metastatic lung cancer   no chemo for now as per Dr. Joseph   chemo will be outpatient   patient carries guarded prognosis       # Acute hypoxic respiratory failure   probably due to lung malignancy   will probably need oxygen after discharge as well  will monitor     # opioid induced constipation   multiple enemas , movantik , laxatives   still no formed BM   non obstructive on XR   will decrease narcotics now       # Hypothyroidism   on levo     # DVT prophylaxis  lovenox    details discussed with patient , all concerns answered   PT to follow everyday   will arrange for home oxygen   discussed with HUONG

## 2020-07-16 NOTE — DIETITIAN INITIAL EVALUATION ADULT. - OTHER INFO
Generalized pain / back pain   due to fractures and metastatic disease to bones  MRI done shows extensive disease T7,T9,lesions in right hepatic lobe , 1.6 cm adrenal nodule , lesion in spleen , L1 , L5, left sacrum and iliac bone , also showed lung mass 	 pain is improved , decreased PRN meds as well   currently undergoing RT day 4 today , 5 sessions planned   Widely metastatic lung cancer   chemo will be outpatient   patient carries guarded prognosis    Pt /  reporting poor- fair PO intake secondary to pain.  Pt is unaware of any weight loss no edema.  Pt is at risk for malnutrition  appears well nourished

## 2020-07-16 NOTE — PROGRESS NOTE ADULT - ASSESSMENT
# Generalized pain / back pain   due to fractures and metastatic disease to bones  MRI done shows extensive disease T7,T9,lesions in right hepatic lobe , 1.6 cm adrenal nodule , lesion in spleen , L1 , L5, left sacrum and iliac bone , also showed lung mass 	   pain is improved , decreased PRN meds as well , stopped IV narcotics , patient describes hallucinations with dilaudid , decreased dose    gabapentin , celebrex , zanaflex and lidoderm will remain the same , zanaflex was decreased   fentanyl patch at 25 mcg  currently undergoing RT day 4 today , 5 sessions planned     # Widely metastatic lung cancer   no chemo for now as per Dr. Joseph   chemo will be outpatient   patient carries guarded prognosis       # Acute hypoxic respiratory failure   probably due to lung malignancy   will probably need oxygen after discharge as well  will monitor     # opioid induced constipation   resolved   continue with bowel regimen       # Hypothyroidism   on levo     # DVT prophylaxis  lovenox    # Disposition   home v/s [facility] , family still deciding     details discussed with patient , all concerns answered   also discussed with  , who wants to consider rehab placement   this has been discussed with  as well # Generalized pain / back pain   due to fractures and metastatic disease to bones  MRI done shows extensive disease T7,T9,lesions in right hepatic lobe , 1.6 cm adrenal nodule , lesion in spleen , L1 , L5, left sacrum and iliac bone , also showed lung mass 	   pain is improved , decreased PRN meds as well , stopped IV narcotics , patient describes hallucinations with dilaudid , decreased dose    gabapentin , celebrex , zanaflex and lidoderm will remain the same , zanaflex was decreased   fentanyl patch at 25 mcg  currently undergoing RT day 4 today , 5 sessions planned     # Widely metastatic lung cancer   no chemo for now as per Dr. Joseph   chemo will be outpatient   patient carries guarded prognosis  called Dr. Lord the confirm the regimen since patient will need rehab placement        # Acute hypoxic respiratory failure   probably due to lung malignancy   will probably need oxygen after discharge as well  will monitor     # opioid induced constipation   resolved   continue with bowel regimen       # Hypothyroidism   on levo     # DVT prophylaxis  lovenox    # Disposition   home v/s [facility] , family still deciding     details discussed with patient , all concerns answered   also discussed with  , who wants to consider rehab placement   this has been discussed with  as well   daughter Jacob called [phone] and updated Dr. Lord was called , awaiting call back with a plan

## 2020-07-16 NOTE — PROGRESS NOTE ADULT - SUBJECTIVE AND OBJECTIVE BOX
CC: back pain     INTERVAL HPI/OVERNIGHT EVENTS: seen and examined , feels better , pain control is improving . patient had large BM yesterday , stated that she is hallucinating with dilaudid , wants to take less dosing , changed medication dosing   denies SOB , no chest pain , no abdominal pain , still has back pain           Vital Signs Last 24 Hrs  T(C): 36.4 (16 Jul 2020 08:39), Max: 36.6 (16 Jul 2020 00:00)  T(F): 97.5 (16 Jul 2020 08:39), Max: 97.9 (16 Jul 2020 00:00)  HR: 86 (16 Jul 2020 08:39) (60 - 86)  BP: 116/70 (16 Jul 2020 08:39) (116/70 - 127/81)  BP(mean): --  RR: 19 (16 Jul 2020 08:39) (18 - 20)  SpO2: 100% (16 Jul 2020 08:39) (88% - 100%)    PHYSICAL EXAM:    GENERAL: NAD, resting comfortable in bed   CHEST/LUNG: CTAB , no wheezing , rales, rhonchi heard   HEART: S1S2+, Regular rate and rhythm  ABDOMEN: Soft, Nontender, Nondistended; Bowel sounds present  EXTREMITIES:  no pitting edema , pulses palpated BL.        LABS:                  MEDICATIONS  (STANDING):  atorvastatin 10 milliGRAM(s) Oral at bedtime  BACItracin   Ointment 1 Application(s) Topical two times a day  bisacodyl Suppository 10 milliGRAM(s) Rectal daily  celecoxib 200 milliGRAM(s) Oral two times a day  enoxaparin Injectable 40 milliGRAM(s) SubCutaneous daily  fentaNYL   Patch  25 MICROgram(s)/Hr 1 Patch Transdermal every 72 hours  gabapentin 300 milliGRAM(s) Oral every 8 hours  levothyroxine 175 MICROGram(s) Oral daily  lidocaine   Patch 2 Patch Transdermal daily  liothyronine 5 MICROGram(s) Oral daily  magnesium hydroxide Suspension 30 milliLiter(s) Oral daily  mineral oil enema 133 milliLiter(s) Rectal once  naloxegol 25 milliGRAM(s) Oral daily  polyethylene glycol 3350 17 Gram(s) Oral at bedtime    MEDICATIONS  (PRN):  diphenhydrAMINE 25 milliGRAM(s) Oral every 6 hours PRN Rash and/or Itching  HYDROmorphone   Tablet 3 milliGRAM(s) Oral every 3 hours PRN Severe Pain (7 - 10)  HYDROmorphone   Tablet 1 milliGRAM(s) Oral every 3 hours PRN Moderate Pain (4 - 6)  metoclopramide  IVPB 5 milliGRAM(s) IV Intermittent every 6 hours PRN nausea vomiting  ondansetron Injectable 4 milliGRAM(s) IV Push every 6 hours PRN Nausea and/or Vomiting  simethicone 80 milliGRAM(s) Chew every 6 hours PRN Gas  sodium chloride 0.65% Nasal 1 Spray(s) Both Nostrils every 6 hours PRN Nasal Congestion  tiZANidine 2 milliGRAM(s) Oral every 8 hours PRN muscle spasms      RADIOLOGY & ADDITIONAL TESTS: CC: back pain     INTERVAL HPI/OVERNIGHT EVENTS: seen and examined , feels better , pain control is improving but she feels dilaudid 1mg is not enough . patient had large BM yesterday , stated that she is hallucinating with dilaudid , wants to take less dosing , changed medication dosing   denies SOB , no chest pain , no abdominal pain , still has back pain           Vital Signs Last 24 Hrs  T(C): 36.4 (16 Jul 2020 08:39), Max: 36.6 (16 Jul 2020 00:00)  T(F): 97.5 (16 Jul 2020 08:39), Max: 97.9 (16 Jul 2020 00:00)  HR: 86 (16 Jul 2020 08:39) (60 - 86)  BP: 116/70 (16 Jul 2020 08:39) (116/70 - 127/81)  BP(mean): --  RR: 19 (16 Jul 2020 08:39) (18 - 20)  SpO2: 100% (16 Jul 2020 08:39) (88% - 100%)    PHYSICAL EXAM:    GENERAL: looks uncomfortable , not in distress   CHEST/LUNG: CTAB , no wheezing , rales, rhonchi heard   HEART: S1S2+, Regular rate and rhythm  ABDOMEN: Soft, Nontender, Nondistended; Bowel sounds present  EXTREMITIES:  no pitting edema , pulses palpated BL.        LABS:                  MEDICATIONS  (STANDING):  atorvastatin 10 milliGRAM(s) Oral at bedtime  BACItracin   Ointment 1 Application(s) Topical two times a day  bisacodyl Suppository 10 milliGRAM(s) Rectal daily  celecoxib 200 milliGRAM(s) Oral two times a day  enoxaparin Injectable 40 milliGRAM(s) SubCutaneous daily  fentaNYL   Patch  25 MICROgram(s)/Hr 1 Patch Transdermal every 72 hours  gabapentin 300 milliGRAM(s) Oral every 8 hours  levothyroxine 175 MICROGram(s) Oral daily  lidocaine   Patch 2 Patch Transdermal daily  liothyronine 5 MICROGram(s) Oral daily  magnesium hydroxide Suspension 30 milliLiter(s) Oral daily  mineral oil enema 133 milliLiter(s) Rectal once  naloxegol 25 milliGRAM(s) Oral daily  polyethylene glycol 3350 17 Gram(s) Oral at bedtime    MEDICATIONS  (PRN):  diphenhydrAMINE 25 milliGRAM(s) Oral every 6 hours PRN Rash and/or Itching  HYDROmorphone   Tablet 3 milliGRAM(s) Oral every 3 hours PRN Severe Pain (7 - 10)  HYDROmorphone   Tablet 1 milliGRAM(s) Oral every 3 hours PRN Moderate Pain (4 - 6)  metoclopramide  IVPB 5 milliGRAM(s) IV Intermittent every 6 hours PRN nausea vomiting  ondansetron Injectable 4 milliGRAM(s) IV Push every 6 hours PRN Nausea and/or Vomiting  simethicone 80 milliGRAM(s) Chew every 6 hours PRN Gas  sodium chloride 0.65% Nasal 1 Spray(s) Both Nostrils every 6 hours PRN Nasal Congestion  tiZANidine 2 milliGRAM(s) Oral every 8 hours PRN muscle spasms      RADIOLOGY & ADDITIONAL TESTS:

## 2020-07-16 NOTE — DIETITIAN INITIAL EVALUATION ADULT. - PERTINENT MEDS FT
MEDICATIONS  (STANDING):  atorvastatin 10 milliGRAM(s) Oral at bedtime  BACItracin   Ointment 1 Application(s) Topical two times a day  bisacodyl Suppository 10 milliGRAM(s) Rectal daily  celecoxib 200 milliGRAM(s) Oral two times a day  enoxaparin Injectable 40 milliGRAM(s) SubCutaneous daily  fentaNYL   Patch  25 MICROgram(s)/Hr 1 Patch Transdermal every 72 hours  gabapentin 300 milliGRAM(s) Oral every 8 hours  levothyroxine 175 MICROGram(s) Oral daily  lidocaine   Patch 2 Patch Transdermal daily  liothyronine 5 MICROGram(s) Oral daily  magnesium hydroxide Suspension 30 milliLiter(s) Oral daily  naloxegol 25 milliGRAM(s) Oral daily  polyethylene glycol 3350 17 Gram(s) Oral at bedtime    MEDICATIONS  (PRN):  diphenhydrAMINE 25 milliGRAM(s) Oral every 6 hours PRN Rash and/or Itching  HYDROmorphone   Tablet 3 milliGRAM(s) Oral every 3 hours PRN Severe Pain (7 - 10)  HYDROmorphone   Tablet 1 milliGRAM(s) Oral every 3 hours PRN Moderate Pain (4 - 6)  metoclopramide  IVPB 5 milliGRAM(s) IV Intermittent every 6 hours PRN nausea vomiting  ondansetron Injectable 4 milliGRAM(s) IV Push every 6 hours PRN Nausea and/or Vomiting  simethicone 80 milliGRAM(s) Chew every 6 hours PRN Gas  sodium chloride 0.65% Nasal 1 Spray(s) Both Nostrils every 6 hours PRN Nasal Congestion  tiZANidine 2 milliGRAM(s) Oral every 8 hours PRN muscle spasms

## 2020-07-16 NOTE — PROGRESS NOTE ADULT - ASSESSMENT
65 yo F with Metastatic Lung Cancer to Bone and Spine with intractable Pain    Metastatic Lung Cancer  Spinal lytic lesions  Clavicle (L) pathologic Fx  RT treatments 5/5 today  Chemotherapy was considered- not at this time as per     Intractable Bone Pain  Intensity less  Hallucinating over weekend thought to be IV Dilaudid  Pain Med dosages reduced  IV Dilaudid DC'd  Encourage Pre-med before transport to [facility]    OPioid Induced Constipation  Oral Bowel regime continued  Movantic daily  Finally had a BM this week    [facility]  Planning on discharge to [facility] when Palliative RT completed  MOLST will be discussed today before discharge  Acute aggressive care has been her goal since diagnosis of Metastasis

## 2020-07-16 NOTE — PROGRESS NOTE ADULT - SUBJECTIVE AND OBJECTIVE BOX
INTERVAL HPI/OVERNIGHT EVENTS: 64.yo Female Patient with Lung cancer with extensive metastasis to spine  back L clavicle, spleen, liver adrenal glands. Admitted in severe Pain back and shoulder. Palliative RT initiated here  for Pain relief.  F/U note  Alert and oriented x 3  No acute events overnight  Appetite has improved, waiting for transport to [facility] for Day 4/5 of RT treatment  Admits to still having lightheaded confusion-but much better since Analgesics were adjusted  Asking to go to RT without Premedicating her for painful transport. After speaking with RN decided to give Gabapentin early before she leaves hospital  Large BM day before last, more comfortable. External female  incontinence catheter in place, amanda cloudy urine  Deneis N/V/D/Constipation CP Palpitations     64y old  Female who presents with a chief complaint of Lung metastasis with hypoxemia (16 Jul 2020 08:47)    PAST MEDICAL & SURGICAL HISTORY:  Genital herpes  Drug abuse: Back in the 70&#x27;s and 80&#x27;s (Cocaine)  Lung cancer: with mets  LAMAR on CPAP  Anxiety  Hypothyroid  Status post lobectomy of lung: 3/2017 (Right lung)  S/P bunionectomy: with revision    Present Symptoms:     Dyspnea:  1 2    Nausea/Vomiting:  No  Anxiety:  Yes   Depression:  No  Fatigue: Yes   Loss of appetite: Yes improving    Pain: much less intense-more tolerable            Character-            Duration-            Effect-            Factors-            Frequency-            Location-            Severity-    Review of Systems: Reviewed                     Negative:                     Positive:  Unable to obtain due to poor mentation   All others negative    MEDICATIONS  (STANDING):  atorvastatin 10 milliGRAM(s) Oral at bedtime  bisacodyl Suppository 10 milliGRAM(s) Rectal daily  celecoxib 200 milliGRAM(s) Oral two times a day  enoxaparin Injectable 40 milliGRAM(s) SubCutaneous daily  fentaNYL   Patch  25 MICROgram(s)/Hr 1 Patch Transdermal every 72 hours  gabapentin 300 milliGRAM(s) Oral every 8 hours  levothyroxine 175 MICROGram(s) Oral daily  lidocaine   Patch 2 Patch Transdermal daily  liothyronine 5 MICROGram(s) Oral daily  magnesium hydroxide Suspension 30 milliLiter(s) Oral daily  naloxegol 25 milliGRAM(s) Oral daily  polyethylene glycol 3350 17 Gram(s) Oral at bedtime    MEDICATIONS  (PRN):  diphenhydrAMINE 25 milliGRAM(s) Oral every 6 hours PRN Rash and/or Itching  HYDROmorphone   Tablet 3 milliGRAM(s) Oral every 3 hours PRN Severe Pain (7 - 10)  HYDROmorphone   Tablet 1 milliGRAM(s) Oral every 3 hours PRN Moderate Pain (4 - 6)  metoclopramide  IVPB 5 milliGRAM(s) IV Intermittent every 6 hours PRN nausea vomiting  ondansetron Injectable 4 milliGRAM(s) IV Push every 6 hours PRN Nausea and/or Vomiting  simethicone 80 milliGRAM(s) Chew every 6 hours PRN Gas  sodium chloride 0.65% Nasal 1 Spray(s) Both Nostrils every 6 hours PRN Nasal Congestion  tiZANidine 2 milliGRAM(s) Oral every 8 hours PRN muscle spasms          LABS:              I&O's Summary      RADIOLOGY & ADDITIONAL STUDIES:    ADVANCE DIRECTIVES:   DNR YES NO  Completed on:                     MOLST  YES NO   Completed on:  Living Will  YES NO   Completed on:    COUNSELING:    Face to face meeting to discuss Advanced Care Planning - Time Spent ______ Minutes.  See goals of care note.    More than 50% time spent in counseling and coordinating care. ______ Minutes.     Thank you for the opportunity to assist with the care of this patient.   [facility] [phone]. INTERVAL HPI/OVERNIGHT EVENTS: 64.yo Female Patient with Lung cancer with extensive metastasis to spine  back L clavicle, spleen, liver adrenal glands. Admitted in severe Pain back and shoulder. Palliative RT initiated here  for Pain relief.  F/U note  Alert and oriented x 3  No acute events overnight  Appetite has improved, waiting for transport to [facility] for Day 4/5 of RT treatment  Admits to still having lightheaded confusion-but much better since Analgesics were adjusted  Asking to go to RT without Premedicating her for painful transport. After speaking with RN decided to give Gabapentin early before she leaves hospital  Large BM day before last, more comfortable. External female  incontinence catheter in place, amanda cloudy urine  Deneis N/V/D/Constipation CP Palpitations     64y old  Female who presents with a chief complaint of Lung metastasis with hypoxemia (16 Jul 2020 08:47)    PAST MEDICAL & SURGICAL HISTORY:  Genital herpes  Drug abuse: Back in the 70&#x27;s and 80&#x27;s (Cocaine)  Lung cancer: with mets  LAMAR on CPAP  Anxiety  Hypothyroid  Status post lobectomy of lung: 3/2017 (Right lung)  S/P bunionectomy: with revision    Present Symptoms:     Dyspnea:  1 2    Nausea/Vomiting:  No  Anxiety:  Yes   Depression:  No  Fatigue: Yes   Loss of appetite: Yes improving    Pain: much less intense-more tolerable            Character-            Duration-            Effect-            Factors-            Frequency-            Location-            Severity-moderate    Review of Systems: Reviewed                      All others negative    MEDICATIONS  (STANDING):  atorvastatin 10 milliGRAM(s) Oral at bedtime  bisacodyl Suppository 10 milliGRAM(s) Rectal daily  celecoxib 200 milliGRAM(s) Oral two times a day  enoxaparin Injectable 40 milliGRAM(s) SubCutaneous daily  fentaNYL   Patch  25 MICROgram(s)/Hr 1 Patch Transdermal every 72 hours  gabapentin 300 milliGRAM(s) Oral every 8 hours  levothyroxine 175 MICROGram(s) Oral daily  lidocaine   Patch 2 Patch Transdermal daily  liothyronine 5 MICROGram(s) Oral daily  magnesium hydroxide Suspension 30 milliLiter(s) Oral daily  naloxegol 25 milliGRAM(s) Oral daily  polyethylene glycol 3350 17 Gram(s) Oral at bedtime    MEDICATIONS  (PRN):  diphenhydrAMINE 25 milliGRAM(s) Oral every 6 hours PRN Rash and/or Itching  HYDROmorphone   Tablet 3 milliGRAM(s) Oral every 3 hours PRN Severe Pain (7 - 10)  HYDROmorphone   Tablet 1 milliGRAM(s) Oral every 3 hours PRN Moderate Pain (4 - 6)  metoclopramide  IVPB 5 milliGRAM(s) IV Intermittent every 6 hours PRN nausea vomiting  ondansetron Injectable 4 milliGRAM(s) IV Push every 6 hours PRN Nausea and/or Vomiting  simethicone 80 milliGRAM(s) Chew every 6 hours PRN Gas  sodium chloride 0.65% Nasal 1 Spray(s) Both Nostrils every 6 hours PRN Nasal Congestion  tiZANidine 2 milliGRAM(s) Oral every 8 hours PRN muscle spasms    LABS:    I&O's Summary    RADIOLOGY & ADDITIONAL STUDIES:  < from: MR Lumbar Spine w/ IV Cont (07.12.20 @ 16:18) >    IMPRESSION:     Thoracic spine:  1.  Enhancing mass involving the right posterior T7 vertebral body and posterior elements consistent with metastasis. Tumor extends into the right T6-T7 and T7-T8 neural foramina resulting in mild neuroforaminal narrowing.  2.  6 mm enhancing lesion in the posterior T9 vertebral body suspicious for metastasis.  3.  Right lung mass. Bilateral pleural effusions and atelectasis. Lesion in the right hepatic lobe. Lesions versus artifact in the spleen. 1.6 cm left adrenal nodule.    Lumbar spine:  1.  Enhancing lesion involving the L1 vertebral body consistent with metastasis. There is associated loss of height of the L1 inferior endplate. Ventral epidural tumor is noted at this level, resulting in moderate spinal canal narrowing. Prevertebral enhancing soft tissue consistent with tumor. Enhancement of bilateral psoas muscles for which tumor invasion cannot be excluded.  2.  2.2 cm enhancing lesion in the left L5 vertebral body and posterior elements suggesting metastasis. Additional enhancing lesions in the left sacrum and iliac bone suggesting metastases.  3.  Abnormal [id number] signal in the L4 vertebral body without associated enhancement is again seen.  4.  Linear dural enhancement extending from the T10 level into the lumbar spine. Differential diagnosis includes postprocedural change versus tumor.  5.  Retroperitoneal lymphadenopathy.      ADVANCE DIRECTIVES:   DNR  NO  Completed on:                     MOLST   NO   Completed on:  Living Will  NO   Completed on:

## 2020-07-17 NOTE — DISCHARGE NOTE PROVIDER - NSDCFUSCHEDAPPT_GEN_ALL_CORE_FT
NADER SCHAEFFER ; 07/24/2020 ; [facility]  NADER SCHAEFFER ; 07/31/2020 ; [facility]  NADER SCHAEFFER ; 08/14/2020 ; [facility]  NADER SCHAEFFER ; 08/18/2020 ; [place] [address] [facility]  NADER SCHAEFFER ; 08/21/2020 ; [facility]  NADER SCHAEFFER ; 09/04/2020 ; [facility]  NADER SCHAEFFER ; 09/11/2020 ; [facility]  NADER SCHAEFFER ; 09/25/2020 ; [facility] NADER SCHAEFFER ; 07/24/2020 ; [facility]  NADER SCHAEFFER ; 07/31/2020 ; [facility]  NADER SCHAEFFER ; 08/14/2020 ; [facility]  NADER SCHAEFFER ; 08/18/2020 ; NPP [address]  NADER SCHAEFFER ; 08/21/2020 ; [facility]  NADER SCHAEFFER ; 09/04/2020 ; [facility]  NADER SCHAEFFER ; 09/11/2020 ; [facility]  NADER SCHAEFFER ; 09/25/2020 ; [facility]

## 2020-07-17 NOTE — DISCHARGE NOTE PROVIDER - HOSPITAL COURSE
63 y/o female with hx of adenocarcinoma lung cancer s/p right lower lobectomy and mediastinal lymph node dissection in 2017 by Dr. Alex comes to the ED persistent and worsening Right lower back pain for the past few days with difficulty to walk due to pain. Per pt her oncologist told her to come to the hospital. Pt. is scheduled to have chemotherapy and radiation. Pt. had a CT scan of her chest on 7/1 that showed new lung mass with mets to her bones (L1, T7 and 6th rib).patient is being treated for metastatic disease pain and opioid induced constipation , had MRI lumbar and thoracic spine showed extensive disease . currently undergoing RT and remains on multimodal pain control regimen . plan is to go to rehab ,  is working on placement.     Generalized pain / back pain due to fractures and metastatic disease to bones.    MRI done shows extensive disease T7,T9,lesions in right hepatic lobe , 1.6 cm adrenal nodule , lesion in spleen , L1 , L5, left sacrum and iliac bone , also showed lung mass 	pt. undergoing RT on 7/17 5/5 radiation sessions. Pain management consulted pain is better controlled, added IV for breakthrough pain and continued with multimodal approach.     Widely metastatic lung cancer no chemo for now as per Dr. Joseph. as per recs from Dr. Joseph MRI brain has been ordered to evaluate for metastatic disease     chemo will be outpatient. patient carries guarded prognosis.    Pt. with Acute hypoxic respiratory failure probably due to lung malignancy will need oxygen after discharge as well. O2 sats >90%.    Pt. with opioid induced constipation started with bowel regimen.     working on ZACHARY placement.         < from: Xray Abdomen 1 View PORTABLE -Urgent (07.13.20 @ 10:07) >    No acute intra-abdominal findings.         < from: MR Lumbar Spine w/ IV Cont (07.12.20 @ 16:18) >    Thoracic spine:    1.  Enhancing mass involving the right posterior T7 vertebral body and posterior elements consistent with metastasis. Tumor extends into the right T6-T7 and T7-T8 neural foramina resulting in mild neuroforaminal narrowing.    2.  6 mm enhancing lesion in the posterior T9 vertebral body suspicious for metastasis.    3.  Right lung mass. Bilateral pleural effusions and atelectasis. Lesion in the right hepatic lobe. Lesions versus artifact in the spleen. 1.6 cm left adrenal nodule.        Lumbar spine:    1.  Enhancing lesion involving the L1 vertebral body consistent with metastasis. There is associated loss of height of the L1 inferior endplate. Ventral epidural tumor is noted at this level, resulting in moderate spinal canal narrowing. Prevertebral enhancing soft tissue consistent with tumor. Enhancement of bilateral psoas muscles for which tumor invasion cannot be excluded.    2.  2.2 cm enhancing lesion in the left L5 vertebral body and posterior elements suggesting metastasis. Additional enhancing lesions in the left sacrum and iliac bone suggesting metastases.    3.  Abnormal M2uqxgmfngjth signal in the L4 vertebral body without associated enhancement is again seen.    4.  Linear dural enhancement extending from the T10 level into the lumbar spine. Differential diagnosis includes postprocedural change versus tumor.    5.  Retroperitoneal lymphadenopathy.        < from: MR Head No Cont (07.08.20 @ 22:37) >    No intracranial metastasis is visualized, within limits of a noncontrast MRI.  Follow-up contrast-enhanced imaging is recommended for complete evaluation.    Patchy white matter lesions are nonspecific in etiology but likely represent chronic microvascular ischemic disease in this age group. 65 y/o female with hx of adenocarcinoma lung cancer s/p right lower lobectomy and mediastinal lymph node dissection in 2017 by Dr. Bradley comes to the ED persistent and worsening Right lower back pain for the past few days with difficulty to walk due to pain. Per pt her oncologist told her to come to the hospital. Pt. is scheduled to have chemotherapy and radiation. Pt. had a CT scan of her chest on 7/1 that showed new lung mass with mets to her bones (L1, T7 and 6th rib).patient is being treated for metastatic disease pain and opioid induced constipation , had MRI lumbar and thoracic spine showed extensive disease . currently undergoing RT and remains on multimodal pain control regimen . plan is to go to rehab ,  is working on placement.     Generalized pain / back pain due to fractures and metastatic disease to bones.    MRI done shows extensive disease T7,T9,lesions in right hepatic lobe , 1.6 cm adrenal nodule , lesion in spleen , L1 , L5, left sacrum and iliac bone , also showed lung mass 	pt. undergoing RT on 7/17 5/5 radiation sessions. Pain management consulted pain is better controlled, added IV for breakthrough pain and continued with multimodal approach.     Widely metastatic lung cancer no chemo for now as per Dr. Joseph. as per recs from Dr. Joseph MRI brain has been ordered to evaluate for metastatic disease     chemo will be outpatient. patient carries guarded prognosis.    Pt. with Acute hypoxic respiratory failure probably due to lung malignancy will need oxygen after discharge as well. O2 sats >90%.    Pt. with opioid induced constipation started with bowel regimen.     working on ZACHARY placement.         < from: Xray Abdomen 1 View PORTABLE -Urgent (07.13.20 @ 10:07) >    No acute intra-abdominal findings.         < from: MR Lumbar Spine w/ IV Cont (07.12.20 @ 16:18) >    Thoracic spine:    1.  Enhancing mass involving the right posterior T7 vertebral body and posterior elements consistent with metastasis. Tumor extends into the right T6-T7 and T7-T8 neural foramina resulting in mild neuroforaminal narrowing.    2.  6 mm enhancing lesion in the posterior T9 vertebral body suspicious for metastasis.    3.  Right lung mass. Bilateral pleural effusions and atelectasis. Lesion in the right hepatic lobe. Lesions versus artifact in the spleen. 1.6 cm left adrenal nodule.        Lumbar spine:    1.  Enhancing lesion involving the L1 vertebral body consistent with metastasis. There is associated loss of height of the L1 inferior endplate. Ventral epidural tumor is noted at this level, resulting in moderate spinal canal narrowing. Prevertebral enhancing soft tissue consistent with tumor. Enhancement of bilateral psoas muscles for which tumor invasion cannot be excluded.    2.  2.2 cm enhancing lesion in the left L5 vertebral body and posterior elements suggesting metastasis. Additional enhancing lesions in the left sacrum and iliac bone suggesting metastases.    3.  Abnormal [id number] signal in the L4 vertebral body without associated enhancement is again seen.    4.  Linear dural enhancement extending from the T10 level into the lumbar spine. Differential diagnosis includes postprocedural change versus tumor.    5.  Retroperitoneal lymphadenopathy.        < from: MR Head No Cont (07.08.20 @ 22:37) >    No intracranial metastasis is visualized, within limits of a noncontrast MRI.  Follow-up contrast-enhanced imaging is recommended for complete evaluation.    Patchy white matter lesions are nonspecific in etiology but likely represent chronic microvascular ischemic disease in this age group.        time spent on dc 32 minutes        PE        GENERAL: NAD, pleasant     CHEST/LUNG: CTAB , no wheezing , rales, rhonchi heard     HEART: S1S2+, Regular rate and rhythm; No murmurs, rubs, or gallops    ABDOMEN: Soft, Nontender, distended; Bowel sounds present    EXTREMITIES:  no pitting edema , pulses palpated BL.    NEURO: aaox 3

## 2020-07-17 NOTE — DISCHARGE NOTE PROVIDER - NSDCCPCAREPLAN_GEN_ALL_CORE_FT
PRINCIPAL DISCHARGE DIAGNOSIS  Diagnosis: Bone metastases  Assessment and Plan of Treatment: Bone metastases, follow up with Oncology, outpatient chemo Rx      SECONDARY DISCHARGE DIAGNOSES  Diagnosis: Lung cancer  Assessment and Plan of Treatment: Lung cancer, follow up with Oncology, outpatient chemo Rx

## 2020-07-17 NOTE — DISCHARGE NOTE PROVIDER - CARE PROVIDERS DIRECT ADDRESSES
[email or web address]

## 2020-07-17 NOTE — DISCHARGE NOTE PROVIDER - NSDCMRMEDTOKEN_GEN_ALL_CORE_FT
aspirin 81 mg oral tablet: 1 tab(s) orally 2 times a day  atorvastatin 10 mg oral tablet: 1 tab(s) orally once a day (at bedtime)  CBD oil:   celecoxib 200 mg oral capsule: 1 cap(s) orally 2 times a day  collagen protein: orally once a day  diphenhydrAMINE 25 mg oral capsule: 1 cap(s) orally every 6 hours, As needed, Rash and/or Itching  fentaNYL 25 mcg/hr transdermal film, extended release: 1 patch transdermal every 72 hours  gabapentin 300 mg oral capsule: 1 cap(s) orally every 8 hours  HYDROmorphone:   lactulose 10 g/15 mL oral syrup:  orally   levothyroxine 175 mcg (0.175 mg) oral tablet: 1 tab(s) orally once a day  lidocaine 5% topical film:  topically   liothyronine 5 mcg oral tablet: 1 tab(s) orally once a day  magnesium hydroxide 8% oral suspension: 30 milliliter(s) orally once a day  metoclopramide 5 mg/mL injectable solution:  injectable   milk thistle oral capsule:  orally once a day  naloxegol 25 mg oral tablet: 1 tab(s) orally once a day  naproxen 500 mg oral tablet:   naproxen 500 mg oral tablet: 1 tab(s) orally 2 times a day  ondansetron 2 mg/mL injectable solution:  injectable   simethicone 80 mg oral tablet, chewable: 1 tab(s) orally every 6 hours, As needed, Gas  sodium chloride 0.65% nasal spray: 1 spray(s) each nostril nasally every 6 hours, As Needed for nasal congestion  tiZANidine 2 mg oral tablet: 1 tab(s) orally every 8 hours, As needed, muscle spasms  traMADol 50 mg oral tablet: 1 tab(s) orally every 6 hours, As Needed - for moderate pain MDD:4 tablets aspirin 81 mg oral tablet: 1 tab(s) orally once a day  atorvastatin 10 mg oral tablet: 1 tab(s) orally once a day (at bedtime)  CBD oil:   celecoxib 200 mg oral capsule: 1 cap(s) orally 2 times a day  collagen protein: orally once a day  dexamethasone 2 mg oral tablet: 2 tab(s) orally every 6 hours   taper  Dilaudid 2 mg oral tablet: 1 tab(s) orally every 8 hours, As Needed MDD:3  Dilaudid 2 mg oral tablet: 2 tab(s) orally every 8 hours, As Needed MDD:6 tabs   diphenhydrAMINE 25 mg oral capsule: 1 cap(s) orally every 6 hours, As needed, Rash and/or Itching  fentaNYL 25 mcg/hr transdermal film, extended release: 1 patch transdermal every 72 hours  gabapentin 300 mg oral capsule: 1 cap(s) orally every 8 hours  lactulose 10 g/15 mL oral syrup:  orally   levothyroxine 175 mcg (0.175 mg) oral tablet: 1 tab(s) orally once a day  lidocaine 5% topical film:  topically   liothyronine 5 mcg oral tablet: 1 tab(s) orally once a day  Lovenox 40 mg/0.4 mL injectable solution: 40 milligram(s) subcutaneously once a day   magnesium hydroxide 8% oral suspension: 30 milliliter(s) orally once a day  milk thistle oral capsule:  orally once a day  naloxegol 25 mg oral tablet: 1 tab(s) orally once a day  simethicone 80 mg oral tablet, chewable: 1 tab(s) orally every 6 hours, As needed, Gas  sodium chloride 0.65% nasal spray: 1 spray(s) each nostril nasally every 6 hours, As Needed for nasal congestion  tiZANidine 2 mg oral tablet: 1 tab(s) orally every 8 hours, As needed, muscle spasms

## 2020-07-17 NOTE — DISCHARGE NOTE PROVIDER - PROVIDER TOKENS
PROVIDER:[TOKEN:[[id number]]],PROVIDER:[TOKEN:[[id number]]],PROVIDER:[TOKEN:[[id number]]],PROVIDER:[TOKEN:[[id number]]]

## 2020-07-17 NOTE — PROGRESS NOTE ADULT - SUBJECTIVE AND OBJECTIVE BOX
CC: pain     INTERVAL HPI/OVERNIGHT EVENTS: seen and examined , feels like she is having more pain and wants to increase pain meds   has not had bowel movement for 3 days , sleeping ok , appetite is ok .           Vital Signs Last 24 Hrs  T(C): 36.3 (17 Jul 2020 11:45), Max: 36.7 (16 Jul 2020 16:51)  T(F): 97.4 (17 Jul 2020 11:45), Max: 98.1 (17 Jul 2020 00:08)  HR: 84 (17 Jul 2020 13:16) (78 - 90)  BP: 122/76 (17 Jul 2020 13:16) (115/64 - 124/80)  BP(mean): --  RR: 18 (17 Jul 2020 13:16) (18 - 18)  SpO2: 99% (17 Jul 2020 13:16) (92% - 99%)    PHYSICAL EXAM:    GENERAL: uncomfortable , awake   CHEST/LUNG: CTAB , no wheezing , rales, rhonchi heard   HEART: S1S2+, Regular rate and rhythm; No murmurs, rubs, or gallops  ABDOMEN: Soft, Nontender, Nondistended; Bowel sounds present  EXTREMITIES:  no pitting edema , pulses palpated BL.        LABS:                  MEDICATIONS  (STANDING):  atorvastatin 10 milliGRAM(s) Oral at bedtime  bisacodyl Suppository 10 milliGRAM(s) Rectal daily  celecoxib 200 milliGRAM(s) Oral two times a day  enoxaparin Injectable 40 milliGRAM(s) SubCutaneous daily  fentaNYL   Patch  25 MICROgram(s)/Hr 1 Patch Transdermal every 72 hours  gabapentin 300 milliGRAM(s) Oral every 8 hours  lactulose Retention Enema 200 Gram(s) Rectal once  levothyroxine 175 MICROGram(s) Oral daily  lidocaine   Patch 2 Patch Transdermal daily  liothyronine 5 MICROGram(s) Oral daily  magnesium hydroxide Suspension 30 milliLiter(s) Oral daily  naloxegol 25 milliGRAM(s) Oral daily  polyethylene glycol 3350 17 Gram(s) Oral at bedtime    MEDICATIONS  (PRN):  diphenhydrAMINE 25 milliGRAM(s) Oral every 6 hours PRN Rash and/or Itching  HYDROmorphone   Tablet 3 milliGRAM(s) Oral every 3 hours PRN Severe Pain (7 - 10)  HYDROmorphone   Tablet 1 milliGRAM(s) Oral every 3 hours PRN Moderate Pain (4 - 6)  HYDROmorphone  Injectable 1 milliGRAM(s) IV Push every 4 hours PRN breakthrough pain  metoclopramide  IVPB 5 milliGRAM(s) IV Intermittent every 6 hours PRN nausea vomiting  ondansetron Injectable 4 milliGRAM(s) IV Push every 6 hours PRN Nausea and/or Vomiting  simethicone 80 milliGRAM(s) Chew every 6 hours PRN Gas  sodium chloride 0.65% Nasal 1 Spray(s) Both Nostrils every 6 hours PRN Nasal Congestion  tiZANidine 2 milliGRAM(s) Oral every 8 hours PRN muscle spasms      RADIOLOGY & ADDITIONAL TESTS:

## 2020-07-17 NOTE — PROGRESS NOTE ADULT - ASSESSMENT
65 y/o female with hx of adenocarcinoma lung cancer s/p right lower lobectomy and mediastinal lymph node dissection in 2017 by Dr. Alex comes to the ED persistent and worsening Right lower back pain for the past few days with difficulty to walk due to pain. Per pt her oncologist told her to come to the hospital. Pt. is scheduled to have chemotherapy and radiation. Pt. had a CT scan of her chest on 7/1 that showed new lung mass with mets to her bones (L1, T7 and 6th rib).patient is being treated for metastatic disease pain and opioid induced constipation , had MRI lumbar and thoracic spine showed extensive disease . currently undergoing RT and remains on multimodal pain control regimen . plan is to go to rehab ,  is working on placement     # Generalized pain / back pain   due to fractures and metastatic disease to bones  MRI done shows extensive disease T7,T9,lesions in right hepatic lobe , 1.6 cm adrenal nodule , lesion in spleen , L1 , L5, left sacrum and iliac bone , also showed lung mass 	   undergoing RT   pain was better controlled but today felt somewhat increased , added IV for breakthrough pain   will continue with multimodal approach     # Widely metastatic lung cancer   no chemo for now as per Dr. Joseph   chemo will be outpatient   patient carries guarded prognosis      # Acute hypoxic respiratory failure   probably due to lung malignancy   will probably need oxygen after discharge as well  will monitor     # opioid induced constipation   resolved   continue with bowel regimen , no BM for 3 days , will do enema again today       # Hypothyroidism   on levo     # DVT prophylaxis  lovenox    # Disposition   [facility]     details discussed with patient , all concerns answered   also discussed with  , who wants to consider rehab placement   this has been discussed with  as well   daughter Jacob called [phone] and updated , as per recs from Dr. Joseph MRI brain has been ordered to evaluate for metastatic disease    working on [facility] placement

## 2020-07-18 NOTE — PROGRESS NOTE ADULT - SUBJECTIVE AND OBJECTIVE BOX
CC: bone pain / back pain     INTERVAL HPI/OVERNIGHT EVENTS: seen and examined , had worsening of pain yesterday , meds were adjusted by palliative care team , no BM in last 4 days , no SOB , no chest pain , awaiting placement           Vital Signs Last 24 Hrs  T(C): 36.3 (18 Jul 2020 07:55), Max: 36.6 (17 Jul 2020 15:58)  T(F): 97.4 (18 Jul 2020 07:55), Max: 97.9 (18 Jul 2020 00:09)  HR: 81 (18 Jul 2020 07:55) (79 - 99)  BP: 125/75 (18 Jul 2020 07:55) (108/70 - 125/75)  BP(mean): --  RR: 19 (18 Jul 2020 07:55) (18 - 19)  SpO2: 99% (18 Jul 2020 07:55) (95% - 99%)    PHYSICAL EXAM:    GENERAL: looks uncomfortable   CHEST/LUNG: CTAB , no wheezing , rales, rhonchi heard   HEART: S1S2+, Regular rate and rhythm; No murmurs, rubs, or gallops  ABDOMEN: Soft, Nontender, distended; Bowel sounds present  EXTREMITIES:  no pitting edema , pulses palpated BL.        LABS:                        11.7   5.49  )-----------( 268      ( 18 Jul 2020 08:01 )             36.9     07-18    136  |  95<L>  |  12.0  ----------------------------<  113<H>  4.7   |  30.0<H>  |  0.43<L>    Ca    9.5      18 Jul 2020 08:01              MEDICATIONS  (STANDING):  atorvastatin 10 milliGRAM(s) Oral at bedtime  bisacodyl Suppository 10 milliGRAM(s) Rectal daily  celecoxib 200 milliGRAM(s) Oral two times a day  enoxaparin Injectable 40 milliGRAM(s) SubCutaneous daily  fentaNYL   Patch  50 MICROgram(s)/Hr 1 Patch Transdermal every 72 hours  gabapentin 300 milliGRAM(s) Oral every 8 hours  lactulose Retention Enema 200 Gram(s) Rectal once  lactulose Retention Enema 200 Gram(s) Rectal daily  levothyroxine 175 MICROGram(s) Oral daily  lidocaine   Patch 2 Patch Transdermal daily  liothyronine 5 MICROGram(s) Oral daily  magnesium hydroxide Suspension 30 milliLiter(s) Oral daily  naloxegol 25 milliGRAM(s) Oral daily  polyethylene glycol 3350 17 Gram(s) Oral at bedtime    MEDICATIONS  (PRN):  diphenhydrAMINE 25 milliGRAM(s) Oral every 6 hours PRN Rash and/or Itching  HYDROmorphone   Tablet 2 milliGRAM(s) Oral every 4 hours PRN Moderate Pain (4 - 6)  HYDROmorphone   Tablet 4 milliGRAM(s) Oral every 3 hours PRN Severe Pain (7 - 10)  metoclopramide  IVPB 5 milliGRAM(s) IV Intermittent every 6 hours PRN nausea vomiting  ondansetron Injectable 4 milliGRAM(s) IV Push every 6 hours PRN Nausea and/or Vomiting  simethicone 80 milliGRAM(s) Chew every 6 hours PRN Gas  sodium chloride 0.65% Nasal 1 Spray(s) Both Nostrils every 6 hours PRN Nasal Congestion  tiZANidine 2 milliGRAM(s) Oral every 8 hours PRN muscle spasms      RADIOLOGY & ADDITIONAL TESTS: CC: bone pain / back pain     INTERVAL HPI/OVERNIGHT EVENTS: seen and examined , had worsening of pain yesterday , meds were adjusted by palliative care team , no BM in last 4 days , no SOB , no chest pain , awaiting placement   patient is worried about a cyst in her back , which has been present but patient feels its getting bigger , discussed that this will need outpatient follow up           Vital Signs Last 24 Hrs  T(C): 36.3 (18 Jul 2020 07:55), Max: 36.6 (17 Jul 2020 15:58)  T(F): 97.4 (18 Jul 2020 07:55), Max: 97.9 (18 Jul 2020 00:09)  HR: 81 (18 Jul 2020 07:55) (79 - 99)  BP: 125/75 (18 Jul 2020 07:55) (108/70 - 125/75)  BP(mean): --  RR: 19 (18 Jul 2020 07:55) (18 - 19)  SpO2: 99% (18 Jul 2020 07:55) (95% - 99%)    PHYSICAL EXAM:    GENERAL: looks uncomfortable   CHEST/LUNG: CTAB , no wheezing , rales, rhonchi heard   HEART: S1S2+, Regular rate and rhythm; No murmurs, rubs, or gallops  ABDOMEN: Soft, Nontender, distended; Bowel sounds present  EXTREMITIES:  no pitting edema , pulses palpated BL.        LABS:                        11.7   5.49  )-----------( 268      ( 18 Jul 2020 08:01 )             36.9     07-18    136  |  95<L>  |  12.0  ----------------------------<  113<H>  4.7   |  30.0<H>  |  0.43<L>    Ca    9.5      18 Jul 2020 08:01              MEDICATIONS  (STANDING):  atorvastatin 10 milliGRAM(s) Oral at bedtime  bisacodyl Suppository 10 milliGRAM(s) Rectal daily  celecoxib 200 milliGRAM(s) Oral two times a day  enoxaparin Injectable 40 milliGRAM(s) SubCutaneous daily  fentaNYL   Patch  50 MICROgram(s)/Hr 1 Patch Transdermal every 72 hours  gabapentin 300 milliGRAM(s) Oral every 8 hours  lactulose Retention Enema 200 Gram(s) Rectal once  lactulose Retention Enema 200 Gram(s) Rectal daily  levothyroxine 175 MICROGram(s) Oral daily  lidocaine   Patch 2 Patch Transdermal daily  liothyronine 5 MICROGram(s) Oral daily  magnesium hydroxide Suspension 30 milliLiter(s) Oral daily  naloxegol 25 milliGRAM(s) Oral daily  polyethylene glycol 3350 17 Gram(s) Oral at bedtime    MEDICATIONS  (PRN):  diphenhydrAMINE 25 milliGRAM(s) Oral every 6 hours PRN Rash and/or Itching  HYDROmorphone   Tablet 2 milliGRAM(s) Oral every 4 hours PRN Moderate Pain (4 - 6)  HYDROmorphone   Tablet 4 milliGRAM(s) Oral every 3 hours PRN Severe Pain (7 - 10)  metoclopramide  IVPB 5 milliGRAM(s) IV Intermittent every 6 hours PRN nausea vomiting  ondansetron Injectable 4 milliGRAM(s) IV Push every 6 hours PRN Nausea and/or Vomiting  simethicone 80 milliGRAM(s) Chew every 6 hours PRN Gas  sodium chloride 0.65% Nasal 1 Spray(s) Both Nostrils every 6 hours PRN Nasal Congestion  tiZANidine 2 milliGRAM(s) Oral every 8 hours PRN muscle spasms      RADIOLOGY & ADDITIONAL TESTS:

## 2020-07-18 NOTE — PROGRESS NOTE ADULT - ASSESSMENT
63 y/o female with hx of adenocarcinoma lung cancer s/p right lower lobectomy and mediastinal lymph node dissection in 2017 by Dr. Alex comes to the ED persistent and worsening Right lower back pain for the past few days with difficulty to walk due to pain. Per pt her oncologist told her to come to the hospital. Pt. is scheduled to have chemotherapy and radiation. Pt. had a CT scan of her chest on 7/1 that showed new lung mass with mets to her bones (L1, T7 and 6th rib).patient is being treated for metastatic disease pain and opioid induced constipation , had MRI lumbar and thoracic spine showed extensive disease . currently undergoing RT and remains on multimodal pain control regimen . plan is to go to rehab ,  is working on placement     # Generalized pain / back pain   due to fractures and metastatic disease to bones  MRI done shows extensive disease T7,T9,lesions in right hepatic lobe , 1.6 cm adrenal nodule , lesion in spleen , L1 , L5, left sacrum and iliac bone , also showed lung mass 	   undergoing RT   pain became uncontrolled yesterday , meds adjusted by palliative care team   will continue with multimodal approach     # Widely metastatic lung cancer   no chemo for now as per Dr. Joseph   chemo will be outpatient   patient carries guarded prognosis  MRI brain negative for mets       # Acute hypoxic respiratory failure   probably due to lung malignancy   will probably need oxygen after discharge as well  will monitor     # opioid induced constipation   resolved   continue with bowel regimen , no BM for 4 days , will do enema again today   enema to be given daily       # Hypothyroidism   on levo     # DVT prophylaxis  lovenox    # Disposition   [facility]     details discussed with patient , all concerns answered   also discussed with  , who wants to consider rehab placement   this has been discussed with  as well   daughter Jacob called [phone] and updated    working on [facility] placement 65 y/o female with hx of adenocarcinoma lung cancer s/p right lower lobectomy and mediastinal lymph node dissection in 2017 by Dr. Alex comes to the ED persistent and worsening Right lower back pain for the past few days with difficulty to walk due to pain. Per pt her oncologist told her to come to the hospital. Pt. is scheduled to have chemotherapy and radiation. Pt. had a CT scan of her chest on 7/1 that showed new lung mass with mets to her bones (L1, T7 and 6th rib).patient is being treated for metastatic disease pain and opioid induced constipation , had MRI lumbar and thoracic spine showed extensive disease . currently undergoing RT and remains on multimodal pain control regimen . plan is to go to rehab ,  is working on placement     # Generalized pain / back pain   due to fractures and metastatic disease to bones  MRI done shows extensive disease T7,T9,lesions in right hepatic lobe , 1.6 cm adrenal nodule , lesion in spleen , L1 , L5, left sacrum and iliac bone , also showed lung mass 	   completed 5 sessions of  RT   pain became uncontrolled yesterday , meds adjusted by palliative care team   will continue with multimodal approach     # Widely metastatic lung cancer   no chemo for now as per Dr. Joseph   chemo will be outpatient   patient carries guarded prognosis  MRI brain negative for mets       # Acute hypoxic respiratory failure   probably due to lung malignancy   will probably need oxygen after discharge as well  will monitor     # opioid induced constipation   continue with bowel regimen , no BM for 4 days , will do enema again today   enema to be given daily , on miralax , movantik       # Hypothyroidism   on levo     # DVT prophylaxis  lovenox    # Disposition   [facility]     details discussed with patient , all concerns answered   also discussed with  , who wants to consider rehab placement   this has been discussed with  as well   daughter Jacob called [phone] and updated    working on [facility] placement

## 2020-07-19 NOTE — PROGRESS NOTE ADULT - SUBJECTIVE AND OBJECTIVE BOX
NADER SCHAEFFER    [id number]    64y      Female    INTERVAL HPI/OVERNIGHT EVENTS: patient being seen for metastatic cancer. patient seen at bedside and states she still has pain     REVIEW OF SYSTEMS:    CONSTITUTIONAL: pain  RESPIRATORY: No cough, wheezing, hemoptysis; No shortness of breath  CARDIOVASCULAR: No chest pain, palpitations  GASTROINTESTINAL: No abdominal or epigastric pain. No nausea, vomiting  NEUROLOGICAL: No headaches, memory loss, loss of strength.  MISCELLANEOUS:      Vital Signs Last 24 Hrs  T(C): 36.4 (19 Jul 2020 07:26), Max: 36.8 (18 Jul 2020 16:09)  T(F): 97.6 (19 Jul 2020 07:26), Max: 98.3 (18 Jul 2020 16:09)  HR: 84 (19 Jul 2020 07:26) (80 - 101)  BP: 116/72 (19 Jul 2020 07:26) (115/60 - 116/76)  BP(mean): --  RR: 18 (19 Jul 2020 07:26) (18 - 18)  SpO2: 95% (19 Jul 2020 07:26) (95% - 98%)    PHYSICAL EXAM:    GENERAL: looks uncomfortable   CHEST/LUNG: CTAB , no wheezing , rales, rhonchi heard   HEART: S1S2+, Regular rate and rhythm; No murmurs, rubs, or gallops  ABDOMEN: Soft, Nontender, distended; Bowel sounds present  EXTREMITIES:  no pitting edema , pulses palpated BL.      LABS:                        11.7   5.49  )-----------( 268      ( 18 Jul 2020 08:01 )             36.9     07-18    136  |  95<L>  |  12.0  ----------------------------<  113<H>  4.7   |  30.0<H>  |  0.43<L>    Ca    9.5      18 Jul 2020 08:01      MEDICATIONS  (STANDING):  atorvastatin 10 milliGRAM(s) Oral at bedtime  bisacodyl Suppository 10 milliGRAM(s) Rectal daily  celecoxib 200 milliGRAM(s) Oral two times a day  dexAMETHasone  Injectable 4 milliGRAM(s) IV Push every 6 hours  enoxaparin Injectable 40 milliGRAM(s) SubCutaneous daily  fentaNYL   Patch  50 MICROgram(s)/Hr 1 Patch Transdermal every 72 hours  gabapentin 300 milliGRAM(s) Oral every 8 hours  lactulose Retention Enema 200 Gram(s) Rectal daily  levothyroxine 175 MICROGram(s) Oral daily  lidocaine   Patch 2 Patch Transdermal daily  liothyronine 5 MICROGram(s) Oral daily  magnesium hydroxide Suspension 30 milliLiter(s) Oral daily  naloxegol 25 milliGRAM(s) Oral daily  polyethylene glycol 3350 17 Gram(s) Oral at bedtime    MEDICATIONS  (PRN):  acetaminophen   Tablet .. 650 milliGRAM(s) Oral every 6 hours PRN Temp greater or equal to 38C (100.4F), Mild Pain (1 - 3)  diphenhydrAMINE 25 milliGRAM(s) Oral every 6 hours PRN Rash and/or Itching  HYDROmorphone   Tablet 2 milliGRAM(s) Oral every 4 hours PRN Moderate Pain (4 - 6)  HYDROmorphone   Tablet 4 milliGRAM(s) Oral every 3 hours PRN Severe Pain (7 - 10)  metoclopramide  IVPB 5 milliGRAM(s) IV Intermittent every 6 hours PRN nausea vomiting  ondansetron Injectable 4 milliGRAM(s) IV Push every 6 hours PRN Nausea and/or Vomiting  simethicone 80 milliGRAM(s) Chew every 6 hours PRN Gas  sodium chloride 0.65% Nasal 1 Spray(s) Both Nostrils every 6 hours PRN Nasal Congestion  tiZANidine 2 milliGRAM(s) Oral every 8 hours PRN muscle spasms      RADIOLOGY & ADDITIONAL TESTS:

## 2020-07-19 NOTE — PROGRESS NOTE ADULT - ASSESSMENT
65 y/o female with hx of adenocarcinoma lung cancer s/p right lower lobectomy and mediastinal lymph node dissection in 2017 by Dr. Alex comes to the ED persistent and worsening Right lower back pain for the past few days with difficulty to walk due to pain. Per pt her oncologist told her to come to the hospital. Pt. is scheduled to have chemotherapy and radiation. Pt. had a CT scan of her chest on 7/1 that showed new lung mass with mets to her bones (L1, T7 and 6th rib).patient is being treated for metastatic disease pain and opioid induced constipation , had MRI lumbar and thoracic spine showed extensive disease . currently undergoing RT and remains on multimodal pain control regimen . plan is to go to rehab ,  is working on placement     # Generalized pain / back pain   due to fractures and metastatic disease to bones  MRI done shows extensive disease T7,T9,lesions in right hepatic lobe , 1.6 cm adrenal nodule , lesion in spleen , L1 , L5, left sacrum and iliac bone , also showed lung mass 	   completed 5 sessions of  RT   add decadron iv   --> c.w pain meds     # Widely metastatic lung cancer   no chemo for now as per Dr. Joseph   chemo will be outpatient   patient carries guarded prognosis  MRI brain negative for mets     # Acute hypoxic respiratory failure   probably due to lung malignancy   will probably need oxygen after discharge as well  will monitor     # opioid induced constipation   improved    # Hypothyroidism   on levo     # DVT prophylaxis  lovenox    # Disposition   [facility]     details discussed with patient , all concerns answered

## 2020-07-20 NOTE — PROGRESS NOTE ADULT - REASON FOR ADMISSION
Lung metastasis with hypoxemia

## 2020-07-20 NOTE — PROGRESS NOTE ADULT - ATTENDING COMMENTS
COUNSELING:    Face to face meeting to discuss Advanced Care Planning - Time Spent ______ Minutes.  See goals of care note.    More than 50% time spent in counseling and coordinating care. _25_____ Minutes.     Thank you for the opportunity to assist with the care of this patient.   [facility] [phone].
COUNSELING:    Face to face meeting to discuss Advanced Care Planning - Time Spent ______ Minutes.  See goals of care note.    More than 50% time spent in counseling and coordinating care. __25____ Minutes.     Thank you for the opportunity to assist with the care of this patient.   [place] [facility] [phone].
COUNSELING:    Face to face meeting to discuss Advanced Care Planning - Time Spent ______ Minutes.  See goals of care note.    More than 50% time spent in counseling and coordinating care. ___30__ Minutes.     Thank you for the opportunity to assist with the care of this patient.   [facility] [phone].
COUNSELING:    Face to face meeting to discuss Advanced Care Planning - Time Spent ______ Minutes.  See goals of care note.    More than 50% time spent in counseling and coordinating care. ___20___ Minutes.     Thank you for the opportunity to assist with the care of this patient.   [facility] [phone].

## 2020-07-20 NOTE — PROGRESS NOTE ADULT - ASSESSMENT
63 yo F with Diffuse Metastatic Lung to Bone Pain S/P RT to L shoulder and spine for intractable pain    Metastatic Lung Cancer  Spinal lytic lesions  Clavicle (L) pathologic Fx  RT treatments 5/5 completed  Chemotherapy is being  considered- Oncology waiting for improved functional status    Intractable Bone Pain  Intensity less  No more Hallucinations or confusion  Pain Med dosages were reduced, then some reordered last Friday  IV Dilaudid DC'd  Pain exacerbation over weekend  Decadron ordered which is providing much needed relief    OPioid Induced Constipation  Oral Bowel regime continued  Movantic daily  Three days since last BM Bowel continues to be slow  Will need enema again today    GO  Planning on discharge to [facility] today  [facility] briefly discussed, Patient does not want to think that way yet, determined to fight cancer  Acute aggressive care has been her goal since diagnosis of Metastatic Cancer

## 2020-07-20 NOTE — PROGRESS NOTE ADULT - PROVIDER SPECIALTY LIST ADULT
Hospitalist
Internal Medicine
Internal Medicine
Pain Medicine
Pain Medicine
Palliative Care
Hospitalist

## 2020-07-20 NOTE — PROGRESS NOTE ADULT - SUBJECTIVE AND OBJECTIVE BOX
NADER SCHAEFFER    [id number]    64y      Female    INTERVAL HPI/OVERNIGHT EVENTS: patient seen at bedside. patient being seen for metastatic disease. patient denies any complaints and states pain is better       REVIEW OF SYSTEMS:    CONSTITUTIONAL: No fever, weight loss, or fatigue  RESPIRATORY: No cough, wheezing, hemoptysis; No shortness of breath  CARDIOVASCULAR: No chest pain, palpitations  GASTROINTESTINAL: No abdominal or epigastric pain. No nausea, vomiting  NEUROLOGICAL: No headaches, memory loss, loss of strength.  MISCELLANEOUS:      Vital Signs Last 24 Hrs  T(C): 36.5 (20 Jul 2020 08:18), Max: 36.5 (20 Jul 2020 08:18)  T(F): 97.7 (20 Jul 2020 08:18), Max: 97.7 (20 Jul 2020 08:18)  HR: 85 (20 Jul 2020 08:18) (85 - 87)  BP: 112/72 (20 Jul 2020 08:18) (112/72 - 121/77)  BP(mean): --  RR: 18 (20 Jul 2020 08:18) (18 - 20)  SpO2: 95% (19 Jul 2020 23:27) (95% - 95%)    PHYSICAL EXAM:    GENERAL: NAD  CHEST/LUNG: CTAB , no wheezing , rales, rhonchi heard   HEART: S1S2+, Regular rate and rhythm; No murmurs, rubs, or gallops  ABDOMEN: Soft, Nontender, distended; Bowel sounds present  EXTREMITIES:  no pitting edema , pulses palpated BL.      LABS:        MEDICATIONS  (STANDING):  atorvastatin 10 milliGRAM(s) Oral at bedtime  bisacodyl Suppository 10 milliGRAM(s) Rectal daily  celecoxib 200 milliGRAM(s) Oral two times a day  dexAMETHasone  Injectable 4 milliGRAM(s) IV Push every 6 hours  enoxaparin Injectable 40 milliGRAM(s) SubCutaneous daily  fentaNYL   Patch  50 MICROgram(s)/Hr 1 Patch Transdermal every 72 hours  gabapentin 300 milliGRAM(s) Oral every 8 hours  lactulose Retention Enema 200 Gram(s) Rectal daily  levothyroxine 175 MICROGram(s) Oral daily  lidocaine   Patch 2 Patch Transdermal daily  liothyronine 5 MICROGram(s) Oral daily  magnesium hydroxide Suspension 30 milliLiter(s) Oral daily  naloxegol 25 milliGRAM(s) Oral daily  polyethylene glycol 3350 17 Gram(s) Oral at bedtime    MEDICATIONS  (PRN):  acetaminophen   Tablet .. 650 milliGRAM(s) Oral every 6 hours PRN Temp greater or equal to 38C (100.4F), Mild Pain (1 - 3)  diphenhydrAMINE 25 milliGRAM(s) Oral every 6 hours PRN Rash and/or Itching  HYDROmorphone   Tablet 2 milliGRAM(s) Oral every 4 hours PRN Moderate Pain (4 - 6)  HYDROmorphone   Tablet 4 milliGRAM(s) Oral every 3 hours PRN Severe Pain (7 - 10)  metoclopramide  IVPB 5 milliGRAM(s) IV Intermittent every 6 hours PRN nausea vomiting  ondansetron Injectable 4 milliGRAM(s) IV Push every 6 hours PRN Nausea and/or Vomiting  simethicone 80 milliGRAM(s) Chew every 6 hours PRN Gas  sodium chloride 0.65% Nasal 1 Spray(s) Both Nostrils every 6 hours PRN Nasal Congestion  tiZANidine 2 milliGRAM(s) Oral every 8 hours PRN muscle spasms      RADIOLOGY & ADDITIONAL TESTS:

## 2020-07-20 NOTE — PROGRESS NOTE ADULT - ASSESSMENT
65 y/o female with hx of adenocarcinoma lung cancer s/p right lower lobectomy and mediastinal lymph node dissection in 2017 by Dr. Alex comes to the ED persistent and worsening Right lower back pain for the past few days with difficulty to walk due to pain. Per pt her oncologist told her to come to the hospital. Pt. is scheduled to have chemotherapy and radiation. Pt. had a CT scan of her chest on 7/1 that showed new lung mass with mets to her bones (L1, T7 and 6th rib).patient is being treated for metastatic disease pain and opioid induced constipation , had MRI lumbar and thoracic spine showed extensive disease . currently undergoing RT and remains on multimodal pain control regimen . plan is to go to rehab ,  is working on placement     # Generalized pain / back pain   due to fractures and metastatic disease to bones  MRI done shows extensive disease T7,T9,lesions in right hepatic lobe , 1.6 cm adrenal nodule , lesion in spleen , L1 , L5, left sacrum and iliac bone , also showed lung mass 	   completed 5 sessions of  RT   dc with decadron  --> c.w pain meds     # Widely metastatic lung cancer   no chemo for now as per Dr. Joseph   chemo will be outpatient   patient carries guarded prognosis  MRI brain negative for mets     # Acute hypoxic respiratory failure   stable    # opioid induced constipation   improved    # Hypothyroidism   on levo     # DVT prophylaxis  lovenox    # Disposition   [facility]     medically stable for dc to [facility]

## 2020-07-24 PROBLEM — G89.3 CANCER RELATED PAIN: Status: ACTIVE | Noted: 2020-01-01

## 2020-07-27 PROBLEM — C34.90 MALIGNANT NEOPLASM OF UNSPECIFIED PART OF UNSPECIFIED BRONCHUS OR LUNG: Chronic | Status: ACTIVE | Noted: 2017-09-19

## 2020-08-08 NOTE — ED PROVIDER NOTE - PMH
Anxiety    Drug abuse  Back in the 70's and 80's (Cocaine)  Genital herpes    Hypothyroid    Lung cancer  with mets  LAMAR on CPAP

## 2020-08-08 NOTE — ED ADULT NURSE NOTE - CHIEF COMPLAINT QUOTE
patient coming from [place] complaining of left leg pain, patient with hx of Metastatic Lung Cancer, is dealing with left cervical fx. Dr. Winn is Oncologist and has lobectomy on right side, is currently on chemo therapy.

## 2020-08-08 NOTE — H&P ADULT - ASSESSMENT
65 y/o female with hx of adenocarcinoma of lung s/p right lower lobectomy and mediastinal lymph node dissection in 2017 by Dr. Bradley presented to the ED d/t persistent and worsening left hip pain days with difficulty to walking due to pain. Pt denies any trauma, states pain started suddenly, is constant and progressively worse. Improved with pain medication and rest, worse with movement. Rates pain currently 8/10, no radiation. Describes the pain as stinging pain with some numbness over the skin overlying the hip. Denies any trauma/falls.     Left pathological femoral neck fracture 2/2 metastatic adenocarcinoma  - prn pain medication  - NBW to left ext  - continue prn meds  - ortho consult appreciated, pending further recs - NPO after midnight for possible OR in am    Lung adenocarcinoma with mets to bone  - follows rad/onc Dr. Keith  - Heme/onc Dr. Joseph   - next scheduled chemotherapy for 8/14  - previously seen Palliative, will reconsult for further pain management and continued goals of care    Back pain due to mets  - pain management    Hypothyroidism   - continue synthroid and cytomel    HLD  - on statin    DVT ppx  - B/L scds as per ortho 63 y/o female with hx of adenocarcinoma of lung s/p right lower lobectomy and mediastinal lymph node dissection in 2017 by Dr. Bradley presented to the ED d/t persistent and worsening left hip pain days with difficulty to walking due to pain. Pt denies any trauma, states pain started suddenly, is constant and progressively worse. Improved with pain medication and rest, worse with movement. Rates pain currently 8/10, no radiation. Describes the pain as stinging pain with some numbness over the skin overlying the hip. Denies any trauma/falls.     Left pathological femoral neck fracture 2/2 metastatic adenocarcinoma  - prn pain medication  - NBW to left ext  - continue prn meds  - ortho consult appreciated, pending further recs, attending to see pt in am    Lung adenocarcinoma with mets to bone  - follows rad/onc Dr. Keith  - Heme/onc Dr. Joseph   - next scheduled chemotherapy for 8/14  - previously seen Palliative, will reconsult for further pain management and continued goals of care    Back pain due to mets  - pain management    Hypothyroidism   - continue synthroid and cytomel    HLD  - on statin    DVT ppx  - lovenox sq  - B/L scds as per ortho 65 y/o female with hx of adenocarcinoma of lung s/p right lower lobectomy and mediastinal lymph node dissection in 2017 by Dr. Bradley presented to the ED d/t persistent and worsening left hip pain days with difficulty to walking due to pain. Pt denies any trauma, states pain started suddenly, is constant and progressively worse. Improved with pain medication and rest, worse with movement. Rates pain currently 8/10, no radiation. Describes the pain as stinging pain with some numbness over the skin overlying the hip. Denies any trauma/falls.     Left pathological femoral neck fracture 2/2 metastatic adenocarcinoma  - prn pain medication  - NBW to left ext  - continue prn meds  - ortho consult appreciated, pending further recs, attending to see pt in am    Thromocytopenia  - not seen on previous admission  - continue to monitor  -  heme/onc consulted    Lung adenocarcinoma with mets to bone  - follows rad/onc Dr. Keith  - Heme/onc Dr. Joseph   - next scheduled chemotherapy for 8/14  - previously seen Palliative, will reconsult for further pain management and continued goals of care  - heme/onc consulted    Back pain due to mets  - pain management    Hypothyroidism   - continue synthroid and cytomel    HLD  - on statin    DVT ppx  - lovenox sq  - B/L scds as per ortho 65 y/o female with hx of adenocarcinoma of lung s/p right lower lobectomy and mediastinal lymph node dissection in 2017 by Dr. Bradley presented to the ED d/t persistent and worsening left hip pain days with difficulty to walking due to pain. Pt denies any trauma, states pain started suddenly, is constant and progressively worse. Improved with pain medication and rest, worse with movement. Rates pain currently 8/10, no radiation. Describes the pain as stinging pain with some numbness over the skin overlying the hip. Denies any trauma/falls.     Left pathological femoral neck fracture 2/2 metastatic adenocarcinoma  - prn pain medication  - NBW to left ext  - continue prn meds  - ortho consult appreciated, pending further recs, attending to see pt in am, no plans for OR yet    Thromocytopenia  - not seen on previous admission  - continue to monitor  -  heme/onc consulted    Lung adenocarcinoma with mets to bone  - follows rad/onc Dr. Keith  - Heme/onc Dr. Joseph   - next scheduled chemotherapy for 8/14  - previously seen Palliative, will reconsult for further pain management and continued goals of care  - heme/onc consulted    Back pain due to mets  - pain management    Hypothyroidism   - continue synthroid and cytomel    HLD  - on statin    DVT ppx  - lovenox sq, if platelets continue to trend down please d/c  - B/L scds as per ortho

## 2020-08-08 NOTE — ED ADULT NURSE REASSESSMENT NOTE - NS ED NURSE REASSESS COMMENT FT1
pt admitted. IV p-laced labs sent. C/o pain. Medications given per Md order. VSS at this time. Report given to FELIPA Smith in CDU. Pt transferred to CDU bed 14.
Stable

## 2020-08-08 NOTE — ED PROVIDER NOTE - OBJECTIVE STATEMENT
63 y/o female with hx of adenocarcinoma lung cancer s/p right lower lobectomy and mediastinal lymph node dissection in 2017 by Dr. Alex . Pt. had a CT scan of her chest on 7/1 that showed new lung mass with mets to her bones (L1, T7 and 6th rib).patient is being treated for metastatic disease pain 63 y/o female with hx of adenocarcinoma lung cancer s/p right lower lobectomy and mediastinal lymph node dissection in 2017 by Dr. Alex presenting to the ED with worsening left hip pain radiating down to left thigh and knee for the past 3-4 days. Pt. denies any recent trauma. Pt. currently at nursing home/rehab facility. Pt. had a CT scan of her chest on 7/1 that showed new lung mass with mets to her bones (L1, T7 and 6th rib).patient is being treated for metastatic disease pain. PT. is concerned about possible new lesions to her lower extremity. Pt. has been having difficulty ambulating with her walker since the increase pain to her hip/knee. Pt. is on Dilaudid 2-4mg for pain. Pain is controlled at this time.

## 2020-08-08 NOTE — H&P ADULT - NSHPPHYSICALEXAM_GEN_ALL_CORE
T(C): 36.6 (08-08-20 @ 17:21), Max: 36.7 (08-08-20 @ 10:40)  HR: 104 (08-08-20 @ 21:21) (87 - 104)  BP: 125/67 (08-08-20 @ 21:21) (113/75 - 125/67)  RR: 18 (08-08-20 @ 21:21) (18 - 18)  SpO2: 99% (08-08-20 @ 21:21) (98% - 99%)  Wt(kg): --    Physical Exam:   GENERAL: NAD, well-groomed, well-developed  HEENT:  Atraumatic, Normocephalic, EOMI, PERRLA, conjunctiva and sclera clear,  Moist mucous membranes  Neck: Supple  NERVOUS SYSTEM:  Alert & Oriented X3, Good concentration; Motor Strength 5/5 RLE, minimal movement of LLE due to pain, able to move feet and toes. 5/5 strength of upper ext b/l   CHEST/LUNG: Clear to auscultation bilaterally; No rales, rhonchi, wheezing  HEART: Regular rate and rhythm; No murmurs  ABDOMEN: Soft, Nontender, Nondistended; Bowel sounds present  EXTREMITIES:  2+ Peripheral Pulses, No clubbing, cyanosis, or edema T(C): 36.6 (08-08-20 @ 17:21), Max: 36.7 (08-08-20 @ 10:40)  HR: 104 (08-08-20 @ 21:21) (87 - 104)  BP: 125/67 (08-08-20 @ 21:21) (113/75 - 125/67)  RR: 18 (08-08-20 @ 21:21) (18 - 18)  SpO2: 99% (08-08-20 @ 21:21) (98% - 99%)  Wt(kg): --    Physical Exam:   GENERAL: NAD, well developed  HEENT:  Atraumatic, Normocephalic, EOMI, PERRLA, conjunctiva and sclera clear,  Moist mucous membranes  Neck: Supple  NERVOUS SYSTEM:  Alert & Oriented X3, Good concentration; Motor Strength 5/5 RLE, minimal movement of LLE due to pain, able to move feet and toes. 5/5 strength of upper ext b/l   CHEST/LUNG: Clear to auscultation bilaterally; No rales, rhonchi, wheezing  HEART: Regular rate and rhythm; No murmurs  ABDOMEN: Soft, Nontender, Nondistended; Bowel sounds present  EXTREMITIES:  2+ Peripheral Pulses, No clubbing, cyanosis, or edema

## 2020-08-08 NOTE — ED PROVIDER NOTE - MUSCULOSKELETAL MINIMAL EXAM
atraumatic/FROM of Left hip. Mild tenderness to left hip and femur and left knee. NO swelling noted to knee/normal range of motion

## 2020-08-08 NOTE — ED ADULT NURSE NOTE - OBJECTIVE STATEMENT
Pt received A&Ox3 c/o left lower leg pain. Pt states the pain radiates to the hip and the knee. No pain at this time. B/l pedal pulses present. Pt states her last chemo treatment was last Friday. Pt has a 25 mcg fentanyl patch on left anterior chest and lidocaine patch on left knee. Pt placed on CM 89 bpm in afib. Breathing even and unlabored. Pt resting comfortably in stretcher w/call bell in reach. Pt safety maintained. NAD. Pt made aware of plan of care.

## 2020-08-08 NOTE — ED PROVIDER NOTE - PROGRESS NOTE DETAILS
Pt. resting comfortably. Pt. still waiting for CT scan. Ortho has been consulted due to developing pathologic fracture of Left hip. Pt. still complaining of pain. Pt. will be medicated. Pt. admitted to hospitalist service.

## 2020-08-08 NOTE — ED ADULT NURSE NOTE - NSIMPLEMENTINTERV_GEN_ALL_ED
Implemented All Fall Risk Interventions:  [place] to call system. Call bell, personal items and telephone within reach. Instruct patient to call for assistance. Room bathroom lighting operational. Non-slip footwear when patient is off stretcher. Physically safe environment: no spills, clutter or unnecessary equipment. Stretcher in lowest position, wheels locked, appropriate side rails in place. Provide visual cue, wrist band, yellow gown, etc. Monitor gait and stability. Monitor for mental status changes and reorient to person, place, and time. Review medications for side effects contributing to fall risk. Reinforce activity limits and safety measures with patient and family.

## 2020-08-08 NOTE — CONSULT NOTE ADULT - SUBJECTIVE AND OBJECTIVE BOX
Pt Name: NADER SCHAEFFER    MRN: [id number]      Patient is a 64y Female presenting to the emergency department with a chief complaint of chronic Left hip and femur pain which increased pain with last couple of days                         Pt denies any acute falls or trauma to the area.                         Pt has a PMHX adenocarcinoma lung cancer      REVIEW OF SYSTEMS    General: Alert, responsive, in NAD  	  Ophthalmologic: No visual changes. No redness.   	  ENMT:	No discharge. No swelling.    Respiratory and Thorax: No difficulty breathing.   	   Cardiovascular:	No chest pain. No palpitations.    Gastrointestinal:	 No abdominal pain/    Musculoskeletal: SEE HPI.    Neurological: No sensory or motor changes.     ROS is otherwise negative.    PAST MEDICAL & SURGICAL HISTORY:    Genital herpes  Drug abuse: Back in the 70&#x27;s and 80&#x27;s (Cocaine)  Lung cancer: with mets  LAMAR on CPAP  Anxiety  Hypothyroid  Status post lobectomy of lung: 3/2017 (Right lung)  S/P bunionectomy: with revision      Allergies: Bananas (Headache)  latex (Rash (Mild))  opioid-like analgesics (Hives; Urticaria)  penicillin (Swelling (Mild to Mod))      Medications:     FAMILY HISTORY:  Family history of hypothyroidism  Family history of hypertension  Family history of stroke  : non-contributory    Social History: Pt states quit smoking tobacco and drinking etoh 5 years ago    Ambulation: Walking independently [ ] With Cane [x ] With Walker [ ]  Bedbound [ ]                 Vital Signs Last 24 Hrs  T(C): 36.6 (08 Aug 2020 17:21), Max: 36.7 (08 Aug 2020 10:40)  T(F): 97.9 (08 Aug 2020 17:21), Max: 98 (08 Aug 2020 10:40)  HR: 87 (08 Aug 2020 17:21) (87 - 98)  BP: 113/75 (08 Aug 2020 17:21) (113/75 - 121/76)  BP(mean): --  RR: 18 (08 Aug 2020 17:21) (18 - 18)  SpO2: 98% (08 Aug 2020 17:21) (98% - 98%)    Daily Height in cm: 171 (08 Aug 2020 10:42)    Daily       PHYSICAL EXAM:      Appearance: Alert, responsive, in no acute distress.    Neurological: Sensation is grossly intact to light touch. 5/5 motor function of all  other extremities.     Skin: no rash on visible skin. Skin is clean, dry and intact. No bleeding. No abrasions. No ulcerations.    Vascular: 2+ distal pulses. Cap refill < 2 sec. No signs of venous insufficiency or stasis. No extremity ulcerations. No cyanosis.    Musculoskeletal:         Left Upper Extremity:  + NROM. Non-tender. No signs of trauma.         Right Upper Extremity:   + NROM. Non-tender. No signs of trauma.           Left Lower Extremity:  Left hip and femur discomfort                                     Dec Active & Passive ROM due to pain                                     Non tender to palpation                                     Normal skin color                                     No swelling noted                                     Discomfort with straight leg raise                                     Mild pain with log roll      Left knee Dec PROM & AROM                                      NVID                                     Calves soft, NT                                     +DF/PF                                      +SILT            Right Lower Extremity:  + NROM. Non-tender. No signs of trauma.      Imaging Studies:CT SCAN Large lesion involving Left femoral neck extending to intertroch region concerning                                         developing pathologic fracture    A/P:  Pt is a  64y Female with chronic  Left hip pain  found to have possible pathological Left hip fx    PLAN: D/W on call Attending  * Bed rest  * Left hip and knee x-ray ordered  *  Will f/u x-rays   * L LE NWB  * Cont pain management  * Cont B/L SCD's  * Pt Name: NADER SCHAEFFER    MRN: [id number]      Patient is a 64y Female presenting to the emergency department with a chief complaint of chronic Left hip and femur pain which increased pain with last couple of days                         Pt denies any acute falls or trauma to the area.                         Pt has a PMHX adenocarcinoma lung cancer      REVIEW OF SYSTEMS    General: Alert, responsive, in NAD  	  Ophthalmologic: No visual changes. No redness.   	  ENMT:	No discharge. No swelling.    Respiratory and Thorax: No difficulty breathing.   	   Cardiovascular:	No chest pain. No palpitations.    Gastrointestinal:	 No abdominal pain/    Musculoskeletal: SEE HPI.    Neurological: No sensory or motor changes.     ROS is otherwise negative.    PAST MEDICAL & SURGICAL HISTORY:    Genital herpes  Drug abuse: Back in the 70&#x27;s and 80&#x27;s (Cocaine)  Lung cancer: with mets  LAMAR on CPAP  Anxiety  Hypothyroid  Status post lobectomy of lung: 3/2017 (Right lung)  S/P bunionectomy: with revision      Allergies: Bananas (Headache)  latex (Rash (Mild))  opioid-like analgesics (Hives; Urticaria)  penicillin (Swelling (Mild to Mod))      Medications:     FAMILY HISTORY:  Family history of hypothyroidism  Family history of hypertension  Family history of stroke  : non-contributory    Social History: Pt states quit smoking tobacco and drinking etoh 5 years ago    Ambulation: Walking independently [ ] With Cane [x ] With Walker [ ]  Bedbound [ ]                 Vital Signs Last 24 Hrs  T(C): 36.6 (08 Aug 2020 17:21), Max: 36.7 (08 Aug 2020 10:40)  T(F): 97.9 (08 Aug 2020 17:21), Max: 98 (08 Aug 2020 10:40)  HR: 87 (08 Aug 2020 17:21) (87 - 98)  BP: 113/75 (08 Aug 2020 17:21) (113/75 - 121/76)  BP(mean): --  RR: 18 (08 Aug 2020 17:21) (18 - 18)  SpO2: 98% (08 Aug 2020 17:21) (98% - 98%)    Daily Height in cm: 171 (08 Aug 2020 10:42)    Daily       PHYSICAL EXAM:      Appearance: Alert, responsive, in no acute distress.    Neurological: Sensation is grossly intact to light touch. 5/5 motor function of all  other extremities.     Skin: no rash on visible skin. Skin is clean, dry and intact. No bleeding. No abrasions. No ulcerations.    Vascular: 2+ distal pulses. Cap refill < 2 sec. No signs of venous insufficiency or stasis. No extremity ulcerations. No cyanosis.    Musculoskeletal:         Left Upper Extremity:  + NROM. Non-tender. No signs of trauma.         Right Upper Extremity:   + NROM. Non-tender. No signs of trauma.           Left Lower Extremity:  Left hip and femur discomfort                                     Dec Active & Passive ROM due to pain                                     Non tender to palpation                                     Normal skin color                                     No swelling noted                                     Discomfort with straight leg raise                                     Mild pain with log roll      Left knee Dec PROM & AROM                                      NVID                                     Calves soft, NT                                     +DF/PF                                      +SILT            Right Lower Extremity:  + NROM. Non-tender. No signs of trauma.      Imaging Studies:CT SCAN Large lesion involving Left femoral neck extending to intertroch region concerning                                         developing pathologic fracture    A/P:  Pt is a  64y Female with chronic  Left hip pain  found to have possible pathological Left hip fx    PLAN: D/W on call Attending who will evaluate patient in the a.m     * Left hip and knee x-ray ordered  *  Will f/u x-rays   * L LE Partial weight bearing  * Cont pain management  * Cont B/L SCD's Pt Name: NADER SCHAEFFER    MRN: [id number]      Patient is a 64y Female presenting to the emergency department with a chief complaint of chronic Left hip and femur pain which increased pain with last couple of days                         Pt denies any acute falls or trauma to the area.                         Pt has a PMHX adenocarcinoma lung cancer      REVIEW OF SYSTEMS    General: Alert, responsive, in NAD  	  Ophthalmologic: No visual changes. No redness.   	  ENMT:	No discharge. No swelling.    Respiratory and Thorax: No difficulty breathing.   	   Cardiovascular:	No chest pain. No palpitations.    Gastrointestinal:	 No abdominal pain/    Musculoskeletal: SEE HPI.    Neurological: No sensory or motor changes.     ROS is otherwise negative.    PAST MEDICAL & SURGICAL HISTORY:    Genital herpes  Drug abuse: Back in the 70&#x27;s and 80&#x27;s (Cocaine)  Lung cancer: with mets  LAMAR on CPAP  Anxiety  Hypothyroid  Status post lobectomy of lung: 3/2017 (Right lung)  S/P bunionectomy: with revision      Allergies: Bananas (Headache)  latex (Rash (Mild))  opioid-like analgesics (Hives; Urticaria)  penicillin (Swelling (Mild to Mod))      Medications:     FAMILY HISTORY:  Family history of hypothyroidism  Family history of hypertension  Family history of stroke  : non-contributory    Social History: Pt states quit smoking tobacco and drinking etoh 5 years ago    Ambulation: Walking independently [ ] With Cane [x ] With Walker [ ]  Bedbound [ ]                 Vital Signs Last 24 Hrs  T(C): 36.6 (08 Aug 2020 17:21), Max: 36.7 (08 Aug 2020 10:40)  T(F): 97.9 (08 Aug 2020 17:21), Max: 98 (08 Aug 2020 10:40)  HR: 87 (08 Aug 2020 17:21) (87 - 98)  BP: 113/75 (08 Aug 2020 17:21) (113/75 - 121/76)  BP(mean): --  RR: 18 (08 Aug 2020 17:21) (18 - 18)  SpO2: 98% (08 Aug 2020 17:21) (98% - 98%)    Daily Height in cm: 171 (08 Aug 2020 10:42)    Daily       PHYSICAL EXAM:      Appearance: Alert, responsive, in no acute distress.    Neurological: Sensation is grossly intact to light touch. 5/5 motor function of all  other extremities.     Skin: no rash on visible skin. Skin is clean, dry and intact. No bleeding. No abrasions. No ulcerations.    Vascular: 2+ distal pulses. Cap refill < 2 sec. No signs of venous insufficiency or stasis. No extremity ulcerations. No cyanosis.    Musculoskeletal:         Left Upper Extremity:  + NROM. Non-tender. No signs of trauma.         Right Upper Extremity:   + NROM. Non-tender. No signs of trauma.           Left Lower Extremity:  Left hip and femur discomfort                                     Dec Active & Passive ROM due to pain                                     Non tender to palpation                                     Normal skin color                                     No swelling noted                                     Discomfort with straight leg raise                                     Mild pain with log roll      Left knee Dec PROM & AROM                                      NVID                                     Calves soft, NT                                     +DF/PF                                      +SILT            Right Lower Extremity:  + NROM. Non-tender. No signs of trauma.      Imaging Studies:CT SCAN Large lesion involving Left femoral neck extending to intertroch region concerning                                         developing pathologic fracture    A/P:  Pt is a  64y Female with chronic  Left hip pain  found to have possible pathological Left hip fx    PLAN: D/W on call Attending who will evaluate patient in the a.m       * Left hip and knee x-ray ordered  *  Will f/u x-rays   * L LE Partial weight bearing  * Cont pain management  * Cont B/L SCD's  *F/u with Tumor ortho surgeon

## 2020-08-08 NOTE — H&P ADULT - NSHPSOCIALHISTORY_GEN_ALL_CORE
hx of smoking, started smoking at age 13, now quit smoking cigarettes 8 years ago (prior 1.5ppd for 35 years)  Drinks alcohol socially  Prior drug abuse

## 2020-08-08 NOTE — ED ADULT TRIAGE NOTE - CHIEF COMPLAINT QUOTE
patient coming from [place] complaining of left leg pain, patient with hx of Metastatic Lung Cancer, is dealing with left cervical fx. Dr. Winn is Oncologist and has lobectomy on right side, is currently on chemo therapy.

## 2020-08-08 NOTE — H&P ADULT - HISTORY OF PRESENT ILLNESS
65 y/o female with hx of adenocarcinoma of lung s/p right lower lobectomy and mediastinal lymph node dissection in 2017 by Dr. Bradley presented to the ED d/t persistent and worsening left hip pain days with difficulty to walking due to pain. Pt denies any trauma, states pain started suddenly, is constant and progressively worse. Improved with pain medication and rest, worse with movement. Rates pain currently 8/10, no radiation. Describes the pain as stinging pain with some numbness over the skin overlying the hip. Denies any trauma/falls.     Pt started chemotherapy July 14th, 2020. Has a course of 4 sessions every friday. Pt. had a CT scan of her chest on 7/1 that showed new lung mass with mets to her bones (L1, T7 and 6th rib). States that mets were not present in april/may and were newly diagnosed in July. Pt. also states that she has a fracture of the left shoulder/clavicle, seen in ED imaging on previous admission.     Denies any chest pain, SOB, cough, fever, trauma/falls, constipation, diarrhea, urinary frequency/urgency, dysuria.     Pt s/p CT scan and xray in ED showing femoral head fracture. Ortho consulted. 63 y/o female with hx of adenocarcinoma of lung s/p right lower lobectomy and mediastinal lymph node dissection in 2017 by Dr. Bradley presented to the ED d/t persistent and worsening left hip pain days with difficulty to walking due to pain. Pt denies any trauma, states pain started suddenly, is constant and progressively worse. Improved with pain medication and rest, worse with movement. Rates pain currently 8/10, no radiation. Describes the pain as stinging pain with some numbness over the skin overlying the hip. Denies any trauma/falls.     Pt started chemotherapy July 14th, 2020. Has a course of 4 sessions every friday. Pt. had a CT scan of her chest on 7/1 that showed new lung mass with mets to her bones (L1, T7 and 6th rib). States that mets were not present in april/may and were newly diagnosed in July. Pt. also states that she has a fracture of the left shoulder/clavicle, seen in ED imaging on previous admission.     Denies any chest pain, SOB, cough, fever, trauma/falls, constipation, diarrhea, urinary frequency/urgency, dysuria.     Pt s/p CT scan and xray in ED showing pathologic femoral head fracture. Ortho consulted.

## 2020-08-08 NOTE — ED PROVIDER NOTE - CLINICAL SUMMARY MEDICAL DECISION MAKING FREE TEXT BOX
Pt. with lung CA with metastatic disease to her thoracic and lumbar spine presenting with worsening left hip pain radiating to her knee. Pain is controlled now. Will order CT scan and re-evaluate.

## 2020-08-08 NOTE — ED PROVIDER NOTE - CARE PLAN
Principal Discharge DX:	Hip pain, chronic, left Principal Discharge DX:	Hip pain, chronic, left  Secondary Diagnosis:	Bony metastasis

## 2020-08-09 NOTE — HISTORY OF PRESENT ILLNESS
[T: ___] : T[unfilled] [AJCC Stage: ____] : AJCC Stage: [unfilled] [N: ___] : N[unfilled] [Medical Office: ([facility])___] : at the medical office located in  [Home] : at home, [unfilled] , at the time of the visit. [Spouse] : spouse [Other:____] : [unfilled] [Verbal consent obtained from patient] : the patient, [unfilled] [de-identified] : The patient was diagnosed with adenocarcinoma of the lung in March 2017 at the age of 61.  She presented with an incidental finding of a 2 cm RLL pulmonary nodule on routine CXR, prior to foot surgery. On February 17, 2017, a CT chest showed a lobulated lesion in the superior segment right lower lobe abutting the fissure along its anterior margin which measured 2.2 x 2.0 cm.  On February 24, 2017 a PET/CT showed the lobulated opacity in the superior segment of the right lower lobe, 1.9 x 1.9 cm with SUV 2.8.  On March 24, 2017 Dr. Bradley performed a right lower lobectomy and mediastinal lymph node dissection.  The tumor size from the right lower lobe wedge resection was 1.8 x 1.7 x 1.6cm.  Pathology showed adenocarcinoma, acinar type, moderately to  poorly differentiated, 1.8 x 1.7 x 1.6 cm with clear margins.  Mediastinal lymph nodes were negative for tumor.  Immunohistochemistry studies showed were positive for:  CK7, Napsin A and TTF-1; and negative for: CDX-2, CK20 and mammaglobin.  Smoking history since age 14, and quit 4 years ago, ~50 PY history.  Drinks ~ 2 glasses wine per day.   [de-identified] : The patient presents for follow up. Currently in [facility] s/p hospitalization for pain management.  Patient c/o severe pain s/t bone metastasis, some improvement s/p palliative RT but pain level still can be 7/10 for the back and still 10/10 in the left shoulder with activity.  Pain worst when sitting, mild/mod pain when lying or standing.  No N/V/C/D.  No fevers, no cough, no SOB.    [de-identified] : Adenocarcinoma of lung

## 2020-08-09 NOTE — CONSULT NOTE ADULT - SUBJECTIVE AND OBJECTIVE BOX
HPI:  65 y/o female with hx of adenocarcinoma of lung s/p right lower lobectomy and mediastinal lymph node dissection in  by Dr. Bradley presented to the ED d/t persistent and worsening left hip pain days with difficulty walking due to pain. Pt denies any trauma, states pain started suddenly, is constant and progressively worse. Improved with pain medication and rest, worse with movement. Rates pain currently 8/10, no radiation. Describes the pain as stinging pain with some numbness over the skin overlying the hip. Denies any trauma/falls.     Pt started chemotherapy 2020, with carbo AUC 6, pemetrexed 500 mg/m2, and pembrolizumab 200 mg q3 weeks + xgeva q6 weeks x 4 cycles,  to be followed by maintenance pembrolizumab and pemetrexed. Pt. had a CT scan of her chest on  that showed new lung mass with mets to her bones (L1, T7 and 6th rib). States that mets were not present in april/may and were newly diagnosed in July. Pt. also states that she has a fracture of the left shoulder/clavicle, seen in ED imaging on previous admission.     Denies any chest pain, SOB, cough, fever, trauma/falls, constipation, diarrhea, urinary frequency/urgency, dysuria.     Pt s/p CT scan and xray in ED showing pathologic femoral head fracture. Ortho consulted. (08 Aug 2020 23:10)      PAST MEDICAL & SURGICAL HISTORY:  Genital herpes  Drug abuse: Back in the 70&#x27;s and 80&#x27;s (Cocaine)  Lung cancer: with mets  LAMAR on CPAP  Anxiety  Hypothyroid  Status post lobectomy of lung: 3/2017 (Right lung)  S/P bunionectomy: with revision      MEDICATIONS  (STANDING):  aspirin enteric coated 81 milliGRAM(s) Oral daily  atorvastatin 20 milliGRAM(s) Oral at bedtime  celecoxib 200 milliGRAM(s) Oral two times a day  dexAMETHasone     Tablet 4 milliGRAM(s) Oral every 6 hours  enoxaparin Injectable 40 milliGRAM(s) SubCutaneous daily  fentaNYL   Patch  25 MICROgram(s)/Hr 1 Patch Transdermal every 72 hours  folic acid 1 milliGRAM(s) Oral daily  gabapentin 300 milliGRAM(s) Oral every 8 hours  HYDROmorphone   Tablet 4 milliGRAM(s) Oral at bedtime  levothyroxine 100 MICROGram(s) Oral daily  lidocaine   Patch 1 Patch Transdermal daily  magnesium hydroxide Suspension 30 milliLiter(s) Oral daily    MEDICATIONS  (PRN):  diphenhydrAMINE 25 milliGRAM(s) Oral every 6 hours PRN Rash and/or Itching  HYDROmorphone   Tablet 2 milliGRAM(s) Oral every 6 hours PRN Severe Pain (7 - 10)  ondansetron Injectable 4 milliGRAM(s) IV Push every 6 hours PRN Nausea  simethicone 80 milliGRAM(s) Chew every 6 hours PRN Gas      Allergies    Bananas (Headache)  latex (Rash (Mild))  penicillin (Swelling (Mild to Mod))    Intolerances        FAMILY HISTORY:  Family history of hypothyroidism  Family history of hypertension  Family history of stroke      Review of Systems    Constitutional, Eyes, ENT, Cardiovascular, Respiratory, Gastrointestinal, Genitourinary, Musculoskeletal, Integumentary, Neurological, Psychiatric, Endocrine, Heme/Lymph and Allergic/Immunologic review of systems are otherwise negative except as noted in HPI.     Vital Signs Last 24 Hrs  T(C): 36.8 (09 Aug 2020 09:47), Max: 36.8 (09 Aug 2020 09:47)  T(F): 98.3 (09 Aug 2020 09:47), Max: 98.3 (09 Aug 2020 09:47)  HR: 104 (09 Aug 2020 09:47) (87 - 107)  BP: 106/76 (09 Aug 2020 09:47) (103/71 - 125/67)  BP(mean): --  RR: 18 (09 Aug 2020 09:47) (18 - 18)  SpO2: 96% (09 Aug 2020 09:47) (96% - 99%)    Physical Exam  Constitutional: well developed, well nourished  Eyes: PERRL, EOMI, no conjunctival infection, anicteric.   ENT: pharynx is unremarkable, moist mucus membrane, no oral lesions.   Neck: supple without JVD, no thyromegaly or masses appreciated.   Pulmonary: clear to auscultation bilaterally, no dullness, no wheezing.   Cardiac: RRR, normal S1S2, no murmurs, rubs, gallops.   Abdomen: normoactive bowel sounds, soft and nontender, no hepatosplenomegaly or masses appreciated.   Skin: normal appearance, no rash, nodules, vesicles, ulcers, erythema.   Psychiatric: affect appropriate.       LABS:  CBC Full  -  ( 09 Aug 2020 07:06 )  WBC Count : 5.20 K/uL  RBC Count : 4.13 M/uL  Hemoglobin : 11.7 g/dL  Hematocrit : 36.9 %  Platelet Count - Automated : 53 K/uL  Mean Cell Volume : 89.3 fl  Mean Cell Hemoglobin : 28.3 pg  Mean Cell Hemoglobin Concentration : 31.7 gm/dL  Auto Neutrophil # : 4.72 K/uL  Auto Lymphocyte # : 0.23 K/uL  Auto Monocyte # : 0.17 K/uL  Auto Eosinophil # : 0.00 K/uL  Auto Basophil # : 0.01 K/uL  Auto Neutrophil % : 90.8 %  Auto Lymphocyte % : 4.4 %  Auto Monocyte % : 3.3 %  Auto Eosinophil % : 0.0 %  Auto Basophil % : 0.2 %        138  |  96<L>  |  24.0<H>  ----------------------------<  111<H>  4.5   |  28.0  |  0.47<L>    Ca    9.0      09 Aug 2020 07:06  Phos  4.2     08-09  Mg     2.1     08-09    TPro  5.5<L>  /  Alb  3.3  /  TBili  0.7  /  DBili  x   /  AST  16  /  ALT  33<H>  /  AlkPhos  97  08-08    PT/INR - ( 09 Aug 2020 07:06 )   PT: 11.2 sec;   INR: 0.96 ratio           Urinalysis Basic - ( 09 Aug 2020 06:08 )    Color: Yellow / Appearance: Clear / S.010 / pH: x  Gluc: x / Ketone: Negative  / Bili: Negative / Urobili: 1 mg/dL   Blood: x / Protein: Negative mg/dL / Nitrite: Negative   Leuk Esterase: Negative / RBC: Negative /HPF / WBC Negative   Sq Epi: x / Non Sq Epi: Occasional / Bacteria: Negative        RADIOLOGY & ADDITIONAL STUDIES:    < from: CT Femur No Cont, Left (20 @ 16:36) >     EXAM:  CT FEMUR ONLY LT                         EXAM:  CT PELVIS BONY ONLY                         [facility]                         EXAM:  CT KNEE ONLY LT                          PROCEDURE DATE:  2020          INTERPRETATION:  CT PELVIS, BILATERAL FEMURS, AND LEFT KNEE    HISTORY: Pelvic and lower extremity pain with attention the left femur and knee. History of lung cancer with metastases. Evaluate for fracture or metastatic disease.    TECHNIQUE: Contiguousaxial imaging was performed through the pelvis, bilateral femurs, and left knee without contrast.  Coronal and sagittal reformatting was utilized. 3-D reformatting was also performed and the findings are compatible with the planar imaging.    COMPARISON: MRI of the lumbar spine dated 2020 and PET CT dated May 28, 2020    FINDINGS:    OSSEOUS STRUCTURES    Mass(es)/Cyst(s):  Heterogeneous sclerosis of L4 is again seen. Lytic lesion of the left L5 pedicle is increased in size extending into the transverse process.  Left S1 sacral lesion is mildly increased in size. Several lytic lesions of the pelvis are new from the prior PET CT involving the bilateral iliac crests, left superomedial medial acetabular wall, and right inferior pubic ramus. There is absence of the left superomedial acetabular wall with the lesion measuring up to 5.4 x 3.4 cm on axial image 70 with soft tissue expansion. Right inferior pubic ramus lesion measures approximately 1.3 x 1.7 cm with absence of the adjacent cortex. Iliac lesions also involve the adjacent cortex.  Left femoral neck lesion is new from the prior PET CT and occupies the anterior marrow cavity extending into the intertrochanteric region measuring approximately 5.7 cm in length and up to 2.4cm in width a slight buckling of the anterior cortex and tiny cortical defects are concerning for developing pathologic fracture. Mild expansile lesion occupies the left posterior greater trochanter. 0.8 cm cortical lesion is present within the anteromedial mid femoral diaphysis. Larger lesion involves the anterior cortex of the distal one third of the left femoral diaphysis measuring approximately 2.0 x 2.1 cm axially extending approximately 2.8 cm craniocaudally and occupying the entire marrow cavity.  1.4 cm cortical lesion is noted within the anteromedial right mid femoral diaphysis.    Symphysis Pubis:  Preserved.    Sacroiliac Joints:  Mild arthrosis is present bilaterally.    RIGHT HIP JOINT    Morphology:  Normal.    Avascular Necrosis:  None.    Joint Space:  Maintained.    Intra-articular Bodies:  None.    Effusion/Synovitis:  None.    LEFT HIP JOINT    Morphology:  Normal.    Avascular Necrosis:  None.    Joint Space:  Maintained.    Intra-articular Bodies:  None.    Effusion/Synovitis:  None.    LEFT KNEE JOINTS    Joint Space(s):  Maintained.    Effusion:  None.    Other Findings:  Suprapatellar enthesophytes are noted.    VISUALIZED SPINE  Lower lumbar degenerative disc disease is present in addition to the metastatic disease.    SOFT TISSUES  Small fat-containing umbilical hernia is present.  Mild atherosclerotic calcifications are present.  Anterior subcutaneous nodularity of the lower abdomen suggests medication injection sites.    IMPRESSION:  1. Multiple osseousmetastases are present that are increased in size and number from the prior PET CT.  2. Large lesion involves the left superomedial and medial acetabulum with absence of the superomedial acetabular wall.  3. Large lesion involves the left femoral neck extending into the intertrochanteric region with anterior cortical buckling and slight cortical defects of the neck concerning for a developing pathologic fracture.  4. Lesion of the left distal femoral diaphysis occupies the marrow cavity and causes some thinning of the anteromedial cortex.  4. Findings discussed with Dr. Jay Doran on 2020 4:59 PM.    < end of copied text >    < from: CT Head No Cont (20 @ 08:09) >     EXAM:  CT BRAIN                          PROCEDURE DATE:  2020          INTERPRETATION:  CLINICAL Indications:  confused    COMPARISON: MRI brain dated 2020.    TECHNIQUE: Noncontrast CT of the head. Multiplanar reformations are submitted.    FINDINGS:  There is periventricular and subcortical white matter hypodensity without mass effect, nonspecific, likely representing mild chronic microvascular ischemic changes. There is no compelling evidence for an acute transcortical infarction. There is no evidence of mass, mass effect, midline shift or extra-axial fluid collection. The lateral ventricles and cortical sulci are age-appropriate in size and configuration. Small mucous retention cyst in the right maxillary sinus and left sphenoid sinus measuring at least 1.5 cm in size respectively. Chronic right orbital medial wall fracture deformity. The orbits, mastoid air cells and visualized paranasal sinuses are otherwise unremarkable. The calvarium is intact.    IMPRESSION:  Mild chronic microvascular changes without evidence of an acute transcortical infarction or hemorrhage. MR is a more sensitive imaging modality for the evaluation of an acute infarction.    < end of copied text >        Ocologic history:  The patient was diagnosed with adenocarcinoma of the lung in 2017 at the age of 61. She presented with an incidental finding of a 2 cm RLL pulmonary nodule on routine CXR, prior to foot surgery. On 2017, a CT chest showed a lobulated lesion in the superior segment right lower lobe abutting the fissure along its anterior margin which measured 2.2 x 2.0 cm. On 2017 a PET/CT showed the lobulated opacity in the superior segment of the right lower lobe, 1.9 x 1.9 cm with SUV 2.8. On 2017 Dr. Bradley performed a right lower lobectomy and mediastinal lymph node dissection. The tumor size from the right lower lobe wedge resection was 1.8 x 1.7 x 1.6cm. Pathology showed adenocarcinoma, acinar type, moderately to poorly differentiated, 1.8 x 1.7 x 1.6 cm with clear margins. Mediastinal lymph nodes were negative for tumor.     -20 CT: increased focal thickening along the suture line, up to 3 cm in maximal      dimension (previously 1.1 cm). Findings are concerning for recurrence.      -discussed CT chest with Dr. Bradley. Given this is a difficult area to      biopsy, he recommended PET/CT       20 PET: FDG avid focal masslike soft tissue thickening along the right lower      lobectomy suture margin, 3 x 1.8 cm with FDG 13, suspicious for recurrent malignancy.      FDG avid right hilar and subcarinal lymph nodes probably metastatic. Multiple FDG avid      lytic lesions as delineated above, suspicious for metastatic disease.      -20 right lung biopsy: adenocarcinoma, PDL1 < 1%, no expression.

## 2020-08-09 NOTE — PROGRESS NOTE ADULT - ASSESSMENT
63 y/o female with hx of adenocarcinoma of lung s/p right lower lobectomy and mediastinal lymph node dissection in 2017 by Dr. Bradley presented to the ED d/t persistent and worsening left hip pain days with difficulty to walking due to pain. Pt denies any trauma, states pain started suddenly, is constant and progressively worse. Improved with pain medication and rest, worse with movement.  Pain upon admission was 8/10, no radiation. Describes the pain as stinging pain with some numbness over the skin overlying the hip. Denies any trauma/falls.  pain currently well controlled.    Left pathological femoral neck fracture 2/2 metastatic adenocarcinoma  - ortho and heme-onc input appreciated,  - On Fentanyl patch, Dilaudid prn.  - Palliative care consult requested for further assistance with pain control.    Thrombocytopenia:  ?due to chemotherapy  - not seen on previous admission  - continue to monitor  - heme/onc f/u    Back pain due to mets  - pain management    Hypothyroidism   - continue synthroid and cytomel    HLD  - on statin    DVT ppx  - lovenox sq, if platelets continue to trend down please d/c  - B/L scds as per ortho

## 2020-08-09 NOTE — CONSULT NOTE ADULT - PROBLEM SELECTOR RECOMMENDATION 9
-plan for outpatient systemic tx given PDL1 < 1%: carbo AUC 6, pemetrexed 500 mg/m2,and pembrolizumab 200 mg q3 weeks + xgeva q6 weeks x 4 cycles, followed by maintenance pembrolizumab and pemetrexed.  -[facility] sent for molecular analysis    -will need MRI brain to r/o brain metastases -C1D10 carbo AUC 6, pemetrexed 500 mg/m2,and pembrolizumab 200 mg q3 weeks + xgeva q6 weeks x 4 cycles, followed by maintenance pembrolizumab and pemetrexed.  -no chemotherapy planned inpatient  -ortho consulted for pathologic fractures  -consult RT onc for palliative RT for pain managment  -pall care/ pain consult

## 2020-08-09 NOTE — REVIEW OF SYSTEMS
[Shortness Of Breath] : shortness of breath [Cough] : cough [Negative] : Heme/Lymph [FreeTextEntry9] : foot surgery healed [FreeTextEntry6] : with exertion

## 2020-08-09 NOTE — CONSULT NOTE ADULT - PROBLEM SELECTOR RECOMMENDATION 2
Multiple bone mets noted on recent imaging CT 8/8/20:  1. Multiple osseous metastases increased in size and number   2. Large lesion involves the left superomedial and medial acetabulum with absence of the superomedial acetabular wall.  3. Large lesion involves the left femoral neck extending into the intertrochanteric region with anterior cortical buckling and slight cortical defects of the neck concerning for a developing pathologic fracture.  4. Lesion of the left distal femoral diaphysis occupies the marrow cavity and causes some thinning of the anteromedial cortex.  -pathologic fracture of the clavicle diagnosed in ED 7/6/20  -RT following for palliation of painful bone metastases.    -pall care consult asap for pain management.  She is currently on fentanyl and morphine IR.  She will need dose optimization. Multiple bone mets noted on recent imaging CT 8/8/20:  1. Multiple osseous metastases increased in size and number   2. Large lesion involves the left superomedial and medial acetabulum with absence of the superomedial acetabular wall.  3. Large lesion involves the left femoral neck extending into the intertrochanteric region with anterior cortical buckling and slight cortical defects of the neck concerning for a developing pathologic fracture.  4. Lesion of the left distal femoral diaphysis occupies the marrow cavity and causes some thinning of the anteromedial cortex.  -pathologic fracture of the clavicle diagnosed in ED 7/6/20  -RT following for palliation of painful bone metastases.    -pall care consult asap for pain management.  She is currently on fentanyl and dilaudid.  She will need dose optimization.

## 2020-08-09 NOTE — PROGRESS NOTE ADULT - SUBJECTIVE AND OBJECTIVE BOX
INTERVAL HPI/OVERNIGHT EVENTS:  Pt seen and examined.  Chart reviewed.  Pain currently well controlled.      MEDICATIONS  (STANDING):  aspirin enteric coated 81 milliGRAM(s) Oral daily  atorvastatin 20 milliGRAM(s) Oral at bedtime  celecoxib 200 milliGRAM(s) Oral two times a day  dexAMETHasone     Tablet 4 milliGRAM(s) Oral every 6 hours  enoxaparin Injectable 40 milliGRAM(s) SubCutaneous daily  fentaNYL   Patch  25 MICROgram(s)/Hr 1 Patch Transdermal every 72 hours  folic acid 1 milliGRAM(s) Oral daily  gabapentin 300 milliGRAM(s) Oral every 8 hours  HYDROmorphone   Tablet 4 milliGRAM(s) Oral at bedtime  levothyroxine 100 MICROGram(s) Oral daily  lidocaine   Patch 1 Patch Transdermal daily  magnesium hydroxide Suspension 30 milliLiter(s) Oral daily    MEDICATIONS  (PRN):  diphenhydrAMINE 25 milliGRAM(s) Oral every 6 hours PRN Rash and/or Itching  HYDROmorphone   Tablet 2 milliGRAM(s) Oral every 6 hours PRN Severe Pain (7 - 10)  ondansetron Injectable 4 milliGRAM(s) IV Push every 6 hours PRN Nausea  simethicone 80 milliGRAM(s) Chew every 6 hours PRN Gas      Allergies    Bananas (Headache)  latex (Rash (Mild))  penicillin (Swelling (Mild to Mod))    Vital Signs Last 24 Hrs  T(C): 36.8 (09 Aug 2020 09:47), Max: 36.8 (09 Aug 2020 09:47)  T(F): 98.3 (09 Aug 2020 09:47), Max: 98.3 (09 Aug 2020 09:47)  HR: 104 (09 Aug 2020 09:47) (87 - 107)  BP: 106/76 (09 Aug 2020 09:47) (103/71 - 125/67)  BP(mean): --  RR: 18 (09 Aug 2020 09:47) (18 - 18)  SpO2: 96% (09 Aug 2020 09:47) (96% - 99%)      Physical Exam:   	GENERAL: NAD, well developed  	HEENT:  Atraumatic, Normocephalic, EOMI, PERRLA, conjunctiva and sclera clear,  Moist mucous membranes  	Neck: Supple  	NERVOUS SYSTEM:  Alert & Oriented X3, Good concentration; Motor Strength 5/5 RLE, minimal movement of LLE due to pain, able to move feet and toes. 5/5 strength of upper ext b/l   	CHEST/LUNG: Clear to auscultation bilaterally; No rales, rhonchi, wheezing  	HEART: Regular rate and rhythm; No murmurs  	ABDOMEN: Soft, Nontender, Nondistended; Bowel sounds present  EXTREMITIES:  2+ Peripheral Pulses, No clubbing, cyanosis, or edema      LABS:                        11.7   5.20  )-----------( 53       ( 09 Aug 2020 07:06 )             36.9     08-09    138  |  96<L>  |  24.0<H>  ----------------------------<  111<H>  4.5   |  28.0  |  0.47<L>    Ca    9.0      09 Aug 2020 07:06  Phos  4.2     08-09  Mg     2.1     08-09    TPro  5.5<L>  /  Alb  3.3  /  TBili  0.7  /  DBili  x   /  AST  16  /  ALT  33<H>  /  AlkPhos  97  08-08    PT/INR - ( 09 Aug 2020 07:06 )   PT: 11.2 sec;   INR: 0.96 ratio           Urinalysis Basic - ( 09 Aug 2020 06:08 )    Color: Yellow / Appearance: Clear / S.010 / pH: x  Gluc: x / Ketone: Negative  / Bili: Negative / Urobili: 1 mg/dL   Blood: x / Protein: Negative mg/dL / Nitrite: Negative   Leuk Esterase: Negative / RBC: Negative /HPF / WBC Negative   Sq Epi: x / Non Sq Epi: Occasional / Bacteria: Negative          RADIOLOGY & ADDITIONAL TESTS:

## 2020-08-10 NOTE — CONSULT NOTE ADULT - ASSESSMENT
65 yo F with Stage IV Adenocarcinoma of Lung with Mets to Bone-New Pathologic Fracture L Femoral head Severe Pain    New pathologic Fracture of Femoral head  Stage IV Adenocarcinoma-Recurrence with Mets  Acute Pain Multimodal Analgesic Plan-build on prior admission  Unable to ambulate- PT has worked with her today, OOB stood with walker  Premedicate for pain  Cannot weight bear LLE >>>toe touch    Stage IV Adenocarcinoma Lung to bone   Patient has had 5 RT treatments to her back mid July  Began Chemotherapy with Dr. Joseph July 31st-tolerated well  Expected to continue Chemotherapy in [facility]  Aggressive Pain Management-  Planning on placing infusaport this admission      Acute Cancer Pain- Progression of Bone METS>L Femoral head Fx  Fentanyl patch increased to 25+12mcg/hr patches  Continue Dilaudid 2-4mg PO  Celebrex  Decadron 4mg Q6 ATC  Lidoderm patches    Opioid Induced Constipation  Aggressive Bowel Regime  Oral Miralax, senna, Mag  Enema ordered this morning    Anxiety Disorder  Exacerbated with Cancer recurrence and Pain  Xanax 0.25 mg PO HS and twice daily prn  Adding Movantic after bowel regime    GO  She is 100% determined to fight the cancer  Refusing Orthopedic surgery to repair L Femur Fx  Working with PT  Return to [facility]  F/U with Oncology, Dr. Joseph next week  FULL CODE-will speak about MOLST again before her discharge
63 y/o female with hx of adenocarcinoma of lung s/p right lower lobectomy and mediastinal lymph node dissection in 2017.  Currently C1D10  carbo AUC 6, pemetrexed 500 mg/m2, and pembrolizumab 200 mg q3 weeks + xgeva q6 weeks, presented to the ED d/t persistent and worsening left hip pain days, a/w multiple new pathologic osseous fractures.

## 2020-08-10 NOTE — CONSULT NOTE ADULT - ATTENDING COMMENTS
COUNSELING:    Face to face meeting to discuss Advanced Care Planning - Time Spent ______ Minutes.  See goals of care note.    More than 50% time spent in counseling and coordinating care. _45_____ Minutes.     Thank you for the opportunity to assist with the care of this patient.   [facility] [phone].
Ortho Trauma Attending:  Agree with above PA note.  Note edited where necessary.      Patient seen and examined. Images reviewed. Multiple lytic lesions noted in the acetabulum, proximal femur and distal femur. Patient may need surgical intervention with orthopedic oncologist vs possible radiation. No acute orthopedic intervention indicated at this time. Patient may be partial weight bearing on the left leg. Patient may follow up with Dr. Maribel Ramírez, orthopedic oncologist. Dr. bains.   Patricio Oneill, DO  Orthopaedic Surgery

## 2020-08-10 NOTE — PHYSICAL THERAPY INITIAL EVALUATION ADULT - PERTINENT HX OF CURRENT PROBLEM, REHAB EVAL
63 y/o female with hx of adenocarcinoma of lung s/p right lower lobectomy and mediastinal lymph node dissection in 2017 by Dr. Bradley presented to the ED d/t persistent and worsening left hip pain days with difficulty to walking due to pain. Pt admitted for Left pathological femoral neck fracture 2/2 metastatic adenocarcinoma

## 2020-08-10 NOTE — PROGRESS NOTE ADULT - SUBJECTIVE AND OBJECTIVE BOX
HOSPITALIST PROGRESS NOTE    NADER SCHAEFFER  [id number]  64yFemale    Patient is a 64y old  Female who presents with a chief complaint of pathologic fracture (10 Aug 2020 11:49)      SUBJECTIVE:   Chart reviewed since admission, discussed with Dr Nelson  Patient seen and examined at bedside for pathologic fracture, metastatic lung cancer, thrombocytopenia    Continues to complain of pain left knee and hip.    Denies any bleeding from nose, gum, bowel or urine      OBJECTIVE:  Vital Signs Last 24 Hrs  T(C): 37.2 (10 Aug 2020 07:25), Max: 37.2 (10 Aug 2020 07:25)  T(F): 98.9 (10 Aug 2020 07:25), Max: 98.9 (10 Aug 2020 07:25)  HR: 83 (10 Aug 2020 07:25) (83 - 106)  BP: 121/84 (10 Aug 2020 07:25) (118/74 - 128/79)   RR: 18 (10 Aug 2020 07:25) (18 - 20)  SpO2: 95% (09 Aug 2020 23:11) (95% - 97%)    PHYSICAL EXAMINATION  General: lying in bed, NAD  HEENT:  EOMI  NECK:  Supple  CVS: regular rate and rhythm S1 S2  RESP:  CTAB  GI:  Soft nondistended nontender BS+  : No suprapubic tenderness  MSK:  Limited ROM LLE due to pain  CNS:  no gross focal or global deficit noted  INTEG:  warm dry skin  PSYCH:  Fair mood    MONITOR:  CAPILLARY BLOOD GLUCOSE            I&O's Summary    09 Aug 2020 07:01  -  10 Aug 2020 07:00  --------------------------------------------------------  IN: 0 mL / OUT: 1100 mL / NET: -1100 mL                            11.1   6.50  )-----------( 48       ( 10 Aug 2020 08:11 )             34.7     PT/INR - ( 09 Aug 2020 07:06 )   PT: 11.2 sec;   INR: 0.96 ratio           08-10    137  |  95<L>  |  19.0  ----------------------------<  175<H>  5.0   |  28.0  |  0.32<L>    Ca    9.0      10 Aug 2020 08:11  Phos  4.2     08-09  Mg     2.1     08-09    TPro  5.5<L>  /  Alb  3.3  /  TBili  0.7  /  DBili  x   /  AST  16  /  ALT  33<H>  /  AlkPhos  97  08-08        Urinalysis Basic - ( 09 Aug 2020 06:08 )    Color: Yellow / Appearance: Clear / S.010 / pH: x  Gluc: x / Ketone: Negative  / Bili: Negative / Urobili: 1 mg/dL   Blood: x / Protein: Negative mg/dL / Nitrite: Negative   Leuk Esterase: Negative / RBC: Negative /HPF / WBC Negative   Sq Epi: x / Non Sq Epi: Occasional / Bacteria: Negative        Culture:    TTE:    RADIOLOGY  < from: CT Pelvis Bony Only No Cont (20 @ 16:37) >     EXAM:  CT FEMUR ONLY LT                         EXAM:  CT PELVIS BONY ONLY                         EXAM:  CT 3D RECONSTRUCT W [place]                         EXAM:  CT KNEE ONLY LT                          PROCEDURE DATE:  2020          INTERPRETATION:  CT PELVIS, BILATERAL FEMURS, AND LEFT KNEE    HISTORY: Pelvic and lower extremity pain with attention the left femur and knee. History of lung cancer with metastases. Evaluate for fracture or metastatic disease.    TECHNIQUE: Contiguousaxial imaging was performed through the pelvis, bilateral femurs, and left knee without contrast.  Coronal and sagittal reformatting was utilized. 3-D reformatting was also performed and the findings are compatible with the planar imaging.    COMPARISON: MRI of the lumbar spine dated 2020 and PET CT dated May 28, 2020    FINDINGS:    OSSEOUS STRUCTURES    Mass(es)/Cyst(s):  Heterogeneous sclerosis of L4 is again seen. Lytic lesion of the left L5 pedicle is increased in size extending into the transverse process.  Left S1 sacral lesion is mildly increased in size. Several lytic lesions of the pelvis are new from the prior PET CT involving the bilateral iliac crests, left superomedial medial acetabular wall, and right inferior pubic ramus. There is absence of the left superomedial acetabular wall with the lesion measuring up to 5.4 x 3.4 cm on axial image 70 with soft tissue expansion. Right inferior pubic ramus lesion measures approximately 1.3 x 1.7 cm with absence of the adjacent cortex. Iliac lesions also involve the adjacent cortex.  Left femoral neck lesion is new from the prior PET CT and occupies the anterior marrow cavity extending into the intertrochanteric region measuring approximately 5.7 cm in length and up to 2.4cm in width a slight buckling of the anterior cortex and tiny cortical defects are concerning for developing pathologic fracture. Mild expansile lesion occupies the left posterior greater trochanter. 0.8 cm cortical lesion is present within the anteromedial mid femoral diaphysis. Larger lesion involves the anterior cortex of the distal one third of the left femoral diaphysis measuring approximately 2.0 x 2.1 cm axially extending approximately 2.8 cm craniocaudally and occupying the entire marrow cavity.  1.4 cm cortical lesion is noted within the anteromedial right mid femoral diaphysis.    Symphysis Pubis:  Preserved.    Sacroiliac Joints:  Mild arthrosis is present bilaterally.    RIGHT HIP JOINT    Morphology:  Normal.    Avascular Necrosis:  None.    Joint Space:  Maintained.    Intra-articular Bodies:  None.    Effusion/Synovitis:  None.    LEFT HIP JOINT    Morphology:  Normal.    Avascular Necrosis:  None.    Joint Space:  Maintained.    Intra-articular Bodies:  None.    Effusion/Synovitis:  None.    LEFT KNEE JOINTS    Joint Space(s):  Maintained.    Effusion:  None.    Other Findings:  Suprapatellar enthesophytes are noted.    VISUALIZED SPINE  Lower lumbar degenerative disc disease is present in addition to the metastatic disease.    SOFT TISSUES  Small fat-containing umbilical hernia is present.  Mild atherosclerotic calcifications are present.  Anterior subcutaneous nodularity of the lower abdomen suggests medication injection sites.    IMPRESSION:  1. Multiple osseousmetastases are present that are increased in size and number from the prior PET CT.  2. Large lesion involves the left superomedial and medial acetabulum with absence of the superomedial acetabular wall.  3. Large lesion involves the left femoral neck extending into the intertrochanteric region with anterior cortical buckling and slight cortical defects of the neck concerning for a developing pathologic fracture.  4. Lesion of the left distal femoral diaphysis occupies the marrow cavity and causes some thinning of the anteromedial cortex.  4. Findings discussed with Dr. Jay Doran on 2020 4:59 PM.              CARIDAD JEAN M.D., ATTENDING RADIOLOGIST  This document has been electronically signed. Aug  8 2020  5:03PM    < end of copied text >        MEDICATIONS  (STANDING):  aspirin enteric coated 81 milliGRAM(s) Oral daily  atorvastatin 20 milliGRAM(s) Oral at bedtime  celecoxib 200 milliGRAM(s) Oral two times a day  dexAMETHasone     Tablet 4 milliGRAM(s) Oral every 6 hours  enoxaparin Injectable 40 milliGRAM(s) SubCutaneous daily  fentaNYL   Patch  12 MICROgram(s)/Hr 1 Patch Transdermal every 72 hours  fentaNYL   Patch  25 MICROgram(s)/Hr 1 Patch Transdermal every 72 hours  folic acid 1 milliGRAM(s) Oral daily  gabapentin 300 milliGRAM(s) Oral every 8 hours  HYDROmorphone   Tablet 4 milliGRAM(s) Oral at bedtime  levothyroxine 100 MICROGram(s) Oral daily  lidocaine   Patch 1 Patch Transdermal daily  magnesium citrate Oral Solution 1 Bottle Oral once  magnesium hydroxide Suspension 30 milliLiter(s) Oral daily  polyethylene glycol 3350 17 Gram(s) Oral daily  senna 1 Tablet(s) Oral two times a day      MEDICATIONS  (PRN):  ALPRAZolam 0.25 milliGRAM(s) Oral two times a day PRN anxiety  diphenhydrAMINE 25 milliGRAM(s) Oral every 6 hours PRN Rash and/or Itching  HYDROmorphone   Tablet 2 milliGRAM(s) Oral every 6 hours PRN Severe Pain (7 - 10)  ondansetron Injectable 4 milliGRAM(s) IV Push every 6 hours PRN Nausea  simethicone 80 milliGRAM(s) Chew every 6 hours PRN Gas

## 2020-08-10 NOTE — DISCHARGE NOTE PROVIDER - CARE PROVIDER_API CALL
Maribel Ramírez)  [facility]  [address] [place] [address]  Phone: [phone]  Fax: ()-  Follow Up Time: Maribel Ramírez)  [facility]  [address] [place] [address]  Phone: [phone]  Fax: ()-  Follow Up Time:     Jesus Alberto Keith  [facility]  [address] [place] [address]  Phone: [phone]  Fax: [phone]  Follow Up Time:     Elvira Joseph  MEDICAL ONCOLOGY  [address] [place] [address]  Phone: [phone]  Fax: [phone]  Follow Up Time:

## 2020-08-10 NOTE — DISCHARGE NOTE PROVIDER - NSDCACTIVITY_GEN_ALL_CORE
Showering allowed/Walking - Indoors allowed/Stairs allowed/Bathing allowed/Do not drive or operate machinery/Do not make important decisions/No heavy lifting/straining/Walking - Outdoors allowed

## 2020-08-10 NOTE — CONSULT NOTE ADULT - SUBJECTIVE AND OBJECTIVE BOX
HPI: This is a 65yo F with PMH of Adenocarcinoma of Lung 2017 S/P RLLobectomy and Mediastinal Lymph node disection. Patient was readmitted from [facility] after hospitalization for recurrent  Lung Cancer with Metastasis. PMH: drug abuse years ago, LAMAR on CPAP, Anxiety and Hypothyroid. Patient is S/P RT x 5 treatments to spinal fractures (T-7 L-1) and has started chemotherapy , with Dr. Joseph at [facility].  She was making progress at [facility] when her L hip and Knee had worsening pain; not able to ambulate or weight bear. Transferred back to [facility], found to have suffered a Left sided Femoral Head pathologic Fracture.  Today she is awake alert and oriented x 3, Anxiety well controlled on Xanax low dose twice day Good appetite, eating lunch-pain intensity has improved since admission; exacerbates when trying to get up from bed with minimal weight bearing LLE. She denies N/V/D CP, Palpitations, dizziness SOB, dysuria. She is constipated requesting enema.    63 y/o female with hx of adenocarcinoma of lung s/p right lower lobectomy and mediastinal lymph node dissection in 2017 by Dr. Bradley presented to the ED d/t persistent and worsening left hip pain days with difficulty to walking due to pain. Pt denies any trauma, states pain started suddenly, is constant and progressively worse. Improved with pain medication and rest, worse with movement. Rates pain currently 8/10, no radiation. Describes the pain as stinging pain with some numbness over the skin overlying the hip. Denies any trauma/falls.     Pt started chemotherapy 2020. Has a course of 4 sessions every friday. Pt. had a CT scan of her chest on  that showed new lung mass with mets to her bones (L1, T7 and 6th rib). States that mets were not present in april/may and were newly diagnosed in July. Pt. also states that she has a fracture of the left shoulder/clavicle, seen in ED imaging on previous admission.     Denies any chest pain, SOB, cough, fever, trauma/falls, constipation, diarrhea, urinary frequency/urgency, dysuria.     Pt s/p CT scan and xray in ED showing pathologic femoral head fracture. Ortho consulted. (08 Aug 2020 23:10)    PERTINENT PMH REVIEWED: Yes     PAST MEDICAL & SURGICAL HISTORY:  Genital herpes  Drug abuse: Back in the 70&#x27;s and 80&#x27;s (Cocaine)  Lung cancer: with mets  LAMAR on CPAP  Anxiety  Hypothyroid  Status post lobectomy of lung: 3/2017 (Right lung)  S/P bunionectomy: with revision      SOCIAL HISTORY:  EtOH    No                              Drugs  Yes many years ago                              nonsmoker                             Admitted from: home  _ [facility] _____HCP Yehuda- [phone]     FAMILY HISTORY:  Family history of hypothyroidism  Family history of hypertension  Family history of stroke    Allergies    Bananas (Headache)  latex (Rash (Mild))  penicillin (Swelling (Mild to Mod))    Baseline ADLs (prior to admission):  Dependent      Present Symptoms:     Dyspnea: 0    Nausea/Vomiting:  No  Anxiety:  Yes   Depression: No  Fatigue:  No  Loss of appetite: No    Pain: L hip and femur to knee            Character-stinging and aching            Duration-            Effect-            Factors-            Frequency-            Location-Lower Thoracic upper lumbar spine L Shoulder            Severity-moderate    Review of Systems: Reviewed                     All others negative    MEDICATIONS  (STANDING):  ALPRAZolam 0.25 milliGRAM(s) Oral at bedtime  aspirin enteric coated 81 milliGRAM(s) Oral daily  atorvastatin 20 milliGRAM(s) Oral at bedtime  celecoxib 200 milliGRAM(s) Oral two times a day  dexAMETHasone     Tablet 4 milliGRAM(s) Oral every 6 hours  enoxaparin Injectable 40 milliGRAM(s) SubCutaneous daily  fentaNYL   Patch  12 MICROgram(s)/Hr 1 Patch Transdermal every 72 hours  fentaNYL   Patch  25 MICROgram(s)/Hr 1 Patch Transdermal every 72 hours  folic acid 1 milliGRAM(s) Oral daily  gabapentin 300 milliGRAM(s) Oral every 8 hours  HYDROmorphone   Tablet 4 milliGRAM(s) Oral at bedtime  levothyroxine 100 MICROGram(s) Oral daily  lidocaine   Patch 1 Patch Transdermal daily  magnesium citrate Oral Solution 1 Bottle Oral once  magnesium hydroxide Suspension 30 milliLiter(s) Oral daily  polyethylene glycol 3350 17 Gram(s) Oral daily  senna 1 Tablet(s) Oral two times a day    MEDICATIONS  (PRN):  ALPRAZolam 0.25 milliGRAM(s) Oral two times a day PRN anxiety  diphenhydrAMINE 25 milliGRAM(s) Oral every 6 hours PRN Rash and/or Itching  HYDROmorphone   Tablet 2 milliGRAM(s) Oral every 6 hours PRN Severe Pain (7 - 10)  ondansetron Injectable 4 milliGRAM(s) IV Push every 6 hours PRN Nausea  simethicone 80 milliGRAM(s) Chew every 6 hours PRN Gas    PHYSICAL EXAM:    Vital Signs Last 24 Hrs  T(C): 37.2 (10 Aug 2020 07:25), Max: 37.2 (10 Aug 2020 07:25)  T(F): 98.9 (10 Aug 2020 07:25), Max: 98.9 (10 Aug 2020 07:25)  HR: 83 (10 Aug 2020 07:25) (83 - 106)  BP: 121/84 (10 Aug 2020 07:25) (118/74 - 128/79)  BP(mean): --  RR: 18 (10 Aug 2020 07:25) (18 - 20)  SpO2: 95% (09 Aug 2020 23:11) (95% - 97%)    General: alert  oriented x 3____talkative                overweight     HEENT:   dry mouth      Lungs: comfortable       CV: normal      GI:   distended  tender               constipation  last BM: days    :    external incontinence catheter    MSK:   weakness              ambulatory with assist    Skin: normal  _  no rash    LABS:                        11.1   6.50  )-----------( 48       ( 10 Aug 2020 08:11 )             34.7     08-10    137  |  95<L>  |  19.0  ----------------------------<  175<H>  5.0   |  28.0  |  0.32<L>    Ca    9.0      10 Aug 2020 08:11  Phos  4.2     08-09  Mg     2.1     08-09    TPro  5.5<L>  /  Alb  3.3  /  TBili  0.7  /  DBili  x   /  AST  16  /  ALT  33<H>  /  AlkPhos  97  08-08    PT/INR - ( 09 Aug 2020 07:06 )   PT: 11.2 sec;   INR: 0.96 ratio       Urinalysis Basic - ( 09 Aug 2020 06:08 )    Color: Yellow / Appearance: Clear / S.010 / pH: x  Gluc: x / Ketone: Negative  / Bili: Negative / Urobili: 1 mg/dL   Blood: x / Protein: Negative mg/dL / Nitrite: Negative   Leuk Esterase: Negative / RBC: Negative /HPF / WBC Negative   Sq Epi: x / Non Sq Epi: Occasional / Bacteria: Negative    I&O's Summary    09 Aug 2020 07:01  -  10 Aug 2020 07:00  --------------------------------------------------------  IN: 0 mL / OUT: 1100 mL / NET: -1100 mL    RADIOLOGY & ADDITIONAL STUDIES:  < from: Xray Hip 2-3 Views, Left (20 @ 22:25) >  IMPRESSION:    No gross fracture or dislocation is seen. Ill-defined lucent lesion is seen in the region of the left greater trochanter and intertrochanteric region for which further evaluation with either CT scan imaging or MRI examination of the left hip is recommended.    ADVANCE DIRECTIVES:   DNR  NO  Completed on:                     MOLST   NO   Completed on:  Living Will   NO   Completed on:

## 2020-08-10 NOTE — PHYSICAL THERAPY INITIAL EVALUATION ADULT - RANGE OF MOTION EXAMINATION, REHAB EVAL
left hip not tested/bilateral lower extremity ROM was WFL (within functional limits)/bilateral upper extremity ROM was WFL (within functional limits)

## 2020-08-10 NOTE — DISCHARGE NOTE PROVIDER - PROVIDER TOKENS
PROVIDER:[TOKEN:[[id number]]] PROVIDER:[TOKEN:[[id number]]],PROVIDER:[TOKEN:[[id number]]],PROVIDER:[TOKEN:[[id number]]]

## 2020-08-10 NOTE — DISCHARGE NOTE PROVIDER - NSDCMRMEDTOKEN_GEN_ALL_CORE_FT
aspirin 81 mg oral tablet: 1 tab(s) orally once a day  atorvastatin 20 mg oral tablet: 1 tab(s) orally once a day  CBD oil:   celecoxib 200 mg oral capsule: 1 cap(s) orally 2 times a day  collagen protein: orally once a day  dexamethasone 2 mg oral tablet: 2 tab(s) orally every 6 hours   taper  Dilaudid 2 mg oral tablet: 1 tab(s) orally every 6 hours, As Needed  Dilaudid 4 mg oral tablet: 1 tab(s) orally once a day (at bedtime)  diphenhydrAMINE 25 mg oral capsule: 1 cap(s) orally every 6 hours, As needed, Rash and/or Itching  fentaNYL 25 mcg/hr transdermal film, extended release: 1 patch transdermal every 72 hours  folic acid 1 mg oral tablet: 1 tab(s) orally once a day  gabapentin 300 mg oral capsule: 1 cap(s) orally every 8 hours  levothyroxine 100 mcg (0.1 mg) oral capsule: 1 cap(s) orally once a day  lidocaine 5% topical film:  topically   magnesium hydroxide 8% oral suspension: 30 milliliter(s) orally once a day  milk thistle oral capsule:  orally once a day  simethicone 80 mg oral tablet, chewable: 1 tab(s) orally every 6 hours, As needed, Gas  tiZANidine 2 mg oral tablet: 1 tab(s) orally every 8 hours, As needed, muscle spasms  Zofran 4 mg oral tablet: 1 tab(s) orally every 6 hours, As Needed ALPRAZolam 0.25 mg oral tablet: 1 tab(s) orally 2 times a day, As needed, anxiety  aspirin 81 mg oral tablet: 1 tab(s) orally once a day  atorvastatin 20 mg oral tablet: 1 tab(s) orally once a day  CBD oil:   celecoxib 200 mg oral capsule: 1 cap(s) orally 2 times a day  collagen protein: orally once a day  dexamethasone 2 mg oral tablet: 2 tab(s) orally every 6 hours   taper  Dilaudid 2 mg oral tablet: 1 tab(s) orally every 6 hours, As Needed  Dilaudid 4 mg oral tablet: 1 tab(s) orally once a day (at bedtime)  diphenhydrAMINE 25 mg oral capsule: 1 cap(s) orally every 6 hours, As needed, Rash and/or Itching  fentaNYL 12 mcg/hr transdermal film, extended release: 1 patch transdermal every 72 hours  fentaNYL 25 mcg/hr transdermal film, extended release: 1 patch transdermal every 72 hours  folic acid 1 mg oral tablet: 1 tab(s) orally once a day  gabapentin 300 mg oral capsule: 1 cap(s) orally every 8 hours  levothyroxine 100 mcg (0.1 mg) oral capsule: 1 cap(s) orally once a day  lidocaine 5% topical film:  topically   magnesium hydroxide 8% oral suspension: 30 milliliter(s) orally once a day  milk thistle oral capsule:  orally once a day  senna oral tablet: 1 tab(s) orally 2 times a day  simethicone 80 mg oral tablet, chewable: 1 tab(s) orally every 6 hours, As needed, Gas  tiZANidine 2 mg oral tablet: 1 tab(s) orally every 8 hours, As needed, muscle spasms  Zofran 4 mg oral tablet: 1 tab(s) orally every 6 hours, As Needed ALPRAZolam 0.25 mg oral tablet: 1 tab(s) orally 2 times a day, As needed, anxiety MDD:2  ALPRAZolam 0.25 mg oral tablet: 1 tab(s) orally 2 times a day, As needed, anxiety  aspirin 81 mg oral tablet: 1 tab(s) orally once a day  atorvastatin 20 mg oral tablet: 1 tab(s) orally once a day  CBD oil: Continue home dose  celecoxib 200 mg oral capsule: 1 cap(s) orally 2 times a day  collagen protein: orally once a day  dexamethasone 2 mg oral tablet: 2 tab(s) orally every 6 hours   taper  Dilaudid 2 mg oral tablet: 1 tab(s) orally every 6 hours, As Needed  Dilaudid 4 mg oral tablet: 1 tab(s) orally once a day (at bedtime)  diphenhydrAMINE 25 mg oral capsule: 1 cap(s) orally every 6 hours, As needed, Rash and/or Itching  fentaNYL 12 mcg/hr transdermal film, extended release: 1 patch transdermal every 72 hours  fentaNYL 25 mcg/hr transdermal film, extended release: 1 patch transdermal every 72 hours  folic acid 1 mg oral tablet: 1 tab(s) orally once a day  gabapentin 300 mg oral capsule: 1 cap(s) orally every 8 hours  levothyroxine 100 mcg (0.1 mg) oral capsule: 1 cap(s) orally once a day  lidocaine 5% topical film:  topically   magnesium hydroxide 8% oral suspension: 30 milliliter(s) orally once a day  milk thistle oral capsule:  orally once a day  OUTPATIENT INTERVANTIONAL RADIOLOGY AT [facility]: CHEMO PORT PLACEMENT on AUGUST 24th at 9AM.  PLEASE HOLD ASPIRIN AND ALL ANTICOAGULANTS from August 20th in preparation for CHEMO-PORT  OUTPATIENT INTERVANTIONAL RADIOLOGY AT [facility]: CHEMO PORT PLACEMENT on AUGUST 24th at 9AM.  PLEASE HOLD ASPIRIN AND ALL ANTICOAGULANTS from August 20th in preparation for CHEMO-PORT  senna oral tablet: 1 tab(s) orally 2 times a day  simethicone 80 mg oral tablet, chewable: 1 tab(s) orally every 6 hours, As needed, Gas  tiZANidine 2 mg oral tablet: 1 tab(s) orally every 8 hours, As needed, muscle spasms  Zofran 4 mg oral tablet: 1 tab(s) orally every 6 hours, As Needed ALPRAZolam 0.25 mg oral tablet: 1 tab(s) orally 2 times a day, As needed, anxiety MDD:2  ALPRAZolam 0.25 mg oral tablet: 1 tab(s) orally 2 times a day, As needed, anxiety  aspirin 81 mg oral tablet: 1 tab(s) orally once a day  atorvastatin 20 mg oral tablet: 1 tab(s) orally once a day  CBD oil: Continue home dose  celecoxib 200 mg oral capsule: 1 cap(s) orally 2 times a day  collagen protein: orally once a day  dexamethasone 2 mg oral tablet: 2 tab(s) orally every 6 hours   taper  Dilaudid 2 mg oral tablet: 1 tab(s) orally every 6 hours, As Needed  Dilaudid 4 mg oral tablet: 1 tab(s) orally once a day (at bedtime)  diphenhydrAMINE 25 mg oral capsule: 1 cap(s) orally every 6 hours, As needed, Rash and/or Itching  fentaNYL 12 mcg/hr transdermal film, extended release: 1 patch transdermal every 72 hours  fentaNYL 25 mcg/hr transdermal film, extended release: 1 patch transdermal every 72 hours  folic acid 1 mg oral tablet: 1 tab(s) orally once a day  gabapentin 300 mg oral capsule: 1 cap(s) orally every 8 hours  levothyroxine 100 mcg (0.1 mg) oral capsule: 1 cap(s) orally once a day  lidocaine 5% topical film:  topically   magnesium hydroxide 8% oral suspension: 30 milliliter(s) orally once a day  milk thistle oral capsule:  orally once a day  OUTPATIENT INTERVANTIONAL RADIOLOGY AT [facility]: CHEMO PORT PLACEMENT on AUGUST 24th at 9AM.  PLEASE HOLD ASPIRIN AND ALL ANTICOAGULANTS from August 20th in preparation for CHEMO-PORT  OUTPATIENT INTERVANTIONAL RADIOLOGY AT [facility]: CHEMO PORT PLACEMENT on AUGUST 24th at 9AM.  PLEASE HOLD ASPIRIN AND ALL ANTICOAGULANTS from August 20th in preparation for CHEMO-PORT  senna oral tablet: 1 tab(s) orally 2 times a day  simethicone 80 mg oral tablet, chewable: 1 tab(s) orally every 6 hours, As needed, Gas  tiZANidine 2 mg oral tablet: 1 tab(s) orally every 8 hours, As needed, muscle spasms  Zofran 4 mg oral tablet: 1 tab(s) orally every 6 hours, As Needed ALPRAZolam 0.25 mg oral tablet: 1 tab(s) orally 2 times a day, As needed, anxiety MDD:2  ALPRAZolam 0.25 mg oral tablet: 1 tab(s) orally 2 times a day, As needed, anxiety  aspirin 81 mg oral tablet: 1 tab(s) orally once a day  atorvastatin 20 mg oral tablet: 1 tab(s) orally once a day  CBD oil: Continue home dose  celecoxib 200 mg oral capsule: 1 cap(s) orally 2 times a day  collagen protein: orally once a day  dexamethasone 2 mg oral tablet: 2 tab(s) orally every 6 hours   taper  Dilaudid 2 mg oral tablet: 1 tab(s) orally every 6 hours, As Needed  Dilaudid 4 mg oral tablet: 1 tab(s) orally once a day (at bedtime)  diphenhydrAMINE 25 mg oral capsule: 1 cap(s) orally every 6 hours, As needed, Rash and/or Itching  fentaNYL 12 mcg/hr transdermal film, extended release: 1 patch transdermal every 72 hours  fentaNYL 25 mcg/hr transdermal film, extended release: 1 patch transdermal every 72 hours  folic acid 1 mg oral tablet: 1 tab(s) orally once a day  gabapentin 300 mg oral capsule: 1 cap(s) orally every 8 hours  levothyroxine 100 mcg (0.1 mg) oral capsule: 1 cap(s) orally once a day  lidocaine 5% topical film:  topically   magnesium hydroxide 8% oral suspension: 30 milliliter(s) orally once a day  milk thistle oral capsule:  orally once a day  OUTPATIENT INTERVANTIONAL RADIOLOGY AT [facility]: CHEMO PORT PLACEMENT on AUGUST 24th at 9AM.  PLEASE HOLD ASPIRIN AND ALL ANTICOAGULANTS from August 20th in preparation for CHEMO-PORT  OUTPATIENT INTERVANTIONAL RADIOLOGY AT [facility]: CHEMO PORT PLACEMENT on AUGUST 24th at 9AM.  PLEASE HOLD ASPIRIN AND ALL ANTICOAGULANTS from August 20th in preparation for CHEMO-PORT  senna oral tablet: 1 tab(s) orally 2 times a day  simethicone 80 mg oral tablet, chewable: 1 tab(s) orally every 6 hours, As needed, Gas  tiZANidine 2 mg oral tablet: 1 tab(s) orally every 8 hours, As needed, muscle spasms  Zofran 4 mg oral tablet: 1 tab(s) orally every 6 hours, As Needed ALPRAZolam 0.25 mg oral tablet: 1 tab(s) orally 2 times a day, As needed, anxiety MDD:2  ALPRAZolam 0.25 mg oral tablet: 1 tab(s) orally 2 times a day, As needed, anxiety  aspirin 81 mg oral tablet: 1 tab(s) orally once a day  atorvastatin 20 mg oral tablet: 1 tab(s) orally once a day  bisacodyl 10 mg rectal suppository: 1 suppository(ies) rectal once a day  CBD oil: Continue home dose  celecoxib 200 mg oral capsule: 1 cap(s) orally 2 times a day  collagen protein: orally once a day  dexamethasone 2 mg oral tablet: 2 tab(s) orally every 6 hours   taper  Dilaudid 2 mg oral tablet: 1 tab(s) orally every 6 hours, As Needed  Dilaudid 4 mg oral tablet: 1 tab(s) orally once a day (at bedtime)  diphenhydrAMINE 25 mg oral capsule: 1 cap(s) orally every 6 hours, As needed, Rash and/or Itching  fentaNYL 12 mcg/hr transdermal film, extended release: 1 patch transdermal every 72 hours  fentaNYL 25 mcg/hr transdermal film, extended release: 1 patch transdermal every 72 hours  folic acid 1 mg oral tablet: 1 tab(s) orally once a day  gabapentin 300 mg oral capsule: 1 cap(s) orally every 8 hours  levothyroxine 100 mcg (0.1 mg) oral capsule: 1 cap(s) orally once a day  lidocaine 5% topical film:  topically   magnesium hydroxide 8% oral suspension: 30 milliliter(s) orally once a day  milk thistle oral capsule:  orally once a day  OUTPATIENT INTERVANTIONAL RADIOLOGY AT [facility]: CHEMO PORT PLACEMENT on AUGUST 19th at 10 AM.  PLEASE HOLD ASPIRIN AND ALL ANTICOAGULANTS from August 14th in preparation for CHEMO-PORT  [facility] AUGUST 19th at 10 AM. at [facility].  PLEASE HOLD ASPIRIN AND ALL ANTICOAGULANTS from August 14th in preparation for CHEMO-PORT  polyethylene glycol 3350 oral powder for reconstitution: 17 gram(s) orally once a day (at bedtime)  senna oral tablet: 1 tab(s) orally 2 times a day  simethicone 80 mg oral tablet, chewable: 1 tab(s) orally every 6 hours, As needed, Gas  tiZANidine 2 mg oral tablet: 1 tab(s) orally every 8 hours, As needed, muscle spasms  Zofran 4 mg oral tablet: 1 tab(s) orally every 6 hours, As Needed ALPRAZolam 0.25 mg oral tablet: 1 tab(s) orally 2 times a day, As needed, anxiety MDD:2  ALPRAZolam 0.25 mg oral tablet: 1 tab(s) orally 2 times a day, As needed, anxiety  aspirin 81 mg oral tablet: 1 tab(s) orally once a day  atorvastatin 20 mg oral tablet: 1 tab(s) orally once a day  bisacodyl 10 mg rectal suppository: 1 suppository(ies) rectal once a day  CBD oil: Continue home dose  celecoxib 200 mg oral capsule: 1 cap(s) orally 2 times a day  collagen protein: orally once a day  dexamethasone 2 mg oral tablet: 2 tab(s) orally every 6 hours   taper  Dilaudid 2 mg oral tablet: 1 tab(s) orally every 6 hours, As Needed  Dilaudid 4 mg oral tablet: 1 tab(s) orally once a day (at bedtime)  diphenhydrAMINE 25 mg oral capsule: 1 cap(s) orally every 6 hours, As needed, Rash and/or Itching  fentaNYL 12 mcg/hr transdermal film, extended release: 1 patch transdermal every 72 hours  fentaNYL 25 mcg/hr transdermal film, extended release: 1 patch transdermal every 72 hours  folic acid 1 mg oral tablet: 1 tab(s) orally once a day  gabapentin 300 mg oral capsule: 1 cap(s) orally every 8 hours  levothyroxine 100 mcg (0.1 mg) oral capsule: 1 cap(s) orally once a day  lidocaine 5% topical film:  topically   magnesium hydroxide 8% oral suspension: 30 milliliter(s) orally once a day  milk thistle oral capsule:  orally once a day  OUTPATIENT INTERVANTIONAL RADIOLOGY AT [facility]: CHEMO PORT PLACEMENT on AUGUST 19th at 10 AM.  PLEASE HOLD ASPIRIN AND ALL ANTICOAGULANTS from August 14th in preparation for CHEMO-PORT  [facility] at 10 AM. at [facility].  PLEASE HOLD ASPIRIN AND ALL ANTICOAGULANTS from August 14th in preparation for CHEMO-PORT  polyethylene glycol 3350 oral powder for reconstitution: 17 gram(s) orally once a day (at bedtime)  senna oral tablet: 1 tab(s) orally 2 times a day  simethicone 80 mg oral tablet, chewable: 1 tab(s) orally every 6 hours, As needed, Gas  tiZANidine 2 mg oral tablet: 1 tab(s) orally every 8 hours, As needed, muscle spasms  Zofran 4 mg oral tablet: 1 tab(s) orally every 6 hours, As Needed ALPRAZolam 0.25 mg oral tablet: 1 tab(s) orally 2 times a day, As needed, anxiety MDD:2  ALPRAZolam 0.25 mg oral tablet: 1 tab(s) orally 2 times a day, As needed, anxiety  aspirin 81 mg oral tablet: 1 tab(s) orally once a day  atorvastatin 20 mg oral tablet: 1 tab(s) orally once a day  bisacodyl 10 mg rectal suppository: 1 suppository(ies) rectal once a day  CBD oil: Continue home dose  celecoxib 200 mg oral capsule: 1 cap(s) orally 2 times a day  collagen protein: orally once a day  dexamethasone 2 mg oral tablet: 2 tab(s) orally every 6 hours   taper  Dilaudid 2 mg oral tablet: 1 tab(s) orally every 6 hours, As Needed  Dilaudid 4 mg oral tablet: 1 tab(s) orally once a day (at bedtime)  diphenhydrAMINE 25 mg oral capsule: 1 cap(s) orally every 6 hours, As needed, Rash and/or Itching  fentaNYL 12 mcg/hr transdermal film, extended release: 1 patch transdermal every 72 hours  fentaNYL 25 mcg/hr transdermal film, extended release: 1 patch transdermal every 72 hours  folic acid 1 mg oral tablet: 1 tab(s) orally once a day  gabapentin 300 mg oral capsule: 1 cap(s) orally every 8 hours  levothyroxine 100 mcg (0.1 mg) oral capsule: 1 cap(s) orally once a day  lidocaine 5% topical film:  topically   magnesium hydroxide 8% oral suspension: 30 milliliter(s) orally once a day  milk thistle oral capsule:  orally once a day  OUTPATIENT INTERVANTIONAL RADIOLOGY AT [facility]: CHEMO PORT PLACEMENT on AUGUST 19th at 10 AM.  PLEASE HOLD ASPIRIN AND ALL ANTICOAGULANTS from August 14th in preparation for CHEMO-PORT  [facility] on A[facility]UST 19th at 10 AM. at [facility].  PLEASE HOLD ASPIRIN AND ALL ANTICOAGULANTS from August 14th in preparation for CHEMO-PORT  polyethylene glycol 3350 oral powder for reconstitution: 17 gram(s) orally once a day (at bedtime)  senna oral tablet: 1 tab(s) orally 2 times a day  simethicone 80 mg oral tablet, chewable: 1 tab(s) orally every 6 hours, As needed, Gas  tiZANidine 2 mg oral tablet: 1 tab(s) orally every 8 hours, As needed, muscle spasms  Zofran 4 mg oral tablet: 1 tab(s) orally every 6 hours, As Needed

## 2020-08-10 NOTE — PHYSICAL THERAPY INITIAL EVALUATION ADULT - ADDITIONAL COMMENTS
Pt lives in a private home with her spouse, Also has a daughter who is very supportive. 2-3 steps to enter with handrails however they are inconsistent heights, No steps inside. Pt was admitted from [facility] where she was minimally ambulating with a RW. Prior to last admission pt was independent without an assist device. Pt does not own DME.

## 2020-08-10 NOTE — DISCHARGE NOTE PROVIDER - NSDCFUADDAPPT_GEN_ALL_CORE_FT
Patient also recommended to have Port for Chemotherapy - patient on ASA which would need to be discontinued 5 days prior to procedure.    Confirmed appts with [facility] for Chemo Rx and Radiation Rx on August 17th  Outpt. IR chemo-port placement scheduled for August 24th at [facility] at 09:00AM  - F/U w/- August 17th  -F/U w/- August 17th  -F/U w/ Patient also recommended to have Port for Chemotherapy - patient on ASA which would need to be discontinued 5 days prior to procedure.    Confirmed appts with [facility] for Chemo Rx and Radiation Rx on August 17th  Outpt.   IR chemo-port placement scheduled for August 19th at [facility] at 10:00AM  Pt. will be called the day before procedure to confirm the time.  - F/U w/- August 17th  -F/U w/- August 17th  -F/U w/

## 2020-08-10 NOTE — DISCHARGE NOTE PROVIDER - NSDCFUADDINST_GEN_ALL_CORE_FT
ORTHOPEDIC FOLLOW UP RECOMMENDATIONS: Recommend patient follow up with orthopedic oncologist, Dr. Maribel Ramírez within 1 week of discharge. ORTHOPEDIC FOLLOW UP RECOMMENDATIONS: Patient is partial weight bearing of the left lower extremity. Recommend patient follow up with orthopedic oncologist, Dr. Maribel Ramírez within 1 week of discharge. ORTHOPEDIC FOLLOW UP RECOMMENDATIONS: Patient is TOE-TOUCH weight bearing of the left lower extremity. Recommend patient follow up with orthopedic oncologist, Dr. Maribel Ramírez within 1-2 weeks of discharge.

## 2020-08-10 NOTE — DISCHARGE NOTE PROVIDER - NSDCFUSCHEDAPPT_GEN_ALL_CORE_FT
NADER SCHAEFFER M ; 08/17/2020 ; [facility]  NADER SCHAEFFER M ; 08/17/2020 ; [facility]  NADER SCHAEFFER M ; 08/17/2020 ; [facility]  NADER SCHAEFFER M ; 08/17/2020 ; NPP [facility] [address] [facility]  NADER SCHAEFFER M ; 08/19/2020 ; NPP Rad IR  [facility]  NADER SCHAEFFER M ; 08/21/2020 ; [facility]  NADER SCHAEFFER M ; 09/04/2020 ; [facility]  NADER SCHAEFFER M ; 09/11/2020 ; [facility]  NADER SCHAEFFER M ; 09/25/2020 ; [facility] NADER SCHAEFFER M ; 08/17/2020 ; [facility]  NADER SCHAEFFER M ; 08/17/2020 ; [facility]  NADER SCHAEFFER M ; 08/17/2020 ; [facility]  NADER SCHAEFFER M ; 08/17/2020 ; NPP Rad Med [address] [facility]  NADER SCHAEFFER M ; 08/19/2020 ; NPP Rad IR  [address] [facility]  NADER SCHAEFFER M ; 08/21/2020 ; [facility]  NADER SCHAEFFER M ; 09/04/2020 ; [facility]  NADER SCHAEFFER M ; 09/11/2020 ; [facility]  NADER SCHAEFFER M ; 09/25/2020 ; [facility] NADER SCHAEFFER M ; 08/17/2020 ; [facility]  NADER SCHAEFFER M ; 08/17/2020 ; [facility]  NADER SCHAEFFER M ; 08/17/2020 ; [facility]  NADER SCHAEFFER M ; 08/17/2020 ; NPP Rad Med [address] [facility]  NADER SCHAEFFER M ; 08/19/2020 ; NPP Rad IR  [facility]  NADER SCHAEFFER M ; 08/21/2020 ; [facility]  NADER SCHAEFFER M ; 09/04/2020 ; [facility]  NADER SCHAEFFER M ; 09/11/2020 ; [facility]  NADER SCHAEFFER M ; 09/25/2020 ; [facility] NADER SCHAEFFER M ; 08/17/2020 ; [facility]  NADER SCHAEFFER M ; 08/17/2020 ; [facility]  NADER SCHAEFFER M ; 08/17/2020 ; [facility]  NADER SCHAEFFER M ; 08/17/2020 ; NPP Rad Med [address] [facility]  NADER SCHAEFFER M ; 08/19/2020 ; NPP Rad IR  [facility]  NADER SCHAEFFER M ; 08/21/2020 ; [facility]  NADER SCHAEFFER M ; 09/04/2020 ; [facility]  NADER SCHAEFFER M ; 09/11/2020 ; [facility]  NADER SCHAEFFER M ; 09/25/2020 ; [facility] NADER SCHAEFFER M ; 08/17/2020 ; [facility]  NADER SCHAEFFER M ; 08/17/2020 ; [facility]  NADER SCHAEFFER M ; 08/17/2020 ; [facility]  NADER SCHAEFFER M ; 08/17/2020 ; NPP Rad Med [address] [facility]  NADER SCHAEFFER M ; 08/19/2020 ; NPP Rad IR  [address] [facility]  NADER SCHAEFFER M ; 08/21/2020 ; [facility]  NADER SCHAEFFER M ; 09/04/2020 ; [facility]  NADER SCHAEFFER M ; 09/11/2020 ; [facility]  NADER SCHAEFFER M ; 09/25/2020 ; [facility] NADER SCHAEFFER M ; 08/17/2020 ; [facility]  NADER SCHAEFFER M ; 08/17/2020 ; [facility]  NADER SCHAEFFER M ; 08/17/2020 ; [facility]  NADER SCHAEFFER M ; 08/17/2020 ; NPP Rad Med [address] [facility]  NADER SCHAEFFER M ; 08/19/2020 ; NPP Rad IR  [facility]  NADER SCHAEFFER M ; 08/21/2020 ; [facility]  NADER SCHAEFFER M ; 09/04/2020 ; [facility]  NADER SCHAEFFER M ; 09/11/2020 ; [facility]  NADER SCHAEFFER M ; 09/25/2020 ; [facility] NADER SCHAEFFER M ; 08/17/2020 ; [facility]  NADER SCHAEFFER M ; 08/17/2020 ; [facility]  NADER SCHAEFFER M ; 08/17/2020 ; [facility]  NADER SCHAEFFER M ; 08/17/2020 ; NPP Rad Med [address] [facility]  NADER SCHAEFFER M ; 08/19/2020 ; NPP Rad IR  [address] [facility]  NADER SCHAEFFER M ; 08/21/2020 ; [facility]  NADER SCHAEFFER M ; 09/04/2020 ; [facility]  NADER SCHAEFFER M ; 09/11/2020 ; [facility]  NADER SCHAEFFER M ; 09/25/2020 ; [facility] NADER SCHAEFFER M ; 08/17/2020 ; [facility]  NADER SCHAEFFER M ; 08/17/2020 ; [facility]  NADER SCHAEFFER M ; 08/17/2020 ; [facility]  NADER SCHAEFFER M ; 08/17/2020 ; NPP Rad Med [address] [facility]  NADER SCHAEFFER M ; 08/19/2020 ; NPP Rad IR  [facility]  NADER SCHAEFFER M ; 08/21/2020 ; [facility]  NADER SCHAEFFER M ; 09/04/2020 ; [facility]  NADER SCHAEFFER M ; 09/11/2020 ; [facility]  NADER SCHAEFFER M ; 09/25/2020 ; [facility] NADER SCHAEFFER M ; 08/17/2020 ; [facility]  NADER SCHAEFFER M ; 08/17/2020 ; [facility]  NADER SCHAEFFER M ; 08/17/2020 ; [facility]  NADER SCHAEFFER M ; 08/17/2020 ; NPP Rad Med [address] [facility]  NADER SCHAEFFER M ; 08/19/2020 ; NPP Rad IR  [facility]  NADER SCHAEFFER M ; 08/21/2020 ; [facility]  NADER SCHAEFFER M ; 09/04/2020 ; [facility]  NADER SCHAEFFER M ; 09/11/2020 ; [facility]  NADER SCHAEFFER M ; 09/25/2020 ; [facility] NADER SCHAEFFER M ; 08/17/2020 ; [facility]  NADER SCHAEFFER M ; 08/17/2020 ; [facility]  NADER SCHAEFFER M ; 08/17/2020 ; [facility]  NADER SCHAEFFER M ; 08/17/2020 ; NPP Rad Med [address] [facility]  NADER SCHAEFFER M ; 08/19/2020 ; NPP Rad IR  [facility]  NADER SCHAEFFER M ; 08/21/2020 ; [facility]  NADER SCHAEFFER M ; 09/04/2020 ; [facility]  NADER SCHAEFFER M ; 09/11/2020 ; [facility]  NADER SCHAEFFER M ; 09/25/2020 ; [facility] NADER SCHAEFFER M ; 08/17/2020 ; [facility]  NADER SCHAEFFER M ; 08/17/2020 ; [facility]  NADER SCHAEFFER M ; 08/17/2020 ; [facility]  NADER SCHAEFFER M ; 08/17/2020 ; NPP Rad Med [address] [facility]  NADER SCHAEFFER M ; 08/19/2020 ; NPP Rad IR  [address] [facility]  NADER SCHAEFFER M ; 08/21/2020 ; [facility]  NADER SCHAEFFER M ; 09/04/2020 ; [facility]  NADER SCHAEFFER M ; 09/11/2020 ; [facility]  NADER SCHAEFFER M ; 09/25/2020 ; [facility]

## 2020-08-10 NOTE — PROGRESS NOTE ADULT - SUBJECTIVE AND OBJECTIVE BOX
D/w Dr. Oneill - patient is to continue TTWB on left LE and follow-up with Dr. Ramírez Ortho Oncology within 1-2 weeks outpatient. Discussed with PT. Cont care as per primary team. Will sign off, no orthopedic surgical intervention at this time. Call with questions or concerns [phone].

## 2020-08-10 NOTE — DISCHARGE NOTE PROVIDER - NSDCCPCAREPLAN_GEN_ALL_CORE_FT
PRINCIPAL DISCHARGE DIAGNOSIS  Diagnosis: Fracture, pathologic  Assessment and Plan of Treatment: Follow up with Dr López      SECONDARY DISCHARGE DIAGNOSES  Diagnosis: Thrombocytopenia  Assessment and Plan of Treatment: follow up with Dr Joseph    Diagnosis: Lung cancer metastatic to bone  Assessment and Plan of Treatment: Continue medications as prescribed  Follow up with appointment with various doctors PRINCIPAL DISCHARGE DIAGNOSIS  Diagnosis: Fracture, pathologic  Assessment and Plan of Treatment: Follow up with Dr López      SECONDARY DISCHARGE DIAGNOSES  Diagnosis: Thrombocytopenia  Assessment and Plan of Treatment: follow up with Dr Joseph CHEMO PORT PLACEMENT on AUGUST 19th   PLEASE HOLD ASPIRIN AND ALL ANTICOAGULANTS from August 14th in preparation for CHEMO-PORT    Diagnosis: Lung cancer metastatic to bone  Assessment and Plan of Treatment: Continue medications as prescribed  Follow up with appointment with various doctors

## 2020-08-10 NOTE — PROGRESS NOTE ADULT - SUBJECTIVE AND OBJECTIVE BOX
D/w Dr. Oneill - patient is to follow-up with Dr. Ramírez outpatient in 1-2 weeks and continue 50% partial weight bearing on left lower extremity. D/w Dr. Oneill - patient is to continue TTWB on left LE and follow-up with Dr. Ramírez Ortho Oncology within 1-2 weeks outpatient. Discussed with PT. Cont care as per primary team. Will sign off, no orthopedic surgical intervention at this time. Call with questions or concerns [phone].

## 2020-08-10 NOTE — DISCHARGE NOTE PROVIDER - CARE PROVIDERS DIRECT ADDRESSES
[email or web address]

## 2020-08-10 NOTE — PHYSICAL THERAPY INITIAL EVALUATION ADULT - GAIT DISTANCE, PT EVAL
1 side step at EOB to right, difficulty advancing BLEs 2*2 strength deficits, PWB of LLE and pain with movement.

## 2020-08-10 NOTE — DISCHARGE NOTE PROVIDER - HOSPITAL COURSE
65 y/o female with hx of adenocarcinoma of lung s/p right lower lobectomy and mediastinal lymph node dissection in 2017 by Dr. Bradley presented to the ED d/t persistent and worsening left hip pain days with difficulty to walking due to pain.          Left pathological femoral neck fracture 2/2 metastatic adenocarcinoma - pain controlled with medications, seen in consultation by Orthopedics with recommendations to follow up with [facility] Recommendations for partial weight bearing. Medical oncolgy and [facility] both plan for further treatments for her malignancy. She was noted to have thrombocytopenia likely due to chemotherapy - Stable counts, no bleeding. She did have some constipation which resolved with bowel regimen.    She was continued on her home medications for hypothyroidism and dyslipidemia.        Patient is medically stable for discharge. She and her daughter want her to go to Rehab.    discharge time 42 minutes 63 y/o female with hx of adenocarcinoma of lung s/p right lower lobectomy and mediastinal lymph node dissection in 2017 by Dr. Bradley presented to the ED d/t persistent and worsening left hip pain days with difficulty to walking due to pain.          Left pathological femoral neck fracture 2/2 metastatic adenocarcinoma - pain controlled with medications, seen in consultation by Orthopedics with recommendations to follow up with [facility] Recommendations for partial weight bearing. Medical oncolgy and [facility] both plan for further treatments for her malignancy. She was noted to have thrombocytopenia likely due to chemotherapy - Stable counts, no bleeding. She did have some constipation which resolved with bowel regimen.    She was continued on her home medications for hypothyroidism and dyslipidemia.        Patient also recommended to have Port for Chemotherapy - patient on ASA which would need to be discontinued 5 days prior to procedure.        Patient is medically stable for discharge. She and her daughter want her to go to Rehab.    discharge time 42 minutes 63 y/o female with hx of adenocarcinoma of lung s/p right lower lobectomy and mediastinal lymph node dissection in 2017 by Dr. Bradley presented to the ED d/t persistent and worsening left hip pain days with difficulty to walking due to pain.          Left pathological femoral neck fracture 2/2 metastatic adenocarcinoma - pain controlled with medications, seen in consultation by Orthopedics with recommendations to follow up with [facility] Recommendations for partial weight bearing. Medical oncolgy and [facility] both plan for further treatments for her malignancy. She was noted to have thrombocytopenia likely due to chemotherapy - Stable counts, no bleeding. She did have some constipation which resolved with bowel regimen.    She was continued on her home medications for hypothyroidism and dyslipidemia.        Patient also recommended to have Port for Chemotherapy - patient on ASA which would need to be discontinued 5 days prior to procedure.    Confirmed appts with [facility] for Chemo Rx and Radiation Rx on August 17th    Outpt. IR chemo-port placement scheduled for August 24th at [facility] at 09:00AM            Patient is medically stable for discharge. She and her daughter want her to go to Rehab.    discharge time 42 minutes 63 y/o female with hx of adenocarcinoma of lung s/p right lower lobectomy and mediastinal lymph node dissection in 2017 by Dr. Bradley presented to the ED d/t persistent and worsening left hip pain days with difficulty to walking due to pain.          Left pathological femoral neck fracture 2/2 metastatic adenocarcinoma - pain controlled with medications, seen in consultation by Orthopedics with recommendations to follow up with [facility] Recommendations for partial weight bearing. Medical oncolgy and [facility] both plan for further treatments for her malignancy. She was noted to have thrombocytopenia likely due to chemotherapy - Stable counts, no bleeding. She did have some constipation which resolved with bowel regimen.    She was continued on her home medications for hypothyroidism and dyslipidemia.        Patient also recommended to have Port for Chemotherapy - patient on ASA which would need to be discontinued 5 days prior to procedure.    Confirmed appts with [facility] for Chemo Rx and Radiation Rx on August 17th    Outpt. IR chemo-port placement scheduled for August 19th at [facility]; patient to stop ASA 5 days prior            Patient is medically stable for discharge. She and her daughter want her to go to Rehab.    discharge time 42 minutes 65 y/o female with hx of adenocarcinoma of lung s/p right lower lobectomy and mediastinal lymph node dissection in 2017 by Dr. Bradley presented to the ED d/t persistent and worsening left hip pain days with difficulty to walking due to pain.          Left pathological femoral neck fracture 2/2 metastatic adenocarcinoma - pain controlled with medications, seen in consultation by Orthopedics with recommendations to follow up with [facility] Recommendations for partial weight bearing. Medical oncolgy and [facility] both plan for further treatments for her malignancy. She was noted to have thrombocytopenia likely due to chemotherapy - Stable counts, no bleeding. She did have some constipation which resolved with bowel regimen.    She was continued on her home medications for hypothyroidism and dyslipidemia.        Patient also recommended to have Port for Chemotherapy - patient on ASA which would need to be discontinued 5 days prior to procedure.    Confirmed appts with [facility] for Chemo Rx and Radiation Rx on August 17th    Outpt. IR chemo-port placement scheduled for August 19th at [facility]; patient to stop ASA 5 days prior            Patient is medically stable for discharge. She and her daughter want her to go to Rehab.    discharge time 42 minutes 63 y/o female with hx of adenocarcinoma of lung s/p right lower lobectomy and mediastinal lymph node dissection in 2017 by Dr. Bradley presented to the ED d/t persistent and worsening left hip pain days with difficulty to walking due to pain.          Left pathological femoral neck fracture 2/2 metastatic adenocarcinoma - pain controlled with medications, seen in consultation by Orthopedics with recommendations to follow up with [facility] Recommendations for partial weight bearing. Medical oncolgy and [facility] both plan for further treatments for her malignancy. She was noted to have thrombocytopenia likely due to chemotherapy - Stable counts, no bleeding. She did have some constipation which resolved with bowel regimen.    She was continued on her home medications for hypothyroidism and dyslipidemia.        Patient also recommended to have Port for Chemotherapy - patient on ASA which would need to be discontinued 5 days prior to procedure.    Confirmed appts with [facility] for Chemo Rx and Radiation Rx on August 17th    Outpt. IR chemo-port placement scheduled for August 19th at [facility]; patient to stop ASA 5 days prior    Pt's daughter Kena updated about all the upcoming appointments.        Patient is medically stable for discharge. She and her daughter want her to go to Rehab.    discharge time 42 minutes

## 2020-08-10 NOTE — PROGRESS NOTE ADULT - ASSESSMENT
65 y/o female with hx of adenocarcinoma of lung s/p right lower lobectomy and mediastinal lymph node dissection in 2017 by Dr. Bradley presented to the ED d/t persistent and worsening left hip pain days with difficulty to walking due to pain. Pt denies any trauma, states pain started suddenly, is constant and progressively worse. Improved with pain medication and rest, worse with movement.  Pain upon admission was 8/10, no radiation. Describes the pain as stinging pain with some numbness over the skin overlying the hip. Denies any trauma/falls.  pain currently well controlled.    Left pathological femoral neck fracture 2/2 metastatic adenocarcinoma - continues to have pain  - ortho and heme-onc input appreciated,  - On Fentanyl patch, Dilaudid prn.  - Palliative care consult requested for further assistance with pain control.  - Pain management consult  - Partial WB LLE  Will need to follow up with OncOrtho after discharge    Thrombocytopenia:  ?due to chemotherapy - Stable counts, no bleeding  - not seen on previous admission  - continue to monitor  - heme/onc f/u    Back pain due to mets  - pain management    Constipation   - Add bowel regimen     Hypothyroidism   - continue synthroid and cytomel    Dyslipidemia  - on statin    DVT ppx  - lovenox sq, if platelets continue to trend down please d/c  - B/L scds as per ortho      Prognosis - guarded given recurrent malignancy which appear to be progressive  Advance Directive - Full code, palliative to address  Disposition - [facility]ACHARY upon discharge likely.

## 2020-08-11 NOTE — CHART NOTE - NSCHARTNOTEFT_GEN_A_CORE
Asked to obtain information on pt's Chemo and radiation oncology appointments.  [facility] called.  Information about appointments confirmed.  Pt. is scheduled for August 17th at [facility] at 9:45am for chemo Rx, and 10:20am appointment with .  Pt. also has appointments on the same day August 17th at [facility] at 11:00am for Radiation Oncology session. Asked to obtain information on pt's Chemo and radiation oncology appointments.  [facility] called.  Information about appointments confirmed.  Pt. is scheduled for August 17th at [facility] at 9:45am for chemo Rx, and 10:20am appointment with .  Pt. also has appointments on the same day August 17th at [facility] at 11:00am for Radiation Oncology session and appt with Dr.Ames BANSAL/JOEY [facility]  Dispo: ZACHARY david

## 2020-08-11 NOTE — PROGRESS NOTE ADULT - ASSESSMENT
65 y/o female with hx of adenocarcinoma of lung s/p right lower lobectomy and mediastinal lymph node dissection in 2017 by Dr. Bradley presented to the ED d/t persistent and worsening left hip pain days with difficulty to walking due to pain. Pt denies any trauma, states pain started suddenly, is constant and progressively worse. Improved with pain medication and rest, worse with movement.  Pain upon admission was 8/10, no radiation. Describes the pain as stinging pain with some numbness over the skin overlying the hip. Denies any trauma/falls.  pain currently well controlled.    Left pathological femoral neck fracture 2/2 metastatic adenocarcinoma - pain controlled o ncurrent regimen  - ortho and heme-onc input appreciated,  - On Fentanyl patch, Dilaudid prn.  - Palliative care consult requested for further assistance with pain control.  - Pain management consult  - Partial WB LLE  Will need to follow up with OncOrtho Dr López, K after discharge  Will need to follow up with MedOnc Dr Joseph, M  WIll need to follow up with RadOnc ALBARO Nassar    Thrombocytopenia:  ?due to chemotherapy - Stable counts, no bleeding  - not seen on previous admission  - continue to monitor  - heme/onc f/u    Back pain due to mets  - pain management    Constipation   -  bowel regimen     Hypothyroidism   - continue synthroid and cytomel    Dyslipidemia  - on statin    DVT ppx  - lovenox sq, if platelets continue to trend down please d/c  - B/L scds as per ortho      Prognosis - guarded given recurrent malignancy which appear to be progressive  Advance Directive - Full code, palliative to address  Disposition - [facility]ACH[facility]

## 2020-08-11 NOTE — PROGRESS NOTE ADULT - SUBJECTIVE AND OBJECTIVE BOX
INTERVAL HPI/OVERNIGHT EVENTS: 64y Female PatientPMH of Metastatic Adenocarcinoma Lung to Bones. Transferred from [facility] with severe L Hip to knee pain, unable to ambulate-Found to have Pathologic Fracture of L Femur  F/U Note  No acute events overnight  Is awake and alert  Pain well controlled when in bed  Had large bowel movements last evening and last night  Patient refusing ORIF L Femur at this time  Appetite good no N/V  Waiting for Dr. Joseph to visit and discuss plan going forward     64y old  Female who presents with a chief complaint of pathologic fracture (10 Aug 2020 12:33)    Present Symptoms:     Dyspnea: 0   Nausea/Vomiting: No  Anxiety:  No  Depression: No  Fatigue:  No  Loss of appetite:  No    Pain: LLE femur and leg to ankle            Character-aching sharp            Duration-            Effect-            Factors-            Frequency-            Location-            Severity-moderate    Review of Systems: Reviewed                       All others negative    MEDICATIONS  (STANDING):  ALPRAZolam 0.25 milliGRAM(s) Oral at bedtime  aspirin enteric coated 81 milliGRAM(s) Oral daily  atorvastatin 20 milliGRAM(s) Oral at bedtime  celecoxib 200 milliGRAM(s) Oral two times a day  dexAMETHasone     Tablet 4 milliGRAM(s) Oral every 6 hours  enoxaparin Injectable 40 milliGRAM(s) SubCutaneous daily  fentaNYL   Patch  12 MICROgram(s)/Hr 1 Patch Transdermal every 72 hours  fentaNYL   Patch  25 MICROgram(s)/Hr 1 Patch Transdermal every 72 hours  folic acid 1 milliGRAM(s) Oral daily  gabapentin 300 milliGRAM(s) Oral every 8 hours  HYDROmorphone   Tablet 4 milliGRAM(s) Oral at bedtime  levothyroxine 100 MICROGram(s) Oral daily  lidocaine   Patch 1 Patch Transdermal daily  magnesium hydroxide Suspension 30 milliLiter(s) Oral daily  polyethylene glycol 3350 17 Gram(s) Oral daily  senna 1 Tablet(s) Oral two times a day    MEDICATIONS  (PRN):  ALPRAZolam 0.25 milliGRAM(s) Oral two times a day PRN anxiety  diphenhydrAMINE 25 milliGRAM(s) Oral every 6 hours PRN Rash and/or Itching  HYDROmorphone   Tablet 2 milliGRAM(s) Oral every 6 hours PRN Severe Pain (7 - 10)  ondansetron Injectable 4 milliGRAM(s) IV Push every 6 hours PRN Nausea  simethicone 80 milliGRAM(s) Chew every 6 hours PRN Gas    LABS:                        11.1   6.50  )-----------( 48       ( 10 Aug 2020 08:11 )             34.7     08-10    137  |  95<L>  |  19.0  ----------------------------<  175<H>  5.0   |  28.0  |  0.32<L>    Ca    9.0      10 Aug 2020 08:11    Urinalysis Basic - ( 10 Aug 2020 22:57 )    Color: Yellow / Appearance: Clear / S.010 / pH: x  Gluc: x / Ketone: Negative  / Bili: Negative / Urobili: 1 mg/dL   Blood: x / Protein: Negative mg/dL / Nitrite: Negative   Leuk Esterase: Trace / RBC: 0-2 /HPF / WBC 0-2   Sq Epi: x / Non Sq Epi: Occasional / Bacteria: Many    I&O's Summary    RADIOLOGY & ADDITIONAL STUDIES:    ADVANCE DIRECTIVES:   DNR  NO  Completed on:                     MOLST   NO   Completed on:  Living Will  NO   Completed on: INTERVAL HPI/OVERNIGHT EVENTS: 64y Female PatientPMH of Metastatic Adenocarcinoma Lung to Bones. Transferred from [facility] with severe L Hip to knee pain, unable to ambulate-Found to have Pathologic Fracture of L Femur  F/U Note  No acute events overnight  Is awake and alert  Pain well controlled when in bed  Had large bowel movements last evening and last night  Patient refusing ORIF L Femur at this time  Appetite good no N/V  Waiting for Dr. Joseph to visit and discuss plan going forward     64y old  Female who presents with a chief complaint of pathologic fracture (10 Aug 2020 12:33)    Present Symptoms:     Dyspnea: 0   Nausea/Vomiting: No  Anxiety:  No  Depression: No  Fatigue:  No  Loss of appetite:  No    Pain: LLE femur and leg to ankle            Character-aching sharp            Duration-            Effect-worse with movement            Factors-            Frequency-            Location-            Severity-moderate    Review of Systems: Reviewed                       All others negative    MEDICATIONS  (STANDING):  ALPRAZolam 0.25 milliGRAM(s) Oral at bedtime  aspirin enteric coated 81 milliGRAM(s) Oral daily  atorvastatin 20 milliGRAM(s) Oral at bedtime  celecoxib 200 milliGRAM(s) Oral two times a day  dexAMETHasone     Tablet 4 milliGRAM(s) Oral every 6 hours  enoxaparin Injectable 40 milliGRAM(s) SubCutaneous daily  fentaNYL   Patch  12 MICROgram(s)/Hr 1 Patch Transdermal every 72 hours  fentaNYL   Patch  25 MICROgram(s)/Hr 1 Patch Transdermal every 72 hours  folic acid 1 milliGRAM(s) Oral daily  gabapentin 300 milliGRAM(s) Oral every 8 hours  HYDROmorphone   Tablet 4 milliGRAM(s) Oral at bedtime  levothyroxine 100 MICROGram(s) Oral daily  lidocaine   Patch 1 Patch Transdermal daily  magnesium hydroxide Suspension 30 milliLiter(s) Oral daily  polyethylene glycol 3350 17 Gram(s) Oral daily  senna 1 Tablet(s) Oral two times a day    MEDICATIONS  (PRN):  ALPRAZolam 0.25 milliGRAM(s) Oral two times a day PRN anxiety  diphenhydrAMINE 25 milliGRAM(s) Oral every 6 hours PRN Rash and/or Itching  HYDROmorphone   Tablet 2 milliGRAM(s) Oral every 6 hours PRN Severe Pain (7 - 10)  ondansetron Injectable 4 milliGRAM(s) IV Push every 6 hours PRN Nausea  simethicone 80 milliGRAM(s) Chew every 6 hours PRN Gas    LABS:                        11.1   6.50  )-----------( 48       ( 10 Aug 2020 08:11 )             34.7     08-10    137  |  95<L>  |  19.0  ----------------------------<  175<H>  5.0   |  28.0  |  0.32<L>    Ca    9.0      10 Aug 2020 08:11    Urinalysis Basic - ( 10 Aug 2020 22:57 )    Color: Yellow / Appearance: Clear / S.010 / pH: x  Gluc: x / Ketone: Negative  / Bili: Negative / Urobili: 1 mg/dL   Blood: x / Protein: Negative mg/dL / Nitrite: Negative   Leuk Esterase: Trace / RBC: 0-2 /HPF / WBC 0-2   Sq Epi: x / Non Sq Epi: Occasional / Bacteria: Many    I&O's Summary    RADIOLOGY & ADDITIONAL STUDIES:    ADVANCE DIRECTIVES: Discussed during last admission-wants Full Code  DNR  NO  Completed on:                     MOLST   NO   Completed on:  Living Will  NO   Completed on:

## 2020-08-11 NOTE — PROGRESS NOTE ADULT - SUBJECTIVE AND OBJECTIVE BOX
HOSPITALIST PROGRESS NOTE    NADER SCHAEFFER  [id number]  64yFemale    Patient is a 64y old  Female who presents with a chief complaint of pathologic fracture (11 Aug 2020 12:21)      SUBJECTIVE:   Chart reviewed since last visit.  Patient seen and examined at bedside for hip pain due to pathologic fracture, metastatic lung cancer.  Feels much better today - hip pain controlled.  Denies any fever, chill, dizziness, dyspnea, chest pain, palpitations.  Finally had BM.  Wants to go to Rehab      OBJECTIVE:  Vital Signs Last 24 Hrs  T(C): 36.8 (11 Aug 2020 09:42), Max: 36.8 (11 Aug 2020 09:42)  T(F): 98.3 (11 Aug 2020 09:42), Max: 98.3 (11 Aug 2020 09:42)  HR: 99 (11 Aug 2020 09:42) (99 - 103)  BP: 116/73 (11 Aug 2020 09:42) (104/61 - 117/71)   RR: 18 (11 Aug 2020 09:42) (18 - 18)  SpO2: 96% (11 Aug 2020 09:42) (95% - 96%)    PHYSICAL EXAMINATION  General: lying in bed, NAD  HEENT:  EOMI  NECK:  Supple  CVS: regular rate and rhythm S1 S2  RESP:  CTAB  GI:  Soft nondistended nontender BS+  : No suprapubic tenderness  MSK:  Limited ROM LLE due to pain  CNS:  no gross focal or global deficit noted  INTEG:  warm dry skin  PSYCH:  Fair mood    MONITOR:  CAPILLARY BLOOD GLUCOSE            I&O's Summary                          11.1   6.50  )-----------( 48       ( 10 Aug 2020 08:11 )             34.7       08-10    137  |  95<L>  |  19.0  ----------------------------<  175<H>  5.0   |  28.0  |  0.32<L>    Ca    9.0      10 Aug 2020 08:11          Urinalysis Basic - ( 10 Aug 2020 22:57 )    Color: Yellow / Appearance: Clear / S.010 / pH: x  Gluc: x / Ketone: Negative  / Bili: Negative / Urobili: 1 mg/dL   Blood: x / Protein: Negative mg/dL / Nitrite: Negative   Leuk Esterase: Trace / RBC: 0-2 /HPF / WBC 0-2   Sq Epi: x / Non Sq Epi: Occasional / Bacteria: Many        Culture:    TTE:    RADIOLOGY        MEDICATIONS  (STANDING):  ALPRAZolam 0.25 milliGRAM(s) Oral at bedtime  aspirin enteric coated 81 milliGRAM(s) Oral daily  atorvastatin 20 milliGRAM(s) Oral at bedtime  celecoxib 200 milliGRAM(s) Oral two times a day  dexAMETHasone     Tablet 4 milliGRAM(s) Oral every 6 hours  enoxaparin Injectable 40 milliGRAM(s) SubCutaneous daily  fentaNYL   Patch  12 MICROgram(s)/Hr 1 Patch Transdermal every 72 hours  fentaNYL   Patch  25 MICROgram(s)/Hr 1 Patch Transdermal every 72 hours  folic acid 1 milliGRAM(s) Oral daily  gabapentin 300 milliGRAM(s) Oral every 8 hours  HYDROmorphone   Tablet 4 milliGRAM(s) Oral at bedtime  levothyroxine 100 MICROGram(s) Oral daily  lidocaine   Patch 1 Patch Transdermal daily  magnesium hydroxide Suspension 30 milliLiter(s) Oral daily  polyethylene glycol 3350 17 Gram(s) Oral daily  senna 1 Tablet(s) Oral two times a day      MEDICATIONS  (PRN):  ALPRAZolam 0.25 milliGRAM(s) Oral two times a day PRN anxiety  diphenhydrAMINE 25 milliGRAM(s) Oral every 6 hours PRN Rash and/or Itching  HYDROmorphone   Tablet 2 milliGRAM(s) Oral every 6 hours PRN Severe Pain (7 - 10)  ondansetron Injectable 4 milliGRAM(s) IV Push every 6 hours PRN Nausea  simethicone 80 milliGRAM(s) Chew every 6 hours PRN Gas

## 2020-08-12 NOTE — PROGRESS NOTE ADULT - SUBJECTIVE AND OBJECTIVE BOX
Patient is a 64y old  Female who presents with a chief complaint of pathologic fracture (11 Aug 2020 12:49)      HPI:  65 y/o female with hx of adenocarcinoma of lung s/p right lower lobectomy and mediastinal lymph node dissection in  by Dr. Bradley presented to the ED d/t persistent and worsening left hip pain days with difficulty to walking due to pain. Pt denies any trauma, states pain started suddenly, is constant and progressively worse. Improved with pain medication and rest, worse with movement. Rates pain currently 8/10, no radiation. Describes the pain as stinging pain with some numbness over the skin overlying the hip. Denies any trauma/falls.     Pt started chemotherapy 2020. Has a course of 4 sessions every friday. Pt. had a CT scan of her chest on  that showed new lung mass with mets to her bones (L1, T7 and 6th rib). States that mets were not present in april/may and were newly diagnosed in July. Pt. also states that she has a fracture of the left shoulder/clavicle, seen in ED imaging on previous admission.     Denies any chest pain, SOB, cough, fever, trauma/falls, constipation, diarrhea, urinary frequency/urgency, dysuria.     Pt s/p CT scan and xray in ED showing pathologic femoral head fracture. Ortho consulted. (08 Aug 2020 23:10)    FAMILY HISTORY:  Family history of hypothyroidism  Family history of hypertension  Family history of stroke    INTERVAL HPI/OVERNIGHT EVENTS:  Pt. is seen and examined. Case d/w attending. Pt. has hip pain but states pain meds relieve it.  States had a nice dessert last night for dinner.  Denies SOB, CP, palpitaions, diaphoresis, chills/fever, abd.pain/N/V, +BM last night  No overnight events  Awaiting authorization for ZACHARY  Scheduled to have outpt chemo and radiation on       PAST MEDICAL & SURGICAL HISTORY:  Genital herpes  Drug abuse: Back in the 70&#x27;s and 80&#x27;s (Cocaine)  Lung cancer: with mets  LAMAR on CPAP  Anxiety  Hypothyroid  Status post lobectomy of lung: 3/2017 (Right lung)  S/P bunionectomy: with revision    Allergies    Bananas (Headache)  latex (Rash (Mild))  penicillin (Swelling (Mild to Mod))    Intolerances    Vital Signs Last 24 Hrs  T(C): 36.9 (12 Aug 2020 08:47), Max: 36.9 (12 Aug 2020 08:47)  T(F): 98.4 (12 Aug 2020 08:47), Max: 98.4 (12 Aug 2020 08:47)  HR: 86 (12 Aug 2020 08:47) (86 - 101)  BP: 139/86 (12 Aug 2020 08:47) (123/63 - 139/86)  RR: 19 (12 Aug 2020 08:47) (19 - 19)  SpO2: 98% (11 Aug 2020 23:56) (95% - 98%)                        11.2   7.66  )-----------( 49       ( 12 Aug 2020 11:40 )             34.4     LIVER FUNCTIONS - ( 12 Aug 2020 11:40 )  Alb: 3.8 g/dL / Pro: 6.1 g/dL / ALK PHOS: 179 U/L / ALT: 35 U/L / AST: 13 U/L / GGT: x           PT/INR - ( 12 Aug 2020 11:40 )   PT: 11.6 sec;   INR: 1.00 ratio       PTT - ( 12 Aug 2020 11:40 )  PTT:24.3 sec  Urinalysis Basic - ( 10 Aug 2020 22:57 )    Color: Yellow / Appearance: Clear / S.010 / pH: x  Gluc: x / Ketone: Negative  / Bili: Negative / Urobili: 1 mg/dL   Blood: x / Protein: Negative mg/dL / Nitrite: Negative   Leuk Esterase: Trace / RBC: 0-2 /HPF / WBC 0-2   Sq Epi: x / Non Sq Epi: Occasional / Bacteria: Many    RADIOLOGY & ADDITIONAL STUDIES:    MEDICATIONS:  ALPRAZolam 0.25 milliGRAM(s) Oral two times a day PRN  ALPRAZolam 0.5 milliGRAM(s) Oral at bedtime  aspirin enteric coated 81 milliGRAM(s) Oral daily  atorvastatin 20 milliGRAM(s) Oral at bedtime  celecoxib 200 milliGRAM(s) Oral two times a day  dexAMETHasone     Tablet 4 milliGRAM(s) Oral every 6 hours  diphenhydrAMINE 25 milliGRAM(s) Oral every 6 hours PRN  enoxaparin Injectable 40 milliGRAM(s) SubCutaneous daily  fentaNYL   Patch  12 MICROgram(s)/Hr 1 Patch Transdermal every 72 hours  fentaNYL   Patch  25 MICROgram(s)/Hr 1 Patch Transdermal every 72 hours  folic acid 1 milliGRAM(s) Oral daily  gabapentin 300 milliGRAM(s) Oral every 8 hours  HYDROmorphone   Tablet 2 milliGRAM(s) Oral every 6 hours PRN  HYDROmorphone   Tablet 4 milliGRAM(s) Oral at bedtime  levothyroxine 100 MICROGram(s) Oral daily  lidocaine   Patch 1 Patch Transdermal daily  magnesium hydroxide Suspension 30 milliLiter(s) Oral daily  ondansetron Injectable 4 milliGRAM(s) IV Push every 6 hours PRN  polyethylene glycol 3350 17 Gram(s) Oral daily  senna 1 Tablet(s) Oral two times a day  simethicone 80 milliGRAM(s) Chew every 6 hours PRN

## 2020-08-12 NOTE — DISCHARGE NOTE NURSING/CASE MANAGEMENT/SOCIAL WORK - PATIENT PORTAL LINK FT
You can access the FollowMyHealth Patient Portal offered by [facility] by registering at the following website: [email or web address]. By joining [facility]’s FollowMyHealth portal, you will also be able to view your health information using other applications (apps) compatible with our system.

## 2020-08-12 NOTE — PROGRESS NOTE ADULT - PROBLEM SELECTOR PLAN 4
Met with patient - pain managed.  Cont pain regimen - Fentanyl just increased to 37.5mcg yesterday.   Patient intends to follow up with oncology for further treatment.

## 2020-08-12 NOTE — PROGRESS NOTE ADULT - ASSESSMENT
64 yr woman  with Metastatic Adenocarcinoma of Lungs to BONE- readmitted from [facility] with L Head of femur fracture

## 2020-08-12 NOTE — PROGRESS NOTE ADULT - SUBJECTIVE AND OBJECTIVE BOX
CC:  follow up symptoms  INTERVAL HPI/OVERNIGHT EVENTS:    PRESENT SYMPTOMS: SOURCE:  Patient/Family/Team    PAIN SCALE:  0 = none  1 = mild   2 = moderate  3 = severe    Pain: 1    Dyspnea:  [ ] YES [x ] NO  Anxiety:  [ ] YES [x ] NO  Fatigue: [ ] YES [ x] NO  Nausea: [ ] YES [x ] NO  Loss of Appetite: [ ] YES [x ] NO  Other symptoms: __________    MEDICATIONS  (STANDING):  ALPRAZolam 0.5 milliGRAM(s) Oral at bedtime  aspirin enteric coated 81 milliGRAM(s) Oral daily  atorvastatin 20 milliGRAM(s) Oral at bedtime  celecoxib 200 milliGRAM(s) Oral two times a day  dexAMETHasone     Tablet 4 milliGRAM(s) Oral every 6 hours  enoxaparin Injectable 40 milliGRAM(s) SubCutaneous daily  fentaNYL   Patch  12 MICROgram(s)/Hr 1 Patch Transdermal every 72 hours  fentaNYL   Patch  25 MICROgram(s)/Hr 1 Patch Transdermal every 72 hours  folic acid 1 milliGRAM(s) Oral daily  gabapentin 300 milliGRAM(s) Oral every 8 hours  HYDROmorphone   Tablet 4 milliGRAM(s) Oral at bedtime  levothyroxine 100 MICROGram(s) Oral daily  lidocaine   Patch 1 Patch Transdermal daily  magnesium hydroxide Suspension 30 milliLiter(s) Oral daily  polyethylene glycol 3350 17 Gram(s) Oral daily  senna 1 Tablet(s) Oral two times a day    MEDICATIONS  (PRN):  ALPRAZolam 0.25 milliGRAM(s) Oral two times a day PRN anxiety  diphenhydrAMINE 25 milliGRAM(s) Oral every 6 hours PRN Rash and/or Itching  HYDROmorphone   Tablet 2 milliGRAM(s) Oral every 6 hours PRN Severe Pain (7 - 10)  ondansetron Injectable 4 milliGRAM(s) IV Push every 6 hours PRN Nausea  simethicone 80 milliGRAM(s) Chew every 6 hours PRN Gas      Allergies    Bananas (Headache)  latex (Rash (Mild))  penicillin (Swelling (Mild to Mod))    Intolerances    Karnofsky Performance Score/Palliative Performance Status Version 2:  60   %    Vital Signs Last 24 Hrs  T(C): 36.9 (12 Aug 2020 08:47), Max: 36.9 (12 Aug 2020 08:47)  T(F): 98.4 (12 Aug 2020 08:47), Max: 98.4 (12 Aug 2020 08:47)  HR: 86 (12 Aug 2020 08:47) (86 - 101)  BP: 139/86 (12 Aug 2020 08:47) (123/63 - 139/86)  BP(mean): --  RR: 19 (12 Aug 2020 08:47) (19 - 19)  SpO2: 98% (11 Aug 2020 23:56) (95% - 98%)    PHYSICAL EXAM:    General: awake alert sitting in chair NAD  HEENT: [ x] normal  [ ] dry mouth  [ ] ET tube/trach    Lungs: [x ] comfortable [ ] tachypnea/labored breathing  [ ] excessive secretions    CV: [x ] normal  [ ] tachycardia    GI: [ x] normal  [ ] distended  [ ] tender  [ ] no BS               [ ] PEG/NG/OG tube    : [ x] normal  [ ] incontinent  [ ] oliguria/anuria  [ ] mcintosh    MSK: [ x] normal  [ ] weakness  [ ] edema             [ ] ambulatory  [ ] bedbound/wheelchair bound    Skin: [ ] normal  [ ] pressure ulcers- Stage_____  [x ] no rash    LABS:                        11.2   7.66  )-----------( 49       ( 12 Aug 2020 11:40 )             34.4     08-12    131<L>  |  92<L>  |  29.0<H>  ----------------------------<  156<H>  5.3   |  24.0  |  0.69    Ca    8.9      12 Aug 2020 11:40    TPro  6.1<L>  /  Alb  3.8  /  TBili  0.9  /  DBili  x   /  AST  13  /  ALT  35<H>  /  AlkPhos  179<H>  08-12    PT/INR - ( 12 Aug 2020 11:40 )   PT: 11.6 sec;   INR: 1.00 ratio         PTT - ( 12 Aug 2020 11:40 )  PTT:24.3 sec  Urinalysis Basic - ( 10 Aug 2020 22:57 )    Color: Yellow / Appearance: Clear / S.010 / pH: x  Gluc: x / Ketone: Negative  / Bili: Negative / Urobili: 1 mg/dL   Blood: x / Protein: Negative mg/dL / Nitrite: Negative   Leuk Esterase: Trace / RBC: 0-2 /HPF / WBC 0-2   Sq Epi: x / Non Sq Epi: Occasional / Bacteria: Many      I&O's Summary      RADIOLOGY & ADDITIONAL STUDIES:

## 2020-08-12 NOTE — PROGRESS NOTE ADULT - ATTENDING COMMENTS
COUNSELING:    Face to face meeting to discuss Advanced Care Planning - Time Spent ______ Minutes.  See goals of care note.    More than 50% time spent in counseling and coordinating care. ___30___ Minutes.     Thank you for the opportunity to assist with the care of this patient.   [facility] [phone].
Discussed with patient, daughter Kena [phone], Edgardo Aguilar and Inna.  Discussed with [facility] Nahed
Awaiting authorization for ZACHARY   Patient wants port placed - Scheduled for 9/24; may continue chemo and XRT as planned.

## 2020-08-12 NOTE — PROGRESS NOTE ADULT - PROBLEM SELECTOR PLAN 1
Patient taking PRN Dilaudid 2mg for BTP  Fentanyl just increased to 37.5mcg yesterday  Bowel regimen

## 2020-08-12 NOTE — DISCHARGE NOTE NURSING/CASE MANAGEMENT/SOCIAL WORK - NSDCFUADDAPPT_GEN_ALL_CORE_FT
Patient also recommended to have Port for Chemotherapy - patient on ASA which would need to be discontinued 5 days prior to procedure.    Confirmed appts with [facility] for Chemo Rx and Radiation Rx on August 17th  Outpt. IR chemo-port placement scheduled for August 24th at [facility] at 09:00AM  - F/U w/- August 17th  -F/U w/- August 17th  -F/U w/

## 2020-08-12 NOTE — PROGRESS NOTE ADULT - ASSESSMENT
65 y/o female with hx of adenocarcinoma of lung s/p right lower lobectomy and mediastinal lymph node dissection in 2017 by Dr. Bradley presented to the ED d/t persistent and worsening left hip pain days with difficulty to walking due to pain. Pt denies any trauma, states pain started suddenly, is constant and progressively worse. Improved with pain medication and rest, worse with movement.  Pain upon admission was 8/10, no radiation. Describes the pain as stinging pain with some numbness over the skin overlying the hip. Denies any trauma/falls.  pain currently well controlled.    Left pathological femoral neck fracture 2/2 metastatic adenocarcinoma - pain controlled on current regimen  - ortho and heme-onc input appreciated,  - On Fentanyl patch, Dilaudid PO prn.  - Palliative care on board    - Pain management consult  - Partial WB LLE- pt refuse ortho surgery at present  Will need to follow up with OncOrtho Dr López, KAY after discharge  Will need to follow up with MedOnc Dr Joseph, M  WIll need to follow up with RadOnc ALBARO [facility]  Appointments scheduled for August 17th for Chemo & radiation Rx  Will need to arrange chemo-port placement- but pt needs to be off ASA for at least 5 days    Thrombocytopenia:  ?due to chemotherapy - Stable counts, no bleeding  - not seen on previous admission  - continue to monitor  - heme/onc f/u    Back pain due to mets  - pain management    Constipation   -  bowel regimen     Hypothyroidism   - continue synthroid and cytomel    Dyslipidemia  - on statin    DVT ppx  - lovenox sq, if platelets continue to trend down please d/c  - B/L scds as per ortho      Prognosis - guarded given recurrent malignancy which appear to be progressive  Advance Directive - Full code, palliative to address  Disposition - ZACHARY    Will need to arrange chemo-port placement- but pt needs to be off ASA for at least 5 days

## 2020-08-12 NOTE — PROGRESS NOTE ADULT - REASON FOR ADMISSION
pathologic fracture

## 2020-08-12 NOTE — PROGRESS NOTE ADULT - PROVIDER SPECIALTY LIST ADULT
Hospitalist
Orthopedics
Orthopedics
Palliative Care
Hospitalist
Palliative Care

## 2020-08-14 NOTE — CONSULT NOTE ADULT - ASSESSMENT
*** NOTE INCOMPLETE *** Patient is a 63 y/o F w/ a PMHx of hypothyroidism, and Stage IV lung adenocarcinoma (S/p right lower lobectomy in 3/2017, w/ recurrence in 5/2020 w/ mets to bone, s/p palliative RT, recently s/p C1 Carbo/Pemetrexed/Pembro on 7/31) who was transferred from [facility] to [facility] for Orthopedic evaluation after she presented s/p fall and was found to have an acute impacted left femoral neck fracture. Oncology consulted for further evaluation.            *** NOTE INCOMPLETE *** Patient is a 65 y/o F w/ a PMHx of hypothyroidism, and Stage IV lung adenocarcinoma (S/p right lower lobectomy in 3/2017, w/ recurrence in 5/2020 w/ mets to bone, s/p palliative RT, recently s/p C1 Carbo/Pemetrexed/Pembro on 7/31) who was transferred from [facility] to [facility] for Orthopedic evaluation after she presented s/p fall and was found to have an acute impacted left femoral neck fracture. Oncology consulted for further evaluation.    # Acute L femoral fracture  - X-ray at OSH showed an acute impacted left femoral neck fracture, at the site of previously seen lytic lesion, suggesting pathologic fracture  - Orthopedics consulted, follow-up recs  - CBC at OSH reviewed. Her ANC is currently WNL (3.24). She is noted to be thrombocytopenic which is likely 2/2 recent chemotherapy.  - Monitor CBC WITH DIFF daily and keep active T+S. Expect patient's counts to gradually improve since today is C1D15 of therapy.   - Transfuse for Hgb < 7, PLT < 10 (if afebrile), PLT < 15 (if febrile), or PLT < 50 (if bleeding). If a surgery is planned, can transfuse to goal as per Ortho  - Appreciate care as per primary team    # Metastatic lung adenocarcinoma  - Initially Dx w/ Stage IA adenocarcinoma in 3/2017 s/p R lower lobectomy. She was placed on surveillance and was found to have evidence of recurrence in 5/2020 with metastatic disease to bone. PD-L1 < 1%. She was found to have a pathologic fracture of the L clavicle in 7/2020. She subsequently underwent palliative RT to the L clavicle, T6-T8 spine, and T12-L2 spine.   - Patient was recently started on Carbo/Pemextred/Pembrolizumab on 7/31/20. She has received 1 cycle thus far.  - There are no plans for systemic therapy while inpatient  - Further management as noted above  - Primary Oncologist: Dr. Elvira Joseph. Continue outpatient follow-up    Silvio Mcmanus, PGY5  Hematology-Oncology Fellow  Pager: [phone] / [phone]

## 2020-08-14 NOTE — H&P ADULT - NSHPLABSRESULTS_GEN_ALL_CORE
9.5    3.15  )-----------( 55                    29.5         136  |  99  |  20  ----------------------------<  115<H>  4.4   |  25  |  0.38<L>    Ca    9.0          PT: 12.3 sec;   INR: 1.04 ratio    PTT:24.5 sec      IMAGING  [place] [facility] duplicate MRN [id number]

## 2020-08-14 NOTE — ED PROVIDER NOTE - OBJECTIVE STATEMENT
64F, c/o L hip pain.  Duration: x3d.  Associated Sx:  L groin pain.  Context:  pathologic Fx due to multiple bony mets.  Initially seen at [facility]; where xrays and CT imaging performed; Dr. Ramírez ortho Onc accepting.    Pain currently well controlled with fentanyl patch - does not want more pain meds.

## 2020-08-14 NOTE — ED PROVIDER NOTE - NS ED ROS FT
CONSTITUTIONAL: No fevers, chills, fatigue, dizziness, weakness, unexpected weight change  HEENT: No nasal congestion, runny nose, sore throat  CV: No chest pain, palpitations  PULM: No cough, shortness of breath  GI: No abdominal pain, nausea, vomiting, diarrhea  SKIN: No rashes, swelling  MSK: LEFT HIP PAIN  NEURO: no headache, paresthesias

## 2020-08-14 NOTE — H&P ADULT - ATTENDING COMMENTS
Patint seen and and examined. VSS and physical exam notable for L groin pain with remaining neurovasc exam intact    64F with PMH of hypothyroidism, lung adenocarcinoma (dx 2017) s/p right lower lobectomy with bony metastatic s/p palliative RT  to clavicle/spine and recent carbo/pemextred/pembrolizumab chemo 7/31, who initially presented on 8.8 to OSH after mechanical fall after slipping in bathroom (no prodromal sx or head trauma) found to have left hip pathologic fracture as well as chemo induced bicytopenia    #Acute L fem ftx pathological: Seen by ortho awaiting official recs; possible intervention next week, requesting optimization; will clarify; for now pain control and bedrest until activity level clarified; SCIDS given thrombocytopenia    #Chemo induced bicytopenia: ANC at [facility] wnl. Hb 9s and plt 55 (previously 200s prior to chem tx). Pending plan for procedure will consider additional blood products if need for procedures    #Metastatic lung cancer: Patient endorsing was supposed to have an appt for mediport prior to admission; clarify without outpt onc and consider IR    #Pain/Anxiety: ISTOPPED as above.

## 2020-08-14 NOTE — ED PROVIDER NOTE - OBJECTIVE STATEMENT
63 y/o F pt with hx of stage IV adenocarcinoma of lung s/p L lobectomy with mets to L hip, femur, knee and pathologic fx of L clavicle presenting today after mechanical fall today at home. Pt's L heel slipped in the bathroom and she fall onto her L side. Pt reports that she was able to break her fall with her R arm. Did not attempt to get up off the fall due to concerns for fx. Pt has baseline pain to LLE and is not c/o worsening pain. Pt was recently d/c from [facility] 3 days ago after evalauation for LLE mets and pain. Pt was set up at out rehab and goes every day. Pt lives with  who normally takes care of her. Pt is currently on IV chemo [id number]. Pt denies any chest pain, dyspnea, fevers, n/v/d, abdominal pain, dysuria, headache, cough, congestion, sore throat, neck pain, back pain, weakness, numbness, tingling, dizziness, syncope, or other complaint.    Onc: Dr. Schaefer @ [facility]

## 2020-08-14 NOTE — ED ADULT NURSE NOTE - NSIMPLEMENTINTERV_GEN_ALL_ED
Implemented All Fall with Harm Risk Interventions:  [place] to call system. Call bell, personal items and telephone within reach. Instruct patient to call for assistance. Room bathroom lighting operational. Non-slip footwear when patient is off stretcher. Physically safe environment: no spills, clutter or unnecessary equipment. Stretcher in lowest position, wheels locked, appropriate side rails in place. Provide visual cue, wrist band, yellow gown, etc. Monitor gait and stability. Monitor for mental status changes and reorient to person, place, and time. Review medications for side effects contributing to fall risk. Reinforce activity limits and safety measures with patient and family. Provide visual clues: red socks.

## 2020-08-14 NOTE — CONSULT NOTE ADULT - SUBJECTIVE AND OBJECTIVE BOX
HPI:  Patient is a 64 yr old woman with PMHx of hypothyroidism, Stage IV lung adenocarcinoma s/p right lower lobectomy, s/p palliative radiation to clavicle and spine, currently on IV chemo x 3 weeks with recent admission 8/8 for pending femoral neck fracture now transferred from [facility] after fall in home with left hip pain, imaging showing left hip pathologic fracture. Patient was recently evaluated at [facility] several days ago and found to have metastatic disease to left femur. Was scheduled for orthopedic follow up in 1-2 weeks after discharge but unfortunately had fall in the home on day of admission. She fell in shower landing on her left hip, did not hit her head. She has pain in the left groin. Able to move toes and knee? (14 Aug 2020 16:55)      PAST MEDICAL & SURGICAL HISTORY:      Review of Systems:   CONSTITUTIONAL: No fever, weight loss, or fatigue  EYES: No eye pain, visual disturbances, or discharge  ENMT:  No difficulty hearing, tinnitus, vertigo; No sinus or throat pain  NECK: No pain or stiffness  BREASTS: No pain, masses, or nipple discharge  RESPIRATORY: No cough, wheezing, chills or hemoptysis; No shortness of breath  CARDIOVASCULAR: No chest pain, palpitations, dizziness, or leg swelling  GASTROINTESTINAL: No abdominal or epigastric pain. No nausea, vomiting, or hematemesis; No diarrhea or constipation. No melena or hematochezia.  GENITOURINARY: No dysuria, frequency, hematuria, or incontinence  NEUROLOGICAL: No headaches, memory loss, loss of strength, numbness, or tremors  SKIN: No itching, burning, rashes, or lesions   LYMPH NODES: No enlarged glands  ENDOCRINE: No heat or cold intolerance; No hair loss  MUSCULOSKELETAL: No joint pain or swelling; No muscle, back, or extremity pain  PSYCHIATRIC: No depression, anxiety, mood swings, or difficulty sleeping  HEME/LYMPH: No easy bruising, or bleeding gums  ALLERY AND IMMUNOLOGIC: No hives or eczema    Allergies    Bananas (Other)  latex (Rash)  opioid-like analgesics (Urticaria)  penicillin (Angioedema)    Intolerances    Social History: Former smoker (1-1.5ppd for 47 years, Quit in 2013), Social EtOH use, No illicit drug use    FAMILY HISTORY:      MEDICATIONS  (STANDING):    MEDICATIONS  (PRN):        CAPILLARY BLOOD GLUCOSE        I&O's Summary    Vital Signs Last 24 Hrs  T(C): 36.6 (14 Aug 2020 15:00), Max: 37.2 (14 Aug 2020 14:36)  T(F): 97.9 (14 Aug 2020 15:00), Max: 99 (14 Aug 2020 14:36)  HR: 72 (14 Aug 2020 15:00) (72 - 95)  BP: 148/85 (14 Aug 2020 15:00) (148/85 - 162/89)  BP(mean): --  RR: 16 (14 Aug 2020 15:00) (16 - 20)  SpO2: 99% (14 Aug 2020 15:00) (97% - 99%)    PHYSICAL EXAM:  GENERAL: NAD, well-developed  HEAD:  Atraumatic, Normocephalic  EYES: EOMI, PERRLA, conjunctiva and sclera clear  NECK: Supple, No JVD  CHEST/LUNG: Clear to auscultation bilaterally; No wheeze  HEART: Regular rate and rhythm; No murmurs, rubs, or gallops  ABDOMEN: Soft, Nontender, Nondistended; Bowel sounds present  EXTREMITIES:  2+ Peripheral Pulses, No clubbing, cyanosis, or edema  PSYCH: AAOx3  NEUROLOGY: non-focal  SKIN: No rashes or lesions    LABS:          PT/INR - ( 14 Aug 2020 16:56 )   PT: 12.3 sec;   INR: 1.04 ratio         PTT - ( 14 Aug 2020 16:56 )  PTT:24.5 sec    RADIOLOGY & ADDITIONAL TESTS:  Studies reviewed. HPI:  Patient is a 63 y/o F w/ a PMHx of hypothyroidism, and Stage IV lung adenocarcinoma s/p right lower lobectomy, s/p palliative radiation to clavicle and spine, currently on IV chemo x 3 weeks with recent admission 8/8 for pending femoral neck fracture now transferred from [facility] after fall in home with left hip pain, imaging showing left hip pathologic fracture. Patient was recently evaluated at [facility] several days ago and found to have metastatic disease to left femur. Was scheduled for orthopedic follow up in 1-2 weeks after discharge but unfortunately had fall in the home on day of admission. She fell in shower landing on her left hip, did not hit her head. She has pain in the left groin. Able to move toes and knee? (14 Aug 2020 16:55)    Oncologic History:  Patient was initially diagnosed with Stage IA adenocarcinoma in 3/2017 s/p R lower lobectomy. She was placed on surveillance and was found to have evidence of recurrence in 5/2020 with metastatic disease to bone. She underwent a R lung biopsy in 6/2020 which showed adenocarcinoma (PD-L1 < 1%). She was found to have a pathologic fracture of the L clavicle in 7/2020. She subsequently underwent palliative RT to the L clavicle, T6-T8 spine, and T12-L2 spine. Patient was started on Carbo/Pemextred/Pembrolizumab on 7/31/20. She has received 1 cycle thus far. Patient follows with Dr. Elvira Joseph at the [facility].    PAST MEDICAL & SURGICAL HISTORY:  Stage IV Lung Cancer  Hypothyroidism     Review of Systems:   CONSTITUTIONAL: No fever, weight loss, or fatigue  EYES: No eye pain, visual disturbances, or discharge  ENMT:  No difficulty hearing, tinnitus, vertigo; No sinus or throat pain  NECK: No pain or stiffness  BREASTS: No pain, masses, or nipple discharge  RESPIRATORY: No cough, wheezing, chills or hemoptysis; No shortness of breath  CARDIOVASCULAR: No chest pain, palpitations, dizziness, or leg swelling  GASTROINTESTINAL: No abdominal or epigastric pain. No nausea, vomiting, or hematemesis; No diarrhea or constipation. No melena or hematochezia.  GENITOURINARY: No dysuria, frequency, hematuria, or incontinence  NEUROLOGICAL: No headaches, memory loss, loss of strength, numbness, or tremors  SKIN: No itching, burning, rashes, or lesions   LYMPH NODES: No enlarged glands  ENDOCRINE: No heat or cold intolerance; No hair loss  MUSCULOSKELETAL: No joint pain or swelling; No muscle, back, or extremity pain  PSYCHIATRIC: No depression, anxiety, mood swings, or difficulty sleeping  HEME/LYMPH: No easy bruising, or bleeding gums  ALLERY AND IMMUNOLOGIC: No hives or eczema    Allergies    Bananas (Other)  latex (Rash)  opioid-like analgesics (Urticaria)  penicillin (Angioedema)    Intolerances    Social History: Former smoker (1-1.5ppd for 47 years, Quit in 2013), Social EtOH use, No illicit drug use    FAMILY HISTORY:      MEDICATIONS  (STANDING):    MEDICATIONS  (PRN):        CAPILLARY BLOOD GLUCOSE        I&O's Summary    Vital Signs Last 24 Hrs  T(C): 36.6 (14 Aug 2020 15:00), Max: 37.2 (14 Aug 2020 14:36)  T(F): 97.9 (14 Aug 2020 15:00), Max: 99 (14 Aug 2020 14:36)  HR: 72 (14 Aug 2020 15:00) (72 - 95)  BP: 148/85 (14 Aug 2020 15:00) (148/85 - 162/89)  BP(mean): --  RR: 16 (14 Aug 2020 15:00) (16 - 20)  SpO2: 99% (14 Aug 2020 15:00) (97% - 99%)    PHYSICAL EXAM:  GENERAL: NAD, well-developed  HEAD:  Atraumatic, Normocephalic  EYES: EOMI, PERRLA, conjunctiva and sclera clear  NECK: Supple, No JVD  CHEST/LUNG: Clear to auscultation bilaterally; No wheeze  HEART: Regular rate and rhythm; No murmurs, rubs, or gallops  ABDOMEN: Soft, Nontender, Nondistended; Bowel sounds present  EXTREMITIES:  2+ Peripheral Pulses, No clubbing, cyanosis, or edema  PSYCH: AAOx3  NEUROLOGY: non-focal  SKIN: No rashes or lesions    LABS:          PT/INR - ( 14 Aug 2020 16:56 )   PT: 12.3 sec;   INR: 1.04 ratio         PTT - ( 14 Aug 2020 16:56 )  PTT:24.5 sec    RADIOLOGY & ADDITIONAL TESTS:  Studies reviewed. HPI:  Patient is a 65 y/o F w/ a PMHx of hypothyroidism, and Stage IV lung adenocarcinoma (S/p right lower lobectomy in 3/2017, w/ recurrence in 5/2020 w/ mets to bone, s/p palliative RT, recently s/p C1 Carbo/Pemetrexed/Pembro on 7/31) who was transferred from [facility] to [facility] for Orthopedic evaluation after she presented s/p fall and was found to have an acute impacted left femoral neck fracture. Patient was recently hospitalized at [facility] for worsening pain in the setting of her metastatic bone disease. Imaging at that time was notable for worsening osseous disease as well as a large lesion involving the left femoral neck which was concerning for a developing pathologic fracture. Patient was evaluated by Orthopedics who recommended that patient follow-up as an outpatient. After discharge, patient had been stable until day of presentation when she had a mechanical fall in her home. She explains that her L heel slipped in the bathroom and she fell onto her L side. No associated LOC or head trauma. She did not attempt to get up due to concern for possible fracture. She then went to [facility] ED for further evaluation. X-rays were obtained which ultimately showed an acute impacted left femoral neck fracture, at the site of previously seen lytic lesion, suggesting pathologic fracture. It also showed multiple lytic lesions suspicious for metastases in the bilateral iliac wings, left acetabulum, right inferior pubic ramus, and left distal femur. Patient was evaluated by Orthopedics who recommended transfer to [facility] for further management. Given patient's history of metastatic lung cancer, Oncology was consulted for further evaluation.    Patient seen this afternoon. Patient's daughter (Kena) was on the telephone at time of visit. Patient reports continued pain of her LLE. Otherwise, no chest pain, shortness of breath, nausea, vomiting, or abdominal pain. No recent fevers as well.    Oncologic History:  Patient was initially diagnosed with Stage IA adenocarcinoma in 3/2017 s/p R lower lobectomy. She was placed on surveillance and was found to have evidence of recurrence in 5/2020 with metastatic disease to bone. She underwent a R lung biopsy in 6/2020 which showed adenocarcinoma (PD-L1 < 1%). She was found to have a pathologic fracture of the L clavicle in 7/2020. She subsequently underwent palliative RT to the L clavicle, T6-T8 spine, and T12-L2 spine. Patient was started on Carbo/Pemextred/Pembrolizumab on 7/31/20. She has received 1 cycle thus far. Patient follows with Dr. Elvira Joseph at the [facility] [place].    PAST MEDICAL & SURGICAL HISTORY:  Stage IV Lung Cancer  Hypothyroidism     Review of Systems:   CONSTITUTIONAL: No fever or chills  EYES: No eye pain or discharge  ENMT: No sinus or throat pain  NECK: No pain or stiffness  RESPIRATORY: No shortness of breath or cough  CARDIOVASCULAR: No chest pain or palpitations  GASTROINTESTINAL: No nausea, vomiting, or abdominal pain  GENITOURINARY: No dysuria or hematuria  NEUROLOGICAL: No headache or confusion  SKIN: No itching or rash  MUSCULOSKELETAL: + LLE pain, No back pain    Allergies    Bananas (Other)  latex (Rash)  opioid-like analgesics (Urticaria)  penicillin (Angioedema)    Intolerances    Social History: Former smoker (1-1.5ppd for 47 years, Quit in 2013), Social EtOH use, No illicit drug use    FAMILY HISTORY:      MEDICATIONS  (STANDING):    MEDICATIONS  (PRN):        CAPILLARY BLOOD GLUCOSE        I&O's Summary    Vital Signs Last 24 Hrs  T(C): 36.6 (14 Aug 2020 15:00), Max: 37.2 (14 Aug 2020 14:36)  T(F): 97.9 (14 Aug 2020 15:00), Max: 99 (14 Aug 2020 14:36)  HR: 72 (14 Aug 2020 15:00) (72 - 95)  BP: 148/85 (14 Aug 2020 15:00) (148/85 - 162/89)  BP(mean): --  RR: 16 (14 Aug 2020 15:00) (16 - 20)  SpO2: 99% (14 Aug 2020 15:00) (97% - 99%)    PHYSICAL EXAM:  GENERAL: NAD, Laying in bed  HEENT: NC/AT, MMM  NECK: Supple  CHEST/LUNG: Grossly CTAB; No wheeze appreciated  HEART: RRR; +S1/S2  ABDOMEN: +BS, Soft, NT  EXTREMITIES: No LE edema  NEUROLOGY: Awake and alert, Answering questions and following commands appropriately  SKIN: Warm and dry  PSYCH: Calm and cooperative    LABS:          PT/INR - ( 14 Aug 2020 16:56 )   PT: 12.3 sec;   INR: 1.04 ratio         PTT - ( 14 Aug 2020 16:56 )  PTT:24.5 sec    RADIOLOGY & ADDITIONAL TESTS:  Studies reviewed. HPI:  Patient is a 65 y/o F w/ a PMHx of hypothyroidism, and Stage IV lung adenocarcinoma (S/p right lower lobectomy in 3/2017, w/ recurrence in 5/2020 w/ mets to bone, s/p palliative RT, recently s/p C1 Carbo/Pemetrexed/Pembro on 7/31) who was transferred from [facility] to [facility] for Orthopedic evaluation after she presented s/p fall and was found to have an acute impacted left femoral neck fracture. Patient was recently hospitalized at [facility] for worsening pain in the setting of her metastatic bone disease. Imaging at that time was notable for worsening osseous disease as well as a large lesion involving the left femoral neck which was concerning for a developing pathologic fracture. Patient was evaluated by Orthopedics who recommended that patient follow-up as an outpatient. After discharge, patient had been stable until day of presentation when she had a mechanical fall in her home. She explains that her L heel slipped in the bathroom and she fell onto her L side. No associated LOC or head trauma. She did not attempt to get up due to concern for possible fracture. She then went to [facility] ED for further evaluation. X-rays were obtained which ultimately showed an acute impacted left femoral neck fracture, at the site of previously seen lytic lesion, suggesting pathologic fracture. It also showed multiple lytic lesions suspicious for metastases in the bilateral iliac wings, left acetabulum, right inferior pubic ramus, and left distal femur. Patient was evaluated by Orthopedics who recommended transfer to [facility] for further management. Given patient's history of metastatic lung cancer, Oncology was consulted for further evaluation.    Patient seen this afternoon. Patient's daughter (Kena) was on the telephone at time of visit. Patient reports continued pain of her LLE. Otherwise, no chest pain, shortness of breath, nausea, vomiting, or abdominal pain. No recent fevers as well.    Oncologic History:  Patient was initially diagnosed with Stage IA adenocarcinoma in 3/2017 s/p R lower lobectomy. She was placed on surveillance and was found to have evidence of recurrence in 5/2020 with metastatic disease to bone. She underwent a R lung biopsy in 6/2020 which showed adenocarcinoma (PD-L1 < 1%). She was found to have a pathologic fracture of the L clavicle in 7/2020. She subsequently underwent palliative RT to the L clavicle, T6-T8 spine, and T12-L2 spine. Patient was started on Carbo/Pemextred/Pembrolizumab on 7/31/20. She has received 1 cycle thus far. Patient follows with Dr. Elvira Joseph at the [facility] [place].    PAST MEDICAL & SURGICAL HISTORY:  Stage IV Lung Cancer  Hypothyroidism     Review of Systems:   CONSTITUTIONAL: No fever or chills  EYES: No eye pain or discharge  ENMT: No sinus or throat pain  NECK: No pain or stiffness  RESPIRATORY: No shortness of breath or cough  CARDIOVASCULAR: No chest pain or palpitations  GASTROINTESTINAL: No nausea, vomiting, or abdominal pain  GENITOURINARY: No dysuria or hematuria  NEUROLOGICAL: No headache or confusion  SKIN: No itching or rash  MUSCULOSKELETAL: + LLE pain, No back pain    Allergies    Bananas (Other)  latex (Rash)  opioid-like analgesics (Urticaria)  penicillin (Angioedema)    Intolerances    Social History: Former smoker (1-1.5ppd for 47 years, Quit in 2013), Social EtOH use, No illicit drug use    FAMILY HISTORY: no h/o lung ca in first degree relatives      MEDICATIONS  (STANDING):    MEDICATIONS  (PRN):        CAPILLARY BLOOD GLUCOSE        I&O's Summary    Vital Signs Last 24 Hrs  T(C): 36.6 (14 Aug 2020 15:00), Max: 37.2 (14 Aug 2020 14:36)  T(F): 97.9 (14 Aug 2020 15:00), Max: 99 (14 Aug 2020 14:36)  HR: 72 (14 Aug 2020 15:00) (72 - 95)  BP: 148/85 (14 Aug 2020 15:00) (148/85 - 162/89)  BP(mean): --  RR: 16 (14 Aug 2020 15:00) (16 - 20)  SpO2: 99% (14 Aug 2020 15:00) (97% - 99%)    PHYSICAL EXAM:  GENERAL: NAD, Laying in bed  HEENT: NC/AT, MMM  NECK: Supple  CHEST/LUNG: Grossly CTAB; No wheeze appreciated  HEART: RRR; +S1/S2  ABDOMEN: +BS, Soft, NT  EXTREMITIES: No LE edema  NEUROLOGY: Awake and alert, Answering questions and following commands appropriately  SKIN: Warm and dry  PSYCH: Calm and cooperative  MSK: Limited ROM LLE     LABS:          PT/INR - ( 14 Aug 2020 16:56 )   PT: 12.3 sec;   INR: 1.04 ratio         PTT - ( 14 Aug 2020 16:56 )  PTT:24.5 sec    RADIOLOGY & ADDITIONAL TESTS:  Studies reviewed.

## 2020-08-14 NOTE — ED ADULT NURSE NOTE - OBJECTIVE STATEMENT
BIBEMS from [place] s/p fall from standing height. Pt endorses significant ca hx, stating stage 4 ca is now present in L hip/femur, L clavicle, spine. Pt states she is supposed to have access port for chemo placed this Wednesday. Pt is currently AAOx3, in NAD and states she came to the hospital "just to make sure nothing is wrong." Pt states "I don't really have any pain right now." Pending full MD eval. Pulses, sensation and motor are intact in b/l LE. CTM.

## 2020-08-14 NOTE — ED PROVIDER NOTE - PHYSICAL EXAMINATION
VS: wnl.  Physical Exam: adult F, NAD, fentanyl patch on L chest.  NCAT, PERRL, EOMI, neck supple, RRR, CTA B, Abd: s/nd/nt.  Ext: LLE shortened and externally rotated, no edema,   Neuro: AAOx3, unable to ambulate 2/2 lower extremity pain.

## 2020-08-14 NOTE — H&P ADULT - PROBLEM SELECTOR PLAN 3
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  - Patient follows with Dr. Winn at [facility], plan for second round of chemo next week and port placement  - Pain management: continue home fentanyl patch and dilaudid 2mg for moderate pain, 4mg for severe pain  - Oncology following, appreciate recs Patient with diagnosis in 2017 s/p RLL lobectomy, recurrence in May 2020 s/p palliative radiation and 1 round of chemotherapy.  - Patient follows with Dr. Winn at [facility], plan for second round of chemo next week and port placement  - Pain management: as above  - Oncology following, appreciate recs  - Possible plan for port insertion next week after surgical intervention for hip

## 2020-08-14 NOTE — ED PROVIDER NOTE - PHYSICAL EXAMINATION
Gen: no acute distress  Head: normocephalic, atraumatic  Lung: CTAB, no respiratory distress, no wheezing, rales, rhonchi  Chest wall: nontender  CV: normal s1/s2, rrr,   Abd: soft, non-tender, non-distended  MSK: L hip, L femur, L knee ttp, LLE shortened and externally rotated, DP/PT pulses intact, limited rom of LLE 2/2 pain, no tenderness to all other extremities, full ROM of all other extremities  Neuro: No focal neurologic deficits  Skin: No rash   Psych: normal affect

## 2020-08-14 NOTE — H&P ADULT - PROBLEM SELECTOR PLAN 4
Patient with erythematous rash under right breast and per patient also on buttocks likely candida.  - Keep area dry and clean  - Topical azole cream Patient with erythematous rash under right breast and per patient also on buttocks likely candida.  - Keep area dry and clean  - Topical azole cream or Nystatin powder Patient with erythematous rash under right breast and per patient also on buttocks likely candida.  - Keep area dry and clean  - Topical Nystatin powder

## 2020-08-14 NOTE — CHART NOTE - NSCHARTNOTEFT_GEN_A_CORE
Reference #: [id number]     Patient Name: Sofia Montalvo   YOB: 1955   Address: [address] [place] [address]   Sex: Female     Rx Written  Rx Dispensed  Drug                                  Quant  Days    Prescriber Name  Payment Dispenser  08/12/2020 08/13/2020  fentanyl 37.5 mcg/hr patch      2       6          Salam, M Hani [employer]   08/13/2020 08/13/2020  alprazolam 0.25 mg tablet      30     15         Salam, M Hani [employer]   07/30/2020 08/05/2020  hydromorphone 4 mg tablet   30     10         Salam, M Hani [employer]   07/30/2020 07/31/2020  fentanyl 25 mcg/hr patch        5       15         Salam, M Hani [employer]   07/21/2020 07/21/2020  hydromorphone 2 mg tablet  40      10         [facility] [employer]   07/21/2020 07/21/2020  hydromorphone 4 mg tablet  30      10         Salam, M Hani [employer]       Patient Name: Sofia Montalvo   YOB: 1955  Address: [address] [place] [address]   Sex: Female     Rx Written   Rx Dispensed  Drug                                  Quant  Days    Prescriber Name  Payment Dispenser  07/07/2020 07/07/2020     fentanyl 50 mcg/hr patch       10     30        Scott Olsen Medicaid [facility] #[id number]   07/06/2020 07/06/2020     tramadol hcl 50 mg tablet      30     7         Chris Gonzales DO Medicaid [facility] #[id number]   07/06/2020 07/06/2020     morphine sulfate ir 15 mg tab  56   7         Scott Olsen [facility] #[id number]   06/12/2020 06/12/2020     alprazolam 0.25 mg tablet      15    15        Christy Salinas Medicaid [facility] #[id number]   06/03/2020 06/08/2020    diazepam 5 mg tablet            10     10        Elvira Joseph MD Medicaid [facility] #[id number]   05/26/2020 05/28/2020    diazepam 5 mg tablet             2      2         Scott Olsen Medicaid [facility] #[id number]      Melisa Morgan, PGY-2  Internal Medicine  Pager: [phone] // [phone]

## 2020-08-14 NOTE — ED PROVIDER NOTE - PROGRESS NOTE DETAILS
Reviewed all results with pt as well as plans for admission. Pt is comfortable with plan for admission. Questions answered. - Palmer Woody, PGY-2 D/w ortho team, requesting txfer to [facility] as pt's ortho oncologist, Dr. Ramírez is there. Transfer line contacted, pt to be tranferred. Dr. Alfred as accepting md. Pt updated on plan and comfortable w/ plan. - Palmer Woody, PGY-2

## 2020-08-14 NOTE — ED ADULT TRIAGE NOTE - CHIEF COMPLAINT QUOTE
"I walked to the bathroom was starting to loose my balance so I slowly laid myself down on the floor landing on my left hip." here two days ago for fall. denies hitting head or LOC. on ASA denies blood thinners

## 2020-08-14 NOTE — CONSULT NOTE ADULT - ATTENDING COMMENTS
Patient is a 63 y/o F w/ a PMHx of hypothyroidism, and Stage IV lung adenocarcinoma (S/p right lower lobectomy in 3/2017, w/ recurrence in 5/2020 w/ mets to bone, s/p palliative RT, recently s/p C1 Carbo/Pemetrexed/Pembro on 7/31) who was transferred from [facility] to [facility] for Orthopedic evaluation after she presented s/p fall and was found to have an acute impacted left femoral neck fracture. Orthopedics consulted, follow-up recs.  Her ANC is currently WNL (3.24). She is noted to be thrombocytopenic which is likely 2/2 recent chemotherapy. Monitor counts

## 2020-08-14 NOTE — H&P ADULT - PROBLEM SELECTOR PLAN 6
- Continue home alprazolam 0.25mg BID - Continue home alprazolam 0.25mg BID  - ISTOP Reference #: [id number]

## 2020-08-14 NOTE — H&P ADULT - NSICDXPASTSURGICALHX_GEN_ALL_CORE_FT
PAST SURGICAL HISTORY:  History of bunionectomy PAST SURGICAL HISTORY:  History of bunionectomy     S/P partial lobectomy of lung Right lower lobe

## 2020-08-14 NOTE — H&P ADULT - PROBLEM SELECTOR PLAN 1
Patient with recent admission for left hip and knee pain found to have mets to femur now with fall at rehab facility.  - Stable vitals, left foot with distal pulse 2+, warm, able to move  - s/p Xray showing pathological femoral neck fractures  - Ortho consulted, appreciate recs Patient with recent admission for left hip and knee pain found to have mets to femur now with fall at rehab facility.  - Stable vitals, left foot with distal pulse 2+, warm, able to move, no acute concern neurovascular compromise  - s/p Xray showing pathological femoral neck fractures  - Fall precautions  - Bed rest  - Ortho consulted - plan for possible intervention Monday?  - Pain control: fentanyl patch 25 mg + dilaudid 2mg for moderate pain and 4mg for severe pain

## 2020-08-14 NOTE — PROGRESS NOTE ADULT - SUBJECTIVE AND OBJECTIVE BOX
Ortho Preop Note    Patient is a 64y old  Female familiar to our service returns to ER s/p fall in bathroom early this AM. Patient is now having increased pain in her left groin and hip region. Patient has metastatic disease with bony lesions in her pelvis and hip. Patient was in  ER a few days ago and was diagnosed with pending pathological fem neck fracture left hip. Patient was set up with Dr Ramírez (Ortho Oncology) to be seen within 1-2 weeks. Unfortunately patient fell this morning in her bathroom sustaining a left femoral neck to fracture.       Vital Signs Last 24 Hrs  T(C): 36.9 (14 Aug 2020 10:03), Max: 36.9 (14 Aug 2020 10:03)  T(F): 98.5 (14 Aug 2020 10:03), Max: 98.5 (14 Aug 2020 10:03)  HR: 88 (14 Aug 2020 10:03) (88 - 96)  BP: 141/70 (14 Aug 2020 10:03) (141/70 - 155/87)  BP(mean): --  RR: 18 (14 Aug 2020 10:03) (18 - 18)  SpO2: 99% (14 Aug 2020 10:03) (97% - 99%)    Patient A&O x 3  Left leg externally rotated and slightly shortened. Pulses appreciated, Calf soft, supple and nontender. Pain with any attempted ROM or log roll.                           10.1   3.69  )-----------( 62       ( 14 Aug 2020 08:13 )             31.3     08-14    135  |  94<L>  |  24.0<H>  ----------------------------<  157<H>  5.1   |  26.0  |  0.39<L>    Ca    8.9      14 Aug 2020 08:13    TPro  5.8<L>  /  Alb  3.7  /  TBili  0.9  /  DBili  x   /  AST  20  /  ALT  46<H>  /  AlkPhos  173<H>  08-14    PT/INR - ( 14 Aug 2020 08:13 )   PT: 12.1 sec;   INR: 1.05 ratio         PTT - ( 14 Aug 2020 08:13 )  PTT:24.3 sec         EXAM:  CT PELVIS BONY ONLY                         EXAM:  CT 3D RECONSTRUCT W ALLI                          PROCEDURE DATE:  08/14/2020          INTERPRETATION:  CT PELVIS BONY, CT 3D RECONSTRUCTION W WORKSTATION dated 8/14/2020 8:04 AM    INDICATION: Left hip pain after fall history of lung cancer.    COMPARISON: Hip radiographs dated 8/8/2020. Pelvic CT dated 8/8/2020    TECHNIQUE: CT imaging of the pelvis was performed. The data was reformatted in the axial, coronal, and sagittal planes. Additionally, 3-D reformatted imaging was created.    FINDINGS:    OSSEOUS STRUCTURES: There are diffuse osseous lytic lesions involving the pelvis and lower lumbosacral spine similar to prior study. There is a now angulated and impacted pathologic fracture of the left femoral neck involving a large lytic lesion. There is an additional lytic lesion that involves the medial left acetabular wall with destruction of the acetabular wall and soft tissue extension into the pelvis. Additional lytic lesions are seen involving the left L5 posterior elements, right superior iliac bone, left superior greater trochanter and right inferior pubic ramus. There is a sclerotic and lucent lesion in the left L4 vertebra. Moderate to severe right and moderate left hip joint space narrowing. Mild spurring at the pubic symphysis.  SYNOVIUM/ JOINT FLUID: There is a small to moderate hip joint effusion.  TENDONS: The tendons are intact. No full-thickness tendon tear or retraction is seen.  MUSCLES: No intramuscular hematoma is identified.  NEUROVASCULAR STRUCTURES: Mild vascular calcifications noted  INTRAPELVIC SOFT TISSUES: Small fat-containing umbilical hernia.  SUBCUTANEOUS SOFT TISSUES: No soft tissue swelling.    3-D reformatted imaging confirms these findings.    IMPRESSION:    1.  Impacted and mildly angulated pathologic fracture of the left femoral neck.  2.  Osseous metastatic disease involving the left medial acetabular wall with destruction of the cortex, by definition a pathologic fracture. Soft tissue extension into the pelvis.  3.  Additional osseous metastatic disease throughout the pelvis and lower lumbosacral spine.          DEON BETANCOURT M.D., ATTENDING RADIOLOGIST  This document has been electronically signed. Aug 14 2020  8:39AM       Assessment & Plan:  64y Female with Pathologic left femoral neck fracture due to lytic lesions   - Patient will be transferred to [facility] for Dr Ramírez Orthopedic Oncology

## 2020-08-14 NOTE — ED PROVIDER NOTE - CHIEF COMPLAINT
The patient is a 64y Female complaining of Transfer from [facility] with pathologic L hip Fx ([facility] MRN:  [id number], all imaging, COVID, and labs) Transfer from [facility] with pathologic L hip Fx ([facility] MRN:  [id number] all imaging, COVID, and labs)

## 2020-08-14 NOTE — ED PROVIDER NOTE - ATTENDING CONTRIBUTION TO CARE
AJM: patient with stage 4 lung ca with mets to bones presenting with mechanical fall. + left leg pain. NVI. will rule out fx and reassess. declining pain meds

## 2020-08-14 NOTE — H&P ADULT - NSHPSOCIALHISTORY_GEN_ALL_CORE
Patient lives with , daughter, and son in law. 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 She was a smoker from age 14, quit approx 7 years ago, approx 50 pack year hx; she drinks 2-3 glasses of alcohol a week but not in the past few weeks; illicit drug use in 1970s (cocaine).

## 2020-08-14 NOTE — ED PROVIDER NOTE - CLINICAL SUMMARY MEDICAL DECISION MAKING FREE TEXT BOX
Impression:  pathologic L hip Fx.  Covid Swab sent at [facility]; result pending.   Plan: type and screen.  ortho dispo.

## 2020-08-14 NOTE — ED PROVIDER NOTE - ATTENDING CONTRIBUTION TO CARE
MD Kirkland:  patient seen and evaluated with the resident.  I was present for key portions of the History & Physical, and I agree with the Impression & Plan.  MD Kirkland:  64F, c/o L hip pain.  Duration: x3d.  Associated Sx:  L groin pain.  Context:  pathologic Fx due to multiple bony mets.  Initially seen at [facility]; where xrays and CT imaging performed; Dr. Ramírez ortho Onc accepting.    VS: wnl.  Physical Exam: adult F, NAD, fentanyl patch on L chest.  NCAT, PERRL, EOMI, neck supple, RRR, CTA B, Abd: s/nd/nt,   Ext: LLE shortened and externally rotated, no edema, Neuro: AAOx3, ambulates w/o diff, strength 5/5 & symmetric throughout.  Impression:  pathologic L hip Fx.  Covid Swab sent at [facility]; result pending.   Plan: type and screen.  ortho dispo. MD Kirkland:  patient seen and evaluated with the resident.  I was present for key portions of the History & Physical, and I agree with the Impression & Plan.  MD Kirkland:  64F, c/o L hip pain.  Duration: x3d.  Associated Sx:  L groin pain.  Context:  pathologic Fx due to multiple bony mets.  Initially seen at [facility]; where xrays and CT imaging performed; Dr. Ramírez ortho Onc accepting.    VS: wnl.  Physical Exam: adult F, NAD, fentanyl patch on L chest.  NCAT, PERRL, EOMI, neck supple, RRR, CTA B, Abd: s/nd/nt,   Ext: LLE shortened and externally rotated, no edema.  Neuro: AAOx3, unable to ambulate 2/2 lower extremity pain.  Impression:  pathologic L hip Fx.  Covid Swab sent at [facility]; result pending.   Plan: type and screen.  ortho dispo.

## 2020-08-14 NOTE — ED ADULT NURSE REASSESSMENT NOTE - NS ED NURSE REASSESS COMMENT FT1
Pt resting in stretcher. alert and oriented x4. c/o left hip pain s/p fall Pt resting in stretcher. alert and oriented x4. c/o left hip pain s/p fall. denies any other complaints. decreased ROM on left leg secondary to pain. no bruising noted. skin in tact. +pedal pulses, sensation present. pt appears comfortable. no distress noted. call bell within reach. pending CT scan

## 2020-08-14 NOTE — ED ADULT NURSE NOTE - NSIMPLEMENTINTERV_GEN_ALL_ED
Implemented All Fall Risk Interventions:  [place] to call system. Call bell, personal items and telephone within reach. Instruct patient to call for assistance. Room bathroom lighting operational. Non-slip footwear when patient is off stretcher. Physically safe environment: no spills, clutter or unnecessary equipment. Stretcher in lowest position, wheels locked, appropriate side rails in place. Provide visual cue, wrist band, yellow gown, etc. Monitor gait and stability. Monitor for mental status changes and reorient to person, place, and time. Review medications for side effects contributing to fall risk. Reinforce activity limits and safety measures with patient and family.

## 2020-08-14 NOTE — H&P ADULT - PROBLEM SELECTOR PLAN 2
Patient with platelets 62 at [facility], on admission to [facility] platelets 55 likely secondary to recent chemotherapy on 7/31.  - Trend daily Patient with platelets 62 at [facility], on admission to [facility] platelets 55 likely secondary to recent chemotherapy on 7/31.  - on 7/31 platelets 235, have been 50s since 8/8 and stable  - No evidence of active bleeding, trend daily

## 2020-08-14 NOTE — H&P ADULT - PROBLEM SELECTOR PLAN 7
DVT: Patient high risk given immobilization and malignancy, pending ortho recs for surgery  Diet: DVT: Patient high risk given immobilization and malignancy, pending ortho recs for surgery  Diet: Regular DVT: Patient high risk given immobilization and malignancy, SCDs   Diet: Regular

## 2020-08-14 NOTE — H&P ADULT - ASSESSMENT
Patient is a 64 yr old woman with PMHx of hypothyroidism, Stage IV lung adenocarcinoma diagnosed in 2017 s/p right lower lobectomy with recurrence 5/2020 s/p palliative radiation to clavicle and spine and 1 round of carbo/pemextred/pembrolizumab chemo 7/31 with recent admission 8/8 for pending femoral neck fracture now transferred from [facility] after fall in home with left hip pain, imaging showing left hip pathologic fracture. Patient also noted to have thrombocytopenia likely secondary to recent chemotherapy.

## 2020-08-14 NOTE — H&P ADULT - NSHPREVIEWOFSYSTEMS_GEN_ALL_CORE
Constitutional:  [ ] fever [ ] chills  [ ] weight loss  [ ] weakness  Skin:  [x ] rash [ ] phlebitis	  Eyes: [ ] icterus [ ] pain  [ ] discharge	  ENMT: [ ] sore throat  [ ] thrush [ ] ulcers [ ] exudates  Respiratory: [ ] dyspnea [ ] hemoptysis [ ] cough [ ] sputum	  Cardiovascular:  [ ] chest pain [ ] palpitations [x ] edema in legs	  Gastrointestinal:  [ ] nausea [ ] vomiting [ ] diarrhea [ ] constipation [ ] pain	  Genitourinary:  [ ] dysuria [ ] frequency [ ] hematuria [ ] discharge [ ] flank pain  [ ] incontinence  Musculoskeletal:  [ ] myalgias [ ] arthralgias [ ] arthritis  [ ] back pain  Neurological:  [ ] headache [ ] seizures  [ ] confusion/altered mental status  Psychiatric:  [x ] anxiety [ ] depression	  Endocrine:  [ ] adrenal [ x] thyroid

## 2020-08-14 NOTE — ED ADULT NURSE NOTE - OBJECTIVE STATEMENT
65 y/o F A&Ox3 PMH Lung carcinoma stage IV, PSH right lobectomy presents to the ED from [facility] c/o left hip pain. Pt slipped and fell in shower, landing on left buttock. Pt denies LOC or hitting her head. Denies SOB, CP, dizziness prior to fall. Pt transferred from [facility] bc her doctors are Health system affiliated. L L/E does not appear grossly deformed, no rotation or shortened length noted. Hx of metastatic disease to the left femur. Scheduled for orthopedic followup prior to fall. Pt has undergone one session of chemo and one session of radiation. Pt scheduled for chemotherapy tomorrow. IV access obtained, call bell within reach. Comfort and safety provided. 65 y/o F A&Ox3 PMH Lung carcinoma stage IV, PSH right lobectomy presents to the ED via EMS transfer from [facility] c/o left hip pain. Pt transferred for orthopedic consultation. Imaging at [facility] showed acute fracture to femoral neck. Pt slipped and fell in bathroom at rehab facility, landing on left buttock. Pt denies LOC or hitting her head. Denies SOB, CP, dizziness prior to fall. Pt transferred from [facility] bc her doctors are [facility] affiliated. L L/E appears externally rotated and shortened. Hx of metastatic disease to the left femur. Scheduled for orthopedic followup prior to fall due to lytic lesions noted on imaging, pt recently diagnosed with stage IV lung adenocarcinoma with mets to the bones. Pt has undergone one session of chemo and one session of radiation. Pt scheduled for chemotherapy tomorrow. Strong peripheral pulses noted to all extremities, pt declined pain medication at this time. Fentanyl patch placed 8/13/20 to R upper chest wall. IV access obtained, call bell within reach. Comfort and safety provided.

## 2020-08-14 NOTE — PATIENT PROFILE ADULT - NSTOBACCONEVERSMOKERY/N_GEN_A
Yes Prednisone Counseling:  I discussed with the patient the risks of prolonged use of prednisone including but not limited to weight gain, insomnia, osteoporosis, mood changes, diabetes, susceptibility to infection, glaucoma and high blood pressure.  In cases where prednisone use is prolonged, patients should be monitored with blood pressure checks, serum glucose levels and an eye exam.  Additionally, the patient may need to be placed on GI prophylaxis, PCP prophylaxis, and calcium and vitamin D supplementation and/or a bisphosphonate.  The patient verbalized understanding of the proper use and the possible adverse effects of prednisone.  All of the patient's questions and concerns were addressed.

## 2020-08-14 NOTE — H&P ADULT - HISTORY OF PRESENT ILLNESS
Patient is a 64 yr old woman with PMHx of hypothyroidism, Stage IV lung adenocarcinoma s/p right lower lobectomy, s/p palliative radiation to clavicle and spine, currently on IV chemo x 3 weeks with recent admission 8/8 for pending femoral neck fracture now transferred from [facility] after fall in home with left hip pain, imaging showing left hip pathologic fracture. Patient was recently evaluated at [facility] several days ago and found to have metastatic disease to left femur. Was scheduled for orthopedic follow up in 1-2 weeks after discharge but unfortunately had fall in the home on day of admission. She fell in shower landing on her left hip, did not hit her head. She has pain in the left groin. Able to move toes and knee? Patient is a 64 yr old woman with PMHx of hypothyroidism, Stage IV lung adenocarcinoma diagnosed in 2017 s/p right lower lobectomy with recurrence 5/2020 s/p palliative radiation to clavicle and spine and 1 round of carbo/pemextred/pembrolizumab chemo 7/31 with recent admission 8/8 for pending femoral neck fracture now transferred from [facility] after fall in home with left hip pain, imaging showing left hip pathologic fracture. Patient was on maintenance surveillance for her cancer with scans q 6 months. Most recent scan in May showed recurrence of cancer in multiple bones. She has since had pathologic left clavicle fracture with palliative radiation. Patient was recently evaluated at [facility] for left groin and leg pain several days ago and found to have metastatic disease to left femur. She was scheduled for orthopedic follow up in 1-2 weeks after discharge but unfortunately had fall at rehab facility on day of admission. Patient states her foot slipped out from under her in shower, causing her to land on her but and left side. She did not hit her head or lose consciousness. She was able to call for help and was transported to the hospital. She was not able to get up on her own or put any weight on the left leg. She has had constant pain in the left groin going down to the back of the right knee for several weeks and after the fall feels the pain is only mildly more intense. She is able to move her toes but not her knee, she does not complain of numbness in the left toes. She is able to void without issue and had a normal BM this morning. Patient is a 64 yr old woman with PMHx of hypothyroidism, Stage IV lung adenocarcinoma diagnosed in 2017 s/p right lower lobectomy with recurrence 5/2020 s/p palliative radiation to clavicle and spine and 1 round of carbo/pemextred/pembrolizumab chemo 7/31 with recent admission 8/8 for pending femoral neck fracture now transferred from [facility] after fall in home with left hip pain, imaging showing left hip pathologic fracture. Patient was on maintenance surveillance for her cancer with scans q 6 months. Most recent scan in May showed recurrence of cancer in multiple bones. She has since had pathologic left clavicle fracture with palliative radiation. Patient was recently evaluated at [facility] for left groin and leg pain several days ago and found to have metastatic disease to left femur. She was scheduled for orthopedic follow up in 1-2 weeks after discharge but unfortunately had fall at rehab facility on day of admission. Patient states her foot slipped out from under her in shower, causing her to land on her but and left side. She did not hit her head or lose consciousness. She denied headaches, heart palpitations, sweating prior to falling. She was able to call for help and was transported to the hospital. She was not able to get up on her own or put any weight on the left leg. She has had constant pain in the left groin going down to the back of the right knee for several weeks and after the fall feels the pain is only mildly more intense. She is able to move her toes but not her knee, she does not complain of numbness in the left toes. She is able to void without issue and had a normal BM this morning.

## 2020-08-14 NOTE — ED PROVIDER NOTE - CLINICAL SUMMARY MEDICAL DECISION MAKING FREE TEXT BOX
65 y/o F pt with hx of stage 4 lung ca with mets to bone presenting after mechancial fall today at home. No worsening pain, but LLE appears shortened and externally rotated. Will image L hip, femur, knee, and reeval.

## 2020-08-14 NOTE — H&P ADULT - NSHPPHYSICALEXAM_GEN_ALL_CORE
Gen: no acute distress; smiling, interactive, well appearing  HEENT: NC/AT; pupils equal, responsive, reactive to light; no conjunctivitis or scleral icterus; no nasal discharge; no nasal congestion; oropharynx without exudates/erythema; mucus membranes moist  Neck: FROM, supple, small hard, mobile, nontender nodules felt in left lobe of thyroid  Chest: regular rate, clear to auscultation bilaterally, no crackles/wheezes, rales or rhonchi, good air entry  CV: normal S1 and S2, regular rate and rhythm, no murmurs, rubs, or gallops   Abd: soft, nontender, nondistended, no HSM appreciated, NABS  MSK: left hip externally rotated and shortened  Extrem: no joint effusion or tenderness; no deformities or erythema noted, 2+ peripheral pulses bilaterally, WWP bilaterally  Neuro: CN grossly intact, unable to move left hip and left knee, otherwise motor strength and sensation grossly normal in upper extremities, right leg, and left foot  Skin: erythematous, itchy rash with clear margins under right breast extending from mediastinum to axilla; patient states similar rash on buttocks but unable to assess given left hip fracture Vital Signs Last 24 Hrs  T(C): 36.9 (14 Aug 2020 18:31), Max: 37.2 (14 Aug 2020 14:36)  T(F): 98.5 (14 Aug 2020 18:31), Max: 99 (14 Aug 2020 14:36)  HR: 96 (14 Aug 2020 18:31) (72 - 96)  BP: 140/91 (14 Aug 2020 18:31) (140/91 - 162/89)  RR: 18 (14 Aug 2020 18:31) (16 - 20)  SpO2: 98% (14 Aug 2020 18:31) (96% - 99%)    Gen: no acute distress; smiling, interactive, well appearing  HEENT: NC/AT; pupils equal, responsive, reactive to light; no conjunctivitis or scleral icterus; no nasal discharge; no nasal congestion; oropharynx without exudates/erythema; mucus membranes moist  Neck: FROM, supple, small hard, mobile, nontender nodules felt in left lobe of thyroid  Chest: regular rate, clear to auscultation bilaterally, no crackles/wheezes, rales or rhonchi, good air entry  CV: normal S1 and S2, regular rate and rhythm, no murmurs, rubs, or gallops   Abd: soft, nontender, nondistended, no HSM appreciated, NABS  MSK: left hip externally rotated and shortened  Extrem: no joint effusion or tenderness; no deformities or erythema noted, 2+ peripheral pulses bilaterally, WWP bilaterally  Neuro: CN grossly intact, unable to move left hip and left knee, otherwise motor strength and sensation grossly normal in upper extremities, right leg, and left foot  Skin: erythematous, itchy rash with clear margins under right breast extending from mediastinum to axilla; patient states similar rash on buttocks but unable to assess given left hip fracture

## 2020-08-15 NOTE — PROGRESS NOTE ADULT - ATTENDING COMMENTS
#Acute L fem ftx pathological: Seen by ortho awaiting official recs; possible intervention next week. Will need PT    #Chemo induced pancytopenia: ANC around 1300 currently. Will optimize plts prior to OR. FU heme/onc if neupogen is warranted.    #Metastatic lung cancer: Patient endorsing was supposed to have an appt for mediport prior to admission; clarify without outpt onc and consider IR this week. Working on pain control. Inc dilaudid to q4h. can give IV tylenol for breakthrough at this time give insensitivity to opioids.

## 2020-08-15 NOTE — CONSULT NOTE ADULT - SUBJECTIVE AND OBJECTIVE BOX
64 F with history of stage IV lung cancer with left hip pain after slip. Patient seen at [facility] and found to have a left femoral neck fracture with lesions in her L hip and L femur and transferred to [facility]. Patient community ambulator at baseline. She denies any other orthopedic injuries. SHe denies any numbness or tingling in affected extremity. Patient being treated with chemotherapy and XRT by  Dr. Joseph in [place].     Vital Signs Last 24 Hrs  T(C): 36.8 (15 Aug 2020 00:19), Max: 37.2 (14 Aug 2020 14:36)  T(F): 98.2 (15 Aug 2020 00:19), Max: 99 (14 Aug 2020 14:36)  HR: 79 (15 Aug 2020 00:19) (72 - 96)  BP: 136/70 (15 Aug 2020 00:19) (136/70 - 162/89)  BP(mean): --  RR: 17 (15 Aug 2020 00:19) (16 - 20)  SpO2: 96% (15 Aug 2020 00:19) (96% - 99%)    PAST MEDICAL & SURGICAL HISTORY:  Stage IV adenocarcinoma of lung  Hypothyroid  S/P partial lobectomy of lung: Right lower lobe  History of bunionectomy      Exam:   GEN: NAD  LLE:   skin intact, compartments soft, TTP over L hip  unable to SLR   Pain with log roll  + EHL/FHL/TA/GSC  SILT   DP +    Secondary Exam:    BLUE: Skin intact, no erythema, ecchymosis, edema, gross deformity, NTTP over the bony prominences of the shoulder/elbow/wrist/hand, painless passive/active ROM of the shoulder/elbow/wrist/hand, C5-T1 SILT, motor grossly intact throughout axillary/musculocutaenous/radial/median/ulnar nerves, + radial pulse    RLE: Skin intact, no erythema, ecchymosis, edema, gross deformity, NTTP over the bony prominences of the hip/knee/ankle/foot, painless passive/active ROM of the hip/knee/ankle/foot, L2-S1 SILT, motor grossly intact throughout hip flexors/quads/hams/TA/EHL/FHL/GSC, + DP/PT pulses, no pain with log roll, no pain on axial loading, compartments soft and compressible, calves nontender     Imaging:     XR L Hip: acute L femoral neck fracture  CT pelvis: acute femoral neck fracture with lucenices in femoral neck and distal femur consistent with metastatic lesions 64 F with history of stage IV lung cancer with left hip pain after slip. Patient seen at [facility] and found to have a left femoral neck fracture with lesions in her L hip and L femur and transferred to [facility]. Patient community ambulator at baseline. She denies any other orthopedic injuries. SHe denies any numbness or tingling in affected extremity. Patient being treated with chemotherapy and XRT by  Dr. Joseph in [place]. Patient states she has had prior L clavicle fracture diagnosed weeks ago after injury.     Vital Signs Last 24 Hrs  T(C): 36.8 (15 Aug 2020 00:19), Max: 37.2 (14 Aug 2020 14:36)  T(F): 98.2 (15 Aug 2020 00:19), Max: 99 (14 Aug 2020 14:36)  HR: 79 (15 Aug 2020 00:19) (72 - 96)  BP: 136/70 (15 Aug 2020 00:19) (136/70 - 162/89)  BP(mean): --  RR: 17 (15 Aug 2020 00:19) (16 - 20)  SpO2: 96% (15 Aug 2020 00:19) (96% - 99%)    PAST MEDICAL & SURGICAL HISTORY:  Stage IV adenocarcinoma of lung  Hypothyroid  S/P partial lobectomy of lung: Right lower lobe  History of bunionectomy      Exam:   GEN: NAD  LLE:   skin intact, compartments soft, TTP over L hip  unable to SLR   Pain with log roll  + EHL/FHL/TA/GSC  SILT   DP +    Secondary Exam:    BLUE: Skin intact, no erythema, ecchymosis, edema, gross deformity, NTTP over the bony prominences of the shoulder/elbow/wrist/hand, painless passive/active ROM of the shoulder/elbow/wrist/hand, Mild TTP over L medial clavicle with clavicular skin intact, C5-T1 SILT, motor grossly intact throughout axillary/musculocutaenous/radial/median/ulnar nerves, + radial pulse.     RLE: Skin intact, no erythema, ecchymosis, edema, gross deformity, NTTP over the bony prominences of the hip/knee/ankle/foot, painless passive/active ROM of the hip/knee/ankle/foot, L2-S1 SILT, motor grossly intact throughout hip flexors/quads/hams/TA/EHL/FHL/GSC, + DP/PT pulses, no pain with log roll, no pain on axial loading, compartments soft and compressible, calves nontender     Imaging:     XR L Hip: acute L femoral neck fracture  CT pelvis: acute femoral neck fracture with lucenices in femoral neck and distal femur consistent with metastatic lesions  CXR: L clavicle fracture

## 2020-08-15 NOTE — PROGRESS NOTE ADULT - PROBLEM SELECTOR PLAN 7
DVT: Patient high risk given immobilization and malignancy, SCDs   Diet: Regular DVT: Patient high risk given immobilization and malignancy, SCDs   Diet: Regular  Vit deficiencies: pending B12, folate, vit D lab results - Continue home alprazolam 0.25mg BID  - ISTOP Reference #: [id number]

## 2020-08-15 NOTE — PROGRESS NOTE ADULT - PROBLEM SELECTOR PLAN 2
Patient with platelets 62 at [facility], on admission to [facility] platelets 55 likely secondary to recent chemotherapy on 7/31.  - on 7/31 platelets 235, have been 50s since 8/8 and stable  - No evidence of active bleeding, trend daily Patient with WBC of 3.15 on admission likely secondary to recent chemotherapy.  - WBC is 1.68 pending diff  - Pending B12, folate, and Vit D levels  - Monitor CBC daily and monitor for fever Patient with WBC of 3.15 on admission likely secondary to recent chemotherapy.  - WBC is 1.68 pending diff  - Pending B12, folate, and Vit D levels  - Monitor CBC daily and monitor for fever  - Will start neupogen as above Patient with WBC of 3.15 on admission likely secondary to recent chemotherapy.  - WBC is 1.68 pending diff  - Pending B12, folate, and Vit D levels  - Monitor CBC daily and monitor for fever  - Will discuss with oncology possible Neupogen

## 2020-08-15 NOTE — PROGRESS NOTE ADULT - SUBJECTIVE AND OBJECTIVE BOX
Authored by Daniela Benson MS4 pager [phone].    Patient is a 64 yr old F with PMHx of hypothyroidism, stage IV lung adenocarcinoma s/p RLL lobectomy with recurrence in May s/p palliative radiation and 1 round of IV chemo, recent admission at [facility] for left groin pain found to have bony mets and pending pathologic fracture, now with mechanical fall at rehab center found to have pathologic fracture of left femur.    OVERNIGHT: Patient seen at bedside.      MEDICATIONS  (STANDING):  ALPRAZolam 0.25 milliGRAM(s) Oral two times a day  fentaNYL   Patch  25 MICROgram(s)/Hr 1 Patch Transdermal every 72 hours  levothyroxine 175 MICROGram(s) Oral daily  liothyronine 5 MICROGram(s) Oral daily  nystatin Powder 1 Application(s) Topical two times a day    MEDICATIONS  (PRN):  diphenhydrAMINE 25 milliGRAM(s) Oral every 6 hours PRN Rash and/or Itching  HYDROmorphone   Tablet 2 milliGRAM(s) Oral every 6 hours PRN Moderate Pain (4 - 6)  HYDROmorphone   Tablet 4 milliGRAM(s) Oral every 6 hours PRN Severe Pain (7 - 10)  polyethylene glycol 3350 17 Gram(s) Oral daily PRN Constipation  senna 2 Tablet(s) Oral at bedtime PRN Constipation      Vital Signs Last 24 Hrs  T(C): 36.7 (15 Aug 2020 05:02), Max: 37.2 (14 Aug 2020 14:36)  T(F): 98.1 (15 Aug 2020 05:02), Max: 99 (14 Aug 2020 14:36)  HR: 101 (15 Aug 2020 05:02) (72 - 101)  BP: 109/67 (15 Aug 2020 05:02) (109/67 - 162/89)  RR: 18 (15 Aug 2020 05:02) (16 - 20)  SpO2: 93% (15 Aug 2020 05:02) (93% - 99%)    PHYSICAL EXAM    Gen: no acute distress; smiling, interactive, well appearing  	HEENT: NC/AT; pupils equal, responsive, reactive to light; no conjunctivitis or scleral icterus; no nasal discharge; no nasal congestion; oropharynx without exudates/erythema; mucus membranes moist  	Neck: FROM, supple, small hard, mobile, nontender nodules felt in left lobe of thyroid  	Chest: regular rate, clear to auscultation bilaterally, no crackles/wheezes, rales or rhonchi, good air entry  	CV: normal S1 and S2, regular rate and rhythm, no murmurs, rubs, or gallops   	Abd: soft, nontender, nondistended, no HSM appreciated, NABS  	MSK: left hip externally rotated and shortened  	Extrem: no joint effusion or tenderness; no deformities or erythema noted, 2+ peripheral pulses bilaterally, WWP bilaterally  	Neuro: CN grossly intact, unable to move left hip and left knee, otherwise motor strength and sensation grossly normal in upper extremities, right leg, and left foot    Skin: erythematous, itchy rash with clear margins under right breast extending from mediastinum to axilla; patient states similar rash on buttocks but unable to assess given left hip fracture      LABS    IMAGING  Xrays in duplicate MRN performed at [facility] MRN#[id number] Authored by Daniela Benson MS4 pager [phone].    Patient is a 64 yr old F with PMHx of hypothyroidism, stage IV lung adenocarcinoma s/p RLL lobectomy with recurrence in May s/p palliative radiation and 1 round of IV chemo, recent admission at [facility] for left groin pain found to have bony mets and pending pathologic fracture, now with mechanical fall at rehab center found to have pathologic fracture of left femur.    OVERNIGHT: Patient seen at bedside. Feeling well this am, slightly drowsy after receiving Xanax. Did have some pain last night required 4mg dilaudid at 8pm and 2mg dilaudid at 5am, pain well controlled during interview. Patient has no complaints, denies headache, chest pain, SOB, N&V, lower extremity swelling.      MEDICATIONS  (STANDING):  ALPRAZolam 0.25 milliGRAM(s) Oral two times a day  fentaNYL   Patch  25 MICROgram(s)/Hr 1 Patch Transdermal every 72 hours  levothyroxine 175 MICROGram(s) Oral daily  liothyronine 5 MICROGram(s) Oral daily  nystatin Powder 1 Application(s) Topical two times a day    MEDICATIONS  (PRN):  diphenhydrAMINE 25 milliGRAM(s) Oral every 6 hours PRN Rash and/or Itching  HYDROmorphone   Tablet 2 milliGRAM(s) Oral every 6 hours PRN Moderate Pain (4 - 6)  HYDROmorphone   Tablet 4 milliGRAM(s) Oral every 6 hours PRN Severe Pain (7 - 10)  polyethylene glycol 3350 17 Gram(s) Oral daily PRN Constipation  senna 2 Tablet(s) Oral at bedtime PRN Constipation      Vital Signs Last 24 Hrs  T(C): 36.7 (15 Aug 2020 05:02), Max: 37.2 (14 Aug 2020 14:36)  T(F): 98.1 (15 Aug 2020 05:02), Max: 99 (14 Aug 2020 14:36)  HR: 101 (15 Aug 2020 05:02) (72 - 101)  BP: 109/67 (15 Aug 2020 05:02) (109/67 - 162/89)  RR: 18 (15 Aug 2020 05:02) (16 - 20)  SpO2: 93% (15 Aug 2020 05:02) (93% - 99%)    PHYSICAL EXAM    Gen: no acute distress; smiling, interactive, well appearing  	HEENT: NC/AT; pupils equal, responsive, reactive to light; no conjunctivitis or scleral icterus; no nasal discharge; no nasal congestion; oropharynx without exudates/erythema; mucus membranes moist  	Neck: FROM, supple, small hard, mobile, nontender nodules felt in left lobe of thyroid  	Chest: regular rate, clear to auscultation bilaterally, no crackles/wheezes, rales or rhonchi, good air entry  	CV: normal S1 and S2, regular rate and rhythm, no murmurs, rubs, or gallops   	Abd: soft, nontender, nondistended, no HSM appreciated, NABS  	MSK: left hip externally rotated and shortened  	Extrem: no joint effusion or tenderness; no deformities or erythema noted, 2+ peripheral pulses bilaterally, WWP bilaterally  	Neuro: CN grossly intact, unable to move left hip and left knee, otherwise motor strength and sensation grossly normal in upper extremities, right leg, and left foot    Skin: erythematous, itchy rash with clear margins under right breast extending from mediastinum to axilla; patient states similar rash on buttocks but unable to assess given left hip fracture      LABS                        10.4   1.68  )-----------( 56                   32.8       137  |  98  |  23  ----------------------------<  105<H>  4.3   |  27  |  0.51    Ca    8.9     Phos  4.0      Mg     2.2       TPro  6.1  /  Alb  3.7  /  TBili  1.2  /  DBili  x   /  AST  18  /  ALT  44  /  AlkPhos  157<H>      PT/INR - ( 14 Aug 2020 16:56 )   PT: 12.3 sec;   INR: 1.04 ratio; PTT:24.5 sec          IMAGING  Xrays in duplicate MRN performed at [facility] MRN#[id number]

## 2020-08-15 NOTE — PROGRESS NOTE ADULT - PROBLEM SELECTOR PLAN 1
Patient with recent admission for left hip and knee pain found to have mets to femur now with fall at rehab facility.  - Stable vitals, left foot with distal pulse 2+, warm, able to move, no acute concern neurovascular compromise  - s/p Xray showing pathological left femoral neck fracture  - Fall precautions  - Bed rest  - Ortho consulted - plan for Monday or Tuesday fixation QASIM and possible femur ORIF; clarify optimization needed  - Pain control: fentanyl patch 25 mg + dilaudid 2mg for moderate pain and 4mg for severe pain (iSTOP note in chart, Reference #: [id number]) Patient with recent admission for left hip and knee pain found to have mets to femur now with fall at rehab facility.  - Stable vitals, left foot with distal pulse 2+, warm, able to move, no acute concern neurovascular compromise  - s/p Xray showing pathological left femoral neck fracture  - Fall precautions  - Bed rest  - Ortho consulted - plan for Monday or Tuesday fixation QASIM and possible femur ORIF; clarify optimization needed  - RCRI score = 0  - Pain control: fentanyl patch 25 mg + dilaudid 2mg for moderate pain and 4mg for severe pain (iSTOP note in chart, Reference #: [id number])  - EKG in chart sinus tachy to 113, junctional depression, premature atrial complexes, QTc 427 Patient with recent admission for left hip and knee pain found to have mets to femur now with fall at rehab facility.  - Stable vitals, left foot with distal pulse 2+, warm, able to move, no acute concern neurovascular compromise  - s/p Xray showing pathological left femoral neck fracture  - Fall precautions  - Bed rest  - Ortho consulted - plan for Monday or Tuesday complex QASIM and possible femur ORIF  - RCRI score = 0  - Pain control: fentanyl patch 25 mg + dilaudid 2mg for moderate pain and 4mg for severe pain (iSTOP note in chart, Reference #: [id number])  - EKG in chart sinus tachy to 113, junctional depression, premature atrial complexes, QTc 427  - CXR 8/14 no evidence of cardiopulm disease  - Per Ortho would like platelets >80,000 and WBC >4 will order Neupogen subQ IV 5mg/kg/day and monitor daily Patient with recent admission for left hip and knee pain found to have mets to femur now with fall at rehab facility.  - Stable vitals, left foot with distal pulse 2+, warm, able to move, no acute concern neurovascular compromise  - s/p Xray showing pathological left femoral neck fracture  - Fall precautions  - Bed rest  - Ortho consulted - plan for Monday or Tuesday complex QASIM and possible femur ORIF  - RCRI score = 0  - Pain control: fentanyl patch 25 mg + dilaudid 2mg for moderate pain and 4mg for severe pain (iSTOP note in chart, Reference #: [id number])  - EKG in chart sinus tachy to 113, junctional depression, premature atrial complexes, QTc 427  - CXR 8/14 no evidence of cardiopulm disease  - Per Ortho would like platelets >80,000 and WBC >4; would like to consider Neupogen will discuss with Oncology Patient with recent admission for left hip and knee pain found to have mets to femur now with fall at rehab facility.  - Stable vitals, left foot with distal pulse 2+, warm, able to move, no acute concern neurovascular compromise  - s/p Xray showing pathological left femoral neck fracture  - Fall precautions  - Bed rest  - Ortho consulted - plan for Monday or Tuesday complex QASIM and possible femur ORIF  - RCRI score = 0  - Pain control: fentanyl patch 25 mg + dilaudid 2mg for moderate pain and 4mg for severe pain (iSTOP note in chart, Reference #: [id number])  - EKG in chart sinus tachy to 113, junctional depression, premature atrial complexes, QTc 427, will order repeat EKG  - CXR 8/14 no evidence of cardiopulm disease  - Per Ortho would like platelets >80,000 and WBC >4; would like to consider Neupogen - will discuss with Oncology

## 2020-08-15 NOTE — CONSULT NOTE ADULT - ATTENDING COMMENTS
Agree with above.    This is a 64F w/ hx of hypothyroidism, stage 4 metastatic lung adenocarcinoma, Dx: 2017, s/p RLL lobectomy w/ recurrence 5/2020 s/p palliative xrt to clavicle and spine. s/p R lung mass IR Bx (6/22/20).   Most recent PET (5/28/20) revealed multifocal metastatic disease including left 10th rib, left acetabulum, right clavicle, left scapula, L1 vert body, RLL mass, subcarinal lymph nodes.     Most recent chemo on 7/31/20- carbo/pemextred/pembro. Was diagnosed with an impending left FN fx (8/8/20), now s/p mechanical fall with subsequent imaging confirming a displaced left femoral neck fx, in addition to a large lesion in the left medial acetabulum w/ pathologic fx. Agree with above. Daughter called ([phone]).     This is a 64F w/ hx of hypothyroidism, stage 4 metastatic lung adenocarcinoma, Dx: 2017, s/p RLL lobectomy w/ recurrence 5/2020 s/p palliative xrt to clavicle, spine, and L hip (July 2020 XRT). s/p R lung mass IR Bx (6/22/20).   Most recent PET (5/28/20) revealed multifocal metastatic disease including left 10th rib, left acetabulum, left clavicle, left scapula, L1 vert body, RLL mass, subcarinal lymph nodes.     Most recent chemo on 7/31/20- carbo/pemextred/pembro. Was diagnosed with an impending left FN fx (8/8/20), now s/p mechanical fall with subsequent imaging confirming a displaced left femoral neck fx, in addition to a large lesion in the left medial acetabulum w/ pathologic fx.     I had a long discussion with the alvin regarding her status and treatment options. She has been indicated for left QASIM with tumor resection, to be performed when optimized- possibly 8/17 or 8/18.    1) FU med clearance  2) Evaluate for possible neupogen to improve WBC  3) Monitor plt, will likely need transfusion prior to surgery  4) Will FU with oncologist, Dr. Joseph- oliver left at [phone], awaiting a call back  5) DVT Px/venodynes  6) Bedrest Agree with above. Daughter called ([phone]).     This is a 64F w/ hx of hypothyroidism, stage 4 metastatic lung adenocarcinoma, Dx: 2017, s/p RLL lobectomy w/ recurrence 5/2020 s/p palliative xrt to clavicle, spine, and L hip (July 2020 XRT). s/p R lung mass IR Bx (6/22/20).   Most recent PET (5/28/20) revealed multifocal metastatic disease including left 10th rib, left acetabulum, left clavicle, left scapula, L1 vert body, RLL mass, subcarinal lymph nodes.     Most recent chemo on 7/31/20- carbo/pemextred/pembro. Was diagnosed with an impending left FN fx (8/8/20), now s/p mechanical fall with subsequent imaging confirming a displaced left femoral neck fx, in addition to a large lesion in the left medial acetabulum w/ pathologic fx.     I had a long discussion with the alvin regarding her status and treatment options. She has been indicated for left QASIM with tumor resection, to be performed when optimized- possibly 8/17 or 8/18.    1) FU med clearance  2) Evaluate for possible neupogen to improve WBC  3) Monitor plt, will likely need transfusion prior to surgery  4) Will FU with oncologist, Dr. Joseph- oliver left at [phone], awaiting a call back  5) DVT Px/venodynes  6) Bedrest  7) bisphosphonates/denosumab to be considered by med oncology

## 2020-08-15 NOTE — PROGRESS NOTE ADULT - PROBLEM SELECTOR PLAN 3
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  - Patient follows with Dr. Winn at [facility], plan for second round of chemo next week and port placement  - Pain management: as above  - Oncology following, appreciate recs  - Possible plan for port insertion next week after ortho intervention Patient with platelets 62 at [facility], on admission to [facility] platelets 55 likely secondary to recent chemotherapy on 7/31.  - on 7/31 platelets 235, have been 50s since 8/8 and stable  - No evidence of active bleeding, trend daily Patient with platelets 62 at [facility], on admission to [facility] platelets 55 likely secondary to recent chemotherapy on 7/31.  - on 7/31 platelets 235, have been 50s since 8/8 and stable  - No evidence of active bleeding, trend daily  - Will start neupogen as above Patient with platelets 62 at [facility], on admission to [facility] platelets 55 likely secondary to recent chemotherapy on 7/31.  - on 7/31 platelets 235, have been 50s since 8/8 and stable  - No evidence of active bleeding, trend daily  - Will discuss with oncology possible neupogen

## 2020-08-15 NOTE — PROGRESS NOTE ADULT - PROBLEM SELECTOR PLAN 6
- Continue home alprazolam 0.25mg BID  - ISTOP Reference #: [id number] - Continue home synthroid 175mg + liothyronine 5mg  - Ordered TSH and T4 labs

## 2020-08-15 NOTE — PROGRESS NOTE ADULT - PROBLEM SELECTOR PLAN 4
Patient with erythematous rash under right breast and per patient also on buttocks likely candida.  - Keep area dry and clean  - Topical Nystatin powder Patient with diagnosis in 2017 s/p RLL lobectomy, recurrence in May 2020 s/p palliative radiation and 1 round of chemotherapy.  - Patient follows with Dr. Winn at [facility], plan for second round of chemo next week and port placement  - Pain management: as above  - Oncology following, appreciate recs  - Possible plan for port insertion next week after ortho intervention Patient with diagnosis in 2017 s/p RLL lobectomy, recurrence in May 2020 s/p palliative radiation and 1 round of chemotherapy.  - Patient follows with Dr. Winn at [facility], plan for second round of chemo next week and port placement  - s/p Xray of left clavicle found to have mets with possible fracture  - per Ortho will order for bone scan  - Pain management: as above  - Oncology following, appreciate recs  - Possible plan for port insertion next week after ortho intervention Patient with diagnosis in 2017 s/p RLL lobectomy, recurrence in May 2020 s/p palliative radiation and 1 round of chemotherapy.  - Patient follows with Dr. Winn at [facility], plan for second round of chemo next week and port placement  - s/p Xray of left clavicle found to have mets with possible fracture  - per Ortho would like bone scan, will discuss with oncology  - Pain management: as above  - Oncology following, appreciate recs  - Possible plan for port insertion next week after ortho intervention

## 2020-08-15 NOTE — CONSULT NOTE ADULT - ASSESSMENT
64 F with acute L femoral neck pathologic fracture    - Plan for operative fixation with Dr. Ramírez, Possible Monday or Tuesday for QASIM and possible femur ORIF  - Medical comanagement, needs clearance for OR  - Heme.onc consulted  - Will reach out to patients Heme once  - DVT ppx per primary team  - patient may need bone scan  - Analgesia as needed  - NWB LLE, strict bedrest  - Will discuss with Dr. Ramírez and advise further if plan changes 64 F with acute L femoral neck pathologic fracture    - Plan for operative fixation with Dr. Ramírez, Possible Monday or Tuesday for QASIM and possible femur ORIF  - Medical comanagement, needs clearance for OR  - Heme.onc consulted  - Will reach out to patients Heme once  - DVT ppx per primary team  - patient may need bone scan  - Analgesia as needed  - NWB LLE, strict bedrest  - NWB LUE in sling   - Will discuss with Dr. Ramírez and advise further if plan changes

## 2020-08-15 NOTE — PROGRESS NOTE ADULT - PROBLEM SELECTOR PLAN 8
PT: will need PT eval after ortho recs/intervention DVT: Patient high risk given immobilization and malignancy, SCDs   Diet: Regular  Vit deficiencies: pending B12, folate, vit D lab results

## 2020-08-15 NOTE — PROGRESS NOTE ADULT - PROBLEM SELECTOR PLAN 5
- Continue home synthroid 175mg + liothyronine 5mg  - Ordered TSH and T4 labs Patient with erythematous rash under right breast and per patient also on buttocks likely candida.  - Keep area dry and clean  - Topical Nystatin powder

## 2020-08-15 NOTE — PROGRESS NOTE ADULT - ASSESSMENT
Patient is a 64 yr old woman with PMHx of hypothyroidism, Stage IV lung adenocarcinoma diagnosed in 2017 s/p right lower lobectomy with recurrence 5/2020 s/p palliative radiation to clavicle and spine and 1 round of carbo/pemextred/pembrolizumab chemo 7/31 with recent admission 8/8 for pending femoral neck fracture now transferred from [facility] after fall in home with left hip pain, imaging showing left hip pathologic fracture. Patient also noted to have thrombocytopenia likely secondary to recent chemotherapy.

## 2020-08-16 NOTE — PROGRESS NOTE ADULT - PROBLEM SELECTOR PLAN 1
Recent admission for left hip and knee pain found to have mets to femur now s/p fall at rehab.  - Stable vitals, left foot with distal pulse 2+, warm, able to move, no acute concern neurovascular compromise  - XR showing pathological left femoral neck fracture  - Fall precautions  - Bed rest  - Plan for OR Monday or Tuesday complex QASIM and possible femur ORIF  - RCRI score = 0  - Pain control: fentanyl patch 25 mg + dilaudid 2mg for moderate pain and 4mg for severe pain (iSTOP note in chart, Reference #: [id number])  - EKG in chart sinus tachy to 113, junctional depression, premature atrial complexes, QTc 427, will order repeat EKG  - CXR 8/14 no evidence of cardiopulm disease  - Per Ortho would like platelets >80,000 and WBC >4, but per heme/onc no indication for filgrastim  - Ortho following; appreciate recs Recent admission for left hip and knee pain found to have mets to femur now s/p fall at rehab.  - Stable vitals, left foot with distal pulse 2+, warm, able to move, no acute concern neurovascular compromise  - XR showing pathological left femoral neck fracture  - Fall precautions  - Bed rest  - Plan for OR Monday or Tuesday complex QASIM and possible femur ORIF  - RCRI score = 0  - Pain control: fentanyl patch 25 mg + dilaudid 2mg for moderate pain and 4mg for severe pain (iSTOP note in chart, Reference #: [id number])  - EKG in chart sinus tachy to 113, junctional depression, premature atrial complexes, QTc 427, will order repeat EKG  - CXR 8/14 no evidence of cardiopulm disease  - Per Ortho would like platelets >80,000 and WBC >4, per heme/onc may start filgrastim depending on diff   - Ortho following; appreciate recs Recent admission for left hip and knee pain found to have mets to femur now s/p fall at rehab.  - Stable vitals, left foot with distal pulse 2+, warm, able to move, no acute concern neurovascular compromise  - XR showing pathological left femoral neck fracture  - Fall precautions  - Bed rest  - RCRI score = 0  - Pain control: fentanyl patch 25 mg + dilaudid 2mg for moderate pain and 4mg for severe pain (iSTOP note in chart, Reference #: [id number])  - Repeat EKG sinus tachycardia, likely due to pain  - CXR 8/14 no evidence of cardiopulm disease  - Per Ortho would like platelets >80,000 and WBC >4, per heme/onc holding off on filgrastim as ANC ok  - Medically optimized for complex QASIM and possible femur ORIF planned for 8/17  - Ortho following; appreciate recs Recent admission for left hip and knee pain found to have mets to femur now s/p fall at rehab.  - Stable vitals, left foot with distal pulse 2+, warm, able to move, no acute concern neurovascular compromise  - XR showing pathological left femoral neck fracture  - Fall precautions  - Bed rest  - RCRI score = 0  - Pain control: fentanyl patch 25 mg + dilaudid 2mg for moderate pain and 4mg for severe pain (iSTOP note in chart, Reference #: [id number])  - Repeat EKG sinus tachycardia, likely due to pain  - CXR 8/14 no evidence of cardiopulm disease  - Per Ortho would like platelets >80,000 and WBC >4, per heme/onc holding off on filgrastim as ANC ok  - Medically optimized for complex QASIM and possible femur ORIF planned for 8/17  - Toradol 15mg IVP q6h for breakthrough pain  - Palliative care consulted for uncontrolled pain  - Ortho following; appreciate recs

## 2020-08-16 NOTE — PROGRESS NOTE ADULT - PROBLEM SELECTOR PLAN 5
Erythematous rash under right breast and per patient also on buttocks likely intertrigo.  - Keep area dry and clean  - Topical Nystatin powder

## 2020-08-16 NOTE — PROGRESS NOTE ADULT - SUBJECTIVE AND OBJECTIVE BOX
Patient seen and examined at bedside. Reports no acute complaints at this time. Pain is well controlled. No acute events overnight.    PHYSICAL EXAM:  Vital Signs Last 24 Hrs  T(C): 36.8 (16 Aug 2020 04:44), Max: 37.1 (15 Aug 2020 21:20)  T(F): 98.2 (16 Aug 2020 04:44), Max: 98.7 (15 Aug 2020 21:20)  HR: 112 (16 Aug 2020 04:44) (111 - 143)  BP: 114/69 (16 Aug 2020 04:44) (112/80 - 133/76)  BP(mean): --  RR: 18 (16 Aug 2020 04:44) (18 - 18)  SpO2: 96% (16 Aug 2020 04:44) (95% - 96%)    Gen: NAD, AAOx3    Left Lower Extremity:  Skin intact  +EHL/FHL/TA/GS  SILT L3-S1  +DP/PT Pulses  Compartments soft  No calf TTP B/L

## 2020-08-16 NOTE — PROGRESS NOTE ADULT - PROBLEM SELECTOR PLAN 3
Plt 62 at [facility], on admission to [facility] platelets 55 likely 2/2 recent chemotherapy on 7/31.  - on 7/31 platelets 235, have been 50s since 8/8 and stable  - No evidence of active bleeding, trend daily  - No indication for filgrastim  - Heme/onc following; appreciate recs Plt 62 at [facility], on admission to [facility] platelets 55 likely 2/2 recent chemotherapy on 7/31.  - on 7/31 platelets 235, have been 50s since 8/8 and stable  - No evidence of active bleeding, trend daily  - Per heme, given downtrending leukocyte counts, may start filgrastim depending on today's diff  - Heme/onc following; appreciate recs Plt 62 at [facility], on admission to [facility] platelets 55 likely 2/2 recent chemotherapy on 7/31.  - on 7/31 platelets 235, have been 50s since 8/8 and stable  - No evidence of active bleeding, trend daily  - Per heme, holding off on filgrastim as ANC ok  - Heme/onc following; appreciate recs

## 2020-08-16 NOTE — CHART NOTE - NSCHARTNOTEFT_GEN_A_CORE
Discussed with attending physician, plan for OR on Tuesday 8/18   Patient to be NPO aftermidnight of 8/17  Hold chemical DVT ppx at midnight  IVF while NPO  Analgesia as needed  NWTOR[facility] FORBES

## 2020-08-16 NOTE — PROGRESS NOTE ADULT - PROBLEM SELECTOR PLAN 9
Transition of Care Status:  1. Name of PCP:  2. PCP Contacted on Admission: ( ) Y    (x) N  3. PCP Contacted at Discharge: ( ) Y    ( ) N    ( ) N/A  4. Post-Discharge Appointment Date and Location:  5. Summary of Handoff given to PCP:

## 2020-08-16 NOTE — PROGRESS NOTE ADULT - ATTENDING COMMENTS
#Acute L fem ftx pathological: Seen by ortho and plan for surgery tomorrow. NPO after MN. Pain is much better controlled today.    #Chemo induced pancytopenia: ANC around 1300 currently. Will optimize plts prior to OR. FU heme/onc if neupogen is warranted.    #Metastatic lung cancer: Patient endorsing was supposed to have an appt for mediport prior to admission; clarify with outpt onc and consider IR this week. Pain control improved today with q4h dilaudid and fentanyl patch.    #dvt ppx- lovenox on hold tomorrow before surgery. resume after.

## 2020-08-16 NOTE — CHART NOTE - NSCHARTNOTEFT_GEN_A_CORE
Notified by nurse regarding pt w/ L leg "tingling". Went to assess pt at bedside, sleeping in NAD, very pleasant to interview once woken. Pt in bed w/ L leg externally rotated in context of hx of pathologic fracture, however denies current pain. Reports "pins and needles" sensation on lateral aspect of L leg from knee to ankle x 2 days. Without pain to palpation, similar in appearance to R leg, similar in warmth to R leg, pulse present. Pt unable to lift entire leg 2/2 pain from fracture however able to dorsiflex and plantar flex foot, wiggle toes without difficulty. Will inform primary team in am for further management of sx.

## 2020-08-16 NOTE — PROGRESS NOTE ADULT - PROBLEM SELECTOR PLAN 2
Patient with WBC of 3.15 on admission likely secondary to recent chemotherapy.  - WBC is 1.68 pending diff  - B12 elevated. Folate and Vit D wnl  - Monitor CBC daily and monitor for fever  - No indication for filgrastim  - Heme/onc following; appreciate recs Patient with WBC of 3.15 on admission likely secondary to recent chemotherapy.  - WBC is 1.68 pending diff  - B12 elevated. Folate and Vit D wnl  - Monitor CBC daily and monitor for fever  - Per heme, given downtrending leukocyte counts, may start filgrastim depending on today's diff  - Heme/onc following; appreciate recs Patient with WBC of 3.15 on admission likely secondary to recent chemotherapy.  - WBC is 1.68 pending diff  - B12 elevated. Folate and Vit D wnl  - Monitor CBC daily and monitor for fever  - Per heme, holding off on filgrastim as ANC ok  - Heme/onc following; appreciate recs

## 2020-08-16 NOTE — PROGRESS NOTE ADULT - PROBLEM SELECTOR PLAN 4
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  - Patient follows with Dr. Winn at [facility], plan for second round of chemo next week and port placement  - s/p Xray of left clavicle found to have mets with possible fracture  - per Ortho would like bone scan, will discuss with oncology  - Pain management: as above  - Possible plan for port insertion next week after ortho intervention  - Oncology following, appreciate recs

## 2020-08-16 NOTE — PROGRESS NOTE ADULT - ASSESSMENT
64F h/o hypothyroidism, Stage IV lung adenocarcinoma diagnosed in 2017 s/p right lower lobectomy with recurrence 5/2020 s/p palliative radiation to clavicle and spine and 1 round of carbo/pemextred/pembrolizumab chemo 7/31 with recent admission 8/8 for pending femoral neck fracture now transferred from [facility] after fall in home with left hip pain, imaging showing left hip pathologic fracture. Patient also noted to have thrombocytopenia likely secondary to recent chemotherapy.

## 2020-08-16 NOTE — PROGRESS NOTE ADULT - SUBJECTIVE AND OBJECTIVE BOX
PROGRESS NOTE:   Authored by Umair Rangel MD PGY-2  Pager [phone] [facility], [id number] [facility]   Please page [phone] after 7PM    Patient is a 64y old  Female who presents with a chief complaint of Left hip pain and fall (16 Aug 2020 06:34)      SUBJECTIVE / OVERNIGHT EVENTS:    ADDITIONAL REVIEW OF SYSTEMS:    MEDICATIONS  (STANDING):  ALPRAZolam 0.25 milliGRAM(s) Oral two times a day  fentaNYL   Patch  25 MICROgram(s)/Hr 1 Patch Transdermal every 72 hours  levothyroxine 175 MICROGram(s) Oral daily  liothyronine 5 MICROGram(s) Oral daily  nystatin Powder 1 Application(s) Topical two times a day    MEDICATIONS  (PRN):  diphenhydrAMINE 25 milliGRAM(s) Oral every 4 hours PRN Rash and/or Itching  HYDROmorphone   Tablet 2 milliGRAM(s) Oral every 4 hours PRN Moderate Pain (4 - 6)  HYDROmorphone   Tablet 4 milliGRAM(s) Oral every 4 hours PRN Severe Pain (7 - 10)  polyethylene glycol 3350 17 Gram(s) Oral daily PRN Constipation  senna 2 Tablet(s) Oral at bedtime PRN Constipation      I&O's Summary    15 Aug 2020 07:01  -  16 Aug 2020 07:00  --------------------------------------------------------  IN: 450 mL / OUT: 1000 mL / NET: -550 mL        PHYSICAL EXAM:  Vital Signs Last 24 Hrs  T(C): 36.8 (16 Aug 2020 04:44), Max: 37.1 (15 Aug 2020 21:20)  T(F): 98.2 (16 Aug 2020 04:44), Max: 98.7 (15 Aug 2020 21:20)  HR: 112 (16 Aug 2020 04:44) (111 - 143)  BP: 114/69 (16 Aug 2020 04:44) (112/80 - 133/76)  BP(mean): --  RR: 18 (16 Aug 2020 04:44) (18 - 18)  SpO2: 96% (16 Aug 2020 04:44) (95% - 96%)    CONSTITUTIONAL: NAD, well-developed  RESPIRATORY: Normal respiratory effort; lungs are clear to auscultation bilaterally  CARDIOVASCULAR: Regular rate and rhythm, normal S1 and S2, no murmur/rub/gallop; No lower extremity edema; Peripheral pulses are 2+ bilaterally  ABDOMEN: Nontender to palpation, normoactive bowel sounds, no rebound/guarding  MUSCULOSKELETAL: no clubbing or cyanosis of digits; no joint swelling or tenderness to palpation  PSYCH: A+O to person, place, and time; affect appropriate    LABS:                        10.4   1.68  )-----------( 56       ( 15 Aug 2020 07:16 )             32.8     08-15    137  |  98  |  23  ----------------------------<  105<H>  4.3   |  27  |  0.51    Ca    8.9      15 Aug 2020 07:16  Phos  4.0     08-15  Mg     2.2     08-15    TPro  6.1  /  Alb  3.7  /  TBili  1.2  /  DBili  x   /  AST  18  /  ALT  44  /  AlkPhos  157<H>  08-15    PT/INR - ( 14 Aug 2020 16:56 )   PT: 12.3 sec;   INR: 1.04 ratio         PTT - ( 14 Aug 2020 16:56 )  PTT:24.5 sec      Urinalysis Basic - ( 15 Aug 2020 14:16 )    Color: Light Orange / Appearance: Turbid / S.026 / pH: x  Gluc: x / Ketone: Negative  / Bili: Negative / Urobili: 4 mg/dL   Blood: x / Protein: Trace / Nitrite: Positive   Leuk Esterase: Moderate / RBC: 9 /hpf / WBC 6 /HPF   Sq Epi: x / Non Sq Epi: 3 /hpf / Bacteria: Many          RADIOLOGY & ADDITIONAL TESTS:  Results Reviewed:   Imaging Personally Reviewed:  Electrocardiogram Personally Reviewed:    COORDINATION OF CARE:  Care Discussed with Consultants/Other Providers [Y/N]:  Prior or Outpatient Records Reviewed [Y/N]: PROGRESS NOTE:   Authored by Umair Rangel MD PGY-2  Pager [phone] [facility], [address] [facility]   Please page [phone] after 7PM    Patient is a 64y old  Female who presents with a chief complaint of Left hip pain and fall (16 Aug 2020 06:34)      SUBJECTIVE / OVERNIGHT EVENTS:  Pt had tingling of the left foot overnight, pulses intact and no new weakness, now resolved. She is still having severe left hip pain. Pt feels lethargic if she takes too much pain medication, so she is trying to use as little as possible to relieve her pain. Currently denies fever, chills, nausea, vomiting, constipation, and diarrhea.    ADDITIONAL REVIEW OF SYSTEMS:    MEDICATIONS  (STANDING):  ALPRAZolam 0.25 milliGRAM(s) Oral two times a day  fentaNYL   Patch  25 MICROgram(s)/Hr 1 Patch Transdermal every 72 hours  levothyroxine 175 MICROGram(s) Oral daily  liothyronine 5 MICROGram(s) Oral daily  nystatin Powder 1 Application(s) Topical two times a day    MEDICATIONS  (PRN):  diphenhydrAMINE 25 milliGRAM(s) Oral every 4 hours PRN Rash and/or Itching  HYDROmorphone   Tablet 2 milliGRAM(s) Oral every 4 hours PRN Moderate Pain (4 - 6)  HYDROmorphone   Tablet 4 milliGRAM(s) Oral every 4 hours PRN Severe Pain (7 - 10)  polyethylene glycol 3350 17 Gram(s) Oral daily PRN Constipation  senna 2 Tablet(s) Oral at bedtime PRN Constipation      I&O's Summary    15 Aug 2020 07:01  -  16 Aug 2020 07:00  --------------------------------------------------------  IN: 450 mL / OUT: 1000 mL / NET: -550 mL        PHYSICAL EXAM:  Vital Signs Last 24 Hrs  T(C): 36.8 (16 Aug 2020 04:44), Max: 37.1 (15 Aug 2020 21:20)  T(F): 98.2 (16 Aug 2020 04:44), Max: 98.7 (15 Aug 2020 21:20)  HR: 112 (16 Aug 2020 04:44) (111 - 143)  BP: 114/69 (16 Aug 2020 04:44) (112/80 - 133/76)  BP(mean): --  RR: 18 (16 Aug 2020 04:44) (18 - 18)  SpO2: 96% (16 Aug 2020 04:44) (95% - 96%)    CONSTITUTIONAL: NAD, appears uncomfortable  RESPIRATORY: Normal respiratory effort; lungs are clear to auscultation bilaterally  CARDIOVASCULAR: Regular rate and rhythm, normal S1 and S2, no murmur/rub/gallop; No lower extremity edema; Peripheral pulses are 2+ bilaterally  ABDOMEN: Nontender to palpation, normoactive bowel sounds, no rebound/guarding  MUSCULOSKELETAL: Left hip externally rotated  PSYCH: A+O to person, place, and time; affect appropriate  SKIN: Erythematous, itchy rash with clear margins under right breast extending from mediastinum to axilla; patient states similar rash on buttocks but unable to assess given left hip fracture    LABS:             10.3   1.75  )-----------( 62       ( 16 Aug 2020 09:25 )             31.3     16 Aug 2020 09:    134    |  96     |  13     ----------------------------<  134    3.7     |  26     |  0.33     Ca    8.9        16 Aug 2020 09:25  Phos  3.4       16 Aug 2020 09:  Mg     2.0       16 Aug 2020 09:    TPro  5.8    /  Alb  3.4    /  TBili  1.8    /  DBili  x      /  AST  23     /  ALT  42     /  AlkPhos  149    16 Aug 2020 09:25    PT/INR - ( 16 Aug 2020 09:25 )   PT: 13.6 sec;   INR: 1.15 ratio    PTT - ( 16 Aug 2020 09:25 )  PTT:30.6 sec    LIVER FUNCTIONS - ( 16 Aug 2020 09: )  Alb: 3.4 g/dL / Pro: 5.8 g/dL / ALK PHOS: 149 U/L / ALT: 42 U/L / AST: 23 U/L / GGT: x           Urinalysis Basic - ( 15 Aug 2020 14:16 )    Color: Light Orange / Appearance: Turbid / S.026 / pH: x  Gluc: x / Ketone: Negative  / Bili: Negative / Urobili: 4 mg/dL   Blood: x / Protein: Trace / Nitrite: Positive   Leuk Esterase: Moderate / RBC: 9 /hpf / WBC 6 /HPF   Sq Epi: x / Non Sq Epi: 3 /hpf / Bacteria: Many    RADIOLOGY & ADDITIONAL TESTS:  Results Reviewed:   Imaging Personally Reviewed:  Electrocardiogram Personally Reviewed:    COORDINATION OF CARE:  Care Discussed with Consultants/Other Providers [Y/N]:  Prior or Outpatient Records Reviewed [Y/N]:

## 2020-08-16 NOTE — PROGRESS NOTE ADULT - ASSESSMENT
A/P: 64F w/ L Pathologic FN Fx  Plan for OR tomorrow or 8/18 pending clearance/optimization and improvement in platelet and ANC   FU UCx; Recommend abx given low WBC  Recommend Repeat Bone Scan  Recommend Neupogen  Analgesia  DVT ppx  NWB LLE  Encourage incentive spirometry  Medical clearance optimization for OR  Will discuss with attending and advise if plan changes

## 2020-08-16 NOTE — PROGRESS NOTE ADULT - PROBLEM SELECTOR PLAN 8
DVT: No pharmacologic ppx given thrombocytopenia; SCDs given immobilization and malignancy   Diet: Regular  Vit deficiencies: B12 elevated. Folate and Vit D wnl  PT: will need PT eval after ortho recs/intervention

## 2020-08-17 PROBLEM — Z00.00 ENCOUNTER FOR PREVENTIVE HEALTH EXAMINATION: Noted: 2020-01-01

## 2020-08-17 NOTE — CONSULT NOTE ADULT - SUBJECTIVE AND OBJECTIVE BOX
HPI: 64F wi PMH of hypothyroidism, stage IV lung CA (2017, s/p RLL lobectomy, s/p pall RT, s/p carbo/pemextred/pembrolizumab ), recent femoral neck fracture, here after fall in home with L hip pain, with a L hip pathologic fracture. Also noted to be thrombocytopenic, likely from chemo.  Possible ORIF on 20 per ortho. Palliative called for pain management. Currently on fentanyl patch 25mcg, dilaudid 2mg/4mg for moderate/severe pain, and toradol 15mg Q6.  At home, patient was most recently on fentanyl 50mcg TDP, tramadol 50mg, and MS IR 15mg.    PERTINENT PM/SXH:   Stage IV adenocarcinoma of lung  Hypothyroid    S/P partial lobectomy of lung  History of bunionectomy    FAMILY HISTORY:  Family history of stroke or transient ischemic attack in father    ITEMS NOT CHECKED ARE NOT PRESENT    SOCIAL HISTORY:   Significant other/partner[X ]  Children[ X]  Rastafarian/Spirituality:  Substance hx:  [ ]   Tobacco hx:  [ ]   Alcohol hx: [ ]   Home Opioid hx:  [ ] I-Stop Reference No: [id number]    Rx Written	Rx Dispensed	Drug	Quantity	Days Supply	Prescriber Name	Payment Method	Dispenser  2020	fentanyl 50 mcg/hr patch	10	30	Sctot Olsen	Medicaid	[facility] #[id number]  2020	tramadol hcl 50 mg tablet	30	7	Chris Gonzales DO	Medicaid	[facility] #[id number]  2020	morphine sulfate ir 15 mg tab	56	7	Scott Olsen	[facility] #[id number]  2020	alprazolam 0.25 mg tablet	15	15	Christy Salinas	Medicaid	[facility] #[id number]  2020	diazepam 5 mg tablet	10	10	Elvira Joseph MD	Medicaid	[facility] #[id number]  2020	diazepam 5 mg tablet	2	2	Scott Olsen	Medicaid	[facility] #[id number]    Living Situation: [X ]Home  [ ]Long term care  [ ]Rehab [ ]Other    ADVANCE DIRECTIVES:    DNR  MOLST  [ ]  Living Will  [ ]   DECISION MAKER(s):  [ ] Health Care Proxy(s)  [ ] Surrogate(s)  [ ] Guardian           Name(s): Phone Number(s):   spouse  daughter Kena Cox [phone]     BASELINE (I)ADL(s) (prior to admission):  [place]: [X ]Total  [ ] Moderate [ ]Dependent    Allergies    Bananas (Other)  latex (Rash)  opioid-like analgesics (Urticaria)  penicillin (Angioedema)    Intolerances    MEDICATIONS  (STANDING):  ALPRAZolam 0.25 milliGRAM(s) Oral two times a day  fentaNYL   Patch  25 MICROgram(s)/Hr 1 Patch Transdermal every 72 hours  lactated ringers. 1000 milliLiter(s) (125 mL/Hr) IV Continuous <Continuous>  levothyroxine 175 MICROGram(s) Oral daily  liothyronine 5 MICROGram(s) Oral daily  nystatin Powder 1 Application(s) Topical two times a day    MEDICATIONS  (PRN):  diphenhydrAMINE 25 milliGRAM(s) Oral every 4 hours PRN Rash and/or Itching  HYDROmorphone   Tablet 2 milliGRAM(s) Oral every 4 hours PRN Moderate Pain (4 - 6)  HYDROmorphone   Tablet 4 milliGRAM(s) Oral every 4 hours PRN Severe Pain (7 - 10)  ketorolac   Injectable 15 milliGRAM(s) IV Push every 6 hours PRN Severe Pain (7 - 10)  polyethylene glycol 3350 17 Gram(s) Oral daily PRN Constipation  senna 2 Tablet(s) Oral at bedtime PRN Constipation    PRESENT SYMPTOMS: [ ]Unable to obtain due to poor mentation   Source if other than patient:  [ ]Family   [ ]Team     Pain: [ ]yes [ ]no  QOL impact -   Location -                    Aggravating factors -  Quality -  Radiation -  Timing-  Severity (0-10 scale):  Minimal acceptable level (0-10 scale):     CPOT:    https://www.[facility].org/getattachment/[email or web address]/Critical-Care-Pain-Observation-Tool-(CPOT)      PAIN AD Score:     http://geriatrictoolkit.[facility]/cog/painad.pdf (press ctrl +  left click to view)    Dyspnea:                           [ ]Mild [ ]Moderate [ ]Severe  Anxiety:                             [ ]Mild [ ]Moderate [ ]Severe  Fatigue:                             [ ]Mild [ ]Moderate [ ]Severe  Nausea:                             [ ]Mild [ ]Moderate [ ]Severe  Loss of appetite:              [ ]Mild [ ]Moderate [ ]Severe  Constipation:                    [ ]Mild [ ]Moderate [ ]Severe    Other Symptoms:  [ ]All other review of systems negative     Palliative Performance Status Version 2:         %    http://[facility].org/files/news/palliative_performance_scale_ppsv2.pdf  PHYSICAL EXAM:  Vital Signs Last 24 Hrs  T(C): 36.7 (17 Aug 2020 04:50), Max: 36.7 (17 Aug 2020 04:50)  T(F): 98 (17 Aug 2020 04:50), Max: 98 (17 Aug 2020 04:50)  HR: 118 (17 Aug 2020 04:50) (118 - 139)  BP: 105/75 (17 Aug 2020 04:50) (105/75 - 126/68)  BP(mean): --  RR: 18 (17 Aug 2020 04:50) (18 - 18)  SpO2: 96% (17 Aug 2020 04:50) (93% - 96%) I&O's Summary    16 Aug 2020 07:01  -  17 Aug 2020 07:00  --------------------------------------------------------  IN: 0 mL / OUT: 500 mL / NET: -500 mL      GENERAL:  [ ]Alert  [ ]Oriented x   [ ]Lethargic  [ ]Cachexia  [ ]Unarousable  [ ]Verbal  [ ]Non-Verbal  Behavioral:   [ ] Anxiety  [ ] Delirium [ ] Agitation [ ] Other  HEENT:  [ ]Normal   [ ]Dry mouth   [ ]ET Tube/Trach  [ ]Oral lesions  PULMONARY:   [ ]Clear [ ]Tachypnea  [ ]Audible excessive secretions   [ ]Rhonchi        [ ]Right [ ]Left [ ]Bilateral  [ ]Crackles        [ ]Right [ ]Left [ ]Bilateral  [ ]Wheezing     [ ]Right [ ]Left [ ]Bilateral  [ ]Diminished breath sounds [ ]right [ ]left [ ]bilateral  CARDIOVASCULAR:    [ ]Regular [ ]Irregular [ ]Tachy  [ ]Esteban [ ]Murmur [ ]Other  GASTROINTESTINAL:  [ ]Soft  [ ]Distended   [ ]+BS  [ ]Non tender [ ]Tender  [ ]PEG [ ]OGT/ NGT  Last BM:     GENITOURINARY:  [ ]Normal [ ] Incontinent   [ ]Oliguria/Anuria   [ ]Sparks  MUSCULOSKELETAL:   [ ]Normal   [ ]Weakness  [ ]Bed/Wheelchair bound [ ]Edema  NEUROLOGIC:   [ ]No focal deficits  [ ]Cognitive impairment  [ ]Dysphagia [ ]Dysarthria [ ]Paresis [ ]Other   SKIN:   [ ]Normal    [ ]Rash  [ ]Pressure ulcer(s)       Present on admission [ ]y [ ]n    CRITICAL CARE:  [ ] Shock Present  [ ]Septic [ ]Cardiogenic [ ]Neurologic [ ]Hypovolemic  [ ]  Vasopressors [ ]  Inotropes   [ ]Respiratory failure present [ ]Mechanical ventilation [ ]Non-invasive ventilatory support [ ]High flow  [ ]Acute  [ ]Chronic [ ]Hypoxic  [ ]Hypercarbic [ ]Other  [ ]Other organ failure     LABS:                        10.0   1.79  )-----------( 73       ( 17 Aug 2020 06:36 )             30.4   08-17    134<L>  |  95<L>  |  11  ----------------------------<  149<H>  3.4<L>   |  26  |  0.35<L>    Ca    9.0      17 Aug 2020 06:36  Phos  3.4     08-17  Mg     2.0     08-17    TPro  5.7<L>  /  Alb  3.3  /  TBili  1.7<H>  /  DBili  x   /  AST  21  /  ALT  37  /  AlkPhos  141<H>  08-17  PT/INR - ( 17 Aug 2020 06:36 )   PT: 13.6 sec;   INR: 1.15 ratio         PTT - ( 17 Aug 2020 06:36 )  PTT:30.8 sec    Urinalysis Basic - ( 15 Aug 2020 14:16 )    Color: Light Orange / Appearance: Turbid / S.026 / pH: x  Gluc: x / Ketone: Negative  / Bili: Negative / Urobili: 4 mg/dL   Blood: x / Protein: Trace / Nitrite: Positive   Leuk Esterase: Moderate / RBC: 9 /hpf / WBC 6 /HPF   Sq Epi: x / Non Sq Epi: 3 /hpf / Bacteria: Many      RADIOLOGY & ADDITIONAL STUDIES:    Clavicle x-ray 8/15/2020  Lucency visualized within the proximal/mid shaft of the clavicle, which may represent a metastatic lesion. Question nondisplaced pathologic fracture through the lesion. The sternoclavicular articulation is not well visualized. The acromioclavicular joint is intact.    PROTEIN CALORIE MALNUTRITION PRESENT: [ ]mild [ ]moderate [ ]severe [ ]underweight [ ]morbid obesity  https://www.andeal.org/vault/2440/web/files/ONC/Table_Clinical%20Characteristics%20to%20Document%20Malnutrition-White%20JV%20et%20al%2020.pdf    Height (cm): 172.7 (20 @ 18:31)  Weight (kg): 88.6 (20 @ 18:31)  BMI (kg/m2): 29.7 (20 @ 18:31)    [ ]PPSV2 < or = to 30% [ ]significant weight loss  [ ]poor nutritional intake  [ ]anasarca     Albumin, Serum: 3.3 g/dL (20 @ 06:36)   [ ]Artificial Nutrition      REFERRALS:   [ ]Chaplaincy  [ ]Hospice  [ ]Child Life  [ ]Social Work  [ ]Case management [ ]Holistic Therapy     Goals of Care Document:     ______________  Armin Munguia MD   of Geriatric and Palliative Medicine  [facility]     Please page the following number for clinical matters between the hours of 9AM and 5PM   from Monday through Friday : [phone]    After 5PM and on weekends, please page: [phone]. The Geriatric and Palliative Medicine consult service has  coverage for medical recommendations, including for symptom management needs. HPI: 64F wi PMH of hypothyroidism, stage IV lung CA (2017, s/p RLL lobectomy, s/p pall RT, s/p carbo/pemextred/pembrolizumab ), recent femoral neck fracture, here after fall in home with L hip pain, with a L hip pathologic fracture. Also noted to be thrombocytopenic, likely from chemo.  Possible ORIF on 20 per ortho. Palliative called for pain management. Currently on fentanyl patch 25mcg, dilaudid 2mg/4mg for moderate/severe pain, and toradol 15mg Q6.  At home, patient was most recently on fentanyl 37.5mcg TDP, and dilaudid 4mg PO.    PERTINENT PM/SXH:   Stage IV adenocarcinoma of lung  Hypothyroid    S/P partial lobectomy of lung  History of bunionectomy    FAMILY HISTORY:  Family history of stroke or transient ischemic attack in father    ITEMS NOT CHECKED ARE NOT PRESENT    SOCIAL HISTORY:   Significant other/partner[X ]  Children[ X]  Confucianist/Spirituality:  Substance hx:  [ ]   Tobacco hx:  [ ]   Alcohol hx: [ ]   Home Opioid hx:  [ ] I-Stop Reference No: [id number]    Rx Written	Rx Dispensed	Drug	Quantity	Days Supply	Prescriber Name	Payment Method	Dispenser  2020	fentanyl 37.5 mcg/hr patch	2	6	Salam, M [facility]	Insurance	[facility]  2020	alprazolam 0.25 mg tablet	30	15	Salam, M Hani	Medicaid	[facility]  2020	hydromorphone 4 mg tablet	30	10	Salam, M Hani	Medicaid	[facility]  2020	fentanyl 25 mcg/hr patch	5	15	Salam, M [facility]	Insurance	[facility]  2020	hydromorphone 2 mg tablet	40	10	Salam, M Hani	Medicaid	[facility]  2020	hydromorphone 4 mg tablet	30	10	Salam, M Hani	Medicaid	[facility]  2020	fentanyl 50 mcg/hr patch	10	30	Scott Olsen	Medicaid	[facility] #[id number]  2020	tramadol hcl 50 mg tablet	30	7	Garra, Chris Fazal DO	Medicaid	[facility] #[id number]  2020	morphine sulfate ir 15 mg tab	56	7	Scott Olsen	[facility] #[id number]  2020	alprazolam 0.25 mg tablet	15	15	Christy Salinas EFRAIN	Medicaid	[facility] #[id number]  2020	diazepam 5 mg tablet	10	10	Elvira Joseph MD	Medicaid	[facility] #[id number]  2020	diazepam 5 mg tablet	2	2	Scott Olsen	Medicaid	[facility] #[id number]    Living Situation: [X ]Home  [ ]Long term care  [ ]Rehab [ ]Other    ADVANCE DIRECTIVES:    DNR  MOLST  [ ]  Living Will  [ ]   DECISION MAKER(s):  [ ] Health Care Proxy(s)  [ ] Surrogate(s)  [ ] Guardian           Name(s): Phone Number(s):   spouse  daughter Kena Cox [phone]     BASELINE (I)ADL(s) (prior to admission):  [place]: [X ]Total  [ ] Moderate [ ]Dependent    Allergies    Bananas (Other)  latex (Rash)  opioid-like analgesics (Urticaria)  penicillin (Angioedema)    Intolerances    MEDICATIONS  (STANDING):  ALPRAZolam 0.25 milliGRAM(s) Oral two times a day  fentaNYL   Patch  25 MICROgram(s)/Hr 1 Patch Transdermal every 72 hours  lactated ringers. 1000 milliLiter(s) (125 mL/Hr) IV Continuous <Continuous>  levothyroxine 175 MICROGram(s) Oral daily  liothyronine 5 MICROGram(s) Oral daily  nystatin Powder 1 Application(s) Topical two times a day    MEDICATIONS  (PRN):  diphenhydrAMINE 25 milliGRAM(s) Oral every 4 hours PRN Rash and/or Itching  HYDROmorphone   Tablet 2 milliGRAM(s) Oral every 4 hours PRN Moderate Pain (4 - 6)  HYDROmorphone   Tablet 4 milliGRAM(s) Oral every 4 hours PRN Severe Pain (7 - 10)  ketorolac   Injectable 15 milliGRAM(s) IV Push every 6 hours PRN Severe Pain (7 - 10)  polyethylene glycol 3350 17 Gram(s) Oral daily PRN Constipation  senna 2 Tablet(s) Oral at bedtime PRN Constipation    PRESENT SYMPTOMS: [ ]Unable to obtain due to poor mentation   Source if other than patient:  [ ]Family   [ ]Team     Pain: [ ]yes [ ]no  QOL impact -   Location -                    Aggravating factors -  Quality -  Radiation -  Timing-  Severity (0-10 scale):  Minimal acceptable level (0-10 scale):     CPOT:    https://www.[facility].org/getattachment/[email or web address]/Critical-Care-Pain-Observation-Tool-(CPOT)      PAIN AD Score:     http://geriatrictoolkit.[facility]/cog/painad.pdf (press ctrl +  left click to view)    Dyspnea:                           [ ]Mild [ ]Moderate [ ]Severe  Anxiety:                             [ ]Mild [ ]Moderate [ ]Severe  Fatigue:                             [ ]Mild [ ]Moderate [ ]Severe  Nausea:                             [ ]Mild [ ]Moderate [ ]Severe  Loss of appetite:              [ ]Mild [ ]Moderate [ ]Severe  Constipation:                    [ ]Mild [ ]Moderate [ ]Severe    Other Symptoms:  [ ]All other review of systems negative     Palliative Performance Status Version 2:         %    http://npcrc.org/files/news/palliative_performance_scale_ppsv2.pdf  PHYSICAL EXAM:  Vital Signs Last 24 Hrs  T(C): 36.7 (17 Aug 2020 04:50), Max: 36.7 (17 Aug 2020 04:50)  T(F): 98 (17 Aug 2020 04:50), Max: 98 (17 Aug 2020 04:50)  HR: 118 (17 Aug 2020 04:50) (118 - 139)  BP: 105/75 (17 Aug 2020 04:50) (105/75 - 126/68)  BP(mean): --  RR: 18 (17 Aug 2020 04:50) (18 - 18)  SpO2: 96% (17 Aug 2020 04:50) (93% - 96%) I&O's Summary    16 Aug 2020 07:01  -  17 Aug 2020 07:00  --------------------------------------------------------  IN: 0 mL / OUT: 500 mL / NET: -500 mL      GENERAL:  [ ]Alert  [ ]Oriented x   [ ]Lethargic  [ ]Cachexia  [ ]Unarousable  [ ]Verbal  [ ]Non-Verbal  Behavioral:   [ ] Anxiety  [ ] Delirium [ ] Agitation [ ] Other  HEENT:  [ ]Normal   [ ]Dry mouth   [ ]ET Tube/Trach  [ ]Oral lesions  PULMONARY:   [ ]Clear [ ]Tachypnea  [ ]Audible excessive secretions   [ ]Rhonchi        [ ]Right [ ]Left [ ]Bilateral  [ ]Crackles        [ ]Right [ ]Left [ ]Bilateral  [ ]Wheezing     [ ]Right [ ]Left [ ]Bilateral  [ ]Diminished breath sounds [ ]right [ ]left [ ]bilateral  CARDIOVASCULAR:    [ ]Regular [ ]Irregular [ ]Tachy  [ ]Esteban [ ]Murmur [ ]Other  GASTROINTESTINAL:  [ ]Soft  [ ]Distended   [ ]+BS  [ ]Non tender [ ]Tender  [ ]PEG [ ]OGT/ NGT  Last BM:     GENITOURINARY:  [ ]Normal [ ] Incontinent   [ ]Oliguria/Anuria   [ ]Sparks  MUSCULOSKELETAL:   [ ]Normal   [ ]Weakness  [ ]Bed/Wheelchair bound [ ]Edema  NEUROLOGIC:   [ ]No focal deficits  [ ]Cognitive impairment  [ ]Dysphagia [ ]Dysarthria [ ]Paresis [ ]Other   SKIN:   [ ]Normal    [ ]Rash  [ ]Pressure ulcer(s)       Present on admission [ ]y [ ]n    CRITICAL CARE:  [ ] Shock Present  [ ]Septic [ ]Cardiogenic [ ]Neurologic [ ]Hypovolemic  [ ]  Vasopressors [ ]  Inotropes   [ ]Respiratory failure present [ ]Mechanical ventilation [ ]Non-invasive ventilatory support [ ]High flow  [ ]Acute  [ ]Chronic [ ]Hypoxic  [ ]Hypercarbic [ ]Other  [ ]Other organ failure     LABS:                        10.0   1.79  )-----------( 73       ( 17 Aug 2020 06:36 )             30.4   08-17    134<L>  |  95<L>  |  11  ----------------------------<  149<H>  3.4<L>   |  26  |  0.35<L>    Ca    9.0      17 Aug 2020 06:36  Phos  3.4     08-17  Mg     2.0     08-17    TPro  5.7<L>  /  Alb  3.3  /  TBili  1.7<H>  /  DBili  x   /  AST  21  /  ALT  37  /  AlkPhos  141<H>    PT/INR - ( 17 Aug 2020 06:36 )   PT: 13.6 sec;   INR: 1.15 ratio         PTT - ( 17 Aug 2020 06:36 )  PTT:30.8 sec    Urinalysis Basic - ( 15 Aug 2020 14:16 )    Color: Light Orange / Appearance: Turbid / S.026 / pH: x  Gluc: x / Ketone: Negative  / Bili: Negative / Urobili: 4 mg/dL   Blood: x / Protein: Trace / Nitrite: Positive   Leuk Esterase: Moderate / RBC: 9 /hpf / WBC 6 /HPF   Sq Epi: x / Non Sq Epi: 3 /hpf / Bacteria: Many      RADIOLOGY & ADDITIONAL STUDIES:    Clavicle x-ray 8/15/2020  Lucency visualized within the proximal/mid shaft of the clavicle, which may represent a metastatic lesion. Question nondisplaced pathologic fracture through the lesion. The sternoclavicular articulation is not well visualized. The acromioclavicular joint is intact.    PROTEIN CALORIE MALNUTRITION PRESENT: [ ]mild [ ]moderate [ ]severe [ ]underweight [ ]morbid obesity  https://www.andeal.org/vault/2440/web/files/ONC/Table_Clinical%20Characteristics%20to%20Document%20Malnutrition-White%20JV%20et%20al%215809.pdf    Height (cm): 172.7 (20 @ 18:31)  Weight (kg): 88.6 (20 @ 18:31)  BMI (kg/m2): 29.7 (20 @ 18:31)    [ ]PPSV2 < or = to 30% [ ]significant weight loss  [ ]poor nutritional intake  [ ]anasarca     Albumin, Serum: 3.3 g/dL (20 @ 06:36)   [ ]Artificial Nutrition      REFERRALS:   [ ]Chaplaincy  [ ]Hospice  [ ]Child Life  [ ]Social Work  [ ]Case management [ ]Holistic Therapy     Goals of Care Document:     ______________  Armin Munguia MD   of Geriatric and Palliative Medicine  [facility]     Please page the following number for clinical matters between the hours of 9AM and 5PM   from Monday through Friday : [phone]    After 5PM and on weekends, please page: [phone]. The Geriatric and Palliative Medicine consult service has  coverage for medical recommendations, including for symptom management needs. HPI: 64F wi PMH of hypothyroidism, stage IV lung CA (2017, s/p RLL lobectomy, s/p pall RT, s/p carbo/pemextred/pembrolizumab ), recent femoral neck fracture, here after fall in home with L hip pain, with a L hip pathologic fracture. Also noted to be thrombocytopenic, likely from chemo.  Possible ORIF on 20 per ortho. Palliative called for pain management. Currently on fentanyl patch 25mcg, dilaudid 2mg/4mg for moderate/severe pain, and toradol 15mg Q6.  At home, patient was most recently on fentanyl 37.5mcg TDP, and dilaudid 4mg PO.    PERTINENT PM/SXH:   Stage IV adenocarcinoma of lung  Hypothyroid    S/P partial lobectomy of lung  History of bunionectomy    FAMILY HISTORY:  Family history of stroke or transient ischemic attack in father    ITEMS NOT CHECKED ARE NOT PRESENT    SOCIAL HISTORY:   Significant other/partner[X ]  Children[ X]  Jain/Spirituality:  Substance hx:  [ ]   Tobacco hx:  [ ]   Alcohol hx: [ ]   Home Opioid hx:  [ ] I-Stop Reference No: [id number]    Rx Written	Rx Dispensed	Drug	Quantity	Days Supply	Prescriber Name	Payment Method	Dispenser  2020	fentanyl 37.5 mcg/hr patch	2	6	Salam, M [facility]	Insurance	[facility]  2020	alprazolam 0.25 mg tablet	30	15	Salam, M Hani	Medicaid	[facility]  2020	hydromorphone 4 mg tablet	30	10	Salam, M Hani	Medicaid	[facility]  2020	fentanyl 25 mcg/hr patch	5	15	Salam, M [facility]	Insurance	[facility]  2020	hydromorphone 2 mg tablet	40	10	Salam, M Hani	Medicaid	[facility]  2020	hydromorphone 4 mg tablet	30	10	Salam, M Hani	Medicaid	[facility]  2020	fentanyl 50 mcg/hr patch	10	30	Scott Olsen	Medicaid	[id number]  2020	tramadol hcl 50 mg tablet	30	7	Garra, Chris Fazal DO	Medicaid	[facility] #[id number]  2020	morphine sulfate ir 15 mg tab	56	7	Scott Olsen	[facility] #[id number]  2020	alprazolam 0.25 mg tablet	15	15	Christy Salinas EFRAIN	Medicaid	[facility] #[id number]  2020	diazepam 5 mg tablet	10	10	Elvira Joseph MD	Medicaid	[facility] #[id number]  2020	diazepam 5 mg tablet	2	2	Scott Olsen	Medicaid	[facility] #[id number]    Living Situation: [X ]Home  [ ]Long term care  [ ]Rehab [ ]Other    ADVANCE DIRECTIVES:    DNR  MOLST  [ ]  Living Will  [ ]   DECISION MAKER(s):  [ ] Health Care Proxy(s)  [ ] Surrogate(s)  [ ] Guardian           Name(s): Phone Number(s):   spouse  daughter Kena Cox [phone]     BASELINE (I)ADL(s) (prior to admission):  [place]: [X ]Total  [ ] Moderate [ ]Dependent    Allergies    Bananas (Other)  latex (Rash)  opioid-like analgesics (Urticaria)  penicillin (Angioedema)    Intolerances    MEDICATIONS  (STANDING):  ALPRAZolam 0.25 milliGRAM(s) Oral two times a day  fentaNYL   Patch  25 MICROgram(s)/Hr 1 Patch Transdermal every 72 hours  lactated ringers. 1000 milliLiter(s) (125 mL/Hr) IV Continuous <Continuous>  levothyroxine 175 MICROGram(s) Oral daily  liothyronine 5 MICROGram(s) Oral daily  nystatin Powder 1 Application(s) Topical two times a day    MEDICATIONS  (PRN):  diphenhydrAMINE 25 milliGRAM(s) Oral every 4 hours PRN Rash and/or Itching  HYDROmorphone   Tablet 2 milliGRAM(s) Oral every 4 hours PRN Moderate Pain (4 - 6)  HYDROmorphone   Tablet 4 milliGRAM(s) Oral every 4 hours PRN Severe Pain (7 - 10)  ketorolac   Injectable 15 milliGRAM(s) IV Push every 6 hours PRN Severe Pain (7 - 10)  polyethylene glycol 3350 17 Gram(s) Oral daily PRN Constipation  senna 2 Tablet(s) Oral at bedtime PRN Constipation    PRESENT SYMPTOMS: [ ]Unable to obtain due to poor mentation   Source if other than patient:  [ ]Family   [ ]Team     Pain: [ X]yes [ ]no  QOL impact -   Location -       LLE             Aggravating factors - movement  Quality -  Radiation -  Timing-  Severity (0-10 scale): up to 10/10  Minimal acceptable level (0-10 scale):     CPOT:    https://www.[facility].org/getattachment/[email or web address]/Critical-Care-Pain-Observation-Tool-(CPOT)      PAIN AD Score:     http://geriatrictoolkit.[facility]/cog/painad.pdf (press ctrl +  left click to view)    Dyspnea:                           [ ]Mild [ ]Moderate [ ]Severe  Anxiety:                             [ ]Mild [ ]Moderate [ ]Severe  Fatigue:                             [ ]Mild [ ]Moderate [ ]Severe  Nausea:                             [ ]Mild [ ]Moderate [ ]Severe  Loss of appetite:              [ ]Mild [ ]Moderate [ ]Severe  Constipation:                    [ ]Mild [ ]Moderate [ ]Severe    Other Symptoms:  [ ]All other review of systems negative     Palliative Performance Status Version 2:         %    http://npcrc.org/files/news/palliative_performance_scale_ppsv2.pdf    PHYSICAL EXAM:  Vital Signs Last 24 Hrs  T(C): 36.4 (17 Aug 2020 14:40), Max: 36.7 (17 Aug 2020 04:50)  T(F): 97.5 (17 Aug 2020 14:40), Max: 98 (17 Aug 2020 04:50)  HR: 117 (17 Aug 2020 14:40) (117 - 125)  BP: 96/66 (17 Aug 2020 14:40) (96/66 - 117/76)  BP(mean): --  RR: 18 (17 Aug 2020 14:40) (18 - 18)  SpO2: 96% (17 Aug 2020 14:40) (93% - 96%)      GENERAL:  [X ]Alert  [ ]Oriented x   [ ]Lethargic  [ ]Cachexia  [ ]Unarousable  [ X]Verbal  [ ]Non-Verbal  Behavioral:   [ ] Anxiety  [ ] Delirium [ ] Agitation [ ] Other  HEENT:  [ ]Normal   [ ]Dry mouth   [ ]ET Tube/Trach  [ ]Oral lesions  PULMONARY:   [X ]Clear [ ]Tachypnea  [ ]Audible excessive secretions   [ ]Rhonchi        [ ]Right [ ]Left [ ]Bilateral  [ ]Crackles        [ ]Right [ ]Left [ ]Bilateral  [ ]Wheezing     [ ]Right [ ]Left [ ]Bilateral  [ ]Diminished breath sounds [ ]right [ ]left [ ]bilateral  CARDIOVASCULAR:    [X ]Regular [ ]Irregular [ ]Tachy  [ ]Esteban [ ]Murmur [ ]Other  GASTROINTESTINAL:  [X ]Soft  [ ]Distended   [X ]+BS  [ X]Non tender [ ]Tender  [ ]PEG [ ]OGT/ NGT  Last BM:     GENITOURINARY:  [ ]Normal [ ] Incontinent   [ ]Oliguria/Anuria   [ ]Sparks  MUSCULOSKELETAL:   [ ]Normal   [ ]Weakness  [ ]Bed/Wheelchair bound [ ]Edema  NEUROLOGIC:   [X ]No focal deficits  [ ]Cognitive impairment  [ ]Dysphagia [ ]Dysarthria [ ]Paresis [ ]Other   SKIN:   [ ]Normal    [ ]Rash  [ ]Pressure ulcer(s)       Present on admission [ ]y [ ]n    CRITICAL CARE:  [ ] Shock Present  [ ]Septic [ ]Cardiogenic [ ]Neurologic [ ]Hypovolemic  [ ]  Vasopressors [ ]  Inotropes   [ ]Respiratory failure present [ ]Mechanical ventilation [ ]Non-invasive ventilatory support [ ]High flow  [ ]Acute  [ ]Chronic [ ]Hypoxic  [ ]Hypercarbic [ ]Other  [ ]Other organ failure     LABS: reviewed                        10.0   1.79  )-----------( 73       ( 17 Aug 2020 06:36 )             30.4   08-17    134<L>  |  95<L>  |  11  ----------------------------<  149<H>  3.4<L>   |  26  |  0.35<L>    Ca    9.0      17 Aug 2020 06:36  Phos  3.4     08-17  Mg     2.0     08-17    TPro  5.7<L>  /  Alb  3.3  /  TBili  1.7<H>  /  DBili  x   /  AST  21  /  ALT  37  /  AlkPhos  141<H>  08-17  PT/INR - ( 17 Aug 2020 06:36 )   PT: 13.6 sec;   INR: 1.15 ratio         PTT - ( 17 Aug 2020 06:36 )  PTT:30.8 sec    Urinalysis Basic - ( 15 Aug 2020 14:16 )    Color: Light Orange / Appearance: Turbid / S.026 / pH: x  Gluc: x / Ketone: Negative  / Bili: Negative / Urobili: 4 mg/dL   Blood: x / Protein: Trace / Nitrite: Positive   Leuk Esterase: Moderate / RBC: 9 /hpf / WBC 6 /HPF   Sq Epi: x / Non Sq Epi: 3 /hpf / Bacteria: Many      RADIOLOGY & ADDITIONAL STUDIES:    Clavicle x-ray 8/15/2020  Lucency visualized within the proximal/mid shaft of the clavicle, which may represent a metastatic lesion. Question nondisplaced pathologic fracture through the lesion. The sternoclavicular articulation is not well visualized. The acromioclavicular joint is intact.    PROTEIN CALORIE MALNUTRITION PRESENT: [ ]mild [ ]moderate [ ]severe [ ]underweight [ ]morbid obesity  https://www.andeal.org/vault/2440/web/files/ONC/Table_Clinical%20Characteristics%20to%20Document%20Malnutrition-White%20JV%20et%20al%680812.pdf    Height (cm): 172.7 (20 @ 18:31)  Weight (kg): 88.6 (20 @ 18:31)  BMI (kg/m2): 29.7 (20 @ 18:31)    [ ]PPSV2 < or = to 30% [ ]significant weight loss  [ ]poor nutritional intake  [ ]anasarca     Albumin, Serum: 3.3 g/dL (20 @ 06:36)   [ ]Artificial Nutrition      REFERRALS:   [ ]Chaplaincy  [ ]Hospice  [ ]Child Life  [ ]Social Work  [ ]Case management [ ]Holistic Therapy     Goals of Care Document:     ______________  Armin Munguia MD   of Geriatric and Palliative Medicine  [facility]     Please page the following number for clinical matters between the hours of 9AM and 5PM   from Monday through Friday : [phone]    After 5PM and on weekends, please page: [phone]. The Geriatric and Palliative Medicine consult service has  coverage for medical recommendations, including for symptom management needs.

## 2020-08-17 NOTE — PROGRESS NOTE ADULT - PROBLEM SELECTOR PLAN 4
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  - Patient follows with Dr. Winn at [facility], plan for second round of chemo next week and port placement  - s/p Xray of left clavicle found to have mets with possible fracture  - per Ortho would like bone scan, will discuss with oncology  - Pain management: as above  - Possible plan for port insertion next week after ortho intervention  - Oncology following, appreciate recs

## 2020-08-17 NOTE — PROGRESS NOTE ADULT - ASSESSMENT
A/P: 64F w/ L Pathologic FN Fx  Plan for OR tomorrow (8/18)  NPO at [facility]; IVF  Hold chemical DVT PPx  Analgesia as needed  NWB LLE  Encourage incentive spirometry  Please document medical clearance for OR  Will discuss with attending and advise if plan changes

## 2020-08-17 NOTE — CONSULT NOTE ADULT - ASSESSMENT
64F [facility] of hypothyroidism, stage IV lung CA (2017, s/p RLL lobectomy, s/p pall RT, s/p carbo/pemextred/pembrolizumab 7/31), recent femoral neck fracture, here after fall in home with L hip pain, with a L hip pathologic fracture. Also noted to be thrombocytopenic, likely from chemo.  Possible ORIF on 8/18/20 per ortho. Palliative called for pain management. Currently on fentanyl patch 25mcg, dilaudid 2mg/4mg for moderate/severe pain, and toradol 15mg Q6.  At home, patient was most recently on fentanyl 50mcg TDP, tramadol 50mg, and MS IR 15mg. 64F [facility] of hypothyroidism, stage IV lung CA (2017, s/p RLL lobectomy, s/p pall RT, s/p carbo/pemextred/pembrolizumab 7/31), recent femoral neck fracture, here after fall in home with L hip pain, with a L hip pathologic fracture. Also noted to be thrombocytopenic, likely from chemo.  Possible ORIF on 8/18/20 per ortho. Palliative called for pain management. Currently on fentanyl patch 25mcg, dilaudid 2mg/4mg for moderate/severe pain, and toradol 15mg Q6.  At home, patient was most recently on fentanyl 37.5mcg TDP, and dilaudid 4mg PO.

## 2020-08-17 NOTE — PROGRESS NOTE ADULT - PROBLEM SELECTOR PLAN 1
Recent admission for left hip and knee pain found to have mets to femur now s/p fall at rehab.  - Stable vitals, left foot with distal pulse 2+, warm, able to move, no acute concern neurovascular compromise  - XR showing pathological left femoral neck fracture  - Fall precautions  - Bed rest  - RCRI score = 0  - Pain control: fentanyl patch 25 mg + dilaudid 2mg for moderate pain and 4mg for severe pain (iSTOP note in chart, Reference #: [id number]); added tordol IV 15mg q6   - Repeat EKG sinus tachycardia, likely due to pain  - CXR 8/14 no evidence of cardiopulm disease  - Per Ortho would like platelets >80,000 and WBC >4, per heme/onc holding off on filgrastim as ANC ok  - Medically optimized for complex QASIM and possible femur ORIF planned for 8/18  - Palliative care consulted for uncontrolled pain  - Ortho following; appreciate recs Recent admission for left hip and knee pain found to have mets to femur now s/p fall at rehab.  - Stable vitals, left foot with distal pulse 2+, warm, able to move, no acute concern neurovascular compromise  - XR showing pathological left femoral neck fracture  - Fall precautions  - Bed rest  - RCRI score = 0  - Pain control: fentanyl patch 25 mg + dilaudid 2mg for moderate pain and 4mg for severe pain (iSTOP note in chart, Reference #: [id number]); added tordol IV 15mg q6   - Repeat EKG sinus tachycardia, likely due to pain  - CXR 8/14 no evidence of cardiopulm disease  - Per Ortho would like platelets >80,000 and WBC >4, per heme/onc holding off on filgrastim as ANC ok  - Medically optimized for complex QASIM and possible femur ORIF planned for 8/18  - Palliative care consulted for uncontrolled pain  - Ortho following; appreciate recs  - Reviewed EKG, CXR, vitals, labs, pt is medically cleared for OR tomorrow Recent admission for left hip and knee pain found to have mets to femur now s/p fall at rehab.  - Stable vitals, left foot with distal pulse 2+, warm, able to move, no acute concern for neurovascular compromise  - XR showing pathological left femoral neck fracture  - Fall precautions  - Bed rest  - RCRI score = 0  - Pain control: fentanyl patch 25 mg + dilaudid 2mg for moderate pain and 4mg for severe pain (iSTOP note in chart, Reference #: [id number]); added tordol IV 15mg q6   - Repeat EKG sinus tachycardia, likely due to pain  - CXR 8/14 no evidence of cardiopulm disease  - Per Ortho would like platelets >80,000 and WBC >4, per heme/onc holding off on filgrastim as ANC ok  - Medically optimized for complex QASIM and possible femur ORIF planned for 8/18  - Palliative care consulted for uncontrolled pain  - Ortho following; appreciate recs  - Reviewed EKG, CXR, vitals, labs, pt is medically cleared for OR tomorrow Recent admission for left hip and knee pain found to have mets to femur now s/p fall at rehab.  - Stable vitals, left foot with distal pulse 2+, warm, able to move, no acute concern for neurovascular compromise  - XR showing pathological left femoral neck fracture  - Fall precautions  - Bed rest  - RCRI score = 0  - Pain control: fentanyl patch 25 mg + dilaudid 2mg for moderate pain and 4mg for severe pain (iSTOP note in chart, Reference #: [id number]); added tordol IV 15mg q6   - Repeat EKG sinus tachycardia, likely due to pain  - CXR 8/14 no evidence of cardiopulm disease  - Per Ortho would like platelets >80,000 and WBC >4, per heme/onc holding off on filgrastim as ANC ok  - Medically optimized for complex QASIM and possible femur ORIF planned for 8/18  - Palliative care consulted for uncontrolled pain, appreciate recs  - UA + with + urine culture started on ceftriaxone 1g x 3 days  - Ortho following; appreciate recs  - Reviewed EKG, CXR, vitals, labs, pt is medically cleared for OR tomorrow

## 2020-08-17 NOTE — PROGRESS NOTE ADULT - ATTENDING COMMENTS
Agree with above. Pt seen and examined. She sustained a pathologic left femoral neck fracture after a fall. In addition, she has a large medial acetabular wall lesion which has perforated through the bone. Her prognosis is poor. I have explained that treatment goals include palliation of pain and improved function. Any local treatment will not be curative. She is therefore indicated for a left total hip arthroplasty with acetabular reconstruction.    I have consented the patient for left femur and acetabulum tumor excision with total hip arthroplasty with acetabular reconstruction. We discussed risks, benefits and alternatives. Rationale of care was reviewed and all questions were answered. Surgical risks include but are not limited to infection (risk is higher due to recent chemo and hx of XRT to the area), bleeding, nerve damage, chronic pain, LLD, fracture, recurrence, VTE, anethesetic complications, loss of life/limb and need for further surgical procedures.  The patient understood all of this and would like to proceed. This was also discussed with her daughter.

## 2020-08-17 NOTE — PROGRESS NOTE ADULT - PROBLEM SELECTOR PLAN 2
Patient with WBC of 3.15 on admission likely secondary to recent chemotherapy.  - WBC is  - B12 elevated. Folate and Vit D wnl  - Monitor CBC daily and monitor for fever  - Per heme, holding off on filgrastim as ANC ok  - Heme/onc following; appreciate recs Patient with WBC of 3.15 on admission likely secondary to recent chemotherapy.  - WBC is 1.79 with ANC 1342.5  - B12 elevated. Folate and Vit D wnl  - Monitor CBC daily and monitor for fever  - Per heme, holding off on filgrastim as ANC ok  - Heme/onc following; appreciate recs

## 2020-08-17 NOTE — PROGRESS NOTE ADULT - PROBLEM SELECTOR PLAN 3
Plt 62 at [facility], on admission to [facility] platelets 55 likely 2/2 recent chemotherapy on 7/31.  - on 7/31 platelets 235, have been 50s since 8/8 and stable  - No evidence of active bleeding, trend daily  - Per heme, holding off on filgrastim as ANC ok  - Heme/onc following; appreciate recs  - For OR on 8/18 will verify blood bank has 2 units of platelets on hold Plt 62 at [facility], on admission to [facility] platelets 55 likely 2/2 recent chemotherapy on 7/31.  - on 7/31 platelets 235, have been 50s since 8/8 and stable, now plts 73  - No evidence of active bleeding, trend daily  - Per heme, holding off on filgrastim as ANC ok  - Heme/onc following; appreciate recs  - For OR on 8/18 will verify blood bank has 2 units of platelets on hold

## 2020-08-17 NOTE — PROGRESS NOTE ADULT - PROBLEM SELECTOR PLAN 8
DVT: No pharmacologic ppx given thrombocytopenia; SCDs given immobilization and malignancy   Diet: Regular  Vit deficiencies: B12 elevated. Folate and Vit D wnl  PT: will need PT eval after OR

## 2020-08-17 NOTE — PROGRESS NOTE ADULT - SUBJECTIVE AND OBJECTIVE BOX
Patient seen and examined at bedside. Pain is well controlled. No acute events overnight.    Per RN, patient had intermittent LE tingling, but patient denies.     PHYSICAL EXAM:  ICU Vital Signs Last 24 Hrs  T(C): 36.7 (17 Aug 2020 04:50), Max: 36.7 (17 Aug 2020 04:50)  T(F): 98 (17 Aug 2020 04:50), Max: 98 (17 Aug 2020 04:50)  HR: 118 (17 Aug 2020 04:50) (118 - 139)  BP: 105/75 (17 Aug 2020 04:50) (105/75 - 126/68)  BP(mean): --  ABP: --  ABP(mean): --  RR: 18 (17 Aug 2020 04:50) (18 - 18)  SpO2: 96% (17 Aug 2020 04:50) (93% - 96%)    Gen: NAD, AAOx3    Left Lower Extremity:  Skin intact  +EHL/FHL/TA/GS  SILT L3-S1  +DP/PT Pulses  Compartments soft  No calf TTP B/L

## 2020-08-17 NOTE — PROGRESS NOTE ADULT - ASSESSMENT
Patient is a 65 y/o F w/ a PMHx of hypothyroidism, and Stage IV lung adenocarcinoma (S/p right lower lobectomy in 3/2017, w/ recurrence in 5/2020 w/ mets to bone, s/p palliative RT, recently s/p C1 Carbo/Pemetrexed/Pembro on 7/31) who was transferred from [facility] to [facility] for Orthopedic evaluation after she presented s/p fall and was found to have an acute impacted left femoral neck fracture. Oncology consulted for further evaluation.    # Acute L femoral fracture  - X-ray at OSH showed an acute impacted left femoral neck fracture, at the site of previously seen lytic lesion, suggesting pathologic fracture  - Orthopedics following, appreciate recs. Tentative plan for OR tomorrow as per Orthopedics.  - Patient's counts are noted. Her pancytopenia is likely 2/2 recent chemotherapy. Since patient's ANC is currently > 1200, ok to hold off on starting Zarxio prior to surgery.  - Continue to monitor CBC WITH DIFF daily and keep active T+S. Expect patient's counts to gradually improve  - Transfuse for Hgb < 7, PLT < 10 (if afebrile), PLT < 15 (if febrile), or PLT < 50 (if bleeding).  - Appreciate care as per primary team    # Metastatic lung adenocarcinoma  - Initially Dx w/ Stage IA adenocarcinoma in 3/2017 s/p R lower lobectomy. She was placed on surveillance and was found to have evidence of recurrence in 5/2020 with metastatic disease to bone. PD-L1 < 1%. She was found to have a pathologic fracture of the L clavicle in 7/2020. She subsequently underwent palliative RT to the L clavicle, T6-T8 spine, and T12-L2 spine.   - Patient was recently started on Carbo/Pemextred/Pembrolizumab on 7/31/20. She has received 1 cycle thus far.  - There are no plans for systemic therapy while inpatient  - Further management as noted above  - Primary Oncologist: Dr. Elvira Joseph. Continue outpatient follow-up    Silvio Mcmanus, PGY5  Hematology-Oncology Fellow  Pager: [phone] / [phone]

## 2020-08-17 NOTE — CONSULT NOTE ADULT - PROBLEM SELECTOR RECOMMENDATION 4
Full consult to follow shortly. Please page [phone] with any acute concerns. - will continue to follow

## 2020-08-17 NOTE — PROGRESS NOTE ADULT - ASSESSMENT
64F h/o hypothyroidism, Stage IV lung adenocarcinoma diagnosed in 2017 s/p right lower lobectomy with recurrence 5/2020 s/p palliative radiation to clavicle and spine and 1 round of carbo/pemextred/pembrolizumab chemo 7/31 with recent admission 8/8 for pending femoral neck fracture now transferred from [facility] after fall in home with left hip pain, imaging showing left hip pathologic fracture. Patient also noted to have thrombocytopenia likely secondary to recent chemotherapy.

## 2020-08-17 NOTE — PROGRESS NOTE ADULT - ATTENDING COMMENTS
Patient is a 65 y/o F w/ a PMHx of hypothyroidism, and Stage IV lung adenocarcinoma (S/p right lower lobectomy in 3/2017, w/ recurrence in 5/2020 w/ mets to bone, s/p palliative RT, recently s/p C1 Carbo/Pemetrexed/Pembro on 7/31) who was transferred from [facility] to [facility] for Orthopedic evaluation after she presented s/p fall and was found to have an acute impacted left femoral neck fracture. Tentative plan for OR tomorrow as per Orthopedics. Patient's counts are noted. Her pancytopenia is likely 2/2 recent chemotherapy. She is day 18 and counts are improving. Since patient's ANC is currently > 1200, ok to hold off on starting Zarxio prior to surgery. d/w pt and daughter Jennifer over the phone.

## 2020-08-17 NOTE — CONSULT NOTE ADULT - PROBLEM SELECTOR RECOMMENDATION 9
- currently on fentanyl patch 25mcg, dilaudid 2mg/4mg for moderate/severe pain, and toradol 15mg Q6  - at home, patient was most recently on fentanyl 50mcg TDP, tramadol 50mg, and MS IR 15mg  - used dilaudid 4mg PO x 2/24 hours, no use of toradol or dilaudid 2mg  - last BM 8/17/20, on senna and miralax - currently on fentanyl patch 25mcg, dilaudid 2mg/4mg for moderate/severe pain, and toradol 15mg Q6  - at home, patient was most recently on fentanyl 37.5mcg TDP, and dilaudid 4mg PO.  - used dilaudid 4mg PO x 2/24 hours, no use of toradol or dilaudid 2mg  - last BM 8/17/20, on senna and miralax - currently on fentanyl patch 25mcg, dilaudid 2mg/4mg for moderate/severe pain, and toradol 15mg Q6  - at home, patient was most recently on fentanyl 37.5mcg TDP, and dilaudid 4mg PO  - states having continued severe pain, no relief with toradol; increase dilaudid to 6mg PO PRN, can also consider IV tylenol as an adjuvant therapy  - used dilaudid 4mg PO x 2/24 hours, no use of toradol or dilaudid 2mg  - last BM 8/17/20, on senna and miralax

## 2020-08-17 NOTE — PROGRESS NOTE ADULT - SUBJECTIVE AND OBJECTIVE BOX
Patient is a 64y old  Female who presents with a chief complaint of Left hip pain and fall (17 Aug 2020 11:24)    SUBJECTIVE / OVERNIGHT EVENTS:  No acute events overnight. Patient seen in the afternoon. She endorses persistent LLE pain. No complaints of chest pain, shortness of breath, or abdominal pain. No other bone pain reported. She is tolerating PO intake. Patient was afebrile overnight.    MEDICATIONS  (STANDING):  ALPRAZolam 0.25 milliGRAM(s) Oral two times a day  fentaNYL   Patch  25 MICROgram(s)/Hr 1 Patch Transdermal every 72 hours  lactated ringers. 1000 milliLiter(s) (125 mL/Hr) IV Continuous <Continuous>  levothyroxine 175 MICROGram(s) Oral daily  liothyronine 5 MICROGram(s) Oral daily  nystatin Powder 1 Application(s) Topical two times a day    MEDICATIONS  (PRN):  diphenhydrAMINE 25 milliGRAM(s) Oral every 4 hours PRN Rash and/or Itching  HYDROmorphone   Tablet 2 milliGRAM(s) Oral every 4 hours PRN Moderate Pain (4 - 6)  HYDROmorphone   Tablet 4 milliGRAM(s) Oral every 4 hours PRN Severe Pain (7 - 10)  ketorolac   Injectable 15 milliGRAM(s) IV Push every 6 hours PRN Severe Pain (7 - 10)  polyethylene glycol 3350 17 Gram(s) Oral daily PRN Constipation  senna 2 Tablet(s) Oral at bedtime PRN Constipation        CAPILLARY BLOOD GLUCOSE        I&O's Summary    16 Aug 2020 07:01  -  17 Aug 2020 07:00  --------------------------------------------------------  IN: 0 mL / OUT: 500 mL / NET: -500 mL    17 Aug 2020 07:01  -  17 Aug 2020 15:00  --------------------------------------------------------  IN: 0 mL / OUT: 500 mL / NET: -500 mL    Vital Signs Last 24 Hrs  T(C): 36.4 (17 Aug 2020 14:40), Max: 36.7 (17 Aug 2020 04:50)  T(F): 97.5 (17 Aug 2020 14:40), Max: 98 (17 Aug 2020 04:50)  HR: 117 (17 Aug 2020 14:40) (117 - 125)  BP: 96/66 (17 Aug 2020 14:40) (96/66 - 117/76)  BP(mean): --  RR: 18 (17 Aug 2020 14:40) (18 - 18)  SpO2: 96% (17 Aug 2020 14:40) (93% - 96%)    PHYSICAL EXAM:  GENERAL: NAD, Laying in bed  HEENT: NC/AT, MMM  NECK: Supple  CHEST/LUNG: Grossly CTAB; No wheeze appreciated  HEART: Tachycardia, Regular rhythm  ABDOMEN: +BS, Soft, NT  EXTREMITIES: No LE edema  NEUROLOGY: Awake and alert, Answering questions and following commands appropriately  SKIN: Warm and dry  PSYCH: Calm and cooperative  MSK: Limited ROM LLE     LABS:                        10.0   1.79  )-----------( 73       ( 17 Aug 2020 06:36 )             30.4     08-17    134<L>  |  95<L>  |  11  ----------------------------<  149<H>  3.4<L>   |  26  |  0.35<L>    Ca    9.0      17 Aug 2020 06:36  Phos  3.4     08-17  Mg     2.0     08-17    TPro  5.7<L>  /  Alb  3.3  /  TBili  1.7<H>  /  DBili  x   /  AST  21  /  ALT  37  /  AlkPhos  141<H>  08-17    PT/INR - ( 17 Aug 2020 06:36 )   PT: 13.6 sec;   INR: 1.15 ratio         PTT - ( 17 Aug 2020 06:36 )  PTT:30.8 sec    RADIOLOGY & ADDITIONAL TESTS:  Studies reviewed.

## 2020-08-17 NOTE — PROGRESS NOTE ADULT - ATTENDING COMMENTS
Seen, examined the patient this am  Awaiting for OR by ortho due to Pathologic Fx of left femoral neck, c/o pain 3/10, remains hemodynamically stable, no fever   - Reviewed labs, imaging. WBC 1.79, PMN >1300  - Heme plan noted, cleared for proposed procedure by Ortho    She was recently started on Carbo/Pemextred/Pembrolizumab on 7/31/20, cycle one for adenoCa of Lung w mets  - c/w analgesics per pain management  - Fall precaution, DVT ppx Seen, examined the patient this am  Awaiting for OR by ortho due to Pathologic Fx of left femoral neck, c/o pain 3/10, remains hemodynamically stable, no fever   - Reviewed labs, imaging. WBC 1.79, ANC >1300  - Heme plan noted, cleared for proposed procedure by Ortho    She was recently started on Carbo/Pemextred/Pembrolizumab on 7/31/20, cycle one for adenoCa of Lung w mets  - c/w analgesics per pain management  - Fall precaution, DVT ppx

## 2020-08-17 NOTE — PROVIDER CONTACT NOTE (CHANGE IN STATUS NOTIFICATION) - ASSESSMENT
pt placed on hip fx bedpan, had small bowel movement after stool incontinence. continues to have +pedal pulses.

## 2020-08-18 NOTE — PROGRESS NOTE ADULT - SUBJECTIVE AND OBJECTIVE BOX
Authored by Daniela Benson MS4 pager [phone]    Patient is a 64y old  Female who presents with a chief complaint of Left hip pain and fall (16 Aug 2020 06:34)      OVERNIGHT EVENTS: Pt seen at bedside this am. She has been NPO since midnight in preparation for the OR today. She is feeling well, has flares of increased pain in the left hip. She was seen by palliative care team who adjusted her pain regiment with improved pain control. She currently denies fever, chills, chest pain, SOB, nausea, vomiting, burning with urination, constipation, and diarrhea.      MEDICATIONS  (STANDING):  ALPRAZolam 0.25 milliGRAM(s) Oral two times a day  aztreonam  IVPB 1000 milliGRAM(s) IV Intermittent every 8 hours  aztreonam  IVPB      chlorhexidine 4% Liquid 1 Application(s) Topical once  fentaNYL   Patch  25 MICROgram(s)/Hr 1 Patch Transdermal every 72 hours  lactated ringers. 1000 milliLiter(s) (125 mL/Hr) IV Continuous <Continuous>  levothyroxine 175 MICROGram(s) Oral daily  liothyronine 5 MICROGram(s) Oral daily  nystatin Powder 1 Application(s) Topical two times a day    MEDICATIONS  (PRN):  diphenhydrAMINE 25 milliGRAM(s) Oral every 4 hours PRN Rash and/or Itching  HYDROmorphone   Tablet 6 milliGRAM(s) Oral every 4 hours PRN Severe Pain (7 - 10)  ketorolac   Injectable 15 milliGRAM(s) IV Push every 6 hours PRN Severe Pain (7 - 10)  polyethylene glycol 3350 17 Gram(s) Oral daily PRN Constipation  senna 2 Tablet(s) Oral at bedtime PRN Constipation      Vital Signs Last 24 Hrs  T(C): 36.5 (18 Aug 2020 04:43), Max: 36.8 (17 Aug 2020 21:04)  T(F): 97.7 (18 Aug 2020 04:43), Max: 98.2 (17 Aug 2020 21:04)  HR: 107 (18 Aug 2020 04:43) (107 - 117)  BP: 104/68 (18 Aug 2020 04:43) (96/66 - 106/73)  RR: 18 (18 Aug 2020 04:43) (18 - 18)  SpO2: 96% (18 Aug 2020 04:43) (95% - 96%)      PHYSICAL EXAM  CONSTITUTIONAL: NAD, appears uncomfortable  RESPIRATORY: Normal respiratory effort; lungs are clear to auscultation bilaterally; no rales, wheezes, or rhonchi  CARDIOVASCULAR: Tachycardic, regular rhythm, normal S1 and S2, no murmur/rub/gallop; mild lower extremity edema; Peripheral pulses are 2+ bilaterally  ABDOMEN: soft, Nontender to palpation, nondistended, normoactive bowel sounds  MUSCULOSKELETAL: Left hip externally rotated, left foot full range of motion  PSYCH: A+O to person, place, and time; affect appropriate  SKIN: Erythematous, itchy rash with clear margins under right breast extending from mediastinum to axilla; patient states similar rash on buttocks but unable to assess given left hip fracture      LABS:                        9.3    1.50  )-----------( 83                    28.1       134<L>  |  98  |  13  ----------------------------<  144<H>  3.9   |  24  |  0.37<L>    Ca    8.8        Phos  4.3      Mg     2.0         TPro  5.4<L>  /  Alb  3.0<L>  /  TBili  1.0  /  DBili  x   /  AST  21  /  ALT  39  /  AlkPhos  136<H>      PT: 13.4 sec;   INR: 1.13 ratio;  PTT:30.1 sec        Urinalysis Basic - ( 15 Aug 2020 14:16 )  Color: Light Orange / Appearance: Turbid / S.026 / pH: x  Gluc: x / Ketone: Negative  / Bili: Negative / Urobili: 4 mg/dL   Blood: x / Protein: Trace / Nitrite: Positive   Leuk Esterase: Moderate / RBC: 9 /hpf / WBC 6 /HPF   Sq Epi: x / Non Sq Epi: 3 /hpf / Bacteria: Many    Culture - Urine (20 @ 10:11)    Specimen Source: .Urine Clean Catch (Midstream)    Culture Results:   >100,000 CFU/ml Escherichia coli Susceptibility to follow.      Vitamin D, 25-Hydroxy: 33.4  Vitamin B12, Serum: >2000 pg/mL   Folate, Serum: 15.7 ng/mL    Thyroid Stimulating Hormone, Serum: 0.24 uIU/mL   Triiodothyronine, Total (T3 Total): 52 ng/dL  T4, Serum: 5.6 ug/dL        < from: 12 Lead ECG (08.15.20 @ 17:03) >    Ventricular Rate 136 BPM  Atrial Rate 136 BPM  P-R Interval 118 ms  QRS Duration 80 ms  Q-T Interval 302 ms  QTC Calculation(Bezet) 454 ms  P Axis 58 degrees  R Axis 58 degrees  T Axis 32 degrees    Diagnosis Line SINUS TACHYCARDIA WITH  NONSPECIFIC ST AND T WAVE ABNORMALITY    < end of copied text >      IMAGING      < from: NM SPECT/CT Bone, Single Area Single Day (20 @ 13:14) >    FINDINGS: There is mild radiopharmaceutical accumulation in the proximal shaft of the left clavicle corresponding to lucency seen on comparison x-ray. There are faint foci in bilateral posterior approximatelyeighth ribs and heterogeneous tracer activity in the spine.  There is abnormally increased tracer activity in the mid right femur and distal left femur. Abnormality in the distal left femur demonstrates questionable cortical disruption which placesto places the patient at risk for pathologic fracture.  Both kidneys are visualized and are symmetric in appearance.    IMPRESSION: Abnormal bone scan.  Findings compatible with multifocal osseous metastases in the axial skeleton and bilateral femurs. Abnormality in the left femur places the patient at risk for pathologic fracture.  Suggest correlation with chest CT to evaluate spinal and rib foci.    < end of copied text >      < from: Xray Clavicle, Left (08.15.20 @ 09:25) >  IMPRESSION:  Lucency visualizedwithin the proximal/mid shaft of the clavicle, which may represent a metastatic lesion. Question nondisplaced pathologic fracture through the lesion. The sternoclavicular articulation is not well visualized. The acromioclavicular joint is intact.    < end of copied text >      Left Hip Xrays performed at [facility] MRN # [id number]    Pending NM bone scan today

## 2020-08-18 NOTE — CHART NOTE - NSCHARTNOTEFT_GEN_A_CORE
Patient's AM labs were reviewed. Her ANC is noted to be 1140 this AM. Since patient's ANC is currently < 1200, recommend giving Zarxio 480mcg x1 today in anticipation of surgery.    Plan d/w primary team    Silvio Mcmanus, PGY5  Hematology-Oncology Fellow  Pager: [phone] / [phone]

## 2020-08-18 NOTE — PRE-ANESTHESIA EVALUATION ADULT - NSANTHPEFT_GEN_ALL_CORE
Gen: Awake, no distress  CV: Regular  Pulm: Unlabored, good effort  Neuro: Grossly intact, pain of L hip on passive/active movement

## 2020-08-18 NOTE — PROGRESS NOTE ADULT - ASSESSMENT
64F h/o hypothyroidism, Stage IV lung adenocarcinoma diagnosed in 2017 s/p right lower lobectomy with recurrence 5/2020 s/p palliative radiation to clavicle and spine and 1 round of carbo/pemextred/pembrolizumab chemo 7/31 with recent admission 8/8 for pending femoral neck fracture now transferred from [facility] after fall in home with left hip pain, imaging showing left hip pathologic fracture. Patient also noted to have thrombocytopenia likely secondary to recent chemotherapy.

## 2020-08-18 NOTE — PROGRESS NOTE ADULT - PROBLEM SELECTOR PLAN 4
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  - Patient follows with Dr. Winn at [facility], plan for second round of chemo next week and port placement  - s/p Xray of left clavicle found to have mets with possible fracture  - per Ortho would like bone scan, will discuss with oncology  - Pain management: as above  - Possible plan for port insertion next week after ortho intervention and dental eval  - Oncology following, appreciate recs

## 2020-08-18 NOTE — BRIEF OPERATIVE NOTE - NSICDXBRIEFPOSTOP_GEN_ALL_CORE_FT
POST-OP DIAGNOSIS:  Pathologic fracture of acetabulum 18-Aug-2020 22:24:38  Adrian Russ  Pathologic fracture of neck of left femur 18-Aug-2020 22:24:05  Adrian Russ

## 2020-08-18 NOTE — BRIEF OPERATIVE NOTE - NSICDXBRIEFPREOP_GEN_ALL_CORE_FT
PRE-OP DIAGNOSIS:  Pathologic fracture of acetabulum 18-Aug-2020 22:24:25  Adrian Russ  Pathologic fracture of neck of left femur 18-Aug-2020 22:23:48  Adrian Russ

## 2020-08-18 NOTE — PROGRESS NOTE ADULT - ASSESSMENT
64F [facility] of hypothyroidism, stage IV lung CA (2017, s/p RLL lobectomy, s/p pall RT, s/p carbo/pemextred/pembrolizumab 7/31), recent femoral neck fracture, here after fall in home with L hip pain, with a L hip pathologic fracture. Also noted to be thrombocytopenic, likely from chemo.  Possible ORIF on 8/18/20 per ortho. Palliative called for pain management. Currently on fentanyl patch 25mcg, dilaudid 2mg/4mg for moderate/severe pain, and toradol 15mg Q6.  At home, patient was most recently on fentanyl 37.5mcg TDP, and dilaudid 4mg PO.

## 2020-08-18 NOTE — PROGRESS NOTE ADULT - SUBJECTIVE AND OBJECTIVE BOX
Ortho Progress Note    S: Patient seen and examined. No acute events overnight. Pain well controlled with current regimen. Denies lightheadedness/dizziness, CP/SOB. NPO for OR today      O:  Physical Exam:  Gen: Laying in bed, NAD, alert and oriented.   Resp: Unlabored breathing  LLExt: EHL/FHL/TA/Sol intact          + SILT DP/SP/MAGANA/Sa          +DP, extremity WWP    LUE:  AIN/PIN/U +  SILT M/R/U  2+ radial    Vital Signs Last 24 Hrs  T(C): 36.5 (18 Aug 2020 04:43), Max: 36.8 (17 Aug 2020 21:04)  T(F): 97.7 (18 Aug 2020 04:43), Max: 98.2 (17 Aug 2020 21:04)  HR: 107 (18 Aug 2020 04:43) (107 - 117)  BP: 104/68 (18 Aug 2020 04:43) (96/66 - 106/73)  BP(mean): --  RR: 18 (18 Aug 2020 04:43) (18 - 18)  SpO2: 96% (18 Aug 2020 04:43) (95% - 96%)                          9.3    1.50  )-----------( 83       ( 18 Aug 2020 04:35 )             28.1                         10.0   1.79  )-----------( 73       ( 17 Aug 2020 06:36 )             30.4       08-18    134<L>  |  98  |  13  ----------------------------<  144<H>  3.9   |  24  |  0.37<L>        PT/INR - ( 18 Aug 2020 04:35 )   PT: 13.4 sec;   INR: 1.13 ratio         PTT - ( 18 Aug 2020 04:35 )  PTT:30.1 sec      A/P: 64F with L path clavicle fx and L path FN fx    OR today  Neuro: Pain control  Resp: IS  GI: NPO/IVF  MSK: NWB LUE, LLE  Heme: DVT PPX: Venodynes  FU AM labs

## 2020-08-18 NOTE — PROGRESS NOTE ADULT - SUBJECTIVE AND OBJECTIVE BOX
HPI: 64F [facility] of hypothyroidism, stage IV lung CA (2017, s/p RLL lobectomy, s/p pall RT, s/p carbo/pemextred/pembrolizumab 7/31), recent femoral neck fracture, here after fall in home with L hip pain, with a L hip pathologic fracture. Also noted to be thrombocytopenic, likely from chemo.  Possible ORIF on 8/18/20 per ortho. Palliative called for pain management. Currently on fentanyl patch 25mcg, dilaudid 2mg/4mg for moderate/severe pain, and toradol 15mg Q6.  At home, patient was most recently on fentanyl 37.5mcg TDP, and dilaudid 4mg PO.    INTERVAL EVENTS:  8/18: states having pain, used dilaudid 6mg PO x 2/24 hours    ADVANCE DIRECTIVES:    DNR  MOLST  [ ]  Living Will  [ ]   DECISION MAKER(s):  [ ] Health Care Proxy(s)  [ ] Surrogate(s)  [ ] Guardian           Name(s): Phone Number(s):   spouse  daughter Kena Cox [phone]     BASELINE (I)ADL(s) (prior to admission):  [place]: [X ]Total  [ ] Moderate [ ]Dependent    Allergies    Bananas (Other)  latex (Rash)  opioid-like analgesics (Urticaria)  penicillin (Angioedema)    Intolerances    MEDICATIONS  (STANDING):  ALPRAZolam 0.25 milliGRAM(s) Oral two times a day  aztreonam  IVPB 1000 milliGRAM(s) IV Intermittent every 8 hours  aztreonam  IVPB      fentaNYL   Patch  25 MICROgram(s)/Hr 1 Patch Transdermal every 72 hours  filgrastim-sndz (ZARXIO) Injectable 480 MICROGram(s) SubCutaneous once  lactated ringers. 1000 milliLiter(s) (125 mL/Hr) IV Continuous <Continuous>  levothyroxine 175 MICROGram(s) Oral daily  liothyronine 5 MICROGram(s) Oral daily  nystatin Powder 1 Application(s) Topical two times a day    MEDICATIONS  (PRN):  diphenhydrAMINE 25 milliGRAM(s) Oral every 4 hours PRN Rash and/or Itching  HYDROmorphone   Tablet 6 milliGRAM(s) Oral every 4 hours PRN Severe Pain (7 - 10)  ketorolac   Injectable 15 milliGRAM(s) IV Push every 6 hours PRN Severe Pain (7 - 10)  polyethylene glycol 3350 17 Gram(s) Oral daily PRN Constipation  senna 2 Tablet(s) Oral at bedtime PRN Constipation      PRESENT SYMPTOMS: [ ]Unable to obtain due to poor mentation   Source if other than patient:  [ ]Family   [ ]Team     Pain: [ X]yes [ ]no  QOL impact -   Location -       LLE             Aggravating factors - movement  Quality -  Radiation -  Timing-  Severity (0-10 scale): up to 10/10  Minimal acceptable level (0-10 scale):     CPOT:    https://www.[facility].org/getattachment/[email or web address]-[email or web address]/Critical-Care-Pain-Observation-Tool-(CPOT)      PAIN AD Score:     http://geriatrictoolkit.[facility]/cog/painad.pdf (press ctrl +  left click to view)    Dyspnea:                           [ ]Mild [ ]Moderate [ ]Severe  Anxiety:                             [ ]Mild [ ]Moderate [ ]Severe  Fatigue:                             [ ]Mild [ ]Moderate [ ]Severe  Nausea:                             [ ]Mild [ ]Moderate [ ]Severe  Loss of appetite:              [ ]Mild [ ]Moderate [ ]Severe  Constipation:                    [ ]Mild [ ]Moderate [ ]Severe    Other Symptoms:  [ ]All other review of systems negative     Palliative Performance Status Version 2:         %    http://npcrc.org/files/news/palliative_performance_scale_ppsv2.pdf    PHYSICAL EXAM:  Vital Signs Last 24 Hrs  T(C): 36.5 (18 Aug 2020 04:43), Max: 36.8 (17 Aug 2020 21:04)  T(F): 97.7 (18 Aug 2020 04:43), Max: 98.2 (17 Aug 2020 21:04)  HR: 107 (18 Aug 2020 04:43) (107 - 117)  BP: 104/68 (18 Aug 2020 04:43) (96/66 - 106/73)  BP(mean): --  RR: 18 (18 Aug 2020 04:43) (18 - 18)  SpO2: 96% (18 Aug 2020 04:43) (95% - 96%)    GENERAL:  [X ]Alert  [ ]Oriented x   [ ]Lethargic  [ ]Cachexia  [ ]Unarousable  [ X]Verbal  [ ]Non-Verbal  Behavioral:   [ ] Anxiety  [ ] Delirium [ ] Agitation [ ] Other  HEENT:  [ ]Normal   [ ]Dry mouth   [ ]ET Tube/Trach  [ ]Oral lesions  PULMONARY:   [X ]Clear [ ]Tachypnea  [ ]Audible excessive secretions   [ ]Rhonchi        [ ]Right [ ]Left [ ]Bilateral  [ ]Crackles        [ ]Right [ ]Left [ ]Bilateral  [ ]Wheezing     [ ]Right [ ]Left [ ]Bilateral  [ ]Diminished breath sounds [ ]right [ ]left [ ]bilateral  CARDIOVASCULAR:    [X ]Regular [ ]Irregular [ ]Tachy  [ ]Esteban [ ]Murmur [ ]Other  GASTROINTESTINAL:  [X ]Soft  [ ]Distended   [X ]+BS  [ X]Non tender [ ]Tender  [ ]PEG [ ]OGT/ NGT  Last BM:   GENITOURINARY:  [ ]Normal [ ] Incontinent   [ ]Oliguria/Anuria   [ ][place]  MUSCULOSKELETAL:   [ ]Normal   [ ]Weakness  [ ]Bed/Wheelchair bound [ ]Edema  NEUROLOGIC:   [X ]No focal deficits  [ ]Cognitive impairment  [ ]Dysphagia [ ]Dysarthria [ ]Paresis [ ]Other   SKIN:   [ ]Normal    [ ]Rash  [ ]Pressure ulcer(s)       Present on admission [ ]y [ ]n    CRITICAL CARE:  [ ] Shock Present  [ ]Septic [ ]Cardiogenic [ ]Neurologic [ ]Hypovolemic  [ ]  Vasopressors [ ]  Inotropes   [ ]Respiratory failure present [ ]Mechanical ventilation [ ]Non-invasive ventilatory support [ ]High flow  [ ]Acute  [ ]Chronic [ ]Hypoxic  [ ]Hypercarbic [ ]Other  [ ]Other organ failure     LABS: reviewed                                   9.3    1.50  )-----------( 83       ( 18 Aug 2020 04:35 )             28.1       08-18    134<L>  |  98  |  13  ----------------------------<  144<H>  3.9   |  24  |  0.37<L>    Ca    8.8      18 Aug 2020 04:35  Phos  4.3     08-18  Mg     2.0     08-18        RADIOLOGY & ADDITIONAL STUDIES:    Clavicle x-ray 8/15/2020  Lucency visualized within the proximal/mid shaft of the clavicle, which may represent a metastatic lesion. Question nondisplaced pathologic fracture through the lesion. The sternoclavicular articulation is not well visualized. The acromioclavicular joint is intact.    PROTEIN CALORIE MALNUTRITION PRESENT: [ ]mild [ ]moderate [ ]severe [ ]underweight [ ]morbid obesity  https://www.andeal.org/vault/2440/web/files/ONC/Table_Clinical%20Characteristics%20to%20Document%20Malnutrition-White%20JV%20et%20al%202012.pdf    Height (cm): 172.7 (08-14-20 @ 18:31)  Weight (kg): 88.6 (08-14-20 @ 18:31)  BMI (kg/m2): 29.7 (08-14-20 @ 18:31)    [ ]PPSV2 < or = to 30% [ ]significant weight loss  [ ]poor nutritional intake  [ ]anasarca     Albumin, Serum: 3.3 g/dL (08-17-20 @ 06:36)   [ ]Artificial Nutrition      REFERRALS:   [ ]Chaplaincy  [ ]Hospice  [ ]Child Life  [ ]Social Work  [ ]Case management [ ]Holistic Therapy     Goals of Care Document:     ______________  Armin Munguia MD   of Geriatric and Palliative Medicine  [facility]     Please page the following number for clinical matters between the hours of 9AM and 5PM   from Monday through Friday : [phone]    After 5PM and on weekends, please page: [phone]. The Geriatric and Palliative Medicine consult service has 24/7 coverage for medical recommendations, including for symptom management needs.

## 2020-08-18 NOTE — PRE-OP CHECKLIST - NOTHING BY MOUTH SINCE
17-Aug-2020 23:59
18-Aug-2020 23:59

## 2020-08-18 NOTE — BRIEF OPERATIVE NOTE - NSICDXBRIEFPROCEDURE_GEN_ALL_CORE_FT
PROCEDURES:  ORIF fracture of left acetabulum 18-Aug-2020 22:23:05  Adrian Russ  Left total hip arthroplasty 18-Aug-2020 22:22:56  Adrian Russ

## 2020-08-18 NOTE — PROGRESS NOTE ADULT - PROBLEM SELECTOR PLAN 1
Recent admission for left hip and knee pain found to have mets to femur now s/p fall at rehab.  - Stable vitals, left foot with distal pulse 2+, warm, able to move, no acute concern for neurovascular compromise  - XR showing pathological left femoral neck fracture  - Fall precautions  - Bed rest  - RCRI score = 0  - Pain control per palliative consult: fentanyl patch 25 mg (37.5mg at home) + dilaudid 4mg for moderate pain and 6mg for severe pain (iSTOP note in chart, Reference #: [id number]); consider adding IV tylenol for breakthrough pain (Toradol not effective)  - Repeat EKG sinus tachycardia, likely due to pain  - CXR 8/14 no evidence of cardiopulm disease  - Per Ortho would like platelets >80,000 and WBC >4, per heme/onc holding off on filgrastim as ANC ok  - Medically optimized for complex QASIM and possible femur ORIF planned for 8/18  - UA + with urine culture growing E. coli started on aztreonam x 3 days  - Ortho following; appreciate recs  - Reviewed EKG, CXR, vitals, labs, pt is medically cleared for OR today

## 2020-08-18 NOTE — PROGRESS NOTE ADULT - PROBLEM SELECTOR PLAN 2
Patient with WBC of 3.15 on admission likely secondary to recent chemotherapy.  - WBC is 1.5 with ANC   - B12 elevated. Folate and Vit D wnl  - Monitor CBC daily and monitor for fever  - Per heme, holding off on filgrastim as ANC ok  - Heme/onc following; appreciate recs Patient with WBC of 3.15 on admission likely secondary to recent chemotherapy.  - WBC is 1.5 with ANC 1140  - B12 elevated. Folate and Vit D wnl  - Monitor CBC daily and monitor for fever  - Per heme, holding off on filgrastim as ANC ok  - Heme/onc following; appreciate recs

## 2020-08-18 NOTE — PROGRESS NOTE ADULT - PROBLEM SELECTOR PLAN 3
Plt 62 at [facility], on admission to [facility] platelets 55 likely 2/2 recent chemotherapy on 7/31.  - on 7/31 platelets 235, have been 50s since 8/8 and stable, now plts 83  - No evidence of active bleeding, trend daily  - Heme/onc following; appreciate recs  - For OR on 8/18 blood bank has 2 units of platelets on hold

## 2020-08-18 NOTE — PROGRESS NOTE ADULT - ATTENDING COMMENTS
Seen, examined the patient this am  Hemodynamically stable, awaiting for OR by ortho due to Pathologic Fx of left femoral neck, no fever   - Reviewed labs, imaging. WBC 1.5, ANC >1140  - Heme plan noted, recommend giving Zarxio 480mcg x1 today in anticipation of surgery.    cleared for proposed procedure by Ortho    She was recently started on Carbo/Pemextred/Pembrolizumab on 7/31/20, cycle one for adenoCa of Lung w mets  - Might need Mediport prior to d/c    c/w analgesics per pain management  - Fall precaution, DVT ppx .    PT eval post surgery

## 2020-08-18 NOTE — PROGRESS NOTE ADULT - PROBLEM SELECTOR PLAN 1
- c/w dilaudid 6mg PO PRN for now, anticipate pain may increase in the post-op period before improving; may require IV at that point, but given reported lethargy to opioids, need to use caution when transitioning to IV, may consider starting with 1mg IV Q4 PRN

## 2020-08-19 NOTE — OCCUPATIONAL THERAPY INITIAL EVALUATION ADULT - PERTINENT HX OF CURRENT PROBLEM, REHAB EVAL
Patient is a 64 yr old woman with PMHx of hypothyroidism, Stage IV lung adenocarcinoma diagnosed in 2017 s/p right lower lobectomy with recurrence 5/2020 s/p palliative radiation to clavicle and spine and 1 round of chemo 7/31 with recent admission 8/8 for pending femoral neck fracture now transferred from [facility] after fall in home, imaging showing left hip pathologic fracture.

## 2020-08-19 NOTE — OCCUPATIONAL THERAPY INITIAL EVALUATION ADULT - STRENGTHENING, PT EVAL
GOAL: Pt will increase muscle strength to at least 3+/5 in 2 weeks. GOAL: Pt will increase muscle strength to at least 3+/5 in 4 weeks.

## 2020-08-19 NOTE — OCCUPATIONAL THERAPY INITIAL EVALUATION ADULT - DIAGNOSIS, OT EVAL
Pt demonstrates decreased strength, ROM, balance, and endurance affecting functional mobility and participation in ADLs

## 2020-08-19 NOTE — CHART NOTE - NSCHARTNOTEFT_GEN_A_CORE
Patient had been scheduled for a dental evaluation as an outpatient in order to obtain clearance prior to starting Xgeva. She was also scheduled to have a Mediport placed this week. Unfortunately, she will be unable to make these appointments as a result of her current hospitalization. Spoke with patient's outpatient Oncologist. Given the logistic difficulty of doing both of these as an outpatient while patient is recovering, request to see if these two tasks can be completed while patient is hospitalized.     Plan was d/w primary team.    Silvio Mcmanus, PGY5  Hematology-Oncology Fellow  Pager: [phone] / [phone]

## 2020-08-19 NOTE — OCCUPATIONAL THERAPY INITIAL EVALUATION ADULT - PLANNED THERAPY INTERVENTIONS, OT EVAL
balance training/strengthening/ADL retraining strengthening/transfer training/ADL retraining/balance training/bed mobility training

## 2020-08-19 NOTE — PROGRESS NOTE ADULT - SUBJECTIVE AND OBJECTIVE BOX
Day 1 of Anesthesia Pain Management Service    SUBJECTIVE: I'm having some pain    Pain Scale Score:	[X] Refer to charted pain scores    THERAPY:    [ ] IV PCA Morphine		[ ] 5 mg/mL	[ ] 1 mg/mL  [X] IV PCA Hydromorphone	[ ] 5 mg/mL	[X] 1 mg/mL  [ ] IV PCA Fentanyl		[ ] 50 micrograms/mL    Demand dose: 0.2 mg     Lockout: 6 minutes   Continuous Rate: 0 mg/hr  4 Hour Limit: 4 mg    MEDICATIONS  (STANDING):  acetaminophen   Tablet .. 975 milliGRAM(s) Oral every 8 hours  acetaminophen  IVPB .. 1000 milliGRAM(s) IV Intermittent once  aztreonam  IVPB 1000 milliGRAM(s) IV Intermittent every 8 hours  HYDROmorphone PCA (1 mG/mL) 30 milliLiter(s) PCA Continuous PCA Continuous  pantoprazole    Tablet 40 milliGRAM(s) Oral before breakfast  rivaroxaban 10 milliGRAM(s) Oral daily  sodium chloride 0.9% Bolus 500 milliLiter(s) IV Bolus once  sodium chloride 0.9%. 1000 milliLiter(s) (75 mL/Hr) IV Continuous <Continuous>  vancomycin  IVPB 1000 milliGRAM(s) IV Intermittent once    MEDICATIONS  (PRN):  HYDROmorphone PCA (1 mG/mL) Rescue Clinician Bolus 0.5 milliGRAM(s) IV Push every 15 minutes PRN for Pain Scale GREATER THAN 6  naloxone Injectable 0.1 milliGRAM(s) IV Push every 3 minutes PRN For ANY of the following changes in patient status:  A. RR LESS THAN 10 breaths per minute, B. Oxygen saturation LESS THAN 90%, C. Sedation score of 6  ondansetron Injectable 4 milliGRAM(s) IV Push every 6 hours PRN Nausea and/or Vomiting  ondansetron Injectable 4 milliGRAM(s) IV Push every 6 hours PRN Nausea  oxyCODONE    IR 5 milliGRAM(s) Oral every 4 hours PRN Mild Pain (1 - 3)      OBJECTIVE:    Sedation Score:	[ X] Alert 	[ ] Drowsy 	[ ] Arousable	[ ] Asleep	[ ] Unresponsive    Side Effects:	[X ] None	[ ] Nausea	[ ] Vomiting	[ ] Pruritus  		[ ] Other:    Vital Signs Last 24 Hrs  T(C): 36.4 (19 Aug 2020 06:37), Max: 36.7 (18 Aug 2020 13:44)  T(F): 97.6 (19 Aug 2020 06:37), Max: 98.1 (18 Aug 2020 13:44)  HR: 103 (19 Aug 2020 06:37) (95 - 114)  BP: 106/63 (19 Aug 2020 06:37) (99/63 - 162/70)  BP(mean): 83 (19 Aug 2020 00:30) (83 - 101)  RR: 16 (19 Aug 2020 06:37) (15 - 18)  SpO2: 94% (19 Aug 2020 06:37) (94% - 100%)    ASSESSMENT/ PLAN    Therapy to  be:               [X] Continued   [ ] Discontinued   [ ] Changed to PRN Analgesics    Documentation and Verification of current medications:   [X] Done	[ ] Not done, not eligible    Comments: Endorsing incisional pain. Using 1-2x/hr. Encouraged increased use. Educated to request bolus dose as needed.

## 2020-08-19 NOTE — OCCUPATIONAL THERAPY INITIAL EVALUATION ADULT - RANGE OF MOTION EXAMINATION, LOWER EXTREMITY
L LE in beulah brace; unable to assess hip/knee ROM. Plantar/Dorsiflexion WFL/bilateral LE Active ROM was WFL  (within functional limits)

## 2020-08-19 NOTE — PROGRESS NOTE ADULT - PROBLEM SELECTOR PLAN 4
Plt 62 at [facility], on admission to [facility] platelets 55 likely 2/2 recent chemotherapy on 7/31.  - on 7/31 platelets 235, have been 50s since 8/8 and stable, now plts 80  - No evidence of active bleeding, trend daily  - Heme/onc following; appreciate recs

## 2020-08-19 NOTE — PROGRESS NOTE ADULT - ASSESSMENT
64F h/o hypothyroidism, Stage IV lung adenocarcinoma diagnosed in 2017 s/p right lower lobectomy with recurrence 5/2020 s/p palliative radiation to clavicle and spine and 1 round of carbo/pemextred/pembrolizumab chemo 7/31 with recent admission 8/8 for pending femoral neck fracture now transferred from [facility] after fall in home with left hip pain, imaging showing left hip pathologic fracture. Patient also noted to have thrombocytopenia likely secondary to recent chemotherapy.

## 2020-08-19 NOTE — PROGRESS NOTE ADULT - SUBJECTIVE AND OBJECTIVE BOX
Authored by Daniela Benson MS4 pager [phone]    Patient is a 64y old  Female who presents with a chief complaint of Left hip pain and fall (16 Aug 2020 06:34)      OVERNIGHT EVENTS: Pt seen at bedside this am. She is s/p QASIM yesterday and was discharged from PACU early this morning 1am. She feels well and is in good spirits. She says her pain is well controlled with the dilaudid PCA. She has been able to sip on clears. She has no complaints. She denies headache, chest pain, SOB, N&V, abdominal pain, suprapubic pain, and tingling/numbness in extremities.      MEDICATIONS  (STANDING):  ALPRAZolam 0.25 milliGRAM(s) Oral two times a day  aztreonam  IVPB 1000 milliGRAM(s) IV Intermittent every 8 hours  aztreonam  IVPB      chlorhexidine 4% Liquid 1 Application(s) Topical once  fentaNYL   Patch  25 MICROgram(s)/Hr 1 Patch Transdermal every 72 hours  lactated ringers. 1000 milliLiter(s) (125 mL/Hr) IV Continuous <Continuous>  levothyroxine 175 MICROGram(s) Oral daily  liothyronine 5 MICROGram(s) Oral daily  nystatin Powder 1 Application(s) Topical two times a day    MEDICATIONS  (PRN):  diphenhydrAMINE 25 milliGRAM(s) Oral every 4 hours PRN Rash and/or Itching  HYDROmorphone   Tablet 6 milliGRAM(s) Oral every 4 hours PRN Severe Pain (7 - 10)  ketorolac   Injectable 15 milliGRAM(s) IV Push every 6 hours PRN Severe Pain (7 - 10)  polyethylene glycol 3350 17 Gram(s) Oral daily PRN Constipation  senna 2 Tablet(s) Oral at bedtime PRN Constipation      Vital Signs Last 24 Hrs  T(C): 36.5 (18 Aug 2020 04:43), Max: 36.8 (17 Aug 2020 21:04)  T(F): 97.7 (18 Aug 2020 04:43), Max: 98.2 (17 Aug 2020 21:04)  HR: 107 (18 Aug 2020 04:43) (107 - 117)  BP: 104/68 (18 Aug 2020 04:43) (96/66 - 106/73)  RR: 18 (18 Aug 2020 04:43) (18 - 18)  SpO2: 96% (18 Aug 2020 04:43) (95% - 96%)      PHYSICAL EXAM  CONSTITUTIONAL: NAD, appears uncomfortable, now with Sparks catheter in place and drain in left thigh.  RESPIRATORY: Normal respiratory effort; lungs are clear to auscultation bilaterally; no rales, wheezes, or rhonchi  CARDIOVASCULAR: Tachycardic, regular rhythm, normal S1 and S2, no murmur/rub/gallop; mild lower extremity edema; Peripheral pulses are 2+ bilaterally  ABDOMEN: soft, Nontender to palpation, nondistended, normoactive bowel sounds  MUSCULOSKELETAL: Both legs extended normally, elevated, with supports surrounding left leg.  PSYCH: A+O to person, place, and time; affect appropriate  SKIN: Erythematous, itchy rash with clear margins under right breast extending from mediastinum to axilla; patient states similar rash on buttocks but unable to assess given left hip fracture      LABS:                        9.3    1.50  )-----------( 83                    28.1       134<L>  |  98  |  13  ----------------------------<  144<H>  3.9   |  24  |  0.37<L>    Ca    8.8        Phos  4.3      Mg     2.0         TPro  5.4<L>  /  Alb  3.0<L>  /  TBili  1.0  /  DBili  x   /  AST  21  /  ALT  39  /  AlkPhos  136<H>      PT: 13.4 sec;   INR: 1.13 ratio;  PTT:30.1 sec        Urinalysis Basic - ( 15 Aug 2020 14:16 )  Color: Light Orange / Appearance: Turbid / S.026 / pH: x  Gluc: x / Ketone: Negative  / Bili: Negative / Urobili: 4 mg/dL   Blood: x / Protein: Trace / Nitrite: Positive   Leuk Esterase: Moderate / RBC: 9 /hpf / WBC 6 /HPF   Sq Epi: x / Non Sq Epi: 3 /hpf / Bacteria: Many    Culture - Urine (20 @ 10:11)    Specimen Source: .Urine Clean Catch (Midstream)    Culture Results:   >100,000 CFU/ml Escherichia coli   Pan - sensitive      Vitamin D, 25-Hydroxy: 33.4  Vitamin B12, Serum: >2000 pg/mL   Folate, Serum: 15.7 ng/mL    Thyroid Stimulating Hormone, Serum: 0.24 uIU/mL   Triiodothyronine, Total (T3 Total): 52 ng/dL  T4, Serum: 5.6 ug/dL        < from: 12 Lead ECG (08.15.20 @ 17:03) >    Ventricular Rate 136 BPM  Atrial Rate 136 BPM  P-R Interval 118 ms  QRS Duration 80 ms  Q-T Interval 302 ms  QTC Calculation(Bezet) 454 ms  P Axis 58 degrees  R Axis 58 degrees  T Axis 32 degrees    Diagnosis Line SINUS TACHYCARDIA WITH  NONSPECIFIC ST AND T WAVE ABNORMALITY    < end of copied text >      IMAGING      < from: NM SPECT/CT Bone, Single Area Single Day (20 @ 13:14) >    FINDINGS: There is mild radiopharmaceutical accumulation in the proximal shaft of the left clavicle corresponding to lucency seen on comparison x-ray. There are faint foci in bilateral posterior approximatelyeighth ribs and heterogeneous tracer activity in the spine.  There is abnormally increased tracer activity in the mid right femur and distal left femur. Abnormality in the distal left femur demonstrates questionable cortical disruption which placesto places the patient at risk for pathologic fracture.  Both kidneys are visualized and are symmetric in appearance.    IMPRESSION: Abnormal bone scan.  Findings compatible with multifocal osseous metastases in the axial skeleton and bilateral femurs. Abnormality in the left femur places the patient at risk for pathologic fracture.  Suggest correlation with chest CT to evaluate spinal and rib foci.    < end of copied text >      < from: Xray Clavicle, Left (08.15.20 @ 09:25) >  IMPRESSION:  Lucency visualizedwithin the proximal/mid shaft of the clavicle, which may represent a metastatic lesion. Question nondisplaced pathologic fracture through the lesion. The sternoclavicular articulation is not well visualized. The acromioclavicular joint is intact.    < end of copied text >      Left Hip Xrays performed at [facility] MRN # [id number]    Pending NM bone scan today

## 2020-08-19 NOTE — PROGRESS NOTE ADULT - PROBLEM SELECTOR PLAN 1
- on dilaudid PCA [0CR/0.2DD/6 mins/4mg 4h limit/0.5Q15 CAB]; anesthesia managing, controlling pain for now  - will transition back to dilaudid PO regimen once off pump

## 2020-08-19 NOTE — PROGRESS NOTE ADULT - PROBLEM SELECTOR PLAN 2
Patient with + UA and urine culture growing pan-sensitive E. Coli.  - s/p aztreonam x 1 day now on aztreonam and vancomycin  - Will discuss with ortho indication for vancomycin otherwise can d/c and continue on aztreonam x 1 more day for total of 3 days

## 2020-08-19 NOTE — PROGRESS NOTE ADULT - SUBJECTIVE AND OBJECTIVE BOX
ORTHO POST OP CHECK    S: Pt seen and examined. Doing well postoperatively. Pain well controlled. Denies N/V/CP/SOB.       O:   PE:  Gen: NAD, laying comfortably in bed  Resp: Unlabored breathing  MSK:   LLE:  Dressing c/d/i, prevena  HV SS          +EHL/FHL/TA/Gas/SOl          +DP/SP/Vita/Saph/T           2+DP  Abd pillow in place    LUE:  sling on   AIN/PIN/U motor +  SILT M/R/U  WWP                          10.2   3.18  )-----------( 86       ( 18 Aug 2020 23:18 )             31.1     08-18    134<L>  |  97  |  12  ----------------------------<  165<H>  4.4   |  26  |  0.35<L>    Ca    8.4      18 Aug 2020 23:18  Phos  4.3     08-18  Mg     2.0     08-18    TPro  5.4<L>  /  Alb  3.0<L>  /  TBili  1.0  /  DBili  x   /  AST  21  /  ALT  39  /  AlkPhos  136<H>  08-18      Vital Signs Last 24 Hrs  T(C): 36.4 (19 Aug 2020 04:30), Max: 36.7 (18 Aug 2020 13:44)  T(F): 97.5 (19 Aug 2020 04:30), Max: 98.1 (18 Aug 2020 13:44)  HR: 110 (19 Aug 2020 04:30) (95 - 114)  BP: 103/67 (19 Aug 2020 04:30) (99/63 - 162/70)  BP(mean): 83 (19 Aug 2020 00:30) (83 - 101)  RR: 16 (19 Aug 2020 04:30) (15 - 18)  SpO2: 94% (19 Aug 2020 04:30) (94% - 100%)  I&O's Summary    17 Aug 2020 07:01  -  18 Aug 2020 07:00  --------------------------------------------------------  IN: 0 mL / OUT: 500 mL / NET: -500 mL    18 Aug 2020 07:01  -  19 Aug 2020 05:07  --------------------------------------------------------  IN: 225 mL / OUT: 590 mL / NET: -365 mL        A/P: 64F L path clavicle fx, L distal femur lesion, and L path FN fx s/p QASIM    -Neuro: Multimodal pain control  -Resp: IS  -GI: reg diet  -MSK: OOB, WBAT LLE, ABd pillow, posterior precautions, NWB LUE in sling, PT/OT  -Heme: DVT PPx: Xarelto to start tonight  FU L[place]pez LLE unlocked  FU HV, prevena  Sparks out 6AM    Ortho

## 2020-08-19 NOTE — PROGRESS NOTE ADULT - PROBLEM SELECTOR PLAN 3
Patient with WBC of 3.15 on admission likely secondary to recent chemotherapy.  - s/p filagrastim x 1 on 8/18  - WBC now 3.38  - B12 elevated. Folate and Vit D wnl  - Monitor CBC daily and monitor for fever  - Heme/onc following; appreciate recs

## 2020-08-19 NOTE — PROGRESS NOTE ADULT - PROBLEM SELECTOR PLAN 9
DVT: SCDs   Diet: Regular  Vit deficiencies: B12 elevated. Folate and Vit D wnl  PT: will need PT eval after OR

## 2020-08-19 NOTE — OCCUPATIONAL THERAPY INITIAL EVALUATION ADULT - ADL RETRAINING, OT EVAL
GOAL: Pt will perform LB dressing with Min A and use of AE as needed in 2 weeks GOAL: Pt will perform LB dressing with Min A and use of AE as needed in 4 weeks

## 2020-08-19 NOTE — PROGRESS NOTE ADULT - PROBLEM SELECTOR PLAN 1
Recent admission for left hip and knee pain found to have mets to femur now s/p fall at rehab.  - Stable vitals, left foot with distal pulse 2+, warm, able to move, no acute concern for neurovascular compromise  - XR showing pathological left femoral neck fracture s/p left QASIM on 8/18  - Fall precautions  - Bed rest  - Pain control per palliative consult: fentanyl patch 25 mg (37.5mg at home) + dilaudid 6mg for severe pain (iSTOP note in chart, Reference #: [id number]); consider adding IV tylenol for breakthrough pain (Toradol not effective); now on dilaudid PCA  - Ortho following; appreciate recs  - PT and OT daily

## 2020-08-19 NOTE — PROGRESS NOTE ADULT - PROBLEM SELECTOR PLAN 5
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  - Patient follows with Dr. Winn at [facility], plan for second round of chemo next week and port placement  - s/p Xray of left clavicle found to have mets with possible fracture  - Pain management: as above  - Possible plan for port insertion with IR and dental eval for bisphosphonate clearance  - Oncology following, appreciate recs

## 2020-08-19 NOTE — OCCUPATIONAL THERAPY INITIAL EVALUATION ADULT - ADDITIONAL COMMENTS
CONTINUED: She has since had pathologic left clavicle fracture with palliative radiation. Patient was recently evaluated at [facility] and found to have metastatic disease to left femur. She was scheduled for orthopedic follow up in 1-2 weeks after discharge but had fall at rehab facility on day of admission.  Now s/p left total hip arthroplasty, left ORIF acetabulum (8/18) XRAY Clavicle: Lucency visualized within the proximal/mid shaft of the clavicle, which may represent a metastatic lesion. Question nondisplaced pathologic fracture through the lesion.

## 2020-08-19 NOTE — OCCUPATIONAL THERAPY INITIAL EVALUATION ADULT - BALANCE TRAINING, PT EVAL
GOAL: Pt will increase all balance grades to fair in order to increase independence with functional transfers in 2 weeks GOAL: Pt will increase all balance grades to fair+ in order to increase independence with functional transfers in 2 weeks

## 2020-08-19 NOTE — PHYSICAL THERAPY INITIAL EVALUATION ADULT - BALANCE TRAINING, PT EVAL
GOAL: pt will increase standing dynamic/static balance to Fair in order to improve safety with functional mobility in 2 weeks

## 2020-08-19 NOTE — CHART NOTE - NSCHARTNOTEFT_GEN_A_CORE
Fit and apply [place] OR [employer] regular hinged post op knee orthosis unlocked. Reviewed application skin precautions and care. Written instructions and contact information given. To notify office with any issues questions or concerns.  JEFFERSON Yusuf.   [facility]  [phone]

## 2020-08-19 NOTE — PHYSICAL THERAPY INITIAL EVALUATION ADULT - ADDITIONAL COMMENTS
PTA pt was independent with functional mobility and ADLs. Pt recently at rehab where she states she was ambulating with a RW. Pt lives in a  with 1 HAWK and a 1st floor set up inside.

## 2020-08-19 NOTE — PROGRESS NOTE ADULT - SUBJECTIVE AND OBJECTIVE BOX
HPI: 64F [facility] of hypothyroidism, stage IV lung CA (2017, s/p RLL lobectomy, s/p pall RT, s/p carbo/pemextred/pembrolizumab 7/31), recent femoral neck fracture, here after fall in home with L hip pain, with a L hip pathologic fracture. Also noted to be thrombocytopenic, likely from chemo.  Possible ORIF on 8/18/20 per ortho. Palliative called for pain management. Currently on fentanyl patch 25mcg, dilaudid 2mg/4mg for moderate/severe pain, and toradol 15mg Q6.  At home, patient was most recently on fentanyl 37.5mcg TDP, and dilaudid 4mg PO.    INTERVAL EVENTS:  8/18: states having pain, used dilaudid 6mg PO x 2/24 hours  8/19: states feeling better after surgery, using PCA, pain overall managed    ADVANCE DIRECTIVES:    DNR  MOLST  [ ]  Living Will  [ ]   DECISION MAKER(s):  [ ] Health Care Proxy(s)  [ ] Surrogate(s)  [ ] Guardian           Name(s): Phone Number(s):   spouse  daughter Kena Cox [phone]     BASELINE (I)ADL(s) (prior to admission):  [place]: [X ]Total  [ ] Moderate [ ]Dependent    Allergies    Bananas (Other)  latex (Rash)  opioid-like analgesics (Urticaria)  penicillin (Angioedema)    Intolerances    MEDICATIONS  (STANDING):  acetaminophen   Tablet .. 975 milliGRAM(s) Oral every 8 hours  aztreonam  IVPB 1000 milliGRAM(s) IV Intermittent every 8 hours  HYDROmorphone PCA (1 mG/mL) 30 milliLiter(s) PCA Continuous PCA Continuous  pantoprazole    Tablet 40 milliGRAM(s) Oral before breakfast  rivaroxaban 10 milliGRAM(s) Oral daily  sodium chloride 0.9% Bolus 500 milliLiter(s) IV Bolus once  sodium chloride 0.9%. 1000 milliLiter(s) (75 mL/Hr) IV Continuous <Continuous>  vancomycin  IVPB 1000 milliGRAM(s) IV Intermittent once    MEDICATIONS  (PRN):  diphenhydrAMINE 25 milliGRAM(s) Oral every 4 hours PRN Rash and/or Itching  HYDROmorphone PCA (1 mG/mL) Rescue Clinician Bolus 0.5 milliGRAM(s) IV Push every 15 minutes PRN for Pain Scale GREATER THAN 6  naloxone Injectable 0.1 milliGRAM(s) IV Push every 3 minutes PRN For ANY of the following changes in patient status:  A. RR LESS THAN 10 breaths per minute, B. Oxygen saturation LESS THAN 90%, C. Sedation score of 6  ondansetron Injectable 4 milliGRAM(s) IV Push every 6 hours PRN Nausea and/or Vomiting  ondansetron Injectable 4 milliGRAM(s) IV Push every 6 hours PRN Nausea  oxyCODONE    IR 5 milliGRAM(s) Oral every 4 hours PRN Mild Pain (1 - 3)        PRESENT SYMPTOMS: [ ]Unable to obtain due to poor mentation   Source if other than patient:  [ ]Family   [ ]Team     Pain: [ X]yes [ ]no  QOL impact -   Location -       LLE             Aggravating factors - movement  Quality -  Radiation -  Timing-  Severity (0-10 scale): up to 10/10  Minimal acceptable level (0-10 scale):     CPOT:    https://www.[facility].org/getattachment/[email or web address]-0s8h-9397e3621a1e/Critical-Care-Pain-Observation-Tool-(CPOT)      PAIN AD Score:     http://geriatrictoolkit.[place].[facility]/cog/painad.pdf (press ctrl +  left click to view)    Dyspnea:                           [ ]Mild [ ]Moderate [ ]Severe  Anxiety:                             [ ]Mild [ ]Moderate [ ]Severe  Fatigue:                             [ ]Mild [ ]Moderate [ ]Severe  Nausea:                             [ ]Mild [ ]Moderate [ ]Severe  Loss of appetite:              [ ]Mild [ ]Moderate [ ]Severe  Constipation:                    [ ]Mild [ ]Moderate [ ]Severe    Other Symptoms:  [ ]All other review of systems negative     Palliative Performance Status Version 2:         %    http://npcrc.org/files/news/palliative_performance_scale_ppsv2.pdf    PHYSICAL EXAM:  Vital Signs Last 24 Hrs  T(C): 36.4 (19 Aug 2020 13:15), Max: 36.7 (19 Aug 2020 01:23)  T(F): 97.6 (19 Aug 2020 13:15), Max: 98 (19 Aug 2020 01:23)  HR: 98 (19 Aug 2020 14:57) (89 - 114)  BP: 114/70 (19 Aug 2020 14:57) (99/63 - 162/70)  BP(mean): 83 (19 Aug 2020 00:30) (83 - 101)  RR: 17 (19 Aug 2020 13:15) (15 - 18)  SpO2: 96% (19 Aug 2020 14:57) (94% - 100%)    Limited exam for patient comfort    GENERAL:  [X ]Alert  [ ]Oriented x   [ ]Lethargic  [ ]Cachexia  [ ]Unarousable  [ X]Verbal  [ ]Non-Verbal  Behavioral:   [ ] Anxiety  [ ] Delirium [ ] Agitation [ ] Other  HEENT:  [ ]Normal   [ ]Dry mouth   [ ]ET Tube/Trach  [ ]Oral lesions  PULMONARY:   [ ]Clear [ ]Tachypnea  [ ]Audible excessive secretions   [ ]Rhonchi        [ ]Right [ ]Left [ ]Bilateral  [ ]Crackles        [ ]Right [ ]Left [ ]Bilateral  [ ]Wheezing     [ ]Right [ ]Left [ ]Bilateral  [ ]Diminished breath sounds [ ]right [ ]left [ ]bilateral  CARDIOVASCULAR:    [ ]Regular [ ]Irregular [ ]Tachy  [ ]Esteban [ ]Murmur [ ]Other  GASTROINTESTINAL:  [ ]Soft  [ ]Distended   [ ]+BS  [ ]Non tender [ ]Tender  [ ]PEG [ ]OGT/ NGT  Last BM:   GENITOURINARY:  [ ]Normal [ ] Incontinent   [ ]Oliguria/Anuria   [ ]Sparks  MUSCULOSKELETAL:   [ ]Normal   [ ]Weakness  [ ]Bed/Wheelchair bound [ ]Edema  NEUROLOGIC:   [X ]No focal deficits  [ ]Cognitive impairment  [ ]Dysphagia [ ]Dysarthria [ ]Paresis [ ]Other   SKIN:   [ ]Normal    [ ]Rash  [ ]Pressure ulcer(s)       Present on admission [ ]y [ ]n    CRITICAL CARE:  [ ] Shock Present  [ ]Septic [ ]Cardiogenic [ ]Neurologic [ ]Hypovolemic  [ ]  Vasopressors [ ]  Inotropes   [ ]Respiratory failure present [ ]Mechanical ventilation [ ]Non-invasive ventilatory support [ ]High flow  [ ]Acute  [ ]Chronic [ ]Hypoxic  [ ]Hypercarbic [ ]Other  [ ]Other organ failure     LABS: reviewed                                   8.7    3.38  )-----------( 80       ( 19 Aug 2020 06:45 )             26.3     08-19    136  |  99  |  10  ----------------------------<  135<H>  3.9   |  24  |  0.31<L>    Ca    8.0<L>      19 Aug 2020 06:44  Phos  4.3     08-18  Mg     2.0     08-18        RADIOLOGY & ADDITIONAL STUDIES:    Clavicle x-ray 8/15/2020  Lucency visualized within the proximal/mid shaft of the clavicle, which may represent a metastatic lesion. Question nondisplaced pathologic fracture through the lesion. The sternoclavicular articulation is not well visualized. The acromioclavicular joint is intact.    PROTEIN CALORIE MALNUTRITION PRESENT: [ ]mild [ ]moderate [ ]severe [ ]underweight [ ]morbid obesity  https://www.andeal.org/vault/2440/web/files/ONC/Table_Clinical%20Characteristics%20to%20Document%20Malnutrition-White%20JV%20et%20al%636777.pdf    Height (cm): 172.7 (08-14-20 @ 18:31)  Weight (kg): 88.6 (08-14-20 @ 18:31)  BMI (kg/m2): 29.7 (08-14-20 @ 18:31)    [ ]PPSV2 < or = to 30% [ ]significant weight loss  [ ]poor nutritional intake  [ ]anasarca     Albumin, Serum: 3.3 g/dL (08-17-20 @ 06:36)   [ ]Artificial Nutrition      REFERRALS:   [ ]Chaplaincy  [ ]Hospice  [ ]Child Life  [ ]Social Work  [ ]Case management [ ]Holistic Therapy     Goals of Care Document:     ______________  Armin Munguia MD   of Geriatric and Palliative Medicine  [facility]     Please page the following number for clinical matters between the hours of 9AM and 5PM   from Monday through Friday : [phone]    After 5PM and on weekends, please page: [phone]. The Geriatric and Palliative Medicine consult service has 24/7 coverage for medical recommendations, including for symptom management needs.

## 2020-08-19 NOTE — OCCUPATIONAL THERAPY INITIAL EVALUATION ADULT - LIVES WITH, PROFILE
Pt lives with spouse and child in private home with 2 HAWK and house adaptable to have bedroom/bathroom on first floor. Pt Independent with ADLs PTA, residing at home and brief stay at rehab center prior to admission at [facility]./spouse

## 2020-08-19 NOTE — PROGRESS NOTE ADULT - ATTENDING COMMENTS
Seen, examined the patient this am with house staff  64F h/o Stage IV lung adenocarcinoma, 2017, s/p right lower lobectomy with recurrence 5/2020 s/p palliative radiation to clavicle and spine on chemo(1 round of carbo/pemextred/pembrolizumab- 7/31) with recent admission 8/8 for pending femoral neck fracture now transferred from [facility] after fall in home with left hip pain, imaging showing left hip pathologic fracture, also UTI on admission. S/P L hemiarthroplasty 8/18    Resting in bed, no acute distress, on IV Dilaudid pump, no SOB, chest pain or fever  Hemodynamically stable   - Reviewed labs, imaging. WBC 3.38  - Ortho plan noted, PT in progress-> for possible Rehab    On IV Aztreonam for UTI, will f/u urine c/s  - Heme plan noted, patient had 1 dose Zarxio 480mcg x1 yesterday.    She was recently started on Carbo/Pemextred/Pembrolizumab on 7/31/20, cycle one for adenoCa of Lung w mets    Chemo on hold  - IR recommended Mediport placement as outpatient  - Fall precaution, DVT ppx .  - d/c planning in progress to [facility].

## 2020-08-19 NOTE — PROGRESS NOTE ADULT - ASSESSMENT
64F [facility] of hypothyroidism, stage IV lung CA (2017, s/p RLL lobectomy, s/p pall RT, s/p carbo/pemextred/pembrolizumab 7/31), recent femoral neck fracture, here after fall in home with L hip pain, with a L hip pathologic fracture. Also noted to be thrombocytopenic, likely from chemo.  Possible ORIF on 8/18/20 per ortho. Palliative called for pain management. Currently on fentanyl patch 25mcg, dilaudid 2mg/4mg for moderate/severe pain, and toradol 15mg Q6.  At home, patient was most recently on fentanyl 37.5mcg TDP, and dilaudid 4mg PO.

## 2020-08-20 NOTE — PROGRESS NOTE ADULT - ASSESSMENT
64F h/o hypothyroidism, Stage IV lung adenocarcinoma diagnosed in 2017 s/p right lower lobectomy with recurrence 5/2020 s/p palliative radiation to clavicle and spine and 1 round of carbo/pemextred/pembrolizumab chemo 7/31 with recent admission 8/8 for pending femoral neck fracture now transferred from [facility] after fall in home with left hip pain, imaging showing left hip pathologic fracture. Patient also noted to have thrombocytopenia likely secondary to recent chemotherapy.

## 2020-08-20 NOTE — PROGRESS NOTE ADULT - SUBJECTIVE AND OBJECTIVE BOX
Patient seen and examined at bedside. Reports no acute complaints at this time. Pain is well controlled. RRT called yesterday for SVT. No other acute events overnight.      O:   PE:  Gen: NAD, laying comfortably in bed  Resp: Unlabored breathing  MSK:   LLE:  Dressing c/d/i, prevena  HV SS          +EHL/FHL/TA/Gas/SOl          +DP/SP/Vita/Saph/T           2+DP  Abd pillow in place    LUE:  sling on   AIN/PIN/U motor +  SILT M/R/U  WWP    LABS:                        9.5    3.35  )-----------( 99       ( 19 Aug 2020 23:45 )             28.7     19 Aug 2020 23:45    132    |  99     |  8      ----------------------------<  120    3.8     |  25     |  0.38     Ca    8.0        19 Aug 2020 23:45  Phos  3.1       19 Aug 2020 23:45  Mg     1.9       19 Aug 2020 23:45    TPro  4.5    /  Alb  2.5    /  TBili  0.8    /  DBili  x      /  AST  24     /  ALT  26     /  AlkPhos  110    19 Aug 2020 23:45    PT/INR - ( 19 Aug 2020 23:46 )   PT: 12.0 sec;   INR: 1.01 ratio         PTT - ( 19 Aug 2020 23:46 )  PTT:28.2 sec    Vital Signs Last 24 Hrs  T(C): 36.7 (20 Aug 2020 04:46), Max: 36.8 (20 Aug 2020 01:40)  T(F): 98 (20 Aug 2020 04:46), Max: 98.2 (20 Aug 2020 01:40)  HR: 164 (20 Aug 2020 04:46) (89 - 167)  BP: 110/74 (20 Aug 2020 04:46) (90/58 - 114/70)  BP(mean): --  RR: 17 (20 Aug 2020 04:46) (16 - 19)  SpO2: 92% (20 Aug 2020 04:46) (92% - 99%)

## 2020-08-20 NOTE — PROVIDER CONTACT NOTE (OTHER) - BACKGROUND
pt here for s/p L hip repair developed ST postoperatively then transferred to 6T from 7T You can access the [facility] Patient Portal, offered by [facility], by registering with the following website: [email or web address]

## 2020-08-20 NOTE — PROGRESS NOTE ADULT - SUBJECTIVE AND OBJECTIVE BOX
HPI: 64F [facility] of hypothyroidism, stage IV lung CA (2017, s/p RLL lobectomy, s/p pall RT, s/p carbo/pemextred/pembrolizumab 7/31), recent femoral neck fracture, here after fall in home with L hip pain, with a L hip pathologic fracture. Also noted to be thrombocytopenic, likely from chemo.  Possible ORIF on 8/18/20 per ortho. Palliative called for pain management. Currently on fentanyl patch 25mcg, dilaudid 2mg/4mg for moderate/severe pain, and toradol 15mg Q6.  At home, patient was most recently on fentanyl 37.5mcg TDP, and dilaudid 4mg PO.    INTERVAL EVENTS:  8/18: states having pain, used dilaudid 6mg PO x 2/24 hours  8/19: states feeling better after surgery, using PCA, pain overall managed  8/20: more tired today, off PCA now, started on fentanyl 25mcg TDP, states having pain at op site; RRT o/n for SVT    ADVANCE DIRECTIVES:    DNR  MOLST  [ ]  Living Will  [ ]   DECISION MAKER(s):  [ ] Health Care Proxy(s)  [ ] Surrogate(s)  [ ] Guardian           Name(s): Phone Number(s):   spouse  daughter Kena Cox [phone]     BASELINE (I)ADL(s) (prior to admission):  [place]: [X ]Total  [ ] Moderate [ ]Dependent    Allergies    Bananas (Other)  latex (Rash)  opioid-like analgesics (Urticaria)  penicillin (Angioedema)    Intolerances    MEDICATIONS  (STANDING):  acetaminophen   Tablet .. 975 milliGRAM(s) Oral every 8 hours  aMIOdarone    Tablet   Oral   aMIOdarone    Tablet 400 milliGRAM(s) Oral every 8 hours  aztreonam  IVPB 1000 milliGRAM(s) IV Intermittent every 8 hours  celecoxib 200 milliGRAM(s) Oral every 12 hours  fentaNYL   Patch  25 MICROgram(s)/Hr 1 Patch Transdermal every 72 hours  lactated ringers. 1000 milliLiter(s) (125 mL/Hr) IV Continuous <Continuous>  levothyroxine 175 MICROGram(s) Oral daily  liothyronine 5 MICROGram(s) Oral daily  pantoprazole    Tablet 40 milliGRAM(s) Oral before breakfast  rivaroxaban 10 milliGRAM(s) Oral daily    MEDICATIONS  (PRN):  bisacodyl Suppository 10 milliGRAM(s) Rectal daily PRN If no bowel movement by POD#2  diphenhydrAMINE 25 milliGRAM(s) Oral every 4 hours PRN Rash and/or Itching  HYDROmorphone  Injectable 1 milliGRAM(s) IV Push every 4 hours PRN moderate-severe pain  HYDROmorphone  Injectable 0.5 milliGRAM(s) IV Push every 30 minutes PRN Moderate Pain (4 - 6)  naloxone Injectable 0.1 milliGRAM(s) IV Push every 3 minutes PRN For ANY of the following changes in patient status:  A. RR LESS THAN 10 breaths per minute, B. Oxygen saturation LESS THAN 90%, C. Sedation score of 6  ondansetron Injectable 4 milliGRAM(s) IV Push every 6 hours PRN Nausea and/or Vomiting  ondansetron Injectable 4 milliGRAM(s) IV Push every 6 hours PRN Nausea    PRESENT SYMPTOMS: [ ]Unable to obtain due to poor mentation   Source if other than patient:  [ ]Family   [ ]Team     Pain: [ X]yes [ ]no  QOL impact -   Location -       LLE             Aggravating factors - movement  Quality -  Radiation -  Timing-  Severity (0-10 scale): up to 10/10  Minimal acceptable level (0-10 scale):     CPOT:    https://www.[facility].org/getattachment/hal39l37-7m8s-7g2c-9d2a-1128x1221u5w/Critical-Care-Pain-Observation-Tool-(CPOT)      PAIN AD Score:     http://geriatrictoolkit.[place].[facility]/cog/painad.pdf (press ctrl +  left click to view)    Dyspnea:                           [ ]Mild [ ]Moderate [ ]Severe  Anxiety:                             [ ]Mild [ ]Moderate [ ]Severe  Fatigue:                             [ ]Mild [ ]Moderate [ ]Severe  Nausea:                             [ ]Mild [ ]Moderate [ ]Severe  Loss of appetite:              [ ]Mild [ ]Moderate [ ]Severe  Constipation:                    [ ]Mild [ ]Moderate [ ]Severe    Other Symptoms:  [ ]All other review of systems negative     Palliative Performance Status Version 2:         %    http://npcrc.org/files/news/palliative_performance_scale_ppsv2.pdf    PHYSICAL EXAM:  Vital Signs Last 24 Hrs  T(C): 36.7 (20 Aug 2020 12:24), Max: 36.8 (20 Aug 2020 01:40)  T(F): 98.1 (20 Aug 2020 12:24), Max: 98.2 (20 Aug 2020 01:40)  HR: 162 (20 Aug 2020 12:34) (93 - 167)  BP: 100/69 (20 Aug 2020 12:34) (90/58 - 110/74)  BP(mean): --  RR: 18 (20 Aug 2020 12:24) (17 - 19)  SpO2: 92% (20 Aug 2020 12:24) (92% - 96%)    GENERAL:  [X ]Alert  [ ]Oriented x   [ ]Lethargic  [ ]Cachexia  [ ]Unarousable  [ X]Verbal  [ ]Non-Verbal  Behavioral:   [ ] Anxiety  [ ] Delirium [ ] Agitation [ ] Other  HEENT:  [ ]Normal   [ ]Dry mouth   [ ]ET Tube/Trach  [ ]Oral lesions  PULMONARY:   [X ]Clear [ ]Tachypnea  [ ]Audible excessive secretions   [ ]Rhonchi        [ ]Right [ ]Left [ ]Bilateral  [ ]Crackles        [ ]Right [ ]Left [ ]Bilateral  [ ]Wheezing     [ ]Right [ ]Left [ ]Bilateral  [ ]Diminished breath sounds [ ]right [ ]left [ ]bilateral  CARDIOVASCULAR:    [X ]Regular [ ]Irregular [ ]Tachy  [ ]Esteban [ ]Murmur [ ]Other  GASTROINTESTINAL:  [X ]Soft  [ ]Distended   [X ]+BS  [ X]Non tender [ ]Tender  [ ]PEG [ ]OGT/ NGT  Last BM:   GENITOURINARY:  [ ]Normal [ ] Incontinent   [ ]Oliguria/Anuria   [ ][place]ks  MUSCULOSKELETAL:   [ ]Normal   [ ]Weakness  [ ]Bed/Wheelchair bound [ ]Edema  NEUROLOGIC:   [X ]No focal deficits  [ ]Cognitive impairment  [ ]Dysphagia [ ]Dysarthria [ ]Paresis [ ]Other   SKIN:   [ ]Normal    [ ]Rash  [ ]Pressure ulcer(s)       Present on admission [ ]y [ ]n    CRITICAL CARE:  [ ] Shock Present  [ ]Septic [ ]Cardiogenic [ ]Neurologic [ ]Hypovolemic  [ ]  Vasopressors [ ]  Inotropes   [ ]Respiratory failure present [ ]Mechanical ventilation [ ]Non-invasive ventilatory support [ ]High flow  [ ]Acute  [ ]Chronic [ ]Hypoxic  [ ]Hypercarbic [ ]Other  [ ]Other organ failure     LABS: reviewed                          9.6    4.31  )-----------( 105      ( 20 Aug 2020 09:12 )             28.5     08-20    138  |  102  |  6<L>  ----------------------------<  130<H>  3.5   |  28  |  0.38<L>    Ca    8.1<L>      20 Aug 2020 09:12  Phos  3.6     08-20  Mg     1.9     08-20      RADIOLOGY & ADDITIONAL STUDIES:    Clavicle x-ray 8/15/2020  Lucency visualized within the proximal/mid shaft of the clavicle, which may represent a metastatic lesion. Question nondisplaced pathologic fracture through the lesion. The sternoclavicular articulation is not well visualized. The acromioclavicular joint is intact.    PROTEIN CALORIE MALNUTRITION PRESENT: [ ]mild [ ]moderate [ ]severe [ ]underweight [ ]morbid obesity  https://www.andeal.org/vault/2440/web/files/ONC/Table_Clinical%20Characteristics%20to%20Document%20Malnutrition-White%20JV%20et%20al%202012.pdf    Height (cm): 172.7 (08-14-20 @ 18:31)  Weight (kg): 88.6 (08-14-20 @ 18:31)  BMI (kg/m2): 29.7 (08-14-20 @ 18:31)    [ ]PPSV2 < or = to 30% [ ]significant weight loss  [ ]poor nutritional intake  [ ]anasarca     Albumin, Serum: 3.3 g/dL (08-17-20 @ 06:36)   [ ]Artificial Nutrition      REFERRALS:   [ ]Chaplaincy  [ ]Hospice  [ ]Child Life  [ ]Social Work  [ ]Case management [ ]Holistic Therapy     Goals of Care Document:     ______________  Armin Munguia MD   of Geriatric and Palliative Medicine  [facility]     Please page the following number for clinical matters between the hours of 9AM and 5PM   from Monday through Friday : [phone]    After 5PM and on weekends, please page: [phone]. The Geriatric and Palliative Medicine consult service has 24/7 coverage for medical recommendations, including for symptom management needs.

## 2020-08-20 NOTE — CONSULT NOTE ADULT - SUBJECTIVE AND OBJECTIVE BOX
CHIEF COMPLAINT:Patient is a 64y old  Female who presents with a chief complaint of Left hip pain and fall (20 Aug 2020 07:11)      HISTORY OF PRESENT ILLNESS:HPI:  Patient is a 64 yr old woman with PMHx of hypothyroidism, Stage IV lung adenocarcinoma diagnosed in 2017 s/p right lower lobectomy with recurrence 5/2020 s/p palliative radiation to clavicle and spine and 1 round of carbo/pemextred/pembrolizumab chemo 7/31 with recent admission 8/8 for pending femoral neck fracture now transferred from [facility] after fall in home with left hip pain, imaging showing left hip pathologic fracture. Patient was on maintenance surveillance for her cancer with scans q 6 months. Most recent scan in May showed recurrence of cancer in multiple bones. She has since had pathologic left clavicle fracture with palliative radiation. Patient was recently evaluated at [facility] for left groin and leg pain several days ago and found to have metastatic disease to left femur. She was scheduled for orthopedic follow up in 1-2 weeks after discharge but unfortunately had fall at rehab facility on day of admission. Patient states her foot slipped out from under her in shower, causing her to land on her but and left side. She did not hit her head or lose consciousness. She denied headaches, heart palpitations, sweating prior to falling. She was able to call for help and was transported to the hospital. She was not able to get up on her own or put any weight on the left leg. She has had constant pain in the left groin going down to the back of the right knee for several weeks and after the fall feels the pain is only mildly more intense. She is able to move her toes but not her knee, she does not complain of numbness in the left toes. She is able to void without issue and had a normal BM this morning. (14 Aug 2020 16:55)      PAST MEDICAL & SURGICAL HISTORY:  Stage IV adenocarcinoma of lung  Hypothyroid  S/P partial lobectomy of lung: Right lower lobe  History of bunionectomy          MEDICATIONS:  rivaroxaban 10 milliGRAM(s) Oral daily    aztreonam  IVPB 1000 milliGRAM(s) IV Intermittent every 8 hours      acetaminophen   Tablet .. 975 milliGRAM(s) Oral every 8 hours  celecoxib 200 milliGRAM(s) Oral every 12 hours  diphenhydrAMINE 25 milliGRAM(s) Oral every 4 hours PRN  fentaNYL   Patch  25 MICROgram(s)/Hr 1 Patch Transdermal every 72 hours  HYDROmorphone   Tablet 6 milliGRAM(s) Oral every 4 hours PRN  ondansetron Injectable 4 milliGRAM(s) IV Push every 6 hours PRN  ondansetron Injectable 4 milliGRAM(s) IV Push every 6 hours PRN  oxyCODONE    IR 5 milliGRAM(s) Oral every 4 hours PRN    bisacodyl Suppository 10 milliGRAM(s) Rectal daily PRN  pantoprazole    Tablet 40 milliGRAM(s) Oral before breakfast    levothyroxine 175 MICROGram(s) Oral daily  liothyronine 5 MICROGram(s) Oral daily    lactated ringers. 1000 milliLiter(s) IV Continuous <Continuous>      FAMILY HISTORY:  Family history of stroke or transient ischemic attack in father      Non-contributory    SOCIAL HISTORY:    [ ] Tobacco  [ ] Drugs  [ ] Alcohol    Allergies    Bananas (Other)  latex (Rash)  opioid-like analgesics (Urticaria)  penicillin (Angioedema)    Intolerances    	    REVIEW OF SYSTEMS:  CONSTITUTIONAL: No fever  EYES: No eye pain, visual disturbances, or discharge  ENMT:  No difficulty hearing, tinnitus  NECK: No pain or stiffness  RESPIRATORY: No cough, wheezing,  CARDIOVASCULAR: No chest pain, palpitations, passing out, dizziness, or leg swelling  GASTROINTESTINAL:  No nausea, vomiting, diarrhea or constipation. No melena.  GENITOURINARY: No dysuria, hematuria  NEUROLOGICAL: No stroke like symptoms  SKIN: No burning or lesions   ENDOCRINE: No heat or cold intolerance  MUSCULOSKELETAL: No joint pain or swelling  PSYCHIATRIC: No  anxiety, mood swings  HEME/LYMPH: No bleeding gums  ALLERGY AND IMMUNOLOGIC: No hives or eczema	    All other ROS negative    PHYSICAL EXAM:  T(C): 36.7 (08-20-20 @ 04:46), Max: 36.8 (08-20-20 @ 01:40)  HR: 164 (08-20-20 @ 04:46) (90 - 167)  BP: 110/74 (08-20-20 @ 04:46) (90/58 - 114/70)  RR: 17 (08-20-20 @ 04:46) (17 - 19)  SpO2: 92% (08-20-20 @ 04:46) (92% - 99%)  Wt(kg): --  I&O's Summary    19 Aug 2020 07:01  -  20 Aug 2020 07:00  --------------------------------------------------------  IN: 2505 mL / OUT: 3796 mL / NET: -1291 mL        Appearance: Normal	  HEENT:   Normal oral mucosa, EOMI	  Cardiovascular:  S1 S2, No JVD,    Respiratory: Lungs clear to auscultation	  Psychiatry: Alert  Gastrointestinal:  Soft, Non-tender, + BS	  Skin: No rashes   Neurologic: Non-focal  Extremities:  No edema  Vascular: Peripheral pulses palpable    	    	  	  CARDIAC MARKERS:  Labs personally reviewed by me                                  9.6    4.31  )-----------( 105      ( 20 Aug 2020 09:12 )             28.5     08-20    138  |  102  |  6<L>  ----------------------------<  130<H>  3.5   |  28  |  0.38<L>    Ca    8.1<L>      20 Aug 2020 09:12  Phos  3.6     08-20  Mg     1.9     08-20    TPro  4.6<L>  /  Alb  2.5<L>  /  TBili  0.7  /  DBili  x   /  AST  22  /  ALT  23  /  AlkPhos  103  08-20          EKG: Personally reviewed by me -   Radiology: Personally reviewed by me -       Assessment /Plan:       Advanced care planning/advanced directives discussed with patient/family. DNR status including forceful chest compressions to attempt to restart the heart, ventilator support/artificial breathing, electric shock, artificial nutrition, health care proxy, Molst form all discussed with pt. Pt wishes to consider. Thirty minutes spent on discussing advanced directives. Differential diagnosis and plan of care discussed with patient after the evaluation. Counseling on diet, nutritional counseling, weight management, exercise and medication compliance was done. **** minutes spent on total encounter, of which more than fifty percent of the encounter was spent counseling and/or coordinating care by the attending physician      Humphrey Galvez DO [facility]  Cardiovascular Medicine  [address] [place] [address], [address]  Office [phone]  Cell [phone] CHIEF COMPLAINT:Patient is a 64y old  Female who presents with a chief complaint of Left hip pain and fall (20 Aug 2020 07:11)      HISTORY OF PRESENT ILLNESS:HPI:  Patient is a 64 yr old woman with PMHx of hypothyroidism, Stage IV lung adenocarcinoma diagnosed in 2017 s/p right lower lobectomy with recurrence 5/2020 s/p palliative radiation to clavicle and spine and 1 round of carbo/pemextred/pembrolizumab chemo 7/31 with recent admission 8/8 for pending femoral neck fracture now transferred from [facility] after fall in home with left hip pain, imaging showing left hip pathologic fracture. Patient was on maintenance surveillance for her cancer with scans q 6 months. Most recent scan in May showed recurrence of cancer in multiple bones. She has since had pathologic left clavicle fracture with palliative radiation. Patient was recently evaluated at [facility] for left groin and leg pain several days ago and found to have metastatic disease to left femur. She was scheduled for orthopedic follow up in 1-2 weeks after discharge but unfortunately had fall at rehab facility on day of admission. Patient states her foot slipped out from under her in shower, causing her to land on her but and left side. She did not hit her head or lose consciousness. She denied headaches, heart palpitations, sweating prior to falling. She was able to call for help and was transported to the hospital. She was not able to get up on her own or put any weight on the left leg. She has had constant pain in the left groin going down to the back of the right knee for several weeks and after the fall feels the pain is only mildly more intense. She is able to move her toes but not her knee, she does not complain of numbness in the left toes. She is able to void without issue and had a normal BM this morning. (14 Aug 2020 16:55)      PAST MEDICAL & SURGICAL HISTORY:  Stage IV adenocarcinoma of lung  Hypothyroid  S/P partial lobectomy of lung: Right lower lobe  History of bunionectomy          MEDICATIONS:  rivaroxaban 10 milliGRAM(s) Oral daily    aztreonam  IVPB 1000 milliGRAM(s) IV Intermittent every 8 hours      acetaminophen   Tablet .. 975 milliGRAM(s) Oral every 8 hours  celecoxib 200 milliGRAM(s) Oral every 12 hours  diphenhydrAMINE 25 milliGRAM(s) Oral every 4 hours PRN  fentaNYL   Patch  25 MICROgram(s)/Hr 1 Patch Transdermal every 72 hours  HYDROmorphone   Tablet 6 milliGRAM(s) Oral every 4 hours PRN  ondansetron Injectable 4 milliGRAM(s) IV Push every 6 hours PRN  ondansetron Injectable 4 milliGRAM(s) IV Push every 6 hours PRN  oxyCODONE    IR 5 milliGRAM(s) Oral every 4 hours PRN    bisacodyl Suppository 10 milliGRAM(s) Rectal daily PRN  pantoprazole    Tablet 40 milliGRAM(s) Oral before breakfast    levothyroxine 175 MICROGram(s) Oral daily  liothyronine 5 MICROGram(s) Oral daily    lactated ringers. 1000 milliLiter(s) IV Continuous <Continuous>      FAMILY HISTORY:  Family history of stroke or transient ischemic attack in father      Non-contributory    SOCIAL HISTORY:    not a smoker    Allergies    Bananas (Other)  latex (Rash)  opioid-like analgesics (Urticaria)  penicillin (Angioedema)    Intolerances    	    REVIEW OF SYSTEMS:  CONSTITUTIONAL: No fever, in pain  EYES: No eye pain, visual disturbances, or discharge  ENMT:  No difficulty hearing, tinnitus  NECK: No pain or stiffness  RESPIRATORY: No cough, wheezing,  CARDIOVASCULAR: No chest pain, NO palpitations, passing out, dizziness, or leg swelling  GASTROINTESTINAL:  No nausea, vomiting, diarrhea or constipation. No melena.  GENITOURINARY: No dysuria, hematuria  NEUROLOGICAL: No stroke like symptoms  SKIN: No burning or lesions   ENDOCRINE: No heat or cold intolerance  MUSCULOSKELETAL: No joint pain or swelling  PSYCHIATRIC: uncomfortable in pain  HEME/LYMPH: No bleeding gums  ALLERGY AND IMMUNOLOGIC: No hives or eczema	    All other ROS negative    PHYSICAL EXAM:  T(C): 36.7 (08-20-20 @ 04:46), Max: 36.8 (08-20-20 @ 01:40)  HR: 164 (08-20-20 @ 04:46) (90 - 167)  BP: 110/74 (08-20-20 @ 04:46) (90/58 - 114/70)  RR: 17 (08-20-20 @ 04:46) (17 - 19)  SpO2: 92% (08-20-20 @ 04:46) (92% - 99%)  Wt(kg): --  I&O's Summary    19 Aug 2020 07:01  -  20 Aug 2020 07:00  --------------------------------------------------------  IN: 2505 mL / OUT: 3796 mL / NET: -1291 mL        Appearance: Normal	  HEENT:   Normal oral mucosa, EOMI	  Cardiovascular:  S1 S2, No JVD,    Respiratory: Lungs clear to auscultation	  Psychiatry: Alert  Gastrointestinal:  Soft, Non-tender, + BS	  Skin: No rashes   Neurologic: Non-focal  Extremities:  No edema  Vascular: Peripheral pulses palpable    	    	  	  CARDIAC MARKERS:  Labs personally reviewed by me                                  9.6    4.31  )-----------( 105      ( 20 Aug 2020 09:12 )             28.5     08-20    138  |  102  |  6<L>  ----------------------------<  130<H>  3.5   |  28  |  0.38<L>    Ca    8.1<L>      20 Aug 2020 09:12  Phos  3.6     08-20  Mg     1.9     08-20    TPro  4.6<L>  /  Alb  2.5<L>  /  TBili  0.7  /  DBili  x   /  AST  22  /  ALT  23  /  AlkPhos  103  08-20      < from: CT Angio Chest w/ IV Cont (08.20.20 @ 00:12) >  IMPRESSION:    No pulmonary embolism.    Perihilar soft tissue thickening adjacent to the right lower lobe surgical sutures. Nodular opacities in the lung. Multiple lytic and sclerotic lesions. Subtle low attenuating foci in the liver. Findings are compatible with metastatic neoplasm. Prior studies should be submitted for comparison.    Multiple pathologic fractures:  *  Acute, comminuted left clavicular fracture.  *  Age indeterminant L1 vertebral body compression deformity.  *  Questionably right anterolateral 6th rib    Hepatic metastases as outlined above.      EKG: Personally reviewed by me - AT at 160bpm  Radiology: Personally reviewed by me -   No pulmonary embolism.    Perihilar soft tissue thickening adjacent to the right lower lobe surgical sutures. Nodular opacities in the lung. Multiple lytic and sclerotic lesions. Subtle low attenuating foci in the liver. Findings are compatible with metastatic neoplasm. Prior studies should be submitted for comparison.    Multiple pathologic fractures:  *  Acute, comminuted left clavicular fracture.  *  Age indeterminant L1 vertebral body compression deformity.  *  Questionably right anterolateral 6th rib    Hepatic metastases as outlined above.        Assessment /Plan:   AT - not responding to beta blocker  Discussed with EP Dr Antoine, will consider Adenosine vs Amio  Will await EP recs  Overall prognosis very poor with stage 4 CA  Consider pall care    Advanced care planning/advanced directives discussed with patient/family. DNR status including forceful chest compressions to attempt to restart the heart, ventilator support/artificial breathing, electric shock, artificial nutrition, health care proxy, Molst form all discussed with pt. Pt wishes to consider. Thirty minutes spent on discussing advanced directives. Differential diagnosis and plan of care discussed with patient after the evaluation. Counseling on diet, nutritional counseling, weight management, exercise and medication compliance was done. **** minutes spent on total encounter, of which more than fifty percent of the encounter was spent counseling and/or coordinating care by the attending physician      Humphrey Galvez DO [facility]  Cardiovascular Medicine  [address] [place] [address], [address]  Office [phone]  Cell [phone]

## 2020-08-20 NOTE — PROVIDER CONTACT NOTE (OTHER) - REASON
pt refusing ordered IV KCL supplement as well as any oral supplement, pt refuses to wear tele monitor

## 2020-08-20 NOTE — CONSULT NOTE ADULT - ASSESSMENT
Assessment  Hypothyroidism: 64y Female with hypothyroidism, on synthroid 175mcg and cytomel 5mcg po daily, reports being compliant with medication intake, TSH was slightly suppressed, repeat TFTs within normal range. RRT called for sinus tachy last night, patient denies palpitations, appears alert and comfortable at present.  Tachycardia: On meds, monitored, FU Cardio/EPS.  Pathologic Hip Fx: Postop, on pain meds, stable.          Carlin Marcial MD  Cell: [phone]  Office: [phone] Assessment  Hypothyroidism: 64y Female with hypothyroidism, on synthroid 175mcg and cytomel 5mcg po daily,  reports being compliant with medication intake, TSH was slightly suppressed, repeat TFTs within normal range. RRT called for sinus tachy last night, patient denies palpitations, appears alert and comfortable at present.  Tachycardia: On meds, monitored, FU Cardio/EPS.  Pathologic Hip Fx: Postop, on pain meds, stable.          Carlin Marcial MD  Cell: [phone]  Office: [phone]

## 2020-08-20 NOTE — DIETITIAN INITIAL EVALUATION ADULT. - OTHER INFO
Diet PTA: Pt Diet PTA: Pt states that appetite and PO intake were fair PTA. Pt was following a plant-based diet; did own cooking. Pt confirms allergy to bananas and latex, resulting in migraine; denies any cross-latex food allergies. Reports taking calcium; denies supplement use. Denies any chewing or swallowing issues.     Wt history: Dosing wt 210 pounds. Pt reports a UBW of 227 lbs. Pt reports a recent 10 pound unintentional wt loss. Question accuracy of wt.     Pt states that appetite and PO intake remain fair in-house. Pt denies any recent N/V, constipation, or diarrhea. Last BM 8/20. Discussed nutritional findings with the pt and need for nutrition supplement; pt amenable to Ensure x1 daily. Discussed importance to continue nutritional supplementation post-discharge as ordered by provider. Encouraged the pt to discuss need for continued supplementation at follow-up visit post-discharge. Pt verbalized understanding. Discussed pt's needs for wt maintenance in setting of cancer diagnosis and chemotherapy. Diet PTA: Pt states that appetite and PO intake were fair PTA. Pt was following a plant-based diet; did own cooking. Pt confirms allergy to bananas and latex, resulting in migraine; denies any other cross-latex food allergies. Reports taking calcium; denies supplement use. Denies any chewing or swallowing issues.     Wt history: Dosing wt 210 pounds. Pt reports a UBW of 227 lbs. Pt reports a recent 10 pound unintentional wt loss. Question accuracy of dosing wt.     Pt states that appetite and PO intake remain fair in-house. Pt denies any recent N/V, constipation, or diarrhea. Last BM 8/20. Discussed nutritional findings with the pt and need for nutrition supplement; pt amenable to Ensure x1 daily. Discussed importance to continue nutritional supplementation post-discharge as ordered by provider. Encouraged the pt to discuss need for continued supplementation at follow-up visit post-discharge. Pt verbalized understanding. Discussed pt's needs for wt maintenance in setting of cancer diagnosis and chemotherapy.

## 2020-08-20 NOTE — PROGRESS NOTE ADULT - PROBLEM SELECTOR PLAN 1
Patient with rapid response on 8/20 for asymptomatic sinus tachycardia to 160s.  - Pre-operative EKG showing sinus tachycardia to 130s  - s/p Labetalol 5mg and Toprol 12.5mg during rapid with minimal effect  - Unknown cause unlikely infectious (currently on vancomycin + aztreonam for UTI and afebrile), volume depletion (Hg 9.5, electrolytes wnl, tolerating PO intake with good urine output), thyroid (T4 normal with mildly low T3 and TSH receiving appropriate pharmacotherapy) possibly due to pain or benzodiazapine withdrawal  - Consult Cardiology EP  - Telemetry monitoring Patient with rapid response on 8/20 for asymptomatic sinus tachycardia to 160s.  - Pre-operative EKG showing sinus tachycardia to 130s  - s/p Labetalol 5mg and Toprol 12.5mg during rapid response with only acute effect  - Unknown cause unlikely infectious (currently on vancomycin + aztreonam for UTI and afebrile), volume depletion (Hg 9.5, electrolytes wnl, tolerating PO intake with good urine output), thyroid (T4 normal with mildly low T3 and TSH receiving appropriate pharmacotherapy) possibly due to pain or benzodiazapine withdrawal  - Consult Cardiology and EP, pending recs  - Consulted endocrinology, Dr. Marcial following, ordered for free T4   - Telemetry monitoring  - s/p Lopressor 5mg IV this at 10am

## 2020-08-20 NOTE — DIETITIAN INITIAL EVALUATION ADULT. - PROBLEM SELECTOR PLAN 1
Patient with recent admission for left hip and knee pain found to have mets to femur now with fall at rehab facility.  - Stable vitals, left foot with distal pulse 2+, warm, able to move, no acute concern neurovascular compromise  - s/p Xray showing pathological femoral neck fractures  - Fall precautions  - Bed rest  - Ortho consulted - plan for possible intervention Monday?  - Pain control: fentanyl patch 25 mg + dilaudid 2mg for moderate pain and 4mg for severe pain

## 2020-08-20 NOTE — CONSULT NOTE ADULT - SUBJECTIVE AND OBJECTIVE BOX
HPI:  Patient is a 64 yr old woman with PMHx of hypothyroidism, Stage IV lung adenocarcinoma diagnosed in 2017 s/p right lower lobectomy with recurrence 5/2020 s/p palliative radiation to clavicle and spine and 1 round of carbo/pemextred/pembrolizumab chemo 7/31 with recent admission 8/8 for pending femoral neck fracture now transferred from [facility] after fall in home with left hip pain, imaging showing left hip pathologic fracture. Patient was on maintenance surveillance for her cancer with scans q 6 months. Most recent scan in May showed recurrence of cancer in multiple bones. She has since had pathologic left clavicle fracture with palliative radiation. Patient was recently evaluated at [facility] for left groin and leg pain several days ago and found to have metastatic disease to left femur. She was scheduled for orthopedic follow up in 1-2 weeks after discharge but unfortunately had fall at rehab facility on day of admission. Patient states her foot slipped out from under her in shower, causing her to land on her but and left side. She did not hit her head or lose consciousness. She denied headaches, heart palpitations, sweating prior to falling. She was able to call for help and was transported to the hospital. She was not able to get up on her own or put any weight on the left leg. She has had constant pain in the left groin going down to the back of the right knee for several weeks and after the fall feels the pain is only mildly more intense. She is able to move her toes but not her knee, she does not complain of numbness in the left toes. She is able to void without issue and had a normal BM this morning. (14 Aug 2020 16:55)  Patient has history thyroid disease, was on synthroid 175 mcg po daily as well as cytomel 5mcg daily, compliant with intake.  Patient follows with Endocrinologist for thyroid management.      PAST MEDICAL & SURGICAL HISTORY:  Stage IV adenocarcinoma of lung  Hypothyroid  S/P partial lobectomy of lung: Right lower lobe  History of bunionectomy      FAMILY HISTORY:  Family history of stroke or transient ischemic attack in father      Social History:    Outpatient Medications:    MEDICATIONS  (STANDING):  acetaminophen   Tablet .. 975 milliGRAM(s) Oral every 8 hours  aMIOdarone    Tablet   Oral   aMIOdarone    Tablet 400 milliGRAM(s) Oral every 8 hours  aztreonam  IVPB 1000 milliGRAM(s) IV Intermittent every 8 hours  celecoxib 200 milliGRAM(s) Oral every 12 hours  fentaNYL   Patch  25 MICROgram(s)/Hr 1 Patch Transdermal every 72 hours  lactated ringers. 1000 milliLiter(s) (125 mL/Hr) IV Continuous <Continuous>  levothyroxine 175 MICROGram(s) Oral daily  liothyronine 5 MICROGram(s) Oral daily  pantoprazole    Tablet 40 milliGRAM(s) Oral before breakfast  rivaroxaban 10 milliGRAM(s) Oral daily    MEDICATIONS  (PRN):  bisacodyl Suppository 10 milliGRAM(s) Rectal daily PRN If no bowel movement by POD#2  diphenhydrAMINE 25 milliGRAM(s) Oral every 4 hours PRN Rash and/or Itching  HYDROmorphone  Injectable 1 milliGRAM(s) IV Push every 4 hours PRN moderate-severe pain  HYDROmorphone  Injectable 0.5 milliGRAM(s) IV Push every 30 minutes PRN Moderate Pain (4 - 6)  naloxone Injectable 0.1 milliGRAM(s) IV Push every 3 minutes PRN For ANY of the following changes in patient status:  A. RR LESS THAN 10 breaths per minute, B. Oxygen saturation LESS THAN 90%, C. Sedation score of 6  ondansetron Injectable 4 milliGRAM(s) IV Push every 6 hours PRN Nausea and/or Vomiting  ondansetron Injectable 4 milliGRAM(s) IV Push every 6 hours PRN Nausea      Allergies    Bananas (Other)  latex (Rash)  opioid-like analgesics (Urticaria)  penicillin (Angioedema)    Intolerances      Review of Systems:  Constitutional: No fever, no chills  Eyes: No blurry vision  Neuro: No tremors  HEENT: No pain, no neck swelling  Cardiovascular: No chest pain, no palpitations  Respiratory: Has SOB, no cough  GI: No nausea, vomiting, abdominal pain  : No dysuria  Skin: no rash  MSK: Has leg swelling.  Psych: no depression  Endocrine: no polyuria, polydipsia    ALL OTHER SYSTEMS REVIEWED AND NEGATIVE    UNABLE TO OBTAIN    PHYSICAL EXAM:  VITALS: T(C): 36.7 (08-20-20 @ 12:24)  T(F): 98.1 (08-20-20 @ 12:24), Max: 98.2 (08-20-20 @ 01:40)  HR: 162 (08-20-20 @ 12:34) (93 - 167)  BP: 100/69 (08-20-20 @ 12:34) (90/58 - 110/74)  RR:  (17 - 19)  SpO2:  (92% - 96%)  Wt(kg): --  GENERAL: NAD, well-groomed, well-developed  EYES: No proptosis, no lid lag  HEENT:  Atraumatic, Normocephalic  THYROID: Normal size, no palpable nodules  RESPIRATORY: Clear to auscultation bilaterally; No rales, rhonchi, wheezing  CARDIOVASCULAR: Si S2, No murmurs;  GI: Soft, non distended, normal bowel sounds  SKIN: Dry, intact, No rashes or lesions  MUSCULOSKELETAL: Has BL lower extremity edema.  NEURO:  no tremor, sensation decreased in feet BL,    POCT Blood Glucose.: 118 mg/dL (08-19-20 @ 23:24)                            9.6    4.31  )-----------( 105      ( 20 Aug 2020 09:12 )             28.5       08-20    138  |  102  |  6<L>  ----------------------------<  130<H>  3.5   |  28  |  0.38<L>    EGFR if : 131  EGFR if non : 113    Ca    8.1<L>      08-20  Mg     1.9     08-20  Phos  3.6     08-20    TPro  4.6<L>  /  Alb  2.5<L>  /  TBili  0.7  /  DBili  x   /  AST  22  /  ALT  23  /  AlkPhos  103  08-20      Thyroid Function Tests:  08-20 @ 12:50 TSH 1.64 FreeT4 1.4 T3 -- Anti TPO -- Anti Thyroglobulin Ab -- TSI --  08-15 @ 11:51 TSH 0.24 FreeT4 -- T3 52 Anti TPO -- Anti Thyroglobulin Ab -- TSI --              Radiology: HPI:  Patient is a 64 yr old woman with PMHx of hypothyroidism, Stage IV lung adenocarcinoma diagnosed in 2017 s/p right lower lobectomy with recurrence 5/2020 s/p palliative radiation to clavicle and spine and 1 round of carbo/pemextred/pembrolizumab chemo 7/31 with recent admission 8/8 for pending femoral neck fracture now transferred from [facility] after fall in home with left hip pain, imaging showing left hip pathologic fracture. Patient was on maintenance surveillance for her cancer with scans q 6 months. Most recent scan in May showed recurrence of cancer in multiple bones. She has since had pathologic left clavicle fracture with palliative radiation. Patient was recently evaluated at [facility] for left groin and leg pain several days ago and found to have metastatic disease to left femur. She was scheduled for orthopedic follow up in 1-2 weeks after discharge but unfortunately had fall at rehab facility on day of admission. Patient states her foot slipped out from under her in shower, causing her to land on her but and left side. She did not hit her head or lose consciousness. She denied headaches, heart palpitations, sweating prior to falling. She was able to call for help and was transported to the hospital. She was not able to get up on her own or put any weight on the left leg. She has had constant pain in the left groin going down to the back of the right knee for several weeks and after the fall feels the pain is only mildly more intense. She is able to move her toes but not her knee, she does not complain of numbness in the left toes. She is able to void without issue and had a normal BM this morning. (14 Aug 2020 16:55)  Patient has history thyroid disease, was on synthroid 175 mcg po daily as well as cytomel 5mcg daily, compliant with intake.  Patient follows with Endocrinologist for thyroid management.      PAST MEDICAL & SURGICAL HISTORY:  Stage IV adenocarcinoma of lung  Hypothyroid  S/P partial lobectomy of lung: Right lower lobe  History of bunionectomy      FAMILY HISTORY:  Family history of stroke or transient ischemic attack in father      Social History:    Outpatient Medications:    MEDICATIONS  (STANDING):  acetaminophen   Tablet .. 975 milliGRAM(s) Oral every 8 hours  aMIOdarone    Tablet   Oral   aMIOdarone    Tablet 400 milliGRAM(s) Oral every 8 hours  aztreonam  IVPB 1000 milliGRAM(s) IV Intermittent every 8 hours  celecoxib 200 milliGRAM(s) Oral every 12 hours  fentaNYL   Patch  25 MICROgram(s)/Hr 1 Patch Transdermal every 72 hours  lactated ringers. 1000 milliLiter(s) (125 mL/Hr) IV Continuous <Continuous>  levothyroxine 175 MICROGram(s) Oral daily  liothyronine 5 MICROGram(s) Oral daily  pantoprazole    Tablet 40 milliGRAM(s) Oral before breakfast  rivaroxaban 10 milliGRAM(s) Oral daily    MEDICATIONS  (PRN):  bisacodyl Suppository 10 milliGRAM(s) Rectal daily PRN If no bowel movement by POD#2  diphenhydrAMINE 25 milliGRAM(s) Oral every 4 hours PRN Rash and/or Itching  HYDROmorphone  Injectable 1 milliGRAM(s) IV Push every 4 hours PRN moderate-severe pain  HYDROmorphone  Injectable 0.5 milliGRAM(s) IV Push every 30 minutes PRN Moderate Pain (4 - 6)  naloxone Injectable 0.1 milliGRAM(s) IV Push every 3 minutes PRN For ANY of the following changes in patient status:  A. RR LESS THAN 10 breaths per minute, B. Oxygen saturation LESS THAN 90%, C. Sedation score of 6  ondansetron Injectable 4 milliGRAM(s) IV Push every 6 hours PRN Nausea and/or Vomiting  ondansetron Injectable 4 milliGRAM(s) IV Push every 6 hours PRN Nausea      Allergies    Bananas (Other)  latex (Rash)  opioid-like analgesics (Urticaria)  penicillin (Angioedema)    Intolerances      Review of Systems:  Constitutional: No fever, no chills  Eyes: No blurry vision  Neuro: No tremors  HEENT: No pain, no neck swelling  Cardiovascular: No chest pain, no palpitations  Respiratory: Has SOB, no cough  GI: No nausea, vomiting, abdominal pain  : No dysuria  Skin: no rash  MSK: Has leg swelling.  Psych: no depression  Endocrine: no polyuria, polydipsia    ALL OTHER SYSTEMS REVIEWED AND NEGATIVE    UNABLE TO OBTAIN    PHYSICAL EXAM:  VITALS: T(C): 36.7 (08-20-20 @ 12:24)  T(F): 98.1 (08-20-20 @ 12:24), Max: 98.2 (08-20-20 @ 01:40)  HR: 162 (08-20-20 @ 12:34) (93 - 167)  BP: 100/69 (08-20-20 @ 12:34) (90/58 - 110/74)  RR:  (17 - 19)  SpO2:  (92% - 96%)  Wt(kg): --  GENERAL: NAD, well-groomed, well-developed  EYES: No proptosis, no lid lag  HEENT:  Atraumatic, Normocephalic  THYROID: Normal size, no palpable nodules  RESPIRATORY: Clear to auscultation bilaterally; No rales, rhonchi, wheezing  CARDIOVASCULAR: Si S2, No murmurs;  GI: Soft, non distended, normal bowel sounds  SKIN: Dry, intact, No rashes or lesions  MUSCULOSKELETAL: Has BL lower extremity edema.  NEURO:  no tremor, sensation decreased in feet BL,    POCT Blood Glucose.: 118 mg/dL (08-19-20 @ 23:24)                            9.6    4.31  )-----------( 105      ( 20 Aug 2020 09:12 )             28.5       08-20    138  |  102  |  6<L>  ----------------------------<  130<H>  3.5   |  28  |  0.38<L>    EGFR if : 131  EGFR if non : 113    Ca    8.1<L>      08-20  Mg     1.9     08-20  Phos  3.6     08-20    TPro  4.6<L>  /  Alb  2.5<L>  /  TBili  0.7  /  DBili  x   /  AST  22  /  ALT  23  /  AlkPhos  103  08-20      Thyroid Function Tests:  08-20 @ 12:50 TSH 1.64 FreeT4 1.4 T3 -- Anti TPO -- Anti Thyroglobulin Ab -- TSI --  08-15 @ 11:51 TSH 0.24 FreeT4 -- T3 52 Anti TPO -- Anti Thyroglobulin Ab -- TSI --              Radiology:

## 2020-08-20 NOTE — PROGRESS NOTE ADULT - PROBLEM SELECTOR PLAN 3
Patient with + UA and urine culture growing pan-sensitive E. Coli and Enterococcus.  - s/p aztreonam x 1 day now on aztreonam and vancomycin  - Sparks to be D/Ranulfo today Patient with + UA and urine culture growing pan-sensitive E. Coli and Enterococcus.  - s/p aztreonam x 1 day now on aztreonam and vancomycin  - Sparks D/Ranulfo today, TOV

## 2020-08-20 NOTE — RAPID RESPONSE TEAM SUMMARY - NSOTHERINTERVENTIONSRRT_GEN_ALL_CORE
CTA chest r/o PE CTA chest r/o PE-  preliminary negative PE . Pending official.   Pt pending start of Xeralto PO dillon ppx however dose was not given yet this evening so, Lovenox SQ 40 mg x 1 given . Primary team at the bedside will ensure pt's dose of Xarelto will be given 8/20.   Re-viewd home meds ; pt has missed few doses of her home dose benzo BID ; PO dose given this evening , additional Ativan 0.5 IV dose administered   Addition, home dose of synthroid 175mcg PO dose restarted , last dose 8/18.   Started IVF LR @ 125 x 10 hrs .   C/w strict I&o's   Continues telemetry and o2sat monitoring. EKG sinus tachycardia.   Hgb stable .   Pt asymptomatic. request to be given time to rest. Will transfer to telemetry for monitoring.   Primary team will follow labs . CTA chest r/o PE-  preliminary negative PE . Pending official.   Pt pending start of Xeralto PO dillon ppx however dose was not given yet this evening so, Lovenox SQ 40 mg x 1 given . Primary team at the bedside will ensure pt's dose of Xarelto will be given 8/20.   Re-viewd home meds ; pt has missed few doses of her home dose benzo BID ; PO dose given this evening , additional Ativan 0.5 IV dose administered   Addition, home dose of synthroid 175mcg PO dose restarted , last dose 8/18.   Started IVF LR @ 125 x 10 hrs .   C/w strict I&o's   Continues telemetry and o2sat monitoring. EKG sinus tachycardia.   Hgb stable . c/t trend CBC.   Pt asymptomatic. request to be given time to rest. Will transfer to telemetry for monitoring.   Primary team will follow labs . Needs TSH in AM .   Rectal T x 1.   c/w broad spectrum abx . Would re-culture .   repeat ABG in AM and PRN . CTA chest r/o PE-  preliminary negative PE . Pending official.   Pt pending start of Xeralto PO dillon ppx however dose was not given yet this evening so, Lovenox SQ 40 mg x 1 given . Primary team at the bedside will ensure pt's dose of Xarelto will be given 8/20.   Re-viewd home meds ; pt has missed few doses of her home dose benzo BID ; PO dose given this evening , additional Ativan 0.5 IV dose administered   Addition, home dose of synthroid 175mcg PO dose restarted , last dose 8/18.   Started IVF LR @ 125 x 10 hrs .   C/w strict I&o's   Continues telemetry and o2sat monitoring. EKG sinus tachycardia.   Hgb stable . c/t trend CBC.   Pt asymptomatic. request to be given time to rest. Will transfer to telemetry for monitoring.   Primary team will follow labs . Needs TSH in AM .   Rectal T x 1.   c/w broad spectrum abx . Would re-culture . Get pre-4th vanco level dose.   repeat ABG in AM and PRN .

## 2020-08-20 NOTE — DIETITIAN INITIAL EVALUATION ADULT. - ADD RECOMMEND
1) Continue current regular diet. 2) Recommend adding Ensure x1 daily to optimize nutrient intake. 3) Malnutrition alert placed in chart, provider made aware. 4) Continue to trend labs, weight, skin integrity, and intake.

## 2020-08-20 NOTE — PROGRESS NOTE ADULT - PROBLEM SELECTOR PLAN 2
Recent admission for left hip and knee pain found to have mets to femur now s/p fall at rehab.  - Stable vitals, left foot with distal pulse 2+, warm, able to move, no acute concern for neurovascular compromise  - XR showing pathological left femoral neck fracture s/p left QASIM on 8/18  - Fall precautions  - Pain control per palliative consult: fentanyl patch 25 mg (37.5mg at home) + dilaudid 6mg for severe pain (iSTOP note in chart, Reference #: [id number]); consider adding IV tylenol for breakthrough pain (Toradol not effective); now on dilaudid PCA  - Ortho following; appreciate recs  - PT and OT daily

## 2020-08-20 NOTE — PROGRESS NOTE ADULT - ATTENDING COMMENTS
Seen, examined the patient this am with house staff  64F h/o Stage IV lung adenocarcinoma, 2017, s/p right lower lobectomy with recurrence 5/2020 s/p palliative radiation to clavicle and spine on chemo(1 round of carbo/pemextred/pembrolizumab- 7/31) with recent admission 8/8 for pending femoral neck fracture now transferred from [facility] after fall in home with left hip pain, imaging showing left hip pathologic fracture, also UTI on admission. S/P L hemiarthroplasty 8/18, developed atial tachycardia around 160s, EP saw.    Resting in bed, no palpitations, SOB, feels ok, no chest pain, had RRT last night for a.tach, on IV Dilaudid pump for pain  -106   - Reviewed labs, imaging. WBC 4, Hb 9.6, Plt 103    CTA chest - no PE  - appreciated cardiology, EP cosnults, Got Metroprolol IV/PO doses, now on Amioloading    Per EP not candidate for DCCV  - Ortho plan noted, PT recommended Rehab once cardiology clears    On IV Aztreonam for UTI, urine c/s showed E. Coli, enterococci  - Heme plan noted, patient had 1 dose Zarxio 480mcg x1 prior to Sx for neutropenia    She was recently started on Carbo/Pemextred/Pembrolizumab on 7/31/20, cycle one for adenoCa of Lung w mets    Chemo on hold  - IR recommended Mediport placement as outpatient  - Fall precaution, DVT ppx .  - d/c planning in progress to [facility] once stable   * spoke to daughter- Kena

## 2020-08-20 NOTE — PROGRESS NOTE ADULT - SUBJECTIVE AND OBJECTIVE BOX
EP Attending    HISTORY OF PRESENT ILLNESS:   Ms Paula is a 63yo woman hear after fall with newly dianosed pathologic fracture in the hip.    On telemetry, having steady 160bpm tachycardia.  Is currently quite ill, with recent dx of lung cancer, metastatic / stg 4.    does not have palpitations, or angina.  has sob and fatigue.  currently bedbound.  10pt ros [-]    PAST MEDICAL & SURGICAL HISTORY:  Stage IV adenocarcinoma of lung  Hypothyroid  S/P partial lobectomy of lung: Right lower lobe  History of bunionectomy    MEDICATIONS  (STANDING):  acetaminophen   Tablet .. 975 milliGRAM(s) Oral every 8 hours  aztreonam  IVPB 1000 milliGRAM(s) IV Intermittent every 8 hours  celecoxib 200 milliGRAM(s) Oral every 12 hours  fentaNYL   Patch  25 MICROgram(s)/Hr 1 Patch Transdermal every 72 hours  lactated ringers. 1000 milliLiter(s) (125 mL/Hr) IV Continuous <Continuous>  levothyroxine 175 MICROGram(s) Oral daily  liothyronine 5 MICROGram(s) Oral daily  pantoprazole    Tablet 40 milliGRAM(s) Oral before breakfast  rivaroxaban 10 milliGRAM(s) Oral daily      Allergies    Bananas (Other)  latex (Rash)  opioid-like analgesics (Urticaria)  penicillin (Angioedema)    Intolerances    FAMILY HISTORY:  Family history of stroke or transient ischemic attack in father    Non-contributary for premature coronary disease or sudden cardiac death    SOCIAL HISTORY:    [x ] Non-smoker  [ ] Smoker  [ ] Alcohol    PHYSICAL EXAM:  T(C): 36.7 (08-20-20 @ 12:24), Max: 36.8 (08-20-20 @ 01:40)  HR: 162 (08-20-20 @ 12:34) (93 - 167)  BP: 100/69 (08-20-20 @ 12:34) (90/58 - 114/70)  RR: 18 (08-20-20 @ 12:24) (17 - 19)  SpO2: 92% (08-20-20 @ 12:24) (92% - 96%)  Wt(kg): --    Appearance: overweight white female in no acute distress.  HEENT:   Normal oral mucosa, PERRL, EOMI	  Lymphatic: No lymphadenopathy , no edema  Cardiovascular: rapid regular S1 S2, No JVD, No murmurs , Peripheral pulses palpable 2+ bilaterally  Respiratory: Lungs clear to auscultation, normal effort 	  Gastrointestinal:  Soft, Non-tender, + BS	  Skin: No rashes, No ecchymoses, No cyanosis, warm to touch  Musculoskeletal: L leg immobilized.  Psychiatry:  Mood & affect appropriate    TELEMETRY: 1:1 long RP SVT, consistent with atrial tachycardia.	    ECG: same 	  Echo: none      LABS:	 	                          9.6    4.31  )-----------( 105      ( 20 Aug 2020 09:12 )             28.5     08-20    138  |  102  |  6<L>  ----------------------------<  130<H>  3.5   |  28  |  0.38<L>    Ca    8.1<L>      20 Aug 2020 09:12  Phos  3.6     08-20  Mg     1.9     08-20    TPro  4.6<L>  /  Alb  2.5<L>  /  TBili  0.7  /  DBili  x   /  AST  22  /  ALT  23  /  AlkPhos  103  08-20  TSH: Thyroid Stimulating Hormone, Serum: 1.64 uIU/mL (08-20 @ 12:50)      ASSESSMENT/PLAN: 	64y Female with new severe oncologic diagnosis with mets to bones.  Having atrial tachycardia.  Recommend Amiodarone with IV bolus.  Too ill for the EP lab.  Will order 5g load (400mg BID x 7 days) then 200mg daily.    Lobito Antoine M.D.  Cardiac Electrophysiology    office [phone]  pager [phone] EP Attending    HISTORY OF PRESENT ILLNESS:   Ms Paula is a 63yo woman hear after fall with newly dianosed pathologic fracture in the hip.    On telemetry, having steady 160bpm tachycardia.  Is currently quite ill, with recent dx of lung cancer, metastatic / stg 4.    does not have palpitations, or angina.  has sob and fatigue.  currently bedbound.  10pt ros [-]    PAST MEDICAL & SURGICAL HISTORY:  Stage IV adenocarcinoma of lung  Hypothyroid  S/P partial lobectomy of lung: Right lower lobe  History of bunionectomy    MEDICATIONS  (STANDING):  acetaminophen   Tablet .. 975 milliGRAM(s) Oral every 8 hours  aztreonam  IVPB 1000 milliGRAM(s) IV Intermittent every 8 hours  celecoxib 200 milliGRAM(s) Oral every 12 hours  fentaNYL   Patch  25 MICROgram(s)/Hr 1 Patch Transdermal every 72 hours  lactated ringers. 1000 milliLiter(s) (125 mL/Hr) IV Continuous <Continuous>  levothyroxine 175 MICROGram(s) Oral daily  liothyronine 5 MICROGram(s) Oral daily  pantoprazole    Tablet 40 milliGRAM(s) Oral before breakfast  rivaroxaban 10 milliGRAM(s) Oral daily      Allergies    Bananas (Other)  latex (Rash)  opioid-like analgesics (Urticaria)  penicillin (Angioedema)    Intolerances    FAMILY HISTORY:  Family history of stroke or transient ischemic attack in father    Non-contributary for premature coronary disease or sudden cardiac death    SOCIAL HISTORY:    [x ] Non-smoker  [ ] Smoker  [ ] Alcohol    PHYSICAL EXAM:  T(C): 36.7 (08-20-20 @ 12:24), Max: 36.8 (08-20-20 @ 01:40)  HR: 162 (08-20-20 @ 12:34) (93 - 167)  BP: 100/69 (08-20-20 @ 12:34) (90/58 - 114/70)  RR: 18 (08-20-20 @ 12:24) (17 - 19)  SpO2: 92% (08-20-20 @ 12:24) (92% - 96%)  Wt(kg): --    Appearance: overweight white female in no acute distress.  HEENT:   Normal oral mucosa, PERRL, EOMI	  Lymphatic: No lymphadenopathy , no edema  Cardiovascular: rapid regular S1 S2, No JVD, No murmurs , Peripheral pulses palpable 2+ bilaterally  Respiratory: Lungs clear to auscultation, normal effort 	  Gastrointestinal:  Soft, Non-tender, + BS	  Skin: No rashes, No ecchymoses, No cyanosis, warm to touch  Musculoskeletal: L leg immobilized.  Psychiatry:  Mood & affect appropriate    TELEMETRY: 1:1 long RP SVT, consistent with atrial tachycardia.	    ECG: same 	  Echo: none      LABS:	 	                          9.6    4.31  )-----------( 105      ( 20 Aug 2020 09:12 )             28.5     08-20    138  |  102  |  6<L>  ----------------------------<  130<H>  3.5   |  28  |  0.38<L>    Ca    8.1<L>      20 Aug 2020 09:12  Phos  3.6     08-20  Mg     1.9     08-20    TPro  4.6<L>  /  Alb  2.5<L>  /  TBili  0.7  /  DBili  x   /  AST  22  /  ALT  23  /  AlkPhos  103  08-20  TSH: Thyroid Stimulating Hormone, Serum: 1.64 uIU/mL (08-20 @ 12:50)      ASSESSMENT/PLAN: 	64y Female with new severe oncologic diagnosis with mets to bones.  Having atrial tachycardia.  Recommend Amiodarone with IV bolus.  Too ill for the EP lab.  Will order 5g load then 200mg daily.    Lobito Antoine M.D.  Cardiac Electrophysiology    office [phone]  pager [phone]

## 2020-08-20 NOTE — CHART NOTE - NSCHARTNOTEFT_GEN_A_CORE
Upon Nutritional Assessment by the Registered Dietitian your patient was determined to meet criteria / has evidence of the following diagnosis/diagnoses:          [ ]  Mild Protein Calorie Malnutrition        [x]  Moderate Protein Calorie Malnutrition        [ ] Severe Protein Calorie Malnutrition        [ ] Unspecified Protein Calorie Malnutrition        [ ] Underweight / BMI <19        [ ] Morbid Obesity / BMI > 40      Findings as based on:  [x] Comprehensive nutrition assessment   [ ] Nutrition Focused Physical Exam  [x] Other:  4.4% wt loss [id number] and PO intake <75% of estimated nutrient needs [id number].       Nutrition Plan/Recommendations:    1) Continue current regular diet.   2) Recommend adding Ensure x1 daily to optimize nutrient intake.   3) Continue to trend labs, weight, skin integrity, and intake.    Tiffaine Block MS, RD, Pager #[phone]   PROVIDER Section:   By signing this assessment you are acknowledging and agree with the diagnosis/diagnoses assigned by the Registered Dietitian    Comments:

## 2020-08-20 NOTE — DIETITIAN INITIAL EVALUATION ADULT. - PROBLEM SELECTOR PLAN 3
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  - Patient follows with Dr. Winn at [facility], plan for second round of chemo next week and port placement  - Pain management: as above  - Oncology following, appreciate recs  - Possible plan for port insertion next week after surgical intervention for hip

## 2020-08-20 NOTE — CONSULT NOTE ADULT - SUBJECTIVE AND OBJECTIVE BOX
EP Attending    HISTORY OF PRESENT ILLNESS:   Ms Paula is a 63yo woman hear after fall with newly dianosed pathologic fracture in the hip.    On telemetry, having steady 160bpm tachycardia.  Is currently quite ill, with recent dx of lung cancer, metastatic / stg 4.    does not have palpitations, or angina.  has sob and fatigue.  currently bedbound.  10pt ros [-]    PAST MEDICAL & SURGICAL HISTORY:  Stage IV adenocarcinoma of lung  Hypothyroid  S/P partial lobectomy of lung: Right lower lobe  History of bunionectomy    MEDICATIONS  (STANDING):  acetaminophen   Tablet .. 975 milliGRAM(s) Oral every 8 hours  aztreonam  IVPB 1000 milliGRAM(s) IV Intermittent every 8 hours  celecoxib 200 milliGRAM(s) Oral every 12 hours  fentaNYL   Patch  25 MICROgram(s)/Hr 1 Patch Transdermal every 72 hours  lactated ringers. 1000 milliLiter(s) (125 mL/Hr) IV Continuous <Continuous>  levothyroxine 175 MICROGram(s) Oral daily  liothyronine 5 MICROGram(s) Oral daily  pantoprazole    Tablet 40 milliGRAM(s) Oral before breakfast  rivaroxaban 10 milliGRAM(s) Oral daily      Allergies    Bananas (Other)  latex (Rash)  opioid-like analgesics (Urticaria)  penicillin (Angioedema)    Intolerances    FAMILY HISTORY:  Family history of stroke or transient ischemic attack in father    Non-contributary for premature coronary disease or sudden cardiac death    SOCIAL HISTORY:    [x ] Non-smoker  [ ] Smoker  [ ] Alcohol    PHYSICAL EXAM:  T(C): 36.7 (08-20-20 @ 12:24), Max: 36.8 (08-20-20 @ 01:40)  HR: 162 (08-20-20 @ 12:34) (93 - 167)  BP: 100/69 (08-20-20 @ 12:34) (90/58 - 114/70)  RR: 18 (08-20-20 @ 12:24) (17 - 19)  SpO2: 92% (08-20-20 @ 12:24) (92% - 96%)  Wt(kg): --    Appearance: overweight white female in no acute distress.  HEENT:   Normal oral mucosa, PERRL, EOMI	  Lymphatic: No lymphadenopathy , no edema  Cardiovascular: rapid regular S1 S2, No JVD, No murmurs , Peripheral pulses palpable 2+ bilaterally  Respiratory: Lungs clear to auscultation, normal effort 	  Gastrointestinal:  Soft, Non-tender, + BS	  Skin: No rashes, No ecchymoses, No cyanosis, warm to touch  Musculoskeletal: L leg immobilized.  Psychiatry:  Mood & affect appropriate    TELEMETRY: 1:1 long RP SVT, consistent with atrial tachycardia.	    ECG: same 	  Echo: none      LABS:	 	                          9.6    4.31  )-----------( 105      ( 20 Aug 2020 09:12 )             28.5     08-20    138  |  102  |  6<L>  ----------------------------<  130<H>  3.5   |  28  |  0.38<L>    Ca    8.1<L>      20 Aug 2020 09:12  Phos  3.6     08-20  Mg     1.9     08-20    TPro  4.6<L>  /  Alb  2.5<L>  /  TBili  0.7  /  DBili  x   /  AST  22  /  ALT  23  /  AlkPhos  103  08-20  TSH: Thyroid Stimulating Hormone, Serum: 1.64 uIU/mL (08-20 @ 12:50)      ASSESSMENT/PLAN: 	64y Female with new severe oncologic diagnosis with mets to bones.  Having atrial tachycardia.  Recommend Amiodarone with IV bolus.  Too ill for the EP lab.  Will order 5g load then 200mg daily.    Lobito Antoine M.D.  Cardiac Electrophysiology    office [phone]  pager [phone]

## 2020-08-20 NOTE — DIETITIAN INITIAL EVALUATION ADULT. - PHYSICAL APPEARANCE
well nourished/other (specify) Ht: 68 inches  Wt: 210 pounds  BMI: 32 kG/m2  IBW: 154 pounds +/-10%  IBW%: 136.4%  Skin per nursing documentation: No pressure injuries noted  Edema per nursing documentation: +2 L hip  Nutrition focused physical exam declined

## 2020-08-20 NOTE — RAPID RESPONSE TEAM SUMMARY - NSSITUATIONBACKGROUNDRRT_GEN_ALL_CORE
Patient is a 64 yr old woman with PMHx of hypothyroidism, Stage IV lung adenocarcinoma diagnosed in 2017 s/p right lower lobectomy with recurrence 5/2020 s/p palliative radiation to clavicle and spine and 1 round of carbo/pemextred/pembrolizumab chemo 7/31 with recent admission 8/8 for pending femoral neck fracture now transferred from [facility] after fall in home with left hip pain, imaging showing left hip pathologic fracture.

## 2020-08-20 NOTE — PROVIDER CONTACT NOTE (OTHER) - SITUATION
pt talisha, frustrated and refuses to wear her telemetry, attempts to pull on her drain lines, Sparks and refuses to have her KCL supplements administered.

## 2020-08-20 NOTE — PROGRESS NOTE ADULT - PROBLEM SELECTOR PLAN 6
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  - Patient follows with Dr. Winn at [facility], plan for second round of chemo and port placement outpatient  - s/p Xray of left clavicle found to have mets with possible fracture  - Pain management: as above  - Dental eval in house for bisphosphonate clearance  - Consulted IR for possible port placement inpatient however risk of infection high, recommend patient recover from hip surgery and pursue port placement as option, discussed with patient and daughter who are in agreement  - Oncology following, appreciate recs

## 2020-08-20 NOTE — PROVIDER CONTACT NOTE (OTHER) - ASSESSMENT
pt is alert and oriented x3, states she has had "too many medications given to her" pt is irate and frustrated. pt HR remains at 160's sinus tach right before she removed her monitor from her chest.

## 2020-08-20 NOTE — DIETITIAN INITIAL EVALUATION ADULT. - PROBLEM SELECTOR PLAN 2
Patient with platelets 62 at [facility], on admission to [facility] platelets 55 likely secondary to recent chemotherapy on 7/31.  - on 7/31 platelets 235, have been 50s since 8/8 and stable  - No evidence of active bleeding, trend daily

## 2020-08-20 NOTE — DIETITIAN INITIAL EVALUATION ADULT. - PERTINENT LABORATORY DATA
08-20 Na 138 mmol/L Glu 130 mg/dL<H> K+ 3.5 mmol/L Cr  0.38 mg/dL<L> BUN 6 mg/dL<L> Phos 3.6 mg/dL Alb 2.5 g/dL<L>   Hgb 9.6 g/dL<L> Hct 28.5 %<L>

## 2020-08-20 NOTE — PROGRESS NOTE ADULT - SUBJECTIVE AND OBJECTIVE BOX
Authored by Daniela Benson MS4 pager [phone]    Patient is a 64y old  Female who presents with a chief complaint of Left hip pain and fall (16 Aug 2020 06:34)      OVERNIGHT EVENTS: Patient had elevated HR overnight, was asymptomatic however HR > 160s  without resolution and rapid response was called. EKG showed sinus tachycardia to ___. A CTA was performed with no evidence of PE. CBC, electrolytes, troponins, ABG drawn all wnl. She was given 1L of LR, calcium gluconate, potassium, magnesium, lovenox. She received labetalol 5mg and metoprolol succinate 12.5mg with minimal effect. Etiology of sinus tachycardia unknown patient was not in any pain, she was taking xanax 0.25mg BID but missed 2 doses on day of surgery and morning after however received evening dose last night, she was given ativan 0.5mg during the rapid response, T4 level was wnl with mildly decreased TSH and T3 receiving appropriate synthroid and liothyronine (missed 1 dose after OR). She was transferred to telemetry monitoring and remains in sinus tachycardia.     She was seen at bedside this am, she says her pain is well controlled with the dilaudid PCA. She has been able to sip on clears. She has no complaints. She denies headache, chest pain, SOB, N&V, abdominal pain, suprapubic pain, and tingling/numbness in extremities.      MEDICATIONS  (STANDING):  acetaminophen   Tablet .. 975 milliGRAM(s) Oral every 8 hours  aztreonam  IVPB 1000 milliGRAM(s) IV Intermittent every 8 hours  celecoxib 200 milliGRAM(s) Oral every 12 hours  lactated ringers. 1000 milliLiter(s) (125 mL/Hr) IV Continuous <Continuous>  levothyroxine 175 MICROGram(s) Oral daily  liothyronine 5 MICROGram(s) Oral daily  pantoprazole    Tablet 40 milliGRAM(s) Oral before breakfast  rivaroxaban 10 milliGRAM(s) Oral daily    MEDICATIONS  (PRN):  bisacodyl Suppository 10 milliGRAM(s) Rectal daily PRN If no bowel movement by POD#2  diphenhydrAMINE 25 milliGRAM(s) Oral every 4 hours PRN Rash and/or Itching  naloxone Injectable 0.1 milliGRAM(s) IV Push every 3 minutes PRN For ANY of the following changes in patient status:  A. RR LESS THAN 10 breaths per minute, B. Oxygen saturation LESS THAN 90%, C. Sedation score of 6  ondansetron Injectable 4 milliGRAM(s) IV Push every 6 hours PRN Nausea and/or Vomiting  ondansetron Injectable 4 milliGRAM(s) IV Push every 6 hours PRN Nausea  oxyCODONE    IR 5 milliGRAM(s) Oral every 4 hours PRN Mild Pain (1 - 3)        Vital Signs Last 24 Hrs  T(C): 36.7 (20 Aug 2020 04:46), Max: 36.8 (20 Aug 2020 01:40)  T(F): 98 (20 Aug 2020 04:46), Max: 98.2 (20 Aug 2020 01:40)  HR: 164 (20 Aug 2020 04:46) (89 - 167)  BP: 110/74 (20 Aug 2020 04:46) (90/58 - 114/70)  RR: 17 (20 Aug 2020 04:46) (17 - 19)  SpO2: 92% (20 Aug 2020 04:46) (92% - 99%)      PHYSICAL EXAM  CONSTITUTIONAL: NAD, appears uncomfortable, now with Sparks catheter in place and drain in left thigh.  RESPIRATORY: Normal respiratory effort; lungs are clear to auscultation bilaterally; no rales, wheezes, or rhonchi  CARDIOVASCULAR: Tachycardic, regular rhythm, normal S1 and S2, no murmur/rub/gallop; mild lower extremity edema; Peripheral pulses are 2+ bilaterally  ABDOMEN: soft, Nontender to palpation, nondistended, normoactive bowel sounds  MUSCULOSKELETAL: Both legs extended normally, elevated, with supports surrounding left leg.  PSYCH: A+O to person, place, and time; affect appropriate  SKIN: Erythematous, itchy rash with clear margins under right breast extending from mediastinum to axilla; patient states similar rash on buttocks but unable to assess given left hip fracture      LABS:             Blood Gas Arterial, Lactate: 1.0 mmol/L (20 @ 23:43)    Blood Gas Profile - Arterial (20 @ 23:43)    pH, Arterial: 7.47    pCO2, Arterial: 38 mmHg    pO2, Arterial: 67 mmHg    HCO3, Arterial: 27 mmoL/L    Base Excess, Arterial: 3.4 mmol/L    Oxygen Saturation, Arterial: 94 %    Total CO2, Arterial: 28 mmoL/L    FIO2, Arterial: 21    Blood Gas Source Arterial: Arterial      Troponin T, High Sensitivity Result: 16:      Urinalysis Basic - ( 15 Aug 2020 14:16 )  Color: Light Orange / Appearance: Turbid / S.026 / pH: x  Gluc: x / Ketone: Negative  / Bili: Negative / Urobili: 4 mg/dL   Blood: x / Protein: Trace / Nitrite: Positive   Leuk Esterase: Moderate / RBC: 9 /hpf / WBC 6 /HPF   Sq Epi: x / Non Sq Epi: 3 /hpf / Bacteria: Many    Culture - Urine (20 @ 10:11)    Specimen Source: .Urine Clean Catch (Midstream)    Culture Results:   >100,000 CFU/ml Escherichia coli   Pan - sensitive      Vitamin D, 25-Hydroxy: 33.4  Vitamin B12, Serum: >2000 pg/mL   Folate, Serum: 15.7 ng/mL    Thyroid Stimulating Hormone, Serum: 0.24 uIU/mL   Triiodothyronine, Total (T3 Total): 52 ng/dL  T4, Serum: 5.6 ug/dL        < from: 12 Lead ECG (08.15.20 @ 17:03) >    Ventricular Rate 136 BPM  Atrial Rate 136 BPM  P-R Interval 118 ms  QRS Duration 80 ms  Q-T Interval 302 ms  QTC Calculation(Bezet) 454 ms  P Axis 58 degrees  R Axis 58 degrees  T Axis 32 degrees    Diagnosis Line SINUS TACHYCARDIA WITH  NONSPECIFIC ST AND T WAVE ABNORMALITY    < end of copied text >      IMAGING    < from: CT Angio Chest w/ IV Cont (20 @ 00:12) >  PROCEDURE DATE:  2020      ******PRELIMINARY REPORT******    ******PRELIMINARY REPORT******              INTERPRETATION:  No acute pulmonary embolism.    < end of copied text >        < from: NM SPECT/CT Bone, Single Area Single Day (20 @ 13:14) >    FINDINGS: There is mild radiopharmaceutical accumulation in the proximal shaft of the left clavicle corresponding to lucency seen on comparison x-ray. There are faint foci in bilateral posterior approximatelyeighth ribs and heterogeneous tracer activity in the spine.  There is abnormally increased tracer activity in the mid right femur and distal left femur. Abnormality in the distal left femur demonstrates questionable cortical disruption which placesto places the patient at risk for pathologic fracture.  Both kidneys are visualized and are symmetric in appearance.    IMPRESSION: Abnormal bone scan.  Findings compatible with multifocal osseous metastases in the axial skeleton and bilateral femurs. Abnormality in the left femur places the patient at risk for pathologic fracture.  Suggest correlation with chest CT to evaluate spinal and rib foci.    < end of copied text >      < from: Xray Clavicle, Left (08.15.20 @ 09:25) >  IMPRESSION:  Lucency visualizedwithin the proximal/mid shaft of the clavicle, which may represent a metastatic lesion. Question nondisplaced pathologic fracture through the lesion. The sternoclavicular articulation is not well visualized. The acromioclavicular joint is intact.    < end of copied text >      Left Hip Xrays performed at [facility] MRN # [id number]    Pending NM bone scan today Authored by Daniela Benson MS4 pager [phone]    Patient is a 64y old  Female who presents with a chief complaint of Left hip pain and fall (16 Aug 2020 06:34)      OVERNIGHT EVENTS: Patient had elevated  overnight at 11pm for routine vital checks, was asymptomatic, pain well controlled, HR did not improve and rapid response was called. EKG showed sinus tachycardia in 160s. A CTA was performed with no evidence of PE. CBC, electrolytes, troponins, ABG drawn all wnl. She was given 1L of LR, calcium gluconate, potassium, magnesium, lovenox. She received labetalol 5mg and metoprolol succinate 12.5mg. Etiology of sinus tachycardia unknown patient was not in any pain, she was taking xanax 0.25mg BID but missed 2 doses on day of surgery and morning after however received evening dose last night, she was given ativan 0.5mg during the rapid response, T4 level was wnl with mildly decreased TSH and T3 receiving appropriate synthroid and liothyronine (missed 1 dose after OR) but was given missed home dose at 1am. She was transferred to telemetry monitoring and remains in sinus tachycardia.     She was seen at bedside this am, she says her pain is controlled, she mostly feels tired after not getting much sleep last night. She says she never had any symptoms and just wanted everyone to leave her alone last night She has been able tolerating PO intake. She has no complaints. She denies palpitations, headache, chest pain, SOB, N&V, abdominal pain, suprapubic pain, and tingling/numbness in extremities.      MEDICATIONS  (STANDING):  acetaminophen   Tablet .. 975 milliGRAM(s) Oral every 8 hours  aztreonam  IVPB 1000 milliGRAM(s) IV Intermittent every 8 hours  celecoxib 200 milliGRAM(s) Oral every 12 hours  lactated ringers. 1000 milliLiter(s) (125 mL/Hr) IV Continuous <Continuous>  levothyroxine 175 MICROGram(s) Oral daily  liothyronine 5 MICROGram(s) Oral daily  pantoprazole    Tablet 40 milliGRAM(s) Oral before breakfast  rivaroxaban 10 milliGRAM(s) Oral daily    MEDICATIONS  (PRN):  bisacodyl Suppository 10 milliGRAM(s) Rectal daily PRN If no bowel movement by POD#2  diphenhydrAMINE 25 milliGRAM(s) Oral every 4 hours PRN Rash and/or Itching  naloxone Injectable 0.1 milliGRAM(s) IV Push every 3 minutes PRN For ANY of the following changes in patient status:  A. RR LESS THAN 10 breaths per minute, B. Oxygen saturation LESS THAN 90%, C. Sedation score of 6  ondansetron Injectable 4 milliGRAM(s) IV Push every 6 hours PRN Nausea and/or Vomiting  ondansetron Injectable 4 milliGRAM(s) IV Push every 6 hours PRN Nausea  oxyCODONE    IR 5 milliGRAM(s) Oral every 4 hours PRN Mild Pain (1 - 3)        Vital Signs Last 24 Hrs  T(C): 36.7 (20 Aug 2020 04:46), Max: 36.8 (20 Aug 2020 01:40)  T(F): 98 (20 Aug 2020 04:46), Max: 98.2 (20 Aug 2020 01:40)  HR: 164 (20 Aug 2020 04:46) (89 - 167)  BP: 110/74 (20 Aug 2020 04:46) (90/58 - 114/70)  RR: 17 (20 Aug 2020 04:46) (17 - 19)  SpO2: 92% (20 Aug 2020 04:46) (92% - 99%)      PHYSICAL EXAM  CONSTITUTIONAL: NAD, appears uncomfortable, now with Sparks catheter in place and drain in left thigh.  RESPIRATORY: Normal respiratory effort; lungs are clear to auscultation bilaterally; no rales, wheezes, or rhonchi  CARDIOVASCULAR: Tachycardic, regular rhythm, normal S1 and S2, no murmur/rub/gallop; mild lower extremity edema; Peripheral pulses are 2+ bilaterally  ABDOMEN: soft, Nontender to palpation, nondistended, normoactive bowel sounds  MUSCULOSKELETAL: Both legs extended normally, elevated, with supports surrounding left leg.  PSYCH: A+O to person, place, and time; affect appropriate  SKIN: Erythematous, itchy rash with clear margins under right breast extending from mediastinum to axilla; patient states similar rash on buttocks but unable to assess given left hip fracture      LABS:                                 9.6    4.31  )-----------( 105                  28.5       138  |  102  |  6<L>  ----------------------------<  130<H>  3.5   |  28  |  0.38<L>    Ca    8.1<L>        Phos  3.6      Mg     1.9         TPro  4.6<L>  /  Alb  2.5<L>  /  TBili  0.7  /  DBili  x   /  AST  22  /  ALT  23  /  AlkPhos  103      PT: 12.4 sec;   INR: 1.04 ratio;  PTT:32.9 sec      POCT Blood Glucose.: 118 mg/dL       Blood Gas Arterial, Lactate: 1.0 mmol/L (20 @ 23:43)    Blood Gas Profile - Arterial (20 @ 23:43)    pH, Arterial: 7.47    pCO2, Arterial: 38 mmHg    pO2, Arterial: 67 mmHg    HCO3, Arterial: 27 mmoL/L    Base Excess, Arterial: 3.4 mmol/L    Oxygen Saturation, Arterial: 94 %    Total CO2, Arterial: 28 mmoL/L    FIO2, Arterial: 21    Blood Gas Source Arterial: Arterial      Troponin T, High Sensitivity Result: 16:      Urinalysis Basic - ( 15 Aug 2020 14:16 )  Color: Light Orange / Appearance: Turbid / S.026 / pH: x  Gluc: x / Ketone: Negative  / Bili: Negative / Urobili: 4 mg/dL   Blood: x / Protein: Trace / Nitrite: Positive   Leuk Esterase: Moderate / RBC: 9 /hpf / WBC 6 /HPF   Sq Epi: x / Non Sq Epi: 3 /hpf / Bacteria: Many    Culture - Urine (20 @ 10:11)    Specimen Source: .Urine Clean Catch (Midstream)    Culture Results:   >100,000 CFU/ml Escherichia coli   Pan - sensitive      Vitamin D, 25-Hydroxy: 33.4  Vitamin B12, Serum: >2000 pg/mL   Folate, Serum: 15.7 ng/mL    Thyroid Stimulating Hormone, Serum: 0.24 uIU/mL   Triiodothyronine, Total (T3 Total): 52 ng/dL  T4, Serum: 5.6 ug/dL        < from: 12 Lead ECG (08.15.20 @ 17:03) >    Ventricular Rate 136 BPM  Atrial Rate 136 BPM  P-R Interval 118 ms  QRS Duration 80 ms  Q-T Interval 302 ms  QTC Calculation(Bezet) 454 ms  P Axis 58 degrees  R Axis 58 degrees  T Axis 32 degrees    Diagnosis Line SINUS TACHYCARDIA WITH  NONSPECIFIC ST AND T WAVE ABNORMALITY    < end of copied text >      IMAGING    < from: CT Angio Chest w/ IV Cont (20 @ 00:12) >  PROCEDURE DATE:  2020      ******PRELIMINARY REPORT******    ******PRELIMINARY REPORT******              INTERPRETATION:  No acute pulmonary embolism.    < end of copied text >        < from: NM SPECT/CT Bone, Single Area Single Day (20 @ 13:14) >    FINDINGS: There is mild radiopharmaceutical accumulation in the proximal shaft of the left clavicle corresponding to lucency seen on comparison x-ray. There are faint foci in bilateral posterior approximatelyeighth ribs and heterogeneous tracer activity in the spine.  There is abnormally increased tracer activity in the mid right femur and distal left femur. Abnormality in the distal left femur demonstrates questionable cortical disruption which placesto places the patient at risk for pathologic fracture.  Both kidneys are visualized and are symmetric in appearance.    IMPRESSION: Abnormal bone scan.  Findings compatible with multifocal osseous metastases in the axial skeleton and bilateral femurs. Abnormality in the left femur places the patient at risk for pathologic fracture.  Suggest correlation with chest CT to evaluate spinal and rib foci.    < end of copied text >      < from: Xray Clavicle, Left (08.15.20 @ 09:25) >  IMPRESSION:  Lucency visualizedwithin the proximal/mid shaft of the clavicle, which may represent a metastatic lesion. Question nondisplaced pathologic fracture through the lesion. The sternoclavicular articulation is not well visualized. The acromioclavicular joint is intact.    < end of copied text >      Left Hip Xrays performed at [facility] MRN # [id number]    Pending NM bone scan today

## 2020-08-20 NOTE — PROVIDER CONTACT NOTE (OTHER) - ASSESSMENT
pt is alert x4 anxious and irritable with the nursing interventions that are performed such as medications administered. see v/s flowsheet pt denies chest pain or shortness of breath

## 2020-08-20 NOTE — PROVIDER CONTACT NOTE (OTHER) - RECOMMENDATIONS
after speaking with patient with RN and provider at bedside, pt agreed to have nursing intervention performed, including her tele monitor placed, v/s taken labs drawn

## 2020-08-20 NOTE — PROVIDER CONTACT NOTE (OTHER) - ASSESSMENT
RN notices elevated heart rate of 160s on cont. pulse ox. pt in bed resting comfortably, denies any chest pain and sob. no signs & symptoms of distress noted. Vitals sign otherwise stable, BP of 102/69, temp 97.5, RR18, sating 100%RA. MD Allen at bedside assessing pt. however, MD Allen left for RRT to different unit. Tiffany stated "pt seems fine, despite tachycardia." safety maintained. cont. pulse ox maintained. will cont. to monitor.

## 2020-08-20 NOTE — RAPID RESPONSE TEAM SUMMARY - NSMEDICATIONSRRT_GEN_ALL_CORE
LR 1L x 1   Ca 2g IVPB x 1   K 10meq IVPB x 3   Mg 2 g IVPB x 1   Ativan 0.5mg IVP x 1 LR 1L x 1   Ca 2g IVPB x 1   K 10meq IVPB x 3   Mg 2 g IVPB x 1   Lovenox 40mg SQ x 1   Ativan 0.5mg IVP x 1

## 2020-08-20 NOTE — DIETITIAN INITIAL EVALUATION ADULT. - PROBLEM SELECTOR PLAN 4
Patient with erythematous rash under right breast and per patient also on buttocks likely candida.  - Keep area dry and clean  - Topical Nystatin powder

## 2020-08-20 NOTE — PROGRESS NOTE ADULT - ASSESSMENT
A/P: 64F L path clavicle fx, L distal femur lesion, and L path FN fx s/p QASIM  -Neuro: Multimodal pain control  -Resp: IS  -GI: reg diet  -MSK: OOB, WBAT LLE, ABd pillow, posterior precautions, NWB LUE in sling, PT/OT  -Heme: DVT PPx: Xarelto to start tonight  FU Dental consult  López LLE unlocked  FU HV, prevena  D/c Bridger this AM  FU CTA official read  Will discuss with attending and advise if plan changes

## 2020-08-20 NOTE — PROGRESS NOTE ADULT - PROBLEM SELECTOR PLAN 5
Plt 62 at [facility], on admission to [facility] platelets 55 likely 2/2 recent chemotherapy on 7/31.  - on 7/31 platelets 235, have been 50s since 8/8 and stable, now plts 99  - No evidence of active bleeding, trend daily  - Heme/onc following; appreciate recs

## 2020-08-20 NOTE — DIETITIAN INITIAL EVALUATION ADULT. - REASON INDICATOR FOR ASSESSMENT
Patient seen for length of stay.  Source: Pt and medical record.   Pt is a 63 y/o F with hx of  hypothyroidism, Stage IV lung adenocarcinoma s/p right lower lobectomy with recurrence 5/2020 s/p palliative radiation to clavicle and spine and 1 round of chemo. Recent admission 8/8 for pending femoral neck fracture. Hospital course c/b Sinus tachycardia, UTI, Neutropenia, and  Thrombocytopenia.

## 2020-08-20 NOTE — CONSULT NOTE ADULT - PROBLEM SELECTOR RECOMMENDATION 9
Repeat TFTs within acceptable range. Will continue current synthroid/cytomel doses, will continue monitoring TFTs and FU.  Patient follows with Dr. Zendejas endocrinology; Suggest to FU outpatient 4 weeks.  Discussed plan with patient.

## 2020-08-20 NOTE — PROGRESS NOTE ADULT - PROBLEM SELECTOR PLAN 1
- off dilaudid PCA  - used approximately 6.5mg dilaudid/24 hours via PCA, agree with 25mcg TDP fentanyl patch, will start dilaudid 1mg IV Q4 PRN for mod-severe pain  - will transition back to dilaudid PO regimen once off pump

## 2020-08-20 NOTE — PROVIDER CONTACT NOTE (OTHER) - ACTION/TREATMENT ORDERED:
pt given ordered x1 dose of IV pain meds (see e-mar) in order for staff to complete her hygiene care (bed linen soiled)so pt may tolerate repositioning pt then agreed to tele monitor after rest/sleep

## 2020-08-21 NOTE — PROGRESS NOTE ADULT - SUBJECTIVE AND OBJECTIVE BOX
EP Attending    HISTORY OF PRESENT ILLNESS:   Ms Paula is a 65yo woman hear after fall with newly dianosed pathologic fracture in the hip.    On telemetry, having steady 160bpm tachycardia.  Is currently quite ill, with recent dx of lung cancer, metastatic / stg 4.    8/20 - overnight, started Amiodarone bolus and oral load.  Converted back to Sinus rhythm around midnight, with a short burst of atrial tachycardia in the early morning hours.  Thinks her breathing is a little bit easier today. No angina, no pre-syncope.    acetaminophen   Tablet .. 975 milliGRAM(s) Oral every 8 hours  ALPRAZolam 0.25 milliGRAM(s) Oral two times a day  aMIOdarone    Tablet   Oral   aMIOdarone    Tablet 400 milliGRAM(s) Oral every 8 hours  aztreonam  IVPB 1000 milliGRAM(s) IV Intermittent every 8 hours  bisacodyl Suppository 10 milliGRAM(s) Rectal daily PRN  celecoxib 200 milliGRAM(s) Oral every 12 hours  ciprofloxacin     Tablet 500 milliGRAM(s) Oral every 12 hours  diphenhydrAMINE 25 milliGRAM(s) Oral every 4 hours PRN  fentaNYL   Patch  25 MICROgram(s)/Hr 1 Patch Transdermal every 72 hours  HYDROmorphone  Injectable 1 milliGRAM(s) IV Push every 4 hours PRN  HYDROmorphone  Injectable 0.5 milliGRAM(s) IV Push every 30 minutes PRN  lactated ringers. 1000 milliLiter(s) IV Continuous <Continuous>  levothyroxine 175 MICROGram(s) Oral daily  liothyronine 5 MICROGram(s) Oral daily  naloxone Injectable 0.1 milliGRAM(s) IV Push every 3 minutes PRN  ondansetron Injectable 4 milliGRAM(s) IV Push every 6 hours PRN  ondansetron Injectable 4 milliGRAM(s) IV Push every 6 hours PRN  pantoprazole    Tablet 40 milliGRAM(s) Oral before breakfast  rivaroxaban 10 milliGRAM(s) Oral daily                            9.3    5.44  )-----------( 121      ( 21 Aug 2020 07:08 )             28.4       08-21    139  |  103  |  10  ----------------------------<  107<H>  3.3<L>   |  27  |  0.37<L>    Ca    8.2<L>      21 Aug 2020 07:08  Phos  4.1     08-21  Mg     1.9     08-21    TPro  4.7<L>  /  Alb  2.4<L>  /  TBili  0.7  /  DBili  x   /  AST  24  /  ALT  24  /  AlkPhos  114  08-21      T(C): 36.6 (08-21-20 @ 08:10), Max: 36.7 (08-20-20 @ 12:24)  HR: 81 (08-21-20 @ 08:10) (80 - 163)  BP: 150/85 (08-21-20 @ 08:10) (92/66 - 150/85)  RR: 18 (08-21-20 @ 08:10) (18 - 18)  SpO2: 96% (08-21-20 @ 08:10) (92% - 97%)  Wt(kg): --    I&O's Summary    20 Aug 2020 07:01  -  21 Aug 2020 07:00  --------------------------------------------------------  IN: 100 mL / OUT: 850 mL / NET: -750 mL      Appearance: overweight white female in no acute distress.  HEENT:   Normal oral mucosa, PERRL, EOMI	  Lymphatic: No lymphadenopathy , no edema  Cardiovascular: rapid regular S1 S2, No JVD, No murmurs , Peripheral pulses palpable 2+ bilaterally  Respiratory: Lungs clear to auscultation, normal effort 	  Gastrointestinal:  Soft, Non-tender, + BS	  Skin: No rashes, No ecchymoses, No cyanosis, warm to touch  Musculoskeletal: L leg immobilized.  Psychiatry:  Mood & affect appropriate    TELEMETRY: 1:1 long RP SVT, consistent with atrial tachycardia. converted midnight into 8/21 to NSR.  ECG: same 	  Echo: none    ASSESSMENT/PLAN: 	64y Female with new severe oncologic diagnosis with mets to bones.  Having atrial tachycardia to 160bpm.  Converted to NSR with IV+oral load, and feeling incrementally better.  Explained to patient that my goal is to allow chemo/rads to go uninterrupted, so no invasive heart rhythm procedures are planned at this time.  Once loading dose is completed, continue 200mg daily indefinitely.    Lobito Antoine M.D.  Cardiac Electrophysiology    office [phone]  pager [phone]

## 2020-08-21 NOTE — DISCHARGE NOTE PROVIDER - CARE PROVIDERS DIRECT ADDRESSES
[email or web address],[email or web address]

## 2020-08-21 NOTE — PROGRESS NOTE ADULT - SUBJECTIVE AND OBJECTIVE BOX
Patient is a 64y old  Female who presents with a chief complaint of Left hip pain and fall (21 Aug 2020 16:53)      INTERVAL HISTORY: feels ok    	  MEDICATIONS:  aMIOdarone    Tablet   Oral   aMIOdarone    Tablet 400 milliGRAM(s) Oral every 8 hours        PHYSICAL EXAM:  T(C): 36.6 (08-21-20 @ 21:26), Max: 36.7 (08-21-20 @ 11:21)  HR: 73 (08-22-20 @ 01:37) (73 - 86)  BP: 124/74 (08-22-20 @ 01:37) (115/78 - 150/85)  RR: 18 (08-21-20 @ 21:26) (18 - 18)  SpO2: 99% (08-21-20 @ 21:26) (96% - 99%)  Wt(kg): --  I&O's Summary    20 Aug 2020 07:01  -  21 Aug 2020 07:00  --------------------------------------------------------  IN: 100 mL / OUT: 850 mL / NET: -750 mL    21 Aug 2020 07:01  -  22 Aug 2020 03:06  --------------------------------------------------------  IN: 200 mL / OUT: 710 mL / NET: -510 mL          Appearance: In no distress	  HEENT:    PERRL, EOMI	  Cardiovascular:  S1 S2, No JVD  Respiratory: Lungs clear to auscultation	  Gastrointestinal:  Soft, Non-tender, + BS	  Vascularature:  No edema of LE  Psychiatric: Appropriate affect   Neuro: no acute focal deficits                               9.3    5.44  )-----------( 121      ( 21 Aug 2020 07:08 )             28.4     08-21    139  |  103  |  10  ----------------------------<  107<H>  3.3<L>   |  27  |  0.37<L>    Ca    8.2<L>      21 Aug 2020 07:08  Phos  4.1     08-21  Mg     1.9     08-21    TPro  4.7<L>  /  Alb  2.4<L>  /  TBili  0.7  /  DBili  x   /  AST  24  /  ALT  24  /  AlkPhos  114  08-21        Labs personally reviewed      EKG: Personally reviewed by me - AT at 160bpm  Radiology: Personally reviewed by me -   No pulmonary embolism.    Perihilar soft tissue thickening adjacent to the right lower lobe surgical sutures. Nodular opacities in the lung. Multiple lytic and sclerotic lesions. Subtle low attenuating foci in the liver. Findings are compatible with metastatic neoplasm. Prior studies should be submitted for comparison.    Multiple pathologic fractures:  *  Acute, comminuted left clavicular fracture.  *  Age indeterminant L1 vertebral body compression deformity.  *  Questionably right anterolateral 6th rib    Hepatic metastases as outlined above.        Assessment /Plan:   AT - Amio loading, responded well   Overall prognosis very poor with stage 4 CA                Humphrey Galvez DO [facility]  Cardiovascular Medicine  [address] [place] [address], [address]  Office: [phone]  Cell: [phone]

## 2020-08-21 NOTE — PROGRESS NOTE ADULT - PROBLEM SELECTOR PLAN 2
Recent admission for left hip and knee pain found to have mets to femur now s/p fall at rehab.  - Stable vitals, left foot with distal pulse 2+, warm, able to move, no acute concern for neurovascular compromise  - XR showing pathological left femoral neck fracture s/p left QASIM on 8/18  - Ortho following; appreciate recs  - Fall precautions  - Home pain regiment was dilaudid PO 2mg moderate pain and 4mg severe pain q6 and fentanyl patch 37.5mg  (iSTOP note in chart, Reference #: [id number])  - Pain control per palliative: fentanyl patch 25 mg + IV dilaudid 1mg IV q4; will transition back to PO when appropriate was on dilaudid 6mg q4 + IV tylenol for breakthrough pain   - PT and OT daily

## 2020-08-21 NOTE — PROGRESS NOTE ADULT - PROBLEM SELECTOR PLAN 7
- Continue home synthroid 175mg + liothyronine 5mg  - TSH and T3 mildly low, T4 wnl - Continue home synthroid 175mg + liothyronine 5mg  - TSH and T3 mildly low, T4 wnl  - Endocrine following

## 2020-08-21 NOTE — PROGRESS NOTE ADULT - PROBLEM SELECTOR PLAN 6
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  - Patient follows with Dr. Winn at [facility], plan for second round of chemo and port placement outpatient  - s/p Xray of left clavicle found to have mets with possible fracture  - Pain management: as above  - Dental eval in house for bisphosphonate clearance  - Consulted IR for possible port placement inpatient however risk of infection high, recommend patient recover from hip surgery and pursue port placement as option, discussed with patient and daughter who are in agreement  - Oncology following, appreciate recs

## 2020-08-21 NOTE — PROGRESS NOTE ADULT - SUBJECTIVE AND OBJECTIVE BOX
HPI: 64F [facility] of hypothyroidism, stage IV lung CA (2017, s/p RLL lobectomy, s/p pall RT, s/p carbo/pemextred/pembrolizumab 7/31), recent femoral neck fracture, here after fall in home with L hip pain, with a L hip pathologic fracture. Also noted to be thrombocytopenic, likely from chemo.  Possible ORIF on 8/18/20 per ortho. Palliative called for pain management. Currently on fentanyl patch 25mcg, dilaudid 2mg/4mg for moderate/severe pain, and toradol 15mg Q6.  At home, patient was most recently on fentanyl 37.5mcg TDP, and dilaudid 4mg PO.    INTERVAL EVENTS:  8/18: states having pain, used dilaudid 6mg PO x 2/24 hours  8/19: states feeling better after surgery, using PCA, pain overall managed  8/20: more tired today, off PCA now, started on fentanyl 25mcg TDP, states having pain at op site; RRT o/n for SVT  8/21: states pain working well, used dilaudid 2.5mg IV    ADVANCE DIRECTIVES:    DNR  MOLST  [ ]  Living Will  [ ]   DECISION MAKER(s):  [ ] Health Care Proxy(s)  [ ] Surrogate(s)  [ ] Guardian           Name(s): Phone Number(s):   spouse  daughter Kena Cox [phone]     BASELINE (I)ADL(s) (prior to admission):  [place]: [X ]Total  [ ] Moderate [ ]Dependent    Allergies    Bananas (Other)  latex (Rash)  opioid-like analgesics (Urticaria)  penicillin (Angioedema)    Intolerances    MEDICATIONS  (STANDING):  acetaminophen   Tablet .. 975 milliGRAM(s) Oral every 8 hours  aMIOdarone    Tablet   Oral   aMIOdarone    Tablet 400 milliGRAM(s) Oral every 8 hours  celecoxib 200 milliGRAM(s) Oral every 12 hours  ciprofloxacin     Tablet 500 milliGRAM(s) Oral every 12 hours  fentaNYL   Patch  25 MICROgram(s)/Hr 1 Patch Transdermal every 72 hours  lactated ringers. 1000 milliLiter(s) (125 mL/Hr) IV Continuous <Continuous>  levothyroxine 175 MICROGram(s) Oral daily  liothyronine 5 MICROGram(s) Oral daily  pantoprazole    Tablet 40 milliGRAM(s) Oral before breakfast  rivaroxaban 10 milliGRAM(s) Oral daily    MEDICATIONS  (PRN):  ALPRAZolam 0.25 milliGRAM(s) Oral two times a day PRN anxiety  bisacodyl Suppository 10 milliGRAM(s) Rectal daily PRN If no bowel movement by POD#2  diphenhydrAMINE 25 milliGRAM(s) Oral every 4 hours PRN Rash and/or Itching  HYDROmorphone  Injectable 1 milliGRAM(s) IV Push every 4 hours PRN moderate-severe pain  HYDROmorphone  Injectable 0.5 milliGRAM(s) IV Push every 30 minutes PRN Moderate Pain (4 - 6)  naloxone Injectable 0.1 milliGRAM(s) IV Push every 3 minutes PRN For ANY of the following changes in patient status:  A. RR LESS THAN 10 breaths per minute, B. Oxygen saturation LESS THAN 90%, C. Sedation score of 6  ondansetron Injectable 4 milliGRAM(s) IV Push every 6 hours PRN Nausea and/or Vomiting  ondansetron Injectable 4 milliGRAM(s) IV Push every 6 hours PRN Nausea    PRESENT SYMPTOMS: [ ]Unable to obtain due to poor mentation   Source if other than patient:  [ ]Family   [ ]Team     Pain: [ X]yes [ ]no  QOL impact -   Location -       LLE             Aggravating factors - movement  Quality -  Radiation -  Timing-  Severity (0-10 scale): up to 10/10  Minimal acceptable level (0-10 scale):     CPOT:    https://www.[facility].org/getattachment/ngq41g10-0q6q-1s0q-8i5j-9463f6141f4a/Critical-Care-Pain-Observation-Tool-(CPOT)      PAIN AD Score:     http://geriatrictoolkit.[place].[facility]/cog/painad.pdf (press ctrl +  left click to view)    Dyspnea:                           [ ]Mild [ ]Moderate [ ]Severe  Anxiety:                             [ ]Mild [ ]Moderate [ ]Severe  Fatigue:                             [ ]Mild [ ]Moderate [ ]Severe  Nausea:                             [ ]Mild [ ]Moderate [ ]Severe  Loss of appetite:              [ ]Mild [ ]Moderate [ ]Severe  Constipation:                    [ ]Mild [ ]Moderate [ ]Severe    Other Symptoms:  [ ]All other review of systems negative     Palliative Performance Status Version 2:         %    http://npcrc.org/files/news/palliative_performance_scale_ppsv2.pdf    PHYSICAL EXAM:  Vital Signs Last 24 Hrs  T(C): 36.7 (21 Aug 2020 11:21), Max: 36.7 (21 Aug 2020 11:21)  T(F): 98.1 (21 Aug 2020 11:21), Max: 98.1 (21 Aug 2020 11:21)  HR: 80 (21 Aug 2020 11:21) (80 - 156)  BP: 115/78 (21 Aug 2020 11:21) (102/71 - 150/85)  BP(mean): --  RR: 18 (21 Aug 2020 11:21) (18 - 18)  SpO2: 97% (21 Aug 2020 11:21) (96% - 97%)%)    GENERAL:  [X ]Alert  [ ]Oriented x   [ ]Lethargic  [ ]Cachexia  [ ]Unarousable  [ X]Verbal  [ ]Non-Verbal  Behavioral:   [ ] Anxiety  [ ] Delirium [ ] Agitation [ ] Other  HEENT:  [ ]Normal   [ ]Dry mouth   [ ]ET Tube/Trach  [ ]Oral lesions  PULMONARY:   [X ]Clear [ ]Tachypnea  [ ]Audible excessive secretions   [ ]Rhonchi        [ ]Right [ ]Left [ ]Bilateral  [ ]Crackles        [ ]Right [ ]Left [ ]Bilateral  [ ]Wheezing     [ ]Right [ ]Left [ ]Bilateral  [ ]Diminished breath sounds [ ]right [ ]left [ ]bilateral  CARDIOVASCULAR:    [X ]Regular [ ]Irregular [ ]Tachy  [ ]Esteban [ ]Murmur [ ]Other  GASTROINTESTINAL:  [X ]Soft  [ ]Distended   [X ]+BS  [ X]Non tender [ ]Tender  [ ]PEG [ ]OGT/ NGT  Last BM:   GENITOURINARY:  [ ]Normal [ ] Incontinent   [ ]Oliguria/Anuria   [ ]Sparks  MUSCULOSKELETAL:   [ ]Normal   [ ]Weakness  [ ]Bed/Wheelchair bound [ ]Edema  NEUROLOGIC:   [X ]No focal deficits  [ ]Cognitive impairment  [ ]Dysphagia [ ]Dysarthria [ ]Paresis [ ]Other   SKIN:   [ ]Normal    [ ]Rash  [ ]Pressure ulcer(s)       Present on admission [ ]y [ ]n    CRITICAL CARE:  [ ] Shock Present  [ ]Septic [ ]Cardiogenic [ ]Neurologic [ ]Hypovolemic  [ ]  Vasopressors [ ]  Inotropes   [ ]Respiratory failure present [ ]Mechanical ventilation [ ]Non-invasive ventilatory support [ ]High flow  [ ]Acute  [ ]Chronic [ ]Hypoxic  [ ]Hypercarbic [ ]Other  [ ]Other organ failure     LABS: reviewed                          9.3    5.44  )-----------( 121      ( 21 Aug 2020 07:08 )             28.4     08-21    139  |  103  |  10  ----------------------------<  107<H>  3.3<L>   |  27  |  0.37<L>    Ca    8.2<L>      21 Aug 2020 07:08  Phos  4.1     08-21  Mg     1.9     08-21        RADIOLOGY & ADDITIONAL STUDIES:    Clavicle x-ray 8/15/2020  Lucency visualized within the proximal/mid shaft of the clavicle, which may represent a metastatic lesion. Question nondisplaced pathologic fracture through the lesion. The sternoclavicular articulation is not well visualized. The acromioclavicular joint is intact.    PROTEIN CALORIE MALNUTRITION PRESENT: [ ]mild [ ]moderate [ ]severe [ ]underweight [ ]morbid obesity  https://www.andeal.org/vault/2440/web/files/ONC/Table_Clinical%20Characteristics%20to%20Document%20Malnutrition-White%20JV%20et%20al%160431.pdf    Height (cm): 172.7 (08-14-20 @ 18:31)  Weight (kg): 88.6 (08-14-20 @ 18:31)  BMI (kg/m2): 29.7 (08-14-20 @ 18:31)    [ ]PPSV2 < or = to 30% [ ]significant weight loss  [ ]poor nutritional intake  [ ]anasarca     Albumin, Serum: 3.3 g/dL (08-17-20 @ 06:36)   [ ]Artificial Nutrition      REFERRALS:   [ ]Chaplaincy  [ ]Hospice  [ ]Child Life  [ ]Social Work  [ ]Case management [ ]Holistic Therapy     Goals of Care Document:     ______________  Armin Munguia MD   of Geriatric and Palliative Medicine  [facility]     Please page the following number for clinical matters between the hours of 9AM and 5PM   from Monday through Friday : [phone]    After 5PM and on weekends, please page: [phone]. The Geriatric and Palliative Medicine consult service has 24/7 coverage for medical recommendations, including for symptom management needs.

## 2020-08-21 NOTE — DISCHARGE NOTE PROVIDER - NSDCCPTREATMENT_GEN_ALL_CORE_FT
PRINCIPAL PROCEDURE  Procedure: Total hip arthroplasty  Findings and Treatment: ·  PROCEDURES:  ORIF fracture of left acetabulum   Left total hip arthroplasty   Operative Findings:  · Operative Findings  s/p left total hip arthroplasty, left ORIF acetabulum        SECONDARY PROCEDURE  Procedure: NM scan bone whole body  Findings and Treatment: FINDINGS: There is mild radiopharmaceutical accumulation in the proximal shaft of the left clavicle corresponding to lucency seen on comparison x-ray. There are faint foci in bilateral posterior approximatelyeighth ribs and heterogeneous tracer activity in the spine.  There is abnormally increased tracer activity in the mid right femur and distal left femur. Abnormality in the distal left femur demonstrates questionable cortical disruption which placesto places the patient at risk for pathologic fracture.  Both kidneys are visualized and are symmetric in appearance.  IMPRESSION: Abnormal bone scan.  Findings compatible with multifocal osseous metastases in the axial skeleton and bilateral femurs. Abnormality in the left femur places the patient at risk for pathologic fracture.

## 2020-08-21 NOTE — PROGRESS NOTE ADULT - PROBLEM SELECTOR PLAN 1
Patient with rapid response on 8/20 for asymptomatic sinus tachycardia to 160s.  - Pre-operative EKG showing sinus tachycardia to 130s  - s/p IV fluids, Labetalol 5mg, Toprol 12.5mg, 0.5mg Ativan, and Synthroid 175 during rapid response with only acute effect  - Unknown cause unlikely infectious (currently on cipro + aztreonam for UTI and afebrile), volume depletion (Hg 9.5, electrolytes wnl, tolerating PO intake with good urine output), thyroid (T4 normal with mildly low T3 and TSH receiving appropriate pharmacotherapy) possibly due to pain vs benzodiazapine withdrawal vs cardiac  - Consulted Cardiology and EP, appreciate recs  - Consulted endocrinology, Dr. Marcial following  - Telemetry monitoring  - Started on amiodarone 5g load plan for amiodarone 200mg daily  - Monitor BP, if SBP <100 will order fluids Patient with rapid response on 8/20 for asymptomatic sinus tachycardia to 160s.  - Pre-operative EKG showing sinus tachycardia to 130s  - s/p IV fluids, Labetalol 5mg, Toprol 12.5mg, 0.5mg Ativan, and Synthroid 175 during rapid response with only acute effect  - Unknown cause unlikely infectious (currently on cipro + aztreonam for UTI and afebrile), volume depletion (Hg 9.5, electrolytes wnl, tolerating PO intake with good urine output), thyroid (T4 normal with mildly low T3 and TSH receiving appropriate pharmacotherapy) possibly due to pain vs benzodiazapine withdrawal vs cardiac  - Consulted Cardiology and EP, appreciate recs  - Consulted endocrinology, Dr. Marcial following  - Telemetry monitoring  - Started on amiodarone 5g load plan for amiodarone 200mg daily  - Monitor BP, if SBP <100 will order fluids  - Pending Echo

## 2020-08-21 NOTE — PROGRESS NOTE ADULT - PROBLEM SELECTOR PLAN 5
Plt 62 at [facility], on admission to [facility] platelets 55 likely 2/2 recent chemotherapy on 7/31.  - on 7/31 platelets 235, have been 50s since 8/8 and stable, now plts 99  - No evidence of active bleeding, trend daily  - Heme/onc following; appreciate recs Plt 62 at [facility], on admission to [facility] platelets 55 likely 2/2 recent chemotherapy on 7/31.  - on 7/31 platelets 235, have been 50s since 8/8 and stable, now plts 121  - No evidence of active bleeding, trend daily  - Heme/onc following; appreciate recs

## 2020-08-21 NOTE — PROGRESS NOTE ADULT - SUBJECTIVE AND OBJECTIVE BOX
Patient seen and examined at bedside. Reports no acute complaints at this time. Pain is well controlled. No other acute events overnight.      Vital Signs Last 24 Hrs  T(C): 36.5 (21 Aug 2020 04:46), Max: 36.7 (20 Aug 2020 12:24)  T(F): 97.7 (21 Aug 2020 04:46), Max: 98.1 (20 Aug 2020 12:24)  HR: 80 (21 Aug 2020 04:46) (80 - 163)  BP: 119/74 (21 Aug 2020 04:46) (92/66 - 119/74)  BP(mean): --  RR: 18 (21 Aug 2020 04:46) (18 - 18)  SpO2: 97% (21 Aug 2020 04:46) (92% - 97%)    Gen: NAD, laying comfortably in bed  Resp: Unlabored breathing  MSK:   LLE:  Dressing c/d/i, prevena  HV SS          +EHL/FHL/TA/Gas/SOl          +DP/SP/Vita/Saph/T           2+DP  Abd pillow in place    LUE:  sling on   AIN/PIN/U motor +  SILT M/R/U  [facility]    LABS:                        9.6    4.31  )-----------( 105      ( 20 Aug 2020 09:12 )             28.5     20 Aug 2020 09:12    138    |  102    |  6      ----------------------------<  130    3.5     |  28     |  0.38     Ca    8.1        20 Aug 2020 09:12  Phos  3.6       20 Aug 2020 09:12  Mg     1.9       20 Aug 2020 09:12    TPro  4.6    /  Alb  2.5    /  TBili  0.7    /  DBili  x      /  AST  22     /  ALT  23     /  AlkPhos  103    20 Aug 2020 09:12    PT/INR - ( 20 Aug 2020 09:15 )   PT: 12.4 sec;   INR: 1.04 ratio         PTT - ( 20 Aug 2020 09:15 )  PTT:32.9 sec

## 2020-08-21 NOTE — DISCHARGE NOTE PROVIDER - PROVIDER TOKENS
PROVIDER:[TOKEN:[[id number]]],PROVIDER:[TOKEN:[[id number]]],PROVIDER:[TOKEN:[[id number]]],PROVIDER:[TOKEN:[[id number]]]

## 2020-08-21 NOTE — DISCHARGE NOTE PROVIDER - HOSPITAL COURSE
Patient is a 64 yr old F with PMHx of hypothyroidism, anxiety, and stage IV lung adenocarcinoma s/p RLL lobectomy with recurrence in May 2020, s/p palliative radiation and 1 round of carbo/pemextred/pembrolizumab chemotherapy 7/31 who was transferred from [facility] after falling at the rehabilitation facility. She was found to have a pathologic left hip and femoral neck fracture. She was transferred to [facility] for medical optimization prior to undergoing surgery. She was followed by Oncology who gave her 1 x filagrastim prior to operation due to ANC of 1140. She also had a UTI and was treated with aztreonam prior to operation. Palliative care team was consulted for pain management and continued her on a transdermal fentanyl patch 25mg, increased her dilaudid to 6mg q4 PRN, and added IV tylenol for breakthrough pain. Her vitals were stable, EKG showed sinus tachycardia to 120s attributed to pain, CXR showed no cardiopulmonary disease, laboratory studies were wnl, and she was medically cleared for the OR. She was followed by Orthopedics and underwent a left QASIM. She was stable postoperatively with WBC of 3.18 and platelets 86. Her pain was controlled with a dilaudid PCA pump. She was able to participate in physical therapy. On post-operative day 2 she was found to have an elevated HR to 160s with no symptoms. A rapid response was called, EKG showed sinus tachycardia to 160s, CTA was negative for pulmonary embolism. During the rapid response, the patient received IV fluids, labetalol, metoprolol succinate, ativan, synthroid, and electrolyte repletion. Causes of sinus tachycardia were assessed including pain management (dilaudid pump), volume status (Hg 9.6, received fluids), infection (afebrile, WBC normal, receiving aztreonam + vancomycin post operatively), thyroid studies (TSH, free T4 normal), and xanax withdrawal (missed 1 dose but received ativan) which were all unlikely. She was transferred to the telemetry unit for further monitoring where she continued to have atrial tachycardia vs atrial flutter. Cardiology was consulted and she was started on an amiodarone 5mg load with conversion back to sinus rhythm HR 70s-80s. Echo showed _________. Her pain management was transitioned from dilaudid pump to 1mg IV and then to oral ______. At time of discharge, patient's vitals and laboratory studies were stable, pain was well controlled, and she was able to partcipate in physical therapy with plan to continue therapy at subacute rehab. Patient is a 64 yr old F with PMHx of hypothyroidism, anxiety, and stage IV lung adenocarcinoma s/p RLL lobectomy with recurrence in May 2020, s/p palliative radiation and 1 round of carbo/pemextred/pembrolizumab chemotherapy 7/31 who was transferred from [facility] after falling at the rehabilitation facility. She was found to have a pathologic left hip and femoral neck fracture. She was transferred to [facility] for medical optimization prior to undergoing surgery. She was followed by Oncology who gave her 1 x filagrastim prior to operation due to ANC of 1140. She also had a UTI and was treated with aztreonam prior to operation. Palliative care team was consulted for pain management and continued her on a transdermal fentanyl patch 25mg, increased her dilaudid to 6mg q4 PRN, and added IV tylenol for breakthrough pain. Her vitals were stable, EKG showed sinus tachycardia to 120s attributed to pain, CXR showed no cardiopulmonary disease, laboratory studies were wnl, and she was medically cleared for the OR. She was followed by Orthopedics and underwent a left QASIM. She was stable postoperatively with WBC of 3.18 and platelets 86. Her pain was controlled with a dilaudid PCA pump. She was able to participate in physical therapy. On post-operative day 2 she was found to have an elevated HR to 160s with no symptoms. A rapid response was called, EKG showed sinus tachycardia to 160s, CTA was negative for pulmonary embolism. During the rapid response, the patient received IV fluids, labetalol, metoprolol succinate, ativan, synthroid, and electrolyte repletion. Causes of sinus tachycardia were assessed including pain management (dilaudid pump), volume status (Hg 9.6, received fluids), infection (afebrile, WBC normal, receiving aztreonam + vancomycin post operatively), thyroid studies (TSH, free T4 normal), and Xanax withdrawal (missed 1 dose but received ativan) which were all unlikely. She was transferred to the telemetry unit for further monitoring where she continued to have atrial tachycardia vs atrial flutter. Cardiology was consulted and she was started on an amiodarone 5mg load with conversion back to sinus rhythm HR 70s-80s. Echo was limited due to tachycardia however valvular function and LV systolic function were normal. She was continued on maintenance amnioadarone 200mg oral daily. Her pain management was transitioned from dilaudid pump to 1mg IV and then to oral ______. At time of discharge, patient's vitals and laboratory studies were stable, pain was well controlled, and she was able to partcipate in physical therapy with plan to continue therapy at subacute rehab. Patient is a 64 yr old F with PMHx of hypothyroidism, anxiety, and stage IV lung adenocarcinoma s/p RLL lobectomy with recurrence in May 2020, s/p palliative radiation and 1 round of carbo/pemextred/pembrolizumab chemotherapy 7/31 who was transferred from [facility] after falling at the rehabilitation facility. She was found to have a pathologic left hip and femoral neck fracture. She was transferred to [facility] for medical optimization prior to undergoing surgery. She was followed by Oncology who gave her 1 x filagrastim prior to operation due to ANC of 1140. She also had a UTI and was treated with aztreonam prior to operation. Palliative care team was consulted for pain management and continued her on a transdermal fentanyl patch 25mg, increased her dilaudid to 6mg q4 PRN, and added IV tylenol for breakthrough pain. Her vitals were stable, EKG showed sinus tachycardia to 120s attributed to pain, CXR showed no cardiopulmonary disease, laboratory studies were wnl, and she was medically cleared for the OR.         She underwent a left QASIM per ortho. She was stable postoperatively with WBC of 3.18 and platelets 86. Her pain was controlled with a dilaudid PCA pump. She was able to participate in physical therapy. On post-operative day 2 she was found to have an elevated HR to 160s with no symptoms. A rapid response was called, EKG showed sinus tachycardia to 160s, CTA was negative for pulmonary embolism. During the rapid response, the patient received IV fluids, labetalol, metoprolol succinate, ativan, synthroid, and electrolyte repletion. Causes of sinus tachycardia were assessed including pain management (dilaudid pump), volume status (Hg 9.6, received fluids), infection (afebrile, WBC normal, receiving aztreonam + vancomycin post operatively), thyroid studies (TSH, free T4 normal), and Xanax withdrawal (missed 1 dose but received ativan) which were all unlikely. She was transferred to the telemetry unit for further monitoring where she continued to have atrial tachycardia vs atrial flutter. Cardiology was consulted and she was started on an amiodarone 5mg load with conversion back to sinus rhythm HR 70s-80s. Echo was limited due to tachycardia however valvular function and LV systolic function were normal. EP switched her from amio to metoprolol 50 daily. She was in sinus regular rate/rhythm over last few days without need for tele monitoring.         Neurosurgery was consulted for osseous lesions in spine. She was to undergo a full spine MRI (cervical, thoracic, lumbar). She had anxiety and only cervical noncontrast MRI was completed, showed: abnormal signal in C5 and T1 vertebral bodies likely mets; epidural extension of tumor suspected on L side at T1 lvl and L sided paraspinal extension suspected. Neurosurgery recommended attempting MRI full spine again with contrast.         Oncology (outpatient and inpatient) confirmed she can go to rehab without need for systemic therapy.         At time of discharge, patient's vitals and laboratory studies were stable, pain was well controlled, and she was able to partcipate in physical therapy with plan to continue therapy at subacute rehab. Patient is a 64 yr old F with PMHx of hypothyroidism, anxiety, and stage IV lung adenocarcinoma s/p RLL lobectomy with recurrence in May 2020, s/p palliative radiation and 1 round of carbo/pemextred/pembrolizumab chemotherapy 7/31 who was transferred from [facility] after falling at the rehabilitation facility. She was found to have a pathologic left hip and femoral neck fracture. She was transferred to [facility] for medical optimization prior to undergoing surgery. She was followed by Oncology who gave her 1 x filagrastim prior to operation due to ANC of 1140. She also had a UTI and was treated with aztreonam prior to operation. Palliative care team was consulted for pain management and continued her on a transdermal fentanyl patch 25mg, increased her dilaudid to 6mg q4 PRN, and added IV tylenol for breakthrough pain. Her vitals were stable, EKG showed sinus tachycardia to 120s attributed to pain, CXR showed no cardiopulmonary disease, laboratory studies were wnl, and she was medically cleared for the OR.         She underwent a left QASIM per ortho. She was stable postoperatively with WBC of 3.18 and platelets 86. Her pain was controlled with a dilaudid PCA pump. She was able to participate in physical therapy. On post-operative day 2 she was found to have an elevated HR to 160s with no symptoms. A rapid response was called, EKG showed sinus tachycardia to 160s, CTA was negative for pulmonary embolism. During the rapid response, the patient received IV fluids, labetalol, metoprolol succinate, ativan, synthroid, and electrolyte repletion. Causes of sinus tachycardia were assessed including pain management (dilaudid pump), volume status (Hg 9.6, received fluids), infection (afebrile, WBC normal, receiving aztreonam + vancomycin post operatively), thyroid studies (TSH, free T4 normal), and Xanax withdrawal (missed 1 dose but received ativan) which were all unlikely. She was transferred to the telemetry unit for further monitoring where she continued to have atrial tachycardia vs atrial flutter. Cardiology was consulted and she was started on an amiodarone 5mg load with conversion back to sinus rhythm HR 70s-80s. Echo was limited due to tachycardia however valvular function and LV systolic function were normal. EP switched her from amio to metoprolol 50 daily. She was in sinus regular rate/rhythm over last few days without need for tele monitoring.         Neurosurgery was consulted for osseous lesions in spine. She was to undergo a full spine MRI (cervical, thoracic, lumbar). MRI showed: Pre and postcontrast imaging of the thoracic and lumbosacral spine and postcontrast imaging of the cervical spine demonstrate osseous metastasis as described above. There is no cord or cauda equina compression. Neurosurgery recommended no intervention at this time.         Oncology (outpatient and inpatient) confirmed she can go to rehab without need for systemic therapy.         At time of discharge, patient's vitals and laboratory studies were stable, pain was well controlled, and she was able to partcipate in physical therapy with plan to continue therapy at subacute rehab.        To do:     [ ] f/u oncology outpatient    [ ] f/u primary care outpatient    [ ] f/u ortho outpatient     [ ] f/u cardiology outpatient    [ ] f/u endocrinology outpatient Patient is a 64 yr old F with PMHx of hypothyroidism, anxiety, and stage IV lung adenocarcinoma s/p RLL lobectomy with recurrence in May 2020, s/p palliative radiation and 1 round of carbo/pemextred/pembrolizumab chemotherapy 7/31 who was transferred from [facility] after falling at the rehabilitation facility. She was found to have a pathologic left hip and femoral neck fracture. She was transferred to [facility] for medical optimization prior to undergoing surgery. She was followed by Oncology who gave her 1 x filagrastim prior to operation due to ANC of 1140. She also had a UTI and was treated with aztreonam prior to operation. Palliative care team was consulted for pain management and continued her on a transdermal fentanyl patch 25mg, increased her dilaudid to 6mg q4 PRN, and added IV tylenol for breakthrough pain. Her vitals were stable, EKG showed sinus tachycardia to 120s attributed to pain, CXR showed no cardiopulmonary disease, laboratory studies were wnl, and she was medically cleared for the OR.         She underwent a left QASIM per ortho. She was stable postoperatively with WBC of 3.18 and platelets 86. Her pain was controlled with a dilaudid PCA pump. She was able to participate in physical therapy. On post-operative day 2 she was found to have an elevated HR to 160s with no symptoms. A rapid response was called, EKG showed sinus tachycardia to 160s, CTA was negative for pulmonary embolism. During the rapid response, the patient received IV fluids, labetalol, metoprolol succinate, ativan, synthroid, and electrolyte repletion. Causes of sinus tachycardia were assessed including pain management (dilaudid pump), volume status (Hg 9.6, received fluids), infection (afebrile, WBC normal, receiving aztreonam + vancomycin post operatively), thyroid studies (TSH, free T4 normal), and Xanax withdrawal (missed 1 dose but received ativan) which were all unlikely. She was transferred to the telemetry unit for further monitoring where she continued to have atrial tachycardia vs atrial flutter. Cardiology was consulted and she was started on an amiodarone 5mg load with conversion back to sinus rhythm HR 70s-80s. Echo was limited due to tachycardia however valvular function and LV systolic function were normal. EP switched her from amio to metoprolol 50 daily. She was in sinus regular rate/rhythm over last few days without need for tele monitoring.         Neurosurgery was consulted for osseous lesions in spine. She was to undergo a full spine MRI (cervical, thoracic, lumbar). MRI showed: Pre and postcontrast imaging of the thoracic and lumbosacral spine and postcontrast imaging of the cervical spine demonstrate osseous metastasis as described above. There is no cord or cauda equina compression. Neurosurgery recommended no intervention at this time.         Oncology (outpatient and inpatient) confirmed she can go to rehab without need for systemic therapy.         At time of discharge, patient's vitals and laboratory studies were stable, pain was well controlled, and she was able to partcipate in physical therapy with plan to continue therapy at subacute rehab.        Please f/u with outpatient dentist for comprehensive dental care and clearance for therapy.             To do:     [ ] f/u oncology outpatient    [ ] f/u primary care outpatient    [ ] f/u ortho outpatient     [ ] f/u cardiology outpatient    [ ] f/u endocrinology outpatient    [ ] f/u dental

## 2020-08-21 NOTE — PROGRESS NOTE ADULT - ASSESSMENT
A/P: 64F L path clavicle fx, L distal femur lesion, and L path FN fx s/p QASIM  -Neuro: Multimodal pain control  -Resp: IS  -GI: reg diet  -MSK: OOB, WBAT LLE, ABd pillow, posterior precautions, NWB LUE in sling, PT/OT  -Heme: DVT PPx: Xarelto to start tonight  FU Dental consult  Cardio/med recs appreciated  [place] LLE unlocked  FU HV, prevena  Will discuss with attending and advise if plan changes

## 2020-08-21 NOTE — PROGRESS NOTE ADULT - SUBJECTIVE AND OBJECTIVE BOX
Chief complaint  Patient is a 64y old  Female who presents with a chief complaint of Left hip pain and fall (21 Aug 2020 10:52)   Review of systems  Patient in bed, looks comfortable.    Labs and Fingersticks  CAPILLARY BLOOD GLUCOSE          Anion Gap, Serum: 9 (08-21 @ 07:08)  Anion Gap, Serum: 8 (08-20 @ 09:12)  Anion Gap, Serum: 8 (08-19 @ 23:45)      Calcium, Total Serum: 8.2 <L> (08-21 @ 07:08)  Calcium, Total Serum: 8.1 <L> (08-20 @ 09:12)  Calcium, Total Serum: 8.0 <L> (08-19 @ 23:45)  Albumin, Serum: 2.4 <L> (08-21 @ 07:08)  Albumin, Serum: 2.5 <L> (08-20 @ 09:12)  Albumin, Serum: 2.5 <L> (08-19 @ 23:45)    Alanine Aminotransferase (ALT/SGPT): 24 (08-21 @ 07:08)  Alanine Aminotransferase (ALT/SGPT): 23 (08-20 @ 09:12)  Alanine Aminotransferase (ALT/SGPT): 26 (08-19 @ 23:45)  Alkaline Phosphatase, Serum: 114 (08-21 @ 07:08)  Alkaline Phosphatase, Serum: 103 (08-20 @ 09:12)  Alkaline Phosphatase, Serum: 110 (08-19 @ 23:45)  Aspartate Aminotransferase (AST/SGOT): 24 (08-21 @ 07:08)  Aspartate Aminotransferase (AST/SGOT): 22 (08-20 @ 09:12)  Aspartate Aminotransferase (AST/SGOT): 24 (08-19 @ 23:45)        08-21    139  |  103  |  10  ----------------------------<  107<H>  3.3<L>   |  27  |  0.37<L>    Ca    8.2<L>      21 Aug 2020 07:08  Phos  4.1     08-21  Mg     1.9     08-21    TPro  4.7<L>  /  Alb  2.4<L>  /  TBili  0.7  /  DBili  x   /  AST  24  /  ALT  24  /  AlkPhos  114  08-21                        9.3    5.44  )-----------( 121      ( 21 Aug 2020 07:08 )             28.4     Medications  MEDICATIONS  (STANDING):  acetaminophen   Tablet .. 975 milliGRAM(s) Oral every 8 hours  aMIOdarone    Tablet   Oral   aMIOdarone    Tablet 400 milliGRAM(s) Oral every 8 hours  celecoxib 200 milliGRAM(s) Oral every 12 hours  ciprofloxacin     Tablet 500 milliGRAM(s) Oral every 12 hours  fentaNYL   Patch  25 MICROgram(s)/Hr 1 Patch Transdermal every 72 hours  lactated ringers. 1000 milliLiter(s) (125 mL/Hr) IV Continuous <Continuous>  levothyroxine 175 MICROGram(s) Oral daily  liothyronine 5 MICROGram(s) Oral daily  pantoprazole    Tablet 40 milliGRAM(s) Oral before breakfast  rivaroxaban 10 milliGRAM(s) Oral daily      Physical Exam  General: Patient comfortable in bed  Vital Signs Last 12 Hrs  T(F): 98.1 (08-21-20 @ 11:21), Max: 98.1 (08-21-20 @ 11:21)  HR: 80 (08-21-20 @ 11:21) (80 - 81)  BP: 115/78 (08-21-20 @ 11:21) (115/78 - 150/85)  BP(mean): --  RR: 18 (08-21-20 @ 11:21) (18 - 18)  SpO2: 97% (08-21-20 @ 11:21) (96% - 97%)  Neck: No palpable thyroid nodules.  CVS: S1S2, No murmurs  Respiratory: No wheezing, no crepitations  GI: Abdomen soft, bowel sounds positive  Musculoskeletal:  edema lower extremities.   Skin: No skin rashes, no ecchymosis    Diagnostics Chief complaint  Patient is a 64y old  Female who presents with a chief complaint of Left hip pain and fall (21 Aug 2020 10:52)   Review of systems  Patient in bed, looks comfortable.    Labs and Fingersticks  CAPILLARY BLOOD GLUCOSE    Anion Gap, Serum: 9 (08-21 @ 07:08)  Anion Gap, Serum: 8 (08-20 @ 09:12)  Anion Gap, Serum: 8 (08-19 @ 23:45)      Calcium, Total Serum: 8.2 <L> (08-21 @ 07:08)  Calcium, Total Serum: 8.1 <L> (08-20 @ 09:12)  Calcium, Total Serum: 8.0 <L> (08-19 @ 23:45)  Albumin, Serum: 2.4 <L> (08-21 @ 07:08)  Albumin, Serum: 2.5 <L> (08-20 @ 09:12)  Albumin, Serum: 2.5 <L> (08-19 @ 23:45)    Alanine Aminotransferase (ALT/SGPT): 24 (08-21 @ 07:08)  Alanine Aminotransferase (ALT/SGPT): 23 (08-20 @ 09:12)  Alanine Aminotransferase (ALT/SGPT): 26 (08-19 @ 23:45)  Alkaline Phosphatase, Serum: 114 (08-21 @ 07:08)  Alkaline Phosphatase, Serum: 103 (08-20 @ 09:12)  Alkaline Phosphatase, Serum: 110 (08-19 @ 23:45)  Aspartate Aminotransferase (AST/SGOT): 24 (08-21 @ 07:08)  Aspartate Aminotransferase (AST/SGOT): 22 (08-20 @ 09:12)  Aspartate Aminotransferase (AST/SGOT): 24 (08-19 @ 23:45)        08-21    139  |  103  |  10  ----------------------------<  107<H>  3.3<L>   |  27  |  0.37<L>    Ca    8.2<L>      21 Aug 2020 07:08  Phos  4.1     08-21  Mg     1.9     08-21    TPro  4.7<L>  /  Alb  2.4<L>  /  TBili  0.7  /  DBili  x   /  AST  24  /  ALT  24  /  AlkPhos  114  08-21                        9.3    5.44  )-----------( 121      ( 21 Aug 2020 07:08 )             28.4     Medications  MEDICATIONS  (STANDING):  acetaminophen   Tablet .. 975 milliGRAM(s) Oral every 8 hours  aMIOdarone    Tablet   Oral   aMIOdarone    Tablet 400 milliGRAM(s) Oral every 8 hours  celecoxib 200 milliGRAM(s) Oral every 12 hours  ciprofloxacin     Tablet 500 milliGRAM(s) Oral every 12 hours  fentaNYL   Patch  25 MICROgram(s)/Hr 1 Patch Transdermal every 72 hours  lactated ringers. 1000 milliLiter(s) (125 mL/Hr) IV Continuous <Continuous>  levothyroxine 175 MICROGram(s) Oral daily  liothyronine 5 MICROGram(s) Oral daily  pantoprazole    Tablet 40 milliGRAM(s) Oral before breakfast  rivaroxaban 10 milliGRAM(s) Oral daily      Physical Exam  General: Patient comfortable in bed  Vital Signs Last 12 Hrs  T(F): 98.1 (08-21-20 @ 11:21), Max: 98.1 (08-21-20 @ 11:21)  HR: 80 (08-21-20 @ 11:21) (80 - 81)  BP: 115/78 (08-21-20 @ 11:21) (115/78 - 150/85)  BP(mean): --  RR: 18 (08-21-20 @ 11:21) (18 - 18)  SpO2: 97% (08-21-20 @ 11:21) (96% - 97%)  Neck: No palpable thyroid nodules.  CVS: S1S2, No murmurs  Respiratory: No wheezing, no crepitations  GI: Abdomen soft, bowel sounds positive  Musculoskeletal:  edema lower extremities.   Skin: No skin rashes, no ecchymosis    Diagnostics

## 2020-08-21 NOTE — PROGRESS NOTE ADULT - PROBLEM SELECTOR PLAN 1
- off dilaudid PCA  - c/w 25mcg TDP fentanyl patch and dilaudid 1mg IV Q4 PRN for mod-severe pain  - will transition back to dilaudid PO regimen next week

## 2020-08-21 NOTE — PROGRESS NOTE ADULT - ASSESSMENT
64F h/o hypothyroidism, Stage IV lung adenocarcinoma diagnosed in 2017 s/p right lower lobectomy with recurrence 5/2020 s/p palliative radiation to clavicle and spine and 1 round of carbo/pemextred/pembrolizumab chemo 7/31 with recent admission 8/8 for pending femoral neck fracture now transferred from [facility] after fall in home with left hip pain, imaging showing left hip pathologic fracture. Patient also noted to have thrombocytopenia likely secondary to recent chemotherapy.

## 2020-08-21 NOTE — PROGRESS NOTE ADULT - ASSESSMENT
Assessment  Hypothyroidism: 64y Female with hypothyroidism, on synthroid 175mcg and cytomel 5mcg po daily, reports being compliant with medication intake, TSH was slightly suppressed, repeat TFTs within normal range. Patient converted to sinus rhythm, appears comfortable, planning DC to [facility].  Tachycardia: On meds, monitored, FU Cardio/EPS.  Pathologic Hip Fx: Postop, on pain meds, stable.          Carlin Marcial MD  Cell: [phone]  Office: [phone]

## 2020-08-21 NOTE — CHART NOTE - NSCHARTNOTEFT_GEN_A_CORE
Nutrition Follow Up Note  Patient seen for malnutrition follow-up.     Chart reviewed, events noted.    Source: Pt, EMR    Diet : Regular diet    Patient reports:    PO intake :     Daily Weight in kg: no new weights.   % Weight Change    Pertinent Medications: MEDICATIONS  (STANDING):  acetaminophen   Tablet .. 975 milliGRAM(s) Oral every 8 hours  ALPRAZolam 0.25 milliGRAM(s) Oral two times a day  aMIOdarone    Tablet   Oral   aMIOdarone    Tablet 400 milliGRAM(s) Oral every 8 hours  aztreonam  IVPB 1000 milliGRAM(s) IV Intermittent every 8 hours  celecoxib 200 milliGRAM(s) Oral every 12 hours  ciprofloxacin     Tablet 500 milliGRAM(s) Oral every 12 hours  fentaNYL   Patch  25 MICROgram(s)/Hr 1 Patch Transdermal every 72 hours  lactated ringers. 1000 milliLiter(s) (125 mL/Hr) IV Continuous <Continuous>  levothyroxine 175 MICROGram(s) Oral daily  liothyronine 5 MICROGram(s) Oral daily  pantoprazole    Tablet 40 milliGRAM(s) Oral before breakfast  rivaroxaban 10 milliGRAM(s) Oral daily    MEDICATIONS  (PRN):  bisacodyl Suppository 10 milliGRAM(s) Rectal daily PRN If no bowel movement by POD#2  diphenhydrAMINE 25 milliGRAM(s) Oral every 4 hours PRN Rash and/or Itching  HYDROmorphone  Injectable 1 milliGRAM(s) IV Push every 4 hours PRN moderate-severe pain  HYDROmorphone  Injectable 0.5 milliGRAM(s) IV Push every 30 minutes PRN Moderate Pain (4 - 6)  naloxone Injectable 0.1 milliGRAM(s) IV Push every 3 minutes PRN For ANY of the following changes in patient status:  A. RR LESS THAN 10 breaths per minute, B. Oxygen saturation LESS THAN 90%, C. Sedation score of 6  ondansetron Injectable 4 milliGRAM(s) IV Push every 6 hours PRN Nausea and/or Vomiting  ondansetron Injectable 4 milliGRAM(s) IV Push every 6 hours PRN Nausea    Pertinent Labs: 08-21 @ 07:08: Na 139, BUN 10, Cr 0.37<L>, <H>, K+ 3.3<L>, Phos 4.1, Mg 1.9, Alk Phos 114, ALT/SGPT 24, AST/SGOT 24, HbA1c --      Skin per nursing documentation:  no pressure injuries  Edema: 2+ L hip    Estimated Needs:   [x] no change since previous assessment  [ ] recalculated:     Previous Nutrition Diagnosis: Moderate malnutrition  Nutrition Diagnosis is: ongoing, to be addressed with Ensure supplementation    New Nutrition Diagnosis: none at this time    Interventions: Nutrition supplementation of Ensure Enlive once daily to provide additional 350kcal and 20g protein in setting of decreased PO intake     Recommend  1) Continue regular diet, please add Ensure Enlive once daily      Monitoring and Evaluation:   Continue to monitor Nutritional intake, Tolerance to diet prescription, weights, labs, skin integrity    Halima Palumbo RD, CDN  Pager [phone]  RD remains available upon request and will follow up per protocol Nutrition Follow Up Note  Patient seen for malnutrition follow-up.     Chart reviewed, events noted.    Source: Pt, EMR    Diet : Regular diet    Patient reports fair PO intake and appetite in-house, pt has not yet eaten lunch. Pt endorses two loose bowel movements daily and was questioning why she is having more frequent BMs. Discussed increased bowel movements may be side effect of antibiotics.  She also had question about whether sugar feeds cancer. Explained the myth to the pt, however encouraged her to limit added sugar consumption as well as refined carbohydrate consumption and to focus on intake of whole foods. Briefly discussed mediterranean diet as well. Obtained food preferences, will supply pt with fresh fruit daily. Pt is amenable to receive Ensure while PO intake is poor, however encouraged her to focus on consumption of meal first and to use Ensure as a supplement only. Lastly, discussed nutrition supplementation during future chemotherapy should the pt have any nutrition-related side-effects.    PO intake : Fair per pt, 60% of breakfast consumed this morning per flow sheets    Daily Weight in kg: no new weights.   % Weight Change    Pertinent Medications: MEDICATIONS  (STANDING):  acetaminophen   Tablet .. 975 milliGRAM(s) Oral every 8 hours  ALPRAZolam 0.25 milliGRAM(s) Oral two times a day  aMIOdarone    Tablet   Oral   aMIOdarone    Tablet 400 milliGRAM(s) Oral every 8 hours  aztreonam  IVPB 1000 milliGRAM(s) IV Intermittent every 8 hours  celecoxib 200 milliGRAM(s) Oral every 12 hours  ciprofloxacin     Tablet 500 milliGRAM(s) Oral every 12 hours  fentaNYL   Patch  25 MICROgram(s)/Hr 1 Patch Transdermal every 72 hours  lactated ringers. 1000 milliLiter(s) (125 mL/Hr) IV Continuous <Continuous>  levothyroxine 175 MICROGram(s) Oral daily  liothyronine 5 MICROGram(s) Oral daily  pantoprazole    Tablet 40 milliGRAM(s) Oral before breakfast  rivaroxaban 10 milliGRAM(s) Oral daily    MEDICATIONS  (PRN):  bisacodyl Suppository 10 milliGRAM(s) Rectal daily PRN If no bowel movement by POD#2  diphenhydrAMINE 25 milliGRAM(s) Oral every 4 hours PRN Rash and/or Itching  HYDROmorphone  Injectable 1 milliGRAM(s) IV Push every 4 hours PRN moderate-severe pain  HYDROmorphone  Injectable 0.5 milliGRAM(s) IV Push every 30 minutes PRN Moderate Pain (4 - 6)  naloxone Injectable 0.1 milliGRAM(s) IV Push every 3 minutes PRN For ANY of the following changes in patient status:  A. RR LESS THAN 10 breaths per minute, B. Oxygen saturation LESS THAN 90%, C. Sedation score of 6  ondansetron Injectable 4 milliGRAM(s) IV Push every 6 hours PRN Nausea and/or Vomiting  ondansetron Injectable 4 milliGRAM(s) IV Push every 6 hours PRN Nausea    Pertinent Labs: 08-21 @ 07:08: Na 139, BUN 10, Cr 0.37<L>, <H>, K+ 3.3<L>, Phos 4.1, Mg 1.9, Alk Phos 114, ALT/SGPT 24, AST/SGOT 24, HbA1c --      Skin per nursing documentation:  no pressure injuries  Edema: 2+ L hip    Estimated Needs:   [x] no change since previous assessment  [ ] recalculated:     Previous Nutrition Diagnosis: Moderate malnutrition  Nutrition Diagnosis is: ongoing, to be addressed with Ensure supplementation    New Nutrition Diagnosis: none at this time    Interventions: Nutrition supplementation of Ensure Enlive once daily to provide additional 350kcal and 20g protein in setting of decreased PO intake     Recommend  1) Continue regular diet, please add Ensure Enlive once daily  2) Please monitor PO intake daily, current weight.  3) Pt aware RD remains available for any questions or diet preferences       Monitoring and Evaluation:   Continue to monitor Nutritional intake, Tolerance to diet prescription, weights, labs, skin integrity    Halima Palumbo RD, CDN  Pager [phone]  RD remains available upon request and will follow up per protocol

## 2020-08-21 NOTE — PROGRESS NOTE ADULT - ASSESSMENT
64F [facility] of hypothyroidism, stage IV lung CA (2017, s/p RLL lobectomy, s/p pall RT, s/p carbo/pemextred/pembrolizumab 7/31), recent femoral neck fracture, here after fall in home with L hip pain, with a L hip pathologic fracture. Also noted to be thrombocytopenic, likely from chemo.  Possible ORIF on 8/18/20 per ortho. Palliative called for pain management. Currently on fentanyl patch 25mcg, dilaudid 2mg/4mg for moderate/severe pain, and toradol 15mg Q6.  At home, patient was most recently on fentanyl 37.5mcg TDP, and dilaudid 4mg PO.

## 2020-08-21 NOTE — PROGRESS NOTE ADULT - ATTENDING COMMENTS
Seen, examined the patient this am with house staff  64F h/o Stage IV lung adenocarcinoma, 2017, s/p right lower lobectomy with recurrence 5/2020 s/p palliative radiation to clavicle and spine on chemo(1 round of carbo/pemextred/pembrolizumab- 7/31) with recent admission 8/8 for pending femoral neck fracture now transferred from [facility] after fall in home with left hip pain, imaging showing left hip pathologic fracture, also UTI on admission. S/P L hemiarthroplasty 8/18, developed atial tachycardia around 160s, EP saw.    Resting in bed, feels better today, no SOB, chest pain, hemodynamically stable, Tele- SR 60-80s  -115, has knee immobilizer LLE   - Reviewed labs, imaging. WBC 5.4, Hb 9.3, Plt 121    CTA chest - no PE  - EP f/u plan noted, on po Amioload 400mg tid till 5gm, then 200mg daily    Per EP not candidate for DCCV  - Ortho plan noted, WBAT on LLE, has a knee immobilizer, Xarelto tonight for DVT ppx    PT recommended Rehab     On po cipro for UTI, urine c/s showed E. Coli, enterococci  - Heme plan noted, patient had 1 dose Zarxio 480mcg x1 prior to Sx for neutropenia    She was recently started on Carbo/Pemextred/Pembrolizumab on 7/31/20, cycle one for adenoCa of Lung w mets    Chemo on hold  - IR recommended Mediport placement as outpatient  - Fall precaution  - d/c planning in progress to [facility] once cleared from Ortho and EP/card   * spoke to daughter- Kena the plan yesterday

## 2020-08-21 NOTE — PROGRESS NOTE ADULT - SUBJECTIVE AND OBJECTIVE BOX
Authored by Daniela Benson MS4 pager [phone]    Patient is a 64y old  Female who presents with a chief complaint of Left hip pain and fall (16 Aug 2020 06:34)      OVERNIGHT EVENTS: Patient continued to have elevated HR overnight with no symptoms. She was seen at bedside this am, she says her pain is controlled. She has been able tolerating PO intake. She has no complaints. She denies palpitations, headache, chest pain, SOB, N&V, abdominal pain, suprapubic pain, and tingling/numbness in extremities.  TELE:      MEDICATIONS  (STANDING):  acetaminophen   Tablet .. 975 milliGRAM(s) Oral every 8 hours  aMIOdarone    Tablet   Oral   aMIOdarone    Tablet 400 milliGRAM(s) Oral every 8 hours  aztreonam  IVPB 1000 milliGRAM(s) IV Intermittent every 8 hours  celecoxib 200 milliGRAM(s) Oral every 12 hours  ciprofloxacin     Tablet 500 milliGRAM(s) Oral every 12 hours  fentaNYL   Patch  25 MICROgram(s)/Hr 1 Patch Transdermal every 72 hours  lactated ringers. 1000 milliLiter(s) (125 mL/Hr) IV Continuous <Continuous>  levothyroxine 175 MICROGram(s) Oral daily  liothyronine 5 MICROGram(s) Oral daily  pantoprazole    Tablet 40 milliGRAM(s) Oral before breakfast  rivaroxaban 10 milliGRAM(s) Oral daily    MEDICATIONS  (PRN):  bisacodyl Suppository 10 milliGRAM(s) Rectal daily PRN If no bowel movement by POD#2  diphenhydrAMINE 25 milliGRAM(s) Oral every 4 hours PRN Rash and/or Itching  HYDROmorphone  Injectable 1 milliGRAM(s) IV Push every 4 hours PRN moderate-severe pain  HYDROmorphone  Injectable 0.5 milliGRAM(s) IV Push every 30 minutes PRN Moderate Pain (4 - 6)  naloxone Injectable 0.1 milliGRAM(s) IV Push every 3 minutes PRN For ANY of the following changes in patient status:  A. RR LESS THAN 10 breaths per minute, B. Oxygen saturation LESS THAN 90%, C. Sedation score of 6  ondansetron Injectable 4 milliGRAM(s) IV Push every 6 hours PRN Nausea and/or Vomiting  ondansetron Injectable 4 milliGRAM(s) IV Push every 6 hours PRN Nausea        Vital Signs Last 24 Hrs  T(C): 36.5 (21 Aug 2020 04:46), Max: 36.7 (20 Aug 2020 12:24)  T(F): 97.7 (21 Aug 2020 04:46), Max: 98.1 (20 Aug 2020 12:24)  HR: 80 (21 Aug 2020 04:46) (80 - 163)  BP: 119/74 (21 Aug 2020 04:46) (92/66 - 119/74)  BP(mean): --  RR: 18 (21 Aug 2020 04:46) (18 - 18)  SpO2: 97% (21 Aug 2020 04:46) (92% - 97%)      PHYSICAL EXAM  CONSTITUTIONAL: NAD, appears uncomfortable, now with Sparks catheter in place and drain in left thigh.  RESPIRATORY: Normal respiratory effort; lungs are clear to auscultation bilaterally; no rales, wheezes, or rhonchi  CARDIOVASCULAR: Tachycardic, regular rhythm, normal S1 and S2, no murmur/rub/gallop; mild lower extremity edema; Peripheral pulses are 2+ bilaterally  ABDOMEN: soft, Nontender to palpation, nondistended, normoactive bowel sounds  MUSCULOSKELETAL: Both legs extended normally, elevated, with supports surrounding left leg.  PSYCH: A+O to person, place, and time; affect appropriate  SKIN: resolving erythematous rash under right breast      LABS:                               Troponin T, High Sensitivity Result: 16:      Urinalysis Basic - ( 15 Aug 2020 14:16 )  Color: Light Orange / Appearance: Turbid / S.026 / pH: x  Gluc: x / Ketone: Negative  / Bili: Negative / Urobili: 4 mg/dL   Blood: x / Protein: Trace / Nitrite: Positive   Leuk Esterase: Moderate / RBC: 9 /hpf / WBC 6 /HPF   Sq Epi: x / Non Sq Epi: 3 /hpf / Bacteria: Many    Culture - Urine (20 @ 10:11)    Specimen Source: .Urine Clean Catch (Midstream)    Culture Results:   >100,000 CFU/ml Escherichia coli   Pan - sensitive      Vitamin D, 25-Hydroxy: 33.4  Vitamin B12, Serum: >2000 pg/mL   Folate, Serum: 15.7 ng/mL    Thyroid Stimulating Hormone, Serum: 0.24 uIU/mL   Triiodothyronine, Total (T3 Total): 52 ng/dL  T4, Serum: 5.6 ug/dL    Free Thyroxine, Serum: 1.4 ng/dL (20 @ 12:50)  Thyroid Stimulating Hormone, Serum: 1.64 uIU/mL (20 @ 12:50)          < from: 12 Lead ECG (08.15.20 @ 17:03) >    Ventricular Rate 136 BPM  Atrial Rate 136 BPM  P-R Interval 118 ms  QRS Duration 80 ms  Q-T Interval 302 ms  QTC Calculation(Bezet) 454 ms  P Axis 58 degrees  R Axis 58 degrees  T Axis 32 degrees    Diagnosis Line SINUS TACHYCARDIA WITH  NONSPECIFIC ST AND T WAVE ABNORMALITY    < end of copied text >      IMAGING      < from: CT Angio Chest w/ IV Cont (20 @ 00:12) >  IMPRESSION:    No pulmonary embolism.    Perihilar soft tissue thickening adjacent to the right lower lobe surgical sutures. Nodular opacities in the lung. Multiple lytic and sclerotic lesions. Subtle low attenuating foci in the liver. Findings are compatible with metastatic neoplasm. Prior studies should be submitted for comparison.    Multiple pathologic fractures:  *  Acute, comminuted left clavicular fracture.  *  Age indeterminant L1 vertebral body compression deformity.  *  Questionably right anterolateral 6th rib    Hepatic metastases as outlined above.    < end of copied text >          < from: NM SPECT/CT Bone, Single Area Single Day (20 @ 13:14) >    FINDINGS: There is mild radiopharmaceutical accumulation in the proximal shaft of the left clavicle corresponding to lucency seen on comparison x-ray. There are faint foci in bilateral posterior approximatelyeighth ribs and heterogeneous tracer activity in the spine.  There is abnormally increased tracer activity in the mid right femur and distal left femur. Abnormality in the distal left femur demonstrates questionable cortical disruption which placesto places the patient at risk for pathologic fracture.  Both kidneys are visualized and are symmetric in appearance.    IMPRESSION: Abnormal bone scan.  Findings compatible with multifocal osseous metastases in the axial skeleton and bilateral femurs. Abnormality in the left femur places the patient at risk for pathologic fracture.  Suggest correlation with chest CT to evaluate spinal and rib foci.    < end of copied text >      < from: Xray Clavicle, Left (08.15.20 @ 09:25) >  IMPRESSION:  Lucency visualizedwithin the proximal/mid shaft of the clavicle, which may represent a metastatic lesion. Question nondisplaced pathologic fracture through the lesion. The sternoclavicular articulation is not well visualized. The acromioclavicular joint is intact.    < end of copied text >      Left Hip Xrays performed at [place] Duplicate MRN # [id number]    Pending NM bone scan today Authored by Daniela Benson MS4 pager [phone]    Patient is a 64y old  Female who presents with a chief complaint of Left hip pain and fall (16 Aug 2020 06:34)      OVERNIGHT EVENTS: Patient continued to have elevated HR overnight with no symptoms. She was seen at bedside this am, she says she has some pain in the left hip and feels very anxious this morning, she had not received her Xanax. She has been able tolerating PO intake and urinating without issue. She denies palpitations, headache, chest pain, SOB, N&V, abdominal pain, suprapubic pain, and tingling/numbness in extremities.  TELE: converted to Sinus rhythm with HR 70s-80s       MEDICATIONS  (STANDING):  acetaminophen   Tablet .. 975 milliGRAM(s) Oral every 8 hours  ALPRAZolam 0.25 milliGRAM(s) Oral two times a day  aMIOdarone    Tablet   Oral   aMIOdarone    Tablet 400 milliGRAM(s) Oral every 8 hours  aztreonam  IVPB 1000 milliGRAM(s) IV Intermittent every 8 hours  celecoxib 200 milliGRAM(s) Oral every 12 hours  ciprofloxacin     Tablet 500 milliGRAM(s) Oral every 12 hours  fentaNYL   Patch  25 MICROgram(s)/Hr 1 Patch Transdermal every 72 hours  lactated ringers. 1000 milliLiter(s) (125 mL/Hr) IV Continuous <Continuous>  levothyroxine 175 MICROGram(s) Oral daily  liothyronine 5 MICROGram(s) Oral daily  pantoprazole    Tablet 40 milliGRAM(s) Oral before breakfast  rivaroxaban 10 milliGRAM(s) Oral daily    MEDICATIONS  (PRN):  bisacodyl Suppository 10 milliGRAM(s) Rectal daily PRN If no bowel movement by POD#2  diphenhydrAMINE 25 milliGRAM(s) Oral every 4 hours PRN Rash and/or Itching  HYDROmorphone  Injectable 1 milliGRAM(s) IV Push every 4 hours PRN moderate-severe pain  HYDROmorphone  Injectable 0.5 milliGRAM(s) IV Push every 30 minutes PRN Moderate Pain (4 - 6)  naloxone Injectable 0.1 milliGRAM(s) IV Push every 3 minutes PRN For ANY of the following changes in patient status:  A. RR LESS THAN 10 breaths per minute, B. Oxygen saturation LESS THAN 90%, C. Sedation score of 6  ondansetron Injectable 4 milliGRAM(s) IV Push every 6 hours PRN Nausea and/or Vomiting  ondansetron Injectable 4 milliGRAM(s) IV Push every 6 hours PRN Nausea        Vital Signs Last 24 Hrs  T(C): 36.5 (21 Aug 2020 04:46), Max: 36.7 (20 Aug 2020 12:24)  T(F): 97.7 (21 Aug 2020 04:46), Max: 98.1 (20 Aug 2020 12:24)  HR: 80 (21 Aug 2020 04:46) (80 - 163)  BP: 119/74 (21 Aug 2020 04:46) (92/66 - 119/74)  BP(mean): --  RR: 18 (21 Aug 2020 04:46) (18 - 18)  SpO2: 97% (21 Aug 2020 04:46) (92% - 97%)      PHYSICAL EXAM  CONSTITUTIONAL: NAD, appears uncomfortable, now with Sparks catheter in place and drain in left thigh.  RESPIRATORY: Normal respiratory effort; lungs are clear to auscultation bilaterally; no rales, wheezes, or rhonchi  CARDIOVASCULAR: Tachycardic, regular rhythm, normal S1 and S2, no murmur/rub/gallop; mild lower extremity edema; Peripheral pulses are 2+ bilaterally  ABDOMEN: soft, Nontender to palpation, nondistended, normoactive bowel sounds  MUSCULOSKELETAL: Both legs extended normally, elevated, with supports surrounding left leg.  PSYCH: A+O to person, place, and time; affect appropriate  SKIN: resolving erythematous rash under right breast      LABS:                                        Troponin T, High Sensitivity Result: 16:      Urinalysis Basic - ( 15 Aug 2020 14:16 )  Color: Light Orange / Appearance: Turbid / S.026 / pH: x  Gluc: x / Ketone: Negative  / Bili: Negative / Urobili: 4 mg/dL   Blood: x / Protein: Trace / Nitrite: Positive   Leuk Esterase: Moderate / RBC: 9 /hpf / WBC 6 /HPF   Sq Epi: x / Non Sq Epi: 3 /hpf / Bacteria: Many    Culture - Urine (20 @ 10:11)    Specimen Source: .Urine Clean Catch (Midstream)    Culture Results:   >100,000 CFU/ml Escherichia coli   Pan - sensitive      Vitamin D, 25-Hydroxy: 33.4  Vitamin B12, Serum: >2000 pg/mL   Folate, Serum: 15.7 ng/mL    Thyroid Stimulating Hormone, Serum: 0.24 uIU/mL   Triiodothyronine, Total (T3 Total): 52 ng/dL  T4, Serum: 5.6 ug/dL    Free Thyroxine, Serum: 1.4 ng/dL (20 @ 12:50)  Thyroid Stimulating Hormone, Serum: 1.64 uIU/mL (20 @ 12:50)          < from: 12 Lead ECG (08.15.20 @ 17:03) >    Ventricular Rate 136 BPM  Atrial Rate 136 BPM  P-R Interval 118 ms  QRS Duration 80 ms  Q-T Interval 302 ms  QTC Calculation(Bezet) 454 ms  P Axis 58 degrees  R Axis 58 degrees  T Axis 32 degrees    Diagnosis Line SINUS TACHYCARDIA WITH  NONSPECIFIC ST AND T WAVE ABNORMALITY    < end of copied text >      IMAGING      < from: CT Angio Chest w/ IV Cont (20 @ 00:12) >  IMPRESSION:    No pulmonary embolism.    Perihilar soft tissue thickening adjacent to the right lower lobe surgical sutures. Nodular opacities in the lung. Multiple lytic and sclerotic lesions. Subtle low attenuating foci in the liver. Findings are compatible with metastatic neoplasm. Prior studies should be submitted for comparison.    Multiple pathologic fractures:  *  Acute, comminuted left clavicular fracture.  *  Age indeterminant L1 vertebral body compression deformity.  *  Questionably right anterolateral 6th rib    Hepatic metastases as outlined above.    < end of copied text >          < from: NM SPECT/CT Bone, Single Area Single Day (20 @ 13:14) >    FINDINGS: There is mild radiopharmaceutical accumulation in the proximal shaft of the left clavicle corresponding to lucency seen on comparison x-ray. There are faint foci in bilateral posterior approximatelyeighth ribs and heterogeneous tracer activity in the spine.  There is abnormally increased tracer activity in the mid right femur and distal left femur. Abnormality in the distal left femur demonstrates questionable cortical disruption which placesto places the patient at risk for pathologic fracture.  Both kidneys are visualized and are symmetric in appearance.    IMPRESSION: Abnormal bone scan.  Findings compatible with multifocal osseous metastases in the axial skeleton and bilateral femurs. Abnormality in the left femur places the patient at risk for pathologic fracture.  Suggest correlation with chest CT to evaluate spinal and rib foci.    < end of copied text >      < from: Xray Clavicle, Left (08.15.20 @ 09:25) >  IMPRESSION:  Lucency visualizedwithin the proximal/mid shaft of the clavicle, which may represent a metastatic lesion. Question nondisplaced pathologic fracture through the lesion. The sternoclavicular articulation is not well visualized. The acromioclavicular joint is intact.    < end of copied text >      Left Hip Xrays performed at [place] Duplicate MRN # [id number]    Pending NM bone scan today Authored by Daniela Benson MS4 pager [phone]    Patient is a 64y old  Female who presents with a chief complaint of Left hip pain and fall (16 Aug 2020 06:34)      OVERNIGHT EVENTS: Patient continued to have elevated HR overnight with no symptoms. She was seen at bedside this am, she says she has some pain in the left hip and feels very anxious this morning, she had not received her Xanax. She has been able tolerating PO intake and urinating without issue. She denies palpitations, headache, chest pain, SOB, N&V, abdominal pain, suprapubic pain, and tingling/numbness in extremities.  TELE: converted to Sinus rhythm with HR 70s-80s       MEDICATIONS  (STANDING):  acetaminophen   Tablet .. 975 milliGRAM(s) Oral every 8 hours  ALPRAZolam 0.25 milliGRAM(s) Oral two times a day  aMIOdarone    Tablet   Oral   aMIOdarone    Tablet 400 milliGRAM(s) Oral every 8 hours  aztreonam  IVPB 1000 milliGRAM(s) IV Intermittent every 8 hours  celecoxib 200 milliGRAM(s) Oral every 12 hours  ciprofloxacin     Tablet 500 milliGRAM(s) Oral every 12 hours  fentaNYL   Patch  25 MICROgram(s)/Hr 1 Patch Transdermal every 72 hours  lactated ringers. 1000 milliLiter(s) (125 mL/Hr) IV Continuous <Continuous>  levothyroxine 175 MICROGram(s) Oral daily  liothyronine 5 MICROGram(s) Oral daily  pantoprazole    Tablet 40 milliGRAM(s) Oral before breakfast  rivaroxaban 10 milliGRAM(s) Oral daily    MEDICATIONS  (PRN):  bisacodyl Suppository 10 milliGRAM(s) Rectal daily PRN If no bowel movement by POD#2  diphenhydrAMINE 25 milliGRAM(s) Oral every 4 hours PRN Rash and/or Itching  HYDROmorphone  Injectable 1 milliGRAM(s) IV Push every 4 hours PRN moderate-severe pain  HYDROmorphone  Injectable 0.5 milliGRAM(s) IV Push every 30 minutes PRN Moderate Pain (4 - 6)  naloxone Injectable 0.1 milliGRAM(s) IV Push every 3 minutes PRN For ANY of the following changes in patient status:  A. RR LESS THAN 10 breaths per minute, B. Oxygen saturation LESS THAN 90%, C. Sedation score of 6  ondansetron Injectable 4 milliGRAM(s) IV Push every 6 hours PRN Nausea and/or Vomiting  ondansetron Injectable 4 milliGRAM(s) IV Push every 6 hours PRN Nausea        Vital Signs Last 24 Hrs  T(C): 36.5 (21 Aug 2020 04:46), Max: 36.7 (20 Aug 2020 12:24)  T(F): 97.7 (21 Aug 2020 04:46), Max: 98.1 (20 Aug 2020 12:24)  HR: 80 (21 Aug 2020 04:46) (80 - 163)  BP: 119/74 (21 Aug 2020 04:46) (92/66 - 119/74)  BP(mean): --  RR: 18 (21 Aug 2020 04:46) (18 - 18)  SpO2: 97% (21 Aug 2020 04:46) (92% - 97%)      PHYSICAL EXAM  CONSTITUTIONAL: NAD, appears uncomfortable, now with Sparks catheter in place and drain in left thigh.  RESPIRATORY: Normal respiratory effort; lungs are clear to auscultation bilaterally; no rales, wheezes, or rhonchi  CARDIOVASCULAR: Tachycardic, regular rhythm, normal S1 and S2, no murmur/rub/gallop; mild lower extremity edema; Peripheral pulses are 2+ bilaterally  ABDOMEN: soft, Nontender to palpation, nondistended, normoactive bowel sounds  MUSCULOSKELETAL: Both legs extended normally, elevated, with supports surrounding left leg.  PSYCH: A+O to person, place, and time; affect appropriate  SKIN: resolving erythematous rash under right breast      LABS:                           Troponin T, High Sensitivity Result: 16:      Urinalysis Basic - ( 15 Aug 2020 14:16 )  Color: Light Orange / Appearance: Turbid / S.026 / pH: x  Gluc: x / Ketone: Negative  / Bili: Negative / Urobili: 4 mg/dL   Blood: x / Protein: Trace / Nitrite: Positive   Leuk Esterase: Moderate / RBC: 9 /hpf / WBC 6 /HPF   Sq Epi: x / Non Sq Epi: 3 /hpf / Bacteria: Many    Culture - Urine (20 @ 10:11)    Specimen Source: .Urine Clean Catch (Midstream)    Culture Results:   >100,000 CFU/ml Escherichia coli   Pan - sensitive      Vitamin D, 25-Hydroxy: 33.4  Vitamin B12, Serum: >2000 pg/mL   Folate, Serum: 15.7 ng/mL    Thyroid Stimulating Hormone, Serum: 0.24 uIU/mL   Triiodothyronine, Total (T3 Total): 52 ng/dL  T4, Serum: 5.6 ug/dL    Free Thyroxine, Serum: 1.4 ng/dL (20 @ 12:50)  Thyroid Stimulating Hormone, Serum: 1.64 uIU/mL (20 @ 12:50)          < from: 12 Lead ECG (08.15.20 @ 17:03) >    Ventricular Rate 136 BPM  Atrial Rate 136 BPM  P-R Interval 118 ms  QRS Duration 80 ms  Q-T Interval 302 ms  QTC Calculation(Bezet) 454 ms  P Axis 58 degrees  R Axis 58 degrees  T Axis 32 degrees    Diagnosis Line SINUS TACHYCARDIA WITH  NONSPECIFIC ST AND T WAVE ABNORMALITY    < end of copied text >      IMAGING      < from: CT Angio Chest w/ IV Cont (20 @ 00:12) >  IMPRESSION:    No pulmonary embolism.    Perihilar soft tissue thickening adjacent to the right lower lobe surgical sutures. Nodular opacities in the lung. Multiple lytic and sclerotic lesions. Subtle low attenuating foci in the liver. Findings are compatible with metastatic neoplasm. Prior studies should be submitted for comparison.    Multiple pathologic fractures:  *  Acute, comminuted left clavicular fracture.  *  Age indeterminant L1 vertebral body compression deformity.  *  Questionably right anterolateral 6th rib    Hepatic metastases as outlined above.    < end of copied text >          < from: NM SPECT/CT Bone, Single Area Single Day (20 @ 13:14) >    FINDINGS: There is mild radiopharmaceutical accumulation in the proximal shaft of the left clavicle corresponding to lucency seen on comparison x-ray. There are faint foci in bilateral posterior approximatelyeighth ribs and heterogeneous tracer activity in the spine.  There is abnormally increased tracer activity in the mid right femur and distal left femur. Abnormality in the distal left femur demonstrates questionable cortical disruption which placesto places the patient at risk for pathologic fracture.  Both kidneys are visualized and are symmetric in appearance.    IMPRESSION: Abnormal bone scan.  Findings compatible with multifocal osseous metastases in the axial skeleton and bilateral femurs. Abnormality in the left femur places the patient at risk for pathologic fracture.  Suggest correlation with chest CT to evaluate spinal and rib foci.    < end of copied text >      < from: Xray Clavicle, Left (08.15.20 @ 09:25) >  IMPRESSION:  Lucency visualizedwithin the proximal/mid shaft of the clavicle, which may represent a metastatic lesion. Question nondisplaced pathologic fracture through the lesion. The sternoclavicular articulation is not well visualized. The acromioclavicular joint is intact.    < end of copied text >      Left Hip Xrays performed at [place] Duplicate MRN # [id number]    Pending NM bone scan today Authored by Daniela Benson MS4 pager [phone]    Patient is a 64y old  Female who presents with a chief complaint of Left hip pain and fall (16 Aug 2020 06:34)      OVERNIGHT EVENTS: Patient continued to have elevated HR overnight with no symptoms. She was seen at bedside this am, she says she has some pain in the left hip and feels very anxious this morning, she had not received her Xanax. She has been able tolerating PO intake and urinating without issue. She denies palpitations, headache, chest pain, SOB, N&V, abdominal pain, suprapubic pain, and tingling/numbness in extremities.  TELE: converted to Sinus rhythm with HR 70s-80s       MEDICATIONS  (STANDING):  acetaminophen   Tablet .. 975 milliGRAM(s) Oral every 8 hours  ALPRAZolam 0.25 milliGRAM(s) Oral two times a day  aMIOdarone    Tablet   Oral   aMIOdarone    Tablet 400 milliGRAM(s) Oral every 8 hours  aztreonam  IVPB 1000 milliGRAM(s) IV Intermittent every 8 hours  celecoxib 200 milliGRAM(s) Oral every 12 hours  ciprofloxacin     Tablet 500 milliGRAM(s) Oral every 12 hours  fentaNYL   Patch  25 MICROgram(s)/Hr 1 Patch Transdermal every 72 hours  lactated ringers. 1000 milliLiter(s) (125 mL/Hr) IV Continuous <Continuous>  levothyroxine 175 MICROGram(s) Oral daily  liothyronine 5 MICROGram(s) Oral daily  pantoprazole    Tablet 40 milliGRAM(s) Oral before breakfast  rivaroxaban 10 milliGRAM(s) Oral daily    MEDICATIONS  (PRN):  bisacodyl Suppository 10 milliGRAM(s) Rectal daily PRN If no bowel movement by POD#2  diphenhydrAMINE 25 milliGRAM(s) Oral every 4 hours PRN Rash and/or Itching  HYDROmorphone  Injectable 1 milliGRAM(s) IV Push every 4 hours PRN moderate-severe pain  HYDROmorphone  Injectable 0.5 milliGRAM(s) IV Push every 30 minutes PRN Moderate Pain (4 - 6)  naloxone Injectable 0.1 milliGRAM(s) IV Push every 3 minutes PRN For ANY of the following changes in patient status:  A. RR LESS THAN 10 breaths per minute, B. Oxygen saturation LESS THAN 90%, C. Sedation score of 6  ondansetron Injectable 4 milliGRAM(s) IV Push every 6 hours PRN Nausea and/or Vomiting  ondansetron Injectable 4 milliGRAM(s) IV Push every 6 hours PRN Nausea        Vital Signs Last 24 Hrs  T(C): 36.5 (21 Aug 2020 04:46), Max: 36.7 (20 Aug 2020 12:24)  T(F): 97.7 (21 Aug 2020 04:46), Max: 98.1 (20 Aug 2020 12:24)  HR: 80 (21 Aug 2020 04:46) (80 - 163)  BP: 119/74 (21 Aug 2020 04:46) (92/66 - 119/74)  BP(mean): --  RR: 18 (21 Aug 2020 04:46) (18 - 18)  SpO2: 97% (21 Aug 2020 04:46) (92% - 97%)      PHYSICAL EXAM  CONSTITUTIONAL: NAD, appears uncomfortable, now with Sparks catheter in place and drain in left thigh.  RESPIRATORY: Normal respiratory effort; lungs are clear to auscultation bilaterally; no rales, wheezes, or rhonchi  CARDIOVASCULAR: Tachycardic, regular rhythm, normal S1 and S2, no murmur/rub/gallop; mild lower extremity edema; Peripheral pulses are 2+ bilaterally  ABDOMEN: soft, Nontender to palpation, nondistended, normoactive bowel sounds  MUSCULOSKELETAL: Both legs extended normally, elevated, with supports surrounding left leg.  PSYCH: A+O to person, place, and time; affect appropriate  SKIN: resolving erythematous rash under right breast      LABS:                                 9.3    5.44  )-----------( 121                  28.4       139  |  103  |  10  ----------------------------<  107<H>  3.3<L>   |  27  |  0.37<L>    Ca    8.2<L>        Phos  4.1      Mg     1.9         TPro  4.7<L>  /  Alb  2.4<L>  /  TBili  0.7  /  DBili  x   /  AST  24  /  ALT  24  /  AlkPhos  114    PT: 12.9 sec;   INR: 1.10 ratio;  PTT:31.9 sec      POCT Blood Glucose.: 118 mg/dL     Troponin T, High Sensitivity Result: 16:      Urinalysis Basic - ( 15 Aug 2020 14:16 )  Color: Light Orange / Appearance: Turbid / S.026 / pH: x  Gluc: x / Ketone: Negative  / Bili: Negative / Urobili: 4 mg/dL   Blood: x / Protein: Trace / Nitrite: Positive   Leuk Esterase: Moderate / RBC: 9 /hpf / WBC 6 /HPF   Sq Epi: x / Non Sq Epi: 3 /hpf / Bacteria: Many    Culture - Urine (20 @ 10:11)    Specimen Source: .Urine Clean Catch (Midstream)    Culture Results:   >100,000 CFU/ml Escherichia coli   Pan - sensitive      Vitamin D, 25-Hydroxy: 33.4  Vitamin B12, Serum: >2000 pg/mL   Folate, Serum: 15.7 ng/mL    Thyroid Stimulating Hormone, Serum: 0.24 uIU/mL   Triiodothyronine, Total (T3 Total): 52 ng/dL  T4, Serum: 5.6 ug/dL    Free Thyroxine, Serum: 1.4 ng/dL (20 @ 12:50)  Thyroid Stimulating Hormone, Serum: 1.64 uIU/mL (20 @ 12:50)          < from: 12 Lead ECG (08.15.20 @ 17:03) >    Ventricular Rate 136 BPM  Atrial Rate 136 BPM  P-R Interval 118 ms  QRS Duration 80 ms  Q-T Interval 302 ms  QTC Calculation(Bezet) 454 ms  P Axis 58 degrees  R Axis 58 degrees  T Axis 32 degrees    Diagnosis Line SINUS TACHYCARDIA WITH  NONSPECIFIC ST AND T WAVE ABNORMALITY    < end of copied text >      IMAGING      < from: CT Angio Chest w/ IV Cont (20 @ 00:12) >  IMPRESSION:    No pulmonary embolism.    Perihilar soft tissue thickening adjacent to the right lower lobe surgical sutures. Nodular opacities in the lung. Multiple lytic and sclerotic lesions. Subtle low attenuating foci in the liver. Findings are compatible with metastatic neoplasm. Prior studies should be submitted for comparison.    Multiple pathologic fractures:  *  Acute, comminuted left clavicular fracture.  *  Age indeterminant L1 vertebral body compression deformity.  *  Questionably right anterolateral 6th rib    Hepatic metastases as outlined above.    < end of copied text >          < from: NM SPECT/CT Bone, Single Area Single Day (20 @ 13:14) >    FINDINGS: There is mild radiopharmaceutical accumulation in the proximal shaft of the left clavicle corresponding to lucency seen on comparison x-ray. There are faint foci in bilateral posterior approximatelyeighth ribs and heterogeneous tracer activity in the spine.  There is abnormally increased tracer activity in the mid right femur and distal left femur. Abnormality in the distal left femur demonstrates questionable cortical disruption which placesto places the patient at risk for pathologic fracture.  Both kidneys are visualized and are symmetric in appearance.    IMPRESSION: Abnormal bone scan.  Findings compatible with multifocal osseous metastases in the axial skeleton and bilateral femurs. Abnormality in the left femur places the patient at risk for pathologic fracture.  Suggest correlation with chest CT to evaluate spinal and rib foci.    < end of copied text >      < from: Xray Clavicle, Left (08.15.20 @ 09:25) >  IMPRESSION:  Lucency visualizedwithin the proximal/mid shaft of the clavicle, which may represent a metastatic lesion. Question nondisplaced pathologic fracture through the lesion. The sternoclavicular articulation is not well visualized. The acromioclavicular joint is intact.    < end of copied text >      Left Hip Xrays performed at [facility] MRN # [id number]    Pending NM bone scan today

## 2020-08-21 NOTE — DISCHARGE NOTE PROVIDER - NSDCFUADDAPPT_GEN_ALL_CORE_FT
Orthopedics Dr. Ramírez    Cardiology Dr. Antoine    Oncologist Dr. Joseph at [facility] - patient will need to schedule port placement with next round of chemotherapy    Endocrine Dr. English for thyroid follow up - has appt 11/12 Orthopedics Dr. Ramírez    Cardiology Dr. Antoine    Oncologist Dr. Joseph at [facility] - patient will need to schedule port placement with next round of chemotherapy    Endocrine Dr. English for thyroid follow up in 4 weeks. (I know you have an appointment katie but need in 4 weeks! Thanks) Orthopedics Dr. Ramírez    Cardiology Dr. Antoine    Oncologist Dr. Joseph at [facility] - patient will need to schedule port placement with next round of chemotherapy    Endocrine Dr. English for thyroid follow up in 4 weeks. (I know you have an appointment katie but need in 4 weeks! Thanks)    Please see dentist to get clearance for bisphosphate therapy. Unable to get it here as not wheelchair transportable.

## 2020-08-21 NOTE — DISCHARGE NOTE PROVIDER - CARE PROVIDER_API CALL
Armin Ramírez  ORTHOPEDICS  [address], [address]  [place] [address]  Phone: [phone]  Fax: [phone]  Follow Up Time:     Jackson English  ENDOCRINOLOGY/METAB/DIABETES  [address] [place] [address]  Phone: [phone]  Fax: [phone]  Follow Up Time:     Lobito Antoine)  Cardiovascular Disease; Internal Medicine  [address] [place] [address]  Phone: [phone]  Fax: [phone]  Follow Up Time:     Elvira Joseph  MEDICAL ONCOLOGY  [address] [place] [address]  Phone: [phone]  Fax: [phone]  Follow Up Time:

## 2020-08-21 NOTE — PROGRESS NOTE ADULT - PROBLEM SELECTOR PLAN 4
Patient with WBC of 3.15 on admission likely secondary to recent chemotherapy.  - s/p filagrastim x 1 on 8/18  - WBC now 3.38  - B12 elevated. Folate and Vit D wnl  - Monitor CBC daily and monitor for fever  - Heme/onc following; appreciate recs Patient with WBC of 3.15 on admission likely secondary to recent chemotherapy.  - s/p filagrastim x 1 on 8/18  - WBC now 5.44  - B12 elevated. Folate and Vit D wnl  - Monitor CBC daily and monitor for fever  - Heme/onc following; appreciate recs

## 2020-08-21 NOTE — DISCHARGE NOTE PROVIDER - NSDCMRMEDTOKEN_GEN_ALL_CORE_FT
ALPRAZolam 0.25 mg oral tablet: 1 tab(s) orally 2 times a day  López brace: Dx:L Total Hip Arthroplasty  Lang: 99M  Dilaudid 2 mg oral tablet: 1 tab(s) orally every 6 hours, As Needed  Dilaudid 4 mg oral tablet: 1 tab(s) orally every 6 hours, As Needed  fentanyl topical 25 mcg/hr transdermal film, extended release (obsolete): 1 patch transdermal every 3 days  liothyronine 5 mcg oral tablet: 1 tab(s) orally once a day  Synthroid 175 mcg (0.175 mg) oral tablet: 1 tab(s) orally once a day ALPRAZolam 0.25 mg oral tablet: 1 tab(s) orally 3 times a day, As needed, anxiety  aluminum hydroxide-magnesium hydroxide 200 mg-200 mg/5 mL oral suspension: 30 milliliter(s) orally every 6 hours, As needed, Dyspepsia  bisacodyl 10 mg rectal suppository: 1 suppository(ies) rectal once a day, As needed, Constipation  [place] brace: Dx:L Total Hip Arthroplasty  Lang: 99M  Dilaudid 2 mg oral tablet: 3 tab(s) orally every 4 hours, As Needed  for pain MDD:36 mg dilaudid  fentaNYL 25 mcg/hr transdermal film, extended release: 1 patch transdermal every 72 hours  levothyroxine 175 mcg (0.175 mg) oral tablet: 1 tab(s) orally once a day  liothyronine 5 mcg oral tablet: 1 tab(s) orally once a day  melatonin 5 mg oral tablet: 1 tab(s) orally once a day (at bedtime)  metoprolol succinate 50 mg oral tablet, extended release: 1 tab(s) orally once a day  polyethylene glycol 3350 oral powder for reconstitution: 17 gram(s) orally once a day  rivaroxaban 10 mg oral tablet: 1 tab(s) orally once a day  senna oral tablet: 2 tab(s) orally once a day (at bedtime) ALPRAZolam 0.25 mg oral tablet: 1 tab(s) orally 3 times a day, As needed, anxiety  aluminum hydroxide-magnesium hydroxide 200 mg-200 mg/5 mL oral suspension: 30 milliliter(s) orally every 6 hours, As needed, Dyspepsia  bisacodyl 10 mg rectal suppository: 1 suppository(ies) rectal once a day, As needed, Constipation  [place] brace: Dx:L Total Hip Arthroplasty  Lang: 99M  Dilaudid 2 mg oral tablet: 3 tab(s) orally every 4 hours, As Needed  for pain MDD:36 mg dilaudid  fentaNYL 25 mcg/hr transdermal film, extended release: 1 patch transdermal every 72 hours  levothyroxine 175 mcg (0.175 mg) oral tablet: 1 tab(s) orally once a day  liothyronine 5 mcg oral tablet: 1 tab(s) orally once a day  melatonin 5 mg oral tablet: 1 tab(s) orally once a day (at bedtime)  metoprolol succinate 50 mg oral tablet, extended release: 1 tab(s) orally once a day  polyethylene glycol 3350 oral powder for reconstitution: 17 gram(s) orally once a day  rivaroxaban 10 mg oral tablet: 1 tab(s) orally once a day  senna oral tablet: 2 tab(s) orally once a day (at bedtime)

## 2020-08-21 NOTE — PROGRESS NOTE ADULT - PROBLEM SELECTOR PLAN 3
Patient with + UA and urine culture growing pan-sensitive E. Coli and Enterococcus.  - s/p aztreonam x 1 day now on aztreonam and ciprofloxacin x 3 days (ends 8/23)  - Sparks D/Ranulfo today, TOV

## 2020-08-21 NOTE — DISCHARGE NOTE PROVIDER - NSDCCPCAREPLAN_GEN_ALL_CORE_FT
PRINCIPAL DISCHARGE DIAGNOSIS  Diagnosis: Pathologic fracture of femoral neck, left, initial encounter  Assessment and Plan of Treatment: You had a fall causing fractures in your left hip and thigh bones. These fractures occurred because you have underlying cancer in these bones. You were treated with pain management and with a total hip arthroplasty. After the procedure, you participated in physical therapy to rehabilitate your left hip and leg. You will need to follow up with the Orthopedist in 1-2 weeks for further management. You will also need to continue further physical therapy to strengthen your left hip and leg. You can continue on the pain medications as needed. If you develop severe pain, loss of feeling, or blue coloration of the left leg/foot, you will need to return to the ED.      SECONDARY DISCHARGE DIAGNOSES  Diagnosis: Atrial tachycardia  Assessment and Plan of Treatment: You had a fast heart rate orginating from the atrial chambers of your heart. This occurred likely because of an abnormal electrical heart circuit and was precipitated by your recent stress and pain related to the hip fracture and operation. You were treated with amniodarone, an anti-arrhythmic agent that helps control the heart rate. You will need to continue this medication and follow up with the cardiologist in 1-2 weeks. If you develop palpatations, chest pain, SOB, lightheadedness you should call your cardiologist and return to the ED.    Diagnosis: Adenocarcinoma of lung, stage 4, unspecified laterality  Assessment and Plan of Treatment: You have recurrent metastatic cancer. You underwent a bone scan which showed cancer in several bones including your left distal femur and left clavicle. You also had low white blood cells and platelets due to your recent chemotherapy. You were followed by the Oncology team who gave you filagrastim, a medication to stimulate your bone marrow which improved your white blood cells and platelets in the blood. You will need to continue follow up with your Oncologist for re-scheduling of your port placement and second round of chemotherapy. You can continue with your pain medications as above. You should be cautious to avoid trauma and falls given your bone disease and bleeding risk. You should also be cautious about exposing yourself to infection given your weakened immune system, please wear a mask in the presence of others, and if you develop a fever, call  your Oncologist for further management. PRINCIPAL DISCHARGE DIAGNOSIS  Diagnosis: Pathologic fracture of femoral neck, left, initial encounter  Assessment and Plan of Treatment: You had a fall causing fractures in your left hip and thigh bones. These fractures occurred because you have underlying cancer in these bones. You were treated with pain management and with a total hip arthroplasty. After the procedure, you participated in physical therapy to rehabilitate your left hip and leg. You will need to follow up with the Orthopedist in 1-2 weeks for further management. You will also need to continue further physical therapy to strengthen your left hip and leg. You can continue on the pain medications as needed. If you develop severe pain, loss of feeling, or blue coloration of the left leg/foot, you will need to return to the ED.      SECONDARY DISCHARGE DIAGNOSES  Diagnosis: Adenocarcinoma of lung, stage 4, unspecified laterality  Assessment and Plan of Treatment: You have recurrent metastatic cancer. You underwent a bone scan which showed cancer in several bones including your left distal femur and left clavicle. You also had low white blood cells and platelets due to your recent chemotherapy. You were followed by the Oncology team who gave you filagrastim, a medication to stimulate your bone marrow which improved your white blood cells and platelets in the blood. You will need to continue follow up with your Oncologist for re-scheduling of your port placement and second round of chemotherapy. You can continue with your pain medications as above. You should be cautious to avoid trauma and falls given your bone disease and bleeding risk. You should also be cautious about exposing yourself to infection given your weakened immune system, please wear a mask in the presence of others, and if you develop a fever, call  your Oncologist for further management.    Diagnosis: Atrial tachycardia  Assessment and Plan of Treatment: You had a fast heart rate orginating from the atrial chambers of your heart. This occurred likely because of an abnormal electrical heart circuit and was precipitated by your recent stress and pain related to the hip fracture and operation. You were treated with amniodarone, an anti-arrhythmic agent that helps control the heart rate. You will need to continue this medication and follow up with the cardiologist in 1-2 weeks. If you develop palpatations, chest pain, SOB, lightheadedness you should call your cardiologist and return to the ED.

## 2020-08-21 NOTE — PROGRESS NOTE ADULT - PROBLEM SELECTOR PLAN 9
DVT: SCDs   Diet: Regular  Vit deficiencies: B12 elevated. Folate and Vit D wnl  PT: will need PT eval after OR DVT: rivaroxaban 10mg   Diet: Regular + 1 ensure daily  Vit deficiencies: B12 elevated. Folate and Vit D wnl  PT: ZACHARY

## 2020-08-22 NOTE — PROGRESS NOTE ADULT - ASSESSMENT
64F h/o hypothyroidism, Stage IV lung adenocarcinoma diagnosed in 2017 s/p right lower lobectomy with recurrence 5/2020 s/p palliative radiation to clavicle and spine and 1 round of carbo/pemextred/pembrolizumab chemo 7/31 with recent admission 8/8 for pending femoral neck fracture now transferred from [facility] after fall in home with left hip pain, imaging showing left hip pathologic fracture. Patient also noted to have thrombocytopenia likely secondary to recent chemotherapy.

## 2020-08-22 NOTE — PROGRESS NOTE ADULT - SUBJECTIVE AND OBJECTIVE BOX
Patient is a 64y old  Female who presents with a chief complaint of Left hip pain and fall (22 Aug 2020 13:58)      INTERVAL HISTORY: feels ok    	  MEDICATIONS:  metoprolol succinate ER 50 milliGRAM(s) Oral daily        PHYSICAL EXAM:  T(C): 36.8 (08-23-20 @ 00:01), Max: 36.8 (08-22-20 @ 20:23)  HR: 66 (08-23-20 @ 00:01) (66 - 77)  BP: 124/73 (08-23-20 @ 00:01) (124/73 - 148/81)  RR: 17 (08-23-20 @ 00:01) (17 - 18)  SpO2: 95% (08-23-20 @ 00:01) (95% - 97%)  Wt(kg): --  I&O's Summary    21 Aug 2020 07:01  -  22 Aug 2020 07:00  --------------------------------------------------------  IN: 200 mL / OUT: 1035 mL / NET: -835 mL    22 Aug 2020 07:01  -  23 Aug 2020 00:50  --------------------------------------------------------  IN: 240 mL / OUT: 680 mL / NET: -440 mL          Appearance: In no distress	  HEENT:    PERRL, EOMI	  Cardiovascular:  S1 S2, No JVD  Respiratory: Lungs clear to auscultation	  Gastrointestinal:  Soft, Non-tender, + BS	  Vascularature:  No edema of LE  Psychiatric: Appropriate affect   Neuro: no acute focal deficits                               9.3    4.70  )-----------( 119      ( 22 Aug 2020 07:19 )             28.1     08-22    139  |  102  |  11  ----------------------------<  106<H>  3.4<L>   |  24  |  0.40<L>    Ca    8.2<L>      22 Aug 2020 07:17  Phos  4.3     08-22  Mg     1.8     08-22    TPro  4.8<L>  /  Alb  2.5<L>  /  TBili  0.6  /  DBili  x   /  AST  34  /  ALT  30  /  AlkPhos  127<H>  08-22        Labs personally reviewed      EKG: Personally reviewed by me - AT at 160bpm  Radiology: Personally reviewed by me -   No pulmonary embolism.    Perihilar soft tissue thickening adjacent to the right lower lobe surgical sutures. Nodular opacities in the lung. Multiple lytic and sclerotic lesions. Subtle low attenuating foci in the liver. Findings are compatible with metastatic neoplasm. Prior studies should be submitted for comparison.    Multiple pathologic fractures:  *  Acute, comminuted left clavicular fracture.  *  Age indeterminant L1 vertebral body compression deformity.  *  Questionably right anterolateral 6th rib    Hepatic metastases as outlined above.        Assessment /Plan:   AT - Amio loading, responded well, in SR. trend QTc  Overall prognosis very poor with stage 4 CA                Humphrey Galvez DO [facility]  Cardiovascular Medicine  [address] [place] [address], [address]  Office: [phone]  Cell: [phone]

## 2020-08-22 NOTE — PROGRESS NOTE ADULT - ATTENDING COMMENTS
Saw and examined patient, then discussed with house staff.  64F h/o Stage IV lung adenocarcinoma, 2017, s/p right lower lobectomy with recurrence 5/2020 s/p palliative radiation to clavicle and spine on chemo (1 round of carbo/pemextred/pembrolizumab- 7/31) with recent admission 8/8 for pending femoral neck fracture now transferred from [facility] after fall in home with left hip pain, imaging showing left hip pathologic fracture, also UTI on admission. S/P L hemiarthroplasty 8/18, developed atrial tachycardia around 160s, EP consulted.  Resting in bed, feels better today, no SOB, chest pain, hemodynamically stable, Tele- SR 60-80s  -115, has knee immobilizer LLE   1. Atrial tachycardia  - EP f/u plan noted, will DC amio and start on metoprolol, DC telemetry monitoring  - Per EP not candidate for DCCV, no further inpatient work up  2. Left hip fracture s/p L hemiarthroplasty 8/18  - Ortho plan noted, WBAT on LLE, has a knee immobilizer, Xarelto for DVT ppx  - PT recommended rehab   - Heme plan noted, patient had 1 dose Zarxio 480mcg x1 prior to Sx for neutropenia  3. UTI  - On po cipro for UTI, urine c/s showed E. Coli, enterococci  4. Stage 4 lung cancer  - She was recently started on Carbo/Pemextred/Pembrolizumab on 7/31/20, cycle one for adenoCa of Lung w mets  - Chemo on hold  - IR recommended Mediport placement as outpatient  - outpatient onc follow up  - d/c planning in progress to [facility]     Discussed with resident Dr. Vasquez.    Caitie Shaver DO  Pager #: [phone]

## 2020-08-22 NOTE — PROGRESS NOTE ADULT - PROBLEM SELECTOR PLAN 4
Patient with WBC of 3.15 on admission likely secondary to recent chemotherapy.  - s/p filagrastim x 1 on 8/18  - WBC now 5.44  - B12 elevated. Folate and Vit D wnl  - Monitor CBC daily and monitor for fever  - Heme/onc following; appreciate recs Patient with WBC of 3.15 on admission likely secondary to recent chemotherapy.  - s/p filagrastim x 1 on 8/18  - WBC now wnl  - B12 elevated. Folate and Vit D wnl  - Monitor CBC daily and monitor for fever  - Heme/onc following; appreciate recs

## 2020-08-22 NOTE — PROGRESS NOTE ADULT - SUBJECTIVE AND OBJECTIVE BOX
Alis Montielathy PGY1    Patient is a 64y old  Female who presents with a chief complaint of Left hip pain and fall (22 Aug 2020 06:26)      SUBJECTIVE / OVERNIGHT EVENTS:    MEDICATIONS  (STANDING):  acetaminophen   Tablet .. 975 milliGRAM(s) Oral every 8 hours  aMIOdarone    Tablet   Oral   aMIOdarone    Tablet 400 milliGRAM(s) Oral every 8 hours  celecoxib 200 milliGRAM(s) Oral every 12 hours  ciprofloxacin     Tablet 500 milliGRAM(s) Oral every 12 hours  fentaNYL   Patch  25 MICROgram(s)/Hr 1 Patch Transdermal every 72 hours  lactated ringers. 1000 milliLiter(s) (125 mL/Hr) IV Continuous <Continuous>  levothyroxine 175 MICROGram(s) Oral daily  liothyronine 5 MICROGram(s) Oral daily  pantoprazole    Tablet 40 milliGRAM(s) Oral before breakfast  rivaroxaban 10 milliGRAM(s) Oral daily    MEDICATIONS  (PRN):  ALPRAZolam 0.25 milliGRAM(s) Oral two times a day PRN anxiety  bisacodyl Suppository 10 milliGRAM(s) Rectal daily PRN If no bowel movement by POD#2  diphenhydrAMINE 25 milliGRAM(s) Oral every 4 hours PRN Rash and/or Itching  HYDROmorphone  Injectable 1 milliGRAM(s) IV Push every 4 hours PRN moderate-severe pain  naloxone Injectable 0.1 milliGRAM(s) IV Push every 3 minutes PRN For ANY of the following changes in patient status:  A. RR LESS THAN 10 breaths per minute, B. Oxygen saturation LESS THAN 90%, C. Sedation score of 6  ondansetron Injectable 4 milliGRAM(s) IV Push every 6 hours PRN Nausea and/or Vomiting  ondansetron Injectable 4 milliGRAM(s) IV Push every 6 hours PRN Nausea      CAPILLARY BLOOD GLUCOSE        I&O's Summary    21 Aug 2020 07:01  -  22 Aug 2020 07:00  --------------------------------------------------------  IN: 200 mL / OUT: 1035 mL / NET: -835 mL        Vital Signs Last 24 Hrs  T(C): 36.7 (22 Aug 2020 08:02), Max: 36.7 (21 Aug 2020 11:21)  T(F): 98.1 (22 Aug 2020 08:02), Max: 98.1 (21 Aug 2020 11:21)  HR: 77 (22 Aug 2020 08:02) (69 - 86)  BP: 129/81 (22 Aug 2020 08:02) (115/78 - 148/80)  BP(mean): --  RR: 18 (22 Aug 2020 08:02) (18 - 18)  SpO2: 95% (22 Aug 2020 08:02) (95% - 99%)    PHYSICAL EXAM:  GENERAL: NAD, well-developed, well-nourished  HEAD: Atraumatic, Normocephalic  EYES: EOMI, PERRLA, conjunctiva and sclera clear  NECK: Supple, No JVD  CHEST/LUNG: Clear to auscultation bilaterally; No wheezes or crackles  HEART: Normal S1/S2; Regular rate and rhythm; No murmurs, rubs, or gallops  ABDOMEN: Soft, Nontender, Nondistended; Bowel sounds present  EXTREMITIES: 2+ Peripheral Pulses; No clubbing, cyanosis, or edema  PSYCH: A&Ox3  NEUROLOGY: no focal neurologic deficit  SKIN: No rashes or lesions    LABS:                        9.3    4.70  )-----------( 119      ( 22 Aug 2020 07:19 )             28.1      08-21    139  |  103  |  10  ----------------------------<  107<H>  3.3<L>   |  27  |  0.37<L>    Ca    8.2<L>      21 Aug 2020 07:08  Phos  4.1     08-21  Mg     1.9     08-21    TPro  4.7<L>  /  Alb  2.4<L>  /  TBili  0.7  /  DBili  x   /  AST  24  /  ALT  24  /  AlkPhos  114  08-21    PT/INR - ( 21 Aug 2020 08:03 )   PT: 12.9 sec;   INR: 1.10 ratio         PTT - ( 21 Aug 2020 08:03 )  PTT:31.9 sec          RADIOLOGY & ADDITIONAL TESTS:    Imaging Personally Reviewed:    Consultant(s) Notes Reviewed:      Care Discussed with Consultants/Other Providers: Alis Montielathy PGY1    Patient is a 64y old  Female who presents with a chief complaint of Left hip pain and fall (22 Aug 2020 06:26)      SUBJECTIVE / OVERNIGHT EVENTS:  - overnight - no events  - am - tele w sinus 70-90s. ECG repeat with qtc 513, zofran d/c'ed. Pt feels pain on L side (surgery side). No chest pain/palpitations. No difficulty breathing. No abd pain, n/v. BM yesterday am.     MEDICATIONS  (STANDING):  acetaminophen   Tablet .. 975 milliGRAM(s) Oral every 8 hours  aMIOdarone    Tablet   Oral   aMIOdarone    Tablet 400 milliGRAM(s) Oral every 8 hours  celecoxib 200 milliGRAM(s) Oral every 12 hours  ciprofloxacin     Tablet 500 milliGRAM(s) Oral every 12 hours  fentaNYL   Patch  25 MICROgram(s)/Hr 1 Patch Transdermal every 72 hours  lactated ringers. 1000 milliLiter(s) (125 mL/Hr) IV Continuous <Continuous>  levothyroxine 175 MICROGram(s) Oral daily  liothyronine 5 MICROGram(s) Oral daily  pantoprazole    Tablet 40 milliGRAM(s) Oral before breakfast  rivaroxaban 10 milliGRAM(s) Oral daily    MEDICATIONS  (PRN):  ALPRAZolam 0.25 milliGRAM(s) Oral two times a day PRN anxiety  bisacodyl Suppository 10 milliGRAM(s) Rectal daily PRN If no bowel movement by POD#2  diphenhydrAMINE 25 milliGRAM(s) Oral every 4 hours PRN Rash and/or Itching  HYDROmorphone  Injectable 1 milliGRAM(s) IV Push every 4 hours PRN moderate-severe pain  naloxone Injectable 0.1 milliGRAM(s) IV Push every 3 minutes PRN For ANY of the following changes in patient status:  A. RR LESS THAN 10 breaths per minute, B. Oxygen saturation LESS THAN 90%, C. Sedation score of 6  ondansetron Injectable 4 milliGRAM(s) IV Push every 6 hours PRN Nausea and/or Vomiting  ondansetron Injectable 4 milliGRAM(s) IV Push every 6 hours PRN Nausea      CAPILLARY BLOOD GLUCOSE        I&O's Summary    21 Aug 2020 07:01  -  22 Aug 2020 07:00  --------------------------------------------------------  IN: 200 mL / OUT: 1035 mL / NET: -835 mL        Vital Signs Last 24 Hrs  T(C): 36.7 (22 Aug 2020 08:02), Max: 36.7 (21 Aug 2020 11:21)  T(F): 98.1 (22 Aug 2020 08:02), Max: 98.1 (21 Aug 2020 11:21)  HR: 77 (22 Aug 2020 08:02) (69 - 86)  BP: 129/81 (22 Aug 2020 08:02) (115/78 - 148/80)  BP(mean): --  RR: 18 (22 Aug 2020 08:02) (18 - 18)  SpO2: 95% (22 Aug 2020 08:02) (95% - 99%)    PHYSICAL EXAM:  GENERAL: lying down, appears comfortable  CHEST/LUNG: clear to auscultation bilaterally, good airation  HEART: regular heart and rhythm, no murmurs  ABDOMEN: nontender to palpation, no rebound/guarding  EXTREMITIES: L hip with surgery site without erythema/swelling.   PSYCH: A&Ox3  NEUROLOGY: no focal neurologic deficit    LABS:                        9.3    4.70  )-----------( 119      ( 22 Aug 2020 07:19 )             28.1      08-21    139  |  103  |  10  ----------------------------<  107<H>  3.3<L>   |  27  |  0.37<L>    Ca    8.2<L>      21 Aug 2020 07:08  Phos  4.1     08-21  Mg     1.9     08-21    TPro  4.7<L>  /  Alb  2.4<L>  /  TBili  0.7  /  DBili  x   /  AST  24  /  ALT  24  /  AlkPhos  114  08-21    PT/INR - ( 21 Aug 2020 08:03 )   PT: 12.9 sec;   INR: 1.10 ratio         PTT - ( 21 Aug 2020 08:03 )  PTT:31.9 sec          RADIOLOGY & ADDITIONAL TESTS:    Imaging Personally Reviewed:    Consultant(s) Notes Reviewed:      Care Discussed with Consultants/Other Providers:

## 2020-08-22 NOTE — PROGRESS NOTE ADULT - PROBLEM SELECTOR PLAN 9
DVT: rivaroxaban 10mg   Diet: Regular + 1 ensure daily  Vit deficiencies: B12 elevated. Folate and Vit D wnl  PT: [facility]

## 2020-08-22 NOTE — PROGRESS NOTE ADULT - PROBLEM SELECTOR PLAN 2
Recent admission for left hip and knee pain found to have mets to femur now s/p fall at rehab.  - Stable vitals, left foot with distal pulse 2+, warm, able to move, no acute concern for neurovascular compromise  - XR showing pathological left femoral neck fracture s/p left QASIM on 8/18  - Ortho following; appreciate recs  - Fall precautions  - Home pain regiment was dilaudid PO 2mg moderate pain and 4mg severe pain q6 and fentanyl patch 37.5mg  (iSTOP note in chart, Reference #: [id number])  - Pain control per palliative: fentanyl patch 25 mg + IV dilaudid 1mg IV q4; will transition back to PO when appropriate was on dilaudid 6mg q4 + IV tylenol for breakthrough pain   - PT and OT daily

## 2020-08-22 NOTE — PROGRESS NOTE ADULT - SUBJECTIVE AND OBJECTIVE BOX
Chief complaint  Patient is a 64y old  Female who presents with a chief complaint of Left hip pain and fall (22 Aug 2020 08:10)   Review of systems  Patient in bed, appears comfortable.    Labs and Fingersticks  CAPILLARY BLOOD GLUCOSE    Anion Gap, Serum: 13 (08-22 @ 07:17)  Anion Gap, Serum: 9 (08-21 @ 07:08)      Calcium, Total Serum: 8.2 <L> (08-22 @ 07:17)  Calcium, Total Serum: 8.2 <L> (08-21 @ 07:08)  Albumin, Serum: 2.5 <L> (08-22 @ 07:17)  Albumin, Serum: 2.4 <L> (08-21 @ 07:08)    Alanine Aminotransferase (ALT/SGPT): 30 (08-22 @ 07:17)  Alanine Aminotransferase (ALT/SGPT): 24 (08-21 @ 07:08)  Alkaline Phosphatase, Serum: 127 <H> (08-22 @ 07:17)  Alkaline Phosphatase, Serum: 114 (08-21 @ 07:08)  Aspartate Aminotransferase (AST/SGOT): 34 (08-22 @ 07:17)  Aspartate Aminotransferase (AST/SGOT): 24 (08-21 @ 07:08)        08-22    139  |  102  |  11  ----------------------------<  106<H>  3.4<L>   |  24  |  0.40<L>    Ca    8.2<L>      22 Aug 2020 07:17  Phos  4.3     08-22  Mg     1.8     08-22    TPro  4.8<L>  /  Alb  2.5<L>  /  TBili  0.6  /  DBili  x   /  AST  34  /  ALT  30  /  AlkPhos  127<H>  08-22                        9.3    4.70  )-----------( 119      ( 22 Aug 2020 07:19 )             28.1     Medications  MEDICATIONS  (STANDING):  acetaminophen   Tablet .. 975 milliGRAM(s) Oral every 8 hours  aMIOdarone    Tablet   Oral   aMIOdarone    Tablet 400 milliGRAM(s) Oral every 8 hours  celecoxib 200 milliGRAM(s) Oral every 12 hours  ciprofloxacin     Tablet 500 milliGRAM(s) Oral every 12 hours  fentaNYL   Patch  25 MICROgram(s)/Hr 1 Patch Transdermal every 72 hours  lactated ringers. 1000 milliLiter(s) (125 mL/Hr) IV Continuous <Continuous>  levothyroxine 175 MICROGram(s) Oral daily  liothyronine 5 MICROGram(s) Oral daily  magnesium sulfate  IVPB 1 Gram(s) IV Intermittent once  pantoprazole    Tablet 40 milliGRAM(s) Oral before breakfast  potassium chloride    Tablet ER 40 milliEquivalent(s) Oral once  potassium chloride  10 mEq/100 mL IVPB 10 milliEquivalent(s) IV Intermittent once  rivaroxaban 10 milliGRAM(s) Oral daily      Physical Exam  General: Patient comfortable in bed  Vital Signs Last 12 Hrs  T(F): 98.1 (08-22-20 @ 11:48), Max: 98.1 (08-22-20 @ 08:02)  HR: 71 (08-22-20 @ 11:48) (69 - 77)  BP: 148/81 (08-22-20 @ 11:48) (124/74 - 148/81)  BP(mean): --  RR: 18 (08-22-20 @ 11:48) (18 - 18)  SpO2: 97% (08-22-20 @ 11:48) (95% - 97%)  Neck: No palpable thyroid nodules.  CVS: S1S2, No murmurs  Respiratory: No wheezing, no crepitations  GI: Abdomen soft, bowel sounds positive  Musculoskeletal:  edema lower extremities.     Diagnostics    Free Thyroxine, Serum: AM Sched. Collection: 21-Aug-2020 06:00 (08-20 @ 10:54)

## 2020-08-22 NOTE — PROGRESS NOTE ADULT - ASSESSMENT
A/P: 64F L path clavicle fx, L distal femur lesion, and L path FN fx s/p QASIM  -Neuro: Multimodal pain control  -Resp: IS  -GI: reg diet  -MSK: OOB, WBAT LLE, ABd pillow, posterior precautions, WBAT LUE  -Heme: DVT PPx: Xarelto  FU Dental consult  Cardio/med recs appreciated  [place] LLE unlocked  FU HV, prevena  Will discuss with attending and advise if plan changes

## 2020-08-22 NOTE — PROGRESS NOTE ADULT - ASSESSMENT
Assessment  Hypothyroidism: 64y Female with hypothyroidism, on synthroid 175mcg and cytomel 5mcg po daily, reports being compliant with medication intake, TSH was slightly suppressed, repeat TFTs within normal range. Patient converted to sinus rhythm, appears comfortable, planning DC to [facility].  Tachycardia: On meds, monitored, FU Cardio/EPS.  Pathologic Hip Fx: Postop, on pain meds, stable.          Carlin Marcial MD  Cell: [phone]  Office: [phone]

## 2020-08-22 NOTE — PROGRESS NOTE ADULT - PROBLEM SELECTOR PLAN 1
- s/p IV fluids, Labetalol 5mg, Toprol 12.5mg, 0.5mg Ativan, and Synthroid 175 during rapid response with only acute effect  - Unknown cause unlikely infectious (currently on cipro + aztreonam for UTI and afebrile), volume depletion (Hg 9.5, electrolytes wnl, tolerating PO intake with good urine output), thyroid (T4 normal with mildly low T3 and TSH receiving appropriate pharmacotherapy) possibly due to pain vs benzodiazapine withdrawal vs cardiac  - Consulted Cardiology and EP, appreciate recs  - Consulted endocrinology, Dr. Marcial following  - Telemetry monitoring  - Started on amiodarone 5g load plan for amiodarone 200mg daily  - Monitor BP, if SBP <100 will order fluids  - Pending Echo. - s/p IV fluids, Labetalol 5mg, Toprol 12.5mg, 0.5mg Ativan, and Synthroid 175 during rapid response with only acute effect  - Unknown cause unlikely infectious (currently on cipro + aztreonam for UTI and afebrile), volume depletion (Hg 9.5, electrolytes wnl, tolerating PO intake with good urine output), thyroid (T4 normal with mildly low T3 and TSH receiving appropriate pharmacotherapy) possibly due to pain vs benzodiazapine withdrawal vs cardiac  - Consulted Cardiology and EP, appreciate recs  - Consulted endocrinology, Dr. Marcial following  - EP: metoprolol 50 mg daily, signed off  - Monitor BP, if SBP <100 will order fluids  - Pending Echo.

## 2020-08-22 NOTE — PROGRESS NOTE ADULT - PROBLEM SELECTOR PLAN 5
Plt 62 at [facility], on admission to [facility] platelets 55 likely 2/2 recent chemotherapy on 7/31.  - on 7/31 platelets 235, have been 50s since 8/8 and stable, now plts 121  - No evidence of active bleeding, trend daily  - Heme/onc following; appreciate recs Plt 62 at [facility], on admission to [facility] platelets 55 likely 2/2 recent chemotherapy on 7/31.  - on 7/31 platelets 235, have been 50s since 8/8 and stable, now plts 119  - No evidence of active bleeding, trend daily  - Heme/onc following; appreciate recs

## 2020-08-22 NOTE — PROGRESS NOTE ADULT - PROBLEM SELECTOR PLAN 3
Patient with + UA and urine culture growing pan-sensitive E. Coli and Enterococcus.  - s/p aztreonam x 1 day now on aztreonam and ciprofloxacin x 3 days (ends 8/23)  - Sparks D/Ranulfo today, TOV Patient with + UA and urine culture growing pan-sensitive E. Coli and Enterococcus.  - s/p aztreonam x 1 day now on aztreonam and ciprofloxacin x 3 days (ends 8/23)  - Sparks D/Ranulfo, passed tov

## 2020-08-22 NOTE — PROGRESS NOTE ADULT - SUBJECTIVE AND OBJECTIVE BOX
EP ATTENDING    tele: NSR, no further AT    she denies palpitations, syncope, nor angina, ROS otherwise -    acetaminophen   Tablet .. 975 milliGRAM(s) Oral every 8 hours  ALPRAZolam 0.25 milliGRAM(s) Oral two times a day PRN  bisacodyl Suppository 10 milliGRAM(s) Rectal daily PRN  celecoxib 200 milliGRAM(s) Oral every 12 hours  ciprofloxacin     Tablet 500 milliGRAM(s) Oral every 12 hours  diphenhydrAMINE 25 milliGRAM(s) Oral every 4 hours PRN  fentaNYL   Patch  25 MICROgram(s)/Hr 1 Patch Transdermal every 72 hours  HYDROmorphone  Injectable 1 milliGRAM(s) IV Push every 4 hours PRN  lactated ringers. 1000 milliLiter(s) IV Continuous <Continuous>  levothyroxine 175 MICROGram(s) Oral daily  liothyronine 5 MICROGram(s) Oral daily  metoprolol succinate ER 50 milliGRAM(s) Oral daily  naloxone Injectable 0.1 milliGRAM(s) IV Push every 3 minutes PRN  pantoprazole    Tablet 40 milliGRAM(s) Oral before breakfast  rivaroxaban 10 milliGRAM(s) Oral daily                            9.3    4.70  )-----------( 119      ( 22 Aug 2020 07:19 )             28.1       08-22    139  |  102  |  11  ----------------------------<  106<H>  3.4<L>   |  24  |  0.40<L>    Ca    8.2<L>      22 Aug 2020 07:17  Phos  4.3     08-22  Mg     1.8     08-22    TPro  4.8<L>  /  Alb  2.5<L>  /  TBili  0.6  /  DBili  x   /  AST  34  /  ALT  30  /  AlkPhos  127<H>  08-22            T(C): 36.7 (08-22-20 @ 11:48), Max: 36.7 (08-22-20 @ 08:02)  HR: 71 (08-22-20 @ 11:48) (69 - 86)  BP: 148/81 (08-22-20 @ 11:48) (124/74 - 148/81)  RR: 18 (08-22-20 @ 11:48) (18 - 18)  SpO2: 97% (08-22-20 @ 11:48) (95% - 99%)  Wt(kg): --    I&O's Summary    21 Aug 2020 07:01  -  22 Aug 2020 07:00  --------------------------------------------------------  IN: 200 mL / OUT: 1035 mL / NET: -835 mL      A&O x 3  neurologically intact  no JVD  RRR, no murmurs  CTAB  soft nt/nd  no c/c/e    echo normal    A/P) 63 y/o female with asymptomatic paroxysmal atrial tachycardia. Now converted to sinus     -change amio to toprol xl 50mg daily  -continue escalating doses of beta blockers  -may d/c tele  -no further inpatient EP workup expected  -would not recommend ablation as she's asymptomatic from her AT      Susan Wright M.D., [facility] [[email or web address]]  Cardiac Electrophysiology  [facility]  [address] [place] [address], [address] [place] [address]  [email or web address]    office [phone]  pager [phone]

## 2020-08-22 NOTE — PROGRESS NOTE ADULT - PROBLEM SELECTOR PLAN 1
Will continue current synthroid/cytomel doses.  Patient follows with Dr. Zendejas endocrinology; Suggest to FU outpatient 4 weeks.  Discussed plan with patient.

## 2020-08-22 NOTE — PROGRESS NOTE ADULT - PROBLEM SELECTOR PLAN 6
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  - Patient follows with Dr. Winn at [facility], plan for second round of chemo and port placement outpatient  - s/p Xray of left clavicle found to have mets with possible fracture  - Pain management: as above  - Dental eval in house for bisphosphonate clearance  - Consulted IR for possible port placement inpatient however risk of infection high, recommend patient recover from hip surgery and pursue port placement as option, discussed with patient and daughter who are in agreement  - Oncology following, appreciate recs

## 2020-08-22 NOTE — PROGRESS NOTE ADULT - PROBLEM SELECTOR PLAN 7
- Continue home synthroid 175mg + liothyronine 5mg  - TSH and T3 mildly low, T4 wnl  - Endocrine following

## 2020-08-23 NOTE — PROGRESS NOTE ADULT - PROBLEM SELECTOR PLAN 4
Patient with WBC of 3.15 on admission likely secondary to recent chemotherapy.  - s/p filagrastim x 1 on 8/18  - WBC now wnl  - B12 elevated. Folate and Vit D wnl  - Monitor CBC daily and monitor for fever  - Heme/onc following; appreciate recs

## 2020-08-23 NOTE — PROVIDER CONTACT NOTE (OTHER) - BACKGROUND
Pt admitted for left groin pain found to have pending left femoral neck fracture now with fall at rehab center and pathologic left hip fracture.

## 2020-08-23 NOTE — PROGRESS NOTE ADULT - SUBJECTIVE AND OBJECTIVE BOX
Chief complaint  Patient is a 64y old  Female who presents with a chief complaint of Left hip pain and fall (23 Aug 2020 11:49)   Review of systems  Patient in bed, appears comfortable.    Labs and Fingersticks  CAPILLARY BLOOD GLUCOSE    Anion Gap, Serum: 9 (08-23 @ 06:43)  Anion Gap, Serum: 13 (08-22 @ 07:17)      Calcium, Total Serum: 8.4 (08-23 @ 06:43)  Calcium, Total Serum: 8.2 <L> (08-22 @ 07:17)  Albumin, Serum: 2.7 <L> (08-23 @ 06:43)  Albumin, Serum: 2.5 <L> (08-22 @ 07:17)    Alanine Aminotransferase (ALT/SGPT): 35 (08-23 @ 06:43)  Alanine Aminotransferase (ALT/SGPT): 30 (08-22 @ 07:17)  Alkaline Phosphatase, Serum: 132 <H> (08-23 @ 06:43)  Alkaline Phosphatase, Serum: 127 <H> (08-22 @ 07:17)  Aspartate Aminotransferase (AST/SGOT): 39 (08-23 @ 06:43)  Aspartate Aminotransferase (AST/SGOT): 34 (08-22 @ 07:17)        08-23    140  |  104  |  8   ----------------------------<  92  4.1   |  27  |  0.40<L>    Ca    8.4      23 Aug 2020 06:43  Phos  3.8     08-23  Mg     2.0     08-23    TPro  5.1<L>  /  Alb  2.7<L>  /  TBili  0.6  /  DBili  x   /  AST  39  /  ALT  35  /  AlkPhos  132<H>  08-23                        9.2    5.74  )-----------( 129      ( 23 Aug 2020 06:43 )             28.0     Medications  MEDICATIONS  (STANDING):  acetaminophen   Tablet .. 975 milliGRAM(s) Oral every 8 hours  celecoxib 200 milliGRAM(s) Oral every 12 hours  fentaNYL   Patch  25 MICROgram(s)/Hr 1 Patch Transdermal every 72 hours  lactated ringers. 1000 milliLiter(s) (125 mL/Hr) IV Continuous <Continuous>  levothyroxine 175 MICROGram(s) Oral daily  liothyronine 5 MICROGram(s) Oral daily  metoprolol succinate ER 50 milliGRAM(s) Oral daily  pantoprazole    Tablet 40 milliGRAM(s) Oral before breakfast  rivaroxaban 10 milliGRAM(s) Oral daily      Physical Exam  General: Patient comfortable in bed  Vital Signs Last 12 Hrs  T(F): 98.2 (08-23-20 @ 09:02), Max: 98.2 (08-23-20 @ 09:02)  HR: 67 (08-23-20 @ 10:33) (67 - 75)  BP: 147/82 (08-23-20 @ 10:33) (127/76 - 155/72)  BP(mean): --  RR: 18 (08-23-20 @ 09:02) (18 - 18)  SpO2: 94% (08-23-20 @ 10:33) (94% - 96%)  Neck: No palpable thyroid nodules.  CVS: S1S2, No murmurs  Respiratory: No wheezing, no crepitations  GI: Abdomen soft, bowel sounds positive  Musculoskeletal:  edema lower extremities.     Diagnostics    Free Thyroxine, Serum: AM Sched. Collection: 21-Aug-2020 06:00 (08-20 @ 10:54)

## 2020-08-23 NOTE — PROGRESS NOTE ADULT - ASSESSMENT
A/P: 64F L path clavicle fx, L distal femur lesion, and L path FN fx s/p QASIM, progressing well    -Neuro: Multimodal pain control  -Resp: IS  -GI: reg diet  -MSK: OOB, WBAT LLE, ABd pillow, posterior precautions, WBAT LUE  -Heme: DVT PPx: Xarelto  FU Dental consult  Cardio/med recs appreciated  [place] LLE unlocked  FU HV, prevena - will likely dc today  Will discuss with attending and advise if plan changes

## 2020-08-23 NOTE — PROVIDER CONTACT NOTE (OTHER) - REASON
Pt with left hip dressing saturated with blood. Pt with left hip dressing post-HV removal saturated with blood.

## 2020-08-23 NOTE — PROGRESS NOTE ADULT - SUBJECTIVE AND OBJECTIVE BOX
EP ATTENDING    tele: NSR, no further AT    she denies palpitations, syncope, nor angina, ROS otherwise -    acetaminophen   Tablet .. 975 milliGRAM(s) Oral every 8 hours  ALPRAZolam 0.25 milliGRAM(s) Oral two times a day PRN  bisacodyl Suppository 10 milliGRAM(s) Rectal daily PRN  celecoxib 200 milliGRAM(s) Oral every 12 hours  diphenhydrAMINE 25 milliGRAM(s) Oral every 4 hours PRN  fentaNYL   Patch  25 MICROgram(s)/Hr 1 Patch Transdermal every 72 hours  HYDROmorphone  Injectable 1 milliGRAM(s) IV Push every 4 hours PRN  lactated ringers. 1000 milliLiter(s) IV Continuous <Continuous>  levothyroxine 175 MICROGram(s) Oral daily  liothyronine 5 MICROGram(s) Oral daily  metoprolol succinate ER 50 milliGRAM(s) Oral daily  naloxone Injectable 0.1 milliGRAM(s) IV Push every 3 minutes PRN  pantoprazole    Tablet 40 milliGRAM(s) Oral before breakfast  rivaroxaban 10 milliGRAM(s) Oral daily                            9.2    5.74  )-----------( 129      ( 23 Aug 2020 06:43 )             28.0       08-23    140  |  104  |  8   ----------------------------<  92  4.1   |  27  |  0.40<L>    Ca    8.4      23 Aug 2020 06:43  Phos  3.8     08-23  Mg     2.0     08-23    TPro  5.1<L>  /  Alb  2.7<L>  /  TBili  0.6  /  DBili  x   /  AST  39  /  ALT  35  /  AlkPhos  132<H>  08-23            T(C): 36.8 (08-23-20 @ 09:02), Max: 36.8 (08-22-20 @ 20:23)  HR: 67 (08-23-20 @ 10:33) (66 - 76)  BP: 147/82 (08-23-20 @ 10:33) (124/73 - 155/72)  RR: 18 (08-23-20 @ 09:02) (17 - 18)  SpO2: 94% (08-23-20 @ 10:33) (94% - 97%)  Wt(kg): --    I&O's Summary    22 Aug 2020 07:01  -  23 Aug 2020 07:00  --------------------------------------------------------  IN: 240 mL / OUT: 1530 mL / NET: -1290 mL        A&O x 3  neurologically intact  no JVD  RRR, no murmurs  CTAB  soft nt/nd  no c/c/e    echo normal    A/P) 63 y/o female with asymptomatic paroxysmal atrial tachycardia. Now converted to sinus     -continue toprol xl 50mg daily  -continue escalating doses of beta blockers  -may d/c tele  -no further inpatient EP workup expected  -would not recommend ablation as she's asymptomatic from her AT      Susan Wright M.D., [facility]  Cardiac Electrophysiology  [facility]  [address] [place] [address], [address] [place] [address]  [email or web address]    office [phone]  pager [phone]

## 2020-08-23 NOTE — PROGRESS NOTE ADULT - ATTENDING COMMENTS
Saw and examined patient, then discussed with house staff.  64F h/o Stage IV lung adenocarcinoma, 2017, s/p right lower lobectomy with recurrence 5/2020 s/p palliative radiation to clavicle and spine on chemo (1 round of carbo/pemextred/pembrolizumab- 7/31) with recent admission 8/8 for pending femoral neck fracture now transferred from [facility] after fall in home with left hip pain, imaging showing left hip pathologic fracture, also UTI on admission. S/P L hemiarthroplasty 8/18, developed atrial tachycardia around 160s, EP consulted.  Resting in bed, feels better today, no SOB, chest pain, LLE pain is tolerable with pain regimen. Feels tired.  1. Atrial tachycardia  - EP f/u plan noted, off amio, started on metoprolol  - Per EP not candidate for DCCV, no further inpatient work up  2. Left hip fracture s/p L hemiarthroplasty 8/18  - Ortho plan noted, WBAT on LLE, has a knee immobilizer, Xarelto for DVT ppx  - PT recommended rehab   - Heme plan noted, patient had 1 dose Zarxio 480mcg x1 prior to Sx for neutropenia  3. UTI  - On po cipro for UTI, urine c/s showed E. Coli, enterococci  4. Stage 4 lung cancer  - She was recently started on Carbo/Pemextred/Pembrolizumab on 7/31/20, cycle one for adenoCa of Lung w mets  - Chemo on hold  - IR recommended Mediport placement as outpatient, patient asking for it to be done while she's in the hospital due to convenience issues, will explore during the week if appropriate  - outpatient onc follow up  - d/c planning in progress to [facility]     Discussed with resident Dr. Vasquez.    Caitie Shaver DO  Pager #: [phone]

## 2020-08-23 NOTE — PROGRESS NOTE ADULT - PROBLEM SELECTOR PLAN 3
Patient with + UA and urine culture growing pan-sensitive E. Coli and Enterococcus.  - s/p aztreonam x 1 day now on aztreonam and ciprofloxacin x 3 days (ends 8/23)  - Sparks D/Ranulfo, passed tov

## 2020-08-23 NOTE — PROGRESS NOTE ADULT - SUBJECTIVE AND OBJECTIVE BOX
Patient seen and examined at bedside. Reports no acute complaints at this time. Pain is well controlled. No other acute events overnight.  Has been OOB w/ PT.    ICU Vital Signs Last 24 Hrs  T(C): 36.3 (23 Aug 2020 05:20), Max: 36.8 (22 Aug 2020 20:23)  T(F): 97.3 (23 Aug 2020 05:20), Max: 98.3 (23 Aug 2020 00:01)  HR: 75 (23 Aug 2020 05:20) (66 - 77)  BP: 155/72 (23 Aug 2020 05:20) (124/73 - 155/72)  BP(mean): --  ABP: --  ABP(mean): --  RR: 18 (23 Aug 2020 05:20) (17 - 18)  SpO2: 96% (23 Aug 2020 05:20) (95% - 97%)    Gen: NAD, laying comfortably in bed  Resp: Unlabored breathing  MSK:   LLE:  Dressing c/d/i, prevena  HV SS          +EHL/FHL/TA/Gas/SOl          +DP/SP/Vita/Saph/T           2+DP

## 2020-08-23 NOTE — PROGRESS NOTE ADULT - SUBJECTIVE AND OBJECTIVE BOX
Patient is a 64y old  Female who presents with a chief complaint of Left hip pain and fall (23 Aug 2020 17:11)      INTERVAL HISTORY: feels ok    TELEMETRY Personally reviewed:sr  	  MEDICATIONS:  metoprolol succinate ER 50 milliGRAM(s) Oral daily        PHYSICAL EXAM:  T(C): 36.8 (08-23-20 @ 17:32), Max: 36.8 (08-22-20 @ 20:23)  HR: 67 (08-23-20 @ 17:32) (66 - 76)  BP: 120/72 (08-23-20 @ 17:32) (120/72 - 155/72)  RR: 18 (08-23-20 @ 17:32) (17 - 18)  SpO2: 92% (08-23-20 @ 17:32) (92% - 96%)  Wt(kg): --  I&O's Summary    22 Aug 2020 07:01  -  23 Aug 2020 07:00  --------------------------------------------------------  IN: 240 mL / OUT: 1530 mL / NET: -1290 mL    23 Aug 2020 07:01  -  23 Aug 2020 18:33  --------------------------------------------------------  IN: 240 mL / OUT: 0 mL / NET: 240 mL          Appearance: In no distress	  HEENT:    PERRL, EOMI	  Cardiovascular:  S1 S2, No JVD  Respiratory: Lungs clear to auscultation	  Gastrointestinal:  Soft, Non-tender, + BS	  Vascularature:  No edema of LE  Psychiatric: Appropriate affect   Neuro: no acute focal deficits                               9.2    5.74  )-----------( 129      ( 23 Aug 2020 06:43 )             28.0     08-23    140  |  104  |  8   ----------------------------<  92  4.1   |  27  |  0.40<L>    Ca    8.4      23 Aug 2020 06:43  Phos  3.8     08-23  Mg     2.0     08-23    TPro  5.1<L>  /  Alb  2.7<L>  /  TBili  0.6  /  DBili  x   /  AST  39  /  ALT  35  /  AlkPhos  132<H>  08-23        Labs personally reviewed      EKG: Personally reviewed by me - AT at 160bpm  Radiology: Personally reviewed by me -   No pulmonary embolism.    Perihilar soft tissue thickening adjacent to the right lower lobe surgical sutures. Nodular opacities in the lung. Multiple lytic and sclerotic lesions. Subtle low attenuating foci in the liver. Findings are compatible with metastatic neoplasm. Prior studies should be submitted for comparison.    Multiple pathologic fractures:  *  Acute, comminuted left clavicular fracture.  *  Age indeterminant L1 vertebral body compression deformity.  *  Questionably right anterolateral 6th rib    Hepatic metastases as outlined above.        Assessment /Plan:   AT - Amio loading, responded well, in SR. trend QTc  Overall prognosis very poor with stage 4 CA      Humphrey Galvez DO [facility]  Cardiovascular Medicine  [address], [address]  Office: [phone]  Cell: [phone]

## 2020-08-23 NOTE — PROGRESS NOTE ADULT - PROBLEM SELECTOR PLAN 2
Recent admission for left hip and knee pain found to have mets to femur now s/p fall at rehab.  - Stable vitals, left foot with distal pulse 2+, warm, able to move, no acute concern for neurovascular compromise  - XR showing pathological left femoral neck fracture s/p left QASIM on 8/18  - Ortho following; appreciate recs  - Fall precautions  - Home pain regiment was dilaudid PO 2mg moderate pain and 4mg severe pain q6 and fentanyl patch 37.5mg  (iSTOP note in chart, Reference #: [id number])  - Pain control per palliative: fentanyl patch 25 mg + IV dilaudid 1mg IV q4; will transition back to PO when appropriate was on dilaudid 6mg q4 + IV tylenol for breakthrough pain   - PT and OT daily

## 2020-08-23 NOTE — PROGRESS NOTE ADULT - PROBLEM SELECTOR PLAN 1
- Unknown cause unlikely infectious (currently on cipro + aztreonam for UTI and afebrile), volume depletion (Hg 9.5, electrolytes wnl, tolerating PO intake with good urine output), thyroid (T4 normal with mildly low T3 and TSH receiving appropriate pharmacotherapy) possibly due to pain vs benzodiazapine withdrawal vs cardiac  - Consulted Cardiology and EP, appreciate recs  - Consulted endocrinology, Dr. Marcial following  - EP: metoprolol 50 mg daily  - Monitor BP, if SBP <100 will order fluids  - Pending Echo.

## 2020-08-23 NOTE — PROGRESS NOTE ADULT - PROBLEM SELECTOR PLAN 5
Plt 62 at [facility], on admission to [facility] platelets 55 likely 2/2 recent chemotherapy on 7/31.  - on 7/31 platelets 235, have been 50s since 8/8 and stable, now plts 129  - No evidence of active bleeding, trend daily  - Heme/onc following; appreciate recs

## 2020-08-23 NOTE — PROGRESS NOTE ADULT - ASSESSMENT
Assessment  Hypothyroidism: 64y Female with hypothyroidism, on synthroid 175mcg and cytomel 5mcg po daily, reports being compliant with medication intake, TSH was slightly suppressed, repeat TFTs within normal range. Patient converted to sinus rhythm, waiting for DC to [facility].  Tachycardia: On meds, monitored, FU Cardio/EPS.  Pathologic Hip Fx: Postop, on pain meds, stable.          Carlin Marcial MD  Cell: [phone]  Office: [phone]

## 2020-08-23 NOTE — PROGRESS NOTE ADULT - SUBJECTIVE AND OBJECTIVE BOX
Alis Montielathy PGY1    Patient is a 64y old  Female who presents with a chief complaint of Left hip pain and fall (23 Aug 2020 11:32)      SUBJECTIVE / OVERNIGHT EVENTS:  - overnight - no events  - am - having some pain in L hip this am, given hydromorphone 1 mg. Denies chest pain, palpitations. Having BM and urinating. Anxiety continued, wants higher dose alprazolam.     MEDICATIONS  (STANDING):  acetaminophen   Tablet .. 975 milliGRAM(s) Oral every 8 hours  celecoxib 200 milliGRAM(s) Oral every 12 hours  fentaNYL   Patch  25 MICROgram(s)/Hr 1 Patch Transdermal every 72 hours  lactated ringers. 1000 milliLiter(s) (125 mL/Hr) IV Continuous <Continuous>  levothyroxine 175 MICROGram(s) Oral daily  liothyronine 5 MICROGram(s) Oral daily  metoprolol succinate ER 50 milliGRAM(s) Oral daily  pantoprazole    Tablet 40 milliGRAM(s) Oral before breakfast  rivaroxaban 10 milliGRAM(s) Oral daily    MEDICATIONS  (PRN):  ALPRAZolam 0.25 milliGRAM(s) Oral two times a day PRN anxiety  bisacodyl Suppository 10 milliGRAM(s) Rectal daily PRN If no bowel movement by POD#2  diphenhydrAMINE 25 milliGRAM(s) Oral every 4 hours PRN Rash and/or Itching  HYDROmorphone  Injectable 1 milliGRAM(s) IV Push every 4 hours PRN moderate-severe pain  naloxone Injectable 0.1 milliGRAM(s) IV Push every 3 minutes PRN For ANY of the following changes in patient status:  A. RR LESS THAN 10 breaths per minute, B. Oxygen saturation LESS THAN 90%, C. Sedation score of 6      CAPILLARY BLOOD GLUCOSE        I&O's Summary    22 Aug 2020 07:01  -  23 Aug 2020 07:00  --------------------------------------------------------  IN: 240 mL / OUT: 1530 mL / NET: -1290 mL        Vital Signs Last 24 Hrs  T(C): 36.8 (23 Aug 2020 09:02), Max: 36.8 (22 Aug 2020 20:23)  T(F): 98.2 (23 Aug 2020 09:02), Max: 98.3 (23 Aug 2020 00:01)  HR: 67 (23 Aug 2020 10:33) (66 - 76)  BP: 147/82 (23 Aug 2020 10:33) (124/73 - 155/72)  BP(mean): --  RR: 18 (23 Aug 2020 09:02) (17 - 18)  SpO2: 94% (23 Aug 2020 10:33) (94% - 97%)    PHYSICAL EXAM:  GENERAL: no distress  PSYCH: A&O x3  HEAD: Atraumatic, Normocephalic  NECK: Supple, No JVD  CHEST/LUNG: clear to auscultation bilaterally  HEART: regular rate and rhythm, no murmurs  ABDOMEN: nontender to palpation, no rebound tenderness/guarding  EXTREMITIES: no edema on bilateral LE  NEUROLOGY: no focal neurologic deficit  SKIN: No rashes or lesions    LABS:                        9.2    5.74  )-----------( 129      ( 23 Aug 2020 06:43 )             28.0      08-23    140  |  104  |  8   ----------------------------<  92  4.1   |  27  |  0.40<L>    Ca    8.4      23 Aug 2020 06:43  Phos  3.8     08-23  Mg     2.0     08-23    TPro  5.1<L>  /  Alb  2.7<L>  /  TBili  0.6  /  DBili  x   /  AST  39  /  ALT  35  /  AlkPhos  132<H>  08-23    PT/INR - ( 22 Aug 2020 09:15 )   PT: 13.0 sec;   INR: 1.10 ratio         PTT - ( 22 Aug 2020 09:15 )  PTT:32.9 sec          RADIOLOGY & ADDITIONAL TESTS:    Imaging Personally Reviewed:    Consultant(s) Notes Reviewed:      Care Discussed with Consultants/Other Providers:

## 2020-08-23 NOTE — PROVIDER CONTACT NOTE (OTHER) - ACTION/TREATMENT ORDERED:
MD Allen at bedside to see patient.  Will notify provider to assess the site and drainage. Will continue to monitor. MD Allen at bedside to see patient.  Will notify ortho to assess the site and change dressing. Will continue to monitor.

## 2020-08-24 NOTE — PROGRESS NOTE ADULT - PROBLEM SELECTOR PLAN 5
Plt 62 at [facility], on admission to [facility] platelets 55 likely 2/2 recent chemotherapy on 7/31.  - on 7/31 platelets 235, have been 50s since 8/8 and stable, now plts 129  - No evidence of active bleeding, trend daily  - Heme/onc following; appreciate recs - Continue home synthroid 175mg + liothyronine 5mg  - TSH and T3 mildly low, T4 wnl  - Endocrine following - endo following, appreciate recs  [ ] continue synthroid/cytomel   [ ] Patient follows with Dr. Zendejas endocrinology; Suggest to FU outpatient 4 weeks.

## 2020-08-24 NOTE — PROGRESS NOTE ADULT - SUBJECTIVE AND OBJECTIVE BOX
Alis Vasquez PGY1    Patient is a 64y old  Female who presents with a chief complaint of Left hip pain and fall (23 Aug 2020 18:33)      SUBJECTIVE / OVERNIGHT EVENTS:  - overnight - pain, given tylenol  - am -     MEDICATIONS  (STANDING):  acetaminophen   Tablet .. 975 milliGRAM(s) Oral every 8 hours  acetaminophen  IVPB .. 1000 milliGRAM(s) IV Intermittent once  celecoxib 200 milliGRAM(s) Oral every 12 hours  fentaNYL   Patch  25 MICROgram(s)/Hr 1 Patch Transdermal every 72 hours  lactated ringers. 1000 milliLiter(s) (125 mL/Hr) IV Continuous <Continuous>  levothyroxine 175 MICROGram(s) Oral daily  liothyronine 5 MICROGram(s) Oral daily  metoprolol succinate ER 50 milliGRAM(s) Oral daily  pantoprazole    Tablet 40 milliGRAM(s) Oral before breakfast  rivaroxaban 10 milliGRAM(s) Oral daily    MEDICATIONS  (PRN):  ALPRAZolam 0.25 milliGRAM(s) Oral three times a day PRN anxiety  bisacodyl Suppository 10 milliGRAM(s) Rectal daily PRN If no bowel movement by POD#2  diphenhydrAMINE 25 milliGRAM(s) Oral every 4 hours PRN Rash and/or Itching  HYDROmorphone  Injectable 1 milliGRAM(s) IV Push every 4 hours PRN moderate-severe pain  naloxone Injectable 0.1 milliGRAM(s) IV Push every 3 minutes PRN For ANY of the following changes in patient status:  A. RR LESS THAN 10 breaths per minute, B. Oxygen saturation LESS THAN 90%, C. Sedation score of 6      CAPILLARY BLOOD GLUCOSE        I&O's Summary    23 Aug 2020 07:01  -  24 Aug 2020 07:00  --------------------------------------------------------  IN: 480 mL / OUT: 1200 mL / NET: -720 mL        Vital Signs Last 24 Hrs  T(C): 36.7 (24 Aug 2020 05:25), Max: 36.8 (23 Aug 2020 09:02)  T(F): 98 (24 Aug 2020 05:25), Max: 98.3 (23 Aug 2020 17:32)  HR: 69 (24 Aug 2020 05:25) (64 - 69)  BP: 132/71 (24 Aug 2020 05:25) (110/70 - 147/82)  BP(mean): --  RR: 18 (24 Aug 2020 05:25) (18 - 18)  SpO2: 96% (24 Aug 2020 05:25) (92% - 96%)    PHYSICAL EXAM:  GENERAL: no distress  PSYCH: A&O x3  HEAD: Atraumatic, Normocephalic  NECK: Supple, No JVD  CHEST/LUNG: clear to auscultation bilaterally  HEART: regular rate and rhythm, no murmurs  ABDOMEN: nontender to palpation, no rebound tenderness/guarding  EXTREMITIES: no edema on bilateral LE  NEUROLOGY: no focal neurologic deficit  SKIN: No rashes or lesions    LABS:                        8.8    6.88  )-----------( 127      ( 24 Aug 2020 06:13 )             27.3      08-24    142  |  105  |  9   ----------------------------<  98  3.8   |  27  |  0.39<L>    Ca    8.3<L>      24 Aug 2020 06:14  Phos  3.5     08-24  Mg     2.0     08-24    TPro  4.9<L>  /  Alb  2.8<L>  /  TBili  0.6  /  DBili  x   /  AST  31  /  ALT  33  /  AlkPhos  131<H>  08-24    PT/INR - ( 22 Aug 2020 09:15 )   PT: 13.0 sec;   INR: 1.10 ratio         PTT - ( 22 Aug 2020 09:15 )  PTT:32.9 sec          RADIOLOGY & ADDITIONAL TESTS:    Imaging Personally Reviewed:    Consultant(s) Notes Reviewed:      Care Discussed with Consultants/Other Providers: Alis Montielathy PGY1    Patient is a 64y old  Female who presents with a chief complaint of Left hip pain and fall (23 Aug 2020 18:33)      SUBJECTIVE / OVERNIGHT EVENTS:  - overnight - pain, given tylenol  - am - some pain in surgical site, improved with tylenol. had mild bleeding, recommended changing bandaging. no bm 3 days. urinating. ok for d/c from ortho. waiting for rehab placement but need to touch base w outpt onc for chemo/radiation tx plan.     MEDICATIONS  (STANDING):  acetaminophen   Tablet .. 975 milliGRAM(s) Oral every 8 hours  acetaminophen  IVPB .. 1000 milliGRAM(s) IV Intermittent once  celecoxib 200 milliGRAM(s) Oral every 12 hours  fentaNYL   Patch  25 MICROgram(s)/Hr 1 Patch Transdermal every 72 hours  lactated ringers. 1000 milliLiter(s) (125 mL/Hr) IV Continuous <Continuous>  levothyroxine 175 MICROGram(s) Oral daily  liothyronine 5 MICROGram(s) Oral daily  metoprolol succinate ER 50 milliGRAM(s) Oral daily  pantoprazole    Tablet 40 milliGRAM(s) Oral before breakfast  rivaroxaban 10 milliGRAM(s) Oral daily    MEDICATIONS  (PRN):  ALPRAZolam 0.25 milliGRAM(s) Oral three times a day PRN anxiety  bisacodyl Suppository 10 milliGRAM(s) Rectal daily PRN If no bowel movement by POD#2  diphenhydrAMINE 25 milliGRAM(s) Oral every 4 hours PRN Rash and/or Itching  HYDROmorphone  Injectable 1 milliGRAM(s) IV Push every 4 hours PRN moderate-severe pain  naloxone Injectable 0.1 milliGRAM(s) IV Push every 3 minutes PRN For ANY of the following changes in patient status:  A. RR LESS THAN 10 breaths per minute, B. Oxygen saturation LESS THAN 90%, C. Sedation score of 6      CAPILLARY BLOOD GLUCOSE        I&O's Summary    23 Aug 2020 07:01  -  24 Aug 2020 07:00  --------------------------------------------------------  IN: 480 mL / OUT: 1200 mL / NET: -720 mL        Vital Signs Last 24 Hrs  T(C): 36.7 (24 Aug 2020 05:25), Max: 36.8 (23 Aug 2020 09:02)  T(F): 98 (24 Aug 2020 05:25), Max: 98.3 (23 Aug 2020 17:32)  HR: 69 (24 Aug 2020 05:25) (64 - 69)  BP: 132/71 (24 Aug 2020 05:25) (110/70 - 147/82)  BP(mean): --  RR: 18 (24 Aug 2020 05:25) (18 - 18)  SpO2: 96% (24 Aug 2020 05:25) (92% - 96%)    PHYSICAL EXAM:  GENERAL: no distress, anxious affect  PSYCH: A&O x3  HEAD: Atraumatic, Normocephalic  NECK: Supple, No JVD  CHEST/LUNG: clear to auscultation bilaterally  HEART: regular rate and rhythm, no murmurs  ABDOMEN: nontender to palpation, no rebound tenderness/guarding  EXTREMITIES: L hip with bandaging on surgical site. no erythema, swelling, tenderness to palpation.   NEUROLOGY: no focal neurologic deficit  SKIN: No rashes or lesions    LABS:                        8.8    6.88  )-----------( 127      ( 24 Aug 2020 06:13 )             27.3      08-24    142  |  105  |  9   ----------------------------<  98  3.8   |  27  |  0.39<L>    Ca    8.3<L>      24 Aug 2020 06:14  Phos  3.5     08-24  Mg     2.0     08-24    TPro  4.9<L>  /  Alb  2.8<L>  /  TBili  0.6  /  DBili  x   /  AST  31  /  ALT  33  /  AlkPhos  131<H>  08-24    PT/INR - ( 22 Aug 2020 09:15 )   PT: 13.0 sec;   INR: 1.10 ratio         PTT - ( 22 Aug 2020 09:15 )  PTT:32.9 sec          RADIOLOGY & ADDITIONAL TESTS:    Imaging Personally Reviewed:    Consultant(s) Notes Reviewed:      Care Discussed with Consultants/Other Providers:

## 2020-08-24 NOTE — PROGRESS NOTE ADULT - PROBLEM SELECTOR PLAN 1
- Unknown cause unlikely infectious (currently on cipro + aztreonam for UTI and afebrile), volume depletion (Hg 9.5, electrolytes wnl, tolerating PO intake with good urine output), thyroid (T4 normal with mildly low T3 and TSH receiving appropriate pharmacotherapy) possibly due to pain vs benzodiazapine withdrawal vs cardiac  - Consulted Cardiology and EP, appreciate recs  - Consulted endocrinology, Dr. Marcial following  - EP: metoprolol 50 mg daily  - Monitor BP, if SBP <100 will order fluids  - Pending Echo. - unclear cause, in sinus rhythm non tachy last 2 days  - EP following, appreciate recs  [ ] continue metoprolol 50 mg daily  - Pending Echo.

## 2020-08-24 NOTE — PROGRESS NOTE ADULT - PROBLEM SELECTOR PLAN 4
Patient with WBC of 3.15 on admission likely secondary to recent chemotherapy.  - s/p filagrastim x 1 on 8/18  - WBC now wnl  - B12 elevated. Folate and Vit D wnl  - Monitor CBC daily and monitor for fever  - Heme/onc following; appreciate recs Patient with diagnosis in 2017 s/p RLL lobectomy, recurrence in May 2020 s/p palliative radiation and 1 round of chemotherapy.  - Patient follows with Dr. Winn at [facility], plan for second round of chemo and port placement outpatient  - s/p Xray of left clavicle found to have mets with possible fracture  - Pain management: as above  - Dental eval in house for bisphosphonate clearance  - Consulted IR for possible port placement inpatient however risk of infection high, recommend patient recover from hip surgery and pursue port placement as option, discussed with patient and daughter who are in agreement  - Oncology following, appreciate recs Patient with diagnosis in 2017 s/p RLL lobectomy, recurrence in May 2020 s/p palliative radiation and 1 round of chemotherapy.  - Patient follows with Dr. Winn at [facility], plan for second round of chemo and port placement outpatient  - Consulted IR for possible port placement inpatient however risk of infection high, recommend patient recover from hip surgery and pursue port placement as option, discussed with patient and daughter who are in agreement  - Oncology following, appreciate recs

## 2020-08-24 NOTE — PROGRESS NOTE ADULT - PROBLEM SELECTOR PLAN 1
- c/w 25mcg TDP fentanyl patch and restarted dilaudid 4mg PO Q4 PRN for mod-severe pain  - monitor BMs

## 2020-08-24 NOTE — CHART NOTE - NSCHARTNOTEFT_GEN_A_CORE
I spoke with inpt team- patient will outpatient postop Rad Onc consult at [facility] with Dr Luís Keith , or - he has treated her previously, and I have alerted him that Ms Spivey will need to see him again  Thanks, Padilla Brennan MD cell

## 2020-08-24 NOTE — PROGRESS NOTE ADULT - SUBJECTIVE AND OBJECTIVE BOX
EP ATTENDING    uneventful overnight.  telemetry shows NSR, no further AT  she denies palpitations, syncope, nor angina, ROS otherwise -    acetaminophen   Tablet .. 975 milliGRAM(s) Oral every 8 hours  acetaminophen  IVPB .. 1000 milliGRAM(s) IV Intermittent once  ALPRAZolam 0.25 milliGRAM(s) Oral three times a day PRN  bisacodyl Suppository 10 milliGRAM(s) Rectal daily PRN  celecoxib 200 milliGRAM(s) Oral every 12 hours  diphenhydrAMINE 25 milliGRAM(s) Oral every 4 hours PRN  fentaNYL   Patch  25 MICROgram(s)/Hr 1 Patch Transdermal every 72 hours  levothyroxine 175 MICROGram(s) Oral daily  liothyronine 5 MICROGram(s) Oral daily  metoprolol succinate ER 50 milliGRAM(s) Oral daily  naloxone Injectable 0.1 milliGRAM(s) IV Push every 3 minutes PRN  pantoprazole    Tablet 40 milliGRAM(s) Oral before breakfast  rivaroxaban 10 milliGRAM(s) Oral daily                            8.8    6.88  )-----------( 127      ( 24 Aug 2020 06:13 )             27.3       08-24    142  |  105  |  9   ----------------------------<  98  3.8   |  27  |  0.39<L>    Ca    8.3<L>      24 Aug 2020 06:14  Phos  3.5     08-24  Mg     2.0     08-24    TPro  4.9<L>  /  Alb  2.8<L>  /  TBili  0.6  /  DBili  x   /  AST  31  /  ALT  33  /  AlkPhos  131<H>  08-24      T(C): 36.7 (08-24-20 @ 05:25), Max: 36.8 (08-23-20 @ 09:02)  HR: 69 (08-24-20 @ 05:25) (64 - 69)  BP: 132/71 (08-24-20 @ 05:25) (110/70 - 147/82)  RR: 18 (08-24-20 @ 05:25) (18 - 18)  SpO2: 96% (08-24-20 @ 05:25) (92% - 96%)  Wt(kg): --    I&O's Summary    23 Aug 2020 07:01  -  24 Aug 2020 07:00  --------------------------------------------------------  IN: 480 mL / OUT: 1200 mL / NET: -720 mL    A&O x 3  neurologically intact  no JVD  RRR, no murmurs  CTAB  soft nt/nd  no c/c/e    echo normal    A/P) 65 y/o female with asymptomatic paroxysmal atrial tachycardia. Now converted to sinus     -continue toprol xl 50mg daily  -may d/c tele since she is not symptomatic from ATach.  -no further inpatient EP workup expected    Lobito Antoine M.D.  Cardiac Electrophysiology  [phone]

## 2020-08-24 NOTE — PROGRESS NOTE ADULT - ASSESSMENT
Assessment  Hypothyroidism: 64y Female with hypothyroidism, on synthroid 175mcg and cytomel 5mcg po daily, reports being compliant with medication intake, TSH was slightly suppressed, repeat TFTs within normal range, appears comfortable, pending DC to [facility].  Tachycardia: On meds, monitored, FU Cardio/EPS.  Pathologic Hip Fx: Postop, on pain meds, stable.          Carlin Marcial MD  Cell: [phone]  Office: [phone] Assessment  Hypothyroidism: 64y Female with hypothyroidism, on synthroid  175mcg and cytomel 5mcg po daily, reports being compliant with medication intake, TSH was slightly suppressed, repeat TFTs within normal range, appears comfortable, pending DC to [facility].  Tachycardia: On meds, monitored, FU Cardio/EPS.  Pathologic Hip Fx: Postop, on pain meds, stable.          Carlin Marcial MD  Cell: [phone]  Office: [phone]

## 2020-08-24 NOTE — PROGRESS NOTE ADULT - ASSESSMENT
64F h/o hypothyroidism, Stage IV lung adenocarcinoma diagnosed in 2017 s/p right lower lobectomy with recurrence 5/2020 s/p palliative radiation to clavicle and spine and 1 round of carbo/pemextred/pembrolizumab chemo 7/31 with recent admission 8/8 for pending femoral neck fracture now transferred from [facility] after fall in home with left hip pain, imaging showing left hip pathologic fracture. Patient also noted to have thrombocytopenia likely secondary to recent chemotherapy.

## 2020-08-24 NOTE — PROGRESS NOTE ADULT - PROBLEM SELECTOR PLAN 8
- alprazolam 0.25 TID prn  - ISTOP Reference #: [id number] Transition of Care Status:  1. Name of PCP:  2. PCP Contacted on Admission: ( ) Y    (x) N  3. PCP Contacted at Discharge: ( ) Y    ( ) N    ( ) N/A  4. Post-Discharge Appointment Date and Location:  5. Summary of Handoff given to PCP:

## 2020-08-24 NOTE — PROGRESS NOTE ADULT - PROBLEM SELECTOR PLAN 2
Recent admission for left hip and knee pain found to have mets to femur now s/p fall at rehab.  - Stable vitals, left foot with distal pulse 2+, warm, able to move, no acute concern for neurovascular compromise  - XR showing pathological left femoral neck fracture s/p left QASIM on 8/18  - Ortho following; appreciate recs  - Fall precautions  - Home pain regiment was dilaudid PO 2mg moderate pain and 4mg severe pain q6 and fentanyl patch 37.5mg  (iSTOP note in chart, Reference #: [id number])  - Pain control per palliative: fentanyl patch 25 mg + IV dilaudid 1mg IV q4; will transition back to PO when appropriate was on dilaudid 6mg q4 + IV tylenol for breakthrough pain   - PT and OT daily -  left femoral neck fracture s/p left QASIM on 8/18  - Ortho + palliative following; appreciate recs  - Home pain regiment was dilaudid PO 2mg moderate pain and 4mg severe pain q6 and fentanyl patch 37.5mg  (iSTOP note in chart, Reference #: [id number])  [ ] cleared for d/c per ortho  [ ] Pain control per palliative: 25mcg TDP fentanyl patch and restarted dilaudid 4mg PO Q4 PRN for mod-severe pain  [ ] PT and OT daily, fall precautions

## 2020-08-24 NOTE — PROGRESS NOTE ADULT - PROBLEM SELECTOR PLAN 3
Patient with + UA and urine culture growing pan-sensitive E. Coli and Enterococcus.  - s/p aztreonam x 1 day now on aztreonam and ciprofloxacin x 3 days (ends 8/23)  - Sparks D/Ranulfo, passed tov Plt 62 at [facility], on admission to [facility] platelets 55 likely 2/2 recent chemotherapy on 7/31.  - on 7/31 platelets 235, have been 50s since 8/8 and stable, now plts 129  - No evidence of active bleeding, trend daily  - Heme/onc following; appreciate recs Plt 62 at [facility], on admission to [facility] platelets 55 likely 2/2 recent chemotherapy on 7/31.  - on 7/31 platelets 235, have been 50s since 8/8 and stable, now plts 129  - No evidence of active bleeding, trend daily  - Heme/onc following; appreciate recs  [ ] CTM

## 2020-08-24 NOTE — PROGRESS NOTE ADULT - PROBLEM SELECTOR PLAN 7
- Continue home synthroid 175mg + liothyronine 5mg  - TSH and T3 mildly low, T4 wnl  - Endocrine following DVT: rivaroxaban 10mg   Diet: Regular + 1 ensure daily  Vit deficiencies: B12 elevated. Folate and Vit D wnl  PT: ZACHARY

## 2020-08-24 NOTE — PROGRESS NOTE ADULT - ATTENDING COMMENTS
Agree with above with following addendum:    #S/p QASIM with residual pain  -continue pain managmenet, change to PO dilaudid in preparation for d/c  -per discussion with ortho, now want spine and rad-onc c/s prior to d/c.   -appreciate rad-onc, no inpatient radiation at this time.  -awaiting spine c/s.    #metastatic lung CA  -outpatient follow up  -team to contact outside oncology to document no chemo/rads while at rehab.    pending spine and onc, d/c planning to rehab to get patient strong enough to go back for chemo/radiation therapy.

## 2020-08-24 NOTE — PROGRESS NOTE ADULT - SUBJECTIVE AND OBJECTIVE BOX
Chief complaint  Patient is a 64y old  Female who presents with a chief complaint of Left hip pain and fall (24 Aug 2020 08:42)   Review of systems  Patient in bed, looks comfortable.    Labs and Fingersticks  CAPILLARY BLOOD GLUCOSE          Anion Gap, Serum: 10 (08-24 @ 06:14)  Anion Gap, Serum: 9 (08-23 @ 06:43)      Calcium, Total Serum: 8.3 <L> (08-24 @ 06:14)  Calcium, Total Serum: 8.4 (08-23 @ 06:43)  Albumin, Serum: 2.8 <L> (08-24 @ 06:14)  Albumin, Serum: 2.7 <L> (08-23 @ 06:43)    Alanine Aminotransferase (ALT/SGPT): 33 (08-24 @ 06:14)  Alanine Aminotransferase (ALT/SGPT): 35 (08-23 @ 06:43)  Alkaline Phosphatase, Serum: 131 <H> (08-24 @ 06:14)  Alkaline Phosphatase, Serum: 132 <H> (08-23 @ 06:43)  Aspartate Aminotransferase (AST/SGOT): 31 (08-24 @ 06:14)  Aspartate Aminotransferase (AST/SGOT): 39 (08-23 @ 06:43)        08-24    142  |  105  |  9   ----------------------------<  98  3.8   |  27  |  0.39<L>    Ca    8.3<L>      24 Aug 2020 06:14  Phos  3.5     08-24  Mg     2.0     08-24    TPro  4.9<L>  /  Alb  2.8<L>  /  TBili  0.6  /  DBili  x   /  AST  31  /  ALT  33  /  AlkPhos  131<H>  08-24                        8.8    6.88  )-----------( 127      ( 24 Aug 2020 06:13 )             27.3     Medications  MEDICATIONS  (STANDING):  acetaminophen   Tablet .. 975 milliGRAM(s) Oral every 8 hours  acetaminophen  IVPB .. 1000 milliGRAM(s) IV Intermittent once  fentaNYL   Patch  25 MICROgram(s)/Hr 1 Patch Transdermal every 72 hours  levothyroxine 175 MICROGram(s) Oral daily  liothyronine 5 MICROGram(s) Oral daily  metoprolol succinate ER 50 milliGRAM(s) Oral daily  pantoprazole    Tablet 40 milliGRAM(s) Oral before breakfast  polyethylene glycol 3350 17 Gram(s) Oral daily  rivaroxaban 10 milliGRAM(s) Oral daily  senna 2 Tablet(s) Oral at bedtime      Physical Exam  General: Patient comfortable in bed  Vital Signs Last 12 Hrs  T(F): 97.9 (08-24-20 @ 14:27), Max: 98 (08-24-20 @ 05:25)  HR: 75 (08-24-20 @ 14:27) (69 - 75)  BP: 114/72 (08-24-20 @ 14:27) (114/72 - 132/71)  BP(mean): --  RR: 18 (08-24-20 @ 14:27) (18 - 18)  SpO2: 95% (08-24-20 @ 14:27) (95% - 96%)  Neck: No palpable thyroid nodules.  CVS: S1S2, No murmurs  Respiratory: No wheezing, no crepitations  GI: Abdomen soft, bowel sounds positive  Musculoskeletal:  edema lower extremities.   Skin: No skin rashes, no ecchymosis    Diagnostics Chief complaint  Patient is a 64y old  Female who presents with a chief complaint of Left hip pain and fall (24 Aug 2020 08:42)   Review of systems  Patient in bed, looks comfortable.    Labs and Fingersticks  CAPILLARY BLOOD GLUCOSE    Anion Gap, Serum: 10 (08-24 @ 06:14)  Anion Gap, Serum: 9 (08-23 @ 06:43)      Calcium, Total Serum: 8.3 <L> (08-24 @ 06:14)  Calcium, Total Serum: 8.4 (08-23 @ 06:43)  Albumin, Serum: 2.8 <L> (08-24 @ 06:14)  Albumin, Serum: 2.7 <L> (08-23 @ 06:43)    Alanine Aminotransferase (ALT/SGPT): 33 (08-24 @ 06:14)  Alanine Aminotransferase (ALT/SGPT): 35 (08-23 @ 06:43)  Alkaline Phosphatase, Serum: 131 <H> (08-24 @ 06:14)  Alkaline Phosphatase, Serum: 132 <H> (08-23 @ 06:43)  Aspartate Aminotransferase (AST/SGOT): 31 (08-24 @ 06:14)  Aspartate Aminotransferase (AST/SGOT): 39 (08-23 @ 06:43)        08-24    142  |  105  |  9   ----------------------------<  98  3.8   |  27  |  0.39<L>    Ca    8.3<L>      24 Aug 2020 06:14  Phos  3.5     08-24  Mg     2.0     08-24    TPro  4.9<L>  /  Alb  2.8<L>  /  TBili  0.6  /  DBili  x   /  AST  31  /  ALT  33  /  AlkPhos  131<H>  08-24                        8.8    6.88  )-----------( 127      ( 24 Aug 2020 06:13 )             27.3     Medications  MEDICATIONS  (STANDING):  acetaminophen   Tablet .. 975 milliGRAM(s) Oral every 8 hours  acetaminophen  IVPB .. 1000 milliGRAM(s) IV Intermittent once  fentaNYL   Patch  25 MICROgram(s)/Hr 1 Patch Transdermal every 72 hours  levothyroxine 175 MICROGram(s) Oral daily  liothyronine 5 MICROGram(s) Oral daily  metoprolol succinate ER 50 milliGRAM(s) Oral daily  pantoprazole    Tablet 40 milliGRAM(s) Oral before breakfast  polyethylene glycol 3350 17 Gram(s) Oral daily  rivaroxaban 10 milliGRAM(s) Oral daily  senna 2 Tablet(s) Oral at bedtime      Physical Exam  General: Patient comfortable in bed  Vital Signs Last 12 Hrs  T(F): 97.9 (08-24-20 @ 14:27), Max: 98 (08-24-20 @ 05:25)  HR: 75 (08-24-20 @ 14:27) (69 - 75)  BP: 114/72 (08-24-20 @ 14:27) (114/72 - 132/71)  BP(mean): --  RR: 18 (08-24-20 @ 14:27) (18 - 18)  SpO2: 95% (08-24-20 @ 14:27) (95% - 96%)  Neck: No palpable thyroid nodules.  CVS: S1S2, No murmurs  Respiratory: No wheezing, no crepitations  GI: Abdomen soft, bowel sounds positive  Musculoskeletal:  edema lower extremities.   Skin: No skin rashes, no ecchymosis    Diagnostics

## 2020-08-24 NOTE — PROGRESS NOTE ADULT - ASSESSMENT
A/P: 64F L path clavicle fx, L distal femur lesion, and L path FN fx s/p QASIM, progressing well    -Neuro: Multimodal pain control  -Resp: IS  -GI: reg diet  -MSK: OOB, WBAT LLE, ABd pillow, posterior precautions, WBAT LUE  -Heme: DVT PPx: Xarelto  FU Dental consult  Cardio/med recs appreciated  [place] LLE unlocked  Dispo planning, ZACHARY  No further acute orthopedic surgical intervention at this time  Patient is orthopedically stable for discharge to rehab  patient should follow up with Dr. Ramírez after discharge, Please call the office to set up an appointment  If patient has staples, staples may be removed at rehab on POD #14 (9/1), or they may be removed in the office if no longer at rehab  Will discuss with attending and advise if plan changes A/P: 64F L path clavicle fx, L distal femur lesion, and L path FN fx s/p QASIM, progressing well    -Neuro: Multimodal pain control  -Resp: IS  -GI: reg diet  -MSK: OOB, WBAT LLE, ABd pillow, posterior precautions, WBAT LUE  -Heme: DVT PPx: Xarelto  FU Dental consult  Cardio/med recs appreciated  [place] LLE unlocked  Dispo planning, ZACHARY  No further acute orthopedic surgical intervention at this time  Patient is orthopedically stable for discharge to rehab  patient should follow up with Dr. Ramírez after discharge, Please call the office to set up an appointment  If patient has staples, staples may be removed at rehab on POD #14 (9/1), or they may be removed in the office if no longer at rehab  Recommend radiation oncology consult for patient for further management of osseus metastasis  Recommend spine consult due to presence of T12 metastasis   Will discuss with attending and advise if plan changes A/P: 64F L path clavicle fx, L distal femur lesion, and L path FN fx s/p QASIM, progressing well    -Neuro: Multimodal pain control  -Resp: IS  -GI: reg diet  -MSK: OOB, WBAT LLE, ABd pillow, posterior precautions, WBAT LUE  -Heme: DVT PPx: Xarelto  FU Dental consult  Cardio/med recs appreciated  [place] LLE unlocked  Dispo planning, ZACHARY  No further acute orthopedic surgical intervention at this time  Patient is orthopedically stable for discharge to rehab  patient should follow up with Dr. Ramírez after discharge, Please call the office to set up an appointment  If patient has staples, staples may be removed at rehab on POD #14 (9/1), or they may be removed in the office if no longer at rehab  Recommend radiation oncology consult for patient for further management of osseus metastasis  Will discuss with attending and advise if plan changes

## 2020-08-24 NOTE — PROGRESS NOTE ADULT - PROBLEM SELECTOR PLAN 6
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  - Patient follows with Dr. Winn at [facility], plan for second round of chemo and port placement outpatient  - s/p Xray of left clavicle found to have mets with possible fracture  - Pain management: as above  - Dental eval in house for bisphosphonate clearance  - Consulted IR for possible port placement inpatient however risk of infection high, recommend patient recover from hip surgery and pursue port placement as option, discussed with patient and daughter who are in agreement  - Oncology following, appreciate recs - alprazolam 0.25 TID prn  - ISTOP Reference #: [id number] [ ] alprazolam 0.25 TID prn  [ ] for sleep: melatonin 5 mg (pt takes doxylamine at home, but is not available here)  - ISTOP Reference #: [id number]

## 2020-08-24 NOTE — PROGRESS NOTE ADULT - SUBJECTIVE AND OBJECTIVE BOX
HPI: 64F [facility] of hypothyroidism, stage IV lung CA (2017, s/p RLL lobectomy, s/p pall RT, s/p carbo/pemextred/pembrolizumab 7/31), recent femoral neck fracture, here after fall in home with L hip pain, with a L hip pathologic fracture. Also noted to be thrombocytopenic, likely from chemo.  Possible ORIF on 8/18/20 per ortho. Palliative called for pain management. Currently on fentanyl patch 25mcg, dilaudid 2mg/4mg for moderate/severe pain, and toradol 15mg Q6.  At home, patient was most recently on fentanyl 37.5mcg TDP, and dilaudid 4mg PO.    INTERVAL EVENTS:  8/18: states having pain, used dilaudid 6mg PO x 2/24 hours  8/19: states feeling better after surgery, using PCA, pain overall managed  8/20: more tired today, off PCA now, started on fentanyl 25mcg TDP, states having pain at op site; RRT o/n for SVT  8/21: states pain working well, used dilaudid 2.5mg IV  8/24: states pain managed, she feels overwhelmed with her disease, its extent, and next steps    ADVANCE DIRECTIVES:    DNR  MOLST  [ ]  Living Will  [ ]   DECISION MAKER(s):  [ ] Health Care Proxy(s)  [ ] Surrogate(s)  [ ] Guardian           Name(s): Phone Number(s):   spouse  daughter Kena Cox [phone]     BASELINE (I)ADL(s) (prior to admission):  [place]: [X ]Total  [ ] Moderate [ ]Dependent    Allergies    Bananas (Other)  latex (Rash)  opioid-like analgesics (Urticaria)  penicillin (Angioedema)    Intolerances    MEDICATIONS  (STANDING):  acetaminophen   Tablet .. 975 milliGRAM(s) Oral every 8 hours  acetaminophen  IVPB .. 1000 milliGRAM(s) IV Intermittent once  fentaNYL   Patch  25 MICROgram(s)/Hr 1 Patch Transdermal every 72 hours  levothyroxine 175 MICROGram(s) Oral daily  liothyronine 5 MICROGram(s) Oral daily  metoprolol succinate ER 50 milliGRAM(s) Oral daily  pantoprazole    Tablet 40 milliGRAM(s) Oral before breakfast  polyethylene glycol 3350 17 Gram(s) Oral daily  rivaroxaban 10 milliGRAM(s) Oral daily  senna 2 Tablet(s) Oral at bedtime    MEDICATIONS  (PRN):  ALPRAZolam 0.25 milliGRAM(s) Oral three times a day PRN anxiety  bisacodyl Suppository 10 milliGRAM(s) Rectal daily PRN If no bowel movement by POD#2  diphenhydrAMINE 25 milliGRAM(s) Oral every 4 hours PRN Rash and/or Itching  HYDROmorphone   Tablet 6 milliGRAM(s) Oral every 4 hours PRN moderate-severe pain  naloxone Injectable 0.1 milliGRAM(s) IV Push every 3 minutes PRN For ANY of the following changes in patient status:  A. RR LESS THAN 10 breaths per minute, B. Oxygen saturation LESS THAN 90%, C. Sedation score of 6    PRESENT SYMPTOMS: [ ]Unable to obtain due to poor mentation   Source if other than patient:  [ ]Family   [ ]Team     Pain: [ X]yes [ ]no  QOL impact -   Location -       LLE             Aggravating factors - movement  Quality -  Radiation -  Timing-  Severity (0-10 scale): up to 10/10  Minimal acceptable level (0-10 scale):     CPOT:    https://www.[facility].org/getattachment/frk66x10-3o3l-8d8y-6g2y-1565c5102n2i/Critical-Care-Pain-Observation-Tool-(CPOT)      PAIN AD Score:     http://geriatrictoolkit.[facility]/cog/painad.pdf (press ctrl +  left click to view)    Dyspnea:                           [ ]Mild [ ]Moderate [ ]Severe  Anxiety:                             [ ]Mild [ ]Moderate [ ]Severe  Fatigue:                             [ ]Mild [ ]Moderate [ ]Severe  Nausea:                             [ ]Mild [ ]Moderate [ ]Severe  Loss of appetite:              [ ]Mild [ ]Moderate [ ]Severe  Constipation:                    [ ]Mild [ ]Moderate [ ]Severe    Other Symptoms:  [ ]All other review of systems negative     Palliative Performance Status Version 2:         %    http://npcrc.org/files/news/palliative_performance_scale_ppsv2.pdf    PHYSICAL EXAM:  Vital Signs Last 24 Hrs  T(C): 36.6 (24 Aug 2020 14:27), Max: 36.8 (23 Aug 2020 17:32)  T(F): 97.9 (24 Aug 2020 14:27), Max: 98.3 (23 Aug 2020 17:32)  HR: 75 (24 Aug 2020 14:27) (64 - 75)  BP: 114/72 (24 Aug 2020 14:27) (110/70 - 132/71)  BP(mean): --  RR: 18 (24 Aug 2020 14:27) (18 - 18)  SpO2: 95% (24 Aug 2020 14:27) (92% - 96%)    GENERAL:  [X ]Alert  [ ]Oriented x   [ ]Lethargic  [ ]Cachexia  [ ]Unarousable  [ X]Verbal  [ ]Non-Verbal  Behavioral:   [ ] Anxiety  [ ] Delirium [ ] Agitation [ ] Other  HEENT:  [ ]Normal   [ ]Dry mouth   [ ]ET Tube/Trach  [ ]Oral lesions  PULMONARY:   [ ]Clear [ ]Tachypnea  [ ]Audible excessive secretions   [ ]Rhonchi        [ ]Right [ ]Left [ ]Bilateral  [ ]Crackles        [ ]Right [ ]Left [ ]Bilateral  [ ]Wheezing     [ ]Right [ ]Left [ ]Bilateral  [ ]Diminished breath sounds [ ]right [ ]left [ ]bilateral  CARDIOVASCULAR:    [ ]Regular [ ]Irregular [ ]Tachy  [ ]Esteban [ ]Murmur [ ]Other  GASTROINTESTINAL:  [ ]Soft  [ ]Distended   [ ]+BS  [ ]Non tender [ ]Tender  [ ]PEG [ ]OGT/ NGT  Last BM:   GENITOURINARY:  [ ]Normal [ ] Incontinent   [ ]Oliguria/Anuria   [ ][place]  MUSCULOSKELETAL:   [ ]Normal   [ ]Weakness  [ ]Bed/Wheelchair bound [ ]Edema  NEUROLOGIC:   [X ]No focal deficits  [ ]Cognitive impairment  [ ]Dysphagia [ ]Dysarthria [ ]Paresis [ ]Other   SKIN:   [ ]Normal    [ ]Rash  [ ]Pressure ulcer(s)       Present on admission [ ]y [ ]n    CRITICAL CARE:  [ ] Shock Present  [ ]Septic [ ]Cardiogenic [ ]Neurologic [ ]Hypovolemic  [ ]  Vasopressors [ ]  Inotropes   [ ]Respiratory failure present [ ]Mechanical ventilation [ ]Non-invasive ventilatory support [ ]High flow  [ ]Acute  [ ]Chronic [ ]Hypoxic  [ ]Hypercarbic [ ]Other  [ ]Other organ failure     LABS: reviewed                          8.8    6.88  )-----------( 127      ( 24 Aug 2020 06:13 )             27.3     08-24    142  |  105  |  9   ----------------------------<  98  3.8   |  27  |  0.39<L>    Ca    8.3<L>      24 Aug 2020 06:14  Phos  3.5     08-24  Mg     2.0     08-24        RADIOLOGY & ADDITIONAL STUDIES:    Clavicle x-ray 8/15/2020  Lucency visualized within the proximal/mid shaft of the clavicle, which may represent a metastatic lesion. Question nondisplaced pathologic fracture through the lesion. The sternoclavicular articulation is not well visualized. The acromioclavicular joint is intact.    PROTEIN CALORIE MALNUTRITION PRESENT: [ ]mild [ ]moderate [ ]severe [ ]underweight [ ]morbid obesity  https://www.andeal.org/vault/2440/web/files/ONC/Table_Clinical%20Characteristics%20to%20Document%20Malnutrition-White%20JV%20et%20al%202012.pdf    Height (cm): 172.7 (08-14-20 @ 18:31)  Weight (kg): 88.6 (08-14-20 @ 18:31)  BMI (kg/m2): 29.7 (08-14-20 @ 18:31)    [ ]PPSV2 < or = to 30% [ ]significant weight loss  [ ]poor nutritional intake  [ ]anasarca     Albumin, Serum: 3.3 g/dL (08-17-20 @ 06:36)   [ ]Artificial Nutrition      REFERRALS:   [ ]Chaplaincy  [ ]Hospice  [ ]Child Life  [ ]Social Work  [ ]Case management [ ]Holistic Therapy     Goals of Care Document:     ______________  Armin Munguia MD   of Geriatric and Palliative Medicine  [facility]     Please page the following number for clinical matters between the hours of 9AM and 5PM   from Monday through Friday : [phone]    After 5PM and on weekends, please page: [phone]. The Geriatric and Palliative Medicine consult service has 24/7 coverage for medical recommendations, including for symptom management needs.

## 2020-08-24 NOTE — PROGRESS NOTE ADULT - PROBLEM SELECTOR PROBLEM 9
Discharge planning issues
Discharge planning issues
Prophylactic measure
Discharge planning issues
Prophylactic measure
Discharge planning issues

## 2020-08-24 NOTE — PROGRESS NOTE ADULT - ASSESSMENT
64F [facility] of hypothyroidism, stage IV lung CA (2017, s/p RLL lobectomy, s/p pall RT, s/p carbo/pemextred/pembrolizumab 7/31), recent femoral neck fracture, here after fall in home with L hip pain, with a L hip pathologic fracture. Also noted to be thrombocytopenic, likely from chemo.  Possible ORIF on 8/18/20 per ortho. Palliative called for pain management. Currently on fentanyl patch 25mcg, dilaudid 2mg/4mg for moderate/severe pain, and toradol 15mg Q6.  At home, patient was most recently on fentanyl 37.5mcg TDP, and dilaudid 4mg PO.

## 2020-08-24 NOTE — PROGRESS NOTE ADULT - SUBJECTIVE AND OBJECTIVE BOX
Patient seen and examined at bedside. Reports no acute complaints at this time. Pain is well controlled. No other acute events overnight.  Has been OOB w/ PT, dressing was changed last night    Vital Signs Last 24 Hrs  T(C): 36.7 (24 Aug 2020 05:25), Max: 36.8 (23 Aug 2020 09:02)  T(F): 98 (24 Aug 2020 05:25), Max: 98.3 (23 Aug 2020 17:32)  HR: 69 (24 Aug 2020 05:25) (64 - 69)  BP: 132/71 (24 Aug 2020 05:25) (110/70 - 147/82)  BP(mean): --  RR: 18 (24 Aug 2020 05:25) (18 - 18)  SpO2: 96% (24 Aug 2020 05:25) (92% - 96%)    Gen: NAD, laying comfortably in bed  Resp: Unlabored breathing  MSK:   LLE:  Dressing c/d/i          +EHL/FHL/TA/Gas/SOl          +DP/SP/Vita/Saph/T           2+DP

## 2020-08-24 NOTE — PROGRESS NOTE ADULT - ATTENDING COMMENTS
Agree with above. Patient may follow up in the office 2 weeks postop for wound check and then again 3 weeks postop for staple removal. If the wound looks well healed, may continue chemo. She also has a lesion of the distal femur which we will be following closely, treating nonoperatively given her recent chemotherapy. I have rec'd XRT to this lesion. She is to be WBAT with a beulah knee brace, unlocked. Posterior hip precautions. Xarelto for DVT Px. PT/OOB.

## 2020-08-25 NOTE — CONSULT NOTE ADULT - SUBJECTIVE AND OBJECTIVE BOX
Patient is a 64y old  Female who presents with a chief complaint of Left hip pain and fall (25 Aug 2020 16:13)      HPI:  Patient is a 64 yr old woman with PMHx of hypothyroidism, Stage IV lung adenocarcinoma diagnosed in 2017 s/p right lower lobectomy with recurrence 5/2020 s/p palliative radiation to clavicle and spine and 1 round of carbo/pemextred/pembrolizumab chemo 7/31 with recent admission 8/8 for pending femoral neck fracture now transferred from [facility] after fall in home with left hip pain, imaging showing left hip pathologic fracture. Patient was on maintenance surveillance for her cancer with scans q 6 months. Most recent scan in May showed recurrence of cancer in multiple bones. She has since had pathologic left clavicle fracture with palliative radiation. Patient was recently evaluated at [facility] for left groin and leg pain several days ago and found to have metastatic disease to left femur. She was scheduled for orthopedic follow up in 1-2 weeks after discharge but unfortunately had fall at rehab facility on day of admission. Patient states her foot slipped out from under her in shower, causing her to land on her but and left side. She did not hit her head or lose consciousness. She denied headaches, heart palpitations, sweating prior to falling. She was able to call for help and was transported to the hospital. She was not able to get up on her own or put any weight on the left leg. She has had constant pain in the left groin going down to the back of the right knee for several weeks and after the fall feels the pain is only mildly more intense. She is able to move her toes but not her knee, she does not complain of numbness in the left toes. She is able to void without issue and had a normal BM this morning. (14 Aug 2020 16:55)    Dental consulted for clearance prior to starting chemotherapy    PAST MEDICAL & SURGICAL HISTORY:  Stage IV adenocarcinoma of lung  Hypothyroid  S/P partial lobectomy of lung: Right lower lobe  History of bunionectomy    ( - ) heart valve replacement  (  - ) joint replacement  ( - ) pregnancy    MEDICATIONS  (STANDING):  acetaminophen   Tablet .. 975 milliGRAM(s) Oral every 8 hours  acetaminophen  IVPB .. 1000 milliGRAM(s) IV Intermittent once  fentaNYL   Patch  25 MICROgram(s)/Hr 1 Patch Transdermal every 72 hours  levothyroxine 175 MICROGram(s) Oral daily  liothyronine 5 MICROGram(s) Oral daily  melatonin 5 milliGRAM(s) Oral at bedtime  metoprolol succinate ER 50 milliGRAM(s) Oral daily  pantoprazole    Tablet 40 milliGRAM(s) Oral before breakfast  polyethylene glycol 3350 17 Gram(s) Oral daily  rivaroxaban 10 milliGRAM(s) Oral daily  senna 2 Tablet(s) Oral at bedtime    MEDICATIONS  (PRN):  ALPRAZolam 0.25 milliGRAM(s) Oral three times a day PRN anxiety  bisacodyl Suppository 10 milliGRAM(s) Rectal daily PRN If no bowel movement by POD#2  diphenhydrAMINE 25 milliGRAM(s) Oral every 4 hours PRN Rash and/or Itching  HYDROmorphone   Tablet 6 milliGRAM(s) Oral every 4 hours PRN moderate-severe pain  naloxone Injectable 0.1 milliGRAM(s) IV Push every 3 minutes PRN For ANY of the following changes in patient status:  A. RR LESS THAN 10 breaths per minute, B. Oxygen saturation LESS THAN 90%, C. Sedation score of 6      Allergies    Bananas (Other)  latex (Rash)  opioid-like analgesics (Urticaria)  penicillin (Angioedema)    Intolerances    Vital Signs Last 24 Hrs  T(C): 36.7 (25 Aug 2020 16:06), Max: 36.9 (24 Aug 2020 20:26)  T(F): 98 (25 Aug 2020 16:06), Max: 98.4 (24 Aug 2020 20:26)  HR: 79 (25 Aug 2020 16:06) (73 - 79)  BP: 126/79 (25 Aug 2020 16:06) (107/70 - 126/79)  BP(mean): --  RR: 18 (25 Aug 2020 16:06) (18 - 18)  SpO2: 95% (25 Aug 2020 16:06) (93% - 96%)    EOE:  TMJ (  - ) clicks                    ( -   ) pops                    (  -  ) crepitus             Mandible FROM             Facial bones and MOM grossly intact             ( -  ) trismus             (  - ) LAD             (  - ) swelling             (  - ) asymmetry             (  - ) palpation             (  - ) SOB             (  - ) dysphagia             (  - ) LOC    IOE:  <<permanent/primary/mixed>> dentition: <<grossly intact>> OR <<multiple carious teeth>> OR <<multiple missing teeth>>           hard/soft palate:  (   ) palatal torus           tongue/FOM <<WNL>>           labial/buccal mucosa <<WNL>>           ( -  ) percussion           (  - ) palpation           (  - ) swelling       Radiographs: n/a     LABS:                        8.7    7.55  )-----------( 136      ( 25 Aug 2020 06:00 )             27.3     08-25    141  |  103  |  10  ----------------------------<  93  4.0   |  28  |  0.41<L>    Ca    8.5      25 Aug 2020 05:59  Phos  4.4     08-25  Mg     2.0     08-25    TPro  5.1<L>  /  Alb  2.8<L>  /  TBili  0.7  /  DBili  x   /  AST  25  /  ALT  28  /  AlkPhos  136<H>  08-25    WBC Count: 7.55 K/uL [3.80 - 10.50] (08-25 @ 06:00)    Platelet Count - Automated: 136 K/uL <L> [150 - 400] (08-25 @ 06:00)  Platelet Count - Automated: 127 K/uL <L> [150 - 400] (08-24 @ 06:13)    INR: 0.99 ratio [0.88 - 1.16] (08-24 @ 08:39)    RADIOLOGY & ADDITIONAL STUDIES:    ASSESSMENT: Limited exam performed. No radiographs can be taken at this time as patient is non transportable. No acute signs of infection or gross decay noted clinically. Dental clearance cannot be provided without radiographs. Patient should follow up with dentist when able to be transported by wheelchair for radiographs. Pt is set to be discharged soon and may want to follow up at rehabilitation center when patient is medically optimized. Pt's daughter was also informed that dental consent could not be given at this time to start patient on Denosumab.    PATIENT IS NOT MEDICALLY CLEAR FROM A DENTAL STANDPOINT.    PROCEDURE:  Verbal consent given.     RECOMMENDATIONS:   1) Obtain radiographs for clearance when medically optimized.  2) Dental F/U with outpatient dentist for comprehensive dental care.   3) If any difficulty swallowing/breathing, fever occur, page dental.     Keli Mora DDS, pager #[phone]  Unsa Ricardo DMD, pager #[phone]    Oral surgeon consulted: Dr. Addison

## 2020-08-25 NOTE — PROGRESS NOTE ADULT - PROBLEM SELECTOR PLAN 2
-  left femoral neck fracture s/p left QASIM on 8/18  - Ortho + palliative following; appreciate recs  - Home pain regiment was dilaudid PO 2mg moderate pain and 4mg severe pain q6 and fentanyl patch 37.5mg  (iSTOP note in chart, Reference #: [id number])  [ ] cleared for d/c per ortho  [ ] Pain control per palliative: 25mcg TDP fentanyl patch and restarted dilaudid 4mg PO Q4 PRN for mod-severe pain  [ ] rehab when accepted and auth.

## 2020-08-25 NOTE — PROGRESS NOTE ADULT - SUBJECTIVE AND OBJECTIVE BOX
Chief complaint  Patient is a 64y old  Female who presents with a chief complaint of Left hip pain and fall (25 Aug 2020 13:32)   Review of systems  Patient in bed, awake and alert, looks comfortable.      MEDICATIONS  (STANDING):  acetaminophen   Tablet .. 975 milliGRAM(s) Oral every 8 hours  acetaminophen  IVPB .. 1000 milliGRAM(s) IV Intermittent once  fentaNYL   Patch  25 MICROgram(s)/Hr 1 Patch Transdermal every 72 hours  levothyroxine 175 MICROGram(s) Oral daily  liothyronine 5 MICROGram(s) Oral daily  melatonin 5 milliGRAM(s) Oral at bedtime  metoprolol succinate ER 50 milliGRAM(s) Oral daily  pantoprazole    Tablet 40 milliGRAM(s) Oral before breakfast  polyethylene glycol 3350 17 Gram(s) Oral daily  rivaroxaban 10 milliGRAM(s) Oral daily  senna 2 Tablet(s) Oral at bedtime      Physical Exam  General: Patient comfortable in bed  Vital Signs Last 12 Hrs  T(F): 98.1 (08-25-20 @ 11:28), Max: 98.2 (08-25-20 @ 04:29)  HR: 73 (08-25-20 @ 11:28) (73 - 78)  BP: 113/69 (08-25-20 @ 11:28) (113/69 - 119/67)  BP(mean): --  RR: 18 (08-25-20 @ 11:28) (18 - 18)  SpO2: 93% (08-25-20 @ 11:28) (93% - 96%) Chief complaint  Patient is a 64y old  Female who presents with a chief complaint of Left hip pain and fall (25 Aug 2020 13:32)   Review of systems  Patient in bed, awake and alert, looks comfortable.    MEDICATIONS  (STANDING):  acetaminophen   Tablet .. 975 milliGRAM(s) Oral every 8 hours  acetaminophen  IVPB .. 1000 milliGRAM(s) IV Intermittent once  fentaNYL   Patch  25 MICROgram(s)/Hr 1 Patch Transdermal every 72 hours  levothyroxine 175 MICROGram(s) Oral daily  liothyronine 5 MICROGram(s) Oral daily  melatonin 5 milliGRAM(s) Oral at bedtime  metoprolol succinate ER 50 milliGRAM(s) Oral daily  pantoprazole    Tablet 40 milliGRAM(s) Oral before breakfast  polyethylene glycol 3350 17 Gram(s) Oral daily  rivaroxaban 10 milliGRAM(s) Oral daily  senna 2 Tablet(s) Oral at bedtime      Physical Exam  General: Patient comfortable in bed  Vital Signs Last 12 Hrs  T(F): 98.1 (08-25-20 @ 11:28), Max: 98.2 (08-25-20 @ 04:29)  HR: 73 (08-25-20 @ 11:28) (73 - 78)  BP: 113/69 (08-25-20 @ 11:28) (113/69 - 119/67)  BP(mean): --  RR: 18 (08-25-20 @ 11:28) (18 - 18)  SpO2: 93% (08-25-20 @ 11:28) (93% - 96%)

## 2020-08-25 NOTE — PROGRESS NOTE ADULT - PROBLEM SELECTOR PLAN 3
Plt 62 at [facility], on admission to [facility] platelets 55 likely 2/2 recent chemotherapy on 7/31.  - on 7/31 platelets 235, have been 50s since 8/8 and stable, now plts 129  - No evidence of active bleeding, trend daily  - Heme/onc following; appreciate recs  [ ] CTM

## 2020-08-25 NOTE — CHART NOTE - NSCHARTNOTEFT_GEN_A_CORE
Nutrition Follow Up Note  Patient seen for: malnutrition follow up on 6TOW    Chart reviewed, events noted.    Source: pt, electronic medical record     Diet : Regular + 2 ensure enlive BID    Patient reports: c/o constipation, no BM since 8/21; encouraged adequate hydration, RD to provide stewed prunes today and pt can request PRN; bowel regimen noted     PO intake : fair, varies still at times (25-70% per nursing flow sheet), pt endorses trying to eat well. She dislikes the ensure supplements being provided and rarely drinks them. Pt has no additional food preferences or nutrition related questions at this time.    Source for PO intake: pt, electronic medical record       Daily Weight in kg: no new wt to assess  95.2 (08-20)    Pertinent Medications: MEDICATIONS  (STANDING):  acetaminophen   Tablet .. 975 milliGRAM(s) Oral every 8 hours  acetaminophen  IVPB .. 1000 milliGRAM(s) IV Intermittent once  fentaNYL   Patch  25 MICROgram(s)/Hr 1 Patch Transdermal every 72 hours  levothyroxine 175 MICROGram(s) Oral daily  liothyronine 5 MICROGram(s) Oral daily  melatonin 5 milliGRAM(s) Oral at bedtime  metoprolol succinate ER 50 milliGRAM(s) Oral daily  pantoprazole    Tablet 40 milliGRAM(s) Oral before breakfast  polyethylene glycol 3350 17 Gram(s) Oral daily  rivaroxaban 10 milliGRAM(s) Oral daily  senna 2 Tablet(s) Oral at bedtime    MEDICATIONS  (PRN):  ALPRAZolam 0.25 milliGRAM(s) Oral three times a day PRN anxiety  bisacodyl Suppository 10 milliGRAM(s) Rectal daily PRN If no bowel movement by POD#2  diphenhydrAMINE 25 milliGRAM(s) Oral every 4 hours PRN Rash and/or Itching  HYDROmorphone   Tablet 6 milliGRAM(s) Oral every 4 hours PRN moderate-severe pain  naloxone Injectable 0.1 milliGRAM(s) IV Push every 3 minutes PRN For ANY of the following changes in patient status:  A. RR LESS THAN 10 breaths per minute, B. Oxygen saturation LESS THAN 90%, C. Sedation score of 6    Pertinent Labs: 08-25 @ 05:59: Na 141, BUN 10, Cr 0.41<L>, BG 93, K+ 4.0, Phos 4.4, Mg 2.0, Alk Phos 136<H>, ALT/SGPT 28, AST/SGOT 25, HbA1c --    Skin per nursing documentation: no pressure injuries noted  Edema: 1+ edema to b/l feet noted    Estimated Needs:   [x] no change since previous assessment  [ ] recalculated:     Previous Nutrition Diagnosis: Malnutrition, Moderate  Nutrition Diagnosis is: ongoing, being addressed with encouragement of po intake, oral nutrition supplement as desired  Nutrition care plan achieved.    New Nutrition Diagnosis: n/a    Recommend  1) Continue to provide current diet order, no therapeutic diet restrictions indicated at this time.  2) Provide 1 ensure enlive daily, pt to consume as desired.    Monitoring and Evaluation:     Continue to monitor Nutritional intake, Tolerance to diet prescription, weights, labs, skin integrity    RD remains available upon request and will follow up per protocol. Rosa Maria Ashford RD, CDN Pager: [phone]

## 2020-08-25 NOTE — CHART NOTE - NSCHARTNOTEFT_GEN_A_CORE
Patient was sent down to complete an MRI, was very anxious, was given xanax, however was still anxious, refused to complete MRI without an anesthetic. Will inform primary team in am.

## 2020-08-25 NOTE — CONSULT NOTE ADULT - ASSESSMENT
Sofia Montalvo  64F Hx of hypothyroidism, Stage IV lung adenocarcinoma dx 2017 s/p right lower lobectomy with recurrence 5/2020 s/p palliative radiation to clavicle and spine and 1 round of carbo/pemextred/pembrolizumab chemo 7/31, recent pathologic femoral neck fracture p/w pathologic hip fx s/p fall. Denies back pain, numbness, retention, incontinence, radicular pain.   CT chest shows Multiple thoracic and lumbar mixed lytic/sclerotic mets, L1 compression fx, no clear cord compression or deformity. AAOx3, 5/5 throughout except L leg which is splinted but distally 5/5. SILT, no clonus  -TLSO brace when OOB  -MRI Total spine w/wo contrast  - Please obtain a TLSO brace. Please contact remington foster for a fitted brace: [phone]

## 2020-08-25 NOTE — PROGRESS NOTE ADULT - PROBLEM SELECTOR PLAN 3
- further chemo plan per oncology  - possible RT as well - further chemo plan per oncology after ZACHARY completed  - possible RT as well

## 2020-08-25 NOTE — PROGRESS NOTE ADULT - SUBJECTIVE AND OBJECTIVE BOX
Oskar Ramos MD, PGY-2  [place] Contact Information  NS Pager: [phone]   ----------------------------------------------------------------------------    [facility] [id number], stage IV lung CA (2017, s/p RLL lobectomy, s/p pall RT, s/p carbo/pemextred/pembrolizumab 7/31), recent femoral neck fracture, here after fall in home with L hip pain, with a L hip pathologic fracture. Also noted to be thrombocytopenic, likely from chemo.  Possible ORIF on 8/18/20 per ortho. Palliative called for pain management. Currently on fentanyl patch 25mcg, dilaudid 2mg/4mg for moderate/severe pain, and toradol 15mg Q6.  At home, patient was most recently on fentanyl 37.5mcg TDP, and dilaudid 4mg PO.    INTERVAL EVENTS:  8/18: states having pain, used dilaudid 6mg PO x 2/24 hours  8/19: states feeling better after surgery, using PCA, pain overall managed  8/20: more tired today, off PCA now, started on fentanyl 25mcg TDP, states having pain at op site; RRT o/n for SVT  8/21: states pain working well, used dilaudid 2.5mg IV  8/24: states pain managed, she feels overwhelmed with her disease, its extent, and next steps  8/25: states pain managed, used dilaudid 6 po    ADVANCE DIRECTIVES:    DNR  MOLST  [ ]  Living Will  [ ]   DECISION MAKER(s):  [ ] Health Care Proxy(s)  [ ] Surrogate(s)  [ ] Guardian           Name(s): Phone Number(s):   spouse  daughter Kena Cox [phone]     BASELINE (I)ADL(s) (prior to admission):  [place]: [X ]Total  [ ] Moderate [ ]Dependent    Allergies    Bananas (Other)  latex (Rash)  opioid-like analgesics (Urticaria)  penicillin (Angioedema)    Intolerances    MEDICATIONS  (STANDING):  acetaminophen   Tablet .. 975 milliGRAM(s) Oral every 8 hours  acetaminophen  IVPB .. 1000 milliGRAM(s) IV Intermittent once  fentaNYL   Patch  25 MICROgram(s)/Hr 1 Patch Transdermal every 72 hours  levothyroxine 175 MICROGram(s) Oral daily  liothyronine 5 MICROGram(s) Oral daily  metoprolol succinate ER 50 milliGRAM(s) Oral daily  pantoprazole    Tablet 40 milliGRAM(s) Oral before breakfast  polyethylene glycol 3350 17 Gram(s) Oral daily  rivaroxaban 10 milliGRAM(s) Oral daily  senna 2 Tablet(s) Oral at bedtime    MEDICATIONS  (PRN):  ALPRAZolam 0.25 milliGRAM(s) Oral three times a day PRN anxiety  bisacodyl Suppository 10 milliGRAM(s) Rectal daily PRN If no bowel movement by POD#2  diphenhydrAMINE 25 milliGRAM(s) Oral every 4 hours PRN Rash and/or Itching  HYDROmorphone   Tablet 6 milliGRAM(s) Oral every 4 hours PRN moderate-severe pain  naloxone Injectable 0.1 milliGRAM(s) IV Push every 3 minutes PRN For ANY of the following changes in patient status:  A. RR LESS THAN 10 breaths per minute, B. Oxygen saturation LESS THAN 90%, C. Sedation score of 6    PRESENT SYMPTOMS: [ ]Unable to obtain due to poor mentation   Source if other than patient:  [ ]Family   [ ]Team     Pain: [ X]yes [ ]no  QOL impact -   Location -       LLE             Aggravating factors - movement  Quality -  Radiation -  Timing-  Severity (0-10 scale): up to 10/10  Minimal acceptable level (0-10 scale):     CPOT:    https://www.sccm.org/getattachment/yeb67c59-3h6p-9h7d-1x7v-7064u1230h6c/Critical-Care-Pain-Observation-Tool-(CPOT)      PAIN AD Score:     [email or web address] (press ctrl +  left click to view)    Dyspnea:                           [ ]Mild [ ]Moderate [ ]Severe  Anxiety:                             [ ]Mild [ ]Moderate [ ]Severe  Fatigue:                             [ ]Mild [ ]Moderate [ ]Severe  Nausea:                             [ ]Mild [ ]Moderate [ ]Severe  Loss of appetite:              [ ]Mild [ ]Moderate [ ]Severe  Constipation:                    [ ]Mild [ ]Moderate [ ]Severe    Other Symptoms:  [ ]All other review of systems negative     Palliative Performance Status Version 2:         %    http://[facility].org/files/news/palliative_performance_scale_ppsv2.pdf  Vital Signs Last 24 Hrs  T(C): 36.7 (25 Aug 2020 08:04), Max: 36.9 (24 Aug 2020 20:26)  T(F): 98 (25 Aug 2020 08:04), Max: 98.4 (24 Aug 2020 20:26)  HR: 78 (25 Aug 2020 08:04) (69 - 78)  BP: 117/70 (25 Aug 2020 08:04) (107/70 - 123/75)  BP(mean): --  RR: 18 (25 Aug 2020 08:04) (18 - 18)  SpO2: 96% (25 Aug 2020 08:04) (94% - 98%)    GENERAL:  [X ]Alert  [ ]Oriented x   [ ]Lethargic  [ ]Cachexia  [ ]Unarousable  [ X]Verbal  [ ]Non-Verbal  Behavioral:   [ ] Anxiety  [ ] Delirium [ ] Agitation [ ] Other  HEENT:  [ ]Normal   [ ]Dry mouth   [ ]ET Tube/Trach  [ ]Oral lesions  PULMONARY:   [ ]Clear [ ]Tachypnea  [ ]Audible excessive secretions   [ ]Rhonchi        [ ]Right [ ]Left [ ]Bilateral  [ ]Crackles        [ ]Right [ ]Left [ ]Bilateral  [ ]Wheezing     [ ]Right [ ]Left [ ]Bilateral  [ ]Diminished breath sounds [ ]right [ ]left [ ]bilateral  CARDIOVASCULAR:    [ ]Regular [ ]Irregular [ ]Tachy  [ ]Esteban [ ]Murmur [ ]Other  GASTROINTESTINAL:  [ ]Soft  [ ]Distended   [ ]+BS  [ ]Non tender [ ]Tender  [ ]PEG [ ]OGT/ NGT  Last BM:   GENITOURINARY:  [ ]Normal [ ] Incontinent   [ ]Oliguria/Anuria   [ ]Sparks  MUSCULOSKELETAL:   [ ]Normal   [ ]Weakness  [ ]Bed/Wheelchair bound [ ]Edema  NEUROLOGIC:   [X ]No focal deficits  [ ]Cognitive impairment  [ ]Dysphagia [ ]Dysarthria [ ]Paresis [ ]Other   SKIN:   [ ]Normal    [ ]Rash  [ ]Pressure ulcer(s)       Present on admission [ ]y [ ]n    CRITICAL CARE:  [ ] Shock Present  [ ]Septic [ ]Cardiogenic [ ]Neurologic [ ]Hypovolemic  [ ]  Vasopressors [ ]  Inotropes   [ ]Respiratory failure present [ ]Mechanical ventilation [ ]Non-invasive ventilatory support [ ]High flow  [ ]Acute  [ ]Chronic [ ]Hypoxic  [ ]Hypercarbic [ ]Other  [ ]Other organ failure     LABS: reviewed                        8.7    7.55  )-----------( 136      ( 25 Aug 2020 06:00 )             27.3     08-25    141  |  103  |  10  ----------------------------<  93  4.0   |  28  |  0.41<L>    Ca    8.5      25 Aug 2020 05:59  Phos  4.4     08-25  Mg     2.0     08-25    TPro  5.1<L>  /  Alb  2.8<L>  /  TBili  0.7  /  DBili  x   /  AST  25  /  ALT  28  /  AlkPhos  136<H>  08-25    PT/INR - ( 24 Aug 2020 08:39 )   PT: 11.8 sec;   INR: 0.99 ratio         PTT - ( 24 Aug 2020 08:39 )  PTT:32.0 sec      Lactate Trend        CAPILLARY BLOOD GLUCOSE          RADIOLOGY & ADDITIONAL STUDIES:    Clavicle x-ray 8/15/2020  Lucency visualized within the proximal/mid shaft of the clavicle, which may represent a metastatic lesion. Question nondisplaced pathologic fracture through the lesion. The sternoclavicular articulation is not well visualized. The acromioclavicular joint is intact.    PROTEIN CALORIE MALNUTRITION PRESENT: [ ]mild [ ]moderate [ ]severe [ ]underweight [ ]morbid obesity  https://www.andeal.org/vault/2440/web/files/ONC/Table_Clinical%20Characteristics%20to%20Document%20Malnutrition-White%20JV%20et%20al%775756.pdf    Height (cm): 172.7 (08-14-20 @ 18:31)  Weight (kg): 88.6 (08-14-20 @ 18:31)  BMI (kg/m2): 29.7 (08-14-20 @ 18:31)    [ ]PPSV2 < or = to 30% [ ]significant weight loss  [ ]poor nutritional intake  [ ]anasarca     Albumin, Serum: 3.3 g/dL (08-17-20 @ 06:36)   [ ]Artificial Nutrition      REFERRALS:   [ ]Chaplaincy  [ ]Hospice  [ ]Child Life  [ ]Social Work  [ ]Case management [ ]Holistic Therapy     Goals of Care Document: Oskar Ramos MD, PGY-2  [place] Contact Information  NS Pager: [phone]   ----------------------------------------------------------------------------    [facility] [id number], stage IV lung CA (2017, s/p RLL lobectomy, s/p pall RT, s/p carbo/pemextred/pembrolizumab 7/31), recent femoral neck fracture, here after fall in home with L hip pain, with a L hip pathologic fracture. Also noted to be thrombocytopenic, likely from chemo.  Possible ORIF on 8/18/20 per ortho. Palliative called for pain management. Currently on fentanyl patch 25mcg, dilaudid 2mg/4mg for moderate/severe pain, and toradol 15mg Q6.  At home, patient was most recently on fentanyl 37.5mcg TDP, and dilaudid 4mg PO.    INTERVAL EVENTS:  8/18: states having pain, used dilaudid 6mg PO x 2/24 hours  8/19: states feeling better after surgery, using PCA, pain overall managed  8/20: more tired today, off PCA now, started on fentanyl 25mcg TDP, states having pain at op site; RRT o/n for SVT  8/21: states pain working well, used dilaudid 2.5mg IV  8/24: states pain managed, she feels overwhelmed with her disease, its extent, and next steps  8/25: states pain managed, used dilaudid 6 PO x 2/24 hours    ADVANCE DIRECTIVES:    DNR  MOLST  [ ]  Living Will  [ ]   DECISION MAKER(s):  [ ] Health Care Proxy(s)  [ ] Surrogate(s)  [ ] Guardian           Name(s): Phone Number(s):   spouse  daughter Kena Cox [phone]     BASELINE (I)ADL(s) (prior to admission):  [place]: [X ]Total  [ ] Moderate [ ]Dependent    Allergies    Bananas (Other)  latex (Rash)  opioid-like analgesics (Urticaria)  penicillin (Angioedema)    Intolerances    MEDICATIONS  (STANDING):  acetaminophen   Tablet .. 975 milliGRAM(s) Oral every 8 hours  acetaminophen  IVPB .. 1000 milliGRAM(s) IV Intermittent once  fentaNYL   Patch  25 MICROgram(s)/Hr 1 Patch Transdermal every 72 hours  levothyroxine 175 MICROGram(s) Oral daily  liothyronine 5 MICROGram(s) Oral daily  metoprolol succinate ER 50 milliGRAM(s) Oral daily  pantoprazole    Tablet 40 milliGRAM(s) Oral before breakfast  polyethylene glycol 3350 17 Gram(s) Oral daily  rivaroxaban 10 milliGRAM(s) Oral daily  senna 2 Tablet(s) Oral at bedtime    MEDICATIONS  (PRN):  ALPRAZolam 0.25 milliGRAM(s) Oral three times a day PRN anxiety  bisacodyl Suppository 10 milliGRAM(s) Rectal daily PRN If no bowel movement by POD#2  diphenhydrAMINE 25 milliGRAM(s) Oral every 4 hours PRN Rash and/or Itching  HYDROmorphone   Tablet 6 milliGRAM(s) Oral every 4 hours PRN moderate-severe pain  naloxone Injectable 0.1 milliGRAM(s) IV Push every 3 minutes PRN For ANY of the following changes in patient status:  A. RR LESS THAN 10 breaths per minute, B. Oxygen saturation LESS THAN 90%, C. Sedation score of 6    PRESENT SYMPTOMS: [ ]Unable to obtain due to poor mentation   Source if other than patient:  [ ]Family   [ ]Team     Pain: [ X]yes [ ]no  QOL impact -   Location -       LLE             Aggravating factors - movement  Quality -  Radiation -  Timing-  Severity (0-10 scale): up to 10/10  Minimal acceptable level (0-10 scale):     CPOT:    https://www.sccm.org/getattachment/xgo55d19-7m0c-4l0e-1c4m-2059x9784a3t/Critical-Care-Pain-Observation-Tool-(CPOT)      PAIN AD Score:     [email or web address] (press ctrl +  left click to view)    Dyspnea:                           [ ]Mild [ ]Moderate [ ]Severe  Anxiety:                             [ ]Mild [ ]Moderate [ ]Severe  Fatigue:                             [ ]Mild [ ]Moderate [ ]Severe  Nausea:                             [ ]Mild [ ]Moderate [ ]Severe  Loss of appetite:              [ ]Mild [ ]Moderate [ ]Severe  Constipation:                    [ ]Mild [ ]Moderate [ ]Severe    Other Symptoms:  [ ]All other review of systems negative     Palliative Performance Status Version 2:         %    http://[facility].org/files/news/palliative_performance_scale_ppsv2.pdf  Vital Signs Last 24 Hrs  T(C): 36.7 (25 Aug 2020 08:04), Max: 36.9 (24 Aug 2020 20:26)  T(F): 98 (25 Aug 2020 08:04), Max: 98.4 (24 Aug 2020 20:26)  HR: 78 (25 Aug 2020 08:04) (69 - 78)  BP: 117/70 (25 Aug 2020 08:04) (107/70 - 123/75)  BP(mean): --  RR: 18 (25 Aug 2020 08:04) (18 - 18)  SpO2: 96% (25 Aug 2020 08:04) (94% - 98%)    GENERAL:  [X ]Alert  [ ]Oriented x   [ ]Lethargic  [ ]Cachexia  [ ]Unarousable  [ X]Verbal  [ ]Non-Verbal  Behavioral:   [ ] Anxiety  [ ] Delirium [ ] Agitation [ ] Other  HEENT:  [ ]Normal   [ ]Dry mouth   [ ]ET Tube/Trach  [ ]Oral lesions  PULMONARY:   [ ]Clear [ ]Tachypnea  [ ]Audible excessive secretions   [ ]Rhonchi        [ ]Right [ ]Left [ ]Bilateral  [ ]Crackles        [ ]Right [ ]Left [ ]Bilateral  [ ]Wheezing     [ ]Right [ ]Left [ ]Bilateral  [ ]Diminished breath sounds [ ]right [ ]left [ ]bilateral  CARDIOVASCULAR:    [ ]Regular [ ]Irregular [ ]Tachy  [ ]Esteban [ ]Murmur [ ]Other  GASTROINTESTINAL:  [ ]Soft  [ ]Distended   [ ]+BS  [ ]Non tender [ ]Tender  [ ]PEG [ ]OGT/ NGT  Last BM:   GENITOURINARY:  [ ]Normal [ ] Incontinent   [ ]Oliguria/Anuria   [ ]Sparks  MUSCULOSKELETAL:   [ ]Normal   [ ]Weakness  [ ]Bed/Wheelchair bound [ ]Edema  NEUROLOGIC:   [X ]No focal deficits  [ ]Cognitive impairment  [ ]Dysphagia [ ]Dysarthria [ ]Paresis [ ]Other   SKIN:   [ ]Normal    [ ]Rash  [ ]Pressure ulcer(s)       Present on admission [ ]y [ ]n    CRITICAL CARE:  [ ] Shock Present  [ ]Septic [ ]Cardiogenic [ ]Neurologic [ ]Hypovolemic  [ ]  Vasopressors [ ]  Inotropes   [ ]Respiratory failure present [ ]Mechanical ventilation [ ]Non-invasive ventilatory support [ ]High flow  [ ]Acute  [ ]Chronic [ ]Hypoxic  [ ]Hypercarbic [ ]Other  [ ]Other organ failure     LABS: reviewed                        8.7    7.55  )-----------( 136      ( 25 Aug 2020 06:00 )             27.3     08-25    141  |  103  |  10  ----------------------------<  93  4.0   |  28  |  0.41<L>    Ca    8.5      25 Aug 2020 05:59  Phos  4.4     08-25  Mg     2.0     08-25    TPro  5.1<L>  /  Alb  2.8<L>  /  TBili  0.7  /  DBili  x   /  AST  25  /  ALT  28  /  AlkPhos  136<H>  08-25    PT/INR - ( 24 Aug 2020 08:39 )   PT: 11.8 sec;   INR: 0.99 ratio         PTT - ( 24 Aug 2020 08:39 )  PTT:32.0 sec      Lactate Trend        CAPILLARY BLOOD GLUCOSE          RADIOLOGY & ADDITIONAL STUDIES:    Clavicle x-ray 8/15/2020  Lucency visualized within the proximal/mid shaft of the clavicle, which may represent a metastatic lesion. Question nondisplaced pathologic fracture through the lesion. The sternoclavicular articulation is not well visualized. The acromioclavicular joint is intact.    PROTEIN CALORIE MALNUTRITION PRESENT: [ ]mild [ ]moderate [ ]severe [ ]underweight [ ]morbid obesity  https://www.andeal.org/vault/2440/web/files/ONC/Table_Clinical%20Characteristics%20to%20Document%20Malnutrition-White%20JV%20et%20al%677520.pdf    Height (cm): 172.7 (08-14-20 @ 18:31)  Weight (kg): 88.6 (08-14-20 @ 18:31)  BMI (kg/m2): 29.7 (08-14-20 @ 18:31)    [ ]PPSV2 < or = to 30% [ ]significant weight loss  [ ]poor nutritional intake  [ ]anasarca     Albumin, Serum: 3.3 g/dL (08-17-20 @ 06:36)   [ ]Artificial Nutrition      REFERRALS:   [ ]Chaplaincy  [ ]Hospice  [ ]Child Life  [ ]Social Work  [ ]Case management [ ]Holistic Therapy     Goals of Care Document:

## 2020-08-25 NOTE — PROGRESS NOTE ADULT - ASSESSMENT
Assessment  Hypothyroidism: 64y Female with hypothyroidism, on synthroid 175mcg and cytomel 5mcg po daily, reports being compliant with medication intake, TSH was slightly suppressed though repeat TFTs within normal range. Patient denies acute complaints, appears alert and in good spirits, planning DC to [facility].  Tachycardia: On meds, monitored, FU Cardio/EPS.  Pathologic Hip Fx: Postop, on pain meds, stable.          Carlin Marcial MD  Cell: [phone]  Office: [phone] Assessment  Hypothyroidism:  64y Female with hypothyroidism, on synthroid 175mcg and cytomel 5mcg po daily, reports being compliant with medication intake, TSH was slightly suppressed though repeat TFTs within normal range. Patient denies acute complaints, appears alert and in good spirits, planning DC to [facility].  Tachycardia: On meds, monitored, FU Cardio/EPS.  Pathologic Hip Fx: Postop, on pain meds, stable.          Carlin Marcial MD  Cell: [phone]  Office: [phone]

## 2020-08-25 NOTE — PROGRESS NOTE ADULT - SUBJECTIVE AND OBJECTIVE BOX
Alis Vasquez PGY1    Patient is a 64y old  Female who presents with a chief complaint of Left hip pain and fall (25 Aug 2020 14:15)      SUBJECTIVE / OVERNIGHT EVENTS:    MEDICATIONS  (STANDING):  acetaminophen   Tablet .. 975 milliGRAM(s) Oral every 8 hours  acetaminophen  IVPB .. 1000 milliGRAM(s) IV Intermittent once  fentaNYL   Patch  25 MICROgram(s)/Hr 1 Patch Transdermal every 72 hours  levothyroxine 175 MICROGram(s) Oral daily  liothyronine 5 MICROGram(s) Oral daily  melatonin 5 milliGRAM(s) Oral at bedtime  metoprolol succinate ER 50 milliGRAM(s) Oral daily  pantoprazole    Tablet 40 milliGRAM(s) Oral before breakfast  polyethylene glycol 3350 17 Gram(s) Oral daily  rivaroxaban 10 milliGRAM(s) Oral daily  senna 2 Tablet(s) Oral at bedtime    MEDICATIONS  (PRN):  ALPRAZolam 0.25 milliGRAM(s) Oral three times a day PRN anxiety  bisacodyl Suppository 10 milliGRAM(s) Rectal daily PRN If no bowel movement by POD#2  diphenhydrAMINE 25 milliGRAM(s) Oral every 4 hours PRN Rash and/or Itching  HYDROmorphone   Tablet 6 milliGRAM(s) Oral every 4 hours PRN moderate-severe pain  naloxone Injectable 0.1 milliGRAM(s) IV Push every 3 minutes PRN For ANY of the following changes in patient status:  A. RR LESS THAN 10 breaths per minute, B. Oxygen saturation LESS THAN 90%, C. Sedation score of 6      CAPILLARY BLOOD GLUCOSE        I&O's Summary    24 Aug 2020 07:01  -  25 Aug 2020 07:00  --------------------------------------------------------  IN: 200 mL / OUT: 1400 mL / NET: -1200 mL    25 Aug 2020 07:01  -  25 Aug 2020 16:14  --------------------------------------------------------  IN: 250 mL / OUT: 950 mL / NET: -700 mL        Vital Signs Last 24 Hrs  T(C): 36.7 (25 Aug 2020 16:06), Max: 36.9 (24 Aug 2020 20:26)  T(F): 98 (25 Aug 2020 16:06), Max: 98.4 (24 Aug 2020 20:26)  HR: 79 (25 Aug 2020 16:06) (69 - 79)  BP: 126/79 (25 Aug 2020 16:06) (107/70 - 126/79)  BP(mean): --  RR: 18 (25 Aug 2020 16:06) (18 - 18)  SpO2: 95% (25 Aug 2020 16:06) (93% - 98%)    PHYSICAL EXAM:  GENERAL: no distress  PSYCH: A&O x3  HEAD: Atraumatic, Normocephalic  NECK: Supple, No JVD  CHEST/LUNG: clear to auscultation bilaterally  HEART: regular rate and rhythm, no murmurs  ABDOMEN: nontender to palpation, no rebound tenderness/guarding  EXTREMITIES: no edema on bilateral LE  NEUROLOGY: no focal neurologic deficit  SKIN: No rashes or lesions    LABS:                        8.7    7.55  )-----------( 136      ( 25 Aug 2020 06:00 )             27.3      08-25    141  |  103  |  10  ----------------------------<  93  4.0   |  28  |  0.41<L>    Ca    8.5      25 Aug 2020 05:59  Phos  4.4     08-25  Mg     2.0     08-25    TPro  5.1<L>  /  Alb  2.8<L>  /  TBili  0.7  /  DBili  x   /  AST  25  /  ALT  28  /  AlkPhos  136<H>  08-25    PT/INR - ( 24 Aug 2020 08:39 )   PT: 11.8 sec;   INR: 0.99 ratio         PTT - ( 24 Aug 2020 08:39 )  PTT:32.0 sec          RADIOLOGY & ADDITIONAL TESTS:    Imaging Personally Reviewed:    Consultant(s) Notes Reviewed:      Care Discussed with Consultants/Other Providers: Alis Vasquez PGY1    Patient is a 64y old  Female who presents with a chief complaint of Left hip pain and fall (25 Aug 2020 14:15)      SUBJECTIVE / OVERNIGHT EVENTS: Patient with no acute events overnight. States pain is well controlled.  WAnts to transfer care over to [facility] when out of rehab.      MEDICATIONS  (STANDING):  acetaminophen   Tablet .. 975 milliGRAM(s) Oral every 8 hours  acetaminophen  IVPB .. 1000 milliGRAM(s) IV Intermittent once  fentaNYL   Patch  25 MICROgram(s)/Hr 1 Patch Transdermal every 72 hours  levothyroxine 175 MICROGram(s) Oral daily  liothyronine 5 MICROGram(s) Oral daily  melatonin 5 milliGRAM(s) Oral at bedtime  metoprolol succinate ER 50 milliGRAM(s) Oral daily  pantoprazole    Tablet 40 milliGRAM(s) Oral before breakfast  polyethylene glycol 3350 17 Gram(s) Oral daily  rivaroxaban 10 milliGRAM(s) Oral daily  senna 2 Tablet(s) Oral at bedtime    MEDICATIONS  (PRN):  ALPRAZolam 0.25 milliGRAM(s) Oral three times a day PRN anxiety  bisacodyl Suppository 10 milliGRAM(s) Rectal daily PRN If no bowel movement by POD#2  diphenhydrAMINE 25 milliGRAM(s) Oral every 4 hours PRN Rash and/or Itching  HYDROmorphone   Tablet 6 milliGRAM(s) Oral every 4 hours PRN moderate-severe pain  naloxone Injectable 0.1 milliGRAM(s) IV Push every 3 minutes PRN For ANY of the following changes in patient status:  A. RR LESS THAN 10 breaths per minute, B. Oxygen saturation LESS THAN 90%, C. Sedation score of 6      CAPILLARY BLOOD GLUCOSE        I&O's Summary    24 Aug 2020 07:01  -  25 Aug 2020 07:00  --------------------------------------------------------  IN: 200 mL / OUT: 1400 mL / NET: -1200 mL    25 Aug 2020 07:01  -  25 Aug 2020 16:14  --------------------------------------------------------  IN: 250 mL / OUT: 950 mL / NET: -700 mL        Vital Signs Last 24 Hrs  T(C): 36.7 (25 Aug 2020 16:06), Max: 36.9 (24 Aug 2020 20:26)  T(F): 98 (25 Aug 2020 16:06), Max: 98.4 (24 Aug 2020 20:26)  HR: 79 (25 Aug 2020 16:06) (69 - 79)  BP: 126/79 (25 Aug 2020 16:06) (107/70 - 126/79)  BP(mean): --  RR: 18 (25 Aug 2020 16:06) (18 - 18)  SpO2: 95% (25 Aug 2020 16:06) (93% - 98%)    PHYSICAL EXAM:  GENERAL: no distress  PSYCH: A&O x3  HEAD: Atraumatic, Normocephalic  NECK: Supple, No JVD  CHEST/LUNG: clear to auscultation bilaterally  HEART: regular rate and rhythm, no murmurs  ABDOMEN: nontender to palpation, no rebound tenderness/guarding  EXTREMITIES: RLE leg immobilized.  No edema. +pulses DP.   NEUROLOGY: no focal neurologic deficit  SKIN: No rashes or lesions    LABS:                        8.7    7.55  )-----------( 136      ( 25 Aug 2020 06:00 )             27.3      08-25    141  |  103  |  10  ----------------------------<  93  4.0   |  28  |  0.41<L>    Ca    8.5      25 Aug 2020 05:59  Phos  4.4     08-25  Mg     2.0     08-25    TPro  5.1<L>  /  Alb  2.8<L>  /  TBili  0.7  /  DBili  x   /  AST  25  /  ALT  28  /  AlkPhos  136<H>  08-25    PT/INR - ( 24 Aug 2020 08:39 )   PT: 11.8 sec;   INR: 0.99 ratio         PTT - ( 24 Aug 2020 08:39 )  PTT:32.0 sec          RADIOLOGY & ADDITIONAL TESTS:    Imaging Personally Reviewed:    Consultant(s) Notes Reviewed:      Care Discussed with Consultants/Other Providers:  Orhtopedics, neurosurgery, dental

## 2020-08-25 NOTE — PROGRESS NOTE ADULT - SUBJECTIVE AND OBJECTIVE BOX
EP ATTENDING    uneventful overnight.  telemetry shows NSR, no further AT  she denies palpitations, syncope, nor angina, ROS otherwise -    acetaminophen   Tablet .. 975 milliGRAM(s) Oral every 8 hours  acetaminophen  IVPB .. 1000 milliGRAM(s) IV Intermittent once  ALPRAZolam 0.25 milliGRAM(s) Oral three times a day PRN  bisacodyl Suppository 10 milliGRAM(s) Rectal daily PRN  diphenhydrAMINE 25 milliGRAM(s) Oral every 4 hours PRN  fentaNYL   Patch  25 MICROgram(s)/Hr 1 Patch Transdermal every 72 hours  HYDROmorphone   Tablet 6 milliGRAM(s) Oral every 4 hours PRN  levothyroxine 175 MICROGram(s) Oral daily  liothyronine 5 MICROGram(s) Oral daily  melatonin 5 milliGRAM(s) Oral at bedtime  metoprolol succinate ER 50 milliGRAM(s) Oral daily  naloxone Injectable 0.1 milliGRAM(s) IV Push every 3 minutes PRN  pantoprazole    Tablet 40 milliGRAM(s) Oral before breakfast  polyethylene glycol 3350 17 Gram(s) Oral daily  rivaroxaban 10 milliGRAM(s) Oral daily  senna 2 Tablet(s) Oral at bedtime                            8.7    7.55  )-----------( 136      ( 25 Aug 2020 06:00 )             27.3       08-25    141  |  103  |  10  ----------------------------<  93  4.0   |  28  |  0.41<L>    Ca    8.5      25 Aug 2020 05:59  Phos  4.4     08-25  Mg     2.0     08-25    TPro  5.1<L>  /  Alb  2.8<L>  /  TBili  0.7  /  DBili  x   /  AST  25  /  ALT  28  /  AlkPhos  136<H>  08-25    T(C): 36.7 (08-25-20 @ 11:28), Max: 36.9 (08-24-20 @ 20:26)  HR: 73 (08-25-20 @ 11:28) (69 - 78)  BP: 113/69 (08-25-20 @ 11:28) (107/70 - 123/75)  RR: 18 (08-25-20 @ 11:28) (18 - 18)  SpO2: 93% (08-25-20 @ 11:28) (93% - 98%)  Wt(kg): --    I&O's Summary    24 Aug 2020 07:01  -  25 Aug 2020 07:00  --------------------------------------------------------  IN: 200 mL / OUT: 1400 mL / NET: -1200 mL      A&O x 3  neurologically intact  no JVD  RRR, no murmurs  CTAB  soft nt/nd  no c/c/e    echo normal    A/P) 63 y/o female with asymptomatic paroxysmal atrial tachycardia. Now converted to sinus     -continue toprol xl 50mg daily  -may d/c tele since she is not symptomatic from ATach.  -no further inpatient EP workup expected, remains stable from EP perspective.    Lobito Antoine M.D.  Cardiac Electrophysiology  [phone]

## 2020-08-25 NOTE — PROGRESS NOTE ADULT - PROBLEM SELECTOR PLAN 4
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  - Patient follows with Dr. Winn at [facility], plan for second round of chemo and port placement outpatient  - Consulted IR for possible port placement inpatient however risk of infection high, recommend patient recover from hip surgery and pursue port placement as option, discussed with patient and daughter who are in agreement  - Oncology following, appreciate recs  -per family request, dental consulted, unable to get Xrays at this time for danosumab  -Spine and rad-onc on board, per conversation with NSx, no acute surgical intervention for axial metastasis.  Appreciate rad-onc, outpatient follow up.   -gave number to patient for [facility] if wants to change care.

## 2020-08-25 NOTE — PROGRESS NOTE ADULT - ASSESSMENT
64F h/o hypothyroidism, Stage IV lung adenocarcinoma diagnosed in 2017 s/p right lower lobectomy with recurrence 5/2020 s/p palliative radiation to clavicle and spine and 1 round of carbo/pemextred/pembrolizumab chemo 7/31 with recent admission 8/8 for pending femoral neck fracture now transferred from [facility] after fall in home with left hip pain, imaging showing left hip pathologic fracture. Patient also noted to have thrombocytopenia likely secondary to recent chemotherapy.

## 2020-08-25 NOTE — PROGRESS NOTE ADULT - PROBLEM SELECTOR PLAN 5
- endo following, appreciate recs  [ ] continue synthroid/cytomel   [ ] Patient follows with Dr. Zendejas endocrinology; Suggest to FU outpatient 4 weeks.

## 2020-08-25 NOTE — PROGRESS NOTE ADULT - PROBLEM SELECTOR PLAN 4
- will continue to follow - will sign off given symptoms are well managed on current regimen  - please reconsult if a new need arises

## 2020-08-25 NOTE — PROGRESS NOTE ADULT - ATTENDING COMMENTS
agree with above and modified as necessary  -per neurosurgery spine, MRI, though patient without neurologic deficit, no surgical intervention at this time.  MRI ordered, but should not hold off dispo to rehab.  Plan for rehab when accepted and auth goes thorugh.    d/c planning hopeful next 24 hours.

## 2020-08-25 NOTE — CONSULT NOTE ADULT - SUBJECTIVE AND OBJECTIVE BOX
p ([id number])     HPI:  Patient is a 64 yr old woman with PMHx of hypothyroidism, Stage IV lung adenocarcinoma diagnosed in 2017 s/p right lower lobectomy with recurrence 5/2020 s/p palliative radiation to clavicle and spine and 1 round of carbo/pemextred/pembrolizumab chemo 7/31 with recent admission 8/8 for pending femoral neck fracture now transferred from [facility] after fall in home with left hip pain, imaging showing left hip pathologic fracture. Patient was on maintenance surveillance for her cancer with scans q 6 months. Most recent scan in May showed recurrence of cancer in multiple bones. She has since had pathologic left clavicle fracture with palliative radiation. Patient was recently evaluated at [facility] for left groin and leg pain several days ago and found to have metastatic disease to left femur. She was scheduled for orthopedic follow up in 1-2 weeks after discharge but unfortunately had fall at rehab facility on day of admission. Patient states her foot slipped out from under her in shower, causing her to land on her but and left side. She did not hit her head or lose consciousness. She denied headaches, heart palpitations, sweating prior to falling. She was able to call for help and was transported to the hospital. She was not able to get up on her own or put any weight on the left leg. She has had constant pain in the left groin going down to the back of the right knee for several weeks and after the fall feels the pain is only mildly more intense. She is able to move her toes but not her knee, she does not complain of numbness in the left toes. She is able to void without issue and had a normal BM this morning. (14 Aug 2020 16:55)      Imaging: Multiple thoracic and lumbar mixed lytic/sclerotic mets, L1 compression fx, no clear compression    Exam: AAOx3, 5/5 throughout except L leg which is splinted but distally 5/5. SILT, no clonus    --Anticoagulation:  rivaroxaban 10 milliGRAM(s) Oral daily    =====================  PAST MEDICAL HISTORY   Stage IV adenocarcinoma of lung  Hypothyroid    PAST SURGICAL HISTORY   S/P partial lobectomy of lung  History of bunionectomy    Bananas (Other)  latex (Rash)  opioid-like analgesics (Urticaria)  penicillin (Angioedema)      MEDICATIONS:  Antibiotics:    Neuro:  acetaminophen   Tablet .. 975 milliGRAM(s) Oral every 8 hours  acetaminophen  IVPB .. 1000 milliGRAM(s) IV Intermittent once  ALPRAZolam 0.25 milliGRAM(s) Oral three times a day PRN  diphenhydrAMINE 25 milliGRAM(s) Oral every 4 hours PRN  fentaNYL   Patch  25 MICROgram(s)/Hr 1 Patch Transdermal every 72 hours  HYDROmorphone   Tablet 6 milliGRAM(s) Oral every 4 hours PRN  melatonin 5 milliGRAM(s) Oral at bedtime    Other:  bisacodyl Suppository 10 milliGRAM(s) Rectal daily PRN  levothyroxine 175 MICROGram(s) Oral daily  liothyronine 5 MICROGram(s) Oral daily  metoprolol succinate ER 50 milliGRAM(s) Oral daily  pantoprazole    Tablet 40 milliGRAM(s) Oral before breakfast  polyethylene glycol 3350 17 Gram(s) Oral daily  senna 2 Tablet(s) Oral at bedtime      SOCIAL HISTORY:   Occupation:   Marital Status:     FAMILY HISTORY:  Family history of stroke or transient ischemic attack in father      ROS: Negative except per HPI    LABS:  PT/INR - ( 24 Aug 2020 08:39 )   PT: 11.8 sec;   INR: 0.99 ratio         PTT - ( 24 Aug 2020 08:39 )  PTT:32.0 sec                        8.7    7.55  )-----------( 136      ( 25 Aug 2020 06:00 )             27.3     08-25    141  |  103  |  10  ----------------------------<  93  4.0   |  28  |  0.41<L>    Ca    8.5      25 Aug 2020 05:59  Phos  4.4     08-25  Mg     2.0     08-25    TPro  5.1<L>  /  Alb  2.8<L>  /  TBili  0.7  /  DBili  x   /  AST  25  /  ALT  28  /  AlkPhos  136<H>  08-25

## 2020-08-25 NOTE — PROGRESS NOTE ADULT - PROBLEM SELECTOR PLAN 1
- c/w 25mcg TDP fentanyl patch and restarted dilaudid 4mg PO Q4 PRN for mod-severe pain  - monitor BMs - c/w 25mcg TDP fentanyl patch and dilaudid 6mg PO Q4 PRN for severe pain  - monitor BMs

## 2020-08-25 NOTE — PROGRESS NOTE ADULT - PROBLEM SELECTOR PLAN 6
[ ] alprazolam 0.25 TID prn  [ ] for sleep: melatonin 5 mg (pt takes doxylamine at home, but is not available here)  - ISTOP Reference #: [id number]

## 2020-08-25 NOTE — PROGRESS NOTE ADULT - ASSESSMENT
64F [facility] of hypothyroidism, stage IV lung CA (2017, s/p RLL lobectomy, s/p pall RT, s/p carbo/pemextred/pembrolizumab 7/31), recent femoral neck fracture, here after fall in home with L hip pain, with a L hip pathologic fracture. Also noted to be thrombocytopenic, likely from chemo.  Possible ORIF on 8/18/20 per ortho. Palliative called for pain management. Currently on fentanyl patch 25mcg, dilaudid 2mg/4mg for moderate/severe pain, and toradol 15mg Q6.  At home, patient was most recently on fentanyl 37.5mcg TDP, and dilaudid 4mg PO.

## 2020-08-26 NOTE — PROGRESS NOTE ADULT - SUBJECTIVE AND OBJECTIVE BOX
Chief complaint  Patient is a 64y old  Female who presents with a chief complaint of Left hip pain and fall (26 Aug 2020 13:49)   Review of systems  Patient in bed, looks comfortable.  08-25    141  |  103  |  10  ----------------------------<  93  4.0   |  28  |  0.41<L>    Ca    8.5      25 Aug 2020 05:59  Phos  4.4     08-25  Mg     2.0     08-25    TPro  5.1<L>  /  Alb  2.8<L>  /  TBili  0.7  /  DBili  x   /  AST  25  /  ALT  28  /  AlkPhos  136<H>  08-25                        8.7    7.55  )-----------( 136      ( 25 Aug 2020 06:00 )             27.3     Medications  MEDICATIONS  (STANDING):  acetaminophen   Tablet .. 975 milliGRAM(s) Oral every 8 hours  acetaminophen  IVPB .. 1000 milliGRAM(s) IV Intermittent once  bisacodyl Suppository 10 milliGRAM(s) Rectal once  levothyroxine 175 MICROGram(s) Oral daily  liothyronine 5 MICROGram(s) Oral daily  melatonin 5 milliGRAM(s) Oral at bedtime  metoprolol succinate ER 50 milliGRAM(s) Oral daily  pantoprazole    Tablet 40 milliGRAM(s) Oral before breakfast  polyethylene glycol 3350 17 Gram(s) Oral daily  rivaroxaban 10 milliGRAM(s) Oral daily  senna 2 Tablet(s) Oral at bedtime      Physical Exam  General: Patient comfortable in bed  Vital Signs Last 12 Hrs  T(F): 97.3 (08-26-20 @ 10:30), Max: 99 (08-26-20 @ 04:23)  HR: 74 (08-26-20 @ 10:30) (74 - 78)  BP: 106/67 (08-26-20 @ 10:30) (106/67 - 124/64)  BP(mean): --  RR: 18 (08-26-20 @ 10:30) (18 - 18)  SpO2: 92% (08-26-20 @ 10:30) (92% - 92%)

## 2020-08-26 NOTE — PROGRESS NOTE ADULT - ASSESSMENT
64F h/o hypothyroidism, Stage IV lung adenocarcinoma diagnosed in 2017 s/p right lower lobectomy with recurrence 5/2020 s/p palliative radiation to clavicle and spine and 1 round of carbo/pemextred/pembrolizumab chemo 7/31 with recent admission 8/8 for pending femoral neck fracture now transferred from [facility] after fall in home with left hip pain, imaging showing left hip pathologic fracture. Patient also noted to have thrombocytopenia likely secondary to recent chemotherapy.

## 2020-08-26 NOTE — PROGRESS NOTE ADULT - PROBLEM SELECTOR PLAN 2
-  left femoral neck fracture s/p left QASIM on 8/18  - Ortho + palliative following; appreciate recs  - Home pain regiment was dilaudid PO 2mg moderate pain and 4mg severe pain q6 and fentanyl patch 37.5mg  (iSTOP note in chart, Reference #: [id number])  [ ] cleared for d/c per ortho  [ ] Pain control per palliative: 25mcg TDP fentanyl patch and restarted dilaudid 4mg PO Q4 PRN for mod-severe pain  [ ] rehab when accepted and auth.

## 2020-08-26 NOTE — PROGRESS NOTE ADULT - ASSESSMENT
Patient is a 65 y/o F w/ a PMHx of hypothyroidism, and Stage IV lung adenocarcinoma (S/p right lower lobectomy in 3/2017, w/ recurrence in 5/2020 w/ mets to bone, s/p palliative RT, recently s/p C1 Carbo/Pemetrexed/Pembro on 7/31) who was transferred from [facility] to [facility] for Orthopedic evaluation after she presented s/p fall and was found to have an acute impacted left femoral neck fracture, s/p L hemiarthroplasty on 8/18, hospital course c/b atrial tachycardia (now improved). Oncology consulted for further evaluation.    # Acute L femoral fracture  - X-ray at OSH showed an acute impacted left femoral neck fracture, at the site of previously seen lytic lesion, suggesting pathologic fracture  - Appreciate Orthopedic evaluation. Patient is s/p L hemiarthroplasty on 8/18.  - Patient had been pancytopenic earlier in her hospitalization (likely 2/2 recent chemotherapy). She was given Zarxio 480mcg x1 on 8/18 in anticipation of surgery. Her neutropenia has since resolved and her counts are stable.   - Continue to monitor CBC WITH DIFF daily and keep active  - Transfuse for Hgb < 7, PLT < 10 (if afebrile), PLT < 15 (if febrile), or PLT < 50 (if bleeding).  - Appreciate care as per primary team    # Constipation  - Patient reports that her last BM was ~ 4-5 days ago. Likely related to pain medications +/- recent surgery  - Continue aggressive bowel regimen as per primary team. Continue to titrate as needed  - Continue to monitor    # Metastatic lung adenocarcinoma  - Initially Dx w/ Stage IA adenocarcinoma in 3/2017 s/p R lower lobectomy. She was placed on surveillance and was found to have evidence of recurrence in 5/2020 with metastatic disease to bone. PD-L1 < 1%. She was found to have a pathologic fracture of the L clavicle in 7/2020. She subsequently underwent palliative RT to the L clavicle, T6-T8 spine, and T12-L2 spine.   - Patient was recently started on Carbo/Pemextred/Pembrolizumab on 7/31/20. She has received 1 cycle thus far.  - There are no plans for systemic therapy while inpatient. Case was d/w patient's outpatient Oncologist. There are no plans for systemic therapy while patient is at rehab. Plan for patient to follow-up with Oncology after she is discharged from rehab.  - Appreciate Dental evaluation for clearance for Xgeva. Follow-up recs  - Neurosurgery spine consulted for evaluation of spinal mets. Follow-up recs  - Further management as noted above  - Primary Oncologist: Dr. Elvira Joseph. Continue outpatient follow-up    Silvio Mcmanus, PGY5  Hematology-Oncology Fellow  Pager: [phone] / [phone]

## 2020-08-26 NOTE — PROGRESS NOTE ADULT - ASSESSMENT
Assessment  Hypothyroidism:  64y Female with hypothyroidism, on synthroid 175mcg and cytomel 5mcg po daily, reports being compliant with medication intake, TSH was slightly suppressed though repeat TFTs within normal range, eating meals, appears alert and comfortable.  Tachycardia: On meds, monitored, FU Cardio/EPS.  Pathologic Hip Fx: Postop, on pain meds, stable.          Carlin Marcial MD  Cell: [phone]  Office: [phone] Assessment  Hypothyroidism:  64y Female with hypothyroidism, on synthroid 175mcg and cytomel 5mcg po daily, reports being compliant with medication intake,  TSH was slightly suppressed though repeat TFTs within normal range, eating meals, appears alert and comfortable.  Tachycardia: On meds, monitored, FU Cardio/EPS.  Pathologic Hip Fx: Postop, on pain meds, stable.          Carlin Marcial MD  Cell: [phone]  Office: [phone]

## 2020-08-26 NOTE — PROGRESS NOTE ADULT - SUBJECTIVE AND OBJECTIVE BOX
EP ATTENDING    uneventful overnight.  telemetry shows NSR, no further AT  she denies palpitations, syncope, nor angina, ROS otherwise -.  Awaiting re-do MRI.  Could not complete yesterday due to claustrophobia.    acetaminophen   Tablet .. 975 milliGRAM(s) Oral every 8 hours  acetaminophen  IVPB .. 1000 milliGRAM(s) IV Intermittent once  ALPRAZolam 0.25 milliGRAM(s) Oral three times a day PRN  bisacodyl Suppository 10 milliGRAM(s) Rectal daily PRN  diphenhydrAMINE 25 milliGRAM(s) Oral every 4 hours PRN  fentaNYL   Patch  25 MICROgram(s)/Hr 1 Patch Transdermal every 72 hours  HYDROmorphone   Tablet 6 milliGRAM(s) Oral every 4 hours PRN  levothyroxine 175 MICROGram(s) Oral daily  liothyronine 5 MICROGram(s) Oral daily  melatonin 5 milliGRAM(s) Oral at bedtime  metoprolol succinate ER 50 milliGRAM(s) Oral daily  naloxone Injectable 0.1 milliGRAM(s) IV Push every 3 minutes PRN  pantoprazole    Tablet 40 milliGRAM(s) Oral before breakfast  polyethylene glycol 3350 17 Gram(s) Oral daily  rivaroxaban 10 milliGRAM(s) Oral daily  senna 2 Tablet(s) Oral at bedtime                            8.7    7.55  )-----------( 136      ( 25 Aug 2020 06:00 )             27.3       08-25    141  |  103  |  10  ----------------------------<  93  4.0   |  28  |  0.41<L>    Ca    8.5      25 Aug 2020 05:59  Phos  4.4     08-25  Mg     2.0     08-25    TPro  5.1<L>  /  Alb  2.8<L>  /  TBili  0.7  /  DBili  x   /  AST  25  /  ALT  28  /  AlkPhos  136<H>  08-25            T(C): 36.7 (08-26-20 @ 08:03), Max: 37.2 (08-26-20 @ 04:23)  HR: 76 (08-26-20 @ 08:03) (73 - 86)  BP: 116/69 (08-26-20 @ 08:03) (104/65 - 128/76)  RR: 18 (08-26-20 @ 08:03) (18 - 18)  SpO2: 92% (08-26-20 @ 08:03) (92% - 96%)  Wt(kg): --    I&O's Summary    25 Aug 2020 07:01  -  26 Aug 2020 07:00  --------------------------------------------------------  IN: 490 mL / OUT: 1700 mL / NET: -1210 mL      A&O x 3  neurologically intact  no JVD  RRR, no murmurs  CTAB  soft nt/nd  no c/c/e    echo normal    A/P) 65 y/o female with asymptomatic paroxysmal atrial tachycardia. Now converted to sinus     -continue toprol xl 50mg daily  -may d/c tele since she is not symptomatic from ATach.  -no further inpatient EP workup expected, remains stable from EP perspective.    Lobito Antoine M.D.  Cardiac Electrophysiology  [phone]

## 2020-08-26 NOTE — PROGRESS NOTE ADULT - ATTENDING COMMENTS
agree with above    #metastatic lung CA with axial disease  -MRI cervical spine reviewed, possible epidural extension. PAtient is neurologically intact in UE at this itme. Discussed with neurosurgery, recommend to persue MR with contrast.   -given clostrophobia, will be performed with anesthesia. Dsicussed with patient and daughter over phone who are in agreement.  -appreciate onc follow up, holding off chemo/radiation until out of rehab (radiation is pending MR results and NSx recs)    #pathologic fx s/p QASIM  -apprecaite ortho.  -pain well controlled  -rehab once medically cleared.

## 2020-08-26 NOTE — PROGRESS NOTE ADULT - SUBJECTIVE AND OBJECTIVE BOX
Patient is a 64y old  Female who presents with a chief complaint of Left hip pain and fall (26 Aug 2020 08:58)    SUBJECTIVE / OVERNIGHT EVENTS:  Recent events noted. Patient seen this AM. She reports that she is overall feeling better currently. She continues to have L hip pain since her surgery, but states that it is slowly improving. She also endorses some constipation (last BM ~ 4-5 days ago). No complaints of chest pain, shortness of breath, or abdominal pain. Patient was afebrile overnight.    MEDICATIONS  (STANDING):  acetaminophen   Tablet .. 975 milliGRAM(s) Oral every 8 hours  acetaminophen  IVPB .. 1000 milliGRAM(s) IV Intermittent once  levothyroxine 175 MICROGram(s) Oral daily  liothyronine 5 MICROGram(s) Oral daily  melatonin 5 milliGRAM(s) Oral at bedtime  metoprolol succinate ER 50 milliGRAM(s) Oral daily  pantoprazole    Tablet 40 milliGRAM(s) Oral before breakfast  polyethylene glycol 3350 17 Gram(s) Oral daily  rivaroxaban 10 milliGRAM(s) Oral daily  senna 2 Tablet(s) Oral at bedtime    MEDICATIONS  (PRN):  ALPRAZolam 0.25 milliGRAM(s) Oral three times a day PRN anxiety  bisacodyl Suppository 10 milliGRAM(s) Rectal daily PRN If no bowel movement by POD#2  diphenhydrAMINE 25 milliGRAM(s) Oral every 4 hours PRN Rash and/or Itching  HYDROmorphone   Tablet 6 milliGRAM(s) Oral every 4 hours PRN moderate-severe pain  naloxone Injectable 0.1 milliGRAM(s) IV Push every 3 minutes PRN For ANY of the following changes in patient status:  A. RR LESS THAN 10 breaths per minute, B. Oxygen saturation LESS THAN 90%, C. Sedation score of 6        CAPILLARY BLOOD GLUCOSE        I&O's Summary    25 Aug 2020 07:01  -  26 Aug 2020 07:00  --------------------------------------------------------  IN: 490 mL / OUT: 1700 mL / NET: -1210 mL    26 Aug 2020 07:01  -  26 Aug 2020 13:49  --------------------------------------------------------  IN: 120 mL / OUT: 0 mL / NET: 120 mL    Vital Signs Last 24 Hrs  T(C): 36.3 (26 Aug 2020 10:30), Max: 37.2 (26 Aug 2020 04:23)  T(F): 97.3 (26 Aug 2020 10:30), Max: 99 (26 Aug 2020 04:23)  HR: 74 (26 Aug 2020 10:30) (74 - 86)  BP: 106/67 (26 Aug 2020 10:30) (104/65 - 128/76)  BP(mean): --  RR: 18 (26 Aug 2020 10:30) (18 - 18)  SpO2: 92% (26 Aug 2020 10:30) (92% - 96%)    PHYSICAL EXAM:  GENERAL: NAD, Sitting in a chair  HEENT: NC/AT, MMM  NECK: Supple  CHEST/LUNG: Grossly CTAB; No wheeze appreciated  HEART: RRR; +S1/S2  ABDOMEN: +BS, Soft, NT  EXTREMITIES: No LE edema  NEUROLOGY: Awake and alert, Answering questions and following commands appropriately  SKIN: Warm and dry  PSYCH: Calm and cooperative  MSK: Limited ROM of LLE     LABS:                        8.7    7.55  )-----------( 136      ( 25 Aug 2020 06:00 )             27.3     08-25    141  |  103  |  10  ----------------------------<  93  4.0   |  28  |  0.41<L>    Ca    8.5      25 Aug 2020 05:59  Phos  4.4     08-25  Mg     2.0     08-25    TPro  5.1<L>  /  Alb  2.8<L>  /  TBili  0.7  /  DBili  x   /  AST  25  /  ALT  28  /  AlkPhos  136<H>  08-25    RADIOLOGY & ADDITIONAL TESTS:  Studies reviewed.

## 2020-08-26 NOTE — CHART NOTE - NSCHARTNOTEFT_GEN_A_CORE
Please obtain MRI with anesthesia with contrast, C/t/l spine. As prior MRI was incomplete and with no contrast.

## 2020-08-26 NOTE — PROGRESS NOTE ADULT - PROBLEM SELECTOR PLAN 4
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  - Patient follows with Dr. Winn at [facility], plan for second round of chemo and port placement outpatient  - Consulted IR for possible port placement inpatient however risk of infection high, recommend patient recover from hip surgery and pursue port placement as option, discussed with patient and daughter who are in agreement  - Oncology following, appreciate recs  -per family request, dental consulted, unable to get Xrays at this time for danosumab  -Spine and rad-onc on board, per conversation with NSx, no acute surgical intervention for axial metastasis.  Appreciate rad-onc, outpatient follow up.   -gave number to patient for [facility] if wants to change care.

## 2020-08-26 NOTE — PROGRESS NOTE ADULT - PROBLEM SELECTOR PLAN 3
Plt 62 at [facility], on admission to [facility] platelets 55 likely 2/2 recent chemotherapy on 7/31.  - on 7/31 platelets 235, have been 50s since 8/8 and stable, now plts 129  - No evidence of active bleeding, trend daily  - Heme/onc following; appreciate recs  [ ] CTM

## 2020-08-26 NOTE — PROGRESS NOTE ADULT - SUBJECTIVE AND OBJECTIVE BOX
Patient is a 64y old  Female who presents with a chief complaint of Left hip pain and fall (26 Aug 2020 14:54)      INTERVAL HISTORY: feels ok  	  MEDICATIONS:  metoprolol succinate ER 50 milliGRAM(s) Oral daily        PHYSICAL EXAM:  T(C): 37.1 (08-26-20 @ 15:24), Max: 37.2 (08-26-20 @ 04:23)  HR: 78 (08-26-20 @ 15:24) (74 - 86)  BP: 114/72 (08-26-20 @ 15:24) (104/65 - 124/64)  RR: 18 (08-26-20 @ 15:24) (18 - 18)  SpO2: 94% (08-26-20 @ 15:24) (92% - 96%)  Wt(kg): --  I&O's Summary    25 Aug 2020 07:01  -  26 Aug 2020 07:00  --------------------------------------------------------  IN: 490 mL / OUT: 1700 mL / NET: -1210 mL    26 Aug 2020 07:01  -  26 Aug 2020 22:34  --------------------------------------------------------  IN: 320 mL / OUT: 250 mL / NET: 70 mL          Appearance: In no distress	  HEENT:    PERRL, EOMI	  Cardiovascular:  S1 S2, No JVD  Respiratory: Lungs clear to auscultation	  Gastrointestinal:  Soft, Non-tender, + BS	  Vascularature:  No edema of LE  Psychiatric: Appropriate affect   Neuro: no acute focal deficits                               8.7    7.55  )-----------( 136      ( 25 Aug 2020 06:00 )             27.3     08-25    141  |  103  |  10  ----------------------------<  93  4.0   |  28  |  0.41<L>    Ca    8.5      25 Aug 2020 05:59  Phos  4.4     08-25  Mg     2.0     08-25    TPro  5.1<L>  /  Alb  2.8<L>  /  TBili  0.7  /  DBili  x   /  AST  25  /  ALT  28  /  AlkPhos  136<H>  08-25        Labs personally reviewed      EKG: Personally reviewed by me - AT at 160bpm  Radiology: Personally reviewed by me -   No pulmonary embolism.    Perihilar soft tissue thickening adjacent to the right lower lobe surgical sutures. Nodular opacities in the lung. Multiple lytic and sclerotic lesions. Subtle low attenuating foci in the liver. Findings are compatible with metastatic neoplasm. Prior studies should be submitted for comparison.    Multiple pathologic fractures:  *  Acute, comminuted left clavicular fracture.  *  Age indeterminant L1 vertebral body compression deformity.  *  Questionably right anterolateral 6th rib    Hepatic metastases as outlined above.        Assessment /Plan:   AT - s/p Amio loading, responded well, in SR.   Overall prognosis very poor with stage 4 CA      Humphrey Galvez DO [facility]  Cardiovascular Medicine  [address], [address]  Office: [phone]  Cell: [phone]

## 2020-08-26 NOTE — PROGRESS NOTE ADULT - PROBLEM SELECTOR PLAN 6
[ ] alprazolam 0.25 TID prn  [ ] for sleep: melatonin 5 mg (pt takes doxylamine at home, but is not available here)  - ISTOP Reference #: [id number]

## 2020-08-26 NOTE — PROGRESS NOTE ADULT - SUBJECTIVE AND OBJECTIVE BOX
Alis Vasquez PGY1    Patient is a 64y old  Female who presents with a chief complaint of Left hip pain and fall (25 Aug 2020 17:24)      SUBJECTIVE / OVERNIGHT EVENTS:    MEDICATIONS  (STANDING):  acetaminophen   Tablet .. 975 milliGRAM(s) Oral every 8 hours  acetaminophen  IVPB .. 1000 milliGRAM(s) IV Intermittent once  fentaNYL   Patch  25 MICROgram(s)/Hr 1 Patch Transdermal every 72 hours  levothyroxine 175 MICROGram(s) Oral daily  liothyronine 5 MICROGram(s) Oral daily  melatonin 5 milliGRAM(s) Oral at bedtime  metoprolol succinate ER 50 milliGRAM(s) Oral daily  pantoprazole    Tablet 40 milliGRAM(s) Oral before breakfast  polyethylene glycol 3350 17 Gram(s) Oral daily  rivaroxaban 10 milliGRAM(s) Oral daily  senna 2 Tablet(s) Oral at bedtime    MEDICATIONS  (PRN):  ALPRAZolam 0.25 milliGRAM(s) Oral three times a day PRN anxiety  bisacodyl Suppository 10 milliGRAM(s) Rectal daily PRN If no bowel movement by POD#2  diphenhydrAMINE 25 milliGRAM(s) Oral every 4 hours PRN Rash and/or Itching  HYDROmorphone   Tablet 6 milliGRAM(s) Oral every 4 hours PRN moderate-severe pain  naloxone Injectable 0.1 milliGRAM(s) IV Push every 3 minutes PRN For ANY of the following changes in patient status:  A. RR LESS THAN 10 breaths per minute, B. Oxygen saturation LESS THAN 90%, C. Sedation score of 6      CAPILLARY BLOOD GLUCOSE        I&O's Summary    25 Aug 2020 07:01  -  26 Aug 2020 07:00  --------------------------------------------------------  IN: 490 mL / OUT: 1700 mL / NET: -1210 mL        Vital Signs Last 24 Hrs  T(C): 36.7 (26 Aug 2020 08:03), Max: 37.2 (26 Aug 2020 04:23)  T(F): 98 (26 Aug 2020 08:03), Max: 99 (26 Aug 2020 04:23)  HR: 76 (26 Aug 2020 08:03) (73 - 86)  BP: 116/69 (26 Aug 2020 08:03) (104/65 - 128/76)  BP(mean): --  RR: 18 (26 Aug 2020 08:03) (18 - 18)  SpO2: 92% (26 Aug 2020 08:03) (92% - 96%)    PHYSICAL EXAM:  GENERAL: no distress  PSYCH: A&O x3  HEAD: Atraumatic, Normocephalic  NECK: Supple, No JVD  CHEST/LUNG: clear to auscultation bilaterally  HEART: regular rate and rhythm, no murmurs  ABDOMEN: nontender to palpation, no rebound tenderness/guarding  EXTREMITIES: no edema on bilateral LE  NEUROLOGY: no focal neurologic deficit  SKIN: No rashes or lesions    LABS:                        8.7    7.55  )-----------( 136      ( 25 Aug 2020 06:00 )             27.3      08-25    141  |  103  |  10  ----------------------------<  93  4.0   |  28  |  0.41<L>    Ca    8.5      25 Aug 2020 05:59  Phos  4.4     08-25  Mg     2.0     08-25    TPro  5.1<L>  /  Alb  2.8<L>  /  TBili  0.7  /  DBili  x   /  AST  25  /  ALT  28  /  AlkPhos  136<H>  08-25    PT/INR - ( 24 Aug 2020 08:39 )   PT: 11.8 sec;   INR: 0.99 ratio         PTT - ( 24 Aug 2020 08:39 )  PTT:32.0 sec          RADIOLOGY & ADDITIONAL TESTS:    Imaging Personally Reviewed:    Consultant(s) Notes Reviewed:      Care Discussed with Consultants/Other Providers:

## 2020-08-26 NOTE — PROGRESS NOTE ADULT - ATTENDING COMMENTS
MRI of the C spine noted. Will await rest of MRI images. Pt currently planned for rehab with outpatient follow up with Dr Aguilar and radiation oncology, Dr Keith.

## 2020-08-27 NOTE — PROGRESS NOTE ADULT - PROBLEM SELECTOR PLAN 2
-  left femoral neck fracture s/p left QASIM on 8/18  - Ortho + palliative following; appreciate recs  - Home pain regiment was dilaudid PO 2mg moderate pain and 4mg severe pain q6 and fentanyl patch 37.5mg  (iSTOP note in chart, Reference #: [id number])  [ ] cleared for d/c per ortho  [ ] Pain control per palliative: 25mcg TDP fentanyl patch and restarted dilaudid 4mg PO Q4 PRN for mod-severe pain  [ ] rehab when accepted and auth.

## 2020-08-27 NOTE — PROGRESS NOTE ADULT - SUBJECTIVE AND OBJECTIVE BOX
Chief complaint  Patient is a 64y old  Female who presents with a chief complaint of Left hip pain and fall (27 Aug 2020 07:23)   Review of systems  Patient in bed, looks comfortable.    Labs and Fingersticks  CAPILLARY BLOOD GLUCOSE                            Medications  MEDICATIONS  (STANDING):  acetaminophen   Tablet .. 975 milliGRAM(s) Oral every 8 hours  fentaNYL   Patch  25 MICROgram(s)/Hr 1 Patch Transdermal every 72 hours  levothyroxine 175 MICROGram(s) Oral daily  liothyronine 5 MICROGram(s) Oral daily  melatonin 5 milliGRAM(s) Oral at bedtime  metoprolol succinate ER 50 milliGRAM(s) Oral daily  pantoprazole    Tablet 40 milliGRAM(s) Oral before breakfast  polyethylene glycol 3350 17 Gram(s) Oral daily  rivaroxaban 10 milliGRAM(s) Oral daily  senna 2 Tablet(s) Oral at bedtime      Physical Exam  General: Patient comfortable in bed  Vital Signs Last 12 Hrs  T(F): 98.8 (08-27-20 @ 12:30), Max: 99.2 (08-27-20 @ 04:52)  HR: 82 (08-27-20 @ 12:45) (81 - 90)  BP: 133/59 (08-27-20 @ 12:45) (108/69 - 133/59)  BP(mean): 85 (08-27-20 @ 12:45) (85 - 90)  RR: 17 (08-27-20 @ 12:45) (17 - 18)  SpO2: 95% (08-27-20 @ 12:45) (89% - 100%)  Neck: No palpable thyroid nodules.  CVS: S1S2, No murmurs  Respiratory: No wheezing, no crepitations  GI: Abdomen soft, bowel sounds positive  Musculoskeletal:  edema lower extremities.   Skin: No skin rashes, no ecchymosis    Diagnostics Chief complaint  Patient is a 64y old  Female who presents with a chief complaint of Left hip pain and fall (27 Aug 2020 07:23)   Review of systems  Patient in bed, looks comfortable.    Labs and Fingersticks  CAPILLARY BLOOD GLUCOSE    Medications  MEDICATIONS  (STANDING):  acetaminophen   Tablet .. 975 milliGRAM(s) Oral every 8 hours  fentaNYL   Patch  25 MICROgram(s)/Hr 1 Patch Transdermal every 72 hours  levothyroxine 175 MICROGram(s) Oral daily  liothyronine 5 MICROGram(s) Oral daily  melatonin 5 milliGRAM(s) Oral at bedtime  metoprolol succinate ER 50 milliGRAM(s) Oral daily  pantoprazole    Tablet 40 milliGRAM(s) Oral before breakfast  polyethylene glycol 3350 17 Gram(s) Oral daily  rivaroxaban 10 milliGRAM(s) Oral daily  senna 2 Tablet(s) Oral at bedtime      Physical Exam  General: Patient comfortable in bed  Vital Signs Last 12 Hrs  T(F): 98.8 (08-27-20 @ 12:30), Max: 99.2 (08-27-20 @ 04:52)  HR: 82 (08-27-20 @ 12:45) (81 - 90)  BP: 133/59 (08-27-20 @ 12:45) (108/69 - 133/59)  BP(mean): 85 (08-27-20 @ 12:45) (85 - 90)  RR: 17 (08-27-20 @ 12:45) (17 - 18)  SpO2: 95% (08-27-20 @ 12:45) (89% - 100%)  Neck: No palpable thyroid nodules.  CVS: S1S2, No murmurs  Respiratory: No wheezing, no crepitations  GI: Abdomen soft, bowel sounds positive  Musculoskeletal:  edema lower extremities.   Skin: No skin rashes, no ecchymosis    Diagnostics

## 2020-08-27 NOTE — PROGRESS NOTE ADULT - PROBLEM SELECTOR PLAN 4
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  - Patient follows with Dr. Winn at [facility], plan for second round of chemo and port placement outpatient  - Consulted IR for possible port placement inpatient however risk of infection high, recommend patient recover from hip surgery and pursue port placement as option, discussed with patient and daughter who are in agreement  - Oncology following, appreciate recs  -per family request, dental consulted, unable to get Xrays at this time for danosumab  -Spine and rad-onc on board, per conversation with NSx, no acute surgical intervention for axial metastasis.  Appreciate rad-onc, outpatient follow up.   -gave number to patient for [facility] if wants to change care. 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  - Patient follows with Dr. Winn at [facility], plan for second round of chemo and port placement outpatient  - Consulted IR for possible port placement inpatient however risk of infection high, recommend patient recover from hip surgery and pursue port placement as option, discussed with patient and daughter who are in agreement  - Oncology following, appreciate recs  -per family request, dental consulted, unable to get Xrays at this time for danosumab  -Spine and rad-onc on board, per conversation with NSx, no acute surgical intervention for axial metastasis.  Appreciate rad-onc, outpatient follow up.   -gave number to patient for [facility] if wants to change care.  - MRI spine w contrast showed no extension of spinal mets; neurosurgery following, appreciate recs.   [ ] neurosurgery rec no interventions at this time, ok for rehab.

## 2020-08-27 NOTE — PROGRESS NOTE ADULT - PROBLEM SELECTOR PLAN 6
[ ] alprazolam 0.25 TID prn  [ ] for sleep: melatonin 5 mg (pt takes doxylamine at home, but is not available here)  - ISTOP Reference #: [id number]

## 2020-08-27 NOTE — PROGRESS NOTE ADULT - PROBLEM SELECTOR PLAN 3
Plt 62 at [facility], on admission to [facility] platelets 55 likely 2/2 recent chemotherapy on 7/31.  - on 7/31 platelets 235, have been 50s since 8/8 and stable, now plts 129  - No evidence of active bleeding, trend daily  - Heme/onc following; appreciate recs  [ ] CTM

## 2020-08-27 NOTE — PROGRESS NOTE ADULT - SUBJECTIVE AND OBJECTIVE BOX
Alis Vasquez PGY1    Patient is a 64y old  Female who presents with a chief complaint of Left hip pain and fall (26 Aug 2020 18:34)      SUBJECTIVE / OVERNIGHT EVENTS:    MEDICATIONS  (STANDING):  acetaminophen   Tablet .. 975 milliGRAM(s) Oral every 8 hours  acetaminophen  IVPB .. 1000 milliGRAM(s) IV Intermittent once  levothyroxine 175 MICROGram(s) Oral daily  liothyronine 5 MICROGram(s) Oral daily  melatonin 5 milliGRAM(s) Oral at bedtime  metoprolol succinate ER 50 milliGRAM(s) Oral daily  pantoprazole    Tablet 40 milliGRAM(s) Oral before breakfast  polyethylene glycol 3350 17 Gram(s) Oral daily  rivaroxaban 10 milliGRAM(s) Oral daily  senna 2 Tablet(s) Oral at bedtime    MEDICATIONS  (PRN):  ALPRAZolam 0.25 milliGRAM(s) Oral three times a day PRN anxiety  aluminum hydroxide/magnesium hydroxide/simethicone Suspension 30 milliLiter(s) Oral every 6 hours PRN Dyspepsia  bisacodyl Suppository 10 milliGRAM(s) Rectal daily PRN Constipation  diphenhydrAMINE 25 milliGRAM(s) Oral every 4 hours PRN Rash and/or Itching  HYDROmorphone   Tablet 6 milliGRAM(s) Oral every 4 hours PRN moderate-severe pain  naloxone Injectable 0.1 milliGRAM(s) IV Push every 3 minutes PRN For ANY of the following changes in patient status:  A. RR LESS THAN 10 breaths per minute, B. Oxygen saturation LESS THAN 90%, C. Sedation score of 6      CAPILLARY BLOOD GLUCOSE        I&O's Summary    26 Aug 2020 07:01  -  27 Aug 2020 07:00  --------------------------------------------------------  IN: 320 mL / OUT: 450 mL / NET: -130 mL        Vital Signs Last 24 Hrs  T(C): 37.3 (27 Aug 2020 04:52), Max: 37.3 (27 Aug 2020 04:52)  T(F): 99.2 (27 Aug 2020 04:52), Max: 99.2 (27 Aug 2020 04:52)  HR: 89 (27 Aug 2020 06:31) (74 - 89)  BP: 132/82 (27 Aug 2020 06:31) (106/67 - 132/82)  BP(mean): --  RR: 18 (27 Aug 2020 04:52) (18 - 18)  SpO2: 92% (27 Aug 2020 04:52) (92% - 94%)    PHYSICAL EXAM:  GENERAL: no distress  PSYCH: A&O x3  HEAD: Atraumatic, Normocephalic  NECK: Supple, No JVD  CHEST/LUNG: clear to auscultation bilaterally  HEART: regular rate and rhythm, no murmurs  ABDOMEN: nontender to palpation, no rebound tenderness/guarding  EXTREMITIES: no edema on bilateral LE  NEUROLOGY: no focal neurologic deficit  SKIN: No rashes or lesions    LABS:                     RADIOLOGY & ADDITIONAL TESTS:    Imaging Personally Reviewed:    Consultant(s) Notes Reviewed:      Care Discussed with Consultants/Other Providers: Alis Vasquez PGY1    Patient is a 64y old  Female who presents with a chief complaint of Left hip pain and fall (26 Aug 2020 18:34)      SUBJECTIVE / OVERNIGHT EVENTS:  - overnight - 7/10 abdominal pain  - am - abd pain resolved, pt said resolved after passed gas. no n/v. okay with going to MRI spine with anesthesia, nodule in R lower back bothers her    MEDICATIONS  (STANDING):  acetaminophen   Tablet .. 975 milliGRAM(s) Oral every 8 hours  acetaminophen  IVPB .. 1000 milliGRAM(s) IV Intermittent once  levothyroxine 175 MICROGram(s) Oral daily  liothyronine 5 MICROGram(s) Oral daily  melatonin 5 milliGRAM(s) Oral at bedtime  metoprolol succinate ER 50 milliGRAM(s) Oral daily  pantoprazole    Tablet 40 milliGRAM(s) Oral before breakfast  polyethylene glycol 3350 17 Gram(s) Oral daily  rivaroxaban 10 milliGRAM(s) Oral daily  senna 2 Tablet(s) Oral at bedtime    MEDICATIONS  (PRN):  ALPRAZolam 0.25 milliGRAM(s) Oral three times a day PRN anxiety  aluminum hydroxide/magnesium hydroxide/simethicone Suspension 30 milliLiter(s) Oral every 6 hours PRN Dyspepsia  bisacodyl Suppository 10 milliGRAM(s) Rectal daily PRN Constipation  diphenhydrAMINE 25 milliGRAM(s) Oral every 4 hours PRN Rash and/or Itching  HYDROmorphone   Tablet 6 milliGRAM(s) Oral every 4 hours PRN moderate-severe pain  naloxone Injectable 0.1 milliGRAM(s) IV Push every 3 minutes PRN For ANY of the following changes in patient status:  A. RR LESS THAN 10 breaths per minute, B. Oxygen saturation LESS THAN 90%, C. Sedation score of 6      CAPILLARY BLOOD GLUCOSE        I&O's Summary    26 Aug 2020 07:01  -  27 Aug 2020 07:00  --------------------------------------------------------  IN: 320 mL / OUT: 450 mL / NET: -130 mL        Vital Signs Last 24 Hrs  T(C): 37.3 (27 Aug 2020 04:52), Max: 37.3 (27 Aug 2020 04:52)  T(F): 99.2 (27 Aug 2020 04:52), Max: 99.2 (27 Aug 2020 04:52)  HR: 89 (27 Aug 2020 06:31) (74 - 89)  BP: 132/82 (27 Aug 2020 06:31) (106/67 - 132/82)  BP(mean): --  RR: 18 (27 Aug 2020 04:52) (18 - 18)  SpO2: 92% (27 Aug 2020 04:52) (92% - 94%)    PHYSICAL EXAM:  GENERAL: lying down, cast in place on left  PSYCH: A&O x3  CHEST/LUNG: clear to auscultation bilaterally  HEART: regular rate and rhythm, no murmurs  ABDOMEN: nontender to palpation, no rebound tenderness/guarding  EXTREMITIES: 2+ DP pulses bilateral LE, 2+ radial pulses bilaterally. 4 cm nodule in R lower back, stage 1 pressure ulcer, tender to palpation, no bleeding/discharge  NEUROLOGY: no focal neurologic deficit; 5/5 str in bilateral UE    LABS: NO LABS.                      RADIOLOGY & ADDITIONAL TESTS:    Imaging Personally Reviewed:    Consultant(s) Notes Reviewed:      Care Discussed with Consultants/Other Providers:

## 2020-08-27 NOTE — PROGRESS NOTE ADULT - ATTENDING COMMENTS
Agree with above    #pathologic fx  -for rehab likely tomorrow  -pain well controlled  continue with PT/OT    #metastatic lung CA  -MR with contrast without epidural extension, cleared by NSx. For rehab tomorrow with outpatient onc follow up.  Gave number for [facility] for follow up if decide to change providers.     rest as above

## 2020-08-27 NOTE — PROGRESS NOTE ADULT - ASSESSMENT
Assessment  Hypothyroidism:  64y Female with hypothyroidism, on synthroid 175mcg and cytomel 5mcg po daily, reports being compliant with medication intake, TSH was slightly suppressed though repeat TFTs within normal range, patient is postop, appears comfortable.  Tachycardia: On meds, monitored, FU Cardio/EPS.  Pathologic Hip Fx: Postop, on pain meds, stable.          Carlin Marcial MD  Cell: [phone]  Office: [phone] Assessment  Hypothyroidism:  64y Female with hypothyroidism, on synthroid 175mcg and cytomel 5mcg po daily, reports being compliant with medication intake, TSH was slightly suppressed  though repeat TFTs within normal range, patient is postop, appears comfortable.  Tachycardia: On meds, monitored, FU Cardio/EPS.  Pathologic Hip Fx: Postop, on pain meds, stable.          Carlin Marcial MD  Cell: [phone]  Office: [phone]

## 2020-08-27 NOTE — PROGRESS NOTE ADULT - ASSESSMENT
64F h/o hypothyroidism, Stage IV lung adenocarcinoma diagnosed in 2017 s/p right lower lobectomy with recurrence 5/2020 s/p palliative radiation to clavicle and spine and 1 round of carbo/pemextred/pembrolizumab chemo 7/31 with recent admission 8/8 for pending femoral neck fracture now transferred from [facility] after fall in home with left hip pain, imaging showing left hip pathologic fracture. Patient also noted to have thrombocytopenia likely secondary to recent chemotherapy.

## 2020-08-27 NOTE — PROGRESS NOTE ADULT - SUBJECTIVE AND OBJECTIVE BOX
Patient is a 64y old  Female who presents with a chief complaint of Left hip pain and fall (27 Aug 2020 16:40)      INTERVAL HISTORY: feels ok    TELEMETRY Personally reviewed: no events  	  MEDICATIONS:  metoprolol succinate ER 50 milliGRAM(s) Oral daily        PHYSICAL EXAM:  T(C): 37 (08-27-20 @ 20:23), Max: 37.3 (08-27-20 @ 04:52)  HR: 84 (08-27-20 @ 20:23) (81 - 90)  BP: 124/62 (08-27-20 @ 20:23) (108/69 - 133/59)  RR: 18 (08-27-20 @ 20:23) (17 - 18)  SpO2: 96% (08-27-20 @ 20:23) (89% - 100%)  Wt(kg): --  I&O's Summary    26 Aug 2020 07:01  -  27 Aug 2020 07:00  --------------------------------------------------------  IN: 320 mL / OUT: 450 mL / NET: -130 mL    27 Aug 2020 07:01  -  27 Aug 2020 23:55  --------------------------------------------------------  IN: 240 mL / OUT: 620 mL / NET: -380 mL      Height (cm): 172.72 (08-27 @ 09:25)  Weight (kg): 95.4 (08-27 @ 09:25)  BMI (kg/m2): 32 (08-27 @ 09:25)  BSA (m2): 2.09 (08-27 @ 09:25)    Appearance: In no distress	  HEENT:    PERRL, EOMI	  Cardiovascular:  S1 S2, No JVD  Respiratory: Lungs clear to auscultation	  Gastrointestinal:  Soft, Non-tender, + BS	  Vascularature:  No edema of LE  Psychiatric: Appropriate affect   Neuro: no acute focal deficits         Labs personally reviewed      EKG: Personally reviewed by me - AT at 160bpm  Radiology: Personally reviewed by me -   No pulmonary embolism.    Perihilar soft tissue thickening adjacent to the right lower lobe surgical sutures. Nodular opacities in the lung. Multiple lytic and sclerotic lesions. Subtle low attenuating foci in the liver. Findings are compatible with metastatic neoplasm. Prior studies should be submitted for comparison.    Multiple pathologic fractures:  *  Acute, comminuted left clavicular fracture.  *  Age indeterminant L1 vertebral body compression deformity.  *  Questionably right anterolateral 6th rib    Hepatic metastases as outlined above.        Assessment /Plan:   AT - s/p Amio loading, responded well, in SR.   Overall prognosis very poor with stage 4 CA      Humphrey Galvez DO [facility]  Cardiovascular Medicine  [address], [address]  Office: [phone]  Cell: [phone]        Humphrey Galvez DO [facility]  Cardiovascular Medicine  [address], [address]  Office: [phone]  Cell: [phone]

## 2020-08-27 NOTE — PRE-ANESTHESIA EVALUATION ADULT - NSANTHOSAYNRD_GEN_A_CORE
No. LAMAR screening performed.  STOP BANG Legend: 0-2 = LOW Risk; 3-4 = INTERMEDIATE Risk; 5-8 = HIGH Risk
No. LAMAR screening performed.  STOP BANG Legend: 0-2 = LOW Risk; 3-4 = INTERMEDIATE Risk; 5-8 = HIGH Risk

## 2020-08-27 NOTE — PROGRESS NOTE ADULT - SUBJECTIVE AND OBJECTIVE BOX
EP ATTENDING    uneventful overnight.  telemetry shows NSR, no further AT  completed part 2 of MRI today.  no angina, palpitations, orthopnea or nausea.    acetaminophen   Tablet .. 975 milliGRAM(s) Oral every 8 hours  ALPRAZolam 0.25 milliGRAM(s) Oral three times a day PRN  aluminum hydroxide/magnesium hydroxide/simethicone Suspension 30 milliLiter(s) Oral every 6 hours PRN  bisacodyl Suppository 10 milliGRAM(s) Rectal daily PRN  diphenhydrAMINE 25 milliGRAM(s) Oral every 4 hours PRN  fentaNYL   Patch  25 MICROgram(s)/Hr 1 Patch Transdermal every 72 hours  HYDROmorphone   Tablet 6 milliGRAM(s) Oral every 4 hours PRN  levothyroxine 175 MICROGram(s) Oral daily  liothyronine 5 MICROGram(s) Oral daily  melatonin 5 milliGRAM(s) Oral at bedtime  metoprolol succinate ER 50 milliGRAM(s) Oral daily  naloxone Injectable 0.1 milliGRAM(s) IV Push every 3 minutes PRN  pantoprazole    Tablet 40 milliGRAM(s) Oral before breakfast  polyethylene glycol 3350 17 Gram(s) Oral daily  rivaroxaban 10 milliGRAM(s) Oral daily  senna 2 Tablet(s) Oral at bedtime    T(C): 37.1 (08-27-20 @ 12:30), Max: 37.3 (08-27-20 @ 04:52)  HR: 82 (08-27-20 @ 12:45) (76 - 90)  BP: 133/59 (08-27-20 @ 12:45) (108/69 - 133/59)  RR: 17 (08-27-20 @ 12:45) (17 - 18)  SpO2: 95% (08-27-20 @ 12:45) (89% - 100%)  Wt(kg): --    I&O's Summary    26 Aug 2020 07:01  -  27 Aug 2020 07:00  --------------------------------------------------------  IN: 320 mL / OUT: 450 mL / NET: -130 mL    27 Aug 2020 07:01  -  27 Aug 2020 16:40  --------------------------------------------------------  IN: 0 mL / OUT: 600 mL / NET: -600 mL        A&O x 3  neurologically intact  no JVD  RRR, no murmurs  CTAB  soft nt/nd  no c/c/e    echo normal    A/P) 65 y/o female with asymptomatic paroxysmal atrial tachycardia. Now converted to sinus     -continue toprol xl 50mg daily  -may d/c tele since she is not symptomatic from ATach.  -no further inpatient EP workup expected, remains stable from EP perspective.    Lobito Antoine M.D.  Cardiac Electrophysiology  [phone]

## 2020-08-28 NOTE — PROGRESS NOTE ADULT - ASSESSMENT
Assessment  Hypothyroidism:  64y Female with hypothyroidism, on synthroid 175mcg and cytomel 5mcg po daily, reports being compliant with medication intake, TSH was slightly suppressed  though repeat TFTs within normal range. Patient seen resting in bed, appears comfortable, states she is going home today.  Tachycardia: On meds, monitored, FU Cardio/EPS.  Pathologic Hip Fx: Postop, on pain meds, stable.          Carlin Marcial MD  Cell: [phone]  Office: [phone] Assessment  Hypothyroidism:  64y Female with hypothyroidism, on synthroid 175mcg and cytomel 5mcg po daily,  reports being compliant with medication intake, TSH was slightly suppressed  though repeat TFTs within normal range. Patient seen resting in bed, appears comfortable, states she is going home today.  Tachycardia: On meds, monitored, FU Cardio/EPS.  Pathologic Hip Fx: Postop, on pain meds, stable.          Carlin Marcial MD  Cell: [phone]  Office: [phone]

## 2020-08-28 NOTE — PROGRESS NOTE ADULT - PROBLEM SELECTOR PROBLEM 1
Hypothyroid
Pain due to fracture
Hypothyroid
Pain due to fracture
Pathologic fracture of femoral neck, left, initial encounter
Sinus tachycardia
Pathologic fracture of femoral neck, left, initial encounter

## 2020-08-28 NOTE — PROGRESS NOTE ADULT - PROVIDER SPECIALTY LIST ADULT
Anesthesia
Anesthesia
Cardiology
Electrophysiology
Endocrinology
Heme/Onc
Internal Medicine
Orthopedics
Palliative Care
Heme/Onc
Orthopedics
Endocrinology
Palliative Care
Internal Medicine

## 2020-08-28 NOTE — PROGRESS NOTE ADULT - SUBJECTIVE AND OBJECTIVE BOX
Chief complaint  Patient is a 64y old  Female who presents with a chief complaint of Left hip pain and fall (28 Aug 2020 15:07)   Review of systems  Patient in bed, looks comfortable.    Labs and Fingersticks  CAPILLARY BLOOD GLUCOSE                            Medications  MEDICATIONS  (STANDING):  acetaminophen   Tablet .. 975 milliGRAM(s) Oral every 8 hours  fentaNYL   Patch  25 MICROgram(s)/Hr 1 Patch Transdermal every 72 hours  levothyroxine 175 MICROGram(s) Oral daily  liothyronine 5 MICROGram(s) Oral daily  melatonin 5 milliGRAM(s) Oral at bedtime  metoprolol succinate ER 50 milliGRAM(s) Oral daily  pantoprazole    Tablet 40 milliGRAM(s) Oral before breakfast  polyethylene glycol 3350 17 Gram(s) Oral daily  rivaroxaban 10 milliGRAM(s) Oral daily  senna 2 Tablet(s) Oral at bedtime      Physical Exam  General: Patient comfortable in bed  Vital Signs Last 12 Hrs  T(F): 98.4 (08-28-20 @ 11:24), Max: 98.5 (08-28-20 @ 04:56)  HR: 86 (08-28-20 @ 11:24) (81 - 86)  BP: 106/68 (08-28-20 @ 11:24) (106/68 - 116/71)  BP(mean): --  RR: 18 (08-28-20 @ 11:24) (18 - 18)  SpO2: 91% (08-28-20 @ 11:24) (91% - 95%)  Neck: No palpable thyroid nodules.  CVS: S1S2, No murmurs  Respiratory: No wheezing, no crepitations  GI: Abdomen soft, bowel sounds positive  Musculoskeletal:  edema lower extremities.   Skin: No skin rashes, no ecchymosis    Diagnostics Chief complaint  Patient is a 64y old  Female who presents with a chief complaint of Left hip pain and fall (28 Aug 2020 15:07)   Review of systems  Patient in bed, looks comfortable.    Labs and Fingersticks  CAPILLARY BLOOD GLUCOSE  Medications  MEDICATIONS  (STANDING):  acetaminophen   Tablet .. 975 milliGRAM(s) Oral every 8 hours  fentaNYL   Patch  25 MICROgram(s)/Hr 1 Patch Transdermal every 72 hours  levothyroxine 175 MICROGram(s) Oral daily  liothyronine 5 MICROGram(s) Oral daily  melatonin 5 milliGRAM(s) Oral at bedtime  metoprolol succinate ER 50 milliGRAM(s) Oral daily  pantoprazole    Tablet 40 milliGRAM(s) Oral before breakfast  polyethylene glycol 3350 17 Gram(s) Oral daily  rivaroxaban 10 milliGRAM(s) Oral daily  senna 2 Tablet(s) Oral at bedtime      Physical Exam  General: Patient comfortable in bed  Vital Signs Last 12 Hrs  T(F): 98.4 (08-28-20 @ 11:24), Max: 98.5 (08-28-20 @ 04:56)  HR: 86 (08-28-20 @ 11:24) (81 - 86)  BP: 106/68 (08-28-20 @ 11:24) (106/68 - 116/71)  BP(mean): --  RR: 18 (08-28-20 @ 11:24) (18 - 18)  SpO2: 91% (08-28-20 @ 11:24) (91% - 95%)  Neck: No palpable thyroid nodules.  CVS: S1S2, No murmurs  Respiratory: No wheezing, no crepitations  GI: Abdomen soft, bowel sounds positive  Musculoskeletal:  edema lower extremities.   Skin: No skin rashes, no ecchymosis    Diagnostics

## 2020-08-28 NOTE — PROGRESS NOTE ADULT - SUBJECTIVE AND OBJECTIVE BOX
EP ATTENDING    uneventful overnight.  telemetry shows NSR, no further AT  Awaiting discharge home.  feels stable since she has no angina, palpitations, orthopnea or nausea.    acetaminophen   Tablet .. 975 milliGRAM(s) Oral every 8 hours  ALPRAZolam 0.25 milliGRAM(s) Oral three times a day PRN  aluminum hydroxide/magnesium hydroxide/simethicone Suspension 30 milliLiter(s) Oral every 6 hours PRN  bisacodyl Suppository 10 milliGRAM(s) Rectal daily PRN  diphenhydrAMINE 25 milliGRAM(s) Oral every 4 hours PRN  fentaNYL   Patch  25 MICROgram(s)/Hr 1 Patch Transdermal every 72 hours  HYDROmorphone   Tablet 6 milliGRAM(s) Oral every 4 hours PRN  levothyroxine 175 MICROGram(s) Oral daily  liothyronine 5 MICROGram(s) Oral daily  melatonin 5 milliGRAM(s) Oral at bedtime  metoprolol succinate ER 50 milliGRAM(s) Oral daily  naloxone Injectable 0.1 milliGRAM(s) IV Push every 3 minutes PRN  pantoprazole    Tablet 40 milliGRAM(s) Oral before breakfast  polyethylene glycol 3350 17 Gram(s) Oral daily  rivaroxaban 10 milliGRAM(s) Oral daily  senna 2 Tablet(s) Oral at bedtime    T(C): 36.9 (08-28-20 @ 11:24), Max: 37 (08-27-20 @ 20:23)  HR: 86 (08-28-20 @ 11:24) (81 - 86)  BP: 106/68 (08-28-20 @ 11:24) (106/68 - 124/62)  RR: 18 (08-28-20 @ 11:24) (18 - 18)  SpO2: 91% (08-28-20 @ 11:24) (91% - 96%)  Wt(kg): --    I&O's Summary    27 Aug 2020 07:01  -  28 Aug 2020 07:00  --------------------------------------------------------  IN: 240 mL / OUT: 970 mL / NET: -730 mL    28 Aug 2020 07:01  -  28 Aug 2020 15:38  --------------------------------------------------------  IN: 0 mL / OUT: 250 mL / NET: -250 mL    A&O x 3  neurologically intact  no JVD  RRR, no murmurs  CTAB  soft nt/nd  no c/c/e    echo normal    A/P) 65 y/o female with asymptomatic paroxysmal atrial tachycardia. Now converted to sinus     -continue toprol xl 50mg daily  -no further inpatient EP workup expected, remains stable from EP perspective.  -Rivaroxaban dose is per orthopedics protocol, not for AFib anticoag.  -Stable for dicharge from EP perspective.    Lobito Antoine M.D.  Cardiac Electrophysiology  [phone]

## 2020-08-28 NOTE — PROGRESS NOTE ADULT - ASSESSMENT
64F h/o hypothyroidism, Stage IV lung adenocarcinoma diagnosed in 2017 s/p right lower lobectomy with recurrence 5/2020 s/p palliative radiation to clavicle and spine and 1 round of carbo/pemextred/pembrolizumab chemo 7/31 with recent admission 8/8 for pending femoral neck fracture now transferred from [facility] after fall in home with left hip pain, imaging showing left hip pathologic fracture. Patient also noted to have thrombocytopenia likely secondary to recent chemotherapy.

## 2020-08-28 NOTE — PROGRESS NOTE ADULT - PROBLEM SELECTOR PLAN 1
TFTs WNL, Suggest DC home on prior synthroid/cytomel doses.  Patient follows with Dr. Zendejas endocrinology; Suggest to FU outpatient 4 weeks.  Discussed plan with patient.

## 2020-08-28 NOTE — PROGRESS NOTE ADULT - COVID-19 NEGATIVE LAB RESULT
COVID-19 ruled out

## 2020-08-28 NOTE — PROGRESS NOTE ADULT - PROBLEM SELECTOR PLAN 6
[ ] alprazolam 0.25 TID prn  [ ] for sleep: melatonin 5 mg (pt takes doxylamine at home, but is not available here)  - ISTOP Reference #: [id number]

## 2020-08-28 NOTE — CONSULT NOTE ADULT - PROVIDER SPECIALTY LIST ADULT
Cardiology
Dental
Dental
Electrophysiology
Heme/Onc
Neurosurgery
Orthopedics
Endocrinology
Palliative Care

## 2020-08-28 NOTE — DISCHARGE NOTE NURSING/CASE MANAGEMENT/SOCIAL WORK - PATIENT PORTAL LINK FT
You can access the FollowMyHealth Patient Portal offered by [facility] by registering at the following website: [email or web address]. By joining [employer]’s FollowMyHealth portal, you will also be able to view your health information using other applications (apps) compatible with our system.

## 2020-08-28 NOTE — CONSULT NOTE ADULT - REASON FOR ADMISSION
Left hip pain and fall

## 2020-08-28 NOTE — DISCHARGE NOTE NURSING/CASE MANAGEMENT/SOCIAL WORK - NSDCPNINST_GEN_ALL_CORE
Patient was instructed about hydromorphone PO and its side effects. Patient acknowledged understanding of the medication.

## 2020-08-28 NOTE — DISCHARGE NOTE NURSING/CASE MANAGEMENT/SOCIAL WORK - NSDCPEEMAIL_GEN_ALL_CORE
[facility] for Tobacco Control email [email or web address]

## 2020-08-28 NOTE — PROGRESS NOTE ADULT - PROBLEM SELECTOR PLAN 2
-  left femoral neck fracture s/p left QASIM on 8/18  - Ortho + palliative following; appreciate recs  - Home pain regiment was dilaudid PO 2mg moderate pain and 4mg severe pain q6 and fentanyl patch 37.5mg  (iSTOP note in chart, Reference #: [id number])  [ ] cleared for d/c per ortho  [ ] Pain control per palliative: 25mcg TDP fentanyl patch and restarted dilaudid 4mg PO Q4 PRN for mod-severe pain  [ ] rehab when accepted and auth. -  left femoral neck fracture s/p left QASIM on 8/18  - Ortho + palliative following; appreciate recs  - Home pain regiment was dilaudid PO 2mg moderate pain and 4mg severe pain q6 and fentanyl patch 37.5mg  (iSTOP note in chart, Reference #: [id number])  [ ] cleared for d/c per ortho  [ ] Pain control per palliative: 25mcg TDP fentanyl patch and restarted dilaudid 6 mg q4 hrs for pain  [ ] rehab today!

## 2020-08-28 NOTE — PROGRESS NOTE ADULT - SUBJECTIVE AND OBJECTIVE BOX
Alis Christina PGY1    Patient is a 64y old  Female who presents with a chief complaint of Left hip pain and fall (27 Aug 2020 20:55)      SUBJECTIVE / OVERNIGHT EVENTS:  overnight - no events  am - no abdominal pain, no bm but does not want rectal suppository or enema (on oral miralax + senna), wants to go to rehab as easier to have bm after moving around. pt aware she will be discharged today after getting dental imaging to clear for bisphosphonate therapy.     MEDICATIONS  (STANDING):  acetaminophen   Tablet .. 975 milliGRAM(s) Oral every 8 hours  fentaNYL   Patch  25 MICROgram(s)/Hr 1 Patch Transdermal every 72 hours  levothyroxine 175 MICROGram(s) Oral daily  liothyronine 5 MICROGram(s) Oral daily  melatonin 5 milliGRAM(s) Oral at bedtime  metoprolol succinate ER 50 milliGRAM(s) Oral daily  pantoprazole    Tablet 40 milliGRAM(s) Oral before breakfast  polyethylene glycol 3350 17 Gram(s) Oral daily  rivaroxaban 10 milliGRAM(s) Oral daily  senna 2 Tablet(s) Oral at bedtime    MEDICATIONS  (PRN):  ALPRAZolam 0.25 milliGRAM(s) Oral three times a day PRN anxiety  aluminum hydroxide/magnesium hydroxide/simethicone Suspension 30 milliLiter(s) Oral every 6 hours PRN Dyspepsia  bisacodyl Suppository 10 milliGRAM(s) Rectal daily PRN Constipation  diphenhydrAMINE 25 milliGRAM(s) Oral every 4 hours PRN Rash and/or Itching  HYDROmorphone   Tablet 6 milliGRAM(s) Oral every 4 hours PRN moderate-severe pain  naloxone Injectable 0.1 milliGRAM(s) IV Push every 3 minutes PRN For ANY of the following changes in patient status:  A. RR LESS THAN 10 breaths per minute, B. Oxygen saturation LESS THAN 90%, C. Sedation score of 6      CAPILLARY BLOOD GLUCOSE        I&O's Summary    27 Aug 2020 07:01  -  28 Aug 2020 07:00  --------------------------------------------------------  IN: 240 mL / OUT: 970 mL / NET: -730 mL        Vital Signs Last 24 Hrs  T(C): 36.9 (28 Aug 2020 11:24), Max: 37 (27 Aug 2020 20:23)  T(F): 98.4 (28 Aug 2020 11:24), Max: 98.6 (27 Aug 2020 20:23)  HR: 86 (28 Aug 2020 11:24) (81 - 86)  BP: 106/68 (28 Aug 2020 11:24) (106/68 - 124/62)  BP(mean): --  RR: 18 (28 Aug 2020 11:24) (18 - 18)  SpO2: 91% (28 Aug 2020 11:24) (91% - 96%)    PHYSICAL EXAM:  GENERAL: appears comfortable, lying down, no distress  PSYCH: A&O x3  CHEST/LUNG: clear to auscultation bilaterally  HEART: regular rate and rhythm, no murmurs  ABDOMEN: nontender to palpation, no rebound tenderness/guarding  EXTREMITIES: no edema on bilateral LE, 5/5 str on   NEUROLOGY: no focal neurologic deficit  SKIN: No rashes or lesions        LABS:                     RADIOLOGY & ADDITIONAL TESTS:    Imaging Personally Reviewed:    Consultant(s) Notes Reviewed:      Care Discussed with Consultants/Other Providers:

## 2020-08-28 NOTE — PROGRESS NOTE ADULT - PROBLEM SELECTOR PLAN 3
Plt 62 at [facility], on admission to [facility] platelets 55 likely 2/2 recent chemotherapy on 7/31.  - on 7/31 platelets 235, have been 50s since 8/8 and stable, now plts 129  - No evidence of active bleeding, trend daily  - Heme/onc following; appreciate recs  [ ] CTM

## 2020-08-28 NOTE — DISCHARGE NOTE NURSING/CASE MANAGEMENT/SOCIAL WORK - NSDCFUADDAPPT_GEN_ALL_CORE_FT
Orthopedics Dr. Ramírez    Cardiology Dr. Antoine    Oncologist Dr. Joseph at [facility] - patient will need to schedule port placement with next round of chemotherapy    Endocrine Dr. English for thyroid follow up in 4 weeks. (I know you have an appointment katie but need in 4 weeks! Thanks)

## 2020-08-28 NOTE — PROGRESS NOTE ADULT - PROBLEM SELECTOR PLAN 4
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  - Patient follows with Dr. Winn at [facility], plan for second round of chemo and port placement outpatient  - Consulted IR for possible port placement inpatient however risk of infection high, recommend patient recover from hip surgery and pursue port placement as option, discussed with patient and daughter who are in agreement  - Oncology following, appreciate recs  -per family request, dental consulted, unable to get Xrays at this time for danosumab  -Spine and rad-onc on board, per conversation with NSx, no acute surgical intervention for axial metastasis.  Appreciate rad-onc, outpatient follow up.   -gave number to patient for [facility] if wants to change care.  - MRI spine w contrast showed no extension of spinal mets; neurosurgery following, appreciate recs.   [ ] neurosurgery rec no interventions at this time, ok for rehab.

## 2020-08-28 NOTE — CONSULT NOTE ADULT - SUBJECTIVE AND OBJECTIVE BOX
Patient is a 64y old female who presents with a chief complaint of Left hip pain and fall (28 Aug 2020 14:08). Dental was paged for clearance prior to discharge and transfer for starting rehabilitation and bisphosphonate therapy.      HPI:  Patient is a 64 yr old woman with PMHx of hypothyroidism, Stage IV lung adenocarcinoma diagnosed in 2017 s/p right lower lobectomy with recurrence 5/2020 s/p palliative radiation to clavicle and spine and 1 round of carbo/pemextred/pembrolizumab chemo 7/31 with recent admission 8/8 for pending femoral neck fracture now transferred from [facility] after fall in home with left hip pain, imaging showing left hip pathologic fracture. Patient was on maintenance surveillance for her cancer with scans q 6 months. Most recent scan in May showed recurrence of cancer in multiple bones. She has since had pathologic left clavicle fracture with palliative radiation. Patient was recently evaluated at [facility] for left groin and leg pain several days ago and found to have metastatic disease to left femur. She was scheduled for orthopedic follow up in 1-2 weeks after discharge but unfortunately had fall at rehab facility on day of admission. Patient states her foot slipped out from under her in shower, causing her to land on her but and left side. She did not hit her head or lose consciousness. She denied headaches, heart palpitations, sweating prior to falling. She was able to call for help and was transported to the hospital. She was not able to get up on her own or put any weight on the left leg. She has had constant pain in the left groin going down to the back of the right knee for several weeks and after the fall feels the pain is only mildly more intense. She is able to move her toes but not her knee, she does not complain of numbness in the left toes. She is able to void without issue and had a normal BM this morning. (14 Aug 2020 16:55)      PAST MEDICAL & SURGICAL HISTORY:  Stage IV adenocarcinoma of lung  Hypothyroid  S/P partial lobectomy of lung: Right lower lobe  History of bunionectomy      MEDICATIONS  (STANDING):  acetaminophen   Tablet .. 975 milliGRAM(s) Oral every 8 hours  fentaNYL   Patch  25 MICROgram(s)/Hr 1 Patch Transdermal every 72 hours  levothyroxine 175 MICROGram(s) Oral daily  liothyronine 5 MICROGram(s) Oral daily  melatonin 5 milliGRAM(s) Oral at bedtime  metoprolol succinate ER 50 milliGRAM(s) Oral daily  pantoprazole    Tablet 40 milliGRAM(s) Oral before breakfast  polyethylene glycol 3350 17 Gram(s) Oral daily  rivaroxaban 10 milliGRAM(s) Oral daily  senna 2 Tablet(s) Oral at bedtime    MEDICATIONS  (PRN):  ALPRAZolam 0.25 milliGRAM(s) Oral three times a day PRN anxiety  aluminum hydroxide/magnesium hydroxide/simethicone Suspension 30 milliLiter(s) Oral every 6 hours PRN Dyspepsia  bisacodyl Suppository 10 milliGRAM(s) Rectal daily PRN Constipation  diphenhydrAMINE 25 milliGRAM(s) Oral every 4 hours PRN Rash and/or Itching  HYDROmorphone   Tablet 6 milliGRAM(s) Oral every 4 hours PRN moderate-severe pain  naloxone Injectable 0.1 milliGRAM(s) IV Push every 3 minutes PRN For ANY of the following changes in patient status:  A. RR LESS THAN 10 breaths per minute, B. Oxygen saturation LESS THAN 90%, C. Sedation score of 6      Allergies    Bananas (Other)  latex (Rash)  opioid-like analgesics (Urticaria)  penicillin (Angioedema)    Intolerances        FAMILY HISTORY:  Family history of stroke or transient ischemic attack in father      Vital Signs Last 24 Hrs  T(C): 36.9 (28 Aug 2020 11:24), Max: 37 (27 Aug 2020 20:23)  T(F): 98.4 (28 Aug 2020 11:24), Max: 98.6 (27 Aug 2020 20:23)  HR: 86 (28 Aug 2020 11:24) (81 - 86)  BP: 106/68 (28 Aug 2020 11:24) (106/68 - 124/62)  BP(mean): --  RR: 18 (28 Aug 2020 11:24) (18 - 18)  SpO2: 91% (28 Aug 2020 11:24) (91% - 96%)    EOE:  TMJ (-) clicks                    (-) pops                    (-) crepitus             Mandible FROM             Facial bones and MOM grossly intact             (-) trismus             (-) LAD             (-) swelling             (-) asymmetry             (-) palpation             (-) SOB             (-) dysphagia             (-) LOC    IOE:  permanent dentition: grossly intact, missing all molar dentition           hard/soft palate: (-) palatal torus           tongue/FOM WNL           labial/buccal mucosa WNL           (-) percussion           (-) palpation           (-) swelling     Dentition present: all dentition present except for molars  Mobility: none  Caries: none detected clinically     Radiographs: Radiographs were not taken at this visit. Patient was not transportable by wheelchair.    RADIOLOGY & ADDITIONAL STUDIES:   FINDINGS: There is mild radiopharmaceutical accumulation in the proximal shaft of the left clavicle corresponding to lucency seen on comparison x-ray. There are faint foci in bilateral posterior approximately eighth ribs and heterogeneous tracer activity in the spine.     There is abnormally increased tracer activity in the mid right femur and distal left femur. Abnormality in the distal left femur demonstrates questionable cortical disruption which places to places the patient at risk for pathologic fracture.     Both kidneys are visualized and are symmetric in appearance.     IMPRESSION: Abnormal bone scan.     Findings compatible with multifocal osseous metastases in the axial skeleton and bilateral femurs. Abnormality in the left femur places the patient at risk for pathologic fracture.     Suggest correlation with chest CT to evaluate spinal and rib foci.     Risk of pathologic fracture in left femur was relayed to  telephone at about 1:30 PM on 8/17/2020.     DILLON BEAL M.D., NUCLEAR MEDICINE ATTENDING   This document has been electronically signed. Aug 17 2020 1:41PM       ASSESSMENT: Patient cannot receive dental clearance without necessary diagnostic radiographs which patient must be wheelchair-transportable by. Informed patient that after discharge, she should schedule a dental visit with her general dentist for radiographs to be taken and cleared prior to starting rehabilitation and bisphosphonate therapy. Patient understands and contacted her general dentist to schedule appointment.    PROCEDURE: limited bedside exam.    RECOMMENDATIONS:   1) Dental F/U with outpatient dentist for comprehensive dental care.       Bettye Gutierrez DDS, #[id number]  Oral surgeon consulted: Dr. Barrow

## 2020-08-28 NOTE — DISCHARGE NOTE NURSING/CASE MANAGEMENT/SOCIAL WORK - NSDCPEWEB_GEN_ALL_CORE
[facility] for Tobacco Control website --- [email or web address] website --- [email or web address]

## 2020-08-28 NOTE — PROGRESS NOTE ADULT - REASON FOR ADMISSION
Left hip pain and fall

## 2020-08-28 NOTE — PROGRESS NOTE ADULT - ATTENDING COMMENTS
Patient is stable for discharge today with outpatient oncology followup. I called dental to see if can get radiographs per family request prior to discharge as patient can sit in wheelchair, but discussed with family that this is typically an OUTPATIENT procedure and if unable to get done will not hold up discharge as main purpose is to go to rehab to regain strength so that patient can go and get chemotherapy. Gave number for [facility] as second opinion, though patient also saw [facility].   d/c planning to [facility] today 33 minutes.

## 2020-09-02 PROBLEM — Z96.642 HISTORY OF TOTAL LEFT HIP REPLACEMENT: Status: ACTIVE | Noted: 2020-01-01

## 2020-09-02 PROBLEM — C34.91 CANCER OF RIGHT LUNG: Status: ACTIVE | Noted: 2017-08-03

## 2020-09-02 PROBLEM — M89.9 BONE LESION: Status: ACTIVE | Noted: 2017-10-04

## 2020-09-02 NOTE — HISTORY OF PRESENT ILLNESS
[de-identified] : This visit was provided via telehealth using real-time 2-way audio visual technology. The patient, Ms. NADER SCHAEFFER, was located at a rehab facility at the time of visit. The provider, Maribel Ramírez MD, was located at his home/office at the time of the visit. The patient, Ms. SCHAEFFER and Dr. Ramírez participated in the telehealth encounter. Verbal consent was given on Sep 02, 2020 by Ms. SCHAEFFER, patient.\par  \par \par 2wks s/p complex L QASIM reconstruction (8/18/20) [de-identified] : This is a 64F w/ hx of hypothyroidism, multifocal metastatic lung ca, s/p R lower lobectomy (2017) w/ recurrence (5/2020), XRT to L clavicle, T6-T8, T12-L2, most recent chemo 7/31/20 (carbo/pemetrexed/pembro), who sustained a pathologic left femoral neck fracture, associated with a large destructive metastasis involving the acetabulum. She underwent acetabular and proximal femoral tumor excision, with a complex Mahmood type QASIM reconstruction on 8/18/20. \par \par The patient says she has been having severe back pain.  In addition she is also describing left-sided hip pain which has been stable since discharge.  During this time she has been able to get out of bed to chair but has not ambulated much.  She denies any fevers or wound drainage.  She says she has full urinary and bowel control. [de-identified] : With the help of her nurse I have visualized her hip wound and found it to be clean dry intact.  A sterile dressing was then applied.  Her lower extremities are equal in length.  She appears to have full strength and sensation distally.  The López knee immobilizer is intact.   [de-identified] : This is a 64-year-old female with multifocal metastatic lung cancer, now 2-week status post complex total hip arthroplasty.  I have recommended an AP pelvis x-ray to evaluate the status of the reconstruction.  I have also recommended that she follow-up with her neurosurgeons regarding her back pain, which is most likely the primary source of her pain. Continue Xarelto for DVT Px. WBAT, posterior hip precautions, w/ beulah, unlocked for protection of her left distal femur lesion. Continue with plan to start Xgeva.

## 2020-09-04 PROBLEM — C34.90 MALIGNANT NEOPLASM OF UNSPECIFIED PART OF UNSPECIFIED BRONCHUS OR LUNG: Chronic | Status: ACTIVE | Noted: 2020-01-01

## 2020-09-04 NOTE — HISTORY OF PRESENT ILLNESS
[FreeTextEntry1] : This 64 year-old woman is being seen for radiation follow-up and to begin treatment planning for further metastatic disease.  She completed palliative radiation therapy to the left clavicle, T6 - T8 spine, T-12 - L2 spine for metastatic adenocarcinoma. She is s/p right lower lobectomy in 2017.  Prior to treatment, she was admitted to Hospital for lower back pain and difficulty walking. \par \par Ms. Paula had a recent pathological fracture of the left femoral neck and left acetabulum.  On 8/18/2020 biopsy was done by Dr. Ramírez on both the left acetabulum and left femoral neck. Pathology metastatic poorly differentiated adenocarcinoma consistent with lung primary.  \par \par Follows with Dr. Joseph.\par

## 2020-09-06 NOTE — ED PROVIDER NOTE - ATTENDING CONTRIBUTION TO CARE
Patricio Du MD, FACEP: patient with increasing left hip pain and was encouraged by daughter to be brought to the emergency department for evaluation of increased pain to left hip. Patient found incidentally to be hypoxic to 79% at [facility] by EMS upon arrival while on room air. Patient up to mid and high 90% with 3 L nc, patient noted to have fever here of 100.9 per rectum. Patient reports no dark stools to her knowledge  ncat  mild conjunctival palpebral pallor   trachea midline  tachycardic   non-tachypneic   lungs clear to auscultation bilaterally, decreased breath sounds on the patient's right side  soft, ntnd, no rebound/guarding, noted lower abdominal ecchymosis from presumed lovenox or heparin sq injections  left hip wound appears c/d/i without erythema or drainage  left lower extremity in knee immobilizer  no noted asymmetrical swelling of bilateral lower extremities   no edema  alert, answering questions appropriately, some short term memory deficits.    Patricio Du MD, FACEP: In this physician's medical judgement based on clinical history and physical exam, concern for fever and anemia consistent with infectious process and blood loss from possible gi source   will get iv, cbc, cmp, ekg, ua, urine culture, ct head, ct head, cta chest/abd, ct a/p iv contrast, ct bony pelvis  will evaluate for source of confusion although likely secondary to fever and analgesia  will evaluate for pulmonary embolism and left hip infection with esr/crp and above ct imaging, orthopedics consulted  fluids to be given for fever and tachycardia  will have t&s sent as hgb is 7.6  pending imaging and antibiotics prn pending source, patient is not in septic shock and lactate 1.2  will conference orthopedics prior to antibiotics  Will follow up on labs, analgesia, imaging, reassess and disposition to the inpatient team as clinically indicated.

## 2020-09-06 NOTE — H&P ADULT - NSICDXPASTSURGICALHX_GEN_ALL_CORE_FT
PAST SURGICAL HISTORY:  History of bunionectomy     History of total hip replacement, left THR 8/18/2020    S/P bunionectomy with revision    S/P partial lobectomy of lung Right lower lobe    Status post lobectomy of lung 3/2017 (Right lung)

## 2020-09-06 NOTE — H&P ADULT - PROBLEM SELECTOR PLAN 4
Patient has stage 4 lung adenocarcinoma (RLL lobectomy in 2017, recurrence in May 2020 s/p palliative rad and chemo) with recent total hip replacement for L femoral neck fracture (8/18/2020) who presents with worsening L hip pain  - Ortho following  - f/u CT hip Patient labs show anemia with slow decline since early this year. Now 7.6  - pending 1u pRBC. Patient presented with repeat shortness of breath likely 2/2 extensive R lung disease and RLL lobectomy. Saturating well on nasal cannula.   - please consult patient's oncologist in AM. Patient presented with tachycardia, tachypnea and fever of 100.9. Likely in setting of DVT vs malignancy. Lower suspicion for infectious etiology at this time - no leukocytosis, no localizing symptoms, UA neg, no abdominal pathology.    -will defer abx for now; however, if has recurrent fever or hemodynamic compromise, would start broad spectrum vanco/cefepime  - fever resolved. tachycardia and tachypnea improved with supplemental O2 as well as pain control  - f/u blood culture  - UA neg  - f/u VA duplex of b/l LE for assessing extent of DVTs

## 2020-09-06 NOTE — H&P ADULT - NSHPPHYSICALEXAM_GEN_ALL_CORE
PHYSICAL EXAM:  GENERAL: NAD, lying in bed comfortably  HEAD:  Atraumatic, Normocephalic  EYES: EOMI, PERRLA, conjunctiva and sclera clear  ENT: Moist mucous membranes  NECK: Supple, No JVD  CHEST/LUNG: Clear to auscultation bilaterally; No rales, rhonchi, wheezing, or rubs. Unlabored respirations  HEART: Regular rate and rhythm; No murmurs, rubs, or gallops  ABDOMEN: Bowel sounds present; Soft, Nontender, Nondistended. No hepatomegally  EXTREMITIES:  2+ Peripheral Pulses, brisk capillary refill. No clubbing, cyanosis, or edema  NERVOUS SYSTEM:  Alert & Oriented X3, speech clear. No deficits   MSK: FROM all 4 extremities, full and equal strength  SKIN: No rashes or lesions PHYSICAL EXAM:  GENERAL: NAD, lying in bed with some discomfort 2/2 pain  HEAD:  Atraumatic, Normocephalic  EYES: EOMI, PERRLA, conjunctiva and sclera clear  ENT: Moist mucous membranes  NECK: Supple, No JVD  CHEST/LUNG: Lungs grossly clear to auscultation bilaterally; Minimal coarse breath sounds. Unlabored respirations  HEART: Regular rate and rhythm; No murmurs, rubs, or gallops  ABDOMEN: Bowel sounds present; Soft, Nontender, Nondistended.  EXTREMITIES:  No derrick clubbing, cyanosis, or edema of LE  NERVOUS SYSTEM:  Alert & Oriented X3, speech clear. No deficits   MSK: FROM all 4 extremities, full and equal strength Vital Signs Last 24 Hrs  T(C): 36.4 (07 Sep 2020 04:31), Max: 38.3 (06 Sep 2020 14:38)  T(F): 97.6 (07 Sep 2020 04:31), Max: 100.9 (06 Sep 2020 14:38)  HR: 84 (07 Sep 2020 04:31) (83 - 115)  BP: 123/71 (07 Sep 2020 04:31) (108/72 - 138/76)  BP(mean): 78 (06 Sep 2020 19:19) (78 - 78)  RR: 18 (07 Sep 2020 04:31) (14 - 28)  SpO2: 100% (07 Sep 2020 04:31) (96% - 100%)    PHYSICAL EXAM:  GENERAL: NAD, lying in bed with some discomfort 2/2 pain  HEAD:  Atraumatic, Normocephalic  EYES: EOMI, PERRLA, conjunctiva and sclera clear  ENT: Moist mucous membranes  NECK: Supple, No JVD  CHEST/LUNG: Lungs grossly clear to auscultation bilaterally; Minimal coarse breath sounds. Unlabored respirations  HEART: Regular rate and rhythm; No murmurs, rubs, or gallops  ABDOMEN: Bowel sounds present; Soft, Nontender, Nondistended.  EXTREMITIES:  No derrick clubbing, cyanosis, or edema of LE  NERVOUS SYSTEM:  Alert & Oriented X3, speech clear. No deficits   MSK: FROM all 4 extremities, full and equal strength

## 2020-09-06 NOTE — H&P ADULT - NSICDXPASTMEDICALHX_GEN_ALL_CORE_FT
PAST MEDICAL HISTORY:  Anxiety     Drug abuse Back in the 70's and 80's (Cocaine)    Genital herpes     Hypothyroid     Lung cancer with mets    LAMAR on CPAP     Stage IV adenocarcinoma of lung

## 2020-09-06 NOTE — CONSULT NOTE ADULT - ASSESSMENT
ASSESSMENT: Patient is a 64F with metastatic lung cancer and recent L hip surgery for pathologic fracture with new onset LLE common femoral DVT.      PLAN:    - Would obtain b/l ultrasound to assess for extent of DVT  - If no contra-indication to anti-coagulation then would start a heparin gtt and transition to longer term therapeutic anticoagulation. If there is a contra-indication to anti-coagulation, then please re-consult vascular surgery for an IVC filter.   - Would consult patient's oncologist  - Discussed with vascular surgery fellow

## 2020-09-06 NOTE — ED PROVIDER NOTE - PHYSICAL EXAMINATION
GEN: Well Appearing, Nontoxic, mild acute distress secondary to pain  HEENT: NC/AT, Symm Facies. PERRL, EOMI, MMM, posterior pharynx clear  CV: No JVD/Bruits or stridor;  +S1S2, tachycardic, regular w/o m/g/r  RESP: CTAB w/o w/r/r  ABD: Soft, nt/nd, +BS. No guarding/rebound. No RUQ tender, no CVAT. Rectal exam performed by Dr. Du chaperoned by NICOLE Chavez: nontender, brown stool.   EXT/MSK: No lower extremity edema or calf tenderness. WWP, palpable pulses. Decreased ROM of lower extremities L > R secondary to pain and recent L hip replacement. Staples to L hip at surgical site are well-healing and without erythema or drainage.   SKIN: No erythema, lesions or rash  Neuro: Grossly intact, AOX3 with normal speech, CN II-XII intact; Sensation intact.

## 2020-09-06 NOTE — ED PROVIDER NOTE - SHIFT CHANGE DETAILS
Patricio Du MD FACEP note of transfer at the usual time of sign out: Receiving team will follow up on labs, analgesia, any clinical imaging, reassess and disposition as clinically indicated.  Details of patient and plan conveyed to receiving physician and conveyed back for understanding.  There were no questions at this time about the patient's status, disposition, and plan. Patient's care to be taken over by receiving physician at this time, all decisions regarding the progression of care will be made at their discretion.

## 2020-09-06 NOTE — ED PROVIDER NOTE - PMH
Anxiety    Drug abuse  Back in the 70's and 80's (Cocaine)  Genital herpes    Hypothyroid    Lung cancer  with mets  LAMAR on CPAP    Stage IV adenocarcinoma of lung

## 2020-09-06 NOTE — CONSULT NOTE ADULT - SUBJECTIVE AND OBJECTIVE BOX
**incomplete note***    64F with Hx stage IV lung CA (2017, s/p RLL lobectomy, s/p pall RT, s/p carbo/pemextred/pembrolizumab 7/31) s/p L QASIM for pathologic femoral neck fracture and acetabular screw/cement for medial wall lesion (Darrell, 8/18/20) presents to [facility] ED with fevers after being sent in from [facility].  Patient has been on ***** for pain control. Pain has been slowly improving. Patient denies radiation of pain. Patient denies numbness/tingling/burning in the LLE. No other bone/joint complaints. Has been ambulatory at rehab.     PAST MEDICAL & SURGICAL HISTORY:  Stage IV adenocarcinoma of lung  Genital herpes  Drug abuse: Back in the 70&#x27;s and 80&#x27;s (Cocaine)  Lung cancer: with mets  LAMAR on CPAP  Anxiety  Hypothyroid  History of total hip replacement, left: THR 8/18/2020  S/P partial lobectomy of lung: Right lower lobe  History of bunionectomy  Status post lobectomy of lung: 3/2017 (Right lung)  S/P bunionectomy: with revision    MEDICATIONS  (STANDING):    MEDICATIONS  (PRN):    Allergies    Bananas (Headache)  Bananas (Other)  latex (Rash)  latex (Rash (Mild))  opioid-like analgesics (Urticaria)  penicillin (Angioedema)  penicillin (Swelling (Mild to Mod))    Intolerances      T(C): 38.3 (09-06-20 @ 14:38), Max: 38.3 (09-06-20 @ 14:38)  HR: 98 (09-06-20 @ 15:35) (98 - 115)  BP: 118/67 (09-06-20 @ 15:35) (118/67 - 138/76)  RR: 14 (09-06-20 @ 15:35) (14 - 28)  SpO2: 100% (09-06-20 @ 15:35) (100% - 100%)  Wt(kg): --    PE: NAD  LLE:  Wound CDI without redness.  Compartments soft; + TTP about hip.   No TTP to knee/leg/ankle/foot   Able to SLR; - Log Roll/Heel Strike  Motor intact GS/TA/FHL/EHL  SILT L2-S1  DP/PT pulses 2+    RLE/BUE:   No bony TTP; Good ROM w/o pain; Exam Unremarkable    Imaging:  pending    64F with Hx stage IV lung CA (2017, s/p RLL lobectomy, s/p pall RT, s/p carbo/pemextred/pembrolizumab 7/31) s/p L QASIM for pathologic femoral neck fracture and acetabular screw/cement for medial wall lesion (Darrell, 8/18/20) who presents with fever of unknown origin    -Obtain imaging of pelvis, hip and femur on left  -Fever workup  -DVTp with xarelto  -ESR, CRP  -FU on above imaging and labs.   -Will DW DR. Ramírez.     **incomplete note*** 64F with Hx stage IV lung CA (2017, s/p RLL lobectomy, s/p pall RT, s/p carbo/pemextred/pembrolizumab 7/31) s/p L QASIM for pathologic femoral neck fracture and acetabular screw/cement for medial wall lesion (Darrell, 8/18/20) presents to [facility] ED with continued L hip pain, fever, hypoxia after being sent in from [facility].  Patient has been on PO Dilaudid for pain control for the past week (switched from IV dilaudid last week). Pain has been slowly improving but persistent. Was having back pain earlier this week - spoke with Dr. Ramírez, had repeat xrays that were unremarkable. Reports mostly posterolateral hip pain currently. Mild low back pain. No right hip or thigh pain. No paresthesias. Patient denies radiation of pain. Patient denies numbness/tingling/burning in the BLLE. No other bone/joint complaints. Has not been ambulatory at rehab. No new complaints otherwise. Denies CP/SOB. No known fevers while at rehab. No new falls or injuries.     PAST MEDICAL & SURGICAL HISTORY:  Stage IV adenocarcinoma of lung  Genital herpes  Drug abuse: Back in the 70&#x27;s and 80&#x27;s (Cocaine)  Lung cancer: with mets  LAMAR on CPAP  Anxiety  Hypothyroid  History of total hip replacement, left: THR 8/18/2020  S/P partial lobectomy of lung: Right lower lobe  History of bunionectomy  Status post lobectomy of lung: 3/2017 (Right lung)  S/P bunionectomy: with revision    MEDICATIONS  (STANDING):    MEDICATIONS  (PRN):    Allergies    Bananas (Headache)  Bananas (Other)  latex (Rash)  latex (Rash (Mild))  opioid-like analgesics (Urticaria)  penicillin (Angioedema)  penicillin (Swelling (Mild to Mod))    Intolerances      T(C): 38.3 (09-06-20 @ 14:38), Max: 38.3 (09-06-20 @ 14:38)  HR: 98 (09-06-20 @ 15:35) (98 - 115)  BP: 118/67 (09-06-20 @ 15:35) (118/67 - 138/76)  RR: 14 (09-06-20 @ 15:35) (14 - 28)  SpO2: 100% (09-06-20 @ 15:35) (100% - 100%)  Wt(kg): --    PE: NAD, comfortable.  LLE:  Wound CDI without redness.  Staples in place.   Compartments soft; + TTP about lateral hip.   No drainage or periincisional redness. No subcutaneous fluctuance.   No TTP to knee/leg/ankle/foot   Able to SLR; - Log Roll/Heel Strike  Motor intact GS/TA/FHL/EHL  SILT L2-S1  DP/PT pulses 2+  Calf and thigh are soft and compressible without pain. No pain with passive dorsiflexion of feet.     RLE/BUE:   No bony TTP; Good ROM w/o pain; Exam Unremarkable    Imaging:  XR Pelvis, L hip, femur: s/p L diaphyseal fitting femoral stem with total hip arthroplasty. No periimplant lucencies Acetabular cement noted with iliac screw. No acute findings.     64F with Hx stage IV lung CA (2017, s/p RLL lobectomy, s/p pall RT, s/p carbo/pemextred/pembrolizumab 7/31) s/p L QASIM for pathologic femoral neck fracture and acetabular screw/cement for medial wall lesion (Darrell, 8/18/20) who presents fever (100.9), hypoxia, and new left femoral DVT noted on CT pelvis.     -Pt with above presentation.   -New L femoral DVT noted. [facility] vascular team, will see patient. Expressed our thoughts on the possibility of IVC filter vs. anticoagulation. Pt has been on prophylactic dose Xarelto (10mg) post op. But currently an INR of 2.14. Ortho would prefer consideration of IVCf but will await official vascular recommendations in this regard.   -In regards to her left hip, xrays are unremarkable. Wound is clean and dry. No signs of surgical site infection. Staples will likely be DC'd during this admission. Will monitor wound for any signs of infection.   -Will FU on official read for CT of the left hip for any imaging findings concerning for deep infection.   -FU official reads on CT CAP.  -Obtain bilateral LE dopplers now  -Give 1U PRBC now.   -FU ESR/CRP  -WBAT LLE with unlocked beulah brace.   -FU BCx  -FU COVID swab  -Pain control  -Hemonc consult for inpatient management  -FU vascular consult  -Medical admission  -Consider ID consult if continues to have fevers  -Will follow.   -LEEANN Ramírez regarding above. 64F with Hx stage IV lung CA (2017, s/p RLL lobectomy, s/p pall RT, s/p carbo/pemextred/pembrolizumab 7/31) s/p L QASIM for pathologic femoral neck fracture and acetabular screw/cement for medial wall lesion (Darrell, 8/18/20) presents to [facility] ED with continued L hip pain, fever, hypoxia after being sent in from [facility].  Patient has been on PO Dilaudid for pain control for the past week (switched from IV dilaudid last week). Pain has been slowly improving but persistent. Was having back pain earlier this week - spoke with Dr. Ramírez, had repeat xrays that were unremarkable. Reports mostly posterolateral hip pain currently. Mild low back pain. No right hip or thigh pain. No paresthesias. Patient denies radiation of pain. Patient denies numbness/tingling/burning in the BLLE. No other bone/joint complaints. Has not been ambulatory at rehab. No new complaints otherwise. Denies CP/SOB. No known fevers while at rehab. No new falls or injuries.     PAST MEDICAL & SURGICAL HISTORY:  Stage IV adenocarcinoma of lung  Genital herpes  Drug abuse: Back in the 70&#x27;s and 80&#x27;s (Cocaine)  Lung cancer: with mets  LAMAR on CPAP  Anxiety  Hypothyroid  History of total hip replacement, left: THR 8/18/2020  S/P partial lobectomy of lung: Right lower lobe  History of bunionectomy  Status post lobectomy of lung: 3/2017 (Right lung)  S/P bunionectomy: with revision    MEDICATIONS  (STANDING):    MEDICATIONS  (PRN):    Allergies    Bananas (Headache)  Bananas (Other)  latex (Rash)  latex (Rash (Mild))  opioid-like analgesics (Urticaria)  penicillin (Angioedema)  penicillin (Swelling (Mild to Mod))    Intolerances      T(C): 38.3 (09-06-20 @ 14:38), Max: 38.3 (09-06-20 @ 14:38)  HR: 98 (09-06-20 @ 15:35) (98 - 115)  BP: 118/67 (09-06-20 @ 15:35) (118/67 - 138/76)  RR: 14 (09-06-20 @ 15:35) (14 - 28)  SpO2: 100% (09-06-20 @ 15:35) (100% - 100%)  Wt(kg): --    PE: NAD, comfortable.  LLE:  Wound CDI without redness.  Staples in place.   Compartments soft; + TTP about lateral hip.   No drainage or periincisional redness. No subcutaneous fluctuance.   No TTP to knee/leg/ankle/foot   Able to SLR; - Log Roll/Heel Strike  Motor intact GS/TA/FHL/EHL  SILT L2-S1  DP/PT pulses 2+  Calf and thigh are soft and compressible without pain. No pain with passive dorsiflexion of feet.     RLE/BUE:   No bony TTP; Good ROM w/o pain; Exam Unremarkable    Imaging:  XR Pelvis, L hip, femur: s/p L diaphyseal fitting femoral stem with total hip arthroplasty. No periimplant lucencies Acetabular cement noted with iliac screw. No acute findings.     64F with Hx stage IV lung CA (2017, s/p RLL lobectomy, s/p pall RT, s/p carbo/pemextred/pembrolizumab 7/31) s/p L QASIM for pathologic femoral neck fracture and acetabular screw/cement for medial wall lesion (Darrell, 8/18/20) who presents fever (100.9), hypoxia, and new left femoral DVT noted on CT pelvis.     -Pt with above presentation.   -New L femoral DVT noted. [facility] vascular team, will see patient. Expressed our thoughts on the possibility of IVC filter vs. anticoagulation. Pt has been on prophylactic dose Xarelto (10mg) post op. But currently an INR of 2.14. Ortho would prefer consideration of IVCf but will await official vascular recommendations in this regard.   -In regards to her left hip, xrays are unremarkable. Wound is clean and dry. No signs of surgical site infection. Staples will likely be DC'd during this admission. Will monitor wound for any signs of infection.   -No signs of deep infection on CT pelvis; Does show a sacral lesion that is likely contributing to her pain.   -Has a left supracondylar distal femur lesion that was being protected with a beulah brace. Will change WB status to 50%. In addition will obtain rad onc c/s for local radiation directed at the supracondylar lesion during this admission.   -Obtain bilateral LE dopplers for extent of L femoral DVT  -Give 1U PRBC now given low hgb  -FU ESR/CRP  -50% partial weight bearing LLE with unlocked beulah brace.   -FU BCx  -FU COVID swab  -Pain control  -Hemonc consult for inpatient management  -Rad onc consult as above (inpatient radiation to L supracondylar distal femur lesion)  -FU vascular consult recommendation for L femoral DVT  -Medical admission  -Consider ID consult if continues to have fevers  -Will follow.   -DW Dr. Ramírez regarding above. 64F with Hx stage IV lung CA (2017, s/p RLL lobectomy, s/p pall RT, s/p carbo/pemextred/pembrolizumab 7/31) s/p L QASIM for pathologic femoral neck fracture and acetabular screw/cement for medial wall lesion (Darrell, 8/18/20) presents to [facility] ED with continued L hip pain, fever, hypoxia after being sent in from [facility].  Patient has been on PO Dilaudid for pain control for the past week (switched from IV dilaudid last week). Pain has been slowly improving but persistent. Was having back pain earlier this week - spoke with Dr. Ramírez, had repeat xrays that were unremarkable. Reports mostly posterolateral hip pain currently. Mild low back pain. No right hip or thigh pain. No paresthesias. Patient denies radiation of pain. Patient denies numbness/tingling/burning in the BLLE. No other bone/joint complaints. Has not been ambulatory at rehab. No new complaints otherwise. Denies CP/SOB. No known fevers while at rehab. No new falls or injuries.     PAST MEDICAL & SURGICAL HISTORY:  Stage IV adenocarcinoma of lung  Genital herpes  Drug abuse: Back in the 70&#x27;s and 80&#x27;s (Cocaine)  Lung cancer: with mets  LAMAR on CPAP  Anxiety  Hypothyroid  History of total hip replacement, left: THR 8/18/2020  S/P partial lobectomy of lung: Right lower lobe  History of bunionectomy  Status post lobectomy of lung: 3/2017 (Right lung)  S/P bunionectomy: with revision    MEDICATIONS  (STANDING):    MEDICATIONS  (PRN):    Allergies    Bananas (Headache)  Bananas (Other)  latex (Rash)  latex (Rash (Mild))  opioid-like analgesics (Urticaria)  penicillin (Angioedema)  penicillin (Swelling (Mild to Mod))    Intolerances      T(C): 38.3 (09-06-20 @ 14:38), Max: 38.3 (09-06-20 @ 14:38)  HR: 98 (09-06-20 @ 15:35) (98 - 115)  BP: 118/67 (09-06-20 @ 15:35) (118/67 - 138/76)  RR: 14 (09-06-20 @ 15:35) (14 - 28)  SpO2: 100% (09-06-20 @ 15:35) (100% - 100%)  Wt(kg): --    PE: NAD, comfortable.  LLE:  Wound CDI without redness.  Staples in place.   Compartments soft; + TTP about lateral hip.   No drainage or periincisional redness. No subcutaneous fluctuance.   No TTP to knee/leg/ankle/foot   Able to SLR; - Log Roll/Heel Strike  Motor intact GS/TA/FHL/EHL  SILT L2-S1  DP/PT pulses 2+  Calf and thigh are soft and compressible without pain. No pain with passive dorsiflexion of feet.     RLE/BUE:   No bony TTP; Good ROM w/o pain; Exam Unremarkable    Imaging:  XR Pelvis, L hip, femur: s/p L diaphyseal fitting femoral stem with total hip arthroplasty. No periimplant lucencies Acetabular cement noted with iliac screw. No acute findings.     64F with Hx stage IV lung CA (2017, s/p RLL lobectomy, s/p pall RT, s/p carbo/pemextred/pembrolizumab 7/31) s/p L QASIM for pathologic femoral neck fracture and acetabular screw/cement for medial wall lesion (Darrell, 8/18/20) who presents fever (100.9), hypoxia, and new left femoral DVT noted on CT pelvis.     -Pt with above presentation.   -New L femoral DVT noted. [facility] vascular team, will see patient. Expressed our thoughts on the possibility of IVC filter vs. anticoagulation. Pt has been on prophylactic dose Xarelto (10mg) post op. But currently an INR of 2.14. Ortho would prefer consideration of IVCf but will await official vascular recommendations in this regard.   -In regards to her left hip, xrays are unremarkable. Wound is clean and dry. No signs of surgical site infection. Staples will likely be DC'd during this admission. Will monitor wound for any signs of infection.   -No signs of deep infection on CT pelvis; Does show a sacral lesion that is likely contributing to her pain; Previously seen by Dr. Durham from neurosurgery (consulted last admission), had noncontrast MRI showing the mets as discussed. Recommended a TLSO brace at that time. Will reconsult ([facility] neurosurgery resident) given the symptomatic sacral lesion, may get thigh extension for TLSO.   -Has a left supracondylar distal femur lesion that was being protected with a beulah brace. Will change WB status to 50%. In addition will obtain rad onc c/s for local radiation directed at the supracondylar lesion during this admission.   -Obtain bilateral LE dopplers for extent of L femoral DVT  -Give 1U PRBC now given low hgb  -FU ESR/CRP  -50% partial weight bearing LLE with unlocked beulah brace.   -FU BCx  -FU COVID swab  -Pain control  -Hemonc consult for inpatient management  -Rad onc consult as above (inpatient radiation to L supracondylar distal femur lesion)  -FU vascular consult recommendation for L femoral DVT  -Medical admission  -Consider ID consult if continues to have fevers  -Will follow.   -DW Dr. Ramírez regarding above.

## 2020-09-06 NOTE — ED ADULT NURSE NOTE - NS ED NURSE RECORD ANOTHER VITAL SIGN
"Social Service Psychosocial Assessment  Presenting Problem:   Patient presented to [place] [facility] from [facility] for not being medication compliant while there. Patient states he was walking from [place] to [place] to go to [facility].   Patient somewhat cooperative with assessment.   Marital Status:   Single   Spouse / Children:    Unknown   Psychiatric TX HX:   Patient has a history of mental health and reported having been under commitment in the past. Patient reported he has been to [facility] in the past.   Suicide Risk Assessment:  Patient admitted to having SI today and on admission, unknown past SA.    Access to [employer] (explain):   None reported   Family Psych HX:   Unknown   A & Ox:   x3  Medication Adherence:   Unknown   Medical Issues:   See H&P   Visual -Motor Functioning:   Okay   Communication Skills /Needs:   Okay   Ethnicity:   White     Spirituality/Scientology Affiliation:    None  Clergy Request:   No   History:   None reported   Living Situation:   Patient is reportedly homeless was living in [place] with a friend   ADL s:  Independent   Education:  Unknown   Financial Situation:   On disability   Occupation:  Unemployed   Leisure & Recreation:  unknown   Childhood History:  Patient states he grew up in [place] with both of his parents and three sisters and two brothers - patient states \"I don't think they care\" when asked where patient's family is.   Trauma Abuse HX:   Unknown   Relationship / Sexuality:   Denies current relationship   Substance Use/ Abuse:   Reported weekly alcohol use and marijuana use   Chemical Dependency Treatment HX:   Hx of treatment per records   Legal Issues:   Unknown   Significant Life Events:   Unknown   Strengths:   In a safe place,   Challenges /Limitation:   Mental health, homelessness   Patient Support Contact (Include name, relationship, number, and summary of conversation):   Patient does not have an SARA signed and is " listed as confidential in the directory   Interventions:        Community-Based Programs    Medical/Dental Care - PCP?     CD Evaluation/Rule 25/Aftercare    Medication Management - reported he saw Jim Guzman     Case Management - ?     Insurance Coverage - Medicaid     Financial Assistance - ?     Commit/[place] Screening - possible petition to be filed     Suicide Risk Assessment - Patient admitted to having SI today and on admission, unknown past SA.      High Risk Safety Plan - Talk to supports; Call crisis lines; Go to local ER if feeling suicidal.         Yes

## 2020-09-06 NOTE — H&P ADULT - PROBLEM SELECTOR PROBLEM 4
Metastatic disease Anemia Stage IV adenocarcinoma of lung SIRS (systemic inflammatory response syndrome)

## 2020-09-06 NOTE — H&P ADULT - HISTORY OF PRESENT ILLNESS
INCOMPLETE    Pt is 65yo F with a Hx of hypothyroidism, anxiety, stage IV lung adenocarcinoma in 2017 s/p RLL lobectomy with recurrence in May 2020 s/p palliative radiation to clavicle and spine and 1 round of carbo/pemextred/pembrolizumab chemo 7/31, with left femoral neck fracture s/p left QASIM on 8/18 on xarelto 10mg BIBEMS from [facility] rehab for left hip pain and O2 desaturation to low 80s. Hx taken from pt and pt's daughter. Pt has been c/o increasing pain in her left hip s/p QASIM on 8/18. Pain management was switched from IV dilaudid to PO upon transition from inpatient to rehab which per pt doesn't work; pt has also been using fentanyl and lido patches with minimal effect. A few days ago the pt was found to have low hemoglobin 7.8; repeat labs this am showed hemoglobin of 7.1 and pt was also found to be desatting to low 80s on RA. Daughter called EMS to remove mom from [facility] for poor pain ctrl. Pt was desatting on 84% on RA in ambulance, satting 100% on 3L O2 via NC. Pt endorses increasing back and L hip pain; she denies any CP/pressure/palpitations, SOB, HA, abd pain, n/v/d, fevers/chills, hematuria/melena.    	Pt scheduled for f/u with her surgeon, Dr. Ramírez, on Tues.    From previous hospital course, patient had left QASIM per ortho and on postop day 2, she had tachycardia to 160s with no symptoms, leading to rapid response. CTA at that time was neg for PE but received IVF, labetalol, metop succinate, ativan, synthroid, and electrolyte repletion. Prior to operation, she had UTI and was on aztreonam.     Palliative care team was consulted for pain management and continued her on a transdermal fentanyl patch 25mg, increased her dilaudid to 6mg q4 PRN, and added IV tylenol for breakthrough pain. Her vitals were stable, EKG showed sinus tachycardia to 120s attributed to pain, CXR showed no cardiopulmonary disease, laboratory studies were wnl, and she was medically cleared for the OR.      Causes of sinus tachycardia were assessed including pain management (dilaudid pump), volume status (Hg 9.6, received fluids), infection (afebrile, WBC normal, receiving aztreonam + vancomycin post operatively), thyroid studies (TSH, free T4 normal), and Xanax withdrawal (missed 1 dose but received ativan) which were all unlikely. She was transferred to the telemetry unit for further monitoring where she continued to have atrial tachycardia vs atrial flutter. Cardiology was consulted and she was started on an amiodarone 5mg load with conversion back to sinus rhythm HR 70s-80s. Echo was limited due to tachycardia however valvular function and LV systolic function were normal. EP switched her from amio to metoprolol 50 daily. She was in sinus regular rate/rhythm over last few days without need for tele monitoring.     Neurosurgery was consulted for osseous lesions in spine. She was to undergo a full spine MRI (cervical, thoracic, lumbar). MRI showed: Pre and postcontrast imaging of the thoracic and lumbosacral spine and postcontrast imaging of the cervical spine demonstrate osseous metastasis as described above. There is no cord or cauda equina compression. Neurosurgery recommended no intervention at this time.     Oncology (outpatient and inpatient) confirmed she can go to rehab without need for systemic therapy.     At time of discharge, patient's vitals and laboratory studies were stable, pain was well controlled, and she was able to partcipate in physical therapy with plan to continue therapy at subacute rehab.    Please f/u with outpatient dentist for comprehensive dental care and clearance for therapy. INCOMPLETE    Pt is 63yo F with a Hx of hypothyroidism, anxiety, stage IV lung adenocarcinoma in 2017 s/p RLL lobectomy with recurrence in May 2020 s/p palliative radiation to clavicle and spine and 1 round of carbo/pemextred/pembrolizumab chemo 7/31, with left femoral neck fracture s/p left QASIM on 8/18 on xarelto 10mg BIBEMS from [facility] rehab for left hip pain and O2 desaturation to low 80s. Hx taken from pt and pt's daughter. Pt has been c/o increasing pain in her left hip s/p QASIM on 8/18. Pain management was switched from IV dilaudid to PO upon transition from inpatient to rehab which per pt doesn't work; pt has also been using fentanyl and lido patches with minimal effect. A few days ago the pt was found to have low hemoglobin 7.8; repeat labs this am showed hemoglobin of 7.1 and pt was also found to be desatting to low 80s on RA. Daughter called EMS to remove mom from [facility] for poor pain ctrl. Pt was desatting on 84% on RA in ambulance, satting 100% on 3L O2 via NC. Pt endorses increasing back and L hip pain; she denies any CP/pressure/palpitations, SOB, HA, abd pain, n/v/d, fevers/chills, hematuria/melena.     Patient found incidentally to be hypoxic to 79% at [facility] by EMS upon arrival while on room air. Patient up to mid and high 90% with 3 L nc, patient noted to have fever here of 100.9 per rectum.     	Pt scheduled for f/u with her surgeon, Dr. Ramírez, on Tues.    In ED: 98.5, 115, 131/75, RR28, 100% on nasal cannula 4L. On labs, patient has anemia    From previous hospital course, patient had left QASIM per ortho and on postop day 2, she had tachycardia to 160s with no symptoms, leading to rapid response. CTA at that time was neg for PE but received IVF, labetalol, metop succinate, ativan, synthroid, and electrolyte repletion. Causes of sinus tachycardia were assessed including pain management (dilaudid pump), volume status (Hg 9.6, received fluids), infection (afebrile, WBC normal, receiving aztreonam + vancomycin post operatively), thyroid studies (TSH, free T4 normal), and Xanax withdrawal (missed 1 dose but received ativan) which were all unlikely. She was transferred to the telemetry unit for further monitoring where she continued to have atrial tachycardia vs atrial flutter. Cardiology was consulted and she was started on an amiodarone 5mg load with conversion back to sinus rhythm HR 70s-80s. Echo was limited due to tachycardia however valvular function and LV systolic function were normal. EP switched her from amio to metoprolol 50 daily. She was in sinus regular rate/rhythm over last few days without need for tele monitoring.     Prior to operation, she had UTI and was on aztreonam.    Also of note from prior hospital course, neurosurgery was consulted for osseous lesions in spine. She was to undergo a full spine MRI (cervical, thoracic, lumbar). MRI showed: Pre and postcontrast imaging of the thoracic and lumbosacral spine and postcontrast imaging of the cervical spine demonstrate osseous metastasis as described above. There is no cord or cauda equina compression. Neurosurgery recommended no intervention at this time. Oncology (outpatient and inpatient) confirmed she can go to rehab without need for systemic therapy. At time of discharge, patient's vitals and laboratory studies were stable, pain was well controlled, and she was able to partcipate in physical therapy with plan to continue therapy at subacute rehab. INCOMPLETE    Pt is 63yo F with a Hx of hypothyroidism, anxiety, stage IV lung adenocarcinoma in 2017 s/p RLL lobectomy with recurrence in May 2020 s/p palliative radiation to clavicle and spine and 1 round of carbo/pemextred/pembrolizumab chemo 7/31, with left femoral neck fracture s/p left QASIM on 8/18 on xarelto 10mg BIBEMS from [facility] rehab for left hip pain and O2 desaturation to low 80s. Hx taken from pt and pt's daughter. Pt has been c/o increasing pain in her left hip s/p QASIM on 8/18. Pain management was switched from IV dilaudid to PO upon transition from inpatient to rehab which per pt doesn't work; pt has also been using fentanyl and lido patches with minimal effect. A few days ago the pt was found to have low hemoglobin 7.8; repeat labs this am showed hemoglobin of 7.1 and pt was also found to be desatting to low 80s on RA. Daughter called EMS to remove mom from [facility] for poor pain ctrl. Pt was desatting on 84% on RA in ambulance, satting 100% on 3L O2 via NC. Pt endorses increasing back and L hip pain; she denies any CP/pressure/palpitations, SOB, HA, abd pain, n/v/d, fevers/chills, hematuria/melena.     Patient found incidentally to be hypoxic to 79% at [facility] by EMS upon arrival while on room air. Patient up to mid and high 90% with 3 L nc, patient noted to have fever here of 100.9 per rectum.     	Pt scheduled for f/u with her surgeon, Dr. Ramírez, on Tues.    In ED: 98.5, 115, 131/75, RR28, 100% on nasal cannula 4L. On labs, patient has anemia w/ neg occult, mild hypoNa, incr Alk phos    From previous hospital course, patient had left QASIM per ortho and on postop day 2, she had tachycardia to 160s with no symptoms, leading to rapid response. CTA at that time was neg for PE but received IVF, labetalol, metop succinate, ativan, synthroid, and electrolyte repletion. Causes of sinus tachycardia were assessed including pain management (dilaudid pump), volume status (Hg 9.6, received fluids), infection (afebrile, WBC normal, receiving aztreonam + vancomycin post operatively), thyroid studies (TSH, free T4 normal), and Xanax withdrawal (missed 1 dose but received ativan) which were all unlikely. She was transferred to the telemetry unit for further monitoring where she continued to have atrial tachycardia vs atrial flutter. Cardiology was consulted and she was started on an amiodarone 5mg load with conversion back to sinus rhythm HR 70s-80s. Echo was limited due to tachycardia however valvular function and LV systolic function were normal. EP switched her from amio to metoprolol 50 daily. She was in sinus regular rate/rhythm over last few days without need for tele monitoring.     Prior to operation, she had UTI and was on aztreonam.    Also of note from prior hospital course, neurosurgery was consulted for osseous lesions in spine. She was to undergo a full spine MRI (cervical, thoracic, lumbar). MRI showed: Pre and postcontrast imaging of the thoracic and lumbosacral spine and postcontrast imaging of the cervical spine demonstrate osseous metastasis as described above. There is no cord or cauda equina compression. Neurosurgery recommended no intervention at this time. Oncology (outpatient and inpatient) confirmed she can go to rehab without need for systemic therapy. At time of discharge, patient's vitals and laboratory studies were stable, pain was well controlled, and she was able to partcipate in physical therapy with plan to continue therapy at subacute rehab. INCOMPLETE    Pt is 63yo F with a Hx of hypothyroidism, anxiety, stage IV lung adenocarcinoma in 2017 s/p RLL lobectomy with recurrence in May 2020 s/p palliative radiation to clavicle and spine and 1 round of carbo/pemextred/pembrolizumab chemo 7/31, with left femoral neck fracture s/p left QASIM on 8/18 on xarelto 10mg BIBEMS from [facility] rehab for left hip pain and O2 desaturation to low 80s. Hx taken from pt and pt's daughter. Pt has been c/o increasing pain in her left hip s/p QASIM on 8/18. Pain management was switched from IV dilaudid to PO upon transition from inpatient to rehab which per pt doesn't work; pt has also been using fentanyl and lido patches with minimal effect. A few days ago the pt was found to have low hemoglobin 7.8; repeat labs this am showed hemoglobin of 7.1 and pt was also found to be desatting to low 80s on RA. Daughter called EMS to remove mom from [facility] for poor pain ctrl. Pt was desatting on 84% on RA in ambulance, satting 100% on 3L O2 via NC. Pt endorses increasing back and L hip pain; she denies any CP/pressure/palpitations, SOB, HA, abd pain, n/v/d, fevers/chills, hematuria/melena.     Patient found incidentally to be hypoxic to 79% at [facility] by EMS upon arrival while on room air. Patient up to mid and high 90% with 3 L nc, patient noted to have fever here of 100.9 per rectum.     	Pt scheduled for f/u with her surgeon, Dr. Ramírez, on Tues.    In ED: 98.5, 115, 131/75, RR28, 100% on nasal cannula 4L. On labs, patient has anemia w/ neg occult, mild hypoNa, incr Alk phos and UA showing urobilinogen. CTA chest/ abd/ pelvis shows no PE but shows increased R pleural effusion from prior, ill defined R hilar mass, hepatic/ L adrenal and extensive bony metastases new from 2019, new R renal lesion (mets vs infection), Screw fixated pathologic L hip fxr. CT head neg. Patient received 2L IV bolus and started on heparin drip for DVT. 1u pRBC ordered. Pain control with dilaudid and tylenol.     From previous hospital course, patient had left QASIM per ortho and on postop day 2, she had tachycardia to 160s with no symptoms, leading to rapid response. CTA at that time was neg for PE but received IVF, labetalol, metop succinate, ativan, synthroid, and electrolyte repletion. Causes of sinus tachycardia were assessed including pain management (dilaudid pump), volume status (Hg 9.6, received fluids), infection (afebrile, WBC normal, receiving aztreonam + vancomycin post operatively), thyroid studies (TSH, free T4 normal), and Xanax withdrawal (missed 1 dose but received ativan) which were all unlikely. She was transferred to the telemetry unit for further monitoring where she continued to have atrial tachycardia vs atrial flutter. Cardiology was consulted and she was started on an amiodarone 5mg load with conversion back to sinus rhythm HR 70s-80s. Echo was limited due to tachycardia however valvular function and LV systolic function were normal. EP switched her from amio to metoprolol 50 daily. She was in sinus regular rate/rhythm over last few days without need for tele monitoring.     Prior to operation, she had UTI and was on aztreonam.    Also of note from prior hospital course, neurosurgery was consulted for osseous lesions in spine. She was to undergo a full spine MRI (cervical, thoracic, lumbar). MRI showed: Pre and postcontrast imaging of the thoracic and lumbosacral spine and postcontrast imaging of the cervical spine demonstrate osseous metastasis as described above. There is no cord or cauda equina compression. Neurosurgery recommended no intervention at this time. Oncology (outpatient and inpatient) confirmed she can go to rehab without need for systemic therapy. At time of discharge, patient's vitals and laboratory studies were stable, pain was well controlled, and she was able to partcipate in physical therapy with plan to continue therapy at subacute rehab. Pt is 64yoF w/ PMHx of stage IV lung adenocarcinoma in 2017 s/p RLL lobectomy with recurrence in May 2020 s/p palliative radiation to clavicle and spine and 1 round of carbo/pemextred/pembrolizumab chemo 7/31, with left femoral neck fracture s/p left QASIM on 8/18, anxiety, and hypothyroidism who presented from [facility] for left hip pain and O2 desat to low 80s. As per patient she stated she had 1 day of worsening shortness of breath. States usually her breathing difficulty is rated 2/10 but was 6-7/10 on day of admission. Able to lie flat without worsening of symptoms. In addition she felt generally unwell but without headaches, dizziness or lightheadedness. Usually experiences dizziness and as per becomes "loopy" on PO dilaudid but fine with IV. She has also had increasing pain in her left hip s/p QASIM on 8/18. Pain management was switched from IV dilaudid to PO upon transition from inpatient to rehab which per pt doesn't work; pt has also been using fentanyl and lido patches. Patient was desatting on 84% on RA in ambulance, but satting 100% on 3L O2 via NC. Otherwise patient denies fever, chills, nausea, vomiting, chest pain, abdominal pain, acute onset focal weakness or sensory changes.     In ED: 98.5, 115, 131/75, RR28, 100% on nasal cannula 4L. Patient had fever of 100.9F per rectum. On labs, patient has anemia w/ neg occult, mild hypoNa, incr Alk phos and UA showing urobilinogen. CTA chest/ abd/ pelvis shows no PE but shows increased R pleural effusion from prior, ill defined R hilar mass, hepatic/ L adrenal and extensive bony metastases new from 2019, new R renal lesion (mets vs infection), Screw fixated pathologic L hip fxr. CT head neg. Patient received 2L IV bolus and started on heparin drip for DVT. 1u pRBC ordered. Pain control with dilaudid and tylenol. Patient also briefly experienced atrial tach/ aflutter days after surgery, and was started on amiodarone 5mg load with conversion back to sinus rhythm HR 70s-80s. Echo was limited due to tachycardia however valvular function and LV systolic function were normal. EP switched her from amio to metoprolol 50 daily. She was in sinus regular rate/rhythm over last few days without need for tele monitoring. Also, from prior hospital course, neurosurgery was consulted for osseous lesions in spine and MRI showed no cord or cauda equina compression. Neurosurgery recommended no intervention at this time. Oncology (outpatient and inpatient) confirmed she can go to rehab without need for systemic therapy. Pt is 64yoF w/ PMHx of stage IV lung adenocarcinoma in 2017 s/p RLL lobectomy with recurrence in May 2020 s/p palliative radiation to clavicle and spine and 1 round of carbo/pemextred/pembrolizumab chemo 7/31, with left femoral neck fracture s/p left QASIM on 8/18, anxiety, and hypothyroidism who presented from [facility] for left hip pain and O2 desat to low 80s. As per patient she stated she had 1 day of worsening shortness of breath. States usually her breathing difficulty is rated 2/10 but was 6-7/10 on day of admission. Able to lie flat without worsening of symptoms. In addition she felt generally unwell but without headaches, dizziness or lightheadedness. Usually experiences dizziness and as per becomes "loopy" on PO dilaudid but fine with IV. She has also had increasing pain in her left hip s/p QASIM on 8/18. Pain management was switched from IV dilaudid to PO upon transition from inpatient to rehab which per pt doesn't work; pt has also been using fentanyl and lido patches. Patient was desatting on 84% on RA in ambulance, but satting 100% on 3L O2 via NC. Otherwise patient denies fever, chills, nausea, vomiting, chest pain, abdominal pain, acute onset focal weakness or sensory changes.     In ED: 98.5, 115, 131/75, RR28, 100% on nasal cannula 4L. Patient had fever of 100.9F per rectum. On labs, patient has anemia w/ neg occult, mild hypoNa, incr Alk phos and UA showing urobilinogen. CTA chest/ abd/ pelvis shows no PE but shows increased R pleural effusion from prior, ill defined R hilar mass, hepatic/ L adrenal and extensive bony metastases new from 2019, new R renal lesion (mets vs infection), Screw fixated pathologic L hip fxr. CT head neg. Patient received 2L IV bolus and started on heparin drip for DVT. 1u pRBC ordered. Pain control with dilaudid and tylenol.     From previous hospital course: Patient also briefly experienced atrial tach/ aflutter days after surgery, and was started on amiodarone 5mg load with conversion back to sinus rhythm HR 70s-80s. Echo was limited due to tachycardia however valvular function and LV systolic function were normal. EP switched her from amio to metoprolol 50 daily. She was in sinus regular rate/rhythm over last few days without need for tele monitoring. Also, from prior hospital course, neurosurgery was consulted for osseous lesions in spine and MRI showed no cord or cauda equina compression. Neurosurgery recommended no intervention at this time. Oncology (outpatient and inpatient) confirmed she could go to rehab without need for systemic therapy. 64yoF w/ PMHx of stage IV lung adenocarcinoma ( dx 2017) s/p RLL lobectomy with recurrence in May 2020 s/p palliative radiation to clavicle and spine and 1 round of carbo/pemextred/pembrolizumab chemo 7/31, hypothyroidism, anxiety, recent admission for pathologic L femoral neck fracture s/p left QASIM on 8/18, presented from [facility] rehab for left hip pain and O2 desat to low 80s. She has been having increasing pain in her left hip and back since going to [facility], 9/10, sharp, worse with movement, non radiating. Pain management was switched from IV dilaudid to PO upon admission to [facility], which per pt doesn't work; pt has also been using fentanyl and lido patches. Pt and dtr note that pt also becomes intermittently altered at [facility]; pt states she  becomes "loopy" on PO dilaudid but fine with IV. Pt was noted with downtrending Hb in past few days 7.8-->7.1 and today noted to be hypoxic 80s on RA. Pt on xarelto 10mg since discharge for DVT ppx. As per patient she stated she had 1 day of worsening shortness of breath. States usually her breathing difficulty is rated 2/10 but was 6-7/10 on day of admission. Able to lie flat without worsening of symptoms. In addition she felt generally unwell but without headaches, dizziness or lightheadedness.  Otherwise patient denies fever, chills, nausea, vomiting, chest pain, abdominal pain, acute onset focal weakness or sensory changes.  Dtr requested pt be brought back to hospital for better pain control. En route, pt hypoxic to 84% on RA and placed on 3LNC with 100% sat.  In ED: 98.5, 115, 131/75, RR28, 100% on nasal cannula 4L. Patient had fever of 100.9F per rectum. On labs, patient has anemia w/ neg occult, mild hypoNa, incr Alk phos and UA showing urobilinogen. CTA chest/ abd/ pelvis shows no PE but shows increased R pleural effusion from prior, ill defined R hilar mass, hepatic/ L adrenal and extensive bony metastases new from 2019, new R renal lesion (mets vs infection), Screw fixated pathologic L hip fxr. CT head neg. Patient received 2L IV bolus and started on heparin drip for DVT. 1u pRBC ordered. Pain control with dilaudid and tylenol.    From previous hospital course: Patient also briefly experienced atrial tach/ aflutter days after surgery, and was started on amiodarone 5mg load with conversion back to sinus rhythm HR 70s-80s. Echo was limited due to tachycardia however valvular function and LV systolic function were normal. EP switched her from amio to metoprolol 50 daily. She was in sinus regular rate/rhythm over last few days without need for tele monitoring. Also, from prior hospital course, neurosurgery was consulted for osseous lesions in spine and MRI showed no cord or cauda equina compression. Neurosurgery recommended no intervention at this time. Oncology (outpatient and inpatient) confirmed she could go to rehab without need for systemic therapy. 64yoF w/ PMHx of stage IV lung adenocarcinoma ( dx 2017) s/p RLL lobectomy with recurrence in May 2020 s/p palliative radiation to clavicle and spine and 1 round of carbo/pemextred/pembrolizumab chemo 7/31, hypothyroidism, anxiety, recent admission for pathologic L femoral neck fracture s/p left QASIM on 8/18, presented from [facility] rehab for left hip pain and O2 desat to low 80s and anemia.   Pt discharged to [facility] 8/28 - states she has been having increasing pain in her left hip and back since going to [facility], 9-10/10, sharp, radiating down the leg. Pain management was switched from IV dilaudid to PO upon admission to [facility], which per pt doesn't work; pt has also been using fentanyl and lido patches. Pt and dtr note that pt also becomes intermittently altered at [facility]; pt states she  becomes "loopy" on PO dilaudid but fine with IV. On 9/5/20 - pt noted to be hypoxic 79% which improved to 100% on 2L O2; pt able to be weaned off and presumed to be 2/2 oversedation from opioids. Blood work at [facility] showed downtrending Hb 7.8 (9/1)-->7.1 (9/6). Pt on xarelto 10mg since discharge for DVT ppx. Pt herself denies any subjective SOB, denies any wheezing, cough, CP, palpitations. Endorses feeling generally weak and more fatigued in the past few days, endorses poor PO intake, infrequent BMs. Denies any bloody BMs, melena or hematuria. Dtr requested pt be brought back to hospital for better pain control and eval of anemia. En route, pt hypoxic to 84% on RA and placed on 3LNC with 100% sat.  In ED: 98.5, 115, 131/75, RR28, 100% on nasal cannula 4L. Patient had fever of 100.9F per rectum. On labs, patient has anemia w/ neg occult, mild hypoNa, incr Alk phos and UA showing urobilinogen. CTA chest/ abd/ pelvis shows no PE but shows increased R pleural effusion from prior, ill defined R hilar mass, hepatic/ L adrenal and extensive bony metastases new from 2019, new R renal lesion (mets vs infection), Screw fixated pathologic L hip fxr. CT head neg. Patient received 2L IV bolus and started on heparin drip for DVT. 1u pRBC ordered. Pain control with dilaudid and tylenol.    From previous hospital course: Patient also briefly experienced atrial tach/ aflutter days after surgery, and was started on amiodarone 5mg load with conversion back to sinus rhythm HR 70s-80s. Echo was limited due to tachycardia however valvular function and LV systolic function were normal. EP switched her from amio to metoprolol 50 daily. She was in sinus regular rate/rhythm over last few days without need for tele monitoring. Also, from prior hospital course, neurosurgery was consulted for osseous lesions in spine and MRI showed no cord or cauda equina compression. Neurosurgery recommended no intervention at this time. Oncology (outpatient and inpatient) confirmed she could go to rehab without need for systemic therapy.

## 2020-09-06 NOTE — ED PROVIDER NOTE - CARE PLAN
Principal Discharge DX:	DVT (deep venous thrombosis) Principal Discharge DX:	DVT (deep venous thrombosis)  Secondary Diagnosis:	Anemia  Secondary Diagnosis:	Acute pain

## 2020-09-06 NOTE — ED ADULT NURSE NOTE - PSH
History of bunionectomy    History of total hip replacement, left  THR 8/18/2020  S/P bunionectomy  with revision  S/P partial lobectomy of lung  Right lower lobe  Status post lobectomy of lung  3/2017 (Right lung)

## 2020-09-06 NOTE — ED ADULT NURSE NOTE - NSIMPLEMENTINTERV_GEN_ALL_ED
Implemented All Fall Risk Interventions:  [place] to call system. Call bell, personal items and telephone within reach. Instruct patient to call for assistance. Room bathroom lighting operational. Non-slip footwear when patient is off stretcher. Physically safe environment: no spills, clutter or unnecessary equipment. Stretcher in lowest position, wheels locked, appropriate side rails in place. Provide visual cue, wrist band, yellow gown, etc. Monitor gait and stability. Monitor for mental status changes and reorient to person, place, and time. Review medications for side effects contributing to fall risk. Reinforce activity limits and safety measures with patient and family.

## 2020-09-06 NOTE — H&P ADULT - PROBLEM SELECTOR PLAN 8
Anticoagulation: on heparin   Diet:  Consults: Ortho, vasc, onc, rad/onc Anticoagulation: on heparin   Diet: regular  Consults: Ortho, vasc, onc, rad/onc c/w xanax 0.25mg as needed   ISTOP# [id number]

## 2020-09-06 NOTE — H&P ADULT - ASSESSMENT
Pt is 65yo F with a Hx of hypothyroidism, anxiety, stage IV lung adenocarcinoma in 2017 s/p RLL lobectomy with recurrence in May 2020 s/p palliative radiation to clavicle and spine and 1 round of carbo/pemextred/pembrolizumab chemo 7/31, with left femoral neck fracture s/p left QASIM on 8/18 on xarelto 10mg BIBEMS from [facility] for left hip pain and O2 desaturation to low 80s. Pt is 63yo F with a Hx of hypothyroidism, anxiety, stage IV lung adenocarcinoma in 2017 s/p RLL lobectomy with recurrence in May 2020 s/p palliative radiation to clavicle and spine and 1 round of carbo/pemextred/pembrolizumab chemo 7/31, with left femoral neck fracture s/p left QASIM on 8/18 on xarelto 10mg BIBEMS from [facility] for left hip pain likely in setting of malignant fxrs and O2 desaturation to low 80s from stage 4 lung adenocarcinoma, also found to have L femoral DVT on heparin drip. Pt is 63yo F with stage IV lung adenocarcinoma in 2017 s/p RLL lobectomy with recurrence in May 2020 s/p palliative radiation to clavicle and spine and 1 round of carbo/pemextred/pembrolizumab chemo 7/31, with left femoral neck fracture s/p left QASIM on 8/18 on xarelto 10mg, hypothyroidism, and anxiety, who was brought from [facility] for left hip pain likely in setting of malignant fxrs and O2 desaturation to low 80s from stage 4 lung adenocarcinoma, pleural effusion and atelectasis also found to have L femoral DVT now on heparin drip. 64yoF w/ PMHx of stage IV lung adenocarcinoma ( dx 2017) s/p RLL lobectomy with recurrence in May 2020 s/p palliative radiation to clavicle and spine and 1 round of carbo/pemextred/pembrolizumab chemo 7/31, hypothyroidism, anxiety, recent admission for pathologic L femoral neck fracture s/p left QASIM on 8/18 discharged to [facility] on xarelto 10mg or DVT ppx, p/w worsening L hip pain and hypoxia - a/w acute LLE DVT and worsening hypoxic respiratory failure 2/2 lung cancer with worsening R sided effusion.

## 2020-09-06 NOTE — ED ADULT NURSE REASSESSMENT NOTE - NS ED NURSE REASSESS COMMENT FT1
Report received from FELIPA Chatman. Pt resting comfortably in bed at this time. Pt updated on plan of care and results of test. Admitted to medicine. VS as documented. On bedside cardiac monitor. On prima-fit. Actual weight maintained, and pt started on heparin per Heparin Nomogram. Bed locked and lowered. Comfort and safety measures maintained.

## 2020-09-06 NOTE — H&P ADULT - NSHPREVIEWOFSYSTEMS_GEN_ALL_CORE
REVIEW OF SYSTEMS:    CONSTITUTIONAL: No weakness, fevers or chills  EYES/ENT: No visual changes;  No vertigo or throat pain   NECK: No pain or stiffness  RESPIRATORY: No cough, wheezing, hemoptysis; No shortness of breath  CARDIOVASCULAR: No chest pain or palpitations  GASTROINTESTINAL: No abdominal or epigastric pain. No nausea, vomiting, or hematemesis; No diarrhea or constipation. No melena or hematochezia.  GENITOURINARY: No dysuria, frequency or hematuria  NEUROLOGICAL: No numbness or weakness  SKIN: No itching, burning, rashes, or lesions   All other review of systems is negative unless indicated above. REVIEW OF SYSTEMS:    CONSTITUTIONAL: No weakness, fevers or chills  EYES/ENT: No visual changes;  No vertigo or throat pain   RESPIRATORY: Improved shortness of breath on nasal cannula with improved chest tightness  CARDIOVASCULAR: No chest pain or palpitations  GASTROINTESTINAL: No abdominal or epigastric pain. No nausea, vomiting, or hematemesis; No melena or hematochezia.  GENITOURINARY: No dysuria, frequency or hematuria  NEUROLOGICAL: No focal numbness or weakness. Has left hip and leg pain  SKIN: No itching, burning, rashes, or lesions REVIEW OF SYSTEMS:    CONSTITUTIONAL: +weakness, no fevers, no chills  EYES/ENT: No visual changes;  no throat pain   NECK: No pain or stiffness  RESPIRATORY: No cough, +shortness of breath  CARDIOVASCULAR: No chest pain or palpitations  GASTROINTESTINAL: no nausea, vomiting, no abdominal pain, no BRBPR  GENITOURINARY: no hematuria, no dysuria  NEUROLOGICAL: no numbness, no headaches  MUSCULOSKELETAL: +back pain, +L hip pain   SKIN: No rashes, or lesions   PSYCH: no anxiety, depression  HEME: no gum bleeding, no bruising REVIEW OF SYSTEMS:    CONSTITUTIONAL: +weakness, no fevers, no chills  EYES/ENT: No visual changes;  no throat pain   NECK: No pain or stiffness  RESPIRATORY: No cough, no shortness of breath  CARDIOVASCULAR: No chest pain or palpitations  GASTROINTESTINAL: no nausea, vomiting, no abdominal pain, no BRBPR  GENITOURINARY: no hematuria, no dysuria  NEUROLOGICAL: no numbness, no headaches  MUSCULOSKELETAL: +back pain, +L hip pain   SKIN: No rashes, or lesions   PSYCH: no anxiety, depression  HEME: no gum bleeding, no bruising

## 2020-09-06 NOTE — ED ADULT NURSE NOTE - TEMPLATE LIST FOR HEAD TO TOE ASSESSMENT
General Topical Clindamycin Counseling: Patient counseled that this medication may cause skin irritation or allergic reactions.  In the event of skin irritation, the patient was advised to reduce the amount of the drug applied or use it less frequently.   The patient verbalized understanding of the proper use and possible adverse effects of clindamycin.  All of the patient's questions and concerns were addressed.

## 2020-09-06 NOTE — H&P ADULT - PROBLEM SELECTOR PLAN 1
Patient found to have L common femoral DVT on CT.   - c/w heparin drip f  - pending formal b/l US of lower extremities  - pending discussion of IVC filter Patient found to have L common femoral DVT on CT.   - c/w heparin drip for DVT  - pending formal b/l US of lower extremities  - will assess need of IVC filter Patient presented with acute on chronic hypoxic respiratory failure in setting of extensive R lung disease, atelectasis and pleural effusion  - Saturating well on RA  - incentive spirometry  - can consider pulm consult regards to candidacy for therapeutic thoracentesis Patient presented with acute on chronic hypoxic respiratory failure in setting of extensive R lung disease, atelectasis and pleural effusion  - Saturating well on nasal cannula  - incentive spirometry  - can consider pulm consult in AM in regards to candidacy for therapeutic thoracentesis Patient found to have L common femoral DVT on CT  - c/w heparin drip for DVT  - pending formal b/l VA duplex of lower extremities  - vascular consulted, will c/t following re: need of IVC filter

## 2020-09-06 NOTE — H&P ADULT - PROBLEM SELECTOR PLAN 6
Anticoagulation: on heparin   Diet:  Consults: Ortho, vasc, onc, rad/onc Patient labs show anemia with slow decline since early this year. Now 7.6  - pending 1u pRBC. Patient labs show anemia with slow decline since early this year. Now 7.6. Low suspicion for active bleeding. Possibly due to hx of chemotherapies.  - pending 1u pRBC.  - trend daily CBC for now Patient labs show anemia with slow decline since early this year. Now 7.6. FOBT (-); ?2/2 malignancy, chemo    - pending 1u pRBC.  - trend daily CBC for now  - will need to monitor closely given heparin gtt; vascular following for possible IVC filter if indicated

## 2020-09-06 NOTE — ED ADULT NURSE REASSESSMENT NOTE - NS ED NURSE REASSESS COMMENT FT1
patient states that she has "no pain" following 2 mg IV dilaudid. VSS. awaiting radiology. will continue to monitor

## 2020-09-06 NOTE — CONSULT NOTE ADULT - SUBJECTIVE AND OBJECTIVE BOX
GENERAL SURGERY CONSULT NOTE  --------------------------------------------------------------------------------------------    Patient is a 64y old  Female who presents with a chief complaint of low SpO2 and pain in her L hip.     HPI: 64F with PMH stage 4 lung adenocarcinoma (RLL lobectomy in , recurrence in May 2020 s/p palliative radiation and chemotherapy) and PSH of recent total hip replacement for L femoral neck fracture (2020) who was BIBEMS from rehab for low O2 saturation (80s) and L hip pain. Pt has been having increasing pain in her L hip since the surgery. She was discharged on prophylactic xarelto, but her course was completed approximately 1 week ago as per patient. No nausea, vomiting, SOB, chest pain, hematuria, melena, BRBPR, palpitations.     ROS: 10-system review is otherwise negative except HPI above.      PAST MEDICAL & SURGICAL HISTORY:  Stage IV adenocarcinoma of lung  Genital herpes  Drug abuse: Back in the 70&#x27;s and 80&#x27;s (Cocaine)  Lung cancer: with mets  LAMAR on CPAP  Anxiety  Hypothyroid  History of total hip replacement, left: THR 2020  S/P partial lobectomy of lung: Right lower lobe  History of bunionectomy  Status post lobectomy of lung: 3/2017 (Right lung)  S/P bunionectomy: with revision    FAMILY HISTORY:  Family history of stroke or transient ischemic attack in father  Family history of hypothyroidism  Family history of hypertension  Family history of stroke    SOCIAL HISTORY:  Patient lives with , daughter, and son in law. 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 She was a smoker from age 14, quit approx 7 years ago, approx 50 pack year hx; she drinks 2-3 glasses of alcohol a week but not in the past few weeks; illicit drug use in 1970s (cocaine).    ALLERGIES: Bananas (Headache)  Bananas (Other)  latex (Rash)  latex (Rash (Mild))  opioid-like analgesics (Urticaria)  penicillin (Angioedema)  penicillin (Swelling (Mild to Mod))    CURRENT MEDICATIONS  MEDICATIONS (STANDING): heparin  Infusion.  Unit(s)/Hr IV Continuous <Continuous>    MEDICATIONS (PRN):heparin   Injectable 7000 Unit(s) IV Push every 6 hours PRN For aPTT less than 40  heparin   Injectable 3500 Unit(s) IV Push every 6 hours PRN For aPTT between 40 - 57    --------------------------------------------------------------------------------------------    Vitals:   T(C): 36.8 (20 @ 18:57), Max: 38.3 (20 @ 14:38)  HR: 95 (20 @ 19:19) (95 - 115)  BP: 112/63 (20 @ 19:19) (112/63 - 138/76)  RR: 16 (20 @ 19:19) (14 - 28)  SpO2: 97% (20 @ 19:19) (96% - 100%)  CAPILLARY BLOOD GLUCOSE    Height (cm): 172.72 ( @ 13:42)  Weight (kg): 88 ( @ 19:19)  BMI (kg/m2): 29.5 ( @ 19:19)  BSA (m2): 2.02 ( @ 19:19)    PHYSICAL EXAM:   General: NAD, Lying in bed comfortably  Neuro: A+Ox3  HEENT: NC/AT, EOMI  Neck: Soft, supple  Cardio: RRR, nml S1/S2  Resp: Good effort, CTA b/l  GI/Abd: Soft, NT/ND, no rebound/guarding, no masses palpated  Vascular: All 4 extremities warm. palpable femoral, DP/PT b/l. no obvious swelling or skin changes.   Skin: Intact, no breakdown  Musculoskeletal: All 4 extremities moving spontaneously, no limitations  --------------------------------------------------------------------------------------------    LABS  CBC ( 14:51)                              7.6<L>                         7.97    )----------------(  294        73.4  % Neutrophils, 7.3<L>% Lymphocytes, ANC: 5.85                                24.9<L>  CBC ( 07:13)                              7.1<L>                         5.74    )----------------(  275        73.0  % Neutrophils, 12.0<L>% Lymphocytes, ANC: 4.31                                23.6<L>    BMP ( 14:51)             133<L>  |  93<L>   |  5<L>  		Ca++ --      Ca 9.7                ---------------------------------( 145<H>		Mg --                 3.9     |  27      |  0.34<L>			Ph --      BMP ( 07:13)             136     |  96      |  5<L>  		Ca++ --      Ca 9.4                ---------------------------------( 110<H>		Mg --                 3.9     |  29      |  0.35<L>			Ph --        LFTs ( 14:51)      TPro 6.3 / Alb 2.9<L> / TBili 1.0 / DBili -- / AST 25 / ALT 19 / AlkPhos 162<H>    Coags ( 14:51)  aPTT 40.7<H> / INR 2.14<H> / PT 24.6<H>        VBG ( 14:51)     -- / -- / -- / -- / -- / --%     Lactate: 1.2    --------------------------------------------------------------------------------------------    MICROBIOLOGY  Urinalysis ( @ 15:11):     Color: Yellow / Appearance: Slightly Turbid<!> / S.014 / pH: 6.5 / Gluc: Negative / Ketones: Negative / Bili: Negative / Urobili: 4 mg/dL<!> / Protein :Trace<!> / Nitrites: Negative / Leuk.Est: Negative / RBC:  / WBC:  / Sq Epi:  / Non Sq Epi:  / Bacteria          --------------------------------------------------------------------------------------------    IMAGING  < from: CT Abdomen and Pelvis w/ IV Cont (20 @ 17:41) >  IMPRESSION:  No pulmonary embolism. Left common femoral vein DVT.    Ill-defined right hilar mass surrounding theright bronchi, similar to prior although difficult to compare due to now large right pleural effusion, increased since prior. Circumferential right pleural thickening is nonspecific, question metastatic versus infectious.    Hepatic, left adrenal andextensive bony metastatic disease, new since 2019.    New right renal lesions, which could represent metastatic disease or infection. Correlate with urinalysis.    Status post interval arthroplasty and screw fixation of pathologic left hip fracture with soft tissue mass with cement extending medially into the obturator region of the pelvis. The screw extends into the left iliopsoas muscle into the iliac crest. New bony fragment posterior to the femoral neck, unclear if postsurgical or representing new pathologic fracture. Correlate with surgical history and dedicated bony CT.    Discussed with Dr. Patel on 2020 at 6:50 PM.    < end of copied text >

## 2020-09-06 NOTE — H&P ADULT - PROBLEM SELECTOR PLAN 3
Patient has stage 4 lung adenocarcinoma (RLL lobectomy in 2017, recurrence in May 2020 s/p palliative rad and chemo) with recent total hip replacement for L femoral neck fracture (8/18/2020) who presents with worsening L hip pain  - likely causing elevated Alk phos  - CTA chest/ abd/ pelvis show increased R pleural effusion from prior, ill defined R hilar mass, hepatic/ L adrenal and extensive bony metastases new from 2019, new R renal lesion (mets vs infection), Screw fixated pathologic L hip fxr.  - Ortho following  - as per ortho, no signs of deep infection on CT pelvis; Does show a sacral lesion that is likely contributing to her pain.  - Ortho will f/u with rad onc for local radiation directed at the left supracondylar distal femur lesion  - pain control with home regimen. Patient found to have L common femoral DVT on CT.   - c/w heparin drip for DVT  - pending formal b/l VA duplex of lower extremities  - will assess need of IVC filter Recent total hip replacement for pathological L femoral neck fracture (8/18/2020), presenting with worsening back and L hip pain. Likely partially 2/2 acute DVT, recent surgery and sacral lesion that is likely contributing to her pain. No e/o infection at surgical site.     - NSX consulted for sacral lesion - c/w TLSO brace, consider thigh extension as per NXS and ortho   - pain control with dilaudid IV and fentanyl patch, monitor for sedation. Patient refuses PO dilaudid  - pain management/palliative consult if pain remains uncontrolled

## 2020-09-06 NOTE — ED PROVIDER NOTE - OBJECTIVE STATEMENT
Pt is a 65yo F with a Hx of hypothyroidism, anxiety, stage IV lung adenocarcinoma in 2017 s/p RLL lobectomy with recurrence in May 2020 s/p palliative radiation to clavicle and spine and 1 round of carbo/pemextred/pembrolizumab chemo 7/31, with left femoral neck fracture s/p left QASIM on 8/18 Pt is a 65yo F with a Hx of hypothyroidism, anxiety, stage IV lung adenocarcinoma in 2017 s/p RLL lobectomy with recurrence in May 2020 s/p palliative radiation to clavicle and spine and 1 round of carbo/pemextred/pembrolizumab chemo 7/31, with left femoral neck fracture s/p left QASIM on 8/18 BIBEMS from [facility] rehab for left hip pain and O2 desaturation to low 80s. Hx taken from pt and pt's daughter. Pt has been c/o increasing pain in her left hip s/p QASIM on 8/18. Pain management was switched from IV dilaudid to PO upon transition from inpatient to rehab which per pt doesn't work; pt has also been using fentanyl and lido patches with minimal effect. A few days ago the pt was found to have low hemoglobin 7.8; repeat labs this am showed hemoglobin of 7.1 and pt was also found to be desatting to low 80s on RA. Daughter called EMS to remove mom from [facility] for poor pain ctrl. Pt was desatting on 84% on RA in ambulance, satting 100% on 3L O2 via NC. Pt endorses increasing back and L hip pain; she denies any CP/pressure/palpitations, SOB, HA, abd pain, n/v/d, fevers/chills, hematuria/melena. Pt is a 63yo F with a Hx of hypothyroidism, anxiety, stage IV lung adenocarcinoma in 2017 s/p RLL lobectomy with recurrence in May 2020 s/p palliative radiation to clavicle and spine and 1 round of carbo/pemextred/pembrolizumab chemo 7/31, with left femoral neck fracture s/p left QASIM on 8/18 on xarelto 10mg BIBEMS from [facility] rehab for left hip pain and O2 desaturation to low 80s. Hx taken from pt and pt's daughter. Pt has been c/o increasing pain in her left hip s/p QASIM on 8/18. Pain management was switched from IV dilaudid to PO upon transition from inpatient to rehab which per pt doesn't work; pt has also been using fentanyl and lido patches with minimal effect. A few days ago the pt was found to have low hemoglobin 7.8; repeat labs this am showed hemoglobin of 7.1 and pt was also found to be desatting to low 80s on RA. Daughter called EMS to remove mom from [facility] for poor pain ctrl. Pt was desatting on 84% on RA in ambulance, satting 100% on 3L O2 via NC. Pt endorses increasing back and L hip pain; she denies any CP/pressure/palpitations, SOB, HA, abd pain, n/v/d, fevers/chills, hematuria/melena. Pt is a 63yo F with a Hx of hypothyroidism, anxiety, stage IV lung adenocarcinoma in 2017 s/p RLL lobectomy with recurrence in May 2020 s/p palliative radiation to clavicle and spine and 1 round of carbo/pemextred/pembrolizumab chemo 7/31, with left femoral neck fracture s/p left QASIM on 8/18 on xarelto 10mg BIBEMS from [facility] rehab for left hip pain and O2 desaturation to low 80s. Hx taken from pt and pt's daughter. Pt has been c/o increasing pain in her left hip s/p QASIM on 8/18. Pain management was switched from IV dilaudid to PO upon transition from inpatient to rehab which per pt doesn't work; pt has also been using fentanyl and lido patches with minimal effect. A few days ago the pt was found to have low hemoglobin 7.8; repeat labs this am showed hemoglobin of 7.1 and pt was also found to be desatting to low 80s on RA. Daughter called EMS to remove mom from [facility] for poor pain ctrl. Pt was desatting on 84% on RA in ambulance, satting 100% on 3L O2 via NC. Pt endorses increasing back and L hip pain; she denies any CP/pressure/palpitations, SOB, HA, abd pain, n/v/d, fevers/chills, hematuria/melena.    Pt scheduled for f/u with her surgeon, Dr. Ramírez, on Tues.

## 2020-09-06 NOTE — H&P ADULT - PROBLEM SELECTOR PROBLEM 1
DVT (deep venous thrombosis) Acute on chronic respiratory failure with hypoxia Acute deep vein thrombosis (DVT) of left lower extremity, unspecified vein

## 2020-09-06 NOTE — ED PROVIDER NOTE - CLINICAL SUMMARY MEDICAL DECISION MAKING FREE TEXT BOX
63 y/o F multiple comorbidities, metastatic lung CA with mets to bone s/p L THR 2/2 pathologic fracture x1 week ago, presenting from [facility] for increasing uncontrolled pain and hypoxia on room air today. Exam reveals significant pain with any movements and O2 sat 84% on RA with fever 100.9F rectally. Concern for post-op infection of unknown origin at this time. Will obtain labs with blood cultures, UA/urine culture, CTA chest r/o PE, CT a/p r/o infection of L hip s/p surgery, provide fluids, pain control and reassess. 63 y/o F multiple comorbidities, metastatic lung CA with mets to bone s/p L THR 2/2 pathologic fracture x1 week ago on xarelto, presenting from [facility] for increasing uncontrolled pain and hypoxia on room air today. Exam reveals significant pain with any movements and O2 sat 84% on RA with fever 100.9F rectally. Concern for post-op infection of unknown origin at this time. Will obtain labs with blood cultures, UA/urine culture, CTA chest r/o PE, CT a/p r/o infection of L hip s/p surgery, provide fluids, pain control and reassess.

## 2020-09-06 NOTE — H&P ADULT - PROBLEM SELECTOR PLAN 5
Can c/w home levothyroxine and liothyronine Patient has stage 4 lung adenocarcinoma (RLL lobectomy in 2017, recurrence in May 2020 s/p palliative rad and chemo) with recent total hip replacement for L femoral neck fracture (8/18/2020) who presents with worsening L hip pain  - likely causing elevated Alk phos  - CTA chest/ abd/ pelvis show increased R pleural effusion from prior, ill defined R hilar mass, hepatic/ L adrenal and extensive bony metastases new from 2019, new R renal lesion (mets vs infection), Screw fixated pathologic L hip fxr.  - Ortho following  - as per ortho, no signs of deep infection on CT pelvis; Does show a sacral lesion that is likely contributing to her pain.  - Ortho will f/u with rad onc for local radiation directed at the left supracondylar distal femur lesion  - pain control with home regimen. Patient has stage 4 lung adenocarcinoma (RLL lobectomy in 2017, recurrence in May 2020 s/p palliative rad and chemo) with recent total hip replacement for L femoral neck fracture (8/18/2020) who presents with worsening L hip pain  - likely causing elevated Alk phos. bili wnl.   - CTA chest/ abd/ pelvis show increased R pleural effusion from prior, ill defined R hilar mass, hepatic/ L adrenal and extensive bony metastases new from 2019, new R renal lesion (mets vs infection), Screw fixated pathologic L hip fxr.  - Ortho following  - as per ortho, no signs of deep infection on CT pelvis; Does show a sacral lesion that is likely contributing to her pain.  - Ortho will f/u with rad onc for local radiation directed at the left supracondylar distal femur lesion  - pain control with home regimen. Patient has stage 4 lung adenocarcinoma (RLL lobectomy in 2017, recurrence in May 2020 s/p palliative rad and chemo) with recent total hip replacement for L femoral neck fracture (8/18/2020) who presents with worsening L hip pain  - likely causing elevated Alk phos. bili wnl.   - CTA chest/ abd/ pelvis show increased R pleural effusion from prior, ill defined R hilar mass, hepatic/ L adrenal and extensive bony metastases new from 2019, new R renal lesion (mets vs infection), Screw fixated pathologic L hip fxr.  - Ortho following  - as per ortho, no signs of deep infection on CT pelvis; Does show a sacral lesion that is likely contributing to her pain.  - Ortho will f/u with rad onc for local radiation directed at the left supracondylar distal femur lesion  - pain control with dilaudid IV and fentanyl patch. Patient refuses PO dilaudid Patient has stage 4 lung adenocarcinoma (RLL lobectomy in 2017, recurrence in May 2020 s/p palliative rad and chemo), CTA chest/ abd/ pelvis show increased R pleural effusion from prior, ill defined R hilar mass, hepatic/ L adrenal and extensive bony metastases new from 2019, new R renal lesion. Screw fixated pathologic L hip fxr. L supracondylar distal femur lesion     - Rad onc consult for directed radiation to supracondylar distal femur lesion  - f/u oncology regarding further treatment options

## 2020-09-06 NOTE — H&P ADULT - PROBLEM SELECTOR PLAN 2
Patient presented with repeat shortness of breath likely 2/2 extensive R lung disease and RLL lobectomy. Saturating well on nasal cannula.   - please consult patient's oncologist in AM. Patient presented with tachycardia, tachypnea and low grade fever of 100.9.  - fever resolved. tachycardia and tachypnea improved with supplemental O2 as well as pain control  - f/u blood culture  - UA neg Patient presented with tachycardia, tachypnea and low grade fever of 100.9. Likely in setting of DVT vs malignancy. Low suspicion for infectious etiology. If reoccurs, can consider parapneumonic effusion.   - fever resolved. tachycardia and tachypnea improved with supplemental O2 as well as pain control  - f/u blood culture  - UA neg  - f/u [facility] duplex of b/l LE for assessing extent of DVTs Patient presented with acute hypoxic respiratory failure in setting of extensive lung cancer, increasing pleural effusion with associated atelectasis; ?R pleural thickening. No PE noted on imaging. Currently saturating well on 3LNC, no resp distress noted.      - c/w supplemental O2, wean as O2   - incentive spirometry  - pulm consult in AM in regards to candidacy for therapeutic thoracentesis

## 2020-09-06 NOTE — ED ADULT NURSE NOTE - OBJECTIVE STATEMENT
patient is a 65 y/o F BIBEMS from [facility] c/o LLE and back pain and hypoxia. patient recently had left hip surgery and has been at [facility] for rehab. per patients daughter patient has not had adequate pain management at [facility] and that her mothers mental status has declined and that she has become more confused. in the ED patient is hypoxic on RA at 84%, patient placed on 3 L NC and is satting at 100%. patient is c/o 9/10 pain in her back and left leg that is sharp in nature and does not radiate.  patient is a&ox4, PERRL. strong peripheral pulses, strong extremities x4. patient has brace placed on LLE. abdomen soft, nondistended, nontender. lungs clear b/l with symmetrical chest rise. patient has staples placed on left leg that are well approximated and well appearing. patient had fentanyl patch placed on left anterior chest and lidocaine patch on left posterior leg from [facility].  patient denies CP, SOB, h/a, dizziness, weakness, vision changes, numbness/tingling, abd pain, n/v/d/c, urinary symptoms, cough, fever, chills, recent travel, or sick contacts  patient has PMH of lung carcinoma with mets, drug use, genital herpes, patient is a 65 y/o F BIBEMS from [facility] c/o LLE and back pain and hypoxia. patient recently had left hip surgery and has been at [facility] for rehab. per patients daughter patient has not had adequate pain management at [facility] and that her mothers mental status has declined and that she has become more confused. in the ED patient is hypoxic on RA at 84%, patient placed on 3 L NC and is satting at 100%. patient is c/o 9/10 pain in her back and left leg that is sharp in nature and does not radiate.  patient is a&ox4, PERRL. strong peripheral pulses, strong extremities x4. patient has brace placed on LLE. abdomen soft, nondistended, nontender. lungs clear b/l with symmetrical chest rise. patient has staples placed on left leg that are well approximated and well appearing. patient had fentanyl patch placed on left anterior chest and lidocaine patch on left posterior leg from [facility].  patient denies CP, SOB, h/a, dizziness, weakness, vision changes, numbness/tingling, abd pain, n/v/d/c, urinary symptoms, cough, fever, chills, recent travel, or sick contacts  patient has PMH of lung carcinoma with mets, drug use, genital herpes, LAMAR, anxiety, and hypothyroid

## 2020-09-06 NOTE — H&P ADULT - PROBLEM SELECTOR PROBLEM 2
Stage IV adenocarcinoma of lung SIRS (systemic inflammatory response syndrome) Acute respiratory failure with hypoxia

## 2020-09-06 NOTE — H&P ADULT - NSICDXFAMILYHX_GEN_ALL_CORE_FT
FAMILY HISTORY:  Family history of hypertension  Family history of hypothyroidism  Family history of stroke  Family history of stroke or transient ischemic attack in father

## 2020-09-06 NOTE — H&P ADULT - NSHPSOCIALHISTORY_GEN_ALL_CORE
Patient lives with , daughter, and son in law. 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 She was a smoker from age 14, quit approx 7 years ago, approx 50 pack year hx; she drinks 2-3 glasses of alcohol a week but not in the past few weeks; illicit drug use in 1970s (cocaine). Patient has hx of smoking from age 14 to 40s, roughly 2 PPD. Drinks recreational alcohol but stopped recently. For drug hx, deferred but chart review shows illicit drug use in 1970s (cocaine).

## 2020-09-07 NOTE — CONSULT NOTE ADULT - PROBLEM SELECTOR RECOMMENDATION 5
Pls prescribe Naloxone for here and upon discharge. Patients with 50mg OME have 4x risk of OD and those with 100mg OME have 9x risk ([email or web address].org)    Inpatient: Naloxone 0.4 to 2mg IV q2-3mins PRN (max 10mg)  Upon discharge: Naloxone 4mg/0.1 ml nasal spray, 1 spray q2-3mins alternating between nostrils     Pls provide patient education prior to discharge. Resource: https://www.health.[place].gov/diseases/aids/general/opioid_overdose_prevention/overdose_facts.htm

## 2020-09-07 NOTE — PROGRESS NOTE ADULT - SUBJECTIVE AND OBJECTIVE BOX
Patient seen and examined at bedside. No acute events over night. No acute complaints at this time. Pain well controlled. Denies chest pain, shortness of breath, nausea or vomiting.     PE:  Vital Signs Last 24 Hrs  T(C): 36.4 (09-07-20 @ 04:31), Max: 38.3 (09-06-20 @ 14:38)  T(F): 97.6 (09-07-20 @ 04:31), Max: 100.9 (09-06-20 @ 14:38)  HR: 84 (09-07-20 @ 04:31) (83 - 115)  BP: 123/71 (09-07-20 @ 04:31) (108/72 - 138/76)  BP(mean): 78 (09-06-20 @ 19:19) (78 - 78)  RR: 18 (09-07-20 @ 04:31) (14 - 28)  SpO2: 100% (09-07-20 @ 04:31) (96% - 100%)    General: NAD, resting comfortably in bed  Wound CDI without redness.  Staples in place.   Compartments soft; + TTP about lateral hip.   No drainage or periincisional redness. No subcutaneous fluctuance.   No TTP to knee/leg/ankle/foot   Able to SLR; - Log Roll/Heel Strike  Motor intact GS/TA/FHL/EHL  SILT L2-S1  DP/PT pulses 2+  Calf and thigh are soft and compressible without pain. No pain with passive dorsiflexion of feet.                           7.4    6.45  )-----------( 245      ( 07 Sep 2020 02:01 )             24.3     06 Sep 2020 14:51    133    |  93     |  5      ----------------------------<  145    3.9     |  27     |  0.34     Ca    9.7        06 Sep 2020 14:51    TPro  6.3    /  Alb  2.9    /  TBili  1.0    /  DBili  x      /  AST  25     /  ALT  19     /  AlkPhos  162    06 Sep 2020 14:51    PT/INR - ( 06 Sep 2020 14:51 )   PT: 24.6 sec;   INR: 2.14 ratio       64F with Hx stage IV lung CA (2017, s/p RLL lobectomy, s/p pall RT, s/p carbo/pemextred/pembrolizumab 7/31) s/p L QASIM for pathologic femoral neck fracture and acetabular screw/cement for medial wall lesion (Darrell, 8/18/20) who presents fever (100.9), hypoxia, and new left femoral DVT noted on CT pelvis.     -Pt with above presentation.   -New L femoral DVT noted. [facility] vascular team, will see patient. Expressed our thoughts on the possibility of IVC filter vs. anticoagulation. Pt has been on prophylactic dose Xarelto (10mg) post op. But currently an INR of 2.14. Ortho would prefer consideration of IVCf but will await official vascular recommendations in this regard.   -In regards to her left hip, xrays are unremarkable. Wound is clean and dry. No signs of surgical site infection. Staples will likely be DC'd during this admission. Will monitor wound for any signs of infection.   -No signs of deep infection on CT pelvis; Does show a sacral lesion that is likely contributing to her pain; Previously seen by Dr. Durham from neurosurgery (consulted last admission), had noncontrast MRI showing the mets as discussed. Recommended a TLSO brace at that time. Will reconsult (LEEANN neurosurgery resident) given the symptomatic sacral lesion, may get thigh extension for TLSO.   -Has a left supracondylar distal femur lesion that was being protected with a beulah brace. Will change WB status to 50%. In addition will obtain rad onc c/s for local radiation directed at the supracondylar lesion during this admission.   -Obtain bilateral LE dopplers for extent of L femoral DVT  -Give 1U PRBC now given low hgb  -FU ESR/CRP  -50% partial weight bearing LLE with unlocked beulah brace.   -FU BCx  -FU COVID swab  -Pain control  -Hemonc consult for inpatient management  -Rad onc consult as above (inpatient radiation to L supracondylar distal femur lesion)  -FU vascular consult recommendation for L femoral DVT  -Medical admission  -Consider ID consult if continues to have fevers  -Will follow.   -LEEANN Ramírez regarding above.  PTT - ( 07 Sep 2020 02:01 )  PTT:126.0 sec    A/P: Patient seen and examined at bedside. Continues to report left lower back pain and left lower thigh. No acute complaints at this time. Pain well controlled. Denies chest pain, shortness of breath, nausea or vomiting.     PE:  Vital Signs Last 24 Hrs  T(C): 36.4 (09-07-20 @ 04:31), Max: 38.3 (09-06-20 @ 14:38)  T(F): 97.6 (09-07-20 @ 04:31), Max: 100.9 (09-06-20 @ 14:38)  HR: 84 (09-07-20 @ 04:31) (83 - 115)  BP: 123/71 (09-07-20 @ 04:31) (108/72 - 138/76)  BP(mean): 78 (09-06-20 @ 19:19) (78 - 78)  RR: 18 (09-07-20 @ 04:31) (14 - 28)  SpO2: 100% (09-07-20 @ 04:31) (96% - 100%)    General: NAD, resting comfortably in bed  Wound CDI without redness.  Staples in place.   Compartments soft; + TTP about lateral hip.   No drainage or periincisional redness. No subcutaneous fluctuance.   No TTP to knee/leg/ankle/foot   Able to SLR; - Log Roll/Heel Strike  Motor intact GS/TA/FHL/EHL  SILT L2-S1  DP/PT pulses 2+  Calf and thigh are soft and compressible without pain. No pain with passive dorsiflexion of feet.                           7.4    6.45  )-----------( 245      ( 07 Sep 2020 02:01 )             24.3     06 Sep 2020 14:51    133    |  93     |  5      ----------------------------<  145    3.9     |  27     |  0.34     Ca    9.7        06 Sep 2020 14:51    TPro  6.3    /  Alb  2.9    /  TBili  1.0    /  DBili  x      /  AST  25     /  ALT  19     /  AlkPhos  162    06 Sep 2020 14:51    PT/INR - ( 06 Sep 2020 14:51 )   PT: 24.6 sec;   INR: 2.14 ratio       64F with Hx stage IV lung CA (2017, s/p RLL lobectomy, s/p pall RT, s/p carbo/pemextred/pembrolizumab 7/31) s/p L QASIM for pathologic femoral neck fracture and acetabular screw/cement for medial wall lesion (Darrell, 8/18/20) who presents fever (100.9), hypoxia, and new left femoral DVT noted on CT pelvis.     -Pt with above presentation.   -New L femoral DVT noted. [facility] vascular team, will see patient. Expressed our thoughts on the possibility of IVC filter vs. anticoagulation. Pt has been on prophylactic dose Xarelto (10mg) post op. But currently an INR of 2.14. Ortho would prefer consideration of IVCf but will await official vascular recommendations in this regard.   -In regards to her left hip, xrays are unremarkable. Wound is clean and dry. No signs of surgical site infection. Staples will likely be DC'd during this admission. Will monitor wound for any signs of infection.   -No signs of deep infection on CT pelvis; Does show a sacral lesion that is likely contributing to her pain; Previously seen by Dr. Durham from neurosurgery (consulted last admission), had noncontrast MRI showing the mets as discussed. Recommended a TLSO brace at that time. Will reconsult (LEEANN neurosurgery resident) given the symptomatic sacral lesion, may get thigh extension for TLSO.   -Has a left supracondylar distal femur lesion that was being protected with a beulah brace. Will change WB status to 50%. In addition will obtain rad onc c/s for local radiation directed at the supracondylar lesion during this admission.   -Obtain bilateral LE dopplers for extent of L femoral DVT  -Give 1U PRBC now given low hgb  -FU ESR/CRP  -50% partial weight bearing LLE with unlocked beulah brace.   -FU BCx  -FU COVID swab  -Pain control  -Hemonc consult for inpatient management  -Rad onc consult as above (inpatient radiation to L supracondylar distal femur lesion)  -FU vascular consult recommendation for L femoral DVT  -Medical admission  -Consider ID consult if continues to have fevers  -Will follow.   -LEEANN Ramírez regarding above.  PTT - ( 07 Sep 2020 02:01 )  PTT:126.0 sec    A/P:

## 2020-09-07 NOTE — PROGRESS NOTE ADULT - PROBLEM SELECTOR PLAN 4
Patient presented with tachycardia, tachypnea and fever of 100.9. Likely in setting of DVT vs malignancy. Lower suspicion for infectious etiology at this time - no leukocytosis, no localizing symptoms, UA neg, no abdominal pathology.    -will defer abx for now; however, if has recurrent fever or hemodynamic compromise, would start broad spectrum vanco/cefepime  - fever resolved. tachycardia and tachypnea improved with supplemental O2 as well as pain control  - f/u blood culture  - UA neg  - f/u VA duplex of b/l LE for assessing extent of DVTs Patient presented with tachycardia, tachypnea and fever of 100.9. Likely in setting of DVT vs malignancy. Lower suspicion for infectious etiology at this time - no leukocytosis, no localizing symptoms, UA neg, no abdominal pathology.  -Currently afebrile with no leukocytosis.   -will defer abx for now; however, if has recurrent fever or hemodynamic compromise, would start broad spectrum vanco/cefepime  - fever resolved. tachycardia and tachypnea improved with supplemental O2 as well as pain control  - f/u blood culture  - UA neg  - f/u VA duplex of b/l LE for assessing extent of DVTs

## 2020-09-07 NOTE — CONSULT NOTE ADULT - SUBJECTIVE AND OBJECTIVE BOX
p (1480)     HPI:  Pt is 64yoF w/ PMHx of stage IV lung adenocarcinoma in 2017 s/p RLL lobectomy with recurrence in May 2020 s/p palliative radiation to clavicle and spine and 1 round of carbo/pemextred/pembrolizumab chemo 7/31, with left femoral neck fracture s/p left QASIM on 8/18, anxiety, and hypothyroidism who presented from [facility] for left hip pain and O2 desat to low 80s. As per patient she stated she had 1 day of worsening shortness of breath. States usually her breathing difficulty is rated 2/10 but was 6-7/10 on day of admission. Able to lie flat without worsening of symptoms. In addition she felt generally unwell but without headaches, dizziness or lightheadedness. Usually experiences dizziness and as per becomes "loopy" on PO dilaudid but fine with IV. She has also had increasing pain in her left hip s/p QASIM on 8/18. Pain management was switched from IV dilaudid to PO upon transition from inpatient to rehab which per pt doesn't work; pt has also been using fentanyl and lido patches. Patient was desatting on 84% on RA in ambulance, but satting 100% on 3L O2 via NC. Otherwise patient denies fever, chills, nausea, vomiting, chest pain, abdominal pain, acute onset focal weakness or sensory changes.     In ED: 98.5, 115, 131/75, RR28, 100% on nasal cannula 4L. Patient had fever of 100.9F per rectum. On labs, patient has anemia w/ neg occult, mild hypoNa, incr Alk phos and UA showing urobilinogen. CTA chest/ abd/ pelvis shows no PE but shows increased R pleural effusion from prior, ill defined R hilar mass, hepatic/ L adrenal and extensive bony metastases new from 2019, new R renal lesion (mets vs infection), Screw fixated pathologic L hip fxr. CT head neg. Patient received 2L IV bolus and started on heparin drip for DVT. 1u pRBC ordered. Pain control with dilaudid and tylenol.     From previous hospital course: Patient also briefly experienced atrial tach/ aflutter days after surgery, and was started on amiodarone 5mg load with conversion back to sinus rhythm HR 70s-80s. Echo was limited due to tachycardia however valvular function and LV systolic function were normal. EP switched her from amio to metoprolol 50 daily. She was in sinus regular rate/rhythm over last few days without need for tele monitoring. Also, from prior hospital course, neurosurgery was consulted for osseous lesions in spine and MRI showed no cord or cauda equina compression. Neurosurgery recommended no intervention at this time. Oncology (outpatient and inpatient) confirmed she could go to rehab without need for systemic therapy. (06 Sep 2020 19:58)      Imaging:    Exam: AAOx3, FC, LLE proximally 4/5 (L hip pain) otherwise SANTOYO 5/5, denies radicular pain.    --Anticoagulation:  heparin   Injectable 7000 Unit(s) IV Push every 6 hours PRN  heparin   Injectable 3500 Unit(s) IV Push every 6 hours PRN  heparin  Infusion.  Unit(s)/Hr IV Continuous <Continuous>    =====================  PAST MEDICAL HISTORY   Stage IV adenocarcinoma of lung  Genital herpes  Drug abuse  Lung cancer  LAMAR on CPAP  Anxiety  Hypothyroid    PAST SURGICAL HISTORY   History of total hip replacement, left  S/P partial lobectomy of lung  History of bunionectomy  Status post lobectomy of lung  S/P bunionectomy    Bananas (Headache)  Bananas (Other)  latex (Rash)  latex (Rash (Mild))  opioid-like analgesics (Urticaria)  penicillin (Angioedema)  penicillin (Swelling (Mild to Mod))      MEDICATIONS:  Antibiotics:    Neuro:  ALPRAZolam 0.25 milliGRAM(s) Oral two times a day PRN  ALPRAZolam 0.25 milliGRAM(s) Oral at bedtime  fentaNYL   Patch  25 MICROgram(s)/Hr 1 Patch Transdermal every 72 hours  HYDROmorphone  Injectable 1 milliGRAM(s) IV Push every 6 hours PRN  melatonin 3 milliGRAM(s) Oral at bedtime    Other:  aluminum hydroxide/magnesium hydroxide/simethicone Suspension 30 milliLiter(s) Oral every 6 hours PRN  levothyroxine 175 MICROGram(s) Oral daily  liothyronine 5 MICROGram(s) Oral daily  metoprolol tartrate 25 milliGRAM(s) Oral two times a day  polyethylene glycol 3350 17 Gram(s) Oral daily  senna 2 Tablet(s) Oral at bedtime      SOCIAL HISTORY:   Occupation:   Marital Status:     FAMILY HISTORY:  Family history of stroke or transient ischemic attack in father  Family history of hypothyroidism  Family history of hypertension  Family history of stroke      ROS: Negative except per HPI    LABS:  PT/INR - ( 06 Sep 2020 14:51 )   PT: 24.6 sec;   INR: 2.14 ratio         PTT - ( 07 Sep 2020 02:01 )  PTT:126.0 sec                        7.4    6.45  )-----------( 245      ( 07 Sep 2020 02:01 )             24.3     09-06    133<L>  |  93<L>  |  5<L>  ----------------------------<  145<H>  3.9   |  27  |  0.34<L>    Ca    9.7      06 Sep 2020 14:51    TPro  6.3  /  Alb  2.9<L>  /  TBili  1.0  /  DBili  x   /  AST  25  /  ALT  19  /  AlkPhos  162<H>  09-06

## 2020-09-07 NOTE — CONSULT NOTE ADULT - PROBLEM SELECTOR RECOMMENDATION 3
.  Left hip pain due to increase in metastatic disease of left pelvis    - As above, the patient has ACUTE/ WORSENING pain due to worsening mets + tolerance to the analgesia + sensitivity to neurotoxic effects  - We will have to find balance b/w analgesia and sedation during this dose-finding stage  - I will take the liberty of orderin) D/c Fentanyl patch. Hold off on LA agents for now due to excessive lethargy.  2) Start scheduled Dilaudid 4mg PO q6h ATC. Hold for sedation.  3) Keep rescue Dilaudid IV 1mg q4h PRN  4) Schedule Ibuprofen 600mg PO q8h ATC x 3 days only    - QTc 464. May be good candidate for methadone later on  - Will also consider Methylphenidate if needed  - For bony mets in general: re-consult Rad-Onc if even possible to do repeat XRT  - BSP may be a good option BUT patient with hyperPO4 already

## 2020-09-07 NOTE — CONSULT NOTE ADULT - PROBLEM SELECTOR RECOMMENDATION 7
Thank you for allowing us to participate in your patient's care. We will continue to follow with you. Please page [phone] or contact us via Microsoft Teams for any q's or c's.     Trevor Rivera  Palliative Medicine

## 2020-09-07 NOTE — CONSULT NOTE ADULT - SUBJECTIVE AND OBJECTIVE BOX
HPI:  64yoF w/ PMHx of stage IV lung adenocarcinoma ( dx 2017) s/p RLL lobectomy with recurrence in May 2020 s/p palliative radiation to clavicle and spine and 1 round of carbo/pemextred/pembrolizumab chemo , recent admission for pathologic L femoral neck fracture s/p left QASIM on , presented from [facility] rehab for left hip pain, hypoxemia and anemia. Palliative Medicine was consulted to evaluate and manage complex symptomatology related to the patient's life-limiting illness    =======================================================  2020    - Chart reviewed. Patient seen and examined.  - The patient was comfortable during my visit. Smiling and making jokes. Good eye contact. The  was also present.  - She reports that her left hip pain is "not worsening, it's the same" - I'm not sure if this is just a case of overreporting pain scores.  - With regards to her pain regimen, here is what she said in summary:    1) Bad reactions to Morphine, Oxycodone, Codeine- severe pruritus  2) Too much Dilaudid "makes me go to [place]"- she gets confused and delirious  3) PO Dilaudid "does not work"  4) IV Dilaudid is the only thing that works  5) The fentanyl patch is not doing anything    - Discussed with BORGES attending: concern for worsening encephalopathy 2/2 opioids  - CT scans revealed increase in size of mets in R intertrochanteric femur and L pelvis.     =======================================================  ----- SYMPTOM ASSESSMENT:   [ ]Unable to obtain due to poor mentation: information obtained from team/ contact    PAIN ASSESSMENT:   Site- LEFT HIP  Onset- ACUTE ON CHRONIC  Character- SHARP  Radiation- NONE  Associated symptoms- NONE  Timing and duration- INTERMITTENT SPIKES THROUGHOUT THE DAY  Exacerbating factors- "RUNNING OUT OF PAIN MEDS"  Severity- MOD TO SEVERE    Effect on QOL- SIGNIFICANTLY INTERFERES WITH ADL'S  PAIN AD Score: 0    Dyspnea:  Yes [ ] No [X ] - [ ]Mild [ ]Moderate [ ]Severe  Anxiety:    Yes [ ] No [ X] - [ ]Mild [ ]Moderate [ ]Severe  Fatigue:    Yes [ ] No [X ] - [ ]Mild [ ]Moderate [ ]Severe  Nausea:    Yes [ ] No [ X] - [ ]Mild [ ]Moderate [ ]Severe                         Loss of appetite: Yes [ ] No [X ] - [ ]Mild [ ]Moderate [ ]Severe             Constipation:  Yes [ ] No [X ] - [ ]Mild [ ]Moderate [ ]Severe  Grief: Yes [ ] No [X ]     [X ]All other review of systems negative, including: weakness, cough, colds, blurry vision, headaches, dysuria, pruritus, palpitations, muscle cramps, easy bruising, epistaxis, rashes    =======================================================  ----- PERTINENT PMH/ SXH/ FHX  Stage IV adenocarcinoma of lung  Hypothyroid  Hypertension  Genital herpes  Drug abuse  Lung cancer  LAMAR on CPAP  Anxiety  Hypothyroid    History of total hip replacement, left  S/P partial lobectomy of lung  History of bunionectomy  Status post lobectomy of lung  S/P bunionectomy    FAMILY HISTORY:  Family history of stroke or transient ischemic attack in father  Family history of hypothyroidism  Family history of hypertension  Family history of stroke      ----- SOCIAL HISTORY:   [ ] Unable to elicit  Significant other/partner:  YES  Children:   Jewish/Spirituality:  Substance hx: Yes[ ]  No [ ]   Tobacco hx:  Yes [ ] No [ ]   Alcohol hx: Yes [ ] No [ ]   Home Opioid hx:  [ ] I-Stop Reference No:  Living Situation: [ ]Home  [ ]Long term care  [X ]Rehab [ ]Other    ----- ADVANCE DIRECTIVES:    DNR  MOLST  [ ]  Living Will  [ ]   DECISION MAKER(s):  [ ] Health Care Proxy(s)  [ ] Surrogate(s)  [ ] Guardian           Name(s): Phone Number(s):    ----- BASELINE (I)ADL(s) (prior to admission):  [place]: [ ]Total  [ ] Moderate [ ]Dependent  Palliative Performance Status Version 2:  40%    =======================================================  -----MEDICATIONS AND ALLERGIES:  Allergies    Bananas (Headache)  Bananas (Other)  latex (Rash)  latex (Rash (Mild))  opioid-like analgesics (Urticaria)  penicillin (Angioedema)  penicillin (Swelling (Mild to Mod))    Intolerances    MEDICATIONS  (STANDING):  ALPRAZolam 0.25 milliGRAM(s) Oral at bedtime  fentaNYL   Patch  25 MICROgram(s)/Hr 1 Patch Transdermal every 72 hours  heparin  Infusion. 1400 Unit(s)/Hr (14 mL/Hr) IV Continuous <Continuous>  levothyroxine 175 MICROGram(s) Oral daily  lidocaine   Patch 1 Patch Transdermal daily  liothyronine 5 MICROGram(s) Oral daily  melatonin 3 milliGRAM(s) Oral at bedtime  metoprolol tartrate 25 milliGRAM(s) Oral two times a day  polyethylene glycol 3350 17 Gram(s) Oral daily  senna 2 Tablet(s) Oral at bedtime    MEDICATIONS  (PRN):  acetaminophen   Tablet .. 650 milliGRAM(s) Oral every 6 hours PRN Temp greater or equal to 38C (100.4F), Mild Pain (1 - 3)  ALPRAZolam 0.25 milliGRAM(s) Oral two times a day PRN anxiety  aluminum hydroxide/magnesium hydroxide/simethicone Suspension 30 milliLiter(s) Oral every 6 hours PRN Dyspepsia  heparin   Injectable 7000 Unit(s) IV Push every 6 hours PRN For aPTT less than 40  heparin   Injectable 3500 Unit(s) IV Push every 6 hours PRN For aPTT between 40 - 57  HYDROmorphone  Injectable 2 milliGRAM(s) IV Push every 4 hours PRN Severe Pain (7 - 10)  HYDROmorphone  Injectable 1 milliGRAM(s) IV Push every 4 hours PRN Moderate Pain (4 - 6)      =======================================================  ----- PHYSICAL EXAM:  Vital Signs Last 24 Hrs  T(C): 36.5 (07 Sep 2020 15:33), Max: 36.8 (06 Sep 2020 18:57)  T(F): 97.7 (07 Sep 2020 15:33), Max: 98.2 (06 Sep 2020 18:57)  HR: 85 (07 Sep 2020 15:33) (83 - 96)  BP: 112/68 (07 Sep 2020 15:33) (108/68 - 123/71)  BP(mean): 78 (06 Sep 2020 19:19) (78 - 78)  RR: 16 (07 Sep 2020 15:33) (16 - 20)  SpO2: 97% (07 Sep 2020 15:33) (96% - 100%) I&O's Summary    06 Sep 2020 07:01  -  07 Sep 2020 07:00  --------------------------------------------------------  IN: 485 mL / OUT: 500 mL / NET: -15 mL    07 Sep 2020 07:01  -  07 Sep 2020 15:54  --------------------------------------------------------  IN: 0 mL / OUT: 200 mL / NET: -200 mL    GEN: Awake, LETHARGIC/ SLEEPY, NAD  HEAD: Normocephalic and atraumatic,   EYES: Anicteric sclerae, EOM's grossly intact  NECK: No JVD, no thyromegaly  PULM: Comfortable work of breathing, clear BS  CV: Pulses 2+ symmetric bilaterally, regular rate and rhythm  ABD: Not distended, soft, non-tender,   EXT: No edema, No deformities  PSYCH: Appropriate mood and affect, no suicidal ideations elicited  NEURO: No facial asymmetry, no tremors, no observed movement disorders  SKIN: No rashes or lesions on exposed skin, No jaundice    =======================================================  ----- LABS:                        8.7    6.43  )-----------( 254      ( 07 Sep 2020 09:44 )             27.9   -    137  |  97  |  <4<L>  ----------------------------<  108<H>  3.7   |  29  |  0.30<L>    Ca    9.0      07 Sep 2020 09:44  Phos  4.6       Mg     1.7         TPro  6.3  /  Alb  2.9<L>  /  TBili  1.0  /  DBili  x   /  AST  25  /  ALT  19  /  AlkPhos  162<H>    PT/INR - ( 06 Sep 2020 14:51 )   PT: 24.6 sec;   INR: 2.14 ratio         PTT - ( 07 Sep 2020 09:44 )  PTT:76.3 sec  Urinalysis Basic - ( 06 Sep 2020 15:11 )    Color: Yellow / Appearance: Slightly Turbid / S.014 / pH: x  Gluc: x / Ketone: Negative  / Bili: Negative / Urobili: 4 mg/dL   Blood: x / Protein: Trace / Nitrite: Negative   Leuk Esterase: Negative / RBC: 1 /hpf / WBC 2 /HPF   Sq Epi: x / Non Sq Epi: 5 /hpf / Bacteria: Negative        -----RADIOLOGY & ADDITIONAL STUDIES:    PROTEIN CALORIE MALNUTRITION PRESENT: [ ] Yes [ ] No  [ ] PPSV2 < or = to 30% [ ] significant weight loss  [ ] poor nutritional intake [ ] catabolic state [ ] anasarca     Albumin, Serum: 2.9 g/dL (20 @ 14:51)      REFERRALS:   [ ]Chaplaincy  [ ] Hospice  [ ]Child Life  [ ]Social Work  [ ]Case management [ ]Holistic Therapy   Goals of Care Discussion Document: BETTYE Carlos (07-15-20 @ 14:32)  Goals of Care Conversation:   Conversation Discussion:  · Conversation Details  KERRY contacted patients spouse Yehuda to offer support in coping with patients catastrophic dx and hospitalization. Spouse appreciative of SW call and reviewed his concerns for planning for when patient discharged. Spouse requesting to speak to hospitalist regarding time frame for discharge since he is aware Friday is last palliative RT tx. SW contacted hospitalist to request follow up with spouse.      Electronic Signatures:  Viji Carlos (PAOLA)  (Signed 15-Jul-2020 14:34)  	Authored: Goals of Care Conversation      Last Updated: 15-Jul-2020 14:34 by Viji Carlos (PAOLA)

## 2020-09-07 NOTE — CONSULT NOTE ADULT - ASSESSMENT
Sofia Paula (DW)  64F prior consult for multiple spine lesions (stg 4 lung adeno) s/p QASIM on priod admission for L pelvic lesion, re-presents from  for L hip pain and desats, found to be anemic and now on hep gtt for com fem DVT. Exam: AAOx3, FC, LLE proximally 4/5 (L hip pain) otherwise SANTOYO 5/5, denies radicular pain.  - Rec continue TLSO brace when OOB, can obtain thigh extensions in setting of pelvic/sacral lesions if no contraindications w/ prior L hip QASIM and femur Sx  - No plans for surgical intervention, will s/o at this time

## 2020-09-07 NOTE — PROGRESS NOTE ADULT - PROBLEM SELECTOR PLAN 9
Anticoagulation: on heparin   Diet: regular  Consults: Ortho, vasc, onc, rad/onc Anticoagulation: on heparin   Diet: regular  Consults: Ortho, vasc, onc, rad/onc, Pulm, pain management.

## 2020-09-07 NOTE — CONSULT NOTE ADULT - PROBLEM SELECTOR RECOMMENDATION 9
.  CT head WNL  Etiology: Opioid induced neurotoxicity vs. Anemia vs. Delirium vs r/o other metabolic/ infectious causes    > Pt did say she gets confused/ sleepy with "too much dilaudid"  > We will have to balance analgesia with side effects  > Will consider Methylphenidate later on once we sort out her opioid regimen  > Defer to Medicine for rest of workup

## 2020-09-07 NOTE — PROGRESS NOTE ADULT - SUBJECTIVE AND OBJECTIVE BOX
PROGRESS NOTE:   Authored by Louie De La Torre MD, PGY1 [facility] Pager [phone] [facility] pager [phone]    Patient is a 64y old  Female who presents with a chief complaint of     SUBJECTIVE / OVERNIGHT EVENTS:     REVIEW OF SYSTEMS:    CONSTITUTIONAL: No weakness, fevers or chills  EYES/ENT: No visual changes;  No vertigo or throat pain   NECK: No pain or stiffness  RESPIRATORY: No cough, wheezing, hemoptysis; No shortness of breath  CARDIOVASCULAR: No chest pain or palpitations  GASTROINTESTINAL: No abdominal or epigastric pain. No nausea, vomiting, or hematemesis; No diarrhea or constipation. No melena or hematochezia.  GENITOURINARY: No dysuria, frequency or hematuria  NEUROLOGICAL: No numbness or weakness  SKIN: No itching, rashes    MEDICATIONS  (STANDING):  ALPRAZolam 0.25 milliGRAM(s) Oral at bedtime  fentaNYL   Patch  25 MICROgram(s)/Hr 1 Patch Transdermal every 72 hours  heparin  Infusion.  Unit(s)/Hr (16 mL/Hr) IV Continuous <Continuous>  levothyroxine 175 MICROGram(s) Oral daily  lidocaine   Patch 1 Patch Transdermal daily  liothyronine 5 MICROGram(s) Oral daily  melatonin 3 milliGRAM(s) Oral at bedtime  metoprolol tartrate 25 milliGRAM(s) Oral two times a day  polyethylene glycol 3350 17 Gram(s) Oral daily  senna 2 Tablet(s) Oral at bedtime    MEDICATIONS  (PRN):  acetaminophen   Tablet .. 650 milliGRAM(s) Oral every 6 hours PRN Temp greater or equal to 38C (100.4F), Mild Pain (1 - 3)  ALPRAZolam 0.25 milliGRAM(s) Oral two times a day PRN anxiety  aluminum hydroxide/magnesium hydroxide/simethicone Suspension 30 milliLiter(s) Oral every 6 hours PRN Dyspepsia  heparin   Injectable 7000 Unit(s) IV Push every 6 hours PRN For aPTT less than 40  heparin   Injectable 3500 Unit(s) IV Push every 6 hours PRN For aPTT between 40 - 57  HYDROmorphone   Tablet 2 milliGRAM(s) Oral every 4 hours PRN Moderate Pain (4 - 6)  HYDROmorphone  Injectable 1 milliGRAM(s) IV Push every 4 hours PRN Severe Pain (7 - 10)      CAPILLARY BLOOD GLUCOSE        I&O's Summary    06 Sep 2020 07:01  -  07 Sep 2020 07:00  --------------------------------------------------------  IN: 485 mL / OUT: 500 mL / NET: -15 mL        PHYSICAL EXAM:  Vital Signs Last 24 Hrs  T(C): 36.4 (07 Sep 2020 04:31), Max: 38.3 (06 Sep 2020 14:38)  T(F): 97.6 (07 Sep 2020 04:31), Max: 100.9 (06 Sep 2020 14:38)  HR: 84 (07 Sep 2020 04:) (83 - 115)  BP: 123/71 (07 Sep 2020 04:) (108/72 - 138/76)  BP(mean): 78 (06 Sep 2020 19:19) (78 - 78)  RR: 18 (07 Sep 2020 04:31) (14 - 28)  SpO2: 100% (07 Sep 2020 04:31) (96% - 100%)    CONSTITUTIONAL: NAD, well-developed  RESPIRATORY: Normal respiratory effort; lungs are clear to auscultation bilaterally  CARDIOVASCULAR: Regular rate and rhythm, normal S1 and S2, no murmur/rub/gallop; No lower extremity edema; Peripheral pulses are 2+ bilaterally  ABDOMEN: Nontender to palpation, normoactive bowel sounds, no rebound/guarding; No hepatosplenomegaly  MUSCLOSKELETAL: no clubbing or cyanosis of digits; no joint swelling or tenderness to palpation  PSYCH: A+O to person, place, and time; affect appropriate  NEURO: Non-focal, no tremors  SKIN: No rashes    LABS:                        7.4    6.45  )-----------( 245      ( 07 Sep 2020 02:01 )             24.3     09-06    133<L>  |  93<L>  |  5<L>  ----------------------------<  145<H>  3.9   |  27  |  0.34<L>    Ca    9.7      06 Sep 2020 14:51    TPro  6.3  /  Alb  2.9<L>  /  TBili  1.0  /  DBili  x   /  AST  25  /  ALT  19  /  AlkPhos  162<H>  09-06    PT/INR - ( 06 Sep 2020 14:51 )   PT: 24.6 sec;   INR: 2.14 ratio         PTT - ( 07 Sep 2020 02:01 )  PTT:126.0 sec  CARDIAC MARKERS ( 06 Sep 2020 18:30 )  x     / x     / x     / x     / 1.1 ng/mL  CARDIAC MARKERS ( 06 Sep 2020 14:51 )  x     / x     / x     / x     / 1.3 ng/mL      Urinalysis Basic - ( 06 Sep 2020 15:11 )    Color: Yellow / Appearance: Slightly Turbid / S.014 / pH: x  Gluc: x / Ketone: Negative  / Bili: Negative / Urobili: 4 mg/dL   Blood: x / Protein: Trace / Nitrite: Negative   Leuk Esterase: Negative / RBC: 1 /hpf / WBC 2 /HPF   Sq Epi: x / Non Sq Epi: 5 /hpf / Bacteria: Negative          RADIOLOGY & ADDITIONAL TESTS:  No new imaging or tests    COORDINATION OF CARE:  Care Discussed with Consultants/Other Providers [Y/N]:  Prior or Outpatient Records Reviewed [Y/N]: PROGRESS NOTE:   Authored by Louie De La Torre MD, PGY1 [facility] Pager [phone] [facility] pager [phone]    Patient is a 64y old  Female who presents with a chief complaint of     SUBJECTIVE / OVERNIGHT EVENTS: No acute events overnight. Patient is in significant pain this morning and required her dilaudid an hour early, but upon seeing her afterwards she notes her pain is improved. Pain is mainly in left leg but also experiences pain in lower back. Notes no SOB currently on 3L 02. Denies fevers, chills, chest pain, abdominal pain, n/v/d or dysuria. Notes LBM 4 days ago and that she is chronically constipated.     REVIEW OF SYSTEMS:    CONSTITUTIONAL: No weakness, fevers or chills.   EYES/ENT: No visual changes;  No vertigo or throat pain   NECK: No pain or stiffness  RESPIRATORY: No cough, wheezing, hemoptysis; No shortness of breath  CARDIOVASCULAR: No chest pain or palpitations  GASTROINTESTINAL: No abdominal or epigastric pain. No nausea, vomiting, or hematemesis; No diarrhea or constipation. No melena or hematochezia.  GENITOURINARY: No dysuria, frequency or hematuria  NEUROLOGICAL: No numbness or weakness  SKIN: No itching, rashes +LLE pain 10/10.     MEDICATIONS  (STANDING):  ALPRAZolam 0.25 milliGRAM(s) Oral at bedtime  fentaNYL   Patch  25 MICROgram(s)/Hr 1 Patch Transdermal every 72 hours  heparin  Infusion.  Unit(s)/Hr (16 mL/Hr) IV Continuous <Continuous>  levothyroxine 175 MICROGram(s) Oral daily  lidocaine   Patch 1 Patch Transdermal daily  liothyronine 5 MICROGram(s) Oral daily  melatonin 3 milliGRAM(s) Oral at bedtime  metoprolol tartrate 25 milliGRAM(s) Oral two times a day  polyethylene glycol 3350 17 Gram(s) Oral daily  senna 2 Tablet(s) Oral at bedtime    MEDICATIONS  (PRN):  acetaminophen   Tablet .. 650 milliGRAM(s) Oral every 6 hours PRN Temp greater or equal to 38C (100.4F), Mild Pain (1 - 3)  ALPRAZolam 0.25 milliGRAM(s) Oral two times a day PRN anxiety  aluminum hydroxide/magnesium hydroxide/simethicone Suspension 30 milliLiter(s) Oral every 6 hours PRN Dyspepsia  heparin   Injectable 7000 Unit(s) IV Push every 6 hours PRN For aPTT less than 40  heparin   Injectable 3500 Unit(s) IV Push every 6 hours PRN For aPTT between 40 - 57  HYDROmorphone   Tablet 2 milliGRAM(s) Oral every 4 hours PRN Moderate Pain (4 - 6)  HYDROmorphone  Injectable 1 milliGRAM(s) IV Push every 4 hours PRN Severe Pain (7 - 10)      CAPILLARY BLOOD GLUCOSE        I&O's Summary    06 Sep 2020 07:01  -  07 Sep 2020 07:00  --------------------------------------------------------  IN: 485 mL / OUT: 500 mL / NET: -15 mL        PHYSICAL EXAM:  Vital Signs Last 24 Hrs  T(C): 36.4 (07 Sep 2020 04:31), Max: 38.3 (06 Sep 2020 14:38)  T(F): 97.6 (07 Sep 2020 04:31), Max: 100.9 (06 Sep 2020 14:38)  HR: 84 (07 Sep 2020 04:31) (83 - 115)  BP: 123/71 (07 Sep 2020 04:31) (108/72 - 138/76)  BP(mean): 78 (06 Sep 2020 19:19) (78 - 78)  RR: 18 (07 Sep 2020 04:31) (14 - 28)  SpO2: 100% (07 Sep 2020 04:31) (96% - 100%)    CONSTITUTIONAL: NAD, well-developed  RESPIRATORY: Normal respiratory effort; coarse breath sounds b/l lung fields.   CARDIOVASCULAR: Regular rate and rhythm, normal S1 and S2, no murmur/rub/gallop; Peripheral pulses are 2+ bilaterally. No LE edema.   ABDOMEN: Nontender to palpation, normoactive bowel sounds, no rebound/guarding; No hepatosplenomegaly  MUSCLOSKELETAL: no clubbing or cyanosis of digits; no joint swelling. +tenderness to palpation of left knee.   PSYCH: A+O to person, place, and time; affect appropriate  NEURO: Non-focal, no tremors  SKIN: No rashes    LABS:                        7.4    6.45  )-----------( 245      ( 07 Sep 2020 02:01 )             24.3     09-06    133<L>  |  93<L>  |  5<L>  ----------------------------<  145<H>  3.9   |  27  |  0.34<L>    Ca    9.7      06 Sep 2020 14:51    TPro  6.3  /  Alb  2.9<L>  /  TBili  1.0  /  DBili  x   /  AST  25  /  ALT  19  /  AlkPhos  162<H>  09-06    PT/INR - ( 06 Sep 2020 14:51 )   PT: 24.6 sec;   INR: 2.14 ratio         PTT - ( 07 Sep 2020 02:01 )  PTT:126.0 sec  CARDIAC MARKERS ( 06 Sep 2020 18:30 )  x     / x     / x     / x     / 1.1 ng/mL  CARDIAC MARKERS ( 06 Sep 2020 14:51 )  x     / x     / x     / x     / 1.3 ng/mL      Urinalysis Basic - ( 06 Sep 2020 15:11 )    Color: Yellow / Appearance: Slightly Turbid / S.014 / pH: x  Gluc: x / Ketone: Negative  / Bili: Negative / Urobili: 4 mg/dL   Blood: x / Protein: Trace / Nitrite: Negative   Leuk Esterase: Negative / RBC: 1 /hpf / WBC 2 /HPF   Sq Epi: x / Non Sq Epi: 5 /hpf / Bacteria: Negative          RADIOLOGY & ADDITIONAL TESTS:  No new imaging or tests    COORDINATION OF CARE:  Care Discussed with Consultants/Other Providers [Y/N]:  Prior or Outpatient Records Reviewed [Y/N]: PROGRESS NOTE:   Authored by Louie De La Torre MD, PGY1 [facility] Pager [phone] [facility] pager [phone]    Patient is a 64y old  Female who presents with a chief complaint of LLE pain.     SUBJECTIVE / OVERNIGHT EVENTS: No acute events overnight. Patient is in significant pain this morning and required her dilaudid an hour early, but upon seeing her afterwards she notes her pain is improved. Pain is mainly in left leg but also experiences pain in lower back. Notes no SOB currently on 3L 02. Denies fevers, chills, chest pain, abdominal pain, n/v/d or dysuria. Notes LBM 4 days ago and that she is chronically constipated.     REVIEW OF SYSTEMS:    CONSTITUTIONAL: No weakness, fevers or chills.   EYES/ENT: No visual changes;  No vertigo or throat pain   NECK: No pain or stiffness  RESPIRATORY: No cough, wheezing, hemoptysis; No shortness of breath  CARDIOVASCULAR: No chest pain or palpitations  GASTROINTESTINAL: No abdominal or epigastric pain. No nausea, vomiting, or hematemesis; No diarrhea or constipation. No melena or hematochezia.  GENITOURINARY: No dysuria, frequency or hematuria  NEUROLOGICAL: No numbness or weakness  SKIN: No itching, rashes +LLE pain 10/10.     MEDICATIONS  (STANDING):  ALPRAZolam 0.25 milliGRAM(s) Oral at bedtime  fentaNYL   Patch  25 MICROgram(s)/Hr 1 Patch Transdermal every 72 hours  heparin  Infusion.  Unit(s)/Hr (16 mL/Hr) IV Continuous <Continuous>  levothyroxine 175 MICROGram(s) Oral daily  lidocaine   Patch 1 Patch Transdermal daily  liothyronine 5 MICROGram(s) Oral daily  melatonin 3 milliGRAM(s) Oral at bedtime  metoprolol tartrate 25 milliGRAM(s) Oral two times a day  polyethylene glycol 3350 17 Gram(s) Oral daily  senna 2 Tablet(s) Oral at bedtime    MEDICATIONS  (PRN):  acetaminophen   Tablet .. 650 milliGRAM(s) Oral every 6 hours PRN Temp greater or equal to 38C (100.4F), Mild Pain (1 - 3)  ALPRAZolam 0.25 milliGRAM(s) Oral two times a day PRN anxiety  aluminum hydroxide/magnesium hydroxide/simethicone Suspension 30 milliLiter(s) Oral every 6 hours PRN Dyspepsia  heparin   Injectable 7000 Unit(s) IV Push every 6 hours PRN For aPTT less than 40  heparin   Injectable 3500 Unit(s) IV Push every 6 hours PRN For aPTT between 40 - 57  HYDROmorphone   Tablet 2 milliGRAM(s) Oral every 4 hours PRN Moderate Pain (4 - 6)  HYDROmorphone  Injectable 1 milliGRAM(s) IV Push every 4 hours PRN Severe Pain (7 - 10)      CAPILLARY BLOOD GLUCOSE        I&O's Summary    06 Sep 2020 07:01  -  07 Sep 2020 07:00  --------------------------------------------------------  IN: 485 mL / OUT: 500 mL / NET: -15 mL        PHYSICAL EXAM:  Vital Signs Last 24 Hrs  T(C): 36.4 (07 Sep 2020 04:31), Max: 38.3 (06 Sep 2020 14:38)  T(F): 97.6 (07 Sep 2020 04:31), Max: 100.9 (06 Sep 2020 14:38)  HR: 84 (07 Sep 2020 04:31) (83 - 115)  BP: 123/71 (07 Sep 2020 04:31) (108/72 - 138/76)  BP(mean): 78 (06 Sep 2020 19:19) (78 - 78)  RR: 18 (07 Sep 2020 04:31) (14 - 28)  SpO2: 100% (07 Sep 2020 04:31) (96% - 100%)    CONSTITUTIONAL: NAD, well-developed  RESPIRATORY: Normal respiratory effort; coarse breath sounds b/l lung fields.   CARDIOVASCULAR: Regular rate and rhythm, normal S1 and S2, no murmur/rub/gallop; Peripheral pulses are 2+ bilaterally. No LE edema.   ABDOMEN: Nontender to palpation, normoactive bowel sounds, no rebound/guarding; No hepatosplenomegaly  MUSCLOSKELETAL: no clubbing or cyanosis of digits; no joint swelling. +tenderness to palpation of left knee.   PSYCH: A+O to person, place, and time; affect appropriate  NEURO: Non-focal, no tremors  SKIN: No rashes    LABS:                        7.4    6.45  )-----------( 245      ( 07 Sep 2020 02:01 )             24.3     09-    133<L>  |  93<L>  |  5<L>  ----------------------------<  145<H>  3.9   |  27  |  0.34<L>    Ca    9.7      06 Sep 2020 14:51    TPro  6.3  /  Alb  2.9<L>  /  TBili  1.0  /  DBili  x   /  AST  25  /  ALT  19  /  AlkPhos  162<H>  09    PT/INR - ( 06 Sep 2020 14:51 )   PT: 24.6 sec;   INR: 2.14 ratio         PTT - ( 07 Sep 2020 02:01 )  PTT:126.0 sec  CARDIAC MARKERS ( 06 Sep 2020 18:30 )  x     / x     / x     / x     / 1.1 ng/mL  CARDIAC MARKERS ( 06 Sep 2020 14:51 )  x     / x     / x     / x     / 1.3 ng/mL      Urinalysis Basic - ( 06 Sep 2020 15:11 )    Color: Yellow / Appearance: Slightly Turbid / S.014 / pH: x  Gluc: x / Ketone: Negative  / Bili: Negative / Urobili: 4 mg/dL   Blood: x / Protein: Trace / Nitrite: Negative   Leuk Esterase: Negative / RBC: 1 /hpf / WBC 2 /HPF   Sq Epi: x / Non Sq Epi: 5 /hpf / Bacteria: Negative          RADIOLOGY & ADDITIONAL TESTS:  No new imaging or tests    COORDINATION OF CARE:  Care Discussed with Consultants/Other Providers [Y/N]:  Prior or Outpatient Records Reviewed [Y/N]:

## 2020-09-07 NOTE — PROGRESS NOTE ADULT - ASSESSMENT
64yoF w/ PMHx of stage IV lung adenocarcinoma ( dx 2017) s/p RLL lobectomy with recurrence in May 2020 s/p palliative radiation to clavicle and spine and 1 round of carbo/pemextred/pembrolizumab chemo 7/31, hypothyroidism, anxiety, recent admission for pathologic L femoral neck fracture s/p left QASIM on 8/18 discharged to [facility] on xarelto 10mg or DVT ppx, p/w worsening L hip pain and hypoxia - a/w acute LLE DVT and worsening hypoxic respiratory failure 2/2 lung cancer with worsening R sided effusion.

## 2020-09-07 NOTE — PROGRESS NOTE ADULT - PROBLEM SELECTOR PLAN 6
Patient labs show anemia with slow decline since early this year. Now 7.6. FOBT (-); ?2/2 malignancy, chemo    - pending 1u pRBC.  - trend daily CBC for now  - will need to monitor closely given heparin gtt; vascular following for possible IVC filter if indicated Patient labs show anemia with slow decline since early this year. Now 7.6. FOBT (-); ?2/2 malignancy, chemo    -s/p 1U pRBC, Hgb now 8.7  - trend daily CBC for now  - will need to monitor closely given heparin gtt; vascular following for possible IVC filter if indicated Patient labs show anemia with slow decline since early this year. Now 7.4. FOBT (-); ?2/2 malignancy, chemo    -s/p 1U pRBC, Hgb now 8.7  - trend daily CBC for now  - will need to monitor closely given heparin gtt; vascular following for possible IVC filter if indicated

## 2020-09-07 NOTE — CONSULT NOTE ADULT - ASSESSMENT
64yoF w/ PMHx of stage IV lung adenocarcinoma ( dx 2017) s/p RLL lobectomy with recurrence in May 2020 s/p palliative radiation to clavicle and spine and 1 round of carbo/pemextred/pembrolizumab chemo 7/31, recent admission for pathologic L femoral neck fracture s/p left QASIM on 8/18, presented from [facility] for left hip pain, hypoxemia and anemia. Palliative Medicine was consulted to evaluate and manage complex symptomatology related to the patient's life-limiting illness

## 2020-09-07 NOTE — CONSULT NOTE ADULT - SUBJECTIVE AND OBJECTIVE BOX
CHIEF COMPLAINT:  LLE pain for past several days    HPI:  64 year old female with metastatic lung adenocarcinoma (diagnosed ) s/p RLL lobectomy and then palliative radiation and chemotherapy () who presents from [facility] with increased left hip pain that is sharp, radiating down left leg, and alleviated with opiates. Pain had been worsening since admission to [facility]. She denies dyspnea, wheezing, or chest pain. Feels lethargic. Sometimes when speaking for prolonged period of time she feel winded. Notes to hypoxemic on room air initially which correct with supplemental oxygen. Was recently admitted for pathologic left femoral neck fracture s/p left QASIM 2020. Pulmonary consulted to evaluate pleural effusion. She has a large right sided pleural effusion. She is on a heparin gtt for DVT. CTPA was negative for PE.    PAST MEDICAL & SURGICAL HISTORY:  Stage IV adenocarcinoma of lung  Genital herpes  Drug abuse: Back in the 70&#x27;s and 80&#x27;s (Cocaine)  Lung cancer: with mets  LAMAR on CPAP  Anxiety  Hypothyroid  History of total hip replacement, left: THR 2020  S/P partial lobectomy of lung: Right lower lobe  History of bunionectomy  Status post lobectomy of lung: 3/2017 (Right lung)  S/P bunionectomy: with revision      FAMILY HISTORY:  Family history of stroke or transient ischemic attack in father  Family history of hypothyroidism  Family history of hypertension  Family history of stroke      SOCIAL HISTORY:  Smoking: [ ] Never Smoked [x] Former Smoker (__ packs x ___ years) [ ] Current Smoker  (__ packs x ___ years)  Substance Use: [ ] Never Used [x] Formerly Used ____  EtOH Use: Denies  Marital Status: [ ] Single [x]  [ ]  [ ]   Sexual History:   Occupation:  Recent Travel:  Country of Birth:  Advance Directives:    Allergies    Bananas (Headache)  Bananas (Other)  latex (Rash)  latex (Rash (Mild))  opioid-like analgesics (Urticaria)  penicillin (Angioedema)  penicillin (Swelling (Mild to Mod))    Intolerances        HOME MEDICATIONS:    REVIEW OF SYSTEMS:  Constitutional: [ ] negative [-] fevers [-] chills [ ] weight loss [ ] weight gain  HEENT: [ ] negative [ ] dry eyes [ ] eye irritation [ ] postnasal drip [ ] nasal congestion  CV: [ ] negative  [-] chest pain [-] orthopnea [-] palpitations [ ] murmur  Resp: [ ] negative [-] cough [-] shortness of breath [-] wheezing [-] sputum [-] hemoptysis  GI: [ ] negative [-] nausea [-] vomiting [-] diarrhea [-] constipation [-] abd pain [ ] dysphagia   : [ ] negative [-] dysuria [ ] nocturia [ ] hematuria [ ] increased urinary frequency  Musculoskeletal: [ ] negative [+] back pain [-] myalgias [-] arthralgias [+] fracture  Skin: [ ] negative [-] rash [ ] itch  Neurological: [ ] negative [-] headache [-] dizziness [ ] syncope [ ] weakness [ ] numbness  Psychiatric: [ ] negative [ ] anxiety [ ] depression  Endocrine: [ ] negative [ ] diabetes [ ] thyroid problem  Hematologic/Lymphatic: [ ] negative [ ] anemia [ ] bleeding problem  Allergic/Immunologic: [ ] negative [ ] itchy eyes [ ] nasal discharge [ ] hives [ ] angioedema  [ ] All other systems negative  [ ] Unable to assess ROS because ________    OBJECTIVE:  ICU Vital Signs Last 24 Hrs  T(C): 36.5 (07 Sep 2020 15:33), Max: 36.8 (06 Sep 2020 18:57)  T(F): 97.7 (07 Sep 2020 15:33), Max: 98.2 (06 Sep 2020 18:57)  HR: 85 (07 Sep 2020 15:33) (83 - 96)  BP: 112/68 (07 Sep 2020 15:33) (108/68 - 123/71)  BP(mean): 78 (06 Sep 2020 19:19) (78 - 78)  ABP: --  ABP(mean): --  RR: 16 (07 Sep 2020 15:33) (16 - 18)  SpO2: 97% (07 Sep 2020 15:33) (96% - 100%)         @ 07:01  -   @ 07:00  --------------------------------------------------------  IN: 485 mL / OUT: 500 mL / NET: -15 mL     @ 07:01  -   @ 16:33  --------------------------------------------------------  IN: 0 mL / OUT: 200 mL / NET: -200 mL      CAPILLARY BLOOD GLUCOSE          PHYSICAL EXAM:  General: No apparent distress  HEENT: NC/AT  Neck: Supple  Respiratory: Decreased BS right posterior lung field. Otherwise good air entry. No wheezing.  Cardiovascular: RRR, no murmur, no LE edema  Abdomen: Soft, nontender, nondistended  Extremities: Warm  Skin: Intact  Neurological: A&Ox3, no focal deficits  Psychiatry: Normal mood and affect    HOSPITAL MEDICATIONS:  heparin  Infusion. 1400 Unit(s)/Hr IV Continuous <Continuous>      metoprolol tartrate 25 milliGRAM(s) Oral two times a day    levothyroxine 175 MICROGram(s) Oral daily  liothyronine 5 MICROGram(s) Oral daily      acetaminophen   Tablet .. 650 milliGRAM(s) Oral every 6 hours PRN  ALPRAZolam 0.25 milliGRAM(s) Oral at bedtime  ALPRAZolam 0.25 milliGRAM(s) Oral two times a day PRN  HYDROmorphone   Tablet 4 milliGRAM(s) Oral every 6 hours  HYDROmorphone  Injectable 1 milliGRAM(s) IV Push every 4 hours PRN  ibuprofen  Tablet. 600 milliGRAM(s) Oral three times a day  melatonin 3 milliGRAM(s) Oral at bedtime    aluminum hydroxide/magnesium hydroxide/simethicone Suspension 30 milliLiter(s) Oral every 6 hours PRN  polyethylene glycol 3350 17 Gram(s) Oral daily  senna 2 Tablet(s) Oral at bedtime            lidocaine   Patch 1 Patch Transdermal daily        LABS:                        8.7    6.43  )-----------( 254      ( 07 Sep 2020 09:44 )             27.9     Hgb Trend: 8.7<--, 7.4<--, 7.6<--, 7.1<--, 7.8<--      137  |  97  |  <4<L>  ----------------------------<  108<H>  3.7   |  29  |  0.30<L>    Ca    9.0      07 Sep 2020 09:44  Phos  4.6       Mg     1.7         TPro  6.3  /  Alb  2.9<L>  /  TBili  1.0  /  DBili  x   /  AST  25  /  ALT  19  /  AlkPhos  162<H>      Creatinine Trend: 0.30<--, 0.34<--, 0.35<--, 0.33<--, 0.41<--, 0.39<--  PT/INR - ( 06 Sep 2020 14:51 )   PT: 24.6 sec;   INR: 2.14 ratio         PTT - ( 07 Sep 2020 09:44 )  PTT:76.3 sec  Urinalysis Basic - ( 06 Sep 2020 15:11 )    Color: Yellow / Appearance: Slightly Turbid / S.014 / pH: x  Gluc: x / Ketone: Negative  / Bili: Negative / Urobili: 4 mg/dL   Blood: x / Protein: Trace / Nitrite: Negative   Leuk Esterase: Negative / RBC: 1 /hpf / WBC 2 /HPF   Sq Epi: x / Non Sq Epi: 5 /hpf / Bacteria: Negative        Venous Blood Gas:   @ 14:51  --/--/--/--/--  VBG Lactate: 1.2      MICROBIOLOGY:     RADIOLOGY:  [x] Reviewed and interpreted by me  CTPA 2020  Right pleural effusion, right hilar mass, no PE.    ASSESSMENT AND RECOMMENDATION:  4 year old female with metastatic lung adenocarcinoma (diagnosed ) s/p RLL lobectomy and then palliative radiation and chemotherapy () who presents from [facility] with increased left hip. Found to have right pleural effusion, presumably malignant.    Problems  Metastatic lung adenocarcinoma  Hypoxemia  Pleural effusion    Discussed risks and benefits of pleural drainage  Patient will discuss with her daughter  Pulmonary will follow CHIEF COMPLAINT:  LLE pain for past several days    HPI:  64 year old female with metastatic lung adenocarcinoma (diagnosed ) s/p RLL lobectomy and then palliative radiation and chemotherapy () who presents from [facility] with increased left hip pain that is sharp, radiating down left leg, and alleviated with opiates. Pain had been worsening since admission to [facility]. She denies dyspnea, wheezing, or chest pain. Feels lethargic. Sometimes when speaking for prolonged period of time she feel winded. Notes to hypoxemic on room air initially which correct with supplemental oxygen. Was recently admitted for pathologic left femoral neck fracture s/p left QASIM 2020. Pulmonary consulted to evaluate pleural effusion. She has a large right sided pleural effusion. She is on a heparin gtt for DVT. CTPA was negative for PE.    PAST MEDICAL & SURGICAL HISTORY:  Stage IV adenocarcinoma of lung  Genital herpes  Drug abuse: Back in the 70&#x27;s and 80&#x27;s (Cocaine)  Lung cancer: with mets  LAMAR on CPAP  Anxiety  Hypothyroid  History of total hip replacement, left: THR 2020  S/P partial lobectomy of lung: Right lower lobe  History of bunionectomy  Status post lobectomy of lung: 3/2017 (Right lung)  S/P bunionectomy: with revision      FAMILY HISTORY:  Family history of stroke or transient ischemic attack in father  Family history of hypothyroidism  Family history of hypertension  Family history of stroke      SOCIAL HISTORY:  Smoking: [ ] Never Smoked [x] Former Smoker (__ packs x ___ years) [ ] Current Smoker  (__ packs x ___ years)  Substance Use: [ ] Never Used [x] Formerly Used ____  EtOH Use: Denies  Marital Status: [ ] Single [x]  [ ]  [ ]   Sexual History:   Occupation:  Recent Travel:  Country of Birth:  Advance Directives:    Allergies    Bananas (Headache)  Bananas (Other)  latex (Rash)  latex (Rash (Mild))  opioid-like analgesics (Urticaria)  penicillin (Angioedema)  penicillin (Swelling (Mild to Mod))    Intolerances        HOME MEDICATIONS:    REVIEW OF SYSTEMS:  Constitutional: [ ] negative [-] fevers [-] chills [ ] weight loss [ ] weight gain  HEENT: [ ] negative [ ] dry eyes [ ] eye irritation [ ] postnasal drip [ ] nasal congestion  CV: [ ] negative  [-] chest pain [-] orthopnea [-] palpitations [ ] murmur  Resp: [ ] negative [-] cough [-] shortness of breath [-] wheezing [-] sputum [-] hemoptysis  GI: [ ] negative [-] nausea [-] vomiting [-] diarrhea [-] constipation [-] abd pain [ ] dysphagia   : [ ] negative [-] dysuria [ ] nocturia [ ] hematuria [ ] increased urinary frequency  Musculoskeletal: [ ] negative [+] back pain [-] myalgias [-] arthralgias [+] fracture  Skin: [ ] negative [-] rash [ ] itch  Neurological: [ ] negative [-] headache [-] dizziness [ ] syncope [ ] weakness [ ] numbness  Psychiatric: [ ] negative [ ] anxiety [ ] depression  Endocrine: [ ] negative [ ] diabetes [ ] thyroid problem  Hematologic/Lymphatic: [ ] negative [ ] anemia [ ] bleeding problem  Allergic/Immunologic: [ ] negative [ ] itchy eyes [ ] nasal discharge [ ] hives [ ] angioedema  [ ] All other systems negative  [ ] Unable to assess ROS because ________    OBJECTIVE:  ICU Vital Signs Last 24 Hrs  T(C): 36.5 (07 Sep 2020 15:33), Max: 36.8 (06 Sep 2020 18:57)  T(F): 97.7 (07 Sep 2020 15:33), Max: 98.2 (06 Sep 2020 18:57)  HR: 85 (07 Sep 2020 15:33) (83 - 96)  BP: 112/68 (07 Sep 2020 15:33) (108/68 - 123/71)  BP(mean): 78 (06 Sep 2020 19:19) (78 - 78)  ABP: --  ABP(mean): --  RR: 16 (07 Sep 2020 15:33) (16 - 18)  SpO2: 97% (07 Sep 2020 15:33) (96% - 100%)         @ 07:01  -   @ 07:00  --------------------------------------------------------  IN: 485 mL / OUT: 500 mL / NET: -15 mL     @ 07:01  -   @ 16:33  --------------------------------------------------------  IN: 0 mL / OUT: 200 mL / NET: -200 mL      CAPILLARY BLOOD GLUCOSE          PHYSICAL EXAM:  General: No apparent distress  HEENT: NC/AT  Neck: Supple  Respiratory: Decreased BS right posterior lung field. Otherwise good air entry. No wheezing.  Cardiovascular: RRR, no murmur, no LE edema  Abdomen: Soft, nontender, nondistended  Extremities: Warm  Skin: Intact  Neurological: A&Ox3, no focal deficits  Psychiatry: Normal mood and affect    HOSPITAL MEDICATIONS:  heparin  Infusion. 1400 Unit(s)/Hr IV Continuous <Continuous>      metoprolol tartrate 25 milliGRAM(s) Oral two times a day    levothyroxine 175 MICROGram(s) Oral daily  liothyronine 5 MICROGram(s) Oral daily      acetaminophen   Tablet .. 650 milliGRAM(s) Oral every 6 hours PRN  ALPRAZolam 0.25 milliGRAM(s) Oral at bedtime  ALPRAZolam 0.25 milliGRAM(s) Oral two times a day PRN  HYDROmorphone   Tablet 4 milliGRAM(s) Oral every 6 hours  HYDROmorphone  Injectable 1 milliGRAM(s) IV Push every 4 hours PRN  ibuprofen  Tablet. 600 milliGRAM(s) Oral three times a day  melatonin 3 milliGRAM(s) Oral at bedtime    aluminum hydroxide/magnesium hydroxide/simethicone Suspension 30 milliLiter(s) Oral every 6 hours PRN  polyethylene glycol 3350 17 Gram(s) Oral daily  senna 2 Tablet(s) Oral at bedtime            lidocaine   Patch 1 Patch Transdermal daily        LABS:                        8.7    6.43  )-----------( 254      ( 07 Sep 2020 09:44 )             27.9     Hgb Trend: 8.7<--, 7.4<--, 7.6<--, 7.1<--, 7.8<--      137  |  97  |  <4<L>  ----------------------------<  108<H>  3.7   |  29  |  0.30<L>    Ca    9.0      07 Sep 2020 09:44  Phos  4.6       Mg     1.7         TPro  6.3  /  Alb  2.9<L>  /  TBili  1.0  /  DBili  x   /  AST  25  /  ALT  19  /  AlkPhos  162<H>      Creatinine Trend: 0.30<--, 0.34<--, 0.35<--, 0.33<--, 0.41<--, 0.39<--  PT/INR - ( 06 Sep 2020 14:51 )   PT: 24.6 sec;   INR: 2.14 ratio         PTT - ( 07 Sep 2020 09:44 )  PTT:76.3 sec  Urinalysis Basic - ( 06 Sep 2020 15:11 )    Color: Yellow / Appearance: Slightly Turbid / S.014 / pH: x  Gluc: x / Ketone: Negative  / Bili: Negative / Urobili: 4 mg/dL   Blood: x / Protein: Trace / Nitrite: Negative   Leuk Esterase: Negative / RBC: 1 /hpf / WBC 2 /HPF   Sq Epi: x / Non Sq Epi: 5 /hpf / Bacteria: Negative        Venous Blood Gas:   @ 14:51  --/--/--/--/--  VBG Lactate: 1.2      MICROBIOLOGY:     RADIOLOGY:  [x] Reviewed and interpreted by me  CTPA 2020  Right pleural effusion, right hilar mass, no PE.    ASSESSMENT AND RECOMMENDATION:  64 year old female with metastatic lung adenocarcinoma (diagnosed ) s/p RLL lobectomy and then palliative radiation and chemotherapy () who presents from [facility] with increased left hip. Found to have right pleural effusion, presumably malignant.    Problems  Metastatic lung adenocarcinoma  Hypoxemia  LLE DVT  Pleural effusion    Discussed risks and benefits of pleural drainage  Patient will discuss with her daughter  Pulmonary will follow

## 2020-09-07 NOTE — PROGRESS NOTE ADULT - PROBLEM SELECTOR PLAN 5
Patient has stage 4 lung adenocarcinoma (RLL lobectomy in 2017, recurrence in May 2020 s/p palliative rad and chemo), CTA chest/ abd/ pelvis show increased R pleural effusion from prior, ill defined R hilar mass, hepatic/ L adrenal and extensive bony metastases new from 2019, new R renal lesion. Screw fixated pathologic L hip fxr. L supracondylar distal femur lesion     - Rad onc consult for directed radiation to supracondylar distal femur lesion  - f/u oncology regarding further treatment options Patient has stage 4 lung adenocarcinoma (RLL lobectomy in 2017, recurrence in May 2020 s/p palliative rad and chemo), CTA chest/ abd/ pelvis show increased R pleural effusion from prior, ill defined R hilar mass, hepatic/ L adrenal and extensive bony metastases new from 2019, new R renal lesion. Screw fixated pathologic L hip fxr. L supracondylar distal femur lesion     - Rad onc consulted for directed radiation to supracondylar distal femur lesion  - f/u oncology regarding further treatment options

## 2020-09-07 NOTE — PROGRESS NOTE ADULT - PROBLEM SELECTOR PLAN 3
Recent total hip replacement for pathological L femoral neck fracture (8/18/2020), presenting with worsening back and L hip pain. Likely partially 2/2 acute DVT, recent surgery and sacral lesion that is likely contributing to her pain. No e/o infection at surgical site.     - NSX consulted for sacral lesion - c/w TLSO brace, consider thigh extension as per NXS and ortho   - pain control with dilaudid IV and fentanyl patch, monitor for sedation. Patient refuses PO dilaudid  - pain management/palliative consult if pain remains uncontrolled Recent total hip replacement for pathological L femoral neck fracture (8/18/2020), presenting with worsening back and L hip pain. Likely partially 2/2 acute DVT, recent surgery and sacral lesion that is likely contributing to her pain. No e/o infection at surgical site.     - NSX consulted for sacral lesion - c/w TLSO brace, consider thigh extension as per NXS and ortho   - pain control with dilaudid IV and fentanyl patch, monitor for sedation. Patient refuses PO dilaudid  - pain management/palliative consult as pain poorly controlled.

## 2020-09-07 NOTE — PROGRESS NOTE ADULT - PROBLEM SELECTOR PLAN 2
Patient presented with acute hypoxic respiratory failure in setting of extensive lung cancer, increasing pleural effusion with associated atelectasis; ?R pleural thickening. No PE noted on imaging. Currently saturating well on 3LNC, no resp distress noted.      - c/w supplemental O2, wean as O2   - incentive spirometry  - pulm consult in AM in regards to candidacy for therapeutic thoracentesis Patient presented with acute hypoxic respiratory failure in setting of extensive lung cancer, increasing pleural effusion with associated atelectasis; ?R pleural thickening. No PE noted on imaging. Currently saturating well on 3LNC, no resp distress noted.   - c/w supplemental O2, wean as O2   - incentive spirometry  - pulm consulted in regards to candidacy for therapeutic thoracentesis Patient presented with acute hypoxic respiratory failure in setting of extensive lung cancer, increasing pleural effusion with associated atelectasis; ?R pleural thickening. No PE noted on imaging. Currently saturating well on 3LNC, no resp distress noted.   - c/w supplemental O2, wean as tolerated  - incentive spirometry  - pulm consulted in regards to candidacy for therapeutic thoracentesis

## 2020-09-07 NOTE — PROGRESS NOTE ADULT - PROBLEM SELECTOR PLAN 1
Patient found to have L common femoral DVT on CT  - c/w heparin drip for DVT  - pending formal b/l [facility] duplex of lower extremities  - vascular consulted, will c/t following re: need of IVC filter

## 2020-09-07 NOTE — PROGRESS NOTE ADULT - ATTENDING COMMENTS
vascular consulted, will c/t following re: need of IVC filter.   pulmonary consult appreciated: patient to d/w dtr prior to making final decision about therapeutic thoracentesis

## 2020-09-08 NOTE — PROGRESS NOTE ADULT - PROBLEM SELECTOR PLAN 4
Patient presented with tachycardia, tachypnea and fever of 100.9. Likely in setting of DVT vs malignancy. Lower suspicion for infectious etiology at this time - no leukocytosis, no localizing symptoms, UA neg, no abdominal pathology.  -Currently afebrile with no leukocytosis.   -will defer abx for now; however, if has recurrent fever or hemodynamic compromise, would start broad spectrum vanco/cefepime  - fever resolved. tachycardia and tachypnea improved with supplemental O2 as well as pain control  - f/u blood culture  - UA neg  - f/u VA duplex of b/l LE for assessing extent of DVTs Patient presented with tachycardia, tachypnea and fever of 100.9. Likely in setting of DVT vs malignancy. Lower suspicion for infectious etiology at this time - no leukocytosis, no localizing symptoms, UA neg, no abdominal pathology.  -Currently afebrile with no leukocytosis.   -will defer abx for now; however, if has recurrent fever or hemodynamic compromise, would start broad spectrum vanco/cefepime  - fever resolved. tachycardia and tachypnea improved with supplemental O2 as well as pain control  - Bcx negative  - UA neg  - VA duplex showing L common femoral vein DVT

## 2020-09-08 NOTE — PROGRESS NOTE ADULT - PROBLEM SELECTOR PLAN 5
Patient has stage 4 lung adenocarcinoma (RLL lobectomy in 2017, recurrence in May 2020 s/p palliative rad and chemo), CTA chest/ abd/ pelvis show increased R pleural effusion from prior, ill defined R hilar mass, hepatic/ L adrenal and extensive bony metastases new from 2019, new R renal lesion. Screw fixated pathologic L hip fxr. L supracondylar distal femur lesion     - Rad onc consulted for directed radiation to supracondylar distal femur lesion  - f/u oncology regarding further treatment options Patient has stage 4 lung adenocarcinoma (RLL lobectomy in 2017, recurrence in May 2020 s/p palliative rad and chemo), CTA chest/ abd/ pelvis show increased R pleural effusion from prior, ill defined R hilar mass, hepatic/ L adrenal and extensive bony metastases new from 2019, new R renal lesion. Screw fixated pathologic L hip fxr. L supracondylar distal femur lesion     - Rad onc consulted for directed radiation to supracondylar distal femur lesion  - f/u oncology regarding further treatment options.  -As per outpatient Onc, would like to continue chemo, but patient needs to be able to sit up in a chair without significant pain for at least 3-4 hours to do so. Patient needs further rehab prior to restarting chemo.

## 2020-09-08 NOTE — PROGRESS NOTE ADULT - PROBLEM SELECTOR PLAN 6
Patient labs show anemia with slow decline since early this year. Now 7.4. FOBT (-); ?2/2 malignancy, chemo    -s/p 1U pRBC, Hgb now 8.7  - trend daily CBC for now  - will need to monitor closely given heparin gtt; vascular following for possible IVC filter if indicated

## 2020-09-08 NOTE — PROGRESS NOTE ADULT - ASSESSMENT
64yoF w/ PMHx of stage IV lung adenocarcinoma ( dx 2017) s/p RLL lobectomy with recurrence in May 2020 s/p palliative radiation to clavicle and spine and 1 round of carbo/pemextred/pembrolizumab chemo 7/31, hypothyroidism, anxiety, recent admission for pathologic L femoral neck fracture s/p left QASIM on 8/18 discharged to [facility] on xarelto 10mg or DVT ppx, p/w worsening L hip pain and hypoxia - a/w acute LLE DVT and worsening hypoxic respiratory failure 2/2 lung cancer with worsening R sided effusion.

## 2020-09-08 NOTE — PROGRESS NOTE ADULT - SUBJECTIVE AND OBJECTIVE BOX
PROGRESS NOTE:   Authored by Louie De La Torre MD, PGY1 [facility] Pager [phone] [facility] pager [phone]    Patient is a 64y old  Female who presents with a chief complaint of PAIN (07 Sep 2020 15:52)      SUBJECTIVE / OVERNIGHT EVENTS:     REVIEW OF SYSTEMS:    CONSTITUTIONAL: No weakness, fevers or chills  EYES/ENT: No visual changes;  No vertigo or throat pain   NECK: No pain or stiffness  RESPIRATORY: No cough, wheezing, hemoptysis; No shortness of breath  CARDIOVASCULAR: No chest pain or palpitations  GASTROINTESTINAL: No abdominal or epigastric pain. No nausea, vomiting, or hematemesis; No diarrhea or constipation. No melena or hematochezia.  GENITOURINARY: No dysuria, frequency or hematuria  NEUROLOGICAL: No numbness or weakness  SKIN: No itching, rashes    MEDICATIONS  (STANDING):  ALPRAZolam 0.25 milliGRAM(s) Oral at bedtime  HYDROmorphone   Solution 3 milliGRAM(s) Oral every 4 hours  ibuprofen  Tablet. 600 milliGRAM(s) Oral three times a day  levothyroxine 175 MICROGram(s) Oral daily  lidocaine   Patch 1 Patch Transdermal daily  liothyronine 5 MICROGram(s) Oral daily  melatonin 3 milliGRAM(s) Oral at bedtime  metoprolol tartrate 25 milliGRAM(s) Oral two times a day  polyethylene glycol 3350 17 Gram(s) Oral daily  senna 2 Tablet(s) Oral at bedtime    MEDICATIONS  (PRN):  acetaminophen   Tablet .. 650 milliGRAM(s) Oral every 6 hours PRN Temp greater or equal to 38C (100.4F), Mild Pain (1 - 3)  ALPRAZolam 0.25 milliGRAM(s) Oral two times a day PRN anxiety  aluminum hydroxide/magnesium hydroxide/simethicone Suspension 30 milliLiter(s) Oral every 6 hours PRN Dyspepsia  heparin   Injectable 7000 Unit(s) IV Push every 6 hours PRN For aPTT less than 40  heparin   Injectable 3500 Unit(s) IV Push every 6 hours PRN For aPTT between 40 - 57  HYDROmorphone  Injectable 1 milliGRAM(s) IV Push every 4 hours PRN Moderate Pain (4 - 6)  naloxone Injectable 0.2 milliGRAM(s) IV Push every 3 minutes PRN sedation due to therapeutic opiates      CAPILLARY BLOOD GLUCOSE        I&O's Summary    07 Sep 2020 07:01  -  08 Sep 2020 07:00  --------------------------------------------------------  IN: 250 mL / OUT: 750 mL / NET: -500 mL        PHYSICAL EXAM:  Vital Signs Last 24 Hrs  T(C): 36.6 (08 Sep 2020 14:39), Max: 36.6 (08 Sep 2020 14:39)  T(F): 97.8 (08 Sep 2020 14:39), Max: 97.8 (08 Sep 2020 14:39)  HR: 79 (08 Sep 2020 14:39) (71 - 85)  BP: 130/80 (08 Sep 2020 14:39) (101/61 - 133/76)  BP(mean): --  RR: 18 (08 Sep 2020 14:39) (16 - 18)  SpO2: 98% (08 Sep 2020 14:39) (91% - 100%)    CONSTITUTIONAL: NAD, well-developed  RESPIRATORY: Normal respiratory effort; lungs are clear to auscultation bilaterally  CARDIOVASCULAR: Regular rate and rhythm, normal S1 and S2, no murmur/rub/gallop; No lower extremity edema; Peripheral pulses are 2+ bilaterally  ABDOMEN: Nontender to palpation, normoactive bowel sounds, no rebound/guarding; No hepatosplenomegaly  MUSCLOSKELETAL: no clubbing or cyanosis of digits; no joint swelling or tenderness to palpation  PSYCH: A+O to person, place, and time; affect appropriate  NEURO: Non-focal, no tremors  SKIN: No rashes    LABS:                        8.4    5.22  )-----------( 252      ( 08 Sep 2020 07:00 )             27.6     09-08    140  |  99  |  5<L>  ----------------------------<  85  3.6   |  29  |  0.34<L>    Ca    9.7      08 Sep 2020 07:01  Phos  5.0     09-08  Mg     1.9     09-08    TPro  5.5<L>  /  Alb  2.7<L>  /  TBili  0.7  /  DBili  x   /  AST  12  /  ALT  12  /  AlkPhos  141<H>  09-08    PT/INR - ( 08 Sep 2020 07:01 )   PT: 14.1 sec;   INR: 1.19 ratio         PTT - ( 08 Sep 2020 07:01 )  PTT:83.8 sec  CARDIAC MARKERS ( 06 Sep 2020 18:30 )  x     / x     / x     / x     / 1.1 ng/mL          Culture - Blood (collected 06 Sep 2020 18:36)  Source: .Blood Blood-Peripheral  Preliminary Report (07 Sep 2020 19:01):    No growth to date.    Culture - Urine (collected 06 Sep 2020 18:35)  Source: .Urine Clean Catch (Midstream)  Final Report (07 Sep 2020 13:29):    <10,000 CFU/mL Normal Urogenital Angelina    Culture - Blood (collected 06 Sep 2020 18:31)  Source: .Blood Blood-Peripheral  Preliminary Report (07 Sep 2020 19:01):    No growth to date.        RADIOLOGY & ADDITIONAL TESTS:  No new imaging or tests    COORDINATION OF CARE:  Care Discussed with Consultants/Other Providers [Y/N]:  Prior or Outpatient Records Reviewed [Y/N]: PROGRESS NOTE:   Authored by Louie De La Torre MD, PGY1 [facility] Pager [phone] [facility] pager [phone]    Patient is a 64y old  Female who presents with a chief complaint of PAIN (07 Sep 2020 15:52)      SUBJECTIVE / OVERNIGHT EVENTS: No acute events overnight. Patient says her pain seems to be a little better controlled on liquid dilaudid 3mg as opposed to 2mg oral and doesn't sedate her like the 4mg dose does. Still notes 5-6/10 pain in left proximal knee. Does not feel SOB today. Denies fevers, chills, chest pain, palpitations, n/v/d. Notes no full BMs in about 3 days    REVIEW OF SYSTEMS:    CONSTITUTIONAL: No weakness, fevers or chills  EYES/ENT: No visual changes;  No vertigo or throat pain   NECK: No pain or stiffness  RESPIRATORY: No cough, wheezing, hemoptysis; No shortness of breath  CARDIOVASCULAR: No chest pain or palpitations  GASTROINTESTINAL: No abdominal or epigastric pain. No nausea, vomiting, or hematemesis; No diarrhea or constipation. No melena or hematochezia.  GENITOURINARY: No dysuria, frequency or hematuria  NEUROLOGICAL: No numbness or weakness  SKIN: No itching, rashes. +pain in left proximal knee.     MEDICATIONS  (STANDING):  ALPRAZolam 0.25 milliGRAM(s) Oral at bedtime  HYDROmorphone   Solution 3 milliGRAM(s) Oral every 4 hours  ibuprofen  Tablet. 600 milliGRAM(s) Oral three times a day  levothyroxine 175 MICROGram(s) Oral daily  lidocaine   Patch 1 Patch Transdermal daily  liothyronine 5 MICROGram(s) Oral daily  melatonin 3 milliGRAM(s) Oral at bedtime  metoprolol tartrate 25 milliGRAM(s) Oral two times a day  polyethylene glycol 3350 17 Gram(s) Oral daily  senna 2 Tablet(s) Oral at bedtime    MEDICATIONS  (PRN):  acetaminophen   Tablet .. 650 milliGRAM(s) Oral every 6 hours PRN Temp greater or equal to 38C (100.4F), Mild Pain (1 - 3)  ALPRAZolam 0.25 milliGRAM(s) Oral two times a day PRN anxiety  aluminum hydroxide/magnesium hydroxide/simethicone Suspension 30 milliLiter(s) Oral every 6 hours PRN Dyspepsia  heparin   Injectable 7000 Unit(s) IV Push every 6 hours PRN For aPTT less than 40  heparin   Injectable 3500 Unit(s) IV Push every 6 hours PRN For aPTT between 40 - 57  HYDROmorphone  Injectable 1 milliGRAM(s) IV Push every 4 hours PRN Moderate Pain (4 - 6)  naloxone Injectable 0.2 milliGRAM(s) IV Push every 3 minutes PRN sedation due to therapeutic opiates      CAPILLARY BLOOD GLUCOSE        I&O's Summary    07 Sep 2020 07:01  -  08 Sep 2020 07:00  --------------------------------------------------------  IN: 250 mL / OUT: 750 mL / NET: -500 mL        PHYSICAL EXAM:  Vital Signs Last 24 Hrs  T(C): 36.6 (08 Sep 2020 14:39), Max: 36.6 (08 Sep 2020 14:39)  T(F): 97.8 (08 Sep 2020 14:39), Max: 97.8 (08 Sep 2020 14:39)  HR: 79 (08 Sep 2020 14:39) (71 - 85)  BP: 130/80 (08 Sep 2020 14:39) (101/61 - 133/76)  BP(mean): --  RR: 18 (08 Sep 2020 14:39) (16 - 18)  SpO2: 98% (08 Sep 2020 14:39) (91% - 100%)    CONSTITUTIONAL: NAD, well-developed  RESPIRATORY: Normal respiratory effort; inspiratory crackles in R. lung base. Some coarse breath sounds in other lung fields.   CARDIOVASCULAR: Regular rate and rhythm, normal S1 and S2, no murmur/rub/gallop; No lower extremity edema; Peripheral pulses are 2+ bilaterally  ABDOMEN: Nontender to palpation, normoactive bowel sounds, no rebound/guarding; No hepatosplenomegaly  MUSCLOSKELETAL: no clubbing or cyanosis of digits; no joint swelling or tenderness to palpation  PSYCH: A+O to person, place, and time; affect appropriate  NEURO: Non-focal, no tremors  SKIN: No rashes    LABS:                        8.4    5.22  )-----------( 252      ( 08 Sep 2020 07:00 )             27.6     09-08    140  |  99  |  5<L>  ----------------------------<  85  3.6   |  29  |  0.34<L>    Ca    9.7      08 Sep 2020 07:01  Phos  5.0     09-08  Mg     1.9     09-08    TPro  5.5<L>  /  Alb  2.7<L>  /  TBili  0.7  /  DBili  x   /  AST  12  /  ALT  12  /  AlkPhos  141<H>  09-08    PT/INR - ( 08 Sep 2020 07:01 )   PT: 14.1 sec;   INR: 1.19 ratio         PTT - ( 08 Sep 2020 07:01 )  PTT:83.8 sec  CARDIAC MARKERS ( 06 Sep 2020 18:30 )  x     / x     / x     / x     / 1.1 ng/mL          Culture - Blood (collected 06 Sep 2020 18:36)  Source: .Blood Blood-Peripheral  Preliminary Report (07 Sep 2020 19:01):    No growth to date.    Culture - Urine (collected 06 Sep 2020 18:35)  Source: .Urine Clean Catch (Midstream)  Final Report (07 Sep 2020 13:29):    <10,000 CFU/mL Normal Urogenital Angelina    Culture - Blood (collected 06 Sep 2020 18:31)  Source: .Blood Blood-Peripheral  Preliminary Report (07 Sep 2020 19:01):    No growth to date.        RADIOLOGY & ADDITIONAL TESTS:  No new imaging or tests    COORDINATION OF CARE:  Care Discussed with Consultants/Other Providers [Y/N]:  Prior or Outpatient Records Reviewed [Y/N]:

## 2020-09-08 NOTE — PROGRESS NOTE ADULT - ASSESSMENT
64F with stage IV lung adenocarcinoma ( dx 2017) s/p RLL lobectomy with recurrence in May 2020 s/p palliative radiation to clavicle and spine and carbo/pemextred/pembrolizumab chemo 7/31, hypothyroidism, anxiety, recent admission for pathologic L femoral neck fracture s/p left QASIM on 8/18 discharged to [facility] on xarelto 10mg or DVT ppx, p/w worsening L hip pain and dyspnea found to have L acute DVT and L pleural effusion. Pulmonology consulted for likely malignant effusion.    - Plan for possible thoracentesis this afternoon, heparin gtt held since 12 pm  - Maintain O2 saturation > 92%  - Pain control    Federico Thompson MD  Fellow, Pulmonary and Critical Care Medicine  [facility]  Pager: [phone], [phone] ([facility])  Email: [email or web address] 64F with stage IV lung adenocarcinoma ( dx 2017) s/p RLL lobectomy with recurrence in May 2020 s/p palliative radiation to clavicle and spine and carbo/pemextred/pembrolizumab chemo 7/31, hypothyroidism, anxiety, recent admission for pathologic L femoral neck fracture s/p left QASIM on 8/18 discharged to [facility] on xarelto 10mg or DVT ppx, p/w worsening L hip pain and dyspnea found to have L acute DVT and R pleural effusion. Pulmonology consulted for likely malignant effusion.    - Plan for possible thoracentesis this afternoon, heparin gtt held since 12 pm  - Maintain O2 saturation > 92%  - Pain control    Federico Thompson MD  Fellow, Pulmonary and Critical Care Medicine  [facility]  Pager: [phone], [phone] ([facility])  Email: [email or web address]

## 2020-09-08 NOTE — PROGRESS NOTE ADULT - SUBJECTIVE AND OBJECTIVE BOX
CHIEF COMPLAINT: Shortness of breath    Interval Events: No events overnight, saturating in the high 90s on 2-3L NC. Heparin gtt held at 12 pm for possible thoracentesis.     REVIEW OF SYSTEMS:  Constitutional: [ ] negative [ ] fevers [ ] chills [ ] weight loss [ ] weight gain  HEENT: [ ] negative [ ] dry eyes [ ] eye irritation [ ] postnasal drip [ ] nasal congestion  CV: [ ] negative  [ ] chest pain [ ] orthopnea [ ] palpitations [ ] murmur  Resp: [ ] negative [ ] cough [ ] shortness of breath [ ] dyspnea [ ] wheezing [ ] sputum [ ] hemoptysis  GI: [ ] negative [ ] nausea [ ] vomiting [ ] diarrhea [ ] constipation [ ] abd pain [ ] dysphagia   : [ ] negative [ ] dysuria [ ] nocturia [ ] hematuria [ ] increased urinary frequency  Musculoskeletal: [ ] negative [ ] back pain [ ] myalgias [ ] arthralgias [ ] fracture  Skin: [ ] negative [ ] rash [ ] itch  Neurological: [ ] negative [ ] headache [ ] dizziness [ ] syncope [ ] weakness [ ] numbness  Psychiatric: [ ] negative [ ] anxiety [ ] depression  Endocrine: [ ] negative [ ] diabetes [ ] thyroid problem  Hematologic/Lymphatic: [ ] negative [ ] anemia [ ] bleeding problem  Allergic/Immunologic: [ ] negative [ ] itchy eyes [ ] nasal discharge [ ] hives [ ] angioedema  [x ] All other systems negative  [ ] Unable to assess ROS because ________    OBJECTIVE:  ICU Vital Signs Last 24 Hrs  T(C): 36.3 (08 Sep 2020 08:42), Max: 36.5 (07 Sep 2020 15:33)  T(F): 97.4 (08 Sep 2020 08:42), Max: 97.7 (07 Sep 2020 15:33)  HR: 71 (08 Sep 2020 08:42) (71 - 85)  BP: 101/61 (08 Sep 2020 08:42) (101/61 - 133/76)  BP(mean): --  ABP: --  ABP(mean): --  RR: 18 (08 Sep 2020 08:42) (16 - 18)  SpO2: 99% (08 Sep 2020 08:42) (91% - 100%)        - @ 07:01  -   @ 07:00  --------------------------------------------------------  IN: 250 mL / OUT: 750 mL / NET: -500 mL      CAPILLARY BLOOD GLUCOSE    PHYSICAL EXAM:  General: NAD  Skin: Warm and dry, no rashes  Neuro: AAOx3, nonfocal  HEENT: PERRL, EOMI, no oral lesions  Neck: Full range of motion, no JVD  Lungs: Decreased breath sounds at R base no wheezes, rales, or rhonchi   Heart: Regular rate and rhythm, no murmurs  Abdomen: Normoactive bowl sounds, soft, nontender, nondistended  Extremities: L leg brace, no erythema, or edema   Psych: normal mood and affect      HOSPITAL MEDICATIONS:  MEDICATIONS  (STANDING):  ALPRAZolam 0.25 milliGRAM(s) Oral at bedtime  HYDROmorphone   Solution 3 milliGRAM(s) Oral every 4 hours  ibuprofen  Tablet. 600 milliGRAM(s) Oral three times a day  levothyroxine 175 MICROGram(s) Oral daily  lidocaine   Patch 1 Patch Transdermal daily  liothyronine 5 MICROGram(s) Oral daily  melatonin 3 milliGRAM(s) Oral at bedtime  metoprolol tartrate 25 milliGRAM(s) Oral two times a day  polyethylene glycol 3350 17 Gram(s) Oral daily  senna 2 Tablet(s) Oral at bedtime    MEDICATIONS  (PRN):  acetaminophen   Tablet .. 650 milliGRAM(s) Oral every 6 hours PRN Temp greater or equal to 38C (100.4F), Mild Pain (1 - 3)  ALPRAZolam 0.25 milliGRAM(s) Oral two times a day PRN anxiety  aluminum hydroxide/magnesium hydroxide/simethicone Suspension 30 milliLiter(s) Oral every 6 hours PRN Dyspepsia  heparin   Injectable 7000 Unit(s) IV Push every 6 hours PRN For aPTT less than 40  heparin   Injectable 3500 Unit(s) IV Push every 6 hours PRN For aPTT between 40 - 57  HYDROmorphone  Injectable 1 milliGRAM(s) IV Push every 4 hours PRN Moderate Pain (4 - 6)  naloxone Injectable 0.2 milliGRAM(s) IV Push every 3 minutes PRN sedation due to therapeutic opiates      LABS:                        8.4    5.22  )-----------( 252      ( 08 Sep 2020 07:00 )             27.6     Hgb Trend: 8.4<--, 8.8<--, 8.7<--, 7.4<--, 7.6<--  09-08    140  |  99  |  5<L>  ----------------------------<  85  3.6   |  29  |  0.34<L>    Ca    9.7      08 Sep 2020 07:01  Phos  5.0       Mg     1.9     08    TPro  5.5<L>  /  Alb  2.7<L>  /  TBili  0.7  /  DBili  x   /  AST  12  /  ALT  12  /  AlkPhos  141<H>  08    Creatinine Trend: 0.34<--, 0.30<--, 0.34<--, 0.35<--, 0.33<--, 0.41<--  PT/INR - ( 08 Sep 2020 07:01 )   PT: 14.1 sec;   INR: 1.19 ratio         PTT - ( 08 Sep 2020 07:01 )  PTT:83.8 sec  Urinalysis Basic - ( 06 Sep 2020 15:11 )    Color: Yellow / Appearance: Slightly Turbid / S.014 / pH: x  Gluc: x / Ketone: Negative  / Bili: Negative / Urobili: 4 mg/dL   Blood: x / Protein: Trace / Nitrite: Negative   Leuk Esterase: Negative / RBC: 1 /hpf / WBC 2 /HPF   Sq Epi: x / Non Sq Epi: 5 /hpf / Bacteria: Negative        Venous Blood Gas:   @ 07:16  7.38/55/139/31/99  VBG Lactate: 2.0  Venous Blood Gas:   @ 14:51  --/--/--/--/--  VBG Lactate: 1.2      MICROBIOLOGY:     Culture - Blood (collected 06 Sep 2020 18:36)  Source: .Blood Blood-Peripheral  Preliminary Report (07 Sep 2020 19:01):    No growth to date.    Culture - Urine (collected 06 Sep 2020 18:35)  Source: .Urine Clean Catch (Midstream)  Final Report (07 Sep 2020 13:29):    <10,000 CFU/mL Normal Urogenital Angelina    Culture - Blood (collected 06 Sep 2020 18:31)  Source: .Blood Blood-Peripheral  Preliminary Report (07 Sep 2020 19:01):    No growth to date.        RADIOLOGY:  < from: CT Pelvis Reform No Cont (20 @ 17:42) >      INTERPRETATION:  CT OF THE PELVIS    CLINICAL INFORMATION: Severe left hip pain.  TECHNIQUE: Multidetector CT of the pelvis. The study was performed without the use of intravenous or intra-articular contrast. Multiplanar reformats were generated for review. Three dimensional reconstructions were obtained on an independent workstation. The interpretation of these images is included in the body of the report of the main portion of the study.    COMPARISON: CT pelvis 2020. Left hip and femur radiographs 3 September 2020 and 2020.    FINDINGS:    HARDWARE: Patient status post longstem left femoral hip arthroplasty with acetabular augmentation. The hardware is intact.    BONE: As seen on prior examination, there are multiple lytic lesions in the pelvis and bilateral femora consistent with patient's known osseous metastatic disease. The lesion arising in the leftward pedicle of L5 has increased in size when compared to prior study today measuring approximately 62 x 32 mm, previously measuring 40 x 16 mm. Lesion in the left sacral ala is similar to prior study. Lesion in the right iliac crest is unchanged from prior exam. Lesions in the right inferior pubic ramus and parasymphyseal pubis are unchanged. Small lytic lesion in the posterior cortex of the right intertrochanteric femur is slightly more conspicuous on today's examination, measuring 8 mm in greatest dimension (303:61). Newly lesion in the posterior aspect of the left iliac bone (303:47). Lesion in the anterior aspect of the left iliac crest is increased in size when compared to prior study, today measuring approximately 28 x 23 mm, previously measuring 11 x 20 mm (303:103).    SOFT TISSUE: Small left hip joint effusion decompressing through the posterior capsule into the region of the left greater trochanteric bursa. Mild edema in the subcutaneousfat of the left lateral thigh favored to be secondary to recent surgery. Vascular calcifications. Vessels are otherwise normal in course and caliber. No pelvic free fluid.      IMPRESSION:  1.  Diffuse lytic metastatic disease is again seen within the lower lumbar spine and pelvis. The lesions in the leftward pedicle of L5, posterior cortex of the right intertrochanteric femur, and anterior aspect of the left iliac crest of increased in size when compared to prior CT. Remaining lesions are unchanged in size.  2.  No acute fracture.  3.  Patient status post left acetabulum augmentation and placement of longstem prosthesis. No evidence of acute hardware complication.    < end of copied text >      < from: CT Angio Chest w/ IV Cont (20 @ 17:41) >  EXAM:  CT ANGIO CHEST (W)AW IC                          EXAM:  CT ABDOMEN AND PELVIS IC                            PROCEDURE DATE:  2020            INTERPRETATION:  CLINICAL INFORMATION: Stage IV lung cancer post right lower lobectomy and radiation therapy. Post left total hip arthroplasty 2020, now with fever. Hip pain.    COMPARISON: CT chest 2020 CT chest abdomen and pelvis 2019    PROCEDURE:  CT Angiography of the Chest was performed followed by portal venous phase imaging of the Abdomen and Pelvis. Patient was reinjected after initial bolus due to suboptimal opacification of the pulmonary tree on the initial study.  IV Contrast: 180 ml of Omnipaque 350 was injected intravenously. 20 ml were discarded.  Oral contrast: None.  Sagittal and coronal reformats were performed as well as 3D (MIP) reconstructions.    FINDINGS:  CHEST:  LUNGS AND LARGE AIRWAYS: Patent central airways. Post right lower lobectomy. Partial right middle and total right lower lobe compressive atelectasis. A 7 mm lingular nodular opacity along the fissure is decreased in size since prior, previously 13 mm. Stable nonspecific peripheral 6 mm lingular nodule (3, 82).  PLEURA: Large right pleural effusion, increased since prior. Circumferential right pleural thickening is also new.  VESSELS: No evidence of pulmonary embolism. No thoracic aortic aneurysm or dissection.  HEART: Heart size is normal. No pericardial effusion.  MEDIASTINUM AND DARNELL: Ill-defined right hilar mass surrounding the right upper lobe and middle lobe bronchi, similar to prior but difficult to compare due to adjacent effusion and atelectasis.  CHEST WALL AND LOWER NECK: Within normal limits.    ABDOMEN AND PELVIS:  LIVER: Bilobar hypodense lesions measuring up to 3 cm in the dome, new since 2019, compatible with metastases.  BILE DUCTS: Normal caliber.  GALLBLADDER: Within normal limits.  SPLEEN: Within normal limits.  PANCREAS: Within normal limits.  ADRENALS: Left adrenal nodules measuring up to 1.4 cm are also new since 2019, suspicious for metastases..  KIDNEYS/URETERS: There are new ill-defined hypodense lesions in the right kidney, measuring up to 2.5 cm in the interpolar region with adjacent mild perinephric inflammatory change. No hydronephrosis. The left kidney is unremarkable.    BLADDER: Small amount of intraluminal gas likely reflecting recent catheterization.  REPRODUCTIVE ORGANS: Normal uterus and adnexa.    BOWEL: No bowel obstruction. Appendix is normal.  PERITONEUM: No ascites.  VESSELS: Calcified atherosclerosis. Left common femoral vein DVT noted.  RETROPERITONEUM/LYMPH NODES: No lymphadenopathy.  ABDOMINAL WALL: Small fat-containing umbilical hernia. Subcutaneous nodules which may reflect injection sites.  BONES: Status post left hip arthroplasty and screw fixation in an area of pathologic fracture and soft tissue mass with cement extending medially into the obturator region of the pelvis. This screw extends into the iliopsoas muscle to the iliac crest. A bony fragment is noted posterior to the left femoral neck. It is unclear if this is postsurgical or related to new pathologic fracture.    Numerous lytic metastases with soft tissue masses in multiple vertebral bodies, ribs, sternum, sacrum, iliac and right ischium, similar to more recent CT chest and pelvis. Several are new since 2019. Displaced pathologic left clavicular fracture is also again seen.    IMPRESSION:  No pulmonary embolism. Left common femoral vein DVT.    Ill-defined right hilar mass surrounding theright bronchi, similar to prior although difficult to compare due to now large right pleural effusion, increased since prior. Circumferential right pleural thickening is nonspecific, question metastatic versus infectious.    Hepatic, left adrenal andextensive bony metastatic disease, new since 2019.    New right renal lesions, which could represent metastatic disease or infection. Correlate with urinalysis.    Status post interval arthroplasty and screw fixation of pathologic left hip fracture with soft tissue mass with cement extending medially into the obturator region of the pelvis. The screw extends into the left iliopsoas muscle into the iliac crest. New bony fragment posterior to the femoral neck, unclear if postsurgical or representing new pathologic fracture. Correlate with surgical history and dedicated bony CT.    Discussed with Dr. Patel on 2020 at 6:50 PM.      Socrates García M.D., RADIOLOGY FELLOW  This document has been electronically signed.  GRADY JHAVERI M.D., ATTENDING RADIOLOGIST  This document has been electronically signed. Sep  6 2020  7:10PM    < end of copied text >

## 2020-09-08 NOTE — CONSULT NOTE ADULT - SUBJECTIVE AND OBJECTIVE BOX
Oncology Consult Note    HPI:  64yoF w/ PMHx of stage IV lung adenocarcinoma (dx 2017) s/p RLL lobectomy with recurrence in May 2020 s/p palliative radiation to clavicle and spine and 1 round of carbo/pemextred/pembrolizumab chemo 7/31, hypothyroidism, anxiety, recent admission for pathologic L femoral neck fracture s/p left QASIM on 8/18, presented from [facility] rehab for left hip pain and O2 desat to low 80s and anemia.   Pt discharged to [facility] 8/28 - states she has been having increasing pain in her left hip and back since going to [facility], 9-10/10, sharp, radiating down the leg. Pain management was switched from IV dilaudid to PO upon admission to [facility], which per pt doesn't work; pt has also been using fentanyl and lido patches. Pt and dtr note that pt also becomes intermittently altered at [facility]; pt states she  becomes "loopy" on PO dilaudid but fine with IV. On 9/5/20 - pt noted to be hypoxic 79% which improved to 100% on 2L O2; pt able to be weaned off and presumed to be 2/2 oversedation from opioids. Blood work at [facility] showed downtrending Hb 7.8 (9/1)-->7.1 (9/6). Pt on xarelto 10mg since discharge for DVT ppx. Pt herself denies any subjective SOB, denies any wheezing, cough, CP, palpitations. Endorses feeling generally weak and more fatigued in the past few days, endorses poor PO intake, infrequent BMs. Denies any bloody BMs, melena or hematuria. Dtr requested pt be brought back to hospital for better pain control and eval of anemia. En route, pt hypoxic to 84% on RA and placed on 3LNC with 100% sat.  In ED: 98.5, 115, 131/75, RR28, 100% on nasal cannula 4L. Patient had fever of 100.9F per rectum. On labs, patient has anemia w/ neg occult, mild hypoNa, incr Alk phos and UA showing urobilinogen. CTA chest/ abd/ pelvis shows no PE but shows increased R pleural effusion from prior, ill defined R hilar mass, hepatic/ L adrenal and extensive bony metastases new from 2019, new R renal lesion (mets vs infection), Screw fixated pathologic L hip fxr. CT head neg. Patient received 2L IV bolus and started on heparin drip for DVT. 1u pRBC ordered. Pain control with dilaudid and tylenol.    From previous hospital course: Patient also briefly experienced atrial tach/ aflutter days after surgery, and was started on amiodarone 5mg load with conversion back to sinus rhythm HR 70s-80s. Echo was limited due to tachycardia however valvular function and LV systolic function were normal. EP switched her from amio to metoprolol 50 daily. She was in sinus regular rate/rhythm over last few days without need for tele monitoring. Also, from prior hospital course, neurosurgery was consulted for osseous lesions in spine and MRI showed no cord or cauda equina compression. Neurosurgery recommended no intervention at this time. Oncology (outpatient and inpatient) confirmed she could go to rehab without need for systemic therapy. (06 Sep 2020 19:58)      Oncologic history  3/24/17 Stage IA adenoca s/p RLL lobectomy and mediastinal LN dissection        Allergies    Bananas (Headache)  Bananas (Other)  latex (Rash)  latex (Rash (Mild))  opioid-like analgesics (Urticaria)  penicillin (Angioedema)  penicillin (Swelling (Mild to Mod))    Intolerances        MEDICATIONS  (STANDING):  ALPRAZolam 0.25 milliGRAM(s) Oral at bedtime  HYDROmorphone   Solution 3 milliGRAM(s) Oral every 4 hours  ibuprofen  Tablet. 600 milliGRAM(s) Oral three times a day  levothyroxine 175 MICROGram(s) Oral daily  lidocaine   Patch 1 Patch Transdermal daily  liothyronine 5 MICROGram(s) Oral daily  melatonin 3 milliGRAM(s) Oral at bedtime  metoprolol tartrate 25 milliGRAM(s) Oral two times a day  polyethylene glycol 3350 17 Gram(s) Oral daily  senna 2 Tablet(s) Oral at bedtime    MEDICATIONS  (PRN):  acetaminophen   Tablet .. 650 milliGRAM(s) Oral every 6 hours PRN Temp greater or equal to 38C (100.4F), Mild Pain (1 - 3)  ALPRAZolam 0.25 milliGRAM(s) Oral two times a day PRN anxiety  aluminum hydroxide/magnesium hydroxide/simethicone Suspension 30 milliLiter(s) Oral every 6 hours PRN Dyspepsia  heparin   Injectable 7000 Unit(s) IV Push every 6 hours PRN For aPTT less than 40  heparin   Injectable 3500 Unit(s) IV Push every 6 hours PRN For aPTT between 40 - 57  HYDROmorphone  Injectable 1 milliGRAM(s) IV Push every 4 hours PRN Moderate Pain (4 - 6)  naloxone Injectable 0.2 milliGRAM(s) IV Push every 3 minutes PRN sedation due to therapeutic opiates      PAST MEDICAL & SURGICAL HISTORY:  Stage IV adenocarcinoma of lung  Genital herpes  Drug abuse: Back in the 70&#x27;s and 80&#x27;s (Cocaine)  Lung cancer: with mets  LAMAR on CPAP  Anxiety  Hypothyroid  History of total hip replacement, left: THR 8/18/2020  S/P partial lobectomy of lung: Right lower lobe  History of bunionectomy  Status post lobectomy of lung: 3/2017 (Right lung)  S/P bunionectomy: with revision      FAMILY HISTORY:  Family history of stroke or transient ischemic attack in father  Family history of hypothyroidism  Family history of hypertension  Family history of stroke      SOCIAL HISTORY: No EtOH, no tobacco    REVIEW OF SYSTEMS:    CONSTITUTIONAL: No weakness, fevers or chills  EYES/ENT: No visual changes;  No vertigo or throat pain   NECK: No pain or stiffness  RESPIRATORY: No cough, wheezing, hemoptysis; No shortness of breath  CARDIOVASCULAR: No chest pain or palpitations  GASTROINTESTINAL: No abdominal or epigastric pain. No nausea, vomiting, or hematemesis; No diarrhea or constipation. No melena or hematochezia.  GENITOURINARY: No dysuria, frequency or hematuria  NEUROLOGICAL: No numbness or weakness  SKIN: No itching, burning, rashes, or lesions   All other review of systems is negative unless indicated above.        T(F): 97.4 (09-08-20 @ 08:42), Max: 97.7 (09-07-20 @ 15:33)  HR: 71 (09-08-20 @ 08:42)  BP: 101/61 (09-08-20 @ 08:42)  RR: 18 (09-08-20 @ 08:42)  SpO2: 99% (09-08-20 @ 08:42)  Wt(kg): --    Physical Exam  GENERAL: NAD, well-developed  HEAD:  Atraumatic, Normocephalic  EYES: EOMI, PERRLA, conjunctiva and sclera clear  NECK: Supple, No JVD  CHEST/LUNG: Clear to auscultation bilaterally; No wheeze  HEART: Regular rate and rhythm; No murmurs, rubs, or gallops  ABDOMEN: Soft, Nontender, Nondistended; Bowel sounds present  EXTREMITIES:  2+ Peripheral Pulses, No clubbing, cyanosis, or edema  NEUROLOGY: non-focal  SKIN: No rashes or lesions                          8.4    5.22  )-----------( 252      ( 08 Sep 2020 07:00 )             27.6       09-08    140  |  99  |  5<L>  ----------------------------<  85  3.6   |  29  |  0.34<L>    Ca    9.7      08 Sep 2020 07:01  Phos  5.0     09-08  Mg     1.9     09-08    TPro  5.5<L>  /  Alb  2.7<L>  /  TBili  0.7  /  DBili  x   /  AST  12  /  ALT  12  /  AlkPhos  141<H>  09-08      Magnesium, Serum: 1.9 mg/dL (09-08 @ 07:01)  Phosphorus Level, Serum: 5.0 mg/dL (09-08 @ 07:01)

## 2020-09-08 NOTE — PROGRESS NOTE ADULT - PROBLEM SELECTOR PLAN 9
Anticoagulation: on heparin   Diet: regular  Consults: Ortho, vasc, onc, rad/onc, Pulm, pain management. Anticoagulation: on heparin   Diet: regular  Consults: Ortho, vasc, onc, rad/onc, Pulm, pain management.  -PT consulted.

## 2020-09-08 NOTE — PROCEDURE NOTE - NSPROCDETAILS_GEN_ALL_CORE
ultrasound assessment of effusion (size)/catheter inserted over needle/ultrasound assessment of effusion (localization)/location identified, draped/prepped, sterile technique used, needle inserted/introduced/Seldinger technique

## 2020-09-08 NOTE — PROGRESS NOTE ADULT - ATTENDING COMMENTS
LLE DVT- on heparin drip- would favor AC over IVC filter given malignancy and immobile state   Pulmonary f/u- possible thoracentesis  Pain management- Trial of Dilaudid elixir 3 mg PO q 4 hours  Patient does not wish to return to [facility] at discharge

## 2020-09-08 NOTE — PROGRESS NOTE ADULT - PROBLEM SELECTOR PLAN 2
Patient presented with acute hypoxic respiratory failure in setting of extensive lung cancer, increasing pleural effusion with associated atelectasis; ?R pleural thickening. No PE noted on imaging. Currently saturating well on 3LNC, no resp distress noted.   - c/w supplemental O2, wean as tolerated  - incentive spirometry  - pulm consulted in regards to candidacy for therapeutic thoracentesis Patient presented with acute hypoxic respiratory failure in setting of extensive lung cancer, increasing pleural effusion with associated atelectasis; ?R pleural thickening. No PE noted on imaging. Currently saturating well on 3LNC, no resp distress noted.   - c/w supplemental O2, wean as tolerated  - incentive spirometry  - pulm consulted in regards to candidacy for therapeutic thoracentesis. To assess today. Holding Heparin drip starting 12PM in anticipation of potential thoracentesis.

## 2020-09-08 NOTE — PROGRESS NOTE ADULT - ATTENDING COMMENTS
Pt seen and examined. Septated and very loculated R pleural effusion. R thoracentesis performed, fluid sent for cell count, LDH, protein, CXs, cytopath. FUP post procedure CXR. Can resume heparin gtt.

## 2020-09-08 NOTE — PROGRESS NOTE ADULT - PROBLEM SELECTOR PLAN 3
Recent total hip replacement for pathological L femoral neck fracture (8/18/2020), presenting with worsening back and L hip pain. Likely partially 2/2 acute DVT, recent surgery and sacral lesion that is likely contributing to her pain. No e/o infection at surgical site.     - NSX consulted for sacral lesion - c/w TLSO brace, consider thigh extension as per NXS and ortho   - pain control with dilaudid IV and fentanyl patch, monitor for sedation. Patient refuses PO dilaudid  - pain management/palliative consult as pain poorly controlled. Recent total hip replacement for pathological L femoral neck fracture (8/18/2020), presenting with worsening back and L hip pain. Likely partially 2/2 acute DVT, recent surgery and sacral lesion that is likely contributing to her pain. No e/o infection at surgical site.     - NSX consulted for sacral lesion - c/w TLSO brace, consider thigh extension as per NXS and ortho   - pain control with dilaudid IV and fentanyl patch, monitor for sedation. Patient refuses PO dilaudid, however likes 3mg liquid dilaudid, so started this dose Q4h ATC.  - palliative following

## 2020-09-08 NOTE — PROGRESS NOTE ADULT - PROBLEM SELECTOR PLAN 1
Patient found to have L common femoral DVT on CT  - c/w heparin drip for DVT  - pending formal b/l VA duplex of lower extremities  - vascular consulted, will c/t following re: need of IVC filter Patient found to have L common femoral DVT on CT  - VA duplex showing L common femoral vein DVT  - c/w heparin drip for DVT  - vascular consulted, will c/t following re: need of IVC filter. Likely will send home on therapeutic anticoagulation given stable H/H, Metastatic disease with high risk of clot burden.

## 2020-09-09 NOTE — PROGRESS NOTE ADULT - ASSESSMENT
64yoF w/ PMHx of stage IV lung adenocarcinoma ( dx 2017) s/p RLL lobectomy with recurrence in May 2020 s/p palliative radiation to clavicle and spine and 1 round of carbo/pemextred/pembrolizumab chemo 7/31, hypothyroidism, anxiety, recent admission for pathologic L femoral neck fracture s/p left QASIM on 8/18 discharged to [facility] on xarelto 10mg or DVT ppx, p/w worsening L hip pain and hypoxia - a/w acute LLE DVT and worsening hypoxic respiratory failure 2/2 lung cancer with worsening R sided effusion.

## 2020-09-09 NOTE — CONSULT NOTE ADULT - CONSULT REASON
Metastatic lung cancer
Metastatic lung cancer
Pain management and GOC
Palliative Medicine was consulted to evaluate and manage complex symptomatology related to the patient's life-limiting illness
Pleural Effusion
fever
left femur bone met/fracture
spinal lesions stg 4 lung adeno
lower extremity DVT

## 2020-09-09 NOTE — CONSULT NOTE ADULT - ATTENDING COMMENTS
As above.
rec AC if no contraindication, the DVT appears to be limited to the CFV on CT.  If unable to AC can place IVC filter
Agree with above. Pt returned to [facility] d/t increasing back/hip pain. Imaging including CT scan revealed stable hardware without loosening or ppx fx. Imaging did reveal a left femoral DVT. Pt also w/ persistent left supracondylar femur lesion. Discussed that this does predispose her to a pathologic fracture, however given recent complications and desire to start chemo we will continue to monitor this area. Pt and family prefer to continue nonop management at this time. Recommending XRT to help halt progression. Continue 50% PWB LLE in [place], unlocked. If any increasing pain was told to follow up for repeat xrays. Otherwise may followup in 4 weeks. I have explained to the patient and famliy that in the event that she sustains a fracture she will require surgery. Continue posterior hip precautions, ACx per medicine. FU NSx regarding spine lesions.
Agree with above. Pt seen and evaluated at bedside.   Plan for transfer to [facility] for RT then f/u with [place]. [place] [facility]
Arnold Morales MD   Geriatrics and Palliative Care (GAP) Consult Service    of Geriatric and Palliative Medicine  [facility]      Please page the following number for clinical matters between the hours of 9 am and 5 pm from Monday through Friday : [phone]    After 5pm and on weekends, please see the contact information below:    In the event of newly developing, evolving, or worsening symptoms, please contact the Palliative Medicine team via pager (if the patient is at [facility] #[phone] or if the patient is at [facility] #[phone]) The Geriatric and Palliative Medicine service has coverage 24 hours a day/ 7 days a week to provide medical recommendations regarding symptom management needs via telephone

## 2020-09-09 NOTE — PROGRESS NOTE ADULT - PROBLEM SELECTOR PLAN 4
Patient presented with tachycardia, tachypnea and fever of 100.9. Likely in setting of DVT vs malignancy. Lower suspicion for infectious etiology at this time - no leukocytosis, no localizing symptoms, UA neg, no abdominal pathology.  -Currently afebrile with no leukocytosis.   -will defer abx for now; however, if has recurrent fever or hemodynamic compromise, would start broad spectrum vanco/cefepime  - fever resolved. tachycardia and tachypnea improved with supplemental O2 as well as pain control  - Bcx negative  - UA neg  - VA duplex showing L common femoral vein DVT

## 2020-09-09 NOTE — CONSULT NOTE ADULT - PROBLEM SELECTOR RECOMMENDATION 2
-c/w senna and Miralax  -s/p Enema with minimal improvement   -Can do trial of lactulose or dulcolax suppository

## 2020-09-09 NOTE — CONSULT NOTE ADULT - PROBLEM SELECTOR RECOMMENDATION 4
-Discussed GOC with patient and daughter. Pt mentions having a  however would feel more comfortable her daughter making decisions, thus would like to make her the HCP  -Will fill form and place in chart

## 2020-09-09 NOTE — PROGRESS NOTE ADULT - PROBLEM SELECTOR PLAN 5
Patient has stage 4 lung adenocarcinoma (RLL lobectomy in 2017, recurrence in May 2020 s/p palliative rad and chemo), CTA chest/ abd/ pelvis show increased R pleural effusion from prior, ill defined R hilar mass, hepatic/ L adrenal and extensive bony metastases new from 2019, new R renal lesion. Screw fixated pathologic L hip fxr. L supracondylar distal femur lesion     - Rad onc consulted for directed radiation to supracondylar distal femur lesion  - f/u oncology regarding further treatment options.  -As per outpatient Onc, would like to continue chemo, but patient needs to be able to sit up in a chair without significant pain for at least 3-4 hours to do so. Patient needs further rehab prior to restarting chemo. Patient has stage 4 lung adenocarcinoma (RLL lobectomy in 2017, recurrence in May 2020 s/p palliative rad and chemo), CTA chest/ abd/ pelvis show increased R pleural effusion from prior, ill defined R hilar mass, hepatic/ L adrenal and extensive bony metastases new from 2019, new R renal lesion. Screw fixated pathologic L hip fxr. L supracondylar distal femur lesion     - Rad onc consulted for directed radiation to supracondylar distal femur lesion  - f/u oncology regarding further treatment options.  -As per outpatient Onc, would like to continue chemo, but patient needs to be able to sit up in a chair without significant pain for at least 3-4 hours to do so. Patient needs further rehab prior to restarting chemo.  -per pt, would like to follow with [facility], f/u with [facility] Dr. Andres Patient has stage 4 lung adenocarcinoma (RLL lobectomy in 2017, recurrence in May 2020 s/p palliative rad and chemo), CTA chest/ abd/ pelvis show increased R pleural effusion from prior, ill defined R hilar mass, hepatic/ L adrenal and extensive bony metastases new from 2019, new R renal lesion. Screw fixated pathologic L hip fxr. L supracondylar distal femur lesion     - Rad onc consulted for directed radiation to supracondylar distal femur lesion  - f/u oncology regarding further treatment options.  -As per outpatient Onc, would like to continue chemo, but patient needs to be able to sit up in a chair without significant pain for at least 3-4 hours to do so. Patient needs further rehab prior to restarting chemo.  -per pt, would like to follow with [facility], f/u with [facility] Dr. Andres  -9/9 Per rad onc, pt is okay with transferring to [facility] for radiation therapy.

## 2020-09-09 NOTE — CONSULT NOTE ADULT - SUBJECTIVE AND OBJECTIVE BOX
HPI:  [id number] w/ PMHx of stage IV lung adenocarcinoma ( dx 2017) s/p RLL lobectomy with recurrence in May 2020 s/p palliative radiation to clavicle and spine and 1 round of carbo/pemextred/pembrolizumab chemo 7/31, hypothyroidism, anxiety, recent admission for pathologic L femoral neck fracture s/p left QASIM on 8/18, presented from [facility] rehab for left hip pain and O2 desat to low 80s and anemia.   Pt discharged to [facility] 8/28 - states she has been having increasing pain in her left hip and back since going to [facility], 9-10/10, sharp, radiating down the leg. Pain management was switched from IV dilaudid to PO upon admission to [facility], which per pt doesn't work; pt has also been using fentanyl and lido patches. Pt and dtr note that pt also becomes intermittently altered at [facility]; pt states she  becomes "loopy" on PO dilaudid but fine with IV. On 9/5/20 - pt noted to be hypoxic 79% which improved to 100% on 2L O2; pt able to be weaned off and presumed to be 2/2 oversedation from opioids. Blood work at [facility] showed downtrending Hb 7.8 (9/1)-->7.1 (9/6). Pt on xarelto 10mg since discharge for DVT ppx. Pt herself denies any subjective SOB, denies any wheezing, cough, CP, palpitations. Endorses feeling generally weak and more fatigued in the past few days, endorses poor PO intake, infrequent BMs. Denies any bloody BMs, melena or hematuria. Dtr requested pt be brought back to hospital for better pain control and eval of anemia. En route, pt hypoxic to 84% on RA and placed on 3LNC with 100% sat.  In ED: 98.5, 115, 131/75, RR28, 100% on nasal cannula 4L. Patient had fever of 100.9F per rectum. On labs, patient has anemia w/ neg occult, mild hypoNa, incr Alk phos and UA showing urobilinogen. CTA chest/ abd/ pelvis shows no PE but shows increased R pleural effusion from prior, ill defined R hilar mass, hepatic/ L adrenal and extensive bony metastases new from 2019, new R renal lesion (mets vs infection), Screw fixated pathologic L hip fxr. CT head neg. Patient received 2L IV bolus and started on heparin drip for DVT. 1u pRBC ordered. Pain control with dilaudid and tylenol.    From previous hospital course: Patient also briefly experienced atrial tach/ aflutter days after surgery, and was started on amiodarone 5mg load with conversion back to sinus rhythm HR 70s-80s. Echo was limited due to tachycardia however valvular function and LV systolic function were normal. EP switched her from amio to metoprolol 50 daily. She was in sinus regular rate/rhythm over last few days without need for tele monitoring. Also, from prior hospital course, neurosurgery was consulted for osseous lesions in spine and MRI showed no cord or cauda equina compression. Neurosurgery recommended no intervention at this time. Oncology (outpatient and inpatient) confirmed she could go to rehab without need for systemic therapy. (06 Sep 2020 19:58)    Pt informs me her pain is mainly at the L hip and radiates down to her L knee that feels sharp at onset. This pain has been occurring since her procedure for her fx. The pain is relieved with dilaudid 3mg PO that she is currently getting. Her pain before dilaudid is 8-9/10 and comes down to a 4-5/10, which is reasonable with her, however she mentions her goal is to be at a zero. Pain is worsened with movement.     PERTINENT PM/SXH:   Stage IV adenocarcinoma of lung  Hypothyroid  Hypertension  Genital herpes  Drug abuse  Lung cancer  LAMAR on CPAP  Anxiety  Hypothyroid    History of total hip replacement, left  S/P partial lobectomy of lung  History of bunionectomy  Status post lobectomy of lung  S/P bunionectomy    FAMILY HISTORY:  Family history of stroke or transient ischemic attack in father  Family history of hypothyroidism  Family history of hypertension  Family history of stroke    ITEMS NOT CHECKED ARE NOT PRESENT    SOCIAL HISTORY:   Significant other/partner[ X]  Children[ ]  Yarsani/Spirituality:  Substance hx:  [ ]   Tobacco hx:  [ ]   Alcohol hx: [ ]   Home Opioid hx:  [ ] I-Stop Reference No:  Living Situation: [X ]Home  [ ]Long term care  [ ]Rehab [ ]Other    ADVANCE DIRECTIVES:    DNR  MOLST  [ ]  Living Will  [ ]   DECISION MAKER(s):  [X ] Health Care Proxy(s)  [ ] Surrogate(s)  [ ] Guardian           Name(s): Phone Number(s):Sofia [phone]    BASELINE (I)ADL(s) (prior to admission):  [place]: [ ]Total  [ ] Moderate [ ]Dependent    Allergies    Bananas (Headache)  Bananas (Other)  latex (Rash)  latex (Rash (Mild))  opioid-like analgesics (Urticaria)  penicillin (Angioedema)  penicillin (Swelling (Mild to Mod))    MEDICATIONS  (STANDING):  dexAMETHasone  Injectable 2 milliGRAM(s) IV Push two times a day  enoxaparin Injectable 90 milliGRAM(s) SubCutaneous two times a day  levothyroxine 175 MICROGram(s) Oral daily  lidocaine   Patch 1 Patch Transdermal daily  liothyronine 5 MICROGram(s) Oral daily  melatonin 3 milliGRAM(s) Oral at bedtime  metoprolol tartrate 25 milliGRAM(s) Oral two times a day  morphine ER Tablet 30 milliGRAM(s) Oral <User Schedule>  pantoprazole    Tablet 40 milliGRAM(s) Oral before breakfast  polyethylene glycol 3350 17 Gram(s) Oral daily  saline laxative (FLEET) Rectal Enema 1 Enema Rectal daily  senna 2 Tablet(s) Oral at bedtime    MEDICATIONS  (PRN):  acetaminophen   Tablet .. 650 milliGRAM(s) Oral every 6 hours PRN Temp greater or equal to 38C (100.4F), Mild Pain (1 - 3)  ALPRAZolam 0.25 milliGRAM(s) Oral two times a day PRN anxiety  aluminum hydroxide/magnesium hydroxide/simethicone Suspension 30 milliLiter(s) Oral every 6 hours PRN Dyspepsia  HYDROmorphone  Injectable 1 milliGRAM(s) IV Push every 2 hours PRN Moderate to severe pain  naloxone Injectable 0.2 milliGRAM(s) IV Push every 3 minutes PRN sedation due to therapeutic opiates    PRESENT SYMPTOMS: [ ]Unable to obtain due to poor mentation   Source if other than patient:  [ ]Family   [ ]Team     Pain: [ ]yes [X ]no  QOL impact -   Location -                    Aggravating factors -  Quality -  Radiation -  Timing-  Severity (0-10 scale):  Minimal acceptable level (0-10 scale):     CPOT:    https://www.[facility].org/getattachment/[email or web address]/Critical-Care-Pain-Observation-Tool-(CPOT)    PAIN AD Score:     http://geriatrictoolkit.[facility]/cog/painad.pdf (press ctrl +  left click to view)    Dyspnea:                           [ ]Mild [ ]Moderate [ ]Severe  Anxiety:                             [ ]Mild [ ]Moderate [ ]Severe  Fatigue:                             [ ]Mild [ ]Moderate [ ]Severe  Nausea:                             [ ]Mild [ ]Moderate [ ]Severe  Loss of appetite:              [ ]Mild [ ]Moderate [ ]Severe  Constipation:                    [ ]Mild [X ]Moderate [ ]Severe    Other Symptoms:  [ ]All other review of systems negative     Palliative Performance Status Version 2:      40%    http://[email or web address]/files/news/palliative_performance_scale_[email or web address].pdf  PHYSICAL EXAM:  Vital Signs Last 24 Hrs  T(C): 36.5 (09 Sep 2020 13:37), Max: 36.5 (09 Sep 2020 13:37)  T(F): 97.7 (09 Sep 2020 13:37), Max: 97.7 (09 Sep 2020 13:37)  HR: 88 (09 Sep 2020 13:37) (72 - 105)  BP: 108/65 (09 Sep 2020 13:37) (108/65 - 133/84)  RR: 18 (09 Sep 2020 13:37) (18 - 18)  SpO2: 98% (09 Sep 2020 13:37) (86% - 100%) I&O's Summary    08 Sep 2020 07:01  -  09 Sep 2020 07:00  --------------------------------------------------------  IN: 648 mL / OUT: 0 mL / NET: 648 mL      GENERAL:  [X]Alert  [X ]Oriented x3   [ ]Lethargic  [ ]Cachexia  [ ]Unarousable  [X ]Verbal  [ ]Non-Verbal  Behavioral:   [ ] Anxiety  [ ] Delirium [ ] Agitation [ ] Other  HEENT:  [X ]Normal   [ ]Dry mouth   [ ]ET Tube/Trach  [ ]Oral lesions  PULMONARY:   [X ]Clear [ ]Tachypnea  [ ]Audible excessive secretions   [ ]Rhonchi        [ ]Right [ ]Left [ ]Bilateral  [ ]Crackles        [ ]Right [ ]Left [ ]Bilateral  [ ]Wheezing     [ ]Right [ ]Left [ ]Bilateral  [ ]Diminished breath sounds [ ]right [ ]left [ ]bilateral  CARDIOVASCULAR:    [X ]Regular [ ]Irregular [ ]Tachy  [ ]Esteban [ ]Murmur [ ]Other  GASTROINTESTINAL:  [X ]Soft  [ ]Distended   [ ]+BS  [X ]Non tender [ ]Tender  [ ]PEG [ ]OGT/ NGT  Last BM: 9/3/2020    GENITOURINARY:  [X ]Normal [ ] Incontinent   [ ]Oliguria/Anuria   [ ][place]ks  MUSCULOSKELETAL:   [ ]Normal   [ X]Weakness  [ ]Bed/Wheelchair bound [ ]Edema  NEUROLOGIC:   [X ]No focal deficits  [ ]Cognitive impairment  [ ]Dysphagia [ ]Dysarthria [ ]Paresis [ ]Other   SKIN:   [X ]Normal    [ ]Rash  [ ]Pressure ulcer(s)       Present on admission [ ]y [ ]n    CRITICAL CARE:  [ ] Shock Present  [ ]Septic [ ]Cardiogenic [ ]Neurologic [ ]Hypovolemic  [ ]  Vasopressors [ ]  Inotropes   [ ]Respiratory failure present [ ]Mechanical ventilation [ ]Non-invasive ventilatory support [ ]High flow    [ ]Acute  [ ]Chronic [ ]Hypoxic  [ ]Hypercarbic [ ]Other  [ ]Other organ failure     LABS:                        9.2    5.30  )-----------( 289      ( 09 Sep 2020 06:46 )             30.9   09-09    140  |  99  |  4<L>  ----------------------------<  91  3.5   |  30  |  0.36<L>    Ca    9.6      09 Sep 2020 06:46  Phos  5.5     09-09  Mg     1.9     09-09    TPro  5.8<L>  /  Alb  2.7<L>  /  TBili  0.7  /  DBili  x   /  AST  13  /  ALT  10  /  AlkPhos  155<H>  09-09  PT/INR - ( 08 Sep 2020 07:01 )   PT: 14.1 sec;   INR: 1.19 ratio    PTT - ( 09 Sep 2020 08:46 )  PTT:88.2 sec      RADIOLOGY & ADDITIONAL STUDIES:  < from: CT 3D Reconstruct w/ Workstation (09.06.20 @ 17:42) >  IMPRESSION:  < from: CT Angio Chest w/ IV Cont (09.06.20 @ 17:41) >  IMPRESSION:  No pulmonary embolism. Left common femoral vein DVT.    Ill-defined right hilar mass surrounding theright bronchi, similar to prior although difficult to compare due to now large right pleural effusion, increased since prior. Circumferential right pleural thickening is nonspecific, question metastatic versus infectious.    Hepatic, left adrenal andextensive bony metastatic disease, new since 2019.    New right renal lesions, which could represent metastatic disease or infection. Correlate with urinalysis.    Status post interval arthroplasty and screw fixation of pathologic left hip fracture with soft tissue mass with cement extending medially into the obturator region of the pelvis. The screw extends into the left iliopsoas muscle into the iliac crest. New bony fragment posterior to the femoral neck, unclear if postsurgical or representing new pathologic fracture. Correlate with surgical history and dedicated bony CT.    < end of copied text >  1.  Diffuse lytic metastatic disease is again seen within the lower lumbar spine and pelvis. The lesions in the leftward pedicle of L5, posterior cortex of the right intertrochanteric femur, and anterior aspect of the left iliac crest of increased in size when compared to prior CT. Remaining lesions are unchanged in size.  2.  No acute fracture.  3.  Patient status post left acetabulum augmentation and placement of longstem prosthesis. No evidence of acute hardware complication.          PROTEIN CALORIE MALNUTRITION PRESENT: [ ]mild [ ]moderate [ ]severe [ ]underweight [ ]morbid obesity  https://www.andeal.org/vault/2440/web/files/ONC/Table_Clinical%20Characteristics%20to%20Document%20Malnutrition-White%20JV%20et%20al%073243.pdf    Height (cm): 172.7 (09-06-20 @ 22:25), 172.72 (08-27-20 @ 09:25), 171 (08-14-20 @ 06:18)  Weight (kg): 88.133 (09-06-20 @ 22:25), 95.4 (08-27-20 @ 09:25), 88.451 (08-14-20 @ 11:20)  BMI (kg/m2): 29.5 (09-06-20 @ 22:25), 32 (08-27-20 @ 09:25), 30.2 (08-14-20 @ 11:20)    [ ]PPSV2 < or = to 30% [ ]significant weight loss  [ ]poor nutritional intake  [ ]anasarca     Albumin, Serum: 2.7 g/dL (09-09-20 @ 06:46)   [ ]Artificial Nutrition      REFERRALS:   [ ]Chaplaincy  [ ]Hospice  [ ]Child Life  [ ]Social Work  [ ]Case management [ ]Holistic Therapy     Goals of Care Document: BETTYE Carlos (07-15-20 @ 14:32)  Goals of Care Conversation:   Conversation Discussion:  · Conversation Details  SW contacted patients spouse Yehuda to offer support in coping with patients catastrophic dx and hospitalization. Spouse appreciative of SW call and reviewed his concerns for planning for when patient discharged. Spouse requesting to speak to hospitalist regarding time frame for discharge since he is aware Friday is last palliative RT tx. SW contacted hospitalist to request follow up with spouse.      Electronic Signatures:  Viji Carlos [place]  (Signed 15-Jul-2020 14:34)  	Authored: Goals of Care Conversation      Last Updated: 15-Jul-2020 14:34 by Viji Carlos [place] HPI:  [id number] w/ PMHx of stage IV lung adenocarcinoma ( dx 2017) s/p RLL lobectomy with recurrence in May 2020 s/p palliative radiation to clavicle and spine and 1 round of carbo/pemextred/pembrolizumab chemo 7/31, hypothyroidism, anxiety, recent admission for pathologic L femoral neck fracture s/p left QASIM on 8/18, presented from [facility] rehab for left hip pain and O2 desat to low 80s and anemia.   Pt discharged to [facility] 8/28 - states she has been having increasing pain in her left hip and back since going to [facility], 9-10/10, sharp, radiating down the leg. Pain management was switched from IV dilaudid to PO upon admission to [facility], which per pt doesn't work; pt has also been using fentanyl and lido patches. Pt and dtr note that pt also becomes intermittently altered at [facility]; pt states she  becomes "loopy" on PO dilaudid but fine with IV. On 9/5/20 - pt noted to be hypoxic 79% which improved to 100% on 2L O2; pt able to be weaned off and presumed to be 2/2 oversedation from opioids. Blood work at [facility] showed downtrending Hb 7.8 (9/1)-->7.1 (9/6). Pt on xarelto 10mg since discharge for DVT ppx. Pt herself denies any subjective SOB, denies any wheezing, cough, CP, palpitations. Endorses feeling generally weak and more fatigued in the past few days, endorses poor PO intake, infrequent BMs. Denies any bloody BMs, melena or hematuria. Dtr requested pt be brought back to hospital for better pain control and eval of anemia. En route, pt hypoxic to 84% on RA and placed on 3LNC with 100% sat.  In ED: 98.5, 115, 131/75, RR28, 100% on nasal cannula 4L. Patient had fever of 100.9F per rectum. On labs, patient has anemia w/ neg occult, mild hypoNa, incr Alk phos and UA showing urobilinogen. CTA chest/ abd/ pelvis shows no PE but shows increased R pleural effusion from prior, ill defined R hilar mass, hepatic/ L adrenal and extensive bony metastases new from 2019, new R renal lesion (mets vs infection), Screw fixated pathologic L hip fxr. CT head neg. Patient received 2L IV bolus and started on heparin drip for DVT. 1u pRBC ordered. Pain control with dilaudid and tylenol.    From previous hospital course: Patient also briefly experienced atrial tach/ aflutter days after surgery, and was started on amiodarone 5mg load with conversion back to sinus rhythm HR 70s-80s. Echo was limited due to tachycardia however valvular function and LV systolic function were normal. EP switched her from amio to metoprolol 50 daily. She was in sinus regular rate/rhythm over last few days without need for tele monitoring. Also, from prior hospital course, neurosurgery was consulted for osseous lesions in spine and MRI showed no cord or cauda equina compression. Neurosurgery recommended no intervention at this time. Oncology (outpatient and inpatient) confirmed she could go to rehab without need for systemic therapy. (06 Sep 2020 19:58)    Pt informs me her pain is mainly at the L hip and radiates down to her L knee that feels sharp at onset. This pain has been occurring since her procedure for her fx. The pain is relieved with dilaudid 3mg PO that she is currently getting. Her pain before dilaudid is 8-9/10 and comes down to a 4-5/10, which is reasonable with her, however she mentions her goal is to be at a zero. Pain is worsened with movement.     PERTINENT PM/SXH:   Stage IV adenocarcinoma of lung  Hypothyroid  Hypertension  Genital herpes  Drug abuse  Lung cancer  LAMAR on CPAP  Anxiety  Hypothyroid    History of total hip replacement, left  S/P partial lobectomy of lung  History of bunionectomy  Status post lobectomy of lung  S/P bunionectomy    FAMILY HISTORY:  Family history of stroke or transient ischemic attack in father  Family history of hypothyroidism  Family history of hypertension  Family history of stroke    ITEMS NOT CHECKED ARE NOT PRESENT    SOCIAL HISTORY:   Significant other/partner[ X]  Children[ ]  Rastafarian/Spirituality:  Substance hx:  [ ]   Tobacco hx:  [ ]   Alcohol hx: [ ]   Home Opioid hx:  [ ] I-Stop Reference No:  Living Situation: [X ]Home  [ ]Long term care  [ ]Rehab [ ]Other    ADVANCE DIRECTIVES:    DNR  MOLST  [ ]  Living Will  [ ]   DECISION MAKER(s):  [X ] Health Care Proxy(s)  [ ] Surrogate(s)  [ ] Guardian           Name(s): Phone Number(s):Sofia [phone]    BASELINE (I)ADL(s) (prior to admission):  [place]: [ ]Total  [ x] Moderate [ ]Dependent    Allergies    Bananas (Headache)  Bananas (Other)  latex (Rash)  latex (Rash (Mild))  opioid-like analgesics (Urticaria)  penicillin (Angioedema)  penicillin (Swelling (Mild to Mod))    MEDICATIONS  (STANDING):  dexAMETHasone  Injectable 2 milliGRAM(s) IV Push two times a day  enoxaparin Injectable 90 milliGRAM(s) SubCutaneous two times a day  levothyroxine 175 MICROGram(s) Oral daily  lidocaine   Patch 1 Patch Transdermal daily  liothyronine 5 MICROGram(s) Oral daily  melatonin 3 milliGRAM(s) Oral at bedtime  metoprolol tartrate 25 milliGRAM(s) Oral two times a day  morphine ER Tablet 30 milliGRAM(s) Oral <User Schedule>  pantoprazole    Tablet 40 milliGRAM(s) Oral before breakfast  polyethylene glycol 3350 17 Gram(s) Oral daily  saline laxative (FLEET) Rectal Enema 1 Enema Rectal daily  senna 2 Tablet(s) Oral at bedtime    MEDICATIONS  (PRN):  acetaminophen   Tablet .. 650 milliGRAM(s) Oral every 6 hours PRN Temp greater or equal to 38C (100.4F), Mild Pain (1 - 3)  ALPRAZolam 0.25 milliGRAM(s) Oral two times a day PRN anxiety  aluminum hydroxide/magnesium hydroxide/simethicone Suspension 30 milliLiter(s) Oral every 6 hours PRN Dyspepsia  HYDROmorphone  Injectable 1 milliGRAM(s) IV Push every 2 hours PRN Moderate to severe pain  naloxone Injectable 0.2 milliGRAM(s) IV Push every 3 minutes PRN sedation due to therapeutic opiates    PRESENT SYMPTOMS: [ ]Unable to obtain due to poor mentation   Source if other than patient:  [ ]Family   [ ]Team     See pain characteristics as above.   Pain: [ ]yes [X ]no  QOL impact -   Location -                    Aggravating factors -  Quality -  Radiation -  Timing-  Severity (0-10 scale):  Minimal acceptable level (0-10 scale):     CPOT:    https://www.[facility].org/getattachment/[email or web address]/Critical-Care-Pain-Observation-Tool-(CPOT)    PAIN AD Score:     http://geriatrictoolkit.[facility]/cog/painad.pdf (press ctrl +  left click to view)    Dyspnea:                           [ ]Mild [ ]Moderate [ ]Severe  Anxiety:                             [ ]Mild [ ]Moderate [ ]Severe  Fatigue:                             [ ]Mild [ ]Moderate [ ]Severe  Nausea:                             [ ]Mild [ ]Moderate [ ]Severe  Loss of appetite:              [ ]Mild [ ]Moderate [ ]Severe  Constipation:                    [ ]Mild [X ]Moderate [ ]Severe    Other Symptoms:  [ ]All other review of systems negative     Palliative Performance Status Version 2:      40%    http://[facility].org/files/news/palliative_performance_scale_[email or web address].pdf  PHYSICAL EXAM:  Vital Signs Last 24 Hrs  T(C): 36.5 (09 Sep 2020 13:37), Max: 36.5 (09 Sep 2020 13:37)  T(F): 97.7 (09 Sep 2020 13:37), Max: 97.7 (09 Sep 2020 13:37)  HR: 88 (09 Sep 2020 13:37) (72 - 105)  BP: 108/65 (09 Sep 2020 13:37) (108/65 - 133/84)  RR: 18 (09 Sep 2020 13:37) (18 - 18)  SpO2: 98% (09 Sep 2020 13:37) (86% - 100%) I&O's Summary    08 Sep 2020 07:01  -  09 Sep 2020 07:00  --------------------------------------------------------  IN: 648 mL / OUT: 0 mL / NET: 648 mL      GENERAL:  [X]Alert  [X ]Oriented x3   [ ]Lethargic  [ ]Cachexia  [ ]Unarousable  [X ]Verbal  [ ]Non-Verbal  Behavioral:   [ ] Anxiety  [ ] Delirium [ ] Agitation [ ] Other  HEENT:  [X ]Normal   [ ]Dry mouth   [ ]ET Tube/Trach  [ ]Oral lesions  PULMONARY:   [X ]Clear [ ]Tachypnea  [ ]Audible excessive secretions   [ ]Rhonchi        [ ]Right [ ]Left [ ]Bilateral  [ ]Crackles        [ ]Right [ ]Left [ ]Bilateral  [ ]Wheezing     [ ]Right [ ]Left [ ]Bilateral  [ ]Diminished breath sounds [ ]right [ ]left [ ]bilateral  CARDIOVASCULAR:    [X ]Regular [ ]Irregular [ ]Tachy  [ ]Esteban [ ]Murmur [ ]Other  GASTROINTESTINAL:  [X ]Soft  [ ]Distended   [ ]+BS  [X ]Non tender [ ]Tender  [ ]PEG [ ]OGT/ NGT  Last BM: 9/3/2020    GENITOURINARY:  [X ]Normal [ ] Incontinent   [ ]Oliguria/Anuria   [ ][place]ks  MUSCULOSKELETAL:   [ ]Normal   [ X]Weakness  [ ]Bed/Wheelchair bound [ ]Edema  NEUROLOGIC:   [X ]No focal deficits  [ ]Cognitive impairment  [ ]Dysphagia [ ]Dysarthria [ ]Paresis [ ]Other   SKIN:   [X ]Normal    [ ]Rash  [ ]Pressure ulcer(s)       Present on admission [ ]y [ ]n    CRITICAL CARE:  [ ] Shock Present  [ ]Septic [ ]Cardiogenic [ ]Neurologic [ ]Hypovolemic  [ ]  Vasopressors [ ]  Inotropes   [ ]Respiratory failure present [ ]Mechanical ventilation [ ]Non-invasive ventilatory support [ ]High flow    [ ]Acute  [ ]Chronic [ ]Hypoxic  [ ]Hypercarbic [ ]Other  [ ]Other organ failure     LABS:                        9.2    5.30  )-----------( 289      ( 09 Sep 2020 06:46 )             30.9   09-09    140  |  99  |  4<L>  ----------------------------<  91  3.5   |  30  |  0.36<L>    Ca    9.6      09 Sep 2020 06:46  Phos  5.5     09-09  Mg     1.9     09-09    TPro  5.8<L>  /  Alb  2.7<L>  /  TBili  0.7  /  DBili  x   /  AST  13  /  ALT  10  /  AlkPhos  155<H>  09-09  PT/INR - ( 08 Sep 2020 07:01 )   PT: 14.1 sec;   INR: 1.19 ratio    PTT - ( 09 Sep 2020 08:46 )  PTT:88.2 sec      RADIOLOGY & ADDITIONAL STUDIES:  < from: CT 3D Reconstruct w/ Workstation (09.06.20 @ 17:42) >  IMPRESSION:  < from: CT Angio Chest w/ IV Cont (09.06.20 @ 17:41) >  IMPRESSION:  No pulmonary embolism. Left common femoral vein DVT.    Ill-defined right hilar mass surrounding theright bronchi, similar to prior although difficult to compare due to now large right pleural effusion, increased since prior. Circumferential right pleural thickening is nonspecific, question metastatic versus infectious.    Hepatic, left adrenal andextensive bony metastatic disease, new since 2019.    New right renal lesions, which could represent metastatic disease or infection. Correlate with urinalysis.    Status post interval arthroplasty and screw fixation of pathologic left hip fracture with soft tissue mass with cement extending medially into the obturator region of the pelvis. The screw extends into the left iliopsoas muscle into the iliac crest. New bony fragment posterior to the femoral neck, unclear if postsurgical or representing new pathologic fracture. Correlate with surgical history and dedicated bony CT.    < end of copied text >  1.  Diffuse lytic metastatic disease is again seen within the lower lumbar spine and pelvis. The lesions in the leftward pedicle of L5, posterior cortex of the right intertrochanteric femur, and anterior aspect of the left iliac crest of increased in size when compared to prior CT. Remaining lesions are unchanged in size.  2.  No acute fracture.  3.  Patient status post left acetabulum augmentation and placement of longstem prosthesis. No evidence of acute hardware complication.          PROTEIN CALORIE MALNUTRITION PRESENT: [ ]mild [ ]moderate [ ]severe [ ]underweight [ ]morbid obesity  [email or web address]    Height (cm): 172.7 (09-06-20 @ 22:25), 172.72 (08-27-20 @ 09:25), 171 (08-14-20 @ 06:18)  Weight (kg): 88.133 (09-06-20 @ 22:25), 95.4 (08-27-20 @ 09:25), 88.451 (08-14-20 @ 11:20)  BMI (kg/m2): 29.5 (09-06-20 @ 22:25), 32 (08-27-20 @ 09:25), 30.2 (08-14-20 @ 11:20)    [ ]PPSV2 < or = to 30% [ ]significant weight loss  [ ]poor nutritional intake  [ ]anasarca     Albumin, Serum: 2.7 g/dL (09-09-20 @ 06:46)   [ ]Artificial Nutrition      REFERRALS:   [ ]Chaplaincy  [ ]Hospice  [ ]Child Life  [ ]Social Work  [ ]Case management [ ]Holistic Therapy     Goals of Care Document: BETTYE Carlos (07-15-20 @ 14:32)  Goals of Care Conversation:   Conversation Discussion:  · Conversation Details  SW contacted patients spouse Yehuda to offer support in coping with patients catastrophic dx and hospitalization. Spouse appreciative of SW call and reviewed his concerns for planning for when patient discharged. Spouse requesting to speak to hospitalist regarding time frame for discharge since he is aware Friday is last palliative RT tx. SW contacted hospitalist to request follow up with spouse.      Electronic Signatures:  Viji Carlos ([facility] [place])  (Signed 15-Jul-2020 14:34)  	Authored: Goals of Care Conversation      Last Updated: 15-Jul-2020 14:34 by Viji Carlos ([facility] [place])

## 2020-09-09 NOTE — PROGRESS NOTE ADULT - PROBLEM SELECTOR PLAN 1
Patient found to have L common femoral DVT on CT  - VA duplex showing L common femoral vein DVT  - c/w heparin drip for DVT  - vascular consulted, will c/t following re: need of IVC filter. Likely will send home on therapeutic anticoagulation given stable H/H, Metastatic disease with high risk of clot burden. Patient found to have L common femoral DVT on CT  - VA duplex showing L common femoral vein DVT  - c/w heparin drip for DVT  - vascular consulted, will c/t following re: need of IVC filter. Likely will send home on therapeutic anticoagulation given stable H/H, Metastatic disease with high risk of clot burden.  -Changed heparin to Lovenox 1mg/kg, per onc recs, and for possible d/c planning for home use.

## 2020-09-09 NOTE — CONSULT NOTE ADULT - ASSESSMENT
65 y/o F w/ a PMHx of hypothyroidism, and Stage IV lung adenocarcinoma (S/p right lower lobectomy in 3/2017, w/ recurrence in 5/2020 w/ mets to bone, s/p palliative RT, recently s/p C1 Carbo/Pemetrexed/Pembro on 7/31) who recently presented s/p fall and was found to have an acute impacted left femoral neck fracture, s/p L hemiarthroplasty on 8/18, was sent from [facility] due to worsening L hip pain and hypoxia. Found to have acute LLE DVT and worsening hypoxic respiratory failure 2/2 lung cancer with worsening R sided effusion. Oncology consulted for further evaluation.    # Metastatic lung adenocarcinoma  - Initially Dx w/ Stage IA adenocarcinoma in 3/2017 s/p R lower lobectomy. She was placed on surveillance and was found to have evidence of recurrence in 5/2020 with metastatic disease to bone. PD-L1 < 1%. s/p 1st cycle of palliative chemo with Pem/pem/carbo.   - Patient has pathologic Fx of clavicle s/p palliative RT to the L clavicle, T6-T8 spine, and T12-L2 spine.   - s/p L hemiarthroplasty on 8/18 for acute impacted left femoral neck fracture 2/2 bone mets.  - Now presenting w/ hypoxia, found to have a large pleural effusion that was drained by pulmonary team. CT chest shows ill-defined right hilar mass surrounding the right bronchi, circumferential right pleural thickening question metastatic versus infectious. New right renal lesions, which could represent metastatic disease or infection. Hepatic, left adrenal and extensive bony metastatic disease, new since 2019.  - There are no plans for systemic therapy while inpatient.   - Per patient's request, will refer [place] [facility] when she is ready to be discharged.    # Acute LLE DVT  - Doppler confirms partially occlusive thrombus affecting the left common femoral and femoral veins.  - Likely 2/2 immobility from recent hip fracture and underlying metastatic adenocarcinoma.  - Will need lifelong anticoagulation, prefer full dose Lovenox if patient can learn self injection.  -CT angio chest; No pulmonary embolism.     # Back and left hip pain, likely 2/2 bone metastasis  - Pathologic femoral neck fracture, s/p s/p L QASIM and acetabular screw/cement for medial wall lesion. Evaluated by ortho team, no signs of surgical site infection.  - Pt has known 2.2 cm enhancing lesion in the left L5 vertebral body and posterior elements, enhancing lesions in the left sacrum and iliac bone, linear dural enhancement extending from the T10 level into the lumbar spine; all concerning for metastasis. Evaluated by neurosurgery team who recommended continue TLSO brace, no plan for surgical intervention.   - Consider Rad/onc consult regarding palliative RT if pain is not well controlled.  - Pain management per primary team, consider palliative team's input for pain control.          Margaret Benjamin MD  PGY 5, Oncology/Hematology fellow  (P) [phone]  After 5pm, please contact on-call team. 65 y/o F w/ a PMHx of hypothyroidism, and Stage IV lung adenocarcinoma (S/p right lower lobectomy in 3/2017, w/ recurrence in 5/2020 w/ mets to bone, s/p palliative RT, recently s/p C1 Carbo/Pemetrexed/Pembro on 7/31) who recently presented s/p fall and was found to have an acute impacted left femoral neck fracture, s/p L hemiarthroplasty on 8/18, was sent from [facility] due to worsening L hip pain and hypoxia. Found to have acute LLE DVT and worsening hypoxic respiratory failure 2/2 lung cancer with worsening R sided effusion. Oncology consulted for further evaluation.    # Metastatic lung adenocarcinoma  - Initially Dx w/ Stage IA adenocarcinoma in 3/2017 s/p R lower lobectomy. She was placed on surveillance and was found to have evidence of recurrence in 5/2020 with metastatic disease to bone. PD-L1 < 1%. s/p 1st cycle of palliative chemo with Pem/pem/carbo.   - Patient has pathologic Fx of clavicle s/p palliative RT to the L clavicle, T6-T8 spine, and T12-L2 spine.   - s/p L hemiarthroplasty on 8/18 for acute impacted left femoral neck fracture 2/2 bone mets.  - Now presenting w/ hypoxia, found to have a large pleural effusion that was drained by pulmonary team. CT chest shows ill-defined right hilar mass surrounding the right bronchi, circumferential right pleural thickening question metastatic versus infectious. New right renal lesions, which could represent metastatic disease or infection. Hepatic, left adrenal and extensive bony metastatic disease, new since 2019.  - There are no plans for systemic therapy while inpatient.   - Per patient's request, will refer her [place] [facility] when she is ready to be discharged.    # Acute LLE DVT  - Doppler confirms partially occlusive thrombus affecting the left common femoral and femoral veins.  - Likely 2/2 immobility from recent hip fracture and underlying metastatic adenocarcinoma.  - Will need lifelong anticoagulation, prefer full dose Lovenox if patient can learn self injection.  - CT angio chest; No pulmonary embolism.     # Back and left hip pain, likely 2/2 bone metastasis  - Pathologic femoral neck fracture, s/p s/p L QASIM and acetabular screw/cement for medial wall lesion. Evaluated by ortho team, no signs of surgical site infection.  - Pt has known 2.2 cm enhancing lesion in the left L5 vertebral body and posterior elements, enhancing lesions in the left sacrum and iliac bone, linear dural enhancement extending from the T10 level into the lumbar spine; all concerning for metastasis. Evaluated by neurosurgery team who recommended continue TLSO brace, no plan for surgical intervention.   - Consider Rad/onc consult regarding palliative RT if pain is not well controlled.  - Pain management per primary team, consider palliative team's input for pain control.          Margaret Benjamin MD  PGY 5, Oncology/Hematology fellow  (P) [phone]  After 5pm, please contact on-call team. 63 y/o F w/ a PMHx of hypothyroidism, and Stage IV lung adenocarcinoma (S/p right lower lobectomy in 3/2017, w/ recurrence in 5/2020 w/ mets to bone, s/p palliative RT, recently s/p C1 Carbo/Pemetrexed/Pembro on 7/31) who recently presented s/p fall and was found to have an acute impacted left femoral neck fracture, s/p L hemiarthroplasty on 8/18, was sent from [facility] due to worsening L hip pain and hypoxia. Found to have acute LLE DVT and worsening hypoxic respiratory failure 2/2 lung cancer with worsening R sided effusion. Oncology consulted for further evaluation.    # Metastatic lung adenocarcinoma  - Initially Dx w/ Stage IA adenocarcinoma in 3/2017 s/p R lower lobectomy. She was placed on surveillance and was found to have evidence of recurrence in 5/2020 with metastatic disease to bone. PD-L1 < 1%. s/p 1st cycle of palliative chemo with Pem/pem/carbo.   - Patient has pathologic Fx of clavicle s/p palliative RT to the L clavicle, T6-T8 spine, and T12-L2 spine.   - s/p L hemiarthroplasty on 8/18 for acute impacted left femoral neck fracture 2/2 bone mets.  - Now presenting w/ hypoxia, found to have a large pleural effusion that was drained by pulmonary team. CT chest shows ill-defined right hilar mass surrounding the right bronchi, circumferential right pleural thickening question metastatic versus infectious. New right renal lesions, which could represent metastatic disease or infection. Hepatic, left adrenal and extensive bony metastatic disease, new since 2019.  - There are no plans for systemic therapy while inpatient.   - Per patient's request, will refer her to [facility] [place] [facility] when she is ready to be discharged.    # Acute LLE DVT  - Doppler confirms partially occlusive thrombus affecting the left common femoral and femoral veins.  - Likely 2/2 immobility from recent hip fracture and underlying metastatic adenocarcinoma.  - Will need lifelong anticoagulation, prefer full dose Lovenox if patient can learn self injection.  - CT angio chest; No pulmonary embolism.     # Back and left hip pain, likely 2/2 bone metastasis  - Pathologic femoral neck fracture, s/p s/p L QASIM and acetabular screw/cement for medial wall lesion. Evaluated by ortho team, no signs of surgical site infection.  - Pt has known 2.2 cm enhancing lesion in the left L5 vertebral body and posterior elements, enhancing lesions in the left sacrum and iliac bone, linear dural enhancement extending from the T10 level into the lumbar spine; all concerning for metastasis. Evaluated by neurosurgery team who recommended continue TLSO brace, no plan for surgical intervention.   - Consider Rad/onc consult regarding palliative RT if pain is not well controlled. Addendum: now evaluated by Rad-Onc team, plan for inpatient RT. Please arrange transfer to [facility].  - Pain management per primary team, consider palliative team's input for pain control.          Margaret Benjamin MD  PGY 5, Oncology/Hematology fellow  (P) [phone]  After 5pm, please contact on-call team.

## 2020-09-09 NOTE — PROGRESS NOTE ADULT - ATTENDING COMMENTS
LLE DVT- transition to subcut Lovenox  Pleural effusion- s/p thoracentesis with 200ml fluid removed  Pain better controlled on Dilaudid elixir 3 mg PO q 4 hours  Rad onc eval appreciated- plan for inpatient XRT- will require transfer to [facility]  Oncology f/u- patient wishes to transfer onc care to [facility]  PT eval underway - patient does not wish to return to [facility] at discharge

## 2020-09-09 NOTE — CONSULT NOTE ADULT - SUBJECTIVE AND OBJECTIVE BOX
Oncology Consult Note    HPI:  64F w/ PMHx of stage IV lung adenocarcinoma (Dx 2017) s/p RLL lobectomy with recurrence in May 2020 s/p palliative radiation to clavicle and spine and 1 round of carbo/pemextred/pembrolizumab chemo 7/31, hypothyroidism, anxiety, recent admission for pathologic L femoral neck fracture s/p left QASIM on 8/18, presented from [facility] rehab for left hip pain and O2 desat to low 80s and anemia.   Pt discharged to [facility] 8/28 - states she has been having increasing pain in her left hip and back since going to [facility], 9-10/10, sharp, radiating down the leg. Pain management was switched from IV dilaudid to PO upon admission to [facility], which per pt doesn't work; pt has also been using fentanyl and lido patches. Pt and dtr note that pt also becomes intermittently altered at [facility]; pt states she becomes "loopy" on PO dilaudid but fine with IV. On 9/5/20 - pt noted to be hypoxic 79% which improved to 100% on 2L O2; pt able to be weaned off and presumed to be 2/2 oversedation from opioids. Blood work at [facility] showed downtrending Hb 7.8 (9/1)-->7.1 (9/6). Pt on xarelto 10mg since discharge for DVT ppx. Pt herself denies any subjective SOB, denies any wheezing, cough, CP, palpitations. Endorses feeling generally weak and more fatigued in the past few days, endorses poor PO intake, infrequent BMs. Denies any bloody BMs, melena or hematuria. Dtr requested pt be brought back to hospital for better pain control and eval of anemia. En route, pt hypoxic to 84% on RA and placed on 3LNC with 100% sat.  In ED: 98.5, 115, 131/75, RR28, 100% on nasal cannula 4L. Patient had fever of 100.9F per rectum. On labs, patient has anemia w/ neg occult, mild hypoNa, incr Alk phos and UA showing urobilinogen. CTA chest/ abd/ pelvis shows no PE but shows increased R pleural effusion from prior, ill defined R hilar mass, hepatic/ L adrenal and extensive bony metastases new from 2019, new R renal lesion (mets vs infection), Screw fixated pathologic L hip fxr. CT head neg. Patient received 2L IV bolus and started on heparin drip for DVT. 1u pRBC ordered. Pain control with dilaudid and tylenol.    From previous hospital course: Patient also briefly experienced atrial tach/ aflutter days after surgery, and was started on amiodarone 5mg load with conversion back to sinus rhythm HR 70s-80s. Echo was limited due to tachycardia however valvular function and LV systolic function were normal. EP switched her from amio to metoprolol 50 daily. She was in sinus regular rate/rhythm over last few days without need for tele monitoring. Also, from prior hospital course, neurosurgery was consulted for osseous lesions in spine and MRI showed no cord or cauda equina compression. Neurosurgery recommended no intervention at this time. Oncology (outpatient and inpatient) confirmed she could go to rehab without need for systemic therapy. (06 Sep 2020 19:58)    Oncology history  3/24/17 stage IA adenocarcinoma s/p RLL lobectomy and mediastinal LN dissection.  5/12/20 CT chest: increased focal thickening along the suture line, findings concerning for recurrence.  5/28/20 PET CT: RLL suture margin masslike soft tissue thickening 3x1.8cm suspicious for malignancy. Rt hilar and subcarinal LN likely metastasis. Multiple FDG avid lytic lesions suspicious for metastatic disease.  6/23/20 Rt lung biopsy confirms adenocarcinoma, PDL1 <1%.  7/17/20 MRI head w/ IV contrast negative for metastasis.  Found to have a pathologic fracture of the L clavicle in 7/2020. She subsequently underwent palliative RT to the L clavicle, T6-T8 spine, and T12-L2 spine.   7/31/20 1st round of palliative chemo w/ PEM/PEM/Carbo with plan for total of 4C followed by maintenance Pem/pem.  8/18/20 acute impacted left femoral neck fracture, s/p L hemiarthroplasty, path confirms metastatic poorly differentiated adenocarcinoma consistent with lung primary            Allergies    Bananas (Headache)  Bananas (Other)  latex (Rash)  latex (Rash (Mild))  opioid-like analgesics (Urticaria)  penicillin (Angioedema)  penicillin (Swelling (Mild to Mod))    Intolerances        MEDICATIONS  (STANDING):  ALPRAZolam 0.25 milliGRAM(s) Oral at bedtime  heparin  Infusion. 1400 Unit(s)/Hr (14 mL/Hr) IV Continuous <Continuous>  HYDROmorphone   Solution 3 milliGRAM(s) Oral every 4 hours  ibuprofen  Tablet. 600 milliGRAM(s) Oral three times a day  levothyroxine 175 MICROGram(s) Oral daily  lidocaine   Patch 1 Patch Transdermal daily  liothyronine 5 MICROGram(s) Oral daily  melatonin 3 milliGRAM(s) Oral at bedtime  metoprolol tartrate 25 milliGRAM(s) Oral two times a day  polyethylene glycol 3350 17 Gram(s) Oral daily  senna 2 Tablet(s) Oral at bedtime    MEDICATIONS  (PRN):  acetaminophen   Tablet .. 650 milliGRAM(s) Oral every 6 hours PRN Temp greater or equal to 38C (100.4F), Mild Pain (1 - 3)  ALPRAZolam 0.25 milliGRAM(s) Oral two times a day PRN anxiety  aluminum hydroxide/magnesium hydroxide/simethicone Suspension 30 milliLiter(s) Oral every 6 hours PRN Dyspepsia  heparin   Injectable 7000 Unit(s) IV Push every 6 hours PRN For aPTT less than 40  heparin   Injectable 3500 Unit(s) IV Push every 6 hours PRN For aPTT between 40 - 57  HYDROmorphone  Injectable 1 milliGRAM(s) IV Push every 4 hours PRN Moderate Pain (4 - 6)  naloxone Injectable 0.2 milliGRAM(s) IV Push every 3 minutes PRN sedation due to therapeutic opiates      PAST MEDICAL & SURGICAL HISTORY:  Stage IV adenocarcinoma of lung  Genital herpes  Drug abuse: Back in the 70&#x27;s and 80&#x27;s (Cocaine)  Lung cancer: with mets  LAMAR on CPAP  Anxiety  Hypothyroid  History of total hip replacement, left: THR 8/18/2020  S/P partial lobectomy of lung: Right lower lobe  History of bunionectomy  Status post lobectomy of lung: 3/2017 (Right lung)  S/P bunionectomy: with revision      FAMILY HISTORY:  Family history of stroke or transient ischemic attack in father  Family history of hypothyroidism  Family history of hypertension  Family history of stroke      SOCIAL HISTORY: No EtOH, no tobacco    REVIEW OF SYSTEMS:    CONSTITUTIONAL: No weakness, fevers or chills  EYES/ENT: No visual changes;  No vertigo or throat pain   NECK: No pain or stiffness  RESPIRATORY: No cough, wheezing, hemoptysis; No shortness of breath  CARDIOVASCULAR: No chest pain or palpitations  GASTROINTESTINAL: No abdominal or epigastric pain. No nausea, vomiting, or hematemesis; No diarrhea or constipation. No melena or hematochezia.  GENITOURINARY: No dysuria, frequency or hematuria  NEUROLOGICAL: No numbness or weakness  SKIN: No itching, burning, rashes, or lesions   All other review of systems is negative unless indicated above.        T(F): 97.3 (09-09-20 @ 05:07), Max: 97.8 (09-08-20 @ 14:39)  HR: 76 (09-09-20 @ 05:07)  BP: 115/65 (09-09-20 @ 05:07)  RR: 18 (09-09-20 @ 05:07)  SpO2: 97% (09-09-20 @ 05:07)  Wt(kg): --    Physical Exam  GENERAL: NAD, well-developed  HEAD:  Atraumatic, Normocephalic  EYES: EOMI, PERRLA, conjunctiva and sclera clear  NECK: Supple, No JVD  CHEST/LUNG: Clear to auscultation bilaterally; No wheeze  HEART: Regular rate and rhythm; No murmurs, rubs, or gallops  ABDOMEN: Soft, Nontender, Nondistended; Bowel sounds present  EXTREMITIES:  2+ Peripheral Pulses, No clubbing, cyanosis, or edema  NEUROLOGY: non-focal  SKIN: No rashes or lesions                          9.2    5.30  )-----------( 289      ( 09 Sep 2020 06:46 )             30.9       09-09    140  |  99  |  4<L>  ----------------------------<  91  3.5   |  30  |  0.36<L>    Ca    9.6      09 Sep 2020 06:46  Phos  5.5     09-09  Mg     1.9     09-09    TPro  5.8<L>  /  Alb  2.7<L>  /  TBili  0.7  /  DBili  x   /  AST  13  /  ALT  10  /  AlkPhos  155<H>  09-09      Magnesium, Serum: 1.9 mg/dL (09-09 @ 06:46)  Phosphorus Level, Serum: 5.5 mg/dL (09-09 @ 06:46)  Lactate Dehydrogenase, Serum: 175 U/L (09-09 @ 06:46) No Oncology Consult Note    HPI:  64F w/ PMHx of stage IV lung adenocarcinoma (Dx 2017) s/p RLL lobectomy with recurrence in May 2020 s/p palliative radiation to clavicle and spine and 1 round of carbo/pemextred/pembrolizumab chemo 7/31, hypothyroidism, anxiety, recent admission for pathologic L femoral neck fracture s/p left QASIM on 8/18, presented from [facility] rehab for left hip pain and O2 desat to low 80s and anemia.   Pt discharged to [facility] 8/28 - states she has been having increasing pain in her left hip and back since going to [facility], 9-10/10, sharp, radiating down the leg. Pain management was switched from IV dilaudid to PO upon admission to [facility], which per pt doesn't work; pt has also been using fentanyl and lido patches. Pt and dtr note that pt also becomes intermittently altered at [facility]; pt states she becomes "loopy" on PO dilaudid but fine with IV. On 9/5/20 - pt noted to be hypoxic 79% which improved to 100% on 2L O2; pt able to be weaned off and presumed to be 2/2 oversedation from opioids. Blood work at [facility] showed downtrending Hb 7.8 (9/1)-->7.1 (9/6). Pt on xarelto 10mg since discharge for DVT ppx. Pt herself denies any subjective SOB, denies any wheezing, cough, CP, palpitations. Endorses feeling generally weak and more fatigued in the past few days, endorses poor PO intake, infrequent BMs. Denies any bloody BMs, melena or hematuria. Dtr requested pt be brought back to hospital for better pain control and eval of anemia. En route, pt hypoxic to 84% on RA and placed on 3LNC with 100% sat.  In ED: 98.5, 115, 131/75, RR28, 100% on nasal cannula 4L. Patient had fever of 100.9F per rectum. On labs, patient has anemia w/ neg occult, mild hypoNa, incr Alk phos and UA showing urobilinogen. CTA chest/ abd/ pelvis shows no PE but shows increased R pleural effusion from prior, ill defined R hilar mass, hepatic/ L adrenal and extensive bony metastases new from 2019, new R renal lesion (mets vs infection), Screw fixated pathologic L hip fxr. CT head neg. Patient received 2L IV bolus and started on heparin drip for DVT. 1u pRBC ordered. Pain control with dilaudid and tylenol.    From previous hospital course: Patient also briefly experienced atrial tach/ aflutter days after surgery, and was started on amiodarone 5mg load with conversion back to sinus rhythm HR 70s-80s. Echo was limited due to tachycardia however valvular function and LV systolic function were normal. EP switched her from amio to metoprolol 50 daily. She was in sinus regular rate/rhythm over last few days without need for tele monitoring. Also, from prior hospital course, neurosurgery was consulted for osseous lesions in spine and MRI showed no cord or cauda equina compression. Neurosurgery recommended no intervention at this time. Oncology (outpatient and inpatient) confirmed she could go to rehab without need for systemic therapy. (06 Sep 2020 19:58)    Oncology history  3/24/17 stage IA adenocarcinoma s/p RLL lobectomy and mediastinal LN dissection.  5/12/20 CT chest: increased focal thickening along the suture line, findings concerning for recurrence.  5/28/20 PET CT: RLL suture margin masslike soft tissue thickening 3x1.8cm suspicious for malignancy. Rt hilar and subcarinal LN likely metastasis. Multiple FDG avid lytic lesions suspicious for metastatic disease.  6/23/20 Rt lung biopsy confirms adenocarcinoma, PDL1 <1%.  7/17/20 MRI head w/ IV contrast negative for metastasis.  Found to have a pathologic fracture of the L clavicle in 7/2020. She subsequently underwent palliative RT to the L clavicle, T6-T8 spine, and T12-L2 spine.   7/31/20 1st round of palliative chemo w/ PEM/PEM/Carbo with plan for total of 4C followed by maintenance Pem/pem.  8/18/20 acute impacted left femoral neck fracture, s/p L hemiarthroplasty, path confirms metastatic poorly differentiated adenocarcinoma consistent with lung primary            Allergies    Bananas (Headache)  Bananas (Other)  latex (Rash)  latex (Rash (Mild))  opioid-like analgesics (Urticaria)  penicillin (Angioedema)  penicillin (Swelling (Mild to Mod))    Intolerances        MEDICATIONS  (STANDING):  ALPRAZolam 0.25 milliGRAM(s) Oral at bedtime  heparin  Infusion. 1400 Unit(s)/Hr (14 mL/Hr) IV Continuous <Continuous>  HYDROmorphone   Solution 3 milliGRAM(s) Oral every 4 hours  ibuprofen  Tablet. 600 milliGRAM(s) Oral three times a day  levothyroxine 175 MICROGram(s) Oral daily  lidocaine   Patch 1 Patch Transdermal daily  liothyronine 5 MICROGram(s) Oral daily  melatonin 3 milliGRAM(s) Oral at bedtime  metoprolol tartrate 25 milliGRAM(s) Oral two times a day  polyethylene glycol 3350 17 Gram(s) Oral daily  senna 2 Tablet(s) Oral at bedtime    MEDICATIONS  (PRN):  acetaminophen   Tablet .. 650 milliGRAM(s) Oral every 6 hours PRN Temp greater or equal to 38C (100.4F), Mild Pain (1 - 3)  ALPRAZolam 0.25 milliGRAM(s) Oral two times a day PRN anxiety  aluminum hydroxide/magnesium hydroxide/simethicone Suspension 30 milliLiter(s) Oral every 6 hours PRN Dyspepsia  heparin   Injectable 7000 Unit(s) IV Push every 6 hours PRN For aPTT less than 40  heparin   Injectable 3500 Unit(s) IV Push every 6 hours PRN For aPTT between 40 - 57  HYDROmorphone  Injectable 1 milliGRAM(s) IV Push every 4 hours PRN Moderate Pain (4 - 6)  naloxone Injectable 0.2 milliGRAM(s) IV Push every 3 minutes PRN sedation due to therapeutic opiates      PAST MEDICAL & SURGICAL HISTORY:  Stage IV adenocarcinoma of lung  Genital herpes  Drug abuse: Back in the 70&#x27;s and 80&#x27;s (Cocaine)  Lung cancer: with mets  LAMAR on CPAP  Anxiety  Hypothyroid  History of total hip replacement, left: THR 8/18/2020  S/P partial lobectomy of lung: Right lower lobe  History of bunionectomy  Status post lobectomy of lung: 3/2017 (Right lung)  S/P bunionectomy: with revision      FAMILY HISTORY:  Family history of stroke or transient ischemic attack in father  Family history of hypothyroidism  Family history of hypertension  Family history of stroke      SOCIAL HISTORY: No EtOH, no tobacco    REVIEW OF SYSTEMS:    CONSTITUTIONAL: No weakness, fevers or chills  EYES/ENT: No visual changes;  No vertigo or throat pain   NECK: No pain or stiffness  RESPIRATORY: No cough, wheezing, hemoptysis; No shortness of breath  CARDIOVASCULAR: No chest pain or palpitations  GASTROINTESTINAL: No abdominal or epigastric pain.  GENITOURINARY: No dysuria, frequency or hematuria  NEUROLOGICAL: No numbness or weakness  SKIN: No itching, burning, rashes, or lesions   MSK: left knee pain  All other review of systems is negative unless indicated above.        T(F): 97.3 (09-09-20 @ 05:07), Max: 97.8 (09-08-20 @ 14:39)  HR: 76 (09-09-20 @ 05:07)  BP: 115/65 (09-09-20 @ 05:07)  RR: 18 (09-09-20 @ 05:07)  SpO2: 97% (09-09-20 @ 05:07)  Wt(kg): --    Physical Exam  GENERAL: NAD, well-developed  HEAD:  Atraumatic, Normocephalic  EYES: EOMI, PERRLA, conjunctiva and sclera clear  NECK: Supple, No JVD  CHEST/LUNG: Clear to auscultation bilaterally; No wheeze  HEART: Regular rate and rhythm; No murmurs, rubs, or gallops  ABDOMEN: Soft, Nontender, Nondistended; Bowel sounds present  EXTREMITIES:  2+ Peripheral Pulses, No clubbing, cyanosis, or edema  NEUROLOGY: non-focal  SKIN: No rashes or lesions                          9.2    5.30  )-----------( 289      ( 09 Sep 2020 06:46 )             30.9       09-09    140  |  99  |  4<L>  ----------------------------<  91  3.5   |  30  |  0.36<L>    Ca    9.6      09 Sep 2020 06:46  Phos  5.5     09-09  Mg     1.9     09-09    TPro  5.8<L>  /  Alb  2.7<L>  /  TBili  0.7  /  DBili  x   /  AST  13  /  ALT  10  /  AlkPhos  155<H>  09-09      Magnesium, Serum: 1.9 mg/dL (09-09 @ 06:46)  Phosphorus Level, Serum: 5.5 mg/dL (09-09 @ 06:46)  Lactate Dehydrogenase, Serum: 175 U/L (09-09 @ 06:46)

## 2020-09-09 NOTE — PROGRESS NOTE ADULT - PROBLEM SELECTOR PLAN 9
Anticoagulation: on heparin   Diet: regular  Consults: Ortho, vasc, onc, rad/onc, Pulm, pain management.  -PT consulted.

## 2020-09-09 NOTE — PROGRESS NOTE ADULT - SUBJECTIVE AND OBJECTIVE BOX
CHIEF COMPLAINT: Shortness of breath    Interval Events: Pt with thoracentesis with drainage of 200 cc serosanguinous fluid from complicated R pleural space, volume removal likely limited by septations. pH 7.29, glucose 49, mixed cellularity, culture NTD.     REVIEW OF SYSTEMS:  Constitutional: [ ] negative [ ] fevers [ ] chills [ ] weight loss [ ] weight gain  HEENT: [ ] negative [ ] dry eyes [ ] eye irritation [ ] postnasal drip [ ] nasal congestion  CV: [ ] negative  [ ] chest pain [ ] orthopnea [ ] palpitations [ ] murmur  Resp: [ ] negative [ ] cough [ ] shortness of breath [ ] dyspnea [ ] wheezing [ ] sputum [ ] hemoptysis  GI: [ ] negative [ ] nausea [ ] vomiting [ ] diarrhea [ ] constipation [ ] abd pain [ ] dysphagia   : [ ] negative [ ] dysuria [ ] nocturia [ ] hematuria [ ] increased urinary frequency  Musculoskeletal: [ ] negative [ ] back pain [ ] myalgias [ ] arthralgias [ ] fracture  Skin: [ ] negative [ ] rash [ ] itch  Neurological: [ ] negative [ ] headache [ ] dizziness [ ] syncope [ ] weakness [ ] numbness  Psychiatric: [ ] negative [ ] anxiety [ ] depression  Endocrine: [ ] negative [ ] diabetes [ ] thyroid problem  Hematologic/Lymphatic: [ ] negative [ ] anemia [ ] bleeding problem  Allergic/Immunologic: [ ] negative [ ] itchy eyes [ ] nasal discharge [ ] hives [ ] angioedema  [ ] All other systems negative  [ ] Unable to assess ROS because ________    OBJECTIVE:  ICU Vital Signs Last 24 Hrs  T(C): 36.3 (09 Sep 2020 05:07), Max: 36.6 (08 Sep 2020 14:39)  T(F): 97.3 (09 Sep 2020 05:07), Max: 97.8 (08 Sep 2020 14:39)  HR: 76 (09 Sep 2020 05:07) (71 - 105)  BP: 115/65 (09 Sep 2020 05:07) (101/61 - 133/84)  BP(mean): --  ABP: --  ABP(mean): --  RR: 18 (09 Sep 2020 05:07) (18 - 18)  SpO2: 97% (09 Sep 2020 05:07) (97% - 100%)        09-08 @ 07:01 - 09-09 @ 07:00  --------------------------------------------------------  IN: 648 mL / OUT: 0 mL / NET: 648 mL      CAPILLARY BLOOD GLUCOSE          PHYSICAL EXAM:  General: NAD, appears comfortable  Skin: Warm and dry, no rashes  Neuro: AAOx3, nonfocal  HEENT: PERRL, EOMI, no oral lesions  Neck: Full range of motion, no JVD  Lungs: Clear to ascultation bilatereally, no wheezes, rales, or rhonchi   Heart: Regular rate and rhythm, no murmurs  Abdomen: Normoactive bowl sounds, soft, nontender, nondistended  Extremities: L leg brace, No lower extremity tenderness, erythema, or edema   Psych: normal mood and affect      HOSPITAL MEDICATIONS:  MEDICATIONS  (STANDING):  ALPRAZolam 0.25 milliGRAM(s) Oral at bedtime  heparin  Infusion. 1400 Unit(s)/Hr (14 mL/Hr) IV Continuous <Continuous>  HYDROmorphone   Solution 3 milliGRAM(s) Oral every 4 hours  ibuprofen  Tablet. 600 milliGRAM(s) Oral three times a day  levothyroxine 175 MICROGram(s) Oral daily  lidocaine   Patch 1 Patch Transdermal daily  liothyronine 5 MICROGram(s) Oral daily  melatonin 3 milliGRAM(s) Oral at bedtime  metoprolol tartrate 25 milliGRAM(s) Oral two times a day  polyethylene glycol 3350 17 Gram(s) Oral daily  senna 2 Tablet(s) Oral at bedtime    MEDICATIONS  (PRN):  acetaminophen   Tablet .. 650 milliGRAM(s) Oral every 6 hours PRN Temp greater or equal to 38C (100.4F), Mild Pain (1 - 3)  ALPRAZolam 0.25 milliGRAM(s) Oral two times a day PRN anxiety  aluminum hydroxide/magnesium hydroxide/simethicone Suspension 30 milliLiter(s) Oral every 6 hours PRN Dyspepsia  heparin   Injectable 7000 Unit(s) IV Push every 6 hours PRN For aPTT less than 40  heparin   Injectable 3500 Unit(s) IV Push every 6 hours PRN For aPTT between 40 - 57  HYDROmorphone  Injectable 1 milliGRAM(s) IV Push every 4 hours PRN Moderate Pain (4 - 6)  naloxone Injectable 0.2 milliGRAM(s) IV Push every 3 minutes PRN sedation due to therapeutic opiates      LABS:                        9.2    5.30  )-----------( 289      ( 09 Sep 2020 06:46 )             30.9     Hgb Trend: 9.2<--, 8.9<--, 8.4<--, 8.8<--, 8.7<--  09-09    140  |  99  |  4<L>  ----------------------------<  91  3.5   |  30  |  0.36<L>    Ca    9.6      09 Sep 2020 06:46  Phos  5.5     09-09  Mg     1.9     09-09    TPro  5.8<L>  /  Alb  2.7<L>  /  TBili  0.7  /  DBili  x   /  AST  13  /  ALT  10  /  AlkPhos  155<H>  09-09    Creatinine Trend: 0.36<--, 0.34<--, 0.30<--, 0.34<--, 0.35<--, 0.33<--  PT/INR - ( 08 Sep 2020 07:01 )   PT: 14.1 sec;   INR: 1.19 ratio         PTT - ( 09 Sep 2020 06:46 )  PTT:80.4 sec      Venous Blood Gas:  09-08 @ 07:16  7.38/55/139/31/99  VBG Lactate: 2.0      MICROBIOLOGY:     Culture - Body Fluid with Gram Stain (collected 08 Sep 2020 21:48)  Source: .Body Fluid Pleural Fluid  Gram Stain (09 Sep 2020 02:54):    No polymorphonuclear leukocytes seen    No organisms seen    by cytocentrifuge    Culture - Blood (collected 06 Sep 2020 18:36)  Source: .Blood Blood-Peripheral  Preliminary Report (07 Sep 2020 19:01):    No growth to date.    Culture - Urine (collected 06 Sep 2020 18:35)  Source: .Urine Clean Catch (Midstream)  Final Report (07 Sep 2020 13:29):    <10,000 CFU/mL Normal Urogenital Angelina    Culture - Blood (collected 06 Sep 2020 18:31)  Source: .Blood Blood-Peripheral  Preliminary Report (07 Sep 2020 19:01):    No growth to date.

## 2020-09-09 NOTE — CONSULT NOTE ADULT - PROBLEM SELECTOR RECOMMENDATION 3
-c/w xanax PRN  -stop the Xanax at bedtime given pt feels slightly confused and tired. Will continue the medication as a PRN  -Currently controlled, pt states she hardly takes it at home

## 2020-09-09 NOTE — PROGRESS NOTE ADULT - PROBLEM SELECTOR PLAN 3
Recent total hip replacement for pathological L femoral neck fracture (8/18/2020), presenting with worsening back and L hip pain. Likely partially 2/2 acute DVT, recent surgery and sacral lesion that is likely contributing to her pain. No e/o infection at surgical site.     - NSX consulted for sacral lesion - c/w TLSO brace, consider thigh extension as per NXS and ortho   - pain control with dilaudid IV and fentanyl patch, monitor for sedation. Patient refuses PO dilaudid, however likes 3mg liquid dilaudid, so started this dose Q4h ATC.  - palliative following

## 2020-09-09 NOTE — CONSULT NOTE ADULT - PROBLEM SELECTOR RECOMMENDATION 9
-In the setting of metastatic disease, pathologic fx that was recently operated on.   -Will switch dilaudid 3mg solution to a long acting opioid. MS Contin 30mg BID  -Will increase frequency of PRN, thus Dilaudid 1mg IV q2hr PRN  -Recommend adding dexamethasone given metastatic disease, 2mg BID IVP  -Will continue to monitor and adjust medications as needed.  -If patient develops side effects from MS contin such as pruritis, can stop medication and administer a one time dose of benadryl. Please then re initiate her previous dilaudid dose.  -Call palliative with concerns [phone]

## 2020-09-09 NOTE — PHYSICAL THERAPY INITIAL EVALUATION ADULT - PRECAUTIONS/LIMITATIONS, REHAB EVAL
CONTINUED..., p/w worsening L hip pain and hypoxia - a/w acute LLE DVT and worsening hypoxic respiratory failure 2/2 lung cancer with worsening R sided effusion. CT: 1.  Diffuse lytic metastatic disease is again seen within the lower lumbar spine and pelvis. The lesions in the leftward pedicle of L5, posterior cortex of the right intertrochanteric femur, and anterior aspect of the left iliac crest of increased in size when compared to prior CT. Remaining lesions are unchanged in size. 2.  No acute fracture. 3.  Patient status post left acetabulum augmentation and placement of longstem prosthesis. No evidence of acute hardware complication. XRay Pelvis: Enlarging lytic lesions in the pelvis; Large right-sided pleural effusion. CONTINUED..., p/w worsening L hip pain and hypoxia - a/w acute LLE DVT and worsening hypoxic respiratory failure 2/2 lung cancer with worsening R sided effusion. CT: 1.  Diffuse lytic metastatic disease is again seen within the lower lumbar spine and pelvis. The lesions in the leftward pedicle of L5, posterior cortex of the right intertrochanteric femur, and anterior aspect of the left iliac crest of increased in size when compared to prior CT. Remaining lesions are unchanged in size. 2.  No acute fracture. 3.  Patient status post left acetabulum augmentation and placement of longstem prosthesis. No evidence of acute hardware complication. XRay Pelvis: Enlarging lytic lesions in the pelvis; Large right-sided pleural effusion. S/p thoracentesis 9/8

## 2020-09-09 NOTE — CONSULT NOTE ADULT - CONSULT REQUESTED DATE/TIME
06-Sep-2020 16:28
07-Sep-2020 04:05
07-Sep-2020 15:53
07-Sep-2020 16:33
08-Sep-2020 14:38
09-Sep-2020 08:17
09-Sep-2020 10:20
09-Sep-2020 16:07
06-Sep-2020

## 2020-09-09 NOTE — PROGRESS NOTE ADULT - ASSESSMENT
64F with stage IV lung adenocarcinoma ( dx 2017) s/p RLL lobectomy with recurrence in May 2020 s/p palliative radiation to clavicle and spine and carbo/pemextred/pembrolizumab chemo , hypothyroidism, anxiety, recent admission for pathologic L femoral neck fracture s/p left QASIM on  discharged to [facility] on xarelto 10mg or DVT ppx, p/w worsening L hip pain and dyspnea found to have L acute DVT and R pleural effusion. Pulmonology consulted for likely malignant effusion. Pt underwent thoracentesis with 200 cc serosanguinous fluid removed from complicated loculated pleural space, likely due to lung cancer, mixed cellularity, pH 7.29, glucose 49, cultures NTD.     - Agree with AC for DVT  - Maintain O2 saturation > 92%  - Follow up cultures and cytopathology, would consult oncology if cytopathology positive  - Pt needs close outpatient pulmonary follow up within 2 weeks of hospital discharge. She can follow up with pulmonary outpatient at [address] [place] [address], [address]. Number [phone]. Appointments can also be made by e-mailing [email or web address] and providing patient's name, , discharge diagnosis, and telephone number.  - Please call with further questions      Federico Thompson MD  Fellow, Pulmonary and Critical Care Medicine  [facility]  Pager: [phone], [phone] ([facility])  Email: [email or web address] 64F with stage IV lung adenocarcinoma ( dx 2017) s/p RLL lobectomy with recurrence in May 2020 s/p palliative radiation to clavicle and spine and carbo/pemextred/pembrolizumab chemo , hypothyroidism, anxiety, recent admission for pathologic L femoral neck fracture s/p left QASIM on  discharged to [facility] on xarelto 10mg or DVT ppx, p/w worsening L hip pain and dyspnea found to have L acute DVT and R pleural effusion. Pulmonology consulted for likely malignant effusion. Pt underwent thoracentesis with 200 cc serosanguinous fluid removed from complicated loculated pleural space, likely due to lung cancer, mixed cellularity, pH 7.29, glucose 49, cultures NTD.     - Agree with AC for DVT  - Maintain O2 saturation > 92%  - Follow up cultures and cytopathology from effusion  - Follow up oncology  - Pt needs close outpatient pulmonary follow up within 2 weeks of hospital discharge. She can follow up with pulmonary outpatient at [address] [place] [address], [address]. Number [phone]. Appointments can also be made by e-mailing [email or web address] and providing patient's name, , discharge diagnosis, and telephone number.  - Please call with further questions      Federico Thompson MD  Fellow, Pulmonary and Critical Care Medicine  [facility]  Pager: [phone], [phone] ([facility])  Email: [email or web address]

## 2020-09-09 NOTE — CONSULT NOTE ADULT - ASSESSMENT
64 year old woman w/ PMHx of stage IV lung adenocarcinoma ( dx 2017) with mets to bone s/p RLL lobectomy with recurrence in May 2020 s/p palliative radiation to clavicle and spine and 1 round of carbo/pemextred/pembrolizumab chemo 7/31, hypothyroidism, anxiety, recent admission for pathologic L femoral neck fracture s/p left QASIM on 8/18, presented from [facility] for left hip pain and hypoxia found to have a Left Lower Extremity DVT and R pleural effusion now s/p thoracentesis. Palliative on board for pain management as GOC.

## 2020-09-09 NOTE — PROGRESS NOTE ADULT - PROBLEM SELECTOR PLAN 2
Patient presented with acute hypoxic respiratory failure in setting of extensive lung cancer, increasing pleural effusion with associated atelectasis; ?R pleural thickening. No PE noted on imaging. Currently saturating well on 3LNC, no resp distress noted.   - c/w supplemental O2, wean as tolerated  - incentive spirometry  - pulm consulted in regards to candidacy for therapeutic thoracentesis. To assess today. Holding Heparin drip starting 12PM in anticipation of potential thoracentesis. Patient presented with acute hypoxic respiratory failure in setting of extensive lung cancer, increasing pleural effusion with associated atelectasis; ?R pleural thickening. No PE noted on imaging. Currently saturating well on 3LNC, no resp distress noted.   - c/w supplemental O2, wean as tolerated  - incentive spirometry  - pulm consulted s/p thoracentesis. removed 200 cc, mostly blood. will f/u with cytology report.

## 2020-09-09 NOTE — PHYSICAL THERAPY INITIAL EVALUATION ADULT - PERTINENT HX OF CURRENT PROBLEM, REHAB EVAL
64yoF w/ PMHx of stage IV lung adenocarcinoma ( dx 2017) s/p RLL lobectomy with recurrence in May 2020 s/p palliative radiation to clavicle and spine and 1 round of carbo/pemextred/pembrolizumab chemo 7/31, hypothyroidism, anxiety, recent admission for pathologic L femoral neck fracture s/p left QASIM on 8/18 discharged to [facility] on xarelto 10mg or DVT ppx...

## 2020-09-09 NOTE — PHYSICAL THERAPY INITIAL EVALUATION ADULT - ADDITIONAL COMMENTS
as per care coord note: Pt's daughter states that the pt lives in a private house with her spouse and temporarily her and her spouse are staying with them. Pt has 2 steps to enter home. Pt's daughter states prior to hospitalization pt was independent in ADLs and denies any use of DMEs. Pt's daughter states that the pt was at [facility] from 8/28 and then transferred to [facility]. Prior to pt's last in [facility] pt was at [facility] for about 1 month.

## 2020-09-09 NOTE — CONSULT NOTE ADULT - PROVIDER SPECIALTY LIST ADULT
Vascular Surgery
Heme/Onc
Heme/Onc
Orthopedics
Neurosurgery
Rad Onc
Pulmonology
Palliative Care
Palliative Care

## 2020-09-09 NOTE — PROGRESS NOTE ADULT - SUBJECTIVE AND OBJECTIVE BOX
Pt's status discussed with radiation oncology. Plan will be to obtain inpatient XRT to the left acetabulum and left femur. Continue 50%PWB LLE with beulah.

## 2020-09-09 NOTE — CONSULT NOTE ADULT - SUBJECTIVE AND OBJECTIVE BOX
HPI:  64yoF w/ PMHx of stage IV lung adenocarcinoma ( dx 2017) s/p RLL lobectomy with recurrence in May 2020 s/p palliative radiation to clavicle and spine and 1 round of carbo/pemextred/pembrolizumab chemo 7/31, hypothyroidism, anxiety, recent admission for pathologic L femoral neck fracture s/p left QASIM on 8/18, presented from [facility] rehab for left hip pain and O2 desat to low 80s and anemia.   Pt discharged to [facility] 8/28 - states she has been having increasing pain in her left hip and back since going to [facility], 9-10/10, sharp, radiating down the leg. Pain management was switched from IV dilaudid to PO upon admission to [facility], which per pt doesn't work; pt has also been using fentanyl and lido patches. Pt and dtr note that pt also becomes intermittently altered at [facility]; pt states she  becomes "loopy" on PO dilaudid but fine with IV. On 9/5/20 - pt noted to be hypoxic 79% which improved to 100% on 2L O2; pt able to be weaned off and presumed to be 2/2 oversedation from opioids. Blood work at [facility] showed downtrending Hb 7.8 (9/1)-->7.1 (9/6). Pt on xarelto 10mg since discharge for DVT ppx. Pt herself denies any subjective SOB, denies any wheezing, cough, CP, palpitations. Endorses feeling generally weak and more fatigued in the past few days, endorses poor PO intake, infrequent BMs. Denies any bloody BMs, melena or hematuria. Dtr requested pt be brought back to hospital for better pain control and eval of anemia. En route, pt hypoxic to 84% on RA and placed on 3LNC with 100% sat.  In ED: 98.5, 115, 131/75, RR28, 100% on nasal cannula 4L. Patient had fever of 100.9F per rectum. On labs, patient has anemia w/ neg occult, mild hypoNa, incr Alk phos and UA showing urobilinogen. CTA chest/ abd/ pelvis shows no PE but shows increased R pleural effusion from prior, ill defined R hilar mass, hepatic/ L adrenal and extensive bony metastases new from 2019, new R renal lesion (mets vs infection), Screw fixated pathologic L hip fxr. CT head neg. Patient received 2L IV bolus and started on heparin drip for DVT. 1u pRBC ordered. Pain control with dilaudid and tylenol.  From previous hospital course: Patient also briefly experienced atrial tach/ aflutter days after surgery, and was started on amiodarone 5mg load with conversion back to sinus rhythm HR 70s-80s. Echo was limited due to tachycardia however valvular function and LV systolic function were normal. EP switched her from amio to metoprolol 50 daily. She was in sinus regular rate/rhythm over last few days without need for tele monitoring. Also, from prior hospital course, neurosurgery was consulted for osseous lesions in spine and MRI showed no cord or cauda equina compression. Neurosurgery recommended no intervention at this time. Oncology (outpatient and inpatient) confirmed she could go to rehab without need for systemic therapy. (06 Sep 2020 19:58)      EXAM:  CT 3D RECONSTRUCT W ALLI                        PROCEDURE DATE:  09/06/2020      FINDINGS:  HARDWARE: Patient status post longstem left femoral hip arthroplasty with acetabular augmentation. The hardware is intact.  BONE: As seen on prior examination, there are multiple lytic lesions in the pelvis and bilateral femora consistent with patient's known osseous metastatic disease. The lesion arising in the leftward pedicle of L5 has increased in size when compared to prior study today measuring approximately 62 x 32 mm, previously measuring 40 x 16 mm. Lesion in the left sacral ala is similar to prior study. Lesion in the right iliac crest is unchanged from prior exam. Lesions in the right inferior pubic ramus and parasymphyseal pubis are unchanged. Small lytic lesion in the posterior cortex of the right intertrochanteric femur is slightly more conspicuous on today's examination, measuring 8 mm in greatest dimension (303:61). Newly lesion in the posterior aspect of the left iliac bone (303:47). Lesion in the anterior aspect of the left iliac crest is increased in size when compared to prior study, today measuring approximately 28 x 23 mm, previously measuring 11 x 20 mm (303:103).  SOFT TISSUE: Small left hip joint effusion decompressing through the posterior capsule into the region of the left greater trochanteric bursa. Mild edema in the subcutaneousfat of the left lateral thigh favored to be secondary to recent surgery. Vascular calcifications. Vessels are otherwise normal in course and caliber. No pelvic free fluid.      IMPRESSION:  1.  Diffuse lytic metastatic disease is again seen within the lower lumbar spine and pelvis. The lesions in the leftward pedicle of L5, posterior cortex of the right intertrochanteric femur, and anterior aspect of the left iliac crest of increased in size when compared to prior CT. Remaining lesions are unchanged in size.  2.  No acute fracture.  3.  Patient status post left acetabulum augmentation and placement of longstem prosthesis. No evidence of acute hardware complication.      Allergies    Bananas (Headache)  Bananas (Other)  latex (Rash)  latex (Rash (Mild))  opioid-like analgesics (Urticaria)  penicillin (Angioedema)  penicillin (Swelling (Mild to Mod))    Intolerances        ROS: [  ] Fever  [  ] Chills  [  ]Chest Pain [  ] SOB  [  ]Cough [  ] N/V  [  ] Diarrhea [  ]Constipation [  ]Other ROS:  [  ] ROS otherwise negative    PAST MEDICAL & SURGICAL HISTORY:  Stage IV adenocarcinoma of lung  Hypothyroid  Hypertension  Genital herpes  Drug abuse: Back in the 70&#x27;s and 80&#x27;s (Cocaine)  Lung cancer: with mets  LAMAR on CPAP  Anxiety  Hypothyroid  History of total hip replacement, left: THR 8/18/2020  S/P partial lobectomy of lung: Right lower lobe  History of bunionectomy  Status post lobectomy of lung: 3/2017 (Right lung)  S/P bunionectomy: with revision      FAMILY HISTORY:  Family history of stroke or transient ischemic attack in father  Family history of hypothyroidism  Family history of hypertension  Family history of stroke      MEDICATIONS  (STANDING):  ALPRAZolam 0.25 milliGRAM(s) Oral at bedtime  enoxaparin Injectable 90 milliGRAM(s) SubCutaneous two times a day  HYDROmorphone   Solution 3 milliGRAM(s) Oral every 4 hours  ibuprofen  Tablet. 600 milliGRAM(s) Oral three times a day  levothyroxine 175 MICROGram(s) Oral daily  lidocaine   Patch 1 Patch Transdermal daily  liothyronine 5 MICROGram(s) Oral daily  melatonin 3 milliGRAM(s) Oral at bedtime  metoprolol tartrate 25 milliGRAM(s) Oral two times a day  polyethylene glycol 3350 17 Gram(s) Oral daily  saline laxative (FLEET) Rectal Enema 1 Enema Rectal daily  senna 2 Tablet(s) Oral at bedtime    MEDICATIONS  (PRN):  acetaminophen   Tablet .. 650 milliGRAM(s) Oral every 6 hours PRN Temp greater or equal to 38C (100.4F), Mild Pain (1 - 3)  ALPRAZolam 0.25 milliGRAM(s) Oral two times a day PRN anxiety  aluminum hydroxide/magnesium hydroxide/simethicone Suspension 30 milliLiter(s) Oral every 6 hours PRN Dyspepsia  HYDROmorphone  Injectable 1 milliGRAM(s) IV Push every 4 hours PRN Moderate Pain (4 - 6)  naloxone Injectable 0.2 milliGRAM(s) IV Push every 3 minutes PRN sedation due to therapeutic opiates      PHYSICAL EXAM  Vital Signs Last 24 Hrs  T(C): 36.3 (09 Sep 2020 05:07), Max: 36.6 (08 Sep 2020 14:39)  T(F): 97.3 (09 Sep 2020 05:07), Max: 97.8 (08 Sep 2020 14:39)  HR: 76 (09 Sep 2020 05:07) (72 - 105)  BP: 115/65 (09 Sep 2020 05:07) (109/67 - 133/84)  BP(mean): --  RR: 18 (09 Sep 2020 05:07) (18 - 18)  SpO2: 97% (09 Sep 2020 05:07) (97% - 100%)    General: Well nourished, well developed, no acute distress  HEENT: NC/AT; EOMI, PERRL, sclera nonicteric; external ears normal; no rhinorrhea or epistaxis; mucous membranes moist; oropharynx clear and without erythema  CV: NR, RR; no appreciable r/m/g  Lungs: CTAB, no increased work of breathing  Abdomen: Bowel sounds present; soft, NTND  MSK: Vertebral spine non-tender to palpation  Neuro: AAOx3; cranial nerves II-XII intact; strength 5/5 in upper and lower extremities; sensation to light touch in tact bilaterally.  Psych: Full affect; mood congruent  Skin: no visible rashes on limited examination    IMAGING/LABS/PATHOLOGY: I have personally reviewed the relevant labs, pathology, and imaging as noted in the HPI.  In addition,                          9.2    5.30  )-----------( 289      ( 09 Sep 2020 06:46 )             30.9     09-09    140  |  99  |  4<L>  ----------------------------<  91  3.5   |  30  |  0.36<L>    Ca    9.6      09 Sep 2020 06:46  Phos  5.5     09-09  Mg     1.9     09-09    TPro  5.8<L>  /  Alb  2.7<L>  /  TBili  0.7  /  DBili  x   /  AST  13  /  ALT  10  /  AlkPhos  155<H>  09-09    PT/INR - ( 08 Sep 2020 07:01 )   PT: 14.1 sec;   INR: 1.19 ratio         PTT - ( 09 Sep 2020 08:46 )  PTT:88.2 sec      ASSESSMENT/PLAN    NADER SCHAEFFER is a 64y woman with     We discussed the use of palliative radiation in this setting, namely to improve quality of life through the reduction of symptoms.  We talked about the risks, benefits, acute and long term side effects, as well as expected treatment outcomes.  He/She was given the opportunity to ask questions, which were answered to his/her apparent satisfaction.  He/She provided written consent to proceed with radiation therapy. We will arrange for inpatient/outpatient treatment. HPI:  64yoF w/ PMHx of stage IV lung adenocarcinoma ( dx 2017) s/p RLL lobectomy with recurrence in May 2020 s/p palliative radiation to clavicle and spine and 1 round of carbo/pemextred/pembrolizumab chemo 7/31, hypothyroidism, anxiety, recent admission for pathologic L femoral neck fracture s/p left QASIM on 8/18, presented from [facility] rehab for left hip pain and O2 desat to low 80s and anemia. 7/31/20 1st round of palliative chemo w/ PEM/PEM/Carbo with plan for total of 4C followed by maintenance Pem/pem.  8/18/20 acute impacted left femoral neck fracture, s/p L hemiarthroplasty, path confirms metastatic poorly differentiated adenocarcinoma consistent with lung primary.  Pt discharged to [facility] 8/28 - states she has been having increasing pain in her left hip and back since going to [facility], 9-10/10, sharp, radiating down the leg. Pain management was switched from IV dilaudid to PO upon admission to [facility], which per pt doesn't work; pt has also been using fentanyl and lido patches. Pt and dtr note that pt also becomes intermittently altered at [facility]; pt states she  becomes "loopy" on PO dilaudid but fine with IV. On 9/5/20 - pt noted to be hypoxic 79% which improved to 100% on 2L O2; pt able to be weaned off and presumed to be 2/2 oversedation from opioids. Blood work at [facility] showed downtrending Hb 7.8 (9/1)-->7.1 (9/6). Pt on xarelto 10mg since discharge for DVT ppx. Pt herself denies any subjective SOB, denies any wheezing, cough, CP, palpitations. Endorses feeling generally weak and more fatigued in the past few days, endorses poor PO intake, infrequent BMs. Denies any bloody BMs, melena or hematuria. Dtr requested pt be brought back to hospital for better pain control and eval of anemia. En route, pt hypoxic to 84% on RA and placed on 3LNC with 100% sat.  In ED: 98.5, 115, 131/75, RR28, 100% on nasal cannula 4L. Patient had fever of 100.9F per rectum. On labs, patient has anemia w/ neg occult, mild hypoNa, incr Alk phos and UA showing urobilinogen. CTA chest/ abd/ pelvis shows no PE but shows increased R pleural effusion from prior, ill defined R hilar mass, hepatic/ L adrenal and extensive bony metastases new from 2019, new R renal lesion (mets vs infection), Screw fixated pathologic L hip fxr. CT head neg. Patient received 2L IV bolus and started on heparin drip for DVT. 1u pRBC ordered. Pain control with dilaudid and tylenol.  From previous hospital course: Patient also briefly experienced atrial tach/ aflutter days after surgery, and was started on amiodarone 5mg load with conversion back to sinus rhythm HR 70s-80s. Echo was limited due to tachycardia however valvular function and LV systolic function were normal. EP switched her from amio to metoprolol 50 daily. She was in sinus regular rate/rhythm over last few days without need for tele monitoring. Also, from prior hospital course, neurosurgery was consulted for osseous lesions in spine and MRI showed no cord or cauda equina compression. Neurosurgery recommended no intervention at this time. Oncology (outpatient and inpatient) confirmed she could go to rehab without need for systemic therapy. (06 Sep 2020 19:58)      EXAM:  CT 3D RECONSTRUCT W ALLI                        PROCEDURE DATE:  09/06/2020      FINDINGS:  HARDWARE: Patient status post longstem left femoral hip arthroplasty with acetabular augmentation. The hardware is intact.  BONE: As seen on prior examination, there are multiple lytic lesions in the pelvis and bilateral femora consistent with patient's known osseous metastatic disease. The lesion arising in the leftward pedicle of L5 has increased in size when compared to prior study today measuring approximately 62 x 32 mm, previously measuring 40 x 16 mm. Lesion in the left sacral ala is similar to prior study. Lesion in the right iliac crest is unchanged from prior exam. Lesions in the right inferior pubic ramus and parasymphyseal pubis are unchanged. Small lytic lesion in the posterior cortex of the right intertrochanteric femur is slightly more conspicuous on today's examination, measuring 8 mm in greatest dimension (303:61). Newly lesion in the posterior aspect of the left iliac bone (303:47). Lesion in the anterior aspect of the left iliac crest is increased in size when compared to prior study, today measuring approximately 28 x 23 mm, previously measuring 11 x 20 mm (303:103).  SOFT TISSUE: Small left hip joint effusion decompressing through the posterior capsule into the region of the left greater trochanteric bursa. Mild edema in the subcutaneousfat of the left lateral thigh favored to be secondary to recent surgery. Vascular calcifications. Vessels are otherwise normal in course and caliber. No pelvic free fluid.      IMPRESSION:  1.  Diffuse lytic metastatic disease is again seen within the lower lumbar spine and pelvis. The lesions in the leftward pedicle of L5, posterior cortex of the right intertrochanteric femur, and anterior aspect of the left iliac crest of increased in size when compared to prior CT. Remaining lesions are unchanged in size.  2.  No acute fracture.  3.  Patient status post left acetabulum augmentation and placement of longstem prosthesis. No evidence of acute hardware complication.      Allergies    Bananas (Headache)  Bananas (Other)  latex (Rash)  latex (Rash (Mild))  opioid-like analgesics (Urticaria)  penicillin (Angioedema)  penicillin (Swelling (Mild to Mod))    Intolerances        ROS: [  ] Fever  [  ] Chills  [  ]Chest Pain [  ] SOB  [  ]Cough [  ] N/V  [  ] Diarrhea [  ]Constipation [  ]Other ROS:  [  ] ROS otherwise negative    PAST MEDICAL & SURGICAL HISTORY:  Stage IV adenocarcinoma of lung  Hypothyroid  Hypertension  Genital herpes  Drug abuse: Back in the 70&#x27;s and 80&#x27;s (Cocaine)  Lung cancer: with mets  LAMAR on CPAP  Anxiety  Hypothyroid  History of total hip replacement, left: THR 8/18/2020  S/P partial lobectomy of lung: Right lower lobe  History of bunionectomy  Status post lobectomy of lung: 3/2017 (Right lung)  S/P bunionectomy: with revision      FAMILY HISTORY:  Family history of stroke or transient ischemic attack in father  Family history of hypothyroidism  Family history of hypertension  Family history of stroke      MEDICATIONS  (STANDING):  ALPRAZolam 0.25 milliGRAM(s) Oral at bedtime  enoxaparin Injectable 90 milliGRAM(s) SubCutaneous two times a day  HYDROmorphone   Solution 3 milliGRAM(s) Oral every 4 hours  ibuprofen  Tablet. 600 milliGRAM(s) Oral three times a day  levothyroxine 175 MICROGram(s) Oral daily  lidocaine   Patch 1 Patch Transdermal daily  liothyronine 5 MICROGram(s) Oral daily  melatonin 3 milliGRAM(s) Oral at bedtime  metoprolol tartrate 25 milliGRAM(s) Oral two times a day  polyethylene glycol 3350 17 Gram(s) Oral daily  saline laxative (FLEET) Rectal Enema 1 Enema Rectal daily  senna 2 Tablet(s) Oral at bedtime    MEDICATIONS  (PRN):  acetaminophen   Tablet .. 650 milliGRAM(s) Oral every 6 hours PRN Temp greater or equal to 38C (100.4F), Mild Pain (1 - 3)  ALPRAZolam 0.25 milliGRAM(s) Oral two times a day PRN anxiety  aluminum hydroxide/magnesium hydroxide/simethicone Suspension 30 milliLiter(s) Oral every 6 hours PRN Dyspepsia  HYDROmorphone  Injectable 1 milliGRAM(s) IV Push every 4 hours PRN Moderate Pain (4 - 6)  naloxone Injectable 0.2 milliGRAM(s) IV Push every 3 minutes PRN sedation due to therapeutic opiates      PHYSICAL EXAM  Vital Signs Last 24 Hrs  T(C): 36.3 (09 Sep 2020 05:07), Max: 36.6 (08 Sep 2020 14:39)  T(F): 97.3 (09 Sep 2020 05:07), Max: 97.8 (08 Sep 2020 14:39)  HR: 76 (09 Sep 2020 05:07) (72 - 105)  BP: 115/65 (09 Sep 2020 05:07) (109/67 - 133/84)  BP(mean): --  RR: 18 (09 Sep 2020 05:07) (18 - 18)  SpO2: 97% (09 Sep 2020 05:07) (97% - 100%)    General: Well nourished, well developed, no acute distress  HEENT: NC/AT; EOMI, PERRL, sclera nonicteric; external ears normal; no rhinorrhea or epistaxis; mucous membranes moist; oropharynx clear and without erythema  CV: NR, RR; no appreciable r/m/g  Lungs: CTAB, no increased work of breathing  Abdomen: Bowel sounds present; soft, NTND  MSK: Vertebral spine non-tender to palpation  Neuro: AAOx3; cranial nerves II-XII intact; strength 5/5 in upper and lower extremities; sensation to light touch in tact bilaterally.  Psych: Full affect; mood congruent  Skin: no visible rashes on limited examination    IMAGING/LABS/PATHOLOGY: I have personally reviewed the relevant labs, pathology, and imaging as noted in the HPI.  In addition,                          9.2    5.30  )-----------( 289      ( 09 Sep 2020 06:46 )             30.9     09-09    140  |  99  |  4<L>  ----------------------------<  91  3.5   |  30  |  0.36<L>    Ca    9.6      09 Sep 2020 06:46  Phos  5.5     09-09  Mg     1.9     09-09    TPro  5.8<L>  /  Alb  2.7<L>  /  TBili  0.7  /  DBili  x   /  AST  13  /  ALT  10  /  AlkPhos  155<H>  09-09    PT/INR - ( 08 Sep 2020 07:01 )   PT: 14.1 sec;   INR: 1.19 ratio         PTT - ( 09 Sep 2020 08:46 )  PTT:88.2 sec      ASSESSMENT/PLAN    NADER SCHAEFFER is a 64y woman with     We discussed the use of palliative radiation in this setting, namely to improve quality of life through the reduction of symptoms.  We talked about the risks, benefits, acute and long term side effects, as well as expected treatment outcomes.  He/She was given the opportunity to ask questions, which were answered to his/her apparent satisfaction.  He/She provided written consent to proceed with radiation therapy. We will arrange for inpatient/outpatient treatment. HPI:  Onel Schaeffer is a 64yoF w/ PMHx of stage IV lung adenocarcinoma ( dx 2017) s/p RLL lobectomy with recurrence in May 2020 s/p palliative radiation to clavicle and spine and 1 round of carbo/pemextred/pembrolizumab chemo 7/31, hypothyroidism, anxiety, recent admission for pathologic L femoral neck fracture s/p left QASIM on 8/18, presented from [facility] rehab for left hip pain and O2 desatting to low 80s and anemia. 7/31/20 1st round of palliative chemo w/ PEM/PEM/Carbo with plan for total of 4C followed by maintenance Pem/pem.  8/18/20 acute impacted left femoral neck fracture, s/p L hemiarthroplasty, path confirms metastatic poorly differentiated adenocarcinoma consistent with lung primary.  Pt discharged to [facility] 8/28 - states she has been having increasing pain in her left hip and back since going to [facility], 9-10/10, sharp, radiating down the leg. Pain management was switched from IV dilaudid to PO upon admission to [facility], which per pt doesn't work; pt has also been using fentanyl and lido patches. Pt and dtr note that pt also becomes intermittently altered at [facility]; pt states she  becomes "loopy" on PO dilaudid but fine with IV. On 9/5/20 - pt noted to be hypoxic 79% which improved to 100% on 2L O2; pt able to be weaned off and presumed to be 2/2 oversedation from opioids. Blood work at [facility] showed downtrending Hb 7.8 (9/1)-->7.1 (9/6). Pt on Xarelto 10mg since discharge for DVT ppx. Pt herself denies any subjective SOB, denies any wheezing, cough, CP, palpitations. Endorses feeling generally weak and more fatigued in the past few days, endorses poor PO intake, infrequent BMs. Denies any bloody BMs, melena or hematuria. Dtr requested pt be brought back to hospital for better pain control and eval of anemia. En route, pt hypoxic to 84% on RA and placed on 3LNC with 100% sat.  In ED: 98.5, 115, 131/75, RR28, 100% on nasal cannula 4L. Patient had fever of 100.9F per rectum. On labs, patient has anemia w/ neg occult, mild hyponatremia, increased Alk phos and UA showing urobilinogen. CTA chest/ abd/ pelvis shows no PE but shows increased R pleural effusion from prior, ill defined R hilar mass, hepatic/ L adrenal and extensive bony metastases new from 2019, new R renal lesion (mets vs infection), Screw fixated pathologic L hip fracture. CT head neg. Patient received 2L IV bolus and started on heparin drip for DVT. 1u pRBC ordered. Pain control with dilaudid and tylenol.  From previous hospital course: Patient also briefly experienced atrial tach/ aflutter days after surgery, and was started on amiodarone 5mg load with conversion back to sinus rhythm HR 70s-80s. Echo was limited due to tachycardia however valvular function and LV systolic function were normal. EP switched her from amio to metoprolol 50 daily. She was in sinus regular rate/rhythm over last few days without need for tele monitoring. Also, from prior hospital course, neurosurgery was consulted for osseous lesions in spine and MRI showed no cord or cauda equina compression. Neurosurgery recommended no intervention at this time. Oncology (outpatient and inpatient) confirmed she could go to rehab without need for systemic therapy. (06 Sep 2020 19:58)  Discussed with Dr Mariebl Ramírez, patient has distal femur/condylar fracture that also requires radiation, in addition to the left proximal femur/acetabulum.  Currently patient describes decreased left proximal and distal femur pain due to adjustments in pain med regimen.    EXAM:  CT 3D RECONSTRUCT W ALLI                        PROCEDURE DATE:  09/06/2020      FINDINGS:  HARDWARE: Patient status post longstem left femoral hip arthroplasty with acetabular augmentation. The hardware is intact.  BONE: As seen on prior examination, there are multiple lytic lesions in the pelvis and bilateral femora consistent with patient's known osseous metastatic disease. The lesion arising in the leftward pedicle of L5 has increased in size when compared to prior study today measuring approximately 62 x 32 mm, previously measuring 40 x 16 mm. Lesion in the left sacral ala is similar to prior study. Lesion in the right iliac crest is unchanged from prior exam. Lesions in the right inferior pubic ramus and parasymphyseal pubis are unchanged. Small lytic lesion in the posterior cortex of the right intertrochanteric femur is slightly more conspicuous on today's examination, measuring 8 mm in greatest dimension (303:61). Newly lesion in the posterior aspect of the left iliac bone (303:47). Lesion in the anterior aspect of the left iliac crest is increased in size when compared to prior study, today measuring approximately 28 x 23 mm, previously measuring 11 x 20 mm (303:103).  SOFT TISSUE: Small left hip joint effusion decompressing through the posterior capsule into the region of the left greater trochanteric bursa. Mild edema in the subcutaneous fat of the left lateral thigh favored to be secondary to recent surgery. Vascular calcifications. Vessels are otherwise normal in course and caliber. No pelvic free fluid.      IMPRESSION:  1.  Diffuse lytic metastatic disease is again seen within the lower lumbar spine and pelvis. The lesions in the leftward pedicle of L5, posterior cortex of the right intertrochanteric femur, and anterior aspect of the left iliac crest of increased in size when compared to prior CT. Remaining lesions are unchanged in size.  2.  No acute fracture.  3.  Patient status post left acetabulum augmentation and placement of longstem prosthesis. No evidence of acute hardware complication.      Allergies    Bananas (Headache)  Bananas (Other)  latex (Rash)  latex (Rash (Mild))  opioid-like analgesics (Urticaria)  penicillin (Angioedema)  penicillin (Swelling (Mild to Mod))    Intolerances        ROS: [  ] Fever  [  ] Chills  [  ]Chest Pain [  ] SOB  [  ]Cough [  ] N/V  [  ] Diarrhea [  ]Constipation [  ]Other ROS:  [  ] ROS otherwise negative    PAST MEDICAL & SURGICAL HISTORY:  Stage IV adenocarcinoma of lung  Hypothyroid  Hypertension  Genital herpes  Drug abuse: Back in the 70&#x27;s and 80&#x27;s (Cocaine)  Lung cancer: with mets  LAMAR on CPAP  Anxiety  Hypothyroid  History of total hip replacement, left: THR 8/18/2020  S/P partial lobectomy of lung: Right lower lobe  History of bunionectomy  Status post lobectomy of lung: 3/2017 (Right lung)  S/P bunionectomy: with revision      FAMILY HISTORY:  Family history of stroke or transient ischemic attack in father  Family history of hypothyroidism  Family history of hypertension  Family history of stroke      MEDICATIONS  (STANDING):  ALPRAZolam 0.25 milliGRAM(s) Oral at bedtime  enoxaparin Injectable 90 milliGRAM(s) SubCutaneous two times a day  HYDROmorphone   Solution 3 milliGRAM(s) Oral every 4 hours  ibuprofen  Tablet. 600 milliGRAM(s) Oral three times a day  levothyroxine 175 MICROGram(s) Oral daily  lidocaine   Patch 1 Patch Transdermal daily  liothyronine 5 MICROGram(s) Oral daily  melatonin 3 milliGRAM(s) Oral at bedtime  metoprolol tartrate 25 milliGRAM(s) Oral two times a day  polyethylene glycol 3350 17 Gram(s) Oral daily  saline laxative (FLEET) Rectal Enema 1 Enema Rectal daily  senna 2 Tablet(s) Oral at bedtime    MEDICATIONS  (PRN):  acetaminophen   Tablet .. 650 milliGRAM(s) Oral every 6 hours PRN Temp greater or equal to 38C (100.4F), Mild Pain (1 - 3)  ALPRAZolam 0.25 milliGRAM(s) Oral two times a day PRN anxiety  aluminum hydroxide/magnesium hydroxide/simethicone Suspension 30 milliLiter(s) Oral every 6 hours PRN Dyspepsia  HYDROmorphone  Injectable 1 milliGRAM(s) IV Push every 4 hours PRN Moderate Pain (4 - 6)  naloxone Injectable 0.2 milliGRAM(s) IV Push every 3 minutes PRN sedation due to therapeutic opiates      PHYSICAL EXAM- KPS 60  Vital Signs Last 24 Hrs  T(C): 36.3 (09 Sep 2020 05:07), Max: 36.6 (08 Sep 2020 14:39)  T(F): 97.3 (09 Sep 2020 05:07), Max: 97.8 (08 Sep 2020 14:39)  HR: 76 (09 Sep 2020 05:07) (72 - 105)  BP: 115/65 (09 Sep 2020 05:07) (109/67 - 133/84)  BP(mean): --  RR: 18 (09 Sep 2020 05:07) (18 - 18)  SpO2: 97% (09 Sep 2020 05:07) (97% - 100%)    General: Well nourished, well developed, no acute distress- nasal O2 cannula in place.  HEENT: NC/AT; EOMI, PERRL, sclera nonicteric; external ears normal; no rhinorrhea or epistaxis; mucous membranes moist; oropharynx clear and without erythema  CV: NR, RR; no appreciable r/m/g  Lungs: scattered wheezing, no stridor or cyanosis.  Abdomen: Bowel sounds present; soft, NTND  MSK: Vertebral spine non-tender to palpation- left calf brace noted, no major pain with palpation. Decreased left thigh ROM  Neuro: AAOx3; cranial nerves II-XII intact; strength 5/5 in upper and lower extremities; sensation to light touch intact bilaterally.  Psych: Full affect; mood congruent  Skin: no visible rashes on limited examination    IMAGING/LABS/PATHOLOGY: I have personally reviewed the relevant labs, pathology, and imaging as noted in the HPI.  In addition,                          9.2    5.30  )-----------( 289      ( 09 Sep 2020 06:46 )             30.9     09-09    140  |  99  |  4<L>  ----------------------------<  91  3.5   |  30  |  0.36<L>    Ca    9.6      09 Sep 2020 06:46  Phos  5.5     09-09  Mg     1.9     09-09    TPro  5.8<L>  /  Alb  2.7<L>  /  TBili  0.7  /  DBili  x   /  AST  13  /  ALT  10  /  AlkPhos  155<H>  09-09    PT/INR - ( 08 Sep 2020 07:01 )   PT: 14.1 sec;   INR: 1.19 ratio         PTT - ( 09 Sep 2020 08:46 )  PTT:88.2 sec      ASSESSMENT/PLAN    NADER SCHAEFFER is a 64y woman with a recent history of metastatic NSCLC, multiple bony sites involved , s/p radiation to the left clavicle, T5-T8 and T12-L2 vertebrae at [facility], now referred for palliative radiation to the left proximal femur/acetabulum and left distal femur/condyles. This case has been discussed today with Dr Maribel Ramírez, with the patient and her daughter ( by cell phone).    We discussed the use of palliative radiation in this setting, namely to improve quality of life through the reduction of symptoms.  We talked about the risks, benefits, acute and long term side effects, as well as expected treatment outcomes.  Ms Schaeffer was given the opportunity to ask questions, which were answered to her apparent satisfaction. She provided written consent to proceed with radiation therapy. We will arrange for inpatient radiation at [facility]. Please notify us when patient has been transferred.    Padilla Brennan MD  cell     Radiation Medicine Dep't. at [facility]

## 2020-09-09 NOTE — PROGRESS NOTE ADULT - SUBJECTIVE AND OBJECTIVE BOX
**** INCOMPLETE******  PROGRESS NOTE:     CONTACT INFO:  Harjit Lentz  PGY-1 Internal Medicine  [phone]    Patient is a 64y old  Female who presents with a chief complaint of PAIN (07 Sep 2020 15:52)      SUBJECTIVE / OVERNIGHT EVENTS:   Patient seen and evaluated at bedside. No fever/chills.  Denies SOB at rest, chest pain, palpitations, abdominal pain, nausea/vomiting      MEDICATIONS  (STANDING):  ALPRAZolam 0.25 milliGRAM(s) Oral at bedtime  heparin  Infusion. 1400 Unit(s)/Hr (14 mL/Hr) IV Continuous <Continuous>  HYDROmorphone   Solution 3 milliGRAM(s) Oral every 4 hours  ibuprofen  Tablet. 600 milliGRAM(s) Oral three times a day  levothyroxine 175 MICROGram(s) Oral daily  lidocaine   Patch 1 Patch Transdermal daily  liothyronine 5 MICROGram(s) Oral daily  melatonin 3 milliGRAM(s) Oral at bedtime  metoprolol tartrate 25 milliGRAM(s) Oral two times a day  polyethylene glycol 3350 17 Gram(s) Oral daily  senna 2 Tablet(s) Oral at bedtime    MEDICATIONS  (PRN):  acetaminophen   Tablet .. 650 milliGRAM(s) Oral every 6 hours PRN Temp greater or equal to 38C (100.4F), Mild Pain (1 - 3)  ALPRAZolam 0.25 milliGRAM(s) Oral two times a day PRN anxiety  aluminum hydroxide/magnesium hydroxide/simethicone Suspension 30 milliLiter(s) Oral every 6 hours PRN Dyspepsia  heparin   Injectable 7000 Unit(s) IV Push every 6 hours PRN For aPTT less than 40  heparin   Injectable 3500 Unit(s) IV Push every 6 hours PRN For aPTT between 40 - 57  HYDROmorphone  Injectable 1 milliGRAM(s) IV Push every 4 hours PRN Moderate Pain (4 - 6)  naloxone Injectable 0.2 milliGRAM(s) IV Push every 3 minutes PRN sedation due to therapeutic opiates      CAPILLARY BLOOD GLUCOSE        I&O's Summary    08 Sep 2020 07:01  -  09 Sep 2020 07:00  --------------------------------------------------------  IN: 366 mL / OUT: 0 mL / NET: 366 mL        PHYSICAL EXAM:  Vital Signs Last 24 Hrs  T(C): 36.3 (09 Sep 2020 05:07), Max: 36.6 (08 Sep 2020 14:39)  T(F): 97.3 (09 Sep 2020 05:07), Max: 97.8 (08 Sep 2020 14:39)  HR: 76 (09 Sep 2020 05:07) (71 - 105)  BP: 115/65 (09 Sep 2020 05:07) (101/61 - 133/84)  BP(mean): --  RR: 18 (09 Sep 2020 05:07) (18 - 18)  SpO2: 97% (09 Sep 2020 05:07) (97% - 100%)    CONSTITUTIONAL: NAD, well-developed  RESPIRATORY: Normal respiratory effort; lungs are clear to auscultation bilaterally  CARDIOVASCULAR: Regular rate and rhythm, normal S1 and S2, no murmur/rub/gallop; No lower extremity edema; Peripheral pulses are 2+ bilaterally  ABDOMEN: Nontender to palpation, normoactive bowel sounds, no rebound/guarding; No hepatosplenomegaly  MUSCLOSKELETAL: no clubbing or cyanosis of digits; no joint swelling or tenderness to palpation  PSYCH: A+O to person, place, and time; affect appropriate    LABS:                        9.2    5.30  )-----------( 289      ( 09 Sep 2020 06:46 )             30.9     09-08    140  |  99  |  5<L>  ----------------------------<  85  3.6   |  29  |  0.34<L>    Ca    9.7      08 Sep 2020 07:01  Phos  5.0     09-08  Mg     1.9     09-08    TPro  5.5<L>  /  Alb  2.7<L>  /  TBili  0.7  /  DBili  x   /  AST  12  /  ALT  12  /  AlkPhos  141<H>  09-08    PT/INR - ( 08 Sep 2020 07:01 )   PT: 14.1 sec;   INR: 1.19 ratio         PTT - ( 09 Sep 2020 01:39 )  PTT:62.0 sec          Culture - Body Fluid with Gram Stain (collected 08 Sep 2020 21:48)  Source: .Body Fluid Pleural Fluid  Gram Stain (09 Sep 2020 02:54):    No polymorphonuclear leukocytes seen    No organisms seen    by cytocentrifuge    Culture - Blood (collected 06 Sep 2020 18:36)  Source: .Blood Blood-Peripheral  Preliminary Report (07 Sep 2020 19:01):    No growth to date.    Culture - Urine (collected 06 Sep 2020 18:35)  Source: .Urine Clean Catch (Midstream)  Final Report (07 Sep 2020 13:29):    <10,000 CFU/mL Normal Urogenital Angelina    Culture - Blood (collected 06 Sep 2020 18:31)  Source: .Blood Blood-Peripheral  Preliminary Report (07 Sep 2020 19:01):    No growth to date.        RADIOLOGY & ADDITIONAL TESTS:  Results Reviewed:   Imaging Personally Reviewed:  Electrocardiogram Personally Reviewed:    COORDINATION OF CARE:  Care Discussed with Consultants/Other Providers [Y/N]:  Prior or Outpatient Records Reviewed [Y/N]: PROGRESS NOTE:     CONTACT INFO:  Harjit Lentz  PGY-1 Internal Medicine  [phone]    Patient is a 64y old  Female who presents with a chief complaint of PAIN (07 Sep 2020 15:52)      SUBJECTIVE / OVERNIGHT EVENTS:   Patient seen and evaluated at bedside. No fever/chills.  Denies SOB at rest, chest pain, palpitations, nausea/vomiting. Endorses pain of her legs that radiates to abdomen, and states breathing is a bit more labored s/p thoracentesis.       MEDICATIONS  (STANDING):  ALPRAZolam 0.25 milliGRAM(s) Oral at bedtime  heparin  Infusion. 1400 Unit(s)/Hr (14 mL/Hr) IV Continuous <Continuous>  HYDROmorphone   Solution 3 milliGRAM(s) Oral every 4 hours  ibuprofen  Tablet. 600 milliGRAM(s) Oral three times a day  levothyroxine 175 MICROGram(s) Oral daily  lidocaine   Patch 1 Patch Transdermal daily  liothyronine 5 MICROGram(s) Oral daily  melatonin 3 milliGRAM(s) Oral at bedtime  metoprolol tartrate 25 milliGRAM(s) Oral two times a day  polyethylene glycol 3350 17 Gram(s) Oral daily  senna 2 Tablet(s) Oral at bedtime    MEDICATIONS  (PRN):  acetaminophen   Tablet .. 650 milliGRAM(s) Oral every 6 hours PRN Temp greater or equal to 38C (100.4F), Mild Pain (1 - 3)  ALPRAZolam 0.25 milliGRAM(s) Oral two times a day PRN anxiety  aluminum hydroxide/magnesium hydroxide/simethicone Suspension 30 milliLiter(s) Oral every 6 hours PRN Dyspepsia  heparin   Injectable 7000 Unit(s) IV Push every 6 hours PRN For aPTT less than 40  heparin   Injectable 3500 Unit(s) IV Push every 6 hours PRN For aPTT between 40 - 57  HYDROmorphone  Injectable 1 milliGRAM(s) IV Push every 4 hours PRN Moderate Pain (4 - 6)  naloxone Injectable 0.2 milliGRAM(s) IV Push every 3 minutes PRN sedation due to therapeutic opiates      CAPILLARY BLOOD GLUCOSE        I&O's Summary    08 Sep 2020 07:01  -  09 Sep 2020 07:00  --------------------------------------------------------  IN: 366 mL / OUT: 0 mL / NET: 366 mL        PHYSICAL EXAM:  Vital Signs Last 24 Hrs  T(C): 36.3 (09 Sep 2020 05:07), Max: 36.6 (08 Sep 2020 14:39)  T(F): 97.3 (09 Sep 2020 05:07), Max: 97.8 (08 Sep 2020 14:39)  HR: 76 (09 Sep 2020 05:07) (71 - 105)  BP: 115/65 (09 Sep 2020 05:07) (101/61 - 133/84)  BP(mean): --  RR: 18 (09 Sep 2020 05:07) (18 - 18)  SpO2: 97% (09 Sep 2020 05:07) (97% - 100%)    CONSTITUTIONAL: NAD, well-developed  RESPIRATORY: Normal respiratory effort; lungs are clear to auscultation bilaterally  CARDIOVASCULAR: Regular rate and rhythm, normal S1 and S2, no murmur/rub/gallop; No lower extremity edema; Peripheral pulses are 2+ bilaterally  ABDOMEN: Nontender to palpation, normoactive bowel sounds, no rebound/guarding; No hepatosplenomegaly  MUSCLOSKELETAL: no clubbing or cyanosis of digits; no joint swelling or tenderness to palpation  PSYCH: A+O to person, place, and time; affect appropriate    LABS:                        9.2    5.30  )-----------( 289      ( 09 Sep 2020 06:46 )             30.9     09-08    140  |  99  |  5<L>  ----------------------------<  85  3.6   |  29  |  0.34<L>    Ca    9.7      08 Sep 2020 07:01  Phos  5.0     09-08  Mg     1.9     09-08    TPro  5.5<L>  /  Alb  2.7<L>  /  TBili  0.7  /  DBili  x   /  AST  12  /  ALT  12  /  AlkPhos  141<H>  09-08    PT/INR - ( 08 Sep 2020 07:01 )   PT: 14.1 sec;   INR: 1.19 ratio         PTT - ( 09 Sep 2020 01:39 )  PTT:62.0 sec          Culture - Body Fluid with Gram Stain (collected 08 Sep 2020 21:48)  Source: .Body Fluid Pleural Fluid  Gram Stain (09 Sep 2020 02:54):    No polymorphonuclear leukocytes seen    No organisms seen    by cytocentrifuge    Culture - Blood (collected 06 Sep 2020 18:36)  Source: .Blood Blood-Peripheral  Preliminary Report (07 Sep 2020 19:01):    No growth to date.    Culture - Urine (collected 06 Sep 2020 18:35)  Source: .Urine Clean Catch (Midstream)  Final Report (07 Sep 2020 13:29):    <10,000 CFU/mL Normal Urogenital Angelina    Culture - Blood (collected 06 Sep 2020 18:31)  Source: .Blood Blood-Peripheral  Preliminary Report (07 Sep 2020 19:01):    No growth to date.        RADIOLOGY & ADDITIONAL TESTS:  Results Reviewed:   Imaging Personally Reviewed:  Electrocardiogram Personally Reviewed:    COORDINATION OF CARE:  Care Discussed with Consultants/Other Providers [Y/N]:  Prior or Outpatient Records Reviewed [Y/N]: PROGRESS NOTE:     CONTACT INFO:  Harjit Lentz  PGY-1 Internal Medicine  [phone]    Patient is a 64y old  Female who presents with a chief complaint of PAIN (07 Sep 2020 15:52)      SUBJECTIVE / OVERNIGHT EVENTS:   Patient seen and evaluated at bedside. No fever/chills.  Denies SOB at rest, chest pain, palpitations, nausea/vomiting. Endorses pain of her legs that radiates to abdomen, and states breathing is a bit more labored s/p thoracentesis. States had very minimal vaginal spotting this morning that did not appear from rectum.       MEDICATIONS  (STANDING):  ALPRAZolam 0.25 milliGRAM(s) Oral at bedtime  heparin  Infusion. 1400 Unit(s)/Hr (14 mL/Hr) IV Continuous <Continuous>  HYDROmorphone   Solution 3 milliGRAM(s) Oral every 4 hours  ibuprofen  Tablet. 600 milliGRAM(s) Oral three times a day  levothyroxine 175 MICROGram(s) Oral daily  lidocaine   Patch 1 Patch Transdermal daily  liothyronine 5 MICROGram(s) Oral daily  melatonin 3 milliGRAM(s) Oral at bedtime  metoprolol tartrate 25 milliGRAM(s) Oral two times a day  polyethylene glycol 3350 17 Gram(s) Oral daily  senna 2 Tablet(s) Oral at bedtime    MEDICATIONS  (PRN):  acetaminophen   Tablet .. 650 milliGRAM(s) Oral every 6 hours PRN Temp greater or equal to 38C (100.4F), Mild Pain (1 - 3)  ALPRAZolam 0.25 milliGRAM(s) Oral two times a day PRN anxiety  aluminum hydroxide/magnesium hydroxide/simethicone Suspension 30 milliLiter(s) Oral every 6 hours PRN Dyspepsia  heparin   Injectable 7000 Unit(s) IV Push every 6 hours PRN For aPTT less than 40  heparin   Injectable 3500 Unit(s) IV Push every 6 hours PRN For aPTT between 40 - 57  HYDROmorphone  Injectable 1 milliGRAM(s) IV Push every 4 hours PRN Moderate Pain (4 - 6)  naloxone Injectable 0.2 milliGRAM(s) IV Push every 3 minutes PRN sedation due to therapeutic opiates      CAPILLARY BLOOD GLUCOSE        I&O's Summary    08 Sep 2020 07:01  -  09 Sep 2020 07:00  --------------------------------------------------------  IN: 366 mL / OUT: 0 mL / NET: 366 mL        PHYSICAL EXAM:  Vital Signs Last 24 Hrs  T(C): 36.3 (09 Sep 2020 05:07), Max: 36.6 (08 Sep 2020 14:39)  T(F): 97.3 (09 Sep 2020 05:07), Max: 97.8 (08 Sep 2020 14:39)  HR: 76 (09 Sep 2020 05:07) (71 - 105)  BP: 115/65 (09 Sep 2020 05:07) (101/61 - 133/84)  BP(mean): --  RR: 18 (09 Sep 2020 05:07) (18 - 18)  SpO2: 97% (09 Sep 2020 05:07) (97% - 100%)    CONSTITUTIONAL: NAD, well-developed  RESPIRATORY: Normal respiratory effort. clear to auscultation bilaterally. no crackles appreciated.   CARDIOVASCULAR: Regular rate and rhythm, normal S1 and S2, no murmur/rub/gallop; No lower extremity edema; Peripheral pulses are 2+ bilaterally  ABDOMEN: Nontender to palpation, normoactive bowel sounds.  No hepatosplenomegaly  MUSCLOSKELETAL: Left leg has brace, unable to access.   NEURO: Non-focal, no tremors  SKIN: No rashes  PSYCH: A+O to person, place, and time; affect appropriate    LABS:                        9.2    5.30  )-----------( 289      ( 09 Sep 2020 06:46 )             30.9     09-08    140  |  99  |  5<L>  ----------------------------<  85  3.6   |  29  |  0.34<L>    Ca    9.7      08 Sep 2020 07:01  Phos  5.0     09-08  Mg     1.9     09-08    TPro  5.5<L>  /  Alb  2.7<L>  /  TBili  0.7  /  DBili  x   /  AST  12  /  ALT  12  /  AlkPhos  141<H>  09-08    PT/INR - ( 08 Sep 2020 07:01 )   PT: 14.1 sec;   INR: 1.19 ratio         PTT - ( 09 Sep 2020 01:39 )  PTT:62.0 sec          Culture - Body Fluid with Gram Stain (collected 08 Sep 2020 21:48)  Source: .Body Fluid Pleural Fluid  Gram Stain (09 Sep 2020 02:54):    No polymorphonuclear leukocytes seen    No organisms seen    by cytocentrifuge    Culture - Blood (collected 06 Sep 2020 18:36)  Source: .Blood Blood-Peripheral  Preliminary Report (07 Sep 2020 19:01):    No growth to date.    Culture - Urine (collected 06 Sep 2020 18:35)  Source: .Urine Clean Catch (Midstream)  Final Report (07 Sep 2020 13:29):    <10,000 CFU/mL Normal Urogenital Angelina    Culture - Blood (collected 06 Sep 2020 18:31)  Source: .Blood Blood-Peripheral  Preliminary Report (07 Sep 2020 19:01):    No growth to date.        RADIOLOGY & ADDITIONAL TESTS:  Results Reviewed:   Imaging Personally Reviewed:  Electrocardiogram Personally Reviewed:    Culture - Body Fluid with Gram Stain (09.08.20 @ 21:48)    Gram Stain:   No polymorphonuclear leukocytes seen  No organisms seen  by cytocentrifuge    Specimen Source: .Body Fluid Pleural Fluid        COORDINATION OF CARE:  Care Discussed with Consultants/Other Providers [Y/N]:  Prior or Outpatient Records Reviewed [Y/N]:

## 2020-09-10 NOTE — DISCHARGE NOTE PROVIDER - NSDCFUSCHEDAPPT_GEN_ALL_CORE_FT
NADER SCHAEFFER ; 09/11/2020 ; [facility] [address] [facility]  NADER SCHAEFFER ; 09/25/2020 ; [facility]  NADER SCHAEFFER ; 11/12/2020 ; [facility] [address] NADER SCHAEFFER ; 09/11/2020 ; [facility] [address] [facility]  NADER SCHAEFFER ; 09/25/2020 ; [facility]  NADER SCHAEFFER ; 11/12/2020 ; [facility] [address] NADER SCHAEFFER ; 09/11/2020 ; [facility] [address] [facility]  NADER SCHAEFFER ; 09/25/2020 ; [facility]  NADER SCHAEFFER ; 11/12/2020 ; [facility] [address] NADER SCHAEFFER ; 09/11/2020 ; [facility] [address] [facility]  NADER SCHAEFFER ; 09/25/2020 ; [facility]  NDAER SCHAEFFER ; 11/12/2020 ; [facility] [address]

## 2020-09-10 NOTE — PROGRESS NOTE ADULT - PROBLEM SELECTOR PROBLEM 4
SIRS (systemic inflammatory response syndrome)
Adenocarcinoma of lung, stage 4, unspecified laterality
SIRS (systemic inflammatory response syndrome)

## 2020-09-10 NOTE — H&P ADULT - PROBLEM SELECTOR PLAN 2
Patient presented with acute hypoxic respiratory failure in setting of extensive lung cancer, increasing pleural effusion with associated atelectasis; ?R pleural thickening. No PE noted on imaging. Currently saturating well on 3LNC, no resp distress noted.   - c/w supplemental O2, wean as tolerated  - incentive spirometry  - pulm consulted s/p thoracentesis. removed 200 cc, mostly blood. will f/u with cytology report. Patient has stage 4 lung adenocarcinoma (RLL lobectomy in 2017, recurrence in May 2020 s/p palliative rad and chemo), CTA chest/ abd/ pelvis show increased R pleural effusion from prior, ill defined R hilar mass, hepatic/ L adrenal and extensive bony metastases new from 2019, new R renal lesion. Screw fixated pathologic L hip fxr. L supracondylar distal femur lesion     - Rad onc consulted for directed radiation to supracondylar distal femur lesion at [facility]. Appreciate recs.  - f/u oncology regarding further treatment options  -As per outpatient Onc, would like to continue chemo, but patient needs to be able to sit up in a chair without significant pain for at least 3-4 hours to do so. Patient needs further rehab prior to restarting chemo. Will follow with [facility], f/u with [facility]

## 2020-09-10 NOTE — PROGRESS NOTE ADULT - ATTENDING COMMENTS
Stage IV adenocarcinoma of lung- bone mets, s/p L THR after pathologic fracture  LLE DVT- On subcut Lovenox  Pleural effusion- s/p thoracentesis with 200ml fluid removed  Pain control- palliative care recs appreciated- on MS Contin, decadron, and prn Dilaudid  Transfer to [facility] for inpatient XRT  Oncology following- patient wishes to transfer onc care to [facility]

## 2020-09-10 NOTE — H&P ADULT - HISTORY OF PRESENT ILLNESS
65yo F w/ PMHx of stage IV lung adenocarcinoma ( dx 2017) s/p RLL lobectomy with recurrence in May 2020 s/p palliative radiation to clavicle and spine and 1 round of carbo/pemextred/pembrolizumab chemo 7/31, hypothyroidism, anxiety, recent admission for pathologic L femoral neck fracture s/p left QASIM on 8/18 presented from [facility] as a transfer for RT.  Patient was initially admitted to [facility] for desat to 80s and anemia. Patient placed on 3L NC and recuperated. CTA w/o signs of PE but identifying pleural effusion. US b/l LE showing L common femoral DVT. Vascular surgery, ortho, oncology, pulm and palliative service consulted there. While admitted there, patient obtained therapeutic/diagnostic thoracentesis. 9/9 switched to lovenox 90 qd. Rad onc consulted, pt would like inpatient radiology onc tx to lesions on her left femur and aetabulum. and as a result she was trasnferred to [facility].

## 2020-09-10 NOTE — PROVIDER CONTACT NOTE (OTHER) - BACKGROUND
Pt p/w L hip pain & hypoxia- a/w acute LLE DVT, s/p heparin gtt; hypoxic respiratory failure 2/2 lung CA w/ worsening R sided pleural effusion

## 2020-09-10 NOTE — PROVIDER CONTACT NOTE (OTHER) - ASSESSMENT
Pt is A&Ox4- VSS- pt has no c/o abdominal pain or discomfort at this time. As per pt last BM was 9/3. As per physical therapy note 9/9- pt needs TSLO brace to ambulate OOB. As per PT- patient family will bring TSLO brace later today.

## 2020-09-10 NOTE — PROGRESS NOTE ADULT - PROVIDER SPECIALTY LIST ADULT
Internal Medicine
Internal Medicine
Orthopedics
Orthopedics
Palliative Care
Pulmonology
Pulmonology
Internal Medicine
Internal Medicine

## 2020-09-10 NOTE — PROGRESS NOTE ADULT - PROBLEM SELECTOR PLAN 1
-Pain is currently managed with current regimen  -c/w MS Contin 30mg q12 hours  -Dilaudid 1mg t1keety PRN for severe pain  -c/w dexamethsone 2mg BID for bone pain

## 2020-09-10 NOTE — H&P ADULT - NSHPPHYSICALEXAM_GEN_ALL_CORE
T(C): 37.1 (09-10-20 @ 16:25), Max: 37.1 (09-10-20 @ 16:25)  HR: 86 (09-10-20 @ 16:25) (73 - 93)  BP: 128/59 (09-10-20 @ 16:25) (114/73 - 143/80)  RR: 18 (09-10-20 @ 16:25) (18 - 20)  SpO2: 98% (09-10-20 @ 16:25) (93% - 100%)  Wt(kg): --  CONSTITUTIONAL: NAD, well-developed  RESPIRATORY: Normal respiratory effort; inspiratory crackles still can be appreciated in R. lung base. Some coarse breath sounds in other lung fields.   CARDIOVASCULAR: Regular rate and rhythm, normal S1 and S2, no murmur/rub/gallop; No lower extremity edema; Peripheral pulses are 2+ bilaterally  ABDOMEN: Nontender to palpation, normoactive bowel sounds, no rebound/guarding; No hepatosplenomegaly  MUSCLOSKELETAL: no clubbing or cyanosis of digits; LLE in cast from recent surgery, unable to move left pelvic joints due to pain  PSYCH: AOX2, appears anxious  NEURO: Non-focal, no tremors  SKIN: No rashes

## 2020-09-10 NOTE — H&P ADULT - PROBLEM SELECTOR PLAN 3
Recent total hip replacement for pathological L femoral neck fracture (8/18/2020), presenting with worsening back and L hip pain. Likely partially 2/2 acute DVT, recent surgery and sacral lesion that is likely contributing to her pain. No e/o infection at surgical site.     - NSX consulted for sacral lesion - c/w TLSO brace, consider thigh extension as per NXS and ortho   - pain control with dilaudid IV and fentanyl patch, monitor for sedation. Patient refuses PO dilaudid, however likes 3mg liquid dilaudid, so started this dose Q4h ATC.  - palliative following  -Patient to be transferred to [facility] for radiation to L femur/acetabulum lesions Patient found to have L common femoral DVT on CT  - [facility] duplex showing L common femoral vein DVT  - c/w lovenox for DVT

## 2020-09-10 NOTE — DISCHARGE NOTE PROVIDER - NSDCMRMEDTOKEN_GEN_ALL_CORE_FT
ALPRAZolam 0.25 mg oral tablet: 1 tab(s) orally 2 times a day, As needed, anxiety  ALPRAZolam 0.25 mg oral tablet: 1 tab(s) orally once a day (at bedtime) - anxiety  Dilaudid 2 mg oral tablet: 1 tab(s) orally every 4 hours, As Needed for moderate pain (4-6)  Dilaudid 4 mg oral tablet: 1 tab(s) orally every 4 hours, As Needed for severe pain (7-10)  fentaNYL 25 mcg/hr transdermal film, extended release: 1 patch transdermal every 72 hours  lactulose 10 g/15 mL oral solution: 30 milliliter(s) orally , As Needed when no BM for 2 days  levothyroxine 175 mcg (0.175 mg) oral tablet: 1 tab(s) orally once a day  lidocaine 5% topical film:  topically   liothyronine 5 mcg oral tablet: 1 tab(s) orally once a day  Maalox Plus 200 mg-200 mg-20 mg/5 mL oral suspension: 30 milliliter(s) orally every 6 hours, As Needed  melatonin 5 mg oral tablet: 1 tab(s) orally once a day (at bedtime)  metoprolol succinate 50 mg oral tablet, extended release: 1 tab(s) orally once a day  polyethylene glycol 3350 oral powder for reconstitution: 17 gram(s) orally once a day  rivaroxaban 10 mg oral tablet: 1 tab(s) orally once a day  senna oral tablet: 2 tab(s) orally once a day (at bedtime) ALPRAZolam 0.25 mg oral tablet: 1 tab(s) orally 2 times a day, As needed, anxiety  ALPRAZolam 0.25 mg oral tablet: 1 tab(s) orally once a day (at bedtime) - anxiety  aluminum hydroxide-magnesium hydroxide 200 mg-200 mg/5 mL oral suspension: 30 milliliter(s) orally every 6 hours, As needed, Dyspepsia  lactulose 10 g/15 mL oral solution: 30 milliliter(s) orally , As Needed when no BM for 2 days  levothyroxine 175 mcg (0.175 mg) oral tablet: 1 tab(s) orally once a day  lidocaine 5% topical film:  topically   liothyronine 5 mcg oral tablet: 1 tab(s) orally once a day  Maalox Plus 200 mg-200 mg-20 mg/5 mL oral suspension: 30 milliliter(s) orally every 6 hours, As Needed  melatonin 5 mg oral tablet: 1 tab(s) orally once a day (at bedtime)  metoprolol succinate 50 mg oral tablet, extended release: 1 tab(s) orally once a day  morphine 30 mg/8 to 12 hr oral tablet, extended release: 1 tab(s) orally   naloxone:   polyethylene glycol 3350 oral powder for reconstitution: 17 gram(s) orally once a day  Protonix 40 mg oral delayed release tablet: 1 tab(s) orally once a day (before a meal)  rivaroxaban 10 mg oral tablet: 1 tab(s) orally once a day  senna oral tablet: 2 tab(s) orally once a day (at bedtime)  sodium biphosphate-sodium phosphate 7 g-19 g rectal enema: 1 each rectal once a day acetaminophen 325 mg oral tablet: 2 tab(s) orally every 6 hours, As needed, Temp greater or equal to 38C (100.4F), Mild Pain (1 - 3)  ALPRAZolam 0.25 mg oral tablet: 1 tab(s) orally 2 times a day, As needed, anxiety  ALPRAZolam 0.25 mg oral tablet: 1 tab(s) orally once a day (at bedtime) - anxiety  aluminum hydroxide-magnesium hydroxide 200 mg-200 mg/5 mL oral suspension: 30 milliliter(s) orally every 6 hours, As needed, Dyspepsia  Dilaudid: 3 milligram(s) orally every 4 hours     ELIXER LIQUID FORM  enoxaparin: 1 mg/kg subcutaneous once a day  lactulose 10 g/15 mL oral solution: 30 milliliter(s) orally , As Needed when no BM for 2 days  levothyroxine 175 mcg (0.175 mg) oral tablet: 1 tab(s) orally once a day  lidocaine 5% topical film:  topically   liothyronine 5 mcg oral tablet: 1 tab(s) orally once a day  Maalox Plus 200 mg-200 mg-20 mg/5 mL oral suspension: 30 milliliter(s) orally every 6 hours, As Needed  melatonin 5 mg oral tablet: 1 tab(s) orally once a day (at bedtime)  metoprolol succinate 50 mg oral tablet, extended release: 1 tab(s) orally once a day  morphine 30 mg/8 to 12 hr oral tablet, extended release: 1 tab(s) orally   naloxone:   polyethylene glycol 3350 oral powder for reconstitution: 17 gram(s) orally once a day  Protonix 40 mg oral delayed release tablet: 1 tab(s) orally once a day (before a meal)  rivaroxaban 10 mg oral tablet: 1 tab(s) orally once a day  senna oral tablet: 2 tab(s) orally once a day (at bedtime)  sodium biphosphate-sodium phosphate 7 g-19 g rectal enema: 1 each rectal once a day

## 2020-09-10 NOTE — H&P ADULT - PROBLEM SELECTOR PLAN 1
Patient found to have L common femoral DVT on CT  - VA duplex showing L common femoral vein DVT  - c/w heparin drip for DVT  - vascular consulted for possible IVC filter placement, however will plan to send home on therapeutic Lovenox mg/kg given malignancy/hypercoaguable state. H/H stable. Switched to Lovenox inpatient. Recent total hip replacement for pathological L femoral neck fracture (8/18/2020), currently with back and L hip pain. Likely partially 2/2 acute DVT, recent surgery and sacral lesion that is likely contributing to her pain.  - pain control with dilaudid IV and morphine, monitor for sedation.   - palliative following  -Awaiting plan for radiation to L femur/acetabulum lesions Recent total hip replacement for pathological L femoral neck fracture (8/18/2020), currently with back and L hip pain. Likely partially 2/2 acute DVT, recent surgery and sacral lesion that is likely contributing to her pain.  - pain control with dilaudid IV and morphine, monitor for sedation.   - palliative following, recommending decadron IV BID for bone pain.  -Awaiting plan for radiation to L femur/acetabulum lesions

## 2020-09-10 NOTE — PROGRESS NOTE ADULT - PROBLEM SELECTOR PLAN 5
Patient has stage 4 lung adenocarcinoma (RLL lobectomy in 2017, recurrence in May 2020 s/p palliative rad and chemo), CTA chest/ abd/ pelvis show increased R pleural effusion from prior, ill defined R hilar mass, hepatic/ L adrenal and extensive bony metastases new from 2019, new R renal lesion. Screw fixated pathologic L hip fxr. L supracondylar distal femur lesion     - Rad onc consulted for directed radiation to supracondylar distal femur lesion  - f/u oncology regarding further treatment options.  -As per outpatient Onc, would like to continue chemo, but patient needs to be able to sit up in a chair without significant pain for at least 3-4 hours to do so. Patient needs further rehab prior to restarting chemo.  -per pt, would like to follow with [facility], f/u with [facility] Dr. Andres  -9/9 Per rad onc, pt is okay with transferring to [facility] for radiation therapy. Patient has stage 4 lung adenocarcinoma (RLL lobectomy in 2017, recurrence in May 2020 s/p palliative rad and chemo), CTA chest/ abd/ pelvis show increased R pleural effusion from prior, ill defined R hilar mass, hepatic/ L adrenal and extensive bony metastases new from 2019, new R renal lesion. Screw fixated pathologic L hip fxr. L supracondylar distal femur lesion     - Rad onc consulted for directed radiation to supracondylar distal femur lesion  - f/u oncology regarding further treatment options  -Patient to be transferred to [facility] for radiation to L femur/acetabulum lesions  -As per outpatient Onc, would like to continue chemo, but patient needs to be able to sit up in a chair without significant pain for at least 3-4 hours to do so. Patient needs further rehab prior to restarting chemo.  -per pt, would like to follow with [facility], f/u with [facility] Dr. Andres  -9/9 Per rad onc, pt is okay with transferring to [facility] for radiation therapy.

## 2020-09-10 NOTE — H&P ADULT - PROBLEM SELECTOR PLAN 5
Patient has stage 4 lung adenocarcinoma (RLL lobectomy in 2017, recurrence in May 2020 s/p palliative rad and chemo), CTA chest/ abd/ pelvis show increased R pleural effusion from prior, ill defined R hilar mass, hepatic/ L adrenal and extensive bony metastases new from 2019, new R renal lesion. Screw fixated pathologic L hip fxr. L supracondylar distal femur lesion     - Rad onc consulted for directed radiation to supracondylar distal femur lesion  - f/u oncology regarding further treatment options  -Patient to be transferred to [facility] for radiation to L femur/acetabulum lesions  -As per outpatient Onc, would like to continue chemo, but patient needs to be able to sit up in a chair without significant pain for at least 3-4 hours to do so. Patient needs further rehab prior to restarting chemo.  -per pt, would like to follow with [facility], f/u with [facility] Dr. Andres  -9/9 Per rad onc, pt is okay with transferring to [facility] for radiation therapy. Patient labs show anemia with slow decline since early this year. 7.4 on admission. FOBT (-); ?2/2 malignancy, chemo    -s/p 1U pRBC, Hgb now 9.5  - trend daily CBC for now  - will need to monitor closely given heparin gtt; vascular following for possible IVC filter if indicated Patient labs show anemia with slow decline since early this year. 7.4 on admission at [facility]. FOBT (-); ?2/2 malignancy, chemo, or anemia of chronic disease.  -s/p 1U pRBC at [facility], Hgb now stable  - trend daily CBC for now as patient is on full AC.

## 2020-09-10 NOTE — PROGRESS NOTE ADULT - SUBJECTIVE AND OBJECTIVE BOX
PROGRESS NOTE:   Authored by Louie De La Torre MD, PGY1 [facility] Pager [phone] [facility] pager [phone]    Patient is a 64y old  Female who presents with a chief complaint of pain (09 Sep 2020 10:20)      SUBJECTIVE / OVERNIGHT EVENTS:     REVIEW OF SYSTEMS:    CONSTITUTIONAL: No weakness, fevers or chills  EYES/ENT: No visual changes;  No vertigo or throat pain   NECK: No pain or stiffness  RESPIRATORY: No cough, wheezing, hemoptysis; No shortness of breath  CARDIOVASCULAR: No chest pain or palpitations  GASTROINTESTINAL: No abdominal or epigastric pain. No nausea, vomiting, or hematemesis; No diarrhea or constipation. No melena or hematochezia.  GENITOURINARY: No dysuria, frequency or hematuria  NEUROLOGICAL: No numbness or weakness  SKIN: No itching, rashes    MEDICATIONS  (STANDING):  dexAMETHasone  Injectable 2 milliGRAM(s) IV Push two times a day  enoxaparin Injectable 90 milliGRAM(s) SubCutaneous two times a day  levothyroxine 175 MICROGram(s) Oral daily  lidocaine   Patch 1 Patch Transdermal daily  liothyronine 5 MICROGram(s) Oral daily  melatonin 3 milliGRAM(s) Oral at bedtime  metoprolol tartrate 25 milliGRAM(s) Oral two times a day  morphine ER Tablet 30 milliGRAM(s) Oral <User Schedule>  pantoprazole    Tablet 40 milliGRAM(s) Oral before breakfast  polyethylene glycol 3350 17 Gram(s) Oral daily  saline laxative (FLEET) Rectal Enema 1 Enema Rectal daily  senna 2 Tablet(s) Oral at bedtime    MEDICATIONS  (PRN):  acetaminophen   Tablet .. 650 milliGRAM(s) Oral every 6 hours PRN Temp greater or equal to 38C (100.4F), Mild Pain (1 - 3)  ALPRAZolam 0.25 milliGRAM(s) Oral two times a day PRN anxiety  aluminum hydroxide/magnesium hydroxide/simethicone Suspension 30 milliLiter(s) Oral every 6 hours PRN Dyspepsia  HYDROmorphone  Injectable 1 milliGRAM(s) IV Push every 2 hours PRN Moderate to severe pain  naloxone Injectable 0.2 milliGRAM(s) IV Push every 3 minutes PRN sedation due to therapeutic opiates      CAPILLARY BLOOD GLUCOSE        I&O's Summary    09 Sep 2020 07:01  -  10 Sep 2020 07:00  --------------------------------------------------------  IN: 600 mL / OUT: 0 mL / NET: 600 mL        PHYSICAL EXAM:  Vital Signs Last 24 Hrs  T(C): 36.6 (10 Sep 2020 05:05), Max: 36.7 (09 Sep 2020 17:30)  T(F): 97.9 (10 Sep 2020 05:05), Max: 98.1 (09 Sep 2020 17:30)  HR: 73 (10 Sep 2020 06:35) (73 - 88)  BP: 130/70 (10 Sep 2020 06:35) (108/65 - 143/80)  BP(mean): --  RR: 18 (10 Sep 2020 06:35) (18 - 20)  SpO2: 100% (10 Sep 2020 06:35) (86% - 100%)    CONSTITUTIONAL: NAD, well-developed  RESPIRATORY: Normal respiratory effort; lungs are clear to auscultation bilaterally  CARDIOVASCULAR: Regular rate and rhythm, normal S1 and S2, no murmur/rub/gallop; No lower extremity edema; Peripheral pulses are 2+ bilaterally  ABDOMEN: Nontender to palpation, normoactive bowel sounds, no rebound/guarding; No hepatosplenomegaly  MUSCLOSKELETAL: no clubbing or cyanosis of digits; no joint swelling or tenderness to palpation  PSYCH: A+O to person, place, and time; affect appropriate  NEURO: Non-focal, no tremors  SKIN: No rashes    LABS:                        9.2    5.30  )-----------( 289      ( 09 Sep 2020 06:46 )             30.9     09-09    140  |  99  |  4<L>  ----------------------------<  91  3.5   |  30  |  0.36<L>    Ca    9.6      09 Sep 2020 06:46  Phos  5.5     09-09  Mg     1.9     09-09    TPro  5.8<L>  /  Alb  2.7<L>  /  TBili  0.7  /  DBili  x   /  AST  13  /  ALT  10  /  AlkPhos  155<H>  09-09    PTT - ( 09 Sep 2020 08:46 )  PTT:88.2 sec          Culture - Fungal, Body Fluid (collected 08 Sep 2020 21:48)  Source: .Body Fluid Pleural Fluid  Preliminary Report (09 Sep 2020 15:17):    Testing in progress    Culture - Body Fluid with Gram Stain (collected 08 Sep 2020 21:48)  Source: .Body Fluid Pleural Fluid  Gram Stain (09 Sep 2020 02:54):    No polymorphonuclear leukocytes seen    No organisms seen    by cytocentrifuge  Preliminary Report (09 Sep 2020 19:06):    No growth        RADIOLOGY & ADDITIONAL TESTS:  No new imaging or tests    COORDINATION OF CARE:  Care Discussed with Consultants/Other Providers [Y/N]:  Prior or Outpatient Records Reviewed [Y/N]: PROGRESS NOTE:   Authored by Louie De La Torre MD, PGY1 [facility] Pager [phone] [facility] pager [phone]    Patient is a 64y old  Female who presents with a chief complaint of pain (09 Sep 2020 10:20)      SUBJECTIVE / OVERNIGHT EVENTS: No acute events overnight. Patient notes her pain is much better controlled now. Feels slightly more SOB. Denies fevers, chills, headaches, chest pain, abdominal pain, n/v/d, numbness or tingling in extremities.     REVIEW OF SYSTEMS:    CONSTITUTIONAL: No weakness, fevers or chills  EYES/ENT: No visual changes;  No vertigo or throat pain   NECK: No pain or stiffness  RESPIRATORY: No cough, wheezing, hemoptysis; +SOB  CARDIOVASCULAR: No chest pain or palpitations  GASTROINTESTINAL: No abdominal or epigastric pain. No nausea, vomiting, or hematemesis; No diarrhea or constipation. No melena or hematochezia.  GENITOURINARY: No dysuria, frequency or hematuria  NEUROLOGICAL: No numbness or weakness  SKIN: No itching, rashes. +pain in proximal L knee.     MEDICATIONS  (STANDING):  dexAMETHasone  Injectable 2 milliGRAM(s) IV Push two times a day  enoxaparin Injectable 90 milliGRAM(s) SubCutaneous two times a day  levothyroxine 175 MICROGram(s) Oral daily  lidocaine   Patch 1 Patch Transdermal daily  liothyronine 5 MICROGram(s) Oral daily  melatonin 3 milliGRAM(s) Oral at bedtime  metoprolol tartrate 25 milliGRAM(s) Oral two times a day  morphine ER Tablet 30 milliGRAM(s) Oral <User Schedule>  pantoprazole    Tablet 40 milliGRAM(s) Oral before breakfast  polyethylene glycol 3350 17 Gram(s) Oral daily  saline laxative (FLEET) Rectal Enema 1 Enema Rectal daily  senna 2 Tablet(s) Oral at bedtime    MEDICATIONS  (PRN):  acetaminophen   Tablet .. 650 milliGRAM(s) Oral every 6 hours PRN Temp greater or equal to 38C (100.4F), Mild Pain (1 - 3)  ALPRAZolam 0.25 milliGRAM(s) Oral two times a day PRN anxiety  aluminum hydroxide/magnesium hydroxide/simethicone Suspension 30 milliLiter(s) Oral every 6 hours PRN Dyspepsia  HYDROmorphone  Injectable 1 milliGRAM(s) IV Push every 2 hours PRN Moderate to severe pain  naloxone Injectable 0.2 milliGRAM(s) IV Push every 3 minutes PRN sedation due to therapeutic opiates      CAPILLARY BLOOD GLUCOSE        I&O's Summary    09 Sep 2020 07:01  -  10 Sep 2020 07:00  --------------------------------------------------------  IN: 600 mL / OUT: 0 mL / NET: 600 mL        PHYSICAL EXAM:  Vital Signs Last 24 Hrs  T(C): 36.6 (10 Sep 2020 05:05), Max: 36.7 (09 Sep 2020 17:30)  T(F): 97.9 (10 Sep 2020 05:05), Max: 98.1 (09 Sep 2020 17:30)  HR: 73 (10 Sep 2020 06:35) (73 - 88)  BP: 130/70 (10 Sep 2020 06:35) (108/65 - 143/80)  BP(mean): --  RR: 18 (10 Sep 2020 06:35) (18 - 20)  SpO2: 100% (10 Sep 2020 06:35) (86% - 100%)    CONSTITUTIONAL: NAD, well-developed  RESPIRATORY: Normal respiratory effort; inspiratory crackles still can be appreciated in R. lung base. Some coarse breath sounds in other lung fields.   CARDIOVASCULAR: Regular rate and rhythm, normal S1 and S2, no murmur/rub/gallop; No lower extremity edema; Peripheral pulses are 2+ bilaterally  ABDOMEN: Nontender to palpation, normoactive bowel sounds, no rebound/guarding; No hepatosplenomegaly  MUSCLOSKELETAL: no clubbing or cyanosis of digits; no joint swelling or tenderness to palpation  PSYCH: A+O to person, place, and time; affect appropriate  NEURO: Non-focal, no tremors  SKIN: No rashes  LABS:                        9.2    5.30  )-----------( 289      ( 09 Sep 2020 06:46 )             30.9     09-09    140  |  99  |  4<L>  ----------------------------<  91  3.5   |  30  |  0.36<L>    Ca    9.6      09 Sep 2020 06:46  Phos  5.5     09-09  Mg     1.9     09-09    TPro  5.8<L>  /  Alb  2.7<L>  /  TBili  0.7  /  DBili  x   /  AST  13  /  ALT  10  /  AlkPhos  155<H>  09-09    PTT - ( 09 Sep 2020 08:46 )  PTT:88.2 sec          Culture - Fungal, Body Fluid (collected 08 Sep 2020 21:48)  Source: .Body Fluid Pleural Fluid  Preliminary Report (09 Sep 2020 15:17):    Testing in progress    Culture - Body Fluid with Gram Stain (collected 08 Sep 2020 21:48)  Source: .Body Fluid Pleural Fluid  Gram Stain (09 Sep 2020 02:54):    No polymorphonuclear leukocytes seen    No organisms seen    by cytocentrifuge  Preliminary Report (09 Sep 2020 19:06):    No growth        RADIOLOGY & ADDITIONAL TESTS:  No new imaging or tests    COORDINATION OF CARE:  Care Discussed with Consultants/Other Providers [Y/N]:  Prior or Outpatient Records Reviewed [Y/N]:

## 2020-09-10 NOTE — DISCHARGE NOTE PROVIDER - CARE PROVIDERS DIRECT ADDRESSES
[email or web address]

## 2020-09-10 NOTE — H&P ADULT - PROBLEM SELECTOR PLAN 6
Patient labs show anemia with slow decline since early this year. 7.4 on admission. FOBT (-); ?2/2 malignancy, chemo    -s/p 1U pRBC, Hgb now 9.5  - trend daily CBC for now  - will need to monitor closely given heparin gtt; vascular following for possible IVC filter if indicated Can c/w home levothyroxine and liothyronine

## 2020-09-10 NOTE — PROGRESS NOTE ADULT - PROBLEM SELECTOR PLAN 4
Patient has stage 4 lung adenocarcinoma (RLL lobectomy in 2017, recurrence in May 2020 s/p palliative rad and chemo), CTA chest/ abd/ pelvis show increased R pleural effusion from prior, ill defined R hilar mass, hepatic/ L adrenal and extensive bony metastases new from 2019, new R renal lesion. Screw fixated pathologic L hip fxr. L supracondylar distal femur lesion   -Evaluated by RT here and will transfer to [facility] for inpatient RT  -As per Onc, will need to built strength in order to be a candidate for chemotherapy

## 2020-09-10 NOTE — DISCHARGE NOTE NURSING/CASE MANAGEMENT/SOCIAL WORK - PATIENT PORTAL LINK FT
You can access the FollowMyHealth Patient Portal offered by [facility] by registering at the following website: [email or web address]. By joining [facility]’s FollowMyHealth portal, you will also be able to view your health information using other applications (apps) compatible with our system.

## 2020-09-10 NOTE — PROGRESS NOTE ADULT - PROBLEM SELECTOR PROBLEM 2
Acute respiratory failure with hypoxia
Acute respiratory failure with hypoxia
Constipation
Acute respiratory failure with hypoxia
Acute respiratory failure with hypoxia

## 2020-09-10 NOTE — PROGRESS NOTE ADULT - PROBLEM SELECTOR PLAN 6
Patient labs show anemia with slow decline since early this year. Now 7.4. FOBT (-); ?2/2 malignancy, chemo    -s/p 1U pRBC, Hgb now 9.2  - trend daily CBC for now  - will need to monitor closely given heparin gtt; vascular following for possible IVC filter if indicated Patient labs show anemia with slow decline since early this year. 7.4 on admission. FOBT (-); ?2/2 malignancy, chemo    -s/p 1U pRBC, Hgb now 9.5  - trend daily CBC for now  - will need to monitor closely given heparin gtt; vascular following for possible IVC filter if indicated

## 2020-09-10 NOTE — PROGRESS NOTE ADULT - SUBJECTIVE AND OBJECTIVE BOX
SUBJECTIVE AND OBJECTIVE:  INTERVAL HPI/OVERNIGHT EVENTS:  Pt mentions she is doing very well. her pain is controlled on her current regimen. She denies any reactions such as pruritis or nausea. She did received one PRN of Dilaudid in 24 hours. She mentions having a BM this morning and is tolerating PO.    DNR on chart:   Allergies    Bananas (Headache)  Bananas (Other)  latex (Rash)  latex (Rash (Mild))  opioid-like analgesics (Urticaria)  penicillin (Angioedema)  penicillin (Swelling (Mild to Mod))    Intolerances    MEDICATIONS  (STANDING):    MEDICATIONS  (PRN):    ITEMS UNCHECKED ARE NOT PRESENT    PRESENT SYMPTOMS: [ ]Unable to obtain due to poor mentation   Source if other than patient:  [ ]Family   [ ]Team     Pain:  [ ]yes [X ]no  QOL impact -   Location -                    Aggravating factors -  Quality -  Radiation -  Timing-  Severity (0-10 scale):  Minimal acceptable level (0-10 scale):     Dyspnea:                           [ ]Mild [ ]Moderate [ ]Severe  Anxiety:                             [ ]Mild [ ]Moderate [ ]Severe  Fatigue:                             [ ]Mild [ ]Moderate [ ]Severe  Nausea:                             [ ]Mild [ ]Moderate [ ]Severe  Loss of appetite:              [ ]Mild [ ]Moderate [ ]Severe  Constipation:                    [ ]Mild [ ]Moderate [ ]Severe    CPOT:    https://www.[facility].org/getattachment/[email or web address]/Critical-Care-Pain-Observation-Tool-(CPOT)    PAIN AD Score:	  http://geriatrictoolkit.[place].[facility]/cog/painad.pdf (Ctrl + left click to view)    Other Symptoms:  [ ]All other review of systems negative     Palliative Performance Status Version 2:    50 %      http://npcrc.org/files/news/palliative_performance_scale_ppsv2.pdf  PHYSICAL EXAM:  Vital Signs Last 24 Hrs  T(C): 37.1 (10 Sep 2020 16:25), Max: 37.1 (10 Sep 2020 16:25)  T(F): 98.7 (10 Sep 2020 16:25), Max: 98.7 (10 Sep 2020 16:25)  HR: 86 (10 Sep 2020 16:25) (73 - 93)  BP: 128/59 (10 Sep 2020 16:25) (108/69 - 143/80)  RR: 18 (10 Sep 2020 16:25) (18 - 20)  SpO2: 98% (10 Sep 2020 16:25) (93% - 100%) I&O's Summary     GENERAL:  [X ]Alert  [ X]Oriented x   [ ]Lethargic  [ ]Cachexia  [ ]Unarousable  [X ]Verbal  [ ]Non-Verbal  Behavioral:   [ ]Anxiety  [ ]Delirium [ ]Agitation [ ]Other  HEENT:  [ X]Normal   [ ]Dry mouth   [ ]ET Tube/Trach  [ ]Oral lesions  PULMONARY:   [ X]Clear [ ]Tachypnea  [ ]Audible excessive secretions   [ ]Rhonchi        [ ]Right [ ]Left [ ]Bilateral  [ ]Crackles        [ ]Right [ ]Left [ ]Bilateral  [ ]Wheezing     [ ]Right [ ]Left [ ]Bilateral  [ ]Diminished BS [ ] Right [ ]Left [ ]Bilateral  CARDIOVASCULAR:    [X ]Regular [ ]Irregular [ ]Tachy  [ ]Esteban [ ]Murmur [ ]Other  GASTROINTESTINAL:  [X ]Soft  [ ]Distended   [ ]+BS  [X ]Non tender [ ]Tender  [ ]PEG [ ]OGT/ NGT   Last BM:  9/10/2020  GENITOURINARY:  [X ]Normal [ ]Incontinent   [ ]Oliguria/Anuria   [ ]Sparks  MUSCULOSKELETAL:   [ ]Normal   [ X]Weakness  [ ]Bed/Wheelchair bound [ ]Edema  NEUROLOGIC:   [X ]No focal deficits  [ ] Cognitive impairment  [ ] Dysphagia [ ]Dysarthria [ ] Paresis [ ]Other   SKIN:   [X ]Normal  [ ]Rash   [ ]Pressure ulcer(s) [ ]y [ ]n present on admission    CRITICAL CARE:  [ ]Shock Present  [ ]Septic [ ]Cardiogenic [ ]Neurologic [ ]Hypovolemic  [ ]Vasopressors [ ]Inotropes  [ ]Respiratory failure present [ ]Mechanical Ventilation [ ]Non-invasive ventilatory support [ ]High-Flow  [ ]Acute  [ ]Chronic [ ]Hypoxic  [ ]Hypercarbic [ ]Other  [ ]Other organ failure     LABS:                        9.1    6.99  )-----------( 306      ( 10 Sep 2020 07:36 )             29.9   09-10    140  |  99  |  6<L>  ----------------------------<  124<H>  4.4   |  27  |  0.38<L>    Ca    9.8      10 Sep 2020 07:36  Phos  4.8     09-10  Mg     1.9     09-10    TPro  6.1  /  Alb  2.8<L>  /  TBili  0.7  /  DBili  x   /  AST  18  /  ALT  11  /  AlkPhos  173<H>  09-10  PTT - ( 09 Sep 2020 08:46 )  PTT:88.2 sec      RADIOLOGY & ADDITIONAL STUDIES:    Protein Calorie Malnutrition Present: [ ]mild [ ]moderate [ ]severe [ ]underweight [ ]morbid obesity  https://www.andeal.org/vault/2440/web/files/ONC/Table_Clinical%20Characteristics%20to%20Document%20Malnutrition-White%20JV%20et%20al%970447.pdf    Height (cm): 172.7 (09-06-20 @ 22:25), 172.72 (08-27-20 @ 09:25), 171 (08-14-20 @ 06:18)  Weight (kg): 88.133 (09-06-20 @ 22:25), 95.4 (08-27-20 @ 09:25), 88.451 (08-14-20 @ 11:20)  BMI (kg/m2): 29.5 (09-06-20 @ 22:25), 32 (08-27-20 @ 09:25), 30.2 (08-14-20 @ 11:20)    [ ]PPSV2 < or = 30%  [ ]significant weight loss [ ]poor nutritional intake [ ]anasarca   Albumin, Serum: 2.8 g/dL (09-10-20 @ 07:36)   [ ]Artificial Nutrition    REFERRALS:   [ ]Chaplaincy  [ ]Hospice  [ ]Child Life  [ ]Social Work  [ ]Case management [ ]Holistic Therapy     Goals of Care Document:  BETTYE Carlos (07-15-20 @ 14:32)  Goals of Care Conversation:   Conversation Discussion:  · Conversation Details  KERRY contacted patients spouse Yehuda to offer support in coping with patients catastrophic dx and hospitalization. Spouse appreciative of SW call and reviewed his concerns for planning for when patient discharged. Spouse requesting to speak to hospitalist regarding time frame for discharge since he is aware Friday is last palliative RT tx. SW contacted hospitalist to request follow up with spouse.      Electronic Signatures:  Viji Carlos ([facility])  (Signed 15-Jul-2020 14:34)  	Authored: Goals of Care Conversation      Last Updated: 15-Jul-2020 14:34 by Viji Carlos ([facility])        ______________    Benjamin Vernon M.D.  Hospice and Palliative Medicine Fellow  Pager [phone] SUBJECTIVE AND OBJECTIVE:  INTERVAL HPI/OVERNIGHT EVENTS:  Pt mentions she is doing very well. her pain is controlled on her current regimen. She denies any reactions such as pruritis or nausea. She did received one PRN of Dilaudid in 24 hours. She mentions having a BM this morning and is tolerating PO.    DNR on chart:   Allergies    Bananas (Headache)  Bananas (Other)  latex (Rash)  latex (Rash (Mild))  opioid-like analgesics (Urticaria)  penicillin (Angioedema)  penicillin (Swelling (Mild to Mod))    Intolerances    MEDICATIONS  (STANDING):    MEDICATIONS  (PRN):    ITEMS UNCHECKED ARE NOT PRESENT    PRESENT SYMPTOMS: [ ]Unable to obtain due to poor mentation   Source if other than patient:  [ ]Family   [ ]Team     Pain:  [ ]yes [X ]no  QOL impact -   Location -                    Aggravating factors -  Quality -  Radiation -  Timing-  Severity (0-10 scale):  Minimal acceptable level (0-10 scale):     Dyspnea:                           [ ]Mild [ ]Moderate [ ]Severe  Anxiety:                             [ ]Mild [ ]Moderate [ ]Severe  Fatigue:                             [ ]Mild [ ]Moderate [ ]Severe  Nausea:                             [ ]Mild [ ]Moderate [ ]Severe  Loss of appetite:              [ ]Mild [ ]Moderate [ ]Severe  Constipation:                    [ ]Mild [ ]Moderate [ ]Severe    CPOT:    https://www.[facility].org/getattachment/[email or web address]-[email or web address]-[email or web address]-5630p2507z7p/Critical-Care-Pain-Observation-Tool-(CPOT)    PAIN AD Score:	  http://geriatrictoolkit.[place].[facility]/cog/painad.pdf (Ctrl + left click to view)    Other Symptoms:  [ x]All other review of systems negative     Palliative Performance Status Version 2:    50 %      http://npcrc.org/files/news/palliative_performance_scale_ppsv2.pdf  PHYSICAL EXAM:  Vital Signs Last 24 Hrs  T(C): 37.1 (10 Sep 2020 16:25), Max: 37.1 (10 Sep 2020 16:25)  T(F): 98.7 (10 Sep 2020 16:25), Max: 98.7 (10 Sep 2020 16:25)  HR: 86 (10 Sep 2020 16:25) (73 - 93)  BP: 128/59 (10 Sep 2020 16:25) (108/69 - 143/80)  RR: 18 (10 Sep 2020 16:25) (18 - 20)  SpO2: 98% (10 Sep 2020 16:25) (93% - 100%) I&O's Summary     GENERAL:  [X ]Alert  [ X]Oriented x   [ ]Lethargic  [ ]Cachexia  [ ]Unarousable  [X ]Verbal  [ ]Non-Verbal  Behavioral:   [ ]Anxiety  [ ]Delirium [ ]Agitation [ ]Other  HEENT:  [ X]Normal   [ ]Dry mouth   [ ]ET Tube/Trach  [ ]Oral lesions  PULMONARY:   [ X]Clear [ ]Tachypnea  [ ]Audible excessive secretions   [ ]Rhonchi        [ ]Right [ ]Left [ ]Bilateral  [ ]Crackles        [ ]Right [ ]Left [ ]Bilateral  [ ]Wheezing     [ ]Right [ ]Left [ ]Bilateral  [ ]Diminished BS [ ] Right [ ]Left [ ]Bilateral  CARDIOVASCULAR:    [X ]Regular [ ]Irregular [ ]Tachy  [ ]Esteban [ ]Murmur [ ]Other  GASTROINTESTINAL:  [X ]Soft  [ ]Distended   [ ]+BS  [X ]Non tender [ ]Tender  [ ]PEG [ ]OGT/ NGT   Last BM:  9/10/2020  GENITOURINARY:  [X ]Normal [ ]Incontinent   [ ]Oliguria/Anuria   [ ]Sparks  MUSCULOSKELETAL:   [ ]Normal   [ X]Weakness  [ ]Bed/Wheelchair bound [ ]Edema  NEUROLOGIC:   [X ]No focal deficits  [ ] Cognitive impairment  [ ] Dysphagia [ ]Dysarthria [ ] Paresis [ ]Other   SKIN:   [X ]Normal  [ ]Rash   [ ]Pressure ulcer(s) [ ]y [ ]n present on admission    CRITICAL CARE:  [ ]Shock Present  [ ]Septic [ ]Cardiogenic [ ]Neurologic [ ]Hypovolemic  [ ]Vasopressors [ ]Inotropes  [ ]Respiratory failure present [ ]Mechanical Ventilation [ ]Non-invasive ventilatory support [ ]High-Flow  [ ]Acute  [ ]Chronic [ ]Hypoxic  [ ]Hypercarbic [ ]Other  [ ]Other organ failure     LABS:                        9.1    6.99  )-----------( 306      ( 10 Sep 2020 07:36 )             29.9   09-10    140  |  99  |  6<L>  ----------------------------<  124<H>  4.4   |  27  |  0.38<L>    Ca    9.8      10 Sep 2020 07:36  Phos  4.8     09-10  Mg     1.9     09-10    TPro  6.1  /  Alb  2.8<L>  /  TBili  0.7  /  DBili  x   /  AST  18  /  ALT  11  /  AlkPhos  173<H>  09-10  PTT - ( 09 Sep 2020 08:46 )  PTT:88.2 sec      RADIOLOGY & ADDITIONAL STUDIES:    Protein Calorie Malnutrition Present: [ ]mild [ ]moderate [ ]severe [ ]underweight [ ]morbid obesity  https://www.andeal.org/vault/2440/web/files/ONC/Table_Clinical%20Characteristics%20to%20Document%20Malnutrition-White%20JV%20et%20al%926076.pdf    Height (cm): 172.7 (09-06-20 @ 22:25), 172.72 (08-27-20 @ 09:25), 171 (08-14-20 @ 06:18)  Weight (kg): 88.133 (09-06-20 @ 22:25), 95.4 (08-27-20 @ 09:25), 88.451 (08-14-20 @ 11:20)  BMI (kg/m2): 29.5 (09-06-20 @ 22:25), 32 (08-27-20 @ 09:25), 30.2 (08-14-20 @ 11:20)    [ ]PPSV2 < or = 30%  [ ]significant weight loss [ ]poor nutritional intake [ ]anasarca   Albumin, Serum: 2.8 g/dL (09-10-20 @ 07:36)   [ ]Artificial Nutrition    REFERRALS:   [ ]Chaplaincy  [ ]Hospice  [ ]Child Life  [ ]Social Work  [ ]Case management [ ]Holistic Therapy     Goals of Care Document:  BETTYE Carlos (07-15-20 @ 14:32)  Goals of Care Conversation:   Conversation Discussion:  · Conversation Details  KERRY contacted patients spouse Yehuda to offer support in coping with patients catastrophic dx and hospitalization. Spouse appreciative of SW call and reviewed his concerns for planning for when patient discharged. Spouse requesting to speak to hospitalist regarding time frame for discharge since he is aware Friday is last palliative RT tx. SW contacted hospitalist to request follow up with spouse.      Electronic Signatures:  Viji Carlos ([facility])  (Signed 15-Jul-2020 14:34)  	Authored: Goals of Care Conversation      Last Updated: 15-Jul-2020 14:34 by Viji Carlos ([facility])        ______________    Benjamin Vernon M.D.  Hospice and Palliative Medicine Fellow  Pager [phone]

## 2020-09-10 NOTE — H&P ADULT - ASSESSMENT
63yo F w/ PMHx of stage IV lung adenocarcinoma ( dx 2017) s/p RLL lobectomy with recurrence in May 2020 s/p palliative radiation to clavicle and spine and 1 round of carbo/pemextred/pembrolizumab chemo 7/31, hypothyroidism, anxiety, recent admission for pathologic L femoral neck fracture s/p left QASIM on 8/18 initially presented to [facility] for hypoxia and anemia, now transferred to [facility] for RT after thoracentesis and started on AC for confirmed LE dvt.

## 2020-09-10 NOTE — PROGRESS NOTE ADULT - PROBLEM SELECTOR PLAN 3
Recent total hip replacement for pathological L femoral neck fracture (8/18/2020), presenting with worsening back and L hip pain. Likely partially 2/2 acute DVT, recent surgery and sacral lesion that is likely contributing to her pain. No e/o infection at surgical site.     - NSX consulted for sacral lesion - c/w TLSO brace, consider thigh extension as per NXS and ortho   - pain control with dilaudid IV and fentanyl patch, monitor for sedation. Patient refuses PO dilaudid, however likes 3mg liquid dilaudid, so started this dose Q4h ATC.  - palliative following Recent total hip replacement for pathological L femoral neck fracture (8/18/2020), presenting with worsening back and L hip pain. Likely partially 2/2 acute DVT, recent surgery and sacral lesion that is likely contributing to her pain. No e/o infection at surgical site.     - NSX consulted for sacral lesion - c/w TLSO brace, consider thigh extension as per NXS and ortho   - pain control with dilaudid IV and fentanyl patch, monitor for sedation. Patient refuses PO dilaudid, however likes 3mg liquid dilaudid, so started this dose Q4h ATC.  - palliative following  -Patient to be [facility] for radiation to L femur/acetabulum lesions

## 2020-09-10 NOTE — H&P ADULT - NSHPREVIEWOFSYSTEMS_GEN_ALL_CORE
CONSTITUTIONAL: weakness, no fevers or chills  EYES/ENT: No visual changes;  No vertigo or throat pain   NECK: No pain or stiffness  RESPIRATORY: No cough, wheezing, hemoptysis; No shortness of breath  CARDIOVASCULAR: No chest pain or palpitations  GASTROINTESTINAL: No abdominal or epigastric pain. No nausea, vomiting, or hematemesis; No diarrhea or constipation. No melena or hematochezia.  GENITOURINARY: No dysuria, frequency or hematuria  MSK: continue to experience LE pelvic bone pain generalized  NEUROLOGICAL: No numbness or weakness  SKIN: No itching, burning, rashes, or lesions   PSYCH: No Depression, no anxiety  All other review of systems is negative unless indicated above.

## 2020-09-10 NOTE — DISCHARGE NOTE PROVIDER - HOSPITAL COURSE
64yoF w/ PMHx of stage IV lung adenocarcinoma ( dx 2017) s/p RLL lobectomy with recurrence in May 2020 s/p palliative radiation to clavicle and spine and 1 round of carbo/pemextred/pembrolizumab chemo 7/31, hypothyroidism, anxiety, recent admission for pathologic L femoral neck fracture s/p left QASIM on 8/18, presented from [facility] rehab for left hip pain and O2 desat to low 80s and anemia.     Pt discharged to [facility] 8/28 - states she has been having increasing pain in her left hip and back since going to [facility], 9-10/10, sharp, radiating down the leg. Pain management was switched from IV dilaudid to PO upon admission to [facility], which per pt doesn't work; pt has also been using fentanyl and lido patches. Pt and dtr note that pt also becomes intermittently altered at [facility]; pt states she  becomes "loopy" on PO dilaudid but fine with IV. On 9/5/20 - pt noted to be hypoxic 79% which improved to 100% on 2L O2; pt able to be weaned off and presumed to be 2/2 oversedation from opioids. Blood work at [facility] showed downtrending Hb 7.8 (9/1)-->7.1 (9/6). Pt on xarelto 10mg since discharge for DVT ppx. Pt herself denies any subjective SOB, denies any wheezing, cough, CP, palpitations. Endorses feeling generally weak and more fatigued in the past few days, endorses poor PO intake, infrequent BMs. Denies any bloody BMs, melena or hematuria. Dtr requested pt be brought back to hospital for better pain control and eval of anemia. En route, pt hypoxic to 84% on RA and placed on 3LNC with 100% sat.    In ED: 98.5, 115, 131/75, RR28, 100% on nasal cannula 4L. Patient had fever of 100.9F per rectum. On labs, patient has anemia w/ neg occult, mild hypoNa, incr Alk phos and UA showing urobilinogen. CTA chest/ abd/ pelvis shows no PE but shows increased R pleural effusion from prior, ill defined R hilar mass, hepatic/ L adrenal and extensive bony metastases new from 2019, new R renal lesion (mets vs infection), Screw fixated pathologic L hip fxr. CT head neg. Patient received 2L IV bolus and started on heparin drip for DVT. 1u pRBC ordered. Pain control with dilaudid and tylenol.    Hospital course: 9/7: satting well on 3L nc. Pulm consulted. Palliative consulted. CTA w/o signs of PE. US b/l LE showing L common femoral DVT. Vascular surgery, Ortho, Rad Onc on board.     9/8: On pain management regemint with help of palliative. Patient tolerating pain well with 3mg dilaudid elixir ATC. Case d/w outpatient oncologist. PT consulted. Patient obtained therapeutic/diagnostic thoracentesis of R. Pleural effusion with 200cc removed. Seen by heme/onc. 9/9 switched to lovenox 90 qd. rad onc consulted, pt would like inpatient radiology onc tx. possible trasnfer to [facility].     9/10: had enema with large BM. To be transferred to [facility] today for radiation therapy            Patient is to be transferred to [facility] for initiation of radiation therapy to lesions on her left femur and acetabulum. Subsequently patient should follow up at [facility] with Dr. Rodriguez as well as with her PCP and the orthopedic oncologist Dr Jackson.

## 2020-09-10 NOTE — H&P ADULT - NSHPLABSRESULTS_GEN_ALL_CORE
(09-10 @ 07:36)                      9.1  6.99 )-----------( 306                 29.9    Neutrophils = -- (--%)  Lymphocytes = -- (--%)  Eosinophils = -- (--%)  Basophils = -- (--%)  Monocytes = -- (--%)  Bands = --%    09-10    140  |  99  |  6<L>  ----------------------------<  124<H>  4.4   |  27  |  0.38<L>    Ca    9.8      10 Sep 2020 07:36  Phos  4.8     09-10  Mg     1.9     09-10    TPro  6.1  /  Alb  2.8<L>  /  TBili  0.7  /  DBili  x   /  AST  18  /  ALT  11  /  AlkPhos  173<H>  09-10    PTT:88.2 sec

## 2020-09-10 NOTE — PROGRESS NOTE ADULT - PROBLEM SELECTOR PLAN 4
-Unable to give pt copy of HCP given her transition to [facility], will send copy to [facility] to provide her with copy  -her HCP is her daughter Kena Cox [phone]

## 2020-09-10 NOTE — PROGRESS NOTE ADULT - PROBLEM SELECTOR PLAN 8
c/w xanax 0.25mg as needed   ISTOP# [id number]
c/w xanax 0.25mg as needed   ISTOP# [id number]
c/w xanax 0.25mg as needed   ISTOP# [id number]
c/w xanax 0.25mg as needed   ISTOP# [id number]

## 2020-09-10 NOTE — PROGRESS NOTE ADULT - PROBLEM SELECTOR PLAN 2
Patient presented with acute hypoxic respiratory failure in setting of extensive lung cancer, increasing pleural effusion with associated atelectasis; ?R pleural thickening. No PE noted on imaging. Currently saturating well on 3LNC, no resp distress noted.   - c/w supplemental O2, wean as tolerated  - incentive spirometry  - pulm consulted s/p thoracentesis. removed 200 cc, mostly blood. will f/u with cytology report.

## 2020-09-10 NOTE — PROGRESS NOTE ADULT - PROBLEM SELECTOR PLAN 1
Patient found to have L common femoral DVT on CT  - [facility] duplex showing L common femoral vein DVT  - c/w heparin drip for DVT  - vascular consulted, will c/t following re: need of IVC filter. Likely will send home on therapeutic anticoagulation given stable H/H, Metastatic disease with high risk of clot burden.  -Changed heparin to Lovenox 1mg/kg, per onc recs, and for possible d/c planning for home use. Patient found to have L common femoral DVT on CT  - [facility] duplex showing L common femoral vein DVT  - c/w heparin drip for DVT  - vascular consulted for possible IVC filter placement, however will plan to send home on therapeutic Lovenox mg/kg given malignancy/hypercoaguable state. H/H stable. Switched to Lovenox inpatient.

## 2020-09-10 NOTE — PROGRESS NOTE ADULT - PROBLEM SELECTOR PLAN 1
-Pain is currently managed with current regimen  -c/w MS Contin 30mg q12 hours  -Dilaudid 1mg [id number] PRN for severe pain  -c/w dexamethsone 2mg BID for bone pain.

## 2020-09-10 NOTE — DISCHARGE NOTE PROVIDER - NSDCCPCAREPLAN_GEN_ALL_CORE_FT
PRINCIPAL DISCHARGE DIAGNOSIS  Diagnosis: DVT (deep venous thrombosis)  Assessment and Plan of Treatment: You came into the hospital with a clot in your left leg. This is likely due to a combination of your underlying malignancy and your recent procedure. We are putting you on a blood thinner called lovenox, which needs to be injected daily. Please take this medication as prescribed.      SECONDARY DISCHARGE DIAGNOSES  Diagnosis: Acute pain  Assessment and Plan of Treatment: In the hospital we helped you manage your pain in your left leg. Please take your pain medications as needed. If you feel short of breath or sedated, refrain from taking further pain meds and call your PCP or return to the hospital    Diagnosis: Adenocarcinoma of lung, stage 4, unspecified laterality  Assessment and Plan of Treatment: In the hospital, we drained fluid from your lung, which is likely present due to your lung cancer. We also had our oncologists see you. You can follow up with dr. Rodriguez on discharge at [facility] on discharge do review your treatment options.  We will be transferring you to [facility] to receive radiation to your left leg, where you have cancer in your bone likely contributing to your pain.    Diagnosis: Anemia  Assessment and Plan of Treatment: Your blood levels were low when you were admitted, likely due to a combinatin of your underlying cancer and your recent surgery. We gave you one unit of blood to help resolve your anemia, which is now improved. Please follow up with your PCP and oncologist on discharge.

## 2020-09-10 NOTE — DISCHARGE NOTE PROVIDER - CARE PROVIDER_API CALL
Augustine Andres  HEMATOLOGY  [address], [place] [id number]  Phone: [phone]  Fax: [phone]  Follow Up Time: 1 week    Maribel Ramírez)  [facility]  [address], [address]  [place] [address]  Phone: [phone]  Fax: [phone])-  Follow Up Time: 1 week    Christy Salinas)  Family Medicine  [address] [place] [id number]  Phone: [phone]  Fax: [phone]  Established Patient  Follow Up Time: 1 week

## 2020-09-10 NOTE — H&P ADULT - PROBLEM SELECTOR PLAN 4
Patient presented with tachycardia, tachypnea and fever of 100.9. Likely in setting of DVT vs malignancy. Lower suspicion for infectious etiology at this time - no leukocytosis, no localizing symptoms, UA neg, no abdominal pathology.  -Currently afebrile with no leukocytosis.   -will defer abx for now; however, if has recurrent fever or hemodynamic compromise, would start broad spectrum vanco/cefepime  - fever resolved. tachycardia and tachypnea improved with supplemental O2 as well as pain control  - Bcx negative  - UA neg  - VA duplex showing L common femoral vein DVT Patient presented with acute hypoxic respiratory failure in setting of extensive lung cancer, increasing pleural effusion with associated atelectasis; ?R pleural thickening. No PE noted on imaging. Currently saturating well on 3LNC, no resp distress noted.   - c/w supplemental O2, wean as tolerated  - incentive spirometry  - pulm consulted s/p thoracentesis. removed 200 cc, mostly blood. will f/u with cytology report.

## 2020-09-10 NOTE — DISCHARGE NOTE PROVIDER - PROVIDER TOKENS
PROVIDER:[TOKEN:[[id number]],FOLLOWUP:[1 week]],PROVIDER:[TOKEN:[[id number]],FOLLOWUP:[1 week]],PROVIDER:[TOKEN:[[id number]],FOLLOWUP:[1 week],ESTABLISHEDPATIENT:[T]]

## 2020-09-10 NOTE — CHART NOTE - NSCHARTNOTEFT_GEN_A_CORE
: Jesu Edwards    INDICATION: Pleural Effusion    PROCEDURE:    [x] LIMITED CHEST    FINDINGS:  Right posterior pleural effusion and associated compressive atelectasis  Stranding noted within pleural space, suggesting chronicity  Right pleural effusion can be seen anterior lung field  A-line predominant pattern with lung sliding left anterior lung field  No left sided pleural effusion    INTERPRETATION:  Large right pleural effusion, likely malignant given history of metastatic lung adenocarcinoma    Images stored on F2G
Obtained blood transfusion consent with patient and placed in chart.
Patient refusing to take tap water enema and wants to wait until physical therapy comes to walk her. As per nurse, physical therapy came prior but wanted family to bring brace from home prior to ambulating. Nurse spent extensive time and effort explaining to patient the need for tap water enema; however, the patient still wanted physical therapy prior to getting water enema. Will have primary team get patient to bring brace, otherwise will need to emphasize patient get enema as patient has not stooled for several days and is on opiates.
aPTT elevated at 126, will decrease dose rate at 14ml/hr  Please note that CBC at 2AM was generated with PTT and occurred while patient was receiving blood transfusion.
Patient is a 64F with metastatic lung cancer and recent L hip surgery for pathologic fracture with new onset LLE common femoral DVT.    Plan:  - care per primary team  - vascular surgery will sign off    Vascular Surgery [phone]

## 2020-09-10 NOTE — PROGRESS NOTE ADULT - ASSESSMENT
64yoF w/ PMHx of stage IV lung adenocarcinoma ( dx 2017) s/p RLL lobectomy with recurrence in May 2020 s/p palliative radiation to clavicle and spine and 1 round of carbo/pemextred/pembrolizumab chemo 7/31, hypothyroidism, anxiety, recent admission for pathologic L femoral neck fracture s/p left QASIM on 8/18 discharged to [facility] on xarelto 10mg or DVT ppx, p/w worsening L hip pain and hypoxia - a/w acute LLE DVT and worsening hypoxic respiratory failure 2/2 lung cancer with worsening R sided effusion.

## 2020-09-10 NOTE — PROGRESS NOTE ADULT - PROBLEM SELECTOR PLAN 7
Can c/w home levothyroxine and liothyronine

## 2020-09-10 NOTE — PROGRESS NOTE ADULT - SUBJECTIVE AND OBJECTIVE BOX
SUBJECTIVE AND OBJECTIVE:  INTERVAL HPI/OVERNIGHT EVENTS:  Pt mentions she is doing very well. her pain is controlled on her current regimen. She denies any reactions such as pruritis or nausea. She did received one PRN of Dilaudid in 24 hours. She mentions having a BM this morning and is tolerating PO.    DNR on chart:   Allergies    Bananas (Headache)  Bananas (Other)  latex (Rash)  latex (Rash (Mild))  opioid-like analgesics (Urticaria)  penicillin (Angioedema)  penicillin (Swelling (Mild to Mod))    Intolerances    MEDICATIONS  (STANDING):    MEDICATIONS  (PRN):      ITEMS UNCHECKED ARE NOT PRESENT    PRESENT SYMPTOMS: [ ]Unable to obtain due to poor mentation   Source if other than patient:  [ ]Family   [ ]Team     Pain:  [ ]yes [X ]no  QOL impact -   Location -                    Aggravating factors -  Quality -  Radiation -  Timing-  Severity (0-10 scale):  Minimal acceptable level (0-10 scale):     Dyspnea:                           [ ]Mild [ ]Moderate [ ]Severe  Anxiety:                             [ ]Mild [ ]Moderate [ ]Severe  Fatigue:                             [ ]Mild [ ]Moderate [ ]Severe  Nausea:                             [ ]Mild [ ]Moderate [ ]Severe  Loss of appetite:              [ ]Mild [ ]Moderate [ ]Severe  Constipation:                    [ ]Mild [ ]Moderate [ ]Severe    CPOT:    https://www.[facility].org/getattachment/[email or web address]/Critical-Care-Pain-Observation-Tool-(CPOT)    PAIN AD Score:	  http://geriatrictoolkit.[place].[facility]/cog/painad.pdf (Ctrl + left click to view)    Other Symptoms:  [ ]All other review of systems negative     Palliative Performance Status Version 2:    50 %      http://npcrc.org/files/news/palliative_performance_scale_ppsv2.pdf  PHYSICAL EXAM:  Vital Signs Last 24 Hrs  T(C): 37.1 (10 Sep 2020 16:25), Max: 37.1 (10 Sep 2020 16:25)  T(F): 98.7 (10 Sep 2020 16:25), Max: 98.7 (10 Sep 2020 16:25)  HR: 86 (10 Sep 2020 16:25) (73 - 93)  BP: 128/59 (10 Sep 2020 16:25) (108/69 - 143/80)  RR: 18 (10 Sep 2020 16:25) (18 - 20)  SpO2: 98% (10 Sep 2020 16:25) (93% - 100%) I&O's Summary     GENERAL:  [X ]Alert  [ X]Oriented x   [ ]Lethargic  [ ]Cachexia  [ ]Unarousable  [X ]Verbal  [ ]Non-Verbal  Behavioral:   [ ]Anxiety  [ ]Delirium [ ]Agitation [ ]Other  HEENT:  [ X]Normal   [ ]Dry mouth   [ ]ET Tube/Trach  [ ]Oral lesions  PULMONARY:   [ X]Clear [ ]Tachypnea  [ ]Audible excessive secretions   [ ]Rhonchi        [ ]Right [ ]Left [ ]Bilateral  [ ]Crackles        [ ]Right [ ]Left [ ]Bilateral  [ ]Wheezing     [ ]Right [ ]Left [ ]Bilateral  [ ]Diminished BS [ ] Right [ ]Left [ ]Bilateral  CARDIOVASCULAR:    [X ]Regular [ ]Irregular [ ]Tachy  [ ]Esteban [ ]Murmur [ ]Other  GASTROINTESTINAL:  [X ]Soft  [ ]Distended   [ ]+BS  [X ]Non tender [ ]Tender  [ ]PEG [ ]OGT/ NGT   Last BM:  9/10/2020  GENITOURINARY:  [X ]Normal [ ]Incontinent   [ ]Oliguria/Anuria   [ ][place]  MUSCULOSKELETAL:   [ ]Normal   [ X]Weakness  [ ]Bed/Wheelchair bound [ ]Edema  NEUROLOGIC:   [X ]No focal deficits  [ ] Cognitive impairment  [ ] Dysphagia [ ]Dysarthria [ ] Paresis [ ]Other   SKIN:   [X ]Normal  [ ]Rash   [ ]Pressure ulcer(s) [ ]y [ ]n present on admission    CRITICAL CARE:  [ ]Shock Present  [ ]Septic [ ]Cardiogenic [ ]Neurologic [ ]Hypovolemic  [ ]Vasopressors [ ]Inotropes  [ ]Respiratory failure present [ ]Mechanical Ventilation [ ]Non-invasive ventilatory support [ ]High-Flow  [ ]Acute  [ ]Chronic [ ]Hypoxic  [ ]Hypercarbic [ ]Other  [ ]Other organ failure     LABS:                        9.1    6.99  )-----------( 306      ( 10 Sep 2020 07:36 )             29.9   09-10    140  |  99  |  6<L>  ----------------------------<  124<H>  4.4   |  27  |  0.38<L>    Ca    9.8      10 Sep 2020 07:36  Phos  4.8     09-10  Mg     1.9     09-10    TPro  6.1  /  Alb  2.8<L>  /  TBili  0.7  /  DBili  x   /  AST  18  /  ALT  11  /  AlkPhos  173<H>  09-10  PTT - ( 09 Sep 2020 08:46 )  PTT:88.2 sec      RADIOLOGY & ADDITIONAL STUDIES:    Protein Calorie Malnutrition Present: [ ]mild [ ]moderate [ ]severe [ ]underweight [ ]morbid obesity  https://www.andeal.org/vault/2440/web/files/ONC/Table_Clinical%20Characteristics%20to%20Document%20Malnutrition-White%20JV%20et%20al%545105.pdf    Height (cm): 172.7 (09-06-20 @ 22:25), 172.72 (08-27-20 @ 09:25), 171 (08-14-20 @ 06:18)  Weight (kg): 88.133 (09-06-20 @ 22:25), 95.4 (08-27-20 @ 09:25), 88.451 (08-14-20 @ 11:20)  BMI (kg/m2): 29.5 (09-06-20 @ 22:25), 32 (08-27-20 @ 09:25), 30.2 (08-14-20 @ 11:20)    [ ]PPSV2 < or = 30%  [ ]significant weight loss [ ]poor nutritional intake [ ]anasarca   Albumin, Serum: 2.8 g/dL (09-10-20 @ 07:36)   [ ]Artificial Nutrition    REFERRALS:   [ ]Chaplaincy  [ ]Hospice  [ ]Child Life  [ ]Social Work  [ ]Case management [ ]Holistic Therapy     Goals of Care Document:  BETTYE Carlos (07-15-20 @ 14:32)  Goals of Care Conversation:   Conversation Discussion:  · Conversation Details  KERRY contacted patients spouse Yehuda to offer support in coping with patients catastrophic dx and hospitalization. Spouse appreciative of SW call and reviewed his concerns for planning for when patient discharged. Spouse requesting to speak to hospitalist regarding time frame for discharge since he is aware Friday is last palliative RT tx. SW contacted hospitalist to request follow up with spouse.      Electronic Signatures:  Viji Carlos ([facility]  (Signed 15-Jul-2020 14:34)  	Authored: Goals of Care Conversation      Last Updated: 15-Jul-2020 14:34 by Viji Carlos ([facility] [place])        ______________    Benjamin Vernon M.D.  Hospice and Palliative Medicine Fellow  Pager [phone]

## 2020-09-10 NOTE — PROGRESS NOTE ADULT - PROBLEM SELECTOR PROBLEM 1
Acute deep vein thrombosis (DVT) of left lower extremity, unspecified vein
Acute deep vein thrombosis (DVT) of left lower extremity, unspecified vein
Pain of left lower extremity
Acute deep vein thrombosis (DVT) of left lower extremity, unspecified vein
Acute deep vein thrombosis (DVT) of left lower extremity, unspecified vein

## 2020-09-10 NOTE — PROVIDER CONTACT NOTE (OTHER) - ACTION/TREATMENT ORDERED:
As per Raz MARTINES- OK for pt to refuse FLEET enema at this time- will endorse to AM providers need for TSLO brace. Endorse to day RN need to administer enema during morning if not seen by PT

## 2020-09-10 NOTE — PROGRESS NOTE ADULT - PROBLEM SELECTOR PROBLEM 5
Stage IV adenocarcinoma of lung
Palliative care encounter
Stage IV adenocarcinoma of lung

## 2020-09-11 NOTE — CONSULT NOTE ADULT - PROBLEM SELECTOR RECOMMENDATION 7
Thank you for allowing us to participate in your patient's care. We will continue to follow with you. Please page [phone] or contact us via Microsoft Teams for any q's or c's.     Trevor Rivera  Palliative Medicine

## 2020-09-11 NOTE — PHYSICAL THERAPY INITIAL EVALUATION ADULT - ASR WT BEARING STATUS EVAL
as per patient she is NWB on Left UE/LLE , as per MD's order only NWB on left UE , will follow WB precautions as per patient until further follow up with MD

## 2020-09-11 NOTE — CONSULT NOTE ADULT - PROBLEM SELECTOR RECOMMENDATION 9
.  CT head WNL  Etiology: Opioid induced neurotoxicity vs. Anemia vs. Delirium vs Hypoxemia vs. r/o other metabolic/ infectious causes    > Pt did say she gets confused/ sleepy with "too much dilaudid"  > We will have to balance analgesia with side effects  > Will consider Methylphenidate later on once we sort out her opioid regimen

## 2020-09-11 NOTE — PROGRESS NOTE ADULT - ASSESSMENT
63yo F w/ PMHx of stage IV lung adenocarcinoma ( dx 2017) s/p RLL lobectomy with recurrence in May 2020 s/p palliative radiation to clavicle and spine and 1 round of carbo/pemextred/pembrolizumab chemo 7/31, hypothyroidism, anxiety, recent admission for pathologic L femoral neck fracture s/p left QASIM on 8/18 presented from [facility] as a [facility] for [facility].

## 2020-09-11 NOTE — CONSULT NOTE ADULT - PROBLEM SELECTOR RECOMMENDATION 4
.  Pt's opiod use history:    1) Bad reactions to Morphine, Oxycodone, Codeine- severe pruritus  2) Too much Dilaudid "makes me go to [place]"- she gets confused and delirious  3) PO Dilaudid "does not work"  4) IV Dilaudid is the only thing that works  5) The fentanyl patch is not doing anything    Pls prescribe Naloxone for here and upon discharge. Patients with 50mg OME have 4x risk of OD and those with 100mg OME have 9x risk (prescribetoprevent.org)    Inpatient: Naloxone 0.4 to 2mg IV q2-3mins PRN (max 10mg)  Upon discharge: Naloxone 4mg/0.1 ml nasal spray, 1 spray q2-3mins alternating between nostrils     Pls provide patient education prior to discharge. Resource: https://www.health.[place].gov/diseases/aids/general/opioid_overdose_prevention/overdose_facts.htm

## 2020-09-11 NOTE — PHYSICAL THERAPY INITIAL EVALUATION ADULT - SITTING BALANCE: DYNAMIC
A Catheter Nc Trek 2mm 12mm Rx Radopq Smth Rnd Tip was inflated in the posterior descending artery. The balloon was inflated at 12 ray for 7 seconds at 1/23/2019 12:17 AM.  The balloon was used for 2nd inflation at 12 ray for 5 seconds. The balloon was used for 3rd inflation at 14 ray for 7 seconds. normal balance

## 2020-09-11 NOTE — PHYSICAL THERAPY INITIAL EVALUATION ADULT - ADDITIONAL COMMENTS
As per patient , she was NWB LLE in a beulah brace since July 6th and is non ambulatory. and is NWB left UE due to clavicle fracture and required 2 person assist to transfer from bed to the wheelchair. Prior to July 6th 2020 patient was independent with ADL's and ambulation without assistive device.Pt lives at home with  prior to hospitalization ,2 steps to manage. As per patient ,since July 6th NWB left UE due to clavicle fracture,  NWB LLE in a beulah brace and required 2 person assist to transfer from bed to the wheelchair. Prior to July 6th 2020 patient was independent with ADL's and ambulation without assistive device.Pt lives at home with  prior to hospitalization ,2 steps to manage.

## 2020-09-11 NOTE — CHART NOTE - NSCHARTNOTEFT_GEN_A_CORE
- Met with the patient and her , Yehuda, at bedside. Maricruz was aggravated because she was demanding to know why she is not getting IV pain medications. I reminded her that we had spoken about it just 4 hours ago with Yehuda on speakerphone. She was not able to recall the salient details of that conversation. Yehuda was able to recall what we had discussed.   - We put Kena (patient's daughter and HCP) on speakerphone.   - We talked about her pain, her pain medications and her pain medication history  - I shared my concern of CHEMICAL COPING with all of them. Yehuda agreed he was concerned about this as well.     Her pain medication history:  -- Pt claims Morphine and Oxycodone make her "itch". This is despite being on MS Contin currently (started 9/9)  -- PT claims the oral medications don't work, "only IV works". She gets aggravated when she doesn't get the IV meds. This is despite the oral medications being stronger in dose than IV  -- Pt claims the Fentanyl patch does not work (I discontinued this on 9/6)  -- Pt claims opioids work BETTER with benadryl. I voiced my concern that this causes euphoria which the patient may be seeking.  --  says the Dilaudid 3mg oral solution WORKS. The patient begrudgingly accepted this.   -- There is concern for opioid-induced neurotoxicity: confusion and sedation    ASSESSMENT:  The patient has L pelvic mets (worsened from prior studies). However, she is very comfortable and not in distress during my multiple visits. I am concerned about opioid-induced neurotoxicity (confusion, sedation) and chemical coping.     PLAN:  > D/c MS Contin. Patient claims it causes itching and it does not work.   > START: Methadone 5mg PO BID. I will not start on a higher dose as there is concern for opioid sedation.  > CONTINUE: Dilaudid 3mg PO solution q4h PRN. The  REFUSED a higher dose as he is concerned about the patient's chemical coping as well  > CONTINUE: Dilaudid 1mg IV q4h PRN for now. I did not discuss this with the patient but this can be used as a 2nd line agent if pain develops breakthrough pain before methadone effect peaks.   > ORDER: EKG ordered for Monday morning to eval QTc in anticipation of Methadone dose increase   > I do not recommend BSP as patient has hyperPO4    I will take the liberty of ordering these medications. Plan discussed with patient, Yehuda and Kena. - Met with the patient and her , Yehuda, at bedside. Sofia was aggravated because she was demanding to know why she is not getting IV pain medications. I reminded her that we had spoken about it just 4 hours ago with Yehuda on speakerphone. She was not able to recall the salient details of that conversation. Yehuda was able to recall what we had discussed.   - We put Kena (patient's daughter and HCP) on speakerphone.   - We talked about her pain, her pain medications and her pain medication history  - I shared my concern of CHEMICAL COPING with all of them. Yehuda agreed he was concerned about this as well.     Her pain medication history:  -- Pt claims Morphine and Oxycodone make her "itch". This is despite being on MS Contin currently (started 9/9)  -- PT claims the oral medications don't work, "only IV works". She gets aggravated when she doesn't get the IV meds. This is despite the oral medications being stronger in dose than IV  -- Pt claims the Fentanyl patch does not work (I discontinued this on 9/6)  -- Pt claims opioids work BETTER with benadryl. I voiced my concern that this causes euphoria which the patient may be seeking.  --  says the Dilaudid 3mg oral solution WORKS. The patient begrudgingly accepted this.   -- There is concern for opioid-induced neurotoxicity: confusion and sedation    ASSESSMENT:  The patient has L pelvic mets (worsened from prior studies). However, she is very comfortable and not in distress during my multiple visits. I am concerned about opioid-induced neurotoxicity (confusion, sedation) and chemical coping.     PLAN:  > D/c MS Contin. Patient claims it causes itching and it does not work.   > START: Methadone 5mg PO BID. I will not start on a higher dose as there is concern for opioid sedation.  > CONTINUE: Dilaudid 3mg PO solution q4h PRN. The  REFUSED a higher dose as he is concerned about the patient's chemical coping as well  > CONTINUE: Dilaudid 1mg IV q4h PRN for now. I did not discuss this with the patient but this can be used as a 2nd line agent if pain develops breakthrough pain before methadone effect peaks.   > ORDER: EKG ordered for Monday morning to eval QTc in anticipation of Methadone dose increase   > I do not recommend BSP as patient has hyperPO4    I will take the liberty of ordering these medications. Plan discussed with patient, Yehuda and Kena.

## 2020-09-11 NOTE — PHYSICAL THERAPY INITIAL EVALUATION ADULT - ACTIVE RANGE OF MOTION EXAMINATION, REHAB EVAL
Right LE Active ROM was WFL (within functional limits)/bilateral upper extremity Active ROM was WFL (within functional limits)/except left shoulder flexion 0-90 degrees , LLE in a beulah brace , left ankle WFL

## 2020-09-11 NOTE — CONSULT NOTE ADULT - SUBJECTIVE AND OBJECTIVE BOX
HPI:  64yoF w/ PMHx of stage IV lung adenocarcinoma ( dx 2017) s/p RLL lobectomy with recurrence in May 2020 s/p palliative radiation to clavicle and spine and 1 round of carbo/pemextred/pembrolizumab chemo 7/31, recent admission for pathologic L femoral neck fracture s/p left QASIM on 8/18, presented from [facility] for left hip pain, hypoxemia and anemia. Transferred to [facility] for XRT. Palliative Medicine was consulted to evaluate and manage complex symptomatology related to the patient's life-limiting illness    =======================================================  9/11/2020    - Chart reviewed. Patient seen and examined.  - The patient was confused during my visit. Did not recognize me nor did she recall discussion we had about pain when I met her at [facility]. I put her  (Yehuda) on speakerphone and he was able to recall what we had talked about.  - Currently on MS Contin 30mg PO BID + Dilaudid 1mg IV q4h PRN  - She is VERY fixated on the dilaudid IV, which I talked about with her and Yehuda  - Talked about finding the right dose of ORAL medications for her as clinically on my assessment, she is COMFORTABLE and NOT IN DISTRESS. Furthermore, I am afraid of opioid-induced neurotoxicity and chemical coping.   - BM yesterday    =======================================================  ----- SYMPTOM ASSESSMENT:   [ ]Unable to obtain due to poor mentation: information obtained from team/ contact    PAIN ASSESSMENT:   Site- LEFT HIP  Onset- ACUTE ON CHRONIC  Character- SHARP  Radiation- NONE  Associated symptoms- NONE  Timing and duration- INTERMITTENT SPIKES THROUGHOUT THE DAY  Exacerbating factors- "RUNNING OUT OF PAIN MEDS"  Severity- MOD TO SEVERE    Effect on QOL- SIGNIFICANTLY INTERFERES WITH ADL'S  PAIN AD Score: 0    Dyspnea:  Yes [ ] No [X ] - [ ]Mild [ ]Moderate [ ]Severe  Anxiety:    Yes [ ] No [ X] - [ ]Mild [ ]Moderate [ ]Severe  Fatigue:    Yes [ ] No [X ] - [ ]Mild [ ]Moderate [ ]Severe  Nausea:    Yes [ ] No [ X] - [ ]Mild [ ]Moderate [ ]Severe                         Loss of appetite: Yes [ ] No [X ] - [ ]Mild [ ]Moderate [ ]Severe             Constipation:  Yes [ ] No [X ] - [ ]Mild [ ]Moderate [ ]Severe  Grief: Yes [ ] No [X ]     [X ]All other review of systems negative, including: weakness, cough, colds, blurry vision, headaches, dysuria, pruritus, palpitations, muscle cramps, easy bruising, epistaxis, rashes    =======================================================  ----- PERTINENT PMH/ SXH/ FHX  Stage IV adenocarcinoma of lung  Hypothyroid  Hypertension  Genital herpes  Drug abuse  Lung cancer  LAMAR on CPAP  Anxiety  Hypothyroid    History of total hip replacement, left  S/P partial lobectomy of lung  History of bunionectomy  Status post lobectomy of lung  S/P bunionectomy    FAMILY HISTORY:  Family history of stroke or transient ischemic attack in father  Family history of hypothyroidism  Family history of hypertension  Family history of stroke      ----- SOCIAL HISTORY:   [ ] Unable to elicit  Significant other/partner:  YES- YEHUDA  Children:   Latter-day/Spirituality:  Substance hx: Yes[ ]  No [ ]   Tobacco hx:  Yes [ ] No [ ]   Alcohol hx: Yes [ ] No [ ]   Home Opioid hx:  [ ] I-Stop Reference No:  Living Situation: [ ]Home  [ ]Long term care  [X ]Rehab- [facility]  [ ]Other    ----- ADVANCE DIRECTIVES:    DNR  MOLST  [ ]  Living Will  [ ]   DECISION MAKER(s):  [x ] Health Care Proxy(s)  [ ] Surrogate(s)  [ ] Guardian           Name(s): Phone Number(s):  EDWARD ARGUELLO (DAUGHTER) HCP FORM IN THE CHART    ----- BASELINE (I)ADL(s) (prior to admission):  [place]: [ ]Total  [ ] Moderate [ ]Dependent  Palliative Performance Status Version 2:  40%    =======================================================  -----MEDICATIONS AND ALLERGIES:  Allergies    Bananas (Headache)  Bananas (Other)  latex (Rash)  latex (Rash (Mild))  opioid-like analgesics (Urticaria)  penicillin (Angioedema)  penicillin (Swelling (Mild to Mod))    Intolerances    MEDICATIONS  (STANDING):  dexAMETHasone  Injectable 2 milliGRAM(s) IV Push two times a day  enoxaparin Injectable 90 milliGRAM(s) SubCutaneous two times a day  levothyroxine 175 MICROGram(s) Oral daily  liothyronine 5 MICROGram(s) Oral daily  melatonin 3 milliGRAM(s) Oral at bedtime  metoprolol succinate ER 50 milliGRAM(s) Oral daily  morphine ER Tablet 30 milliGRAM(s) Oral <User Schedule>  pantoprazole    Tablet 40 milliGRAM(s) Oral before breakfast  polyethylene glycol 3350 17 Gram(s) Oral daily  senna 2 Tablet(s) Oral at bedtime    MEDICATIONS  (PRN):  acetaminophen   Tablet .. 650 milliGRAM(s) Oral every 6 hours PRN Temp greater or equal to 38C (100.4F), Mild Pain (1 - 3), Moderate Pain (4 - 6)  ALPRAZolam 0.25 milliGRAM(s) Oral two times a day PRN anxiety  HYDROmorphone  Injectable 1 milliGRAM(s) IV Push every 4 hours PRN Severe Pain (7 - 10)    =======================================================  ----- PHYSICAL EXAM:  Vital Signs Last 24 Hrs  T(C): 36.6 (11 Sep 2020 09:30), Max: 37.1 (10 Sep 2020 16:25)  T(F): 97.8 (11 Sep 2020 09:30), Max: 98.7 (10 Sep 2020 16:25)  HR: 74 (11 Sep 2020 09:30) (74 - 92)  BP: 117/64 (11 Sep 2020 09:30) (117/64 - 142/80)  BP(mean): --  RR: 18 (11 Sep 2020 09:30) (16 - 18)  SpO2: 98% (11 Sep 2020 09:30) (93% - 98%)    GEN: Awake, LETHARGIC/ SLEEPY, NAD  HEAD: Normocephalic and atraumatic,   EYES: Anicteric sclerae, EOM's grossly intact  NECK: No JVD, no thyromegaly  PULM: Comfortable work of breathing, clear BS  CV: Pulses 2+ symmetric bilaterally, regular rate and rhythm  ABD: Not distended, soft, non-tender,   EXT: No edema, No deformities- LLE BRACE  PSYCH: Appropriate mood and affect, no suicidal ideations elicited  NEURO: No facial asymmetry, no tremors, no observed movement disorders  SKIN: No rashes or lesions on exposed skin, No jaundice    =======================================================  ----- LABS:    09-11    139  |  99  |  7   ----------------------------<  124<H>  3.9   |  29  |  0.36<L>    Ca    9.4      11 Sep 2020 05:45  Phos  4.7     09-11  Mg     1.9     09-11    TPro  6.2  /  Alb  2.9<L>  /  TBili  0.6  /  DBili  x   /  AST  12  /  ALT  12  /  AlkPhos  159<H>  09-11

## 2020-09-11 NOTE — CONSULT NOTE ADULT - PROBLEM SELECTOR RECOMMENDATION 2
.  Left hip pain due to progression of metastatic disease, L hip, h/o pathologic fx s/p surgery and XRT    - See below for high risk medication use and risk factor for dependency and chemical coping  - Concern for opioid induced neurotoxicity    Current meds:   MS contin 30mg PO q12h  Dilaudid 1mg IV q4h PRN    > Goal: Convert all medications to ORAL and wean patient off IV (she is very fixated on getting IV medications)  > Continue MS contin 30mg PO q12h for now. My goals is to transition this to Methadone and PO Dilaudid PRN (QTc 464ms)  > Start Dilaudid 3mg PO q4h PRN as FIRST LINE   > Keep Dilaudid 1mg IV q4h PRN as second line (plan to d/c this)

## 2020-09-11 NOTE — PROGRESS NOTE ADULT - SUBJECTIVE AND OBJECTIVE BOX
[facility] Division of Hospital Medicine  Shanon Hodges MD  Pager [phone]    Patient is a 64y old  Female who presents with a chief complaint of Transferred from [facility] to [facility] for potential RT     SUBJECTIVE / OVERNIGHT EVENTS: pt states her pain control is not adequate at times but otherwise her appetite is ok and reports +BM      MEDICATIONS  (STANDING):  dexAMETHasone  Injectable 2 milliGRAM(s) IV Push two times a day  enoxaparin Injectable 90 milliGRAM(s) SubCutaneous two times a day  levothyroxine 175 MICROGram(s) Oral daily  liothyronine 5 MICROGram(s) Oral daily  melatonin 3 milliGRAM(s) Oral at bedtime  metoprolol succinate ER 50 milliGRAM(s) Oral daily  morphine ER Tablet 30 milliGRAM(s) Oral <User Schedule>  pantoprazole    Tablet 40 milliGRAM(s) Oral before breakfast  polyethylene glycol 3350 17 Gram(s) Oral daily  senna 2 Tablet(s) Oral at bedtime    MEDICATIONS  (PRN):  acetaminophen   Tablet .. 650 milliGRAM(s) Oral every 6 hours PRN Temp greater or equal to 38C (100.4F), Mild Pain (1 - 3), Moderate Pain (4 - 6)  ALPRAZolam 0.25 milliGRAM(s) Oral two times a day PRN anxiety  HYDROmorphone   Tablet 3 milliGRAM(s) Oral every 4 hours PRN PAIN  HYDROmorphone  Injectable 1 milliGRAM(s) IV Push every 4 hours PRN BREAKTHROUGH PAIN      PHYSICAL EXAM:  Vital Signs Last 24 Hrs  T(F): 97.8 (11 Sep 2020 09:30), Max: 98.7 (10 Sep 2020 16:25)  HR: 74 (11 Sep 2020 09:30) (74 - 92)  BP: 117/64 (11 Sep 2020 09:30) (117/64 - 142/80)  RR: 18 (11 Sep 2020 09:30) (16 - 18)  SpO2: 98% (11 Sep 2020 09:30) (93% - 98%)    CONSTITUTIONAL: NAD  EYES: PERRLA; conjunctiva and sclera clear  ENMT: Moist oral mucosa  RESPIRATORY: Normal respiratory effort; grossly b/l AE   CARDIOVASCULAR: Regular rate and rhythm, normal S1 and S2, no murmur/rub/gallop;   ABDOMEN: Nontender to palpation, normoactive bowel sounds  MUSCULOSKELETAL: LLE immobilizer in place  PSYCH: affect appropriate    LABS:                        8.9    9.88  )-----------( 355      ( 11 Sep 2020 05:45 )             29.4     09-11    139  |  99  |  7   ----------------------------<  124<H>  3.9   |  29  |  0.36<L>    Ca    9.4      11 Sep 2020 05:45  Phos  4.7     09-11  Mg     1.9     09-11    TPro  6.2  /  Alb  2.9<L>  /  TBili  0.6  /  DBili  x   /  AST  12  /  ALT  12  /  AlkPhos  159<H>  09-11

## 2020-09-11 NOTE — PHYSICAL THERAPY INITIAL EVALUATION ADULT - PERTINENT HX OF CURRENT PROBLEM, REHAB EVAL
This is a 63yo F w/ PMHx of stage IV lung adenocarcinoma ( dx 2017) s/p RLL lobectomy with recurrence in May 2020 s/p palliative radiation to clavicle and spine and 1 round of carbo/pemextred/pembrolizumab chemo 7/31, hypothyroidism, anxiety, recent admission for pathologic L femoral neck fracture s/p left QASIM on 8/18 presented from [facility] as a transfer for RT.

## 2020-09-11 NOTE — CONSULT NOTE ADULT - ASSESSMENT
64yoF w/ PMHx of stage IV lung adenocarcinoma ( dx 2017) s/p RLL lobectomy with recurrence in May 2020 s/p palliative radiation to clavicle and spine and 1 round of carbo/pemextred/pembrolizumab chemo 7/31, recent admission for pathologic L femoral neck fracture s/p left QASIM on 8/18, presented from [facility] for left hip pain, hypoxemia and anemia. Transferred to [facility] for XRT. Palliative Medicine was consulted to evaluate and manage complex symptomatology related to the patient's life-limiting illness

## 2020-09-12 NOTE — PROGRESS NOTE ADULT - SUBJECTIVE AND OBJECTIVE BOX
[facility] Division of Hospital Medicine  Shanon Hodges MD  Pager [phone]    Patient is a 64y old  Female who presents with a chief complaint of Transferred from [facility] to [facility] for potential RT       SUBJECTIVE / OVERNIGHT EVENTS: reports improved pain control with med adjustment; some soft stool with bowel regimen      MEDICATIONS  (STANDING):  dexAMETHasone  Injectable 2 milliGRAM(s) IV Push two times a day  enoxaparin Injectable 90 milliGRAM(s) SubCutaneous two times a day  levothyroxine 175 MICROGram(s) Oral daily  liothyronine 5 MICROGram(s) Oral daily  melatonin 3 milliGRAM(s) Oral at bedtime  methadone    Tablet 5 milliGRAM(s) Oral two times a day  metoprolol succinate ER 50 milliGRAM(s) Oral daily  pantoprazole    Tablet 40 milliGRAM(s) Oral before breakfast  polyethylene glycol 3350 17 Gram(s) Oral daily  senna 2 Tablet(s) Oral at bedtime    MEDICATIONS  (PRN):  acetaminophen   Tablet .. 650 milliGRAM(s) Oral every 6 hours PRN Temp greater or equal to 38C (100.4F), Mild Pain (1 - 3)  ALPRAZolam 0.25 milliGRAM(s) Oral two times a day PRN anxiety  HYDROmorphone   Tablet 3 milliGRAM(s) Oral every 4 hours PRN Moderate Pain (4 - 6)  HYDROmorphone  Injectable 1 milliGRAM(s) IV Push every 4 hours PRN Severe Pain (7 - 10)        PHYSICAL EXAM:  Vital Signs Last 24 Hrs  T(F): 98 (12 Sep 2020 12:57), Max: 98 (12 Sep 2020 12:57)  HR: 72 (12 Sep 2020 12:57) (72 - 79)  BP: 120/67 (12 Sep 2020 12:57) (120/67 - 131/76)  RR: 19 (12 Sep 2020 12:57) (18 - 19)  SpO2: 94% (12 Sep 2020 12:57) (94% - 96%)    CONSTITUTIONAL: NAD  ENMT: Moist oral mucosa  RESPIRATORY: Normal respiratory effort; lungs are clear to auscultation bilaterally  CARDIOVASCULAR: Regular rate and rhythm; No lower extremity edema;   ABDOMEN: Nontender to palpation, normoactive bowel sounds  MUSCULOSKELETAL: no clubbing or cyanosis of digits; no joint swelling or tenderness to palpation  PSYCH: affect appropriate  NEUROLOGY:  no gross sensory deficits       LABS:                        9.6    8.87  )-----------( 335      ( 12 Sep 2020 06:57 )             30.8     09-12    139  |  99  |  10  ----------------------------<  106<H>  4.0   |  29  |  0.40<L>    Ca    9.0      12 Sep 2020 06:57  Phos  4.7     09-12  Mg     1.9     09-12    TPro  6.2  /  Alb  2.7<L>  /  TBili  0.6  /  DBili  x   /  AST  14  /  ALT  11  /  AlkPhos  149<H>  09-12

## 2020-09-12 NOTE — CHART NOTE - NSCHARTNOTEFT_GEN_A_CORE
Chart reviewed by On-call Palliative team.   Patient used 2 doses of PO dilaudid 3mg prn moderate and 1 dose of IV 1mg dilaudid prn severe pain. She had BM this morning.     63yo F w/ PMHx of stage IV lung adenocarcinoma ( dx 2017) s/p RLL lobectomy with recurrence in May 2020 s/p palliative radiation to clavicle and spine and 1 round of carbo/pemextred/pembrolizumab chemo 7/31, hypothyroidism, anxiety, recent admission for pathologic L femoral neck fracture s/p left QASIM on 8/18 presented from [facility] as a transfer for RT.    PLAN:  > Continue Methadone 5mg PO BID. Patient not started on a higher dose as there was concern for opioid sedation.  > CONTINUE: Dilaudid 3mg PO solution q4h PRN moderate pain  > CONTINUE: Dilaudid 1mg IV q4h PRN severe pain for now.   > ORDER: EKG ordered for Monday morning to eval QTc in anticipation of Methadone dose increase   >Continue bowel regimen while patient is on opioids.    Thank you for allowing us to participate in your patient's care. We will continue to follow with you. Please page [phone] for any q's or c's.     Carole Mcclelland D.O.   Palliative Medicine

## 2020-09-13 NOTE — PROGRESS NOTE ADULT - SUBJECTIVE AND OBJECTIVE BOX
[facility] Division of Hospital Medicine  Shanon Hodges MD  Pager [phone]    Patient is a 64y old  Female who presents with a chief complaint of Transferred from [facility] to [facility] for potential RT        SUBJECTIVE / OVERNIGHT EVENTS: overnight events noted; pt still feels "not right" in her head; unclear if this is due to the methadone that was started or if pt is reporting this with the goal of getting IV meds despite discussions to prefer PO over IV; RN spoke with me separately in the hallway to inform me pt yelled at her this AM demanding the IV dose, which was given before RN was aware of pt's euphoria seeking from the IV; RN will be more firm with this plan throughout the rest of today; pt states she is confused about the plan of when she is starting RT; reminded her we discussed this yesterday that rad onc will see her 9/14 and determine a plan; pt tearful during my interview stating she just lost another friend, unable to given more details      MEDICATIONS  (STANDING):  dexAMETHasone  Injectable 2 milliGRAM(s) IV Push two times a day  enoxaparin Injectable 90 milliGRAM(s) SubCutaneous two times a day  levothyroxine 175 MICROGram(s) Oral daily  liothyronine 5 MICROGram(s) Oral daily  melatonin 3 milliGRAM(s) Oral at bedtime  methadone    Tablet 5 milliGRAM(s) Oral two times a day  metoprolol succinate ER 50 milliGRAM(s) Oral daily  pantoprazole    Tablet 40 milliGRAM(s) Oral before breakfast  polyethylene glycol 3350 17 Gram(s) Oral daily  senna 2 Tablet(s) Oral at bedtime    MEDICATIONS  (PRN):  acetaminophen   Tablet .. 650 milliGRAM(s) Oral every 6 hours PRN Temp greater or equal to 38C (100.4F), Mild Pain (1 - 3)  ALPRAZolam 0.25 milliGRAM(s) Oral two times a day PRN anxiety  HYDROmorphone   Tablet 3 milliGRAM(s) Oral every 4 hours PRN Moderate Pain (4 - 6)  HYDROmorphone  Injectable 1 milliGRAM(s) IV Push every 4 hours PRN Severe Pain (7 - 10)      PHYSICAL EXAM:  Vital Signs Last 24 Hrs  T(F): 97.5 (13 Sep 2020 13:12), Max: 98.3 (12 Sep 2020 21:37)  HR: 74 (13 Sep 2020 13:12) (74 - 78)  BP: 129/80 (13 Sep 2020 13:12) (129/80 - 142/65)  RR: 17 (13 Sep 2020 13:12) (16 - 17)  SpO2: 98% (13 Sep 2020 13:12) (98% - 100%)    CONSTITUTIONAL: NAD  RESPIRATORY: Normal respiratory effort; lungs are clear to auscultation bilaterally  CARDIOVASCULAR: Regular rate and rhythm; No lower extremity edema;   ABDOMEN: Nontender to palpation, normoactive bowel sounds  MUSCULOSKELETAL:  LLE immobilizer in place  PSYCH: tearful  NEUROLOGY: no gross sensory deficits     LABS:                        9.1    10.13 )-----------( 366      ( 13 Sep 2020 05:40 )             30.6     09-13    138  |  98  |  10  ----------------------------<  102<H>  4.2   |  28  |  0.36<L>    Ca    9.3      13 Sep 2020 05:40  Phos  4.6     09-13  Mg     2.0     09-13    TPro  6.2  /  Alb  3.0<L>  /  TBili  0.6  /  DBili  x   /  AST  11  /  ALT  12  /  AlkPhos  150<H>  09-13

## 2020-09-13 NOTE — PROVIDER CONTACT NOTE (OTHER) - BACKGROUND
Patient had stage 4 lung CA
Pt transferred from [facility] to [facility] for radiation therapy, p/w L hip pain & hypoxia- a/w acute LLE DVT, hypoxic respiratory failure 2/2 lung CA w/ worsening R sided pleural effusion

## 2020-09-13 NOTE — PROVIDER CONTACT NOTE (OTHER) - ASSESSMENT
Patient states she "feels funny and confused". RN asked patient orientation questions, patient A&Ox4. No neuro deficits. Vital signs stable
Pt is A&Ox4, on 3 L NC. VS stable, O2sat 98%. resting in bed.

## 2020-09-13 NOTE — CHART NOTE - NSCHARTNOTEFT_GEN_A_CORE
Chart reviewed.   Case discussed with Dr. Hodges.     Over the last 24 hours (10a-10a) patient used 2 doses of IV dilaudid PRN for severe pain and 3 doses of PRN PO dilaudid prn moderate pain. Overnight and Interval events noted. Palliative Team concerned about possible opioid-induced neurotoxicity and chemical coping. Would continue with current pain regimen for now. On-call Palliative team to sign out to [facility] team to re-assess patient on 9/14.    Thank you for allowing us to participate in your patient's care. We will continue to follow with you. Please page [phone] for any q's or c's.     Carole Mcclelland D.O.   Palliative Medicine

## 2020-09-14 NOTE — PROGRESS NOTE ADULT - PROBLEM SELECTOR PLAN 1
.  Etiology: Opioid induced neurotoxicity vs. Anemia vs. Delirium vs Hypoxemia vs. r/o other metabolic/ infectious causes    > less concerned about opiod-induced neurotoxicity at this time given mental status during today's exam  > Extensive opiate education provided to patient and daughter

## 2020-09-14 NOTE — PROGRESS NOTE ADULT - SUBJECTIVE AND OBJECTIVE BOX
[facility] Division of Hospital Medicine  Shanon Hodges MD  Pager [phone]    Patient is a 64y old  Female who presents with a chief complaint of Transferred from [facility] to [facility] for potential RT     SUBJECTIVE / OVERNIGHT EVENTS: seen earlier this AM; eagerly awaiting plan for RT      MEDICATIONS  (STANDING):  dexAMETHasone  Injectable 2 milliGRAM(s) IV Push two times a day  enoxaparin Injectable 90 milliGRAM(s) SubCutaneous two times a day  levothyroxine 175 MICROGram(s) Oral daily  liothyronine 5 MICROGram(s) Oral daily  melatonin 3 milliGRAM(s) Oral at bedtime  methadone    Tablet 5 milliGRAM(s) Oral two times a day  metoprolol succinate ER 50 milliGRAM(s) Oral daily  pantoprazole    Tablet 40 milliGRAM(s) Oral before breakfast  polyethylene glycol 3350 17 Gram(s) Oral daily  senna 2 Tablet(s) Oral at bedtime    MEDICATIONS  (PRN):  acetaminophen   Tablet .. 650 milliGRAM(s) Oral every 6 hours PRN Temp greater or equal to 38C (100.4F), Mild Pain (1 - 3)  ALPRAZolam 0.25 milliGRAM(s) Oral two times a day PRN anxiety  HYDROmorphone   Tablet 3 milliGRAM(s) Oral every 4 hours PRN Moderate Pain (4 - 6)  HYDROmorphone  Injectable 1 milliGRAM(s) IV Push every 4 hours PRN Severe Pain (7 - 10)      PHYSICAL EXAM:  Vital Signs Last 24 Hrs  T(F): 98.7 (14 Sep 2020 05:36), Max: 98.7 (14 Sep 2020 05:36)  HR: 89 (14 Sep 2020 05:36) (66 - 89)  BP: 150/85 (14 Sep 2020 05:36) (144/62 - 150/85)  RR: 16 (14 Sep 2020 05:36) (16 - 18)  SpO2: 96% (14 Sep 2020 05:36) (96% - 100%)    CONSTITUTIONAL: NAD  RESPIRATORY: Normal respiratory effort; lungs are clear to auscultation ant/lat  CARDIOVASCULAR: Regular rate and rhythm; No lower extremity edema;   ABDOMEN: Nontender to palpation, normoactive bowel sounds  MUSCULOSKELETAL:  LLE immobilizer in place  PSYCH: A+O to person, place, and time; affect appropriate  NEUROLOGY: no gross sensory deficits       LABS:                        11.4   12.49 )-----------( 443      ( 14 Sep 2020 05:50 )             36.7     09-14    134<L>  |  94<L>  |  10  ----------------------------<  102<H>  4.2   |  26  |  0.36<L>    Ca    10.2      14 Sep 2020 05:50  Phos  4.1     09-14  Mg     2.1     09-14    TPro  7.5  /  Alb  3.6  /  TBili  0.7  /  DBili  x   /  AST  13  /  ALT  14  /  AlkPhos  178<H>  09-14

## 2020-09-14 NOTE — PROGRESS NOTE ADULT - SUBJECTIVE AND OBJECTIVE BOX
[facility] Geriatrics and Palliative Care  Chico Crandall, Palliative Care Attending  Contact Info: Page [phone] (including Nights/Weekend), message on Microsoft Teams (Chico Crandall), or leave VM at Palliative Office [phone] (Non-Urgent)    SUBJECTIVE AND OBJECTIVE:  INTERVAL HPI/OVERNIGHT EVENTS: Interval events noted, concern for opioid induced neurotoxicity vs some other aberrant behavior. Patient seen and examined at bedside. Patient reports some worsening anxiety this morning, largely due to inactivity from the weekend. Patient required PRNs of Dilaudid PO x3 and Dilaudid x4 in past 24hrs. No adverse effects of opiates noted: no sedation/dizziness/nausea. Extensive discussion re: medical plan with patient's daughter.     DNR on chart:     Allergies  Bananas (Headache)  Bananas (Other)  latex (Rash)  latex (Rash (Mild))  opioid-like analgesics (Urticaria)  penicillin (Angioedema)  penicillin (Swelling (Mild to Mod))    MEDICATIONS  (STANDING):  dexAMETHasone  Injectable 2 milliGRAM(s) IV Push two times a day  enoxaparin Injectable 90 milliGRAM(s) SubCutaneous two times a day  levothyroxine 175 MICROGram(s) Oral daily  liothyronine 5 MICROGram(s) Oral daily  melatonin 3 milliGRAM(s) Oral at bedtime  methadone    Tablet 5 milliGRAM(s) Oral two times a day  metoprolol succinate ER 50 milliGRAM(s) Oral daily  pantoprazole    Tablet 40 milliGRAM(s) Oral before breakfast  polyethylene glycol 3350 17 Gram(s) Oral daily  senna 2 Tablet(s) Oral at bedtime    MEDICATIONS  (PRN):  acetaminophen   Tablet .. 650 milliGRAM(s) Oral every 6 hours PRN Temp greater or equal to 38C (100.4F), Mild Pain (1 - 3)  ALPRAZolam 0.25 milliGRAM(s) Oral two times a day PRN anxiety  HYDROmorphone   Tablet 3 milliGRAM(s) Oral every 4 hours PRN Moderate Pain (4 - 6)  HYDROmorphone  Injectable 1 milliGRAM(s) IV Push every 4 hours PRN Severe Pain (7 - 10)      ITEMS UNCHECKED ARE NOT PRESENT    PRESENT SYMPTOMS: []Unable to obtain due to poor mentation   Source if other than patient:  []Family   []Team     PAIN ASSESSMENT:   Site- LEFT HIP  Onset- ACUTE ON CHRONIC  Character- SHARP  Radiation- NONE  Associated symptoms- NONE  Timing and duration- INTERMITTENT SPIKES THROUGHOUT THE DAY  Exacerbating factors- "RUNNING OUT OF PAIN MEDS"  Severity- MOD TO SEVERE    Effect on QOL- SIGNIFICANTLY INTERFERES WITH ADL'S  PAIN AD Score: 0    Dyspnea:  Yes [ ] No [X ] - [ ]Mild [ ]Moderate [ ]Severe  Anxiety:    Yes [ ] No [ X] - [ ]Mild [ ]Moderate [ ]Severe  Fatigue:    Yes [ ] No [X ] - [ ]Mild [ ]Moderate [ ]Severe  Nausea:    Yes [ ] No [ X] - [ ]Mild [ ]Moderate [ ]Severe                         Loss of appetite: Yes [ ] No [X ] - [ ]Mild [ ]Moderate [ ]Severe             Constipation:  Yes [ ] No [X ] - [ ]Mild [ ]Moderate [ ]Severe  Grief: Yes [ ] No [X ]     [X ]All other review of systems negative, including: weakness, cough, colds, blurry vision, headaches, dysuria, pruritus, palpitations, muscle cramps, easy bruising, epistaxis, rashes    Palliative Performance Status Version 2:  40%  http://npcrc.org/files/news/palliative_performance_scale_ppsv2.pdf    PHYSICAL EXAM:  Vital Signs Last 24 Hrs  T(C): 37.1 (14 Sep 2020 05:36), Max: 37.1 (14 Sep 2020 05:36)  T(F): 98.7 (14 Sep 2020 05:36), Max: 98.7 (14 Sep 2020 05:36)  HR: 89 (14 Sep 2020 05:36) (66 - 89)  BP: 150/85 (14 Sep 2020 05:36) (129/80 - 150/85)  BP(mean): --  RR: 16 (14 Sep 2020 05:36) (16 - 18)  SpO2: 96% (14 Sep 2020 05:36) (96% - 100%) I&O's Summary    13 Sep 2020 07:01  -  14 Sep 2020 07:00  --------------------------------------------------------  IN: 600 mL / OUT: 800 mL / NET: -200 mL      GEN: Awake, Coherent, Oriented x3  HEAD: Normocephalic and atraumatic,   EYES: Anicteric sclerae, EOM's grossly intact  NECK: No JVD, no thyromegaly  PULM: Comfortable work of breathing, clear BS  CV: Pulses 2+ symmetric bilaterally, regular rate and rhythm  ABD: Not distended, soft, non-tender,   EXT: No edema, No deformities- LLE BRACE  PSYCH: Mildly anxious  NEURO: No facial asymmetry, no tremors, no observed movement disorders  SKIN: No rashes or lesions on exposed skin, No jaundice      LABS:              11.4   12.49 )-----------( 443      ( 14 Sep 2020 05:50 )             36.7   09-14    134<L>  |  94<L>  |  10  ----------------------------<  102<H>  4.2   |  26  |  0.36<L>    Ca    10.2      14 Sep 2020 05:50  Phos  4.1     09-14  Mg     2.1     09-14    TPro  7.5  /  Alb  3.6  /  TBili  0.7  /  DBili  x   /  AST  13  /  ALT  14  /  AlkPhos  178<H>  09-14      RADIOLOGY & ADDITIONAL STUDIES: None new    PROTEIN CALORIE MALNUTRITION PRESENT: [ ]mild [ ]moderate [ ]severe [ ]underweight [ ]morbid obesity  [] PPSV2 < or = 30% [] significant weight loss [] poor nutritional intake [] anasarca [] catabolic state   Albumin, Serum: 3.6 g/dL (09-14-20 @ 05:50)   Artificial Nutrition []     REFERRALS:   []Chaplaincy  []Hospice  []Child Life  [x]Social Work  [x]Case management []Holistic Therapy []Physical Therapy []Dietary     Goals of Care Document: BETTYE Carlos (07-15-20 @ 14:32)  Goals of Care Conversation:   Conversation Discussion:  · Conversation Details  KERRY contacted patients spouse Yehuda to offer support in coping with patients catastrophic dx and hospitalization. Spouse appreciative of SW call and reviewed his concerns for planning for when patient discharged. Spouse requesting to speak to hospitalist regarding time frame for discharge since he is aware Friday is last palliative RT tx. KERRY contacted hospitalist to request follow up with spouse.      Electronic Signatures:  Viji Carlos ([facility]  (Signed 15-Jul-2020 14:34)  	Authored: Goals of Care Conversation      Last Updated: 15-Jul-2020 14:34 by Viji Carlos ([facility]      Care Coordination Document: Progress Notes - Care Coordination [C. Provider] (09-11-20 @ 16:10)                                       Progress Notes    PROGRESS NOTE  Date & Time of Note   2020-09-11 04:09    Notes    Notes: Pt transferred from [facility] for RT.  CM assessment completed at that time  9/7/20.  Cm to continue to follow while at [facility] for DC planning       Electronically signed by:  Brynn Jose RN Case Manager  Electronically signed on:  2020-09-11  16:10

## 2020-09-14 NOTE — PROGRESS NOTE ADULT - PROBLEM SELECTOR PLAN 5
.  > Based on note 9/9 no plans for inpatient chemo  > Follows w/ Dr. Andres at [facility]

## 2020-09-14 NOTE — CHART NOTE - NSCHARTNOTEFT_GEN_A_CORE
64 y.o. F, h/o metastatic adenocarcinoma of lung, s/p RT prior to the clavicle and spine, with recent h/o left femoral neck pathological fx s/p QASIM 08/18, and had rehabilitation at [facility], now for palliative RT to the left hip/left femur.    Plan for CT simulation today and 5 fractions planned.

## 2020-09-14 NOTE — PROGRESS NOTE ADULT - ASSESSMENT
Full Note to Follow.    ·	Currently no overt signs of opiate adverse effects: no sedation, nausea, dizziness, or neurotoxicity  ·	Can consider increasing Moderate PRN to Dilaudid 4mg PO q4h PRN for Moderate Pain with same disclaimer to try prior to using IV  ·	Patient requesting Hydromorphone Solution instead of Oral tablet, unfortunately only available at [facility] and not here 64yoF w/ PMHx of stage IV lung adenocarcinoma ( dx 2017) s/p RLL lobectomy with recurrence in May 2020 s/p palliative radiation to clavicle and spine and 1 round of carbo/pemextred/pembrolizumab chemo 7/31, recent admission for pathologic L femoral neck fracture s/p left QASIM on 8/18, presented from [facility] rehab for left hip pain, hypoxemia and anemia. Transferred to [facility] for XRT. Palliative Medicine was consulted to evaluate and manage complex symptomatology related to the patient's life-limiting illness.    ·	Currently no overt signs of opiate adverse effects: no sedation, nausea, dizziness, or neurotoxicity (cognitive dysfxn, hallucinations, hyperalgesia)  ·	Can consider increasing Moderate PRN to Dilaudid 4mg PO q4h PRN for Moderate Pain with same disclaimer to try prior to using IV  ·	Patient requesting Hydromorphone Solution instead of Oral tablet, unfortunately only available at [facility] and not at [facility]

## 2020-09-14 NOTE — PROGRESS NOTE ADULT - PROBLEM SELECTOR PLAN 2
.  Left hip pain due to progression of metastatic disease, L hip, h/o pathologic fx s/p surgery and XRT    - See below for high risk medication use and risk factor for dependency and chemical coping  - Monitoring for opioid induced neurotoxicity    Current meds:   Methadone 5mg PO BID  Dilaudid 1mg IV q4h PRN  Dilaudid 3mg PO q4h PRN    > Consider increasing Moderate PRN to Dilaudid 4mg PO q4h PRN, current moderate PRN may be underdosing patient's pain level leading her to ask for the IV Dilaudid PRN  > Maintain current Methadone dosing, check EKG for QTc monitoring

## 2020-09-14 NOTE — PROGRESS NOTE ADULT - PROBLEM SELECTOR PLAN 1
plan for RT to L femur, sim tomorrow and start RT for 5 sessions  await rad onc plan  pain control with pall care. limit IV

## 2020-09-14 NOTE — PROGRESS NOTE ADULT - ASSESSMENT
65yo F w/ PMHx of stage IV lung adenocarcinoma ( dx 2017) s/p RLL lobectomy with recurrence in May 2020 s/p palliative radiation to clavicle and spine and 1 round of carbo/pemextred/pembrolizumab chemo 7/31, hypothyroidism, anxiety, recent admission for pathologic L femoral neck fracture s/p left QASIM on 8/18 presented from [facility] as a transfer for RT.

## 2020-09-15 NOTE — PROGRESS NOTE ADULT - ASSESSMENT
64yoF w/ PMHx of stage IV lung adenocarcinoma ( dx 2017) s/p RLL lobectomy with recurrence in May 2020 s/p palliative radiation to clavicle and spine and 1 round of carbo/pemextred/pembrolizumab chemo 7/31, recent admission for pathologic L femoral neck fracture s/p left QASIM on 8/18, presented from [facility] for left hip pain, hypoxemia and anemia. Transferred to [facility] for XRT. Palliative Medicine was consulted to evaluate and manage complex symptomatology related to the patient's life-limiting illness.

## 2020-09-15 NOTE — PROGRESS NOTE ADULT - PROBLEM SELECTOR PLAN 5
.  > Based on note 9/9 no plans for inpatient chemo  > Follows w/ Dr. Andres at [facility]

## 2020-09-15 NOTE — PROGRESS NOTE ADULT - SUBJECTIVE AND OBJECTIVE BOX
SUBJECTIVE AND OBJECTIVE:  9/15:  - Chart reviewed. Patient discussed in IDT. Patient seen and examined.  - Patient's daughter and HCP, Kena, on speakerphone as well  - She is comfortable and pain-free on my assessment. She is happy that she is starting XRT  - She is also tolerating PT (transfers from bed to chair)  - Talked about her pain and pain medications. She did admit that in the 80's she had a substance abuse problem. This is not surprising given her behavior in the hospital. I told her this and she said she knows she has been asking for the IV a lot. She asked me, "why is it (addiction) coming back now?"  - We all decided to keep her on the Methadone and PO Dilaudid and to STOP the IV Dilaudid. All their q's and c's addressed.       Allergies  Bananas (Headache)  Bananas (Other)  latex (Rash)  latex (Rash (Mild))  opioid-like analgesics (Urticaria)  penicillin (Angioedema)  penicillin (Swelling (Mild to Mod))    MEDICATIONS  (STANDING):  dexAMETHasone  Injectable 2 milliGRAM(s) IV Push two times a day  enoxaparin Injectable 90 milliGRAM(s) SubCutaneous two times a day  levothyroxine 175 MICROGram(s) Oral daily  liothyronine 5 MICROGram(s) Oral daily  melatonin 3 milliGRAM(s) Oral at bedtime  methadone    Tablet 5 milliGRAM(s) Oral two times a day  metoprolol succinate ER 50 milliGRAM(s) Oral daily  pantoprazole    Tablet 40 milliGRAM(s) Oral before breakfast  polyethylene glycol 3350 17 Gram(s) Oral two times a day  senna 2 Tablet(s) Oral at bedtime    MEDICATIONS  (PRN):  acetaminophen   Tablet .. 650 milliGRAM(s) Oral every 6 hours PRN Temp greater or equal to 38C (100.4F), Mild Pain (1 - 3)  ALPRAZolam 0.25 milliGRAM(s) Oral two times a day PRN anxiety  HYDROmorphone   Tablet 3 milliGRAM(s) Oral every 4 hours PRN Moderate Pain (4 - 6)  HYDROmorphone  Injectable 1 milliGRAM(s) IV Push every 4 hours PRN Severe Pain (7 - 10)    ITEMS UNCHECKED ARE NOT PRESENT    PRESENT SYMPTOMS: []Unable to obtain due to poor mentation   Source if other than patient:  []Family   []Team     PAIN ASSESSMENT:   Site- LEFT HIP  Onset- ACUTE ON CHRONIC  Character- SHARP  Radiation- NONE  Associated symptoms- NONE  Timing and duration- INTERMITTENT SPIKES THROUGHOUT THE DAY  Exacerbating factors- "RUNNING OUT OF PAIN MEDS"  Severity- MOD TO SEVERE    Effect on QOL- SIGNIFICANTLY INTERFERES WITH ADL'S  PAIN AD Score: 0    Dyspnea:  Yes [ ] No [X ] - [ ]Mild [ ]Moderate [ ]Severe  Anxiety:    Yes [ ] No [ X] - [ ]Mild [ ]Moderate [ ]Severe  Fatigue:    Yes [ ] No [X ] - [ ]Mild [ ]Moderate [ ]Severe  Nausea:    Yes [ ] No [ X] - [ ]Mild [ ]Moderate [ ]Severe                         Loss of appetite: Yes [ ] No [X ] - [ ]Mild [ ]Moderate [ ]Severe             Constipation:  Yes [ ] No [X ] - [ ]Mild [ ]Moderate [ ]Severe  Grief: Yes [ ] No [X ]     [X ]All other review of systems negative, including: weakness, cough, colds, blurry vision, headaches, dysuria, pruritus, palpitations, muscle cramps, easy bruising, epistaxis, rashes    Palliative Performance Status Version 2:  40%    Vital Signs Last 24 Hrs  T(C): 36.6 (15 Sep 2020 13:01), Max: 36.7 (15 Sep 2020 06:13)  T(F): 97.9 (15 Sep 2020 13:01), Max: 98 (15 Sep 2020 06:13)  HR: 77 (15 Sep 2020 13:01) (77 - 97)  BP: 126/70 (15 Sep 2020 13:01) (125/62 - 151/72)  BP(mean): --  RR: 16 (15 Sep 2020 13:01) (16 - 16)  SpO2: 100% (15 Sep 2020 13:01) (98% - 100%)    GEN: Awake, Coherent, Oriented x3, NOT IN DISTRESS AND VERY COMFORTABLE  HEAD: Normocephalic and atraumatic,   EYES: Anicteric sclerae, EOM's grossly intact  NECK: No JVD, no thyromegaly  PULM: Comfortable work of breathing, clear BS  CV: Pulses 2+ symmetric bilaterally, regular rate and rhythm  ABD: Not distended, soft, non-tender,   EXT: No edema, No deformities- LLE BRACE  PSYCH: Mildly anxious  NEURO: No facial asymmetry, no tremors, no observed movement disorders  SKIN: No rashes or lesions on exposed skin, No jaundice      LABS:   09-14    134<L>  |  94<L>  |  10  ----------------------------<  102<H>  4.2   |  26  |  0.36<L>    Ca    10.2      14 Sep 2020 05:50  Phos  4.1     09-14  Mg     2.1     09-14    TPro  7.5  /  Alb  3.6  /  TBili  0.7  /  DBili  x   /  AST  13  /  ALT  14  /  AlkPhos  178<H>  09-14

## 2020-09-15 NOTE — PROGRESS NOTE ADULT - SUBJECTIVE AND OBJECTIVE BOX
[facility] Division of Hospital Medicine  Shanon Hodges MD  Pager [phone]    Patient is a 64y old  Female who presents with a chief complaint of Transferred from [facility] to [facility] for potential RT        SUBJECTIVE / OVERNIGHT EVENTS: eager to start RT; pain with improved control      MEDICATIONS  (STANDING):  dexAMETHasone  Injectable 2 milliGRAM(s) IV Push two times a day  enoxaparin Injectable 90 milliGRAM(s) SubCutaneous two times a day  levothyroxine 175 MICROGram(s) Oral daily  liothyronine 5 MICROGram(s) Oral daily  melatonin 3 milliGRAM(s) Oral at bedtime  methadone    Tablet 5 milliGRAM(s) Oral two times a day  metoprolol succinate ER 50 milliGRAM(s) Oral daily  pantoprazole    Tablet 40 milliGRAM(s) Oral before breakfast  polyethylene glycol 3350 17 Gram(s) Oral two times a day  senna 2 Tablet(s) Oral at bedtime    MEDICATIONS  (PRN):  acetaminophen   Tablet .. 650 milliGRAM(s) Oral every 6 hours PRN Temp greater or equal to 38C (100.4F), Mild Pain (1 - 3)  ALPRAZolam 0.25 milliGRAM(s) Oral two times a day PRN anxiety  HYDROmorphone   Tablet 3 milliGRAM(s) Oral every 4 hours PRN Moderate Pain (4 - 6)  HYDROmorphone  Injectable 1 milliGRAM(s) IV Push every 4 hours PRN Severe Pain (7 - 10)      PHYSICAL EXAM:  Vital Signs Last 24 Hrs  T(F): 97.9 (15 Sep 2020 13:01), Max: 98 (15 Sep 2020 06:13)  HR: 77 (15 Sep 2020 13:01) (77 - 97)  BP: 126/70 (15 Sep 2020 13:01) (125/62 - 151/72)  RR: 16 (15 Sep 2020 13:01) (16 - 16)  SpO2: 100% (15 Sep 2020 13:01) (98% - 100%)    CONSTITUTIONAL: NAD  RESPIRATORY: Normal respiratory effort; lungs are clear to auscultation   CARDIOVASCULAR: Regular rate and rhythm; No lower extremity edema;   ABDOMEN: Nontender to palpation, normoactive bowel sounds  MUSCULOSKELETAL:  LLE immobilizer  PSYCH: affect appropriate  NEUROLOGY: no gross sensory deficits       LABS:                        10.1   11.75 )-----------( 352      ( 15 Sep 2020 06:30 )             34.1

## 2020-09-15 NOTE — PROGRESS NOTE ADULT - PROBLEM SELECTOR PLAN 1
.  Etiology: Opioid induced neurotoxicity vs. Anemia vs. Delirium vs Hypoxemia vs. r/o other metabolic/ infectious causes    > Still admits forgetfulness. Still can't remember what happened last few days at [facility].  > Continue to monitor

## 2020-09-15 NOTE — PROGRESS NOTE ADULT - PROBLEM SELECTOR PLAN 1
plan for RT to L femur, start RT for 5 sessions today  await rad onc plan  pain control with pall care. limit IV

## 2020-09-15 NOTE — PROGRESS NOTE ADULT - PROBLEM SELECTOR PLAN 4
.  Pt's opiod use history:    1) Bad reactions to Morphine, Oxycodone, Codeine- severe pruritus  2) Too much Dilaudid "makes me go to [place]"- she gets confused and delirious  3) PO Dilaudid "does not work"  4) IV Dilaudid is the only thing that works  5) The fentanyl patch is not doing anything  6) 9/15: Patient admits to having a substance abuse problem in the 80's    Pls prescribe Naloxone for here and upon discharge. Patients with 50mg OME have 4x risk of OD and those with 100mg OME have 9x risk (prescribetoprevent.org)    Inpatient: Naloxone 0.4 to 2mg IV q2-3mins PRN (max 10mg)  Upon discharge: Naloxone 4mg/0.1 ml nasal spray, 1 spray q2-3mins alternating between nostrils     Pls provide patient education prior to discharge. Resource: https://www.health.[place].gov/diseases/aids/general/opioid_overdose_prevention/overdose_facts.htm

## 2020-09-15 NOTE — PROGRESS NOTE ADULT - PROBLEM SELECTOR PLAN 2
.  Left hip pain due to progression of metastatic disease, L hip, h/o pathologic fx s/p surgery and XRT    - See below for high risk medication use and risk factor for dependency and chemical coping  - Monitoring for opioid induced neurotoxicity    Current meds:   Methadone 5mg PO BID  Dilaudid 1mg IV q4h PRN  Dilaudid 3mg PO q4h PRN    > Keep current oral regimen. Discussed with patient and her daughter.  > STOP IV DILAUDID BY TOMORROW AM. Will see how she does with XRT today and if pain is stable, can discontinue.

## 2020-09-16 NOTE — PROGRESS NOTE ADULT - PROBLEM SELECTOR PLAN 1
plan for RT to L femur, start RT for 5 sessions 2/5 so far  await rad onc plan  apprec [facility] f.u for pain management

## 2020-09-16 NOTE — PROGRESS NOTE ADULT - SUBJECTIVE AND OBJECTIVE BOX
[facility] Division of Hospital Medicine  Shanon Hodges MD  Pager [phone]    Patient is a 64y old  Female who presents with a chief complaint of Transferred from [facility] to [facility] for potential RT     SUBJECTIVE / OVERNIGHT EVENTS: tolerating RT; reports pain control better    MEDICATIONS  (STANDING):  dexAMETHasone  Injectable 2 milliGRAM(s) IV Push two times a day  enoxaparin Injectable 90 milliGRAM(s) SubCutaneous two times a day  levothyroxine 175 MICROGram(s) Oral daily  liothyronine 5 MICROGram(s) Oral daily  melatonin 3 milliGRAM(s) Oral at bedtime  methadone    Tablet 5 milliGRAM(s) Oral two times a day  metoprolol succinate ER 50 milliGRAM(s) Oral daily  pantoprazole    Tablet 40 milliGRAM(s) Oral before breakfast  polyethylene glycol 3350 17 Gram(s) Oral two times a day  senna 2 Tablet(s) Oral at bedtime    MEDICATIONS  (PRN):  acetaminophen   Tablet .. 650 milliGRAM(s) Oral every 6 hours PRN Temp greater or equal to 38C (100.4F), Mild Pain (1 - 3)  ALPRAZolam 0.25 milliGRAM(s) Oral two times a day PRN anxiety  HYDROmorphone   Tablet 3 milliGRAM(s) Oral every 4 hours PRN Moderate Pain (4 - 6)        PHYSICAL EXAM:  Vital Signs Last 24 Hrs  T(F): 98 (16 Sep 2020 12:57), Max: 98.4 (15 Sep 2020 21:55)  HR: 87 (16 Sep 2020 12:57) (80 - 87)  BP: 123/60 (16 Sep 2020 12:57) (113/60 - 127/67)  RR: 16 (16 Sep 2020 12:57) (16 - 16)  SpO2: 100% (16 Sep 2020 12:57) (94% - 100%)    CONSTITUTIONAL: NAD  ENMT: Moist oral mucosa  RESPIRATORY: Normal respiratory effort; lungs are clear to auscultation ant  CARDIOVASCULAR: Regular rate and rhythm; No lower extremity edema;   ABDOMEN: Nontender to palpation, normoactive bowel sounds  MUSCULOSKELETAL:  no clubbing or cyanosis of digits; no joint swelling or tenderness to palpation  PSYCH: affect appropriate  NEUROLOGY: no gross sensory deficits       LABS:                        9.6    13.43 )-----------( 324      ( 16 Sep 2020 06:00 )             32.1     09-16    134<L>  |  96<L>  |  11  ----------------------------<  123<H>  4.4   |  25  |  0.33<L>    Ca    9.4      16 Sep 2020 06:00    TPro  6.5  /  Alb  2.9<L>  /  TBili  0.5  /  DBili  x   /  AST  16  /  ALT  14  /  AlkPhos  145<H>  09-16

## 2020-09-16 NOTE — CHART NOTE - NSCHARTNOTEFT_GEN_A_CORE
************************************************************************  PALLIATIVE MEDICINE COORDINATION OF CARE NOTE FOR SOFIA SCHAEFFER  [x] Inpatient Consult  [ ] Other:  ************************************************************************  SUMMARY OF RECOMMENDATIONS:    - Chart reviewed. Discussed in IDT.  - Patient took 3 PRN's of Dilaudid 3mg PO + Dilaudid 1mg IV x 1 from 7am to 7am (past 24h)    > SYMPTOM MANAGEMENT: I took the liberty of stopping the IV Dilaudid this morning (as per my discussion with patient and her daughter)  > GOALS OF CARE: Pt wants to continue XRT, PT and to get stronger (back on her feet)  > CODE STATUS: FULL  > HCP/ SURROGATE: Edward (daughter)  > Communicated with primary team. We will continue to follow with you.     ************************************************************************  CHART REVIEW:    HPI:  63yo F w/ PMHx of stage IV lung adenocarcinoma ( dx 2017) s/p RLL lobectomy with recurrence in May 2020 s/p palliative radiation to clavicle and spine and 1 round of carbo/pemextred/pembrolizumab chemo 7/31, hypothyroidism, anxiety, recent admission for pathologic L femoral neck fracture s/p left QASIM on 8/18 presented from [facility] as a [facility] for RT.  Patient was initially admitted to [facility] for desat to 80s and anemia. Patient placed on 3L NC and recuperated. CTA w/o signs of PE but identifying pleural effusion. US b/l LE showing L common femoral DVT. Vascular surgery, ortho, oncology, pulm and palliative service consulted there. While admitted there, patient obtained therapeutic/diagnostic thoracentesis. 9/9 switched to lovenox 90 qd. Rad onc consulted, pt would like inpatient radiology onc tx to lesions on her left femur and aetabulum. and as a result she was trasnferred to [facility]. (10 Sep 2020 20:42)      MEDICATIONS  (STANDING):  dexAMETHasone  Injectable 2 milliGRAM(s) IV Push two times a day  enoxaparin Injectable 90 milliGRAM(s) SubCutaneous two times a day  levothyroxine 175 MICROGram(s) Oral daily  liothyronine 5 MICROGram(s) Oral daily  melatonin 3 milliGRAM(s) Oral at bedtime  methadone    Tablet 5 milliGRAM(s) Oral two times a day  metoprolol succinate ER 50 milliGRAM(s) Oral daily  pantoprazole    Tablet 40 milliGRAM(s) Oral before breakfast  polyethylene glycol 3350 17 Gram(s) Oral two times a day  senna 2 Tablet(s) Oral at bedtime    MEDICATIONS  (PRN):  acetaminophen   Tablet .. 650 milliGRAM(s) Oral every 6 hours PRN Temp greater or equal to 38C (100.4F), Mild Pain (1 - 3)  ALPRAZolam 0.25 milliGRAM(s) Oral two times a day PRN anxiety  HYDROmorphone   Tablet 3 milliGRAM(s) Oral every 4 hours PRN Moderate Pain (4 - 6)    ************************************************************************  MEDICATION REVIEW:  --- Pls refer to current medicatons in the body of this note  ISTOP REFERENCE: [id number]    Patient Name: Sofia SchaefferBirth Date: 1955  Address: [facility] [address] [place] [id number]: Female  Rx Written	Rx Dispensed	Drug	Quantity	Days Supply	Prescriber Name	Payment Method	Dispenser  07/07/2020	07/07/2020	fentanyl 50 mcg/hr patch	10	30	Scott Olsen	Medicaid	[facility] #[id number]  07/06/2020	07/06/2020	tramadol hcl 50 mg tablet	30	7	JavyChris leahy 	Medicaid	[facility] #[id number]  07/06/2020	07/06/2020	morphine sulfate ir 15 mg tab	56	7	Scott Olsen	[facility] #[id number]  06/12/2020	06/12/2020	alprazolam 0.25 mg tablet	15	15	Christy Salinas	Medicaid	[id number]  06/03/2020	06/08/2020	diazepam 5 mg tablet	10	10	Elvira Joseph MD	Medicaid	[facility] #[id number]  05/26/2020	05/28/2020	diazepam 5 mg tablet	2	2	Scott Olsen	Medicaid	[facility] #[id number]    --- PRN usage: Patient took 3 PRN's of Dilaudid 3mg PO + Dilaudid 1mg IV x 1 from 7am to 7am (past 24h)    ------------------------------------------------------------------------  COORDINATION OF CARE:  --- Palliative Care consulted for: Pain management  --- Patient assessed: 9/16  --- Patient previously seen by Palliative Care service: NO  ADVANCE CARE PLANNING  --- Code status: FULL  --- MOLST reviewed in chart: NONE  --- HCP/ Surrogate: EDWARD  --- [facility] document found in Alpha: NONE  --- HCP/ Living will/ Other advanced directives in Alpha: YES  CARE PROVIDER DOCUMENTATION:  --- Rad-Onc: XRT to L pelvis  --- SW/CM notes: Continues to  for dc planning  --- PT recs: PT inpatient ongoing  PLAN OF CARE  --- Known admissions in past year: 3  --- Current admit date: 9/10  --- LOS: 6  --- LACE score: 13  --- Current dispo plan: TO BE DETERMINED  ------------------------------------------------------------------------  --- Chart reviewed: 30 Minutes [including time used to gather, review and transfer data to this note]  --- Start: 8:30  --- End: 9:00  Prolonged services rendered, as part of this patient's care provided by Palliative Medicine, include: i.chart review for provider and ancillary service documentation, ii.pertinent diagnostics including laboratory and imaging studies,iii. medication review including PRN use, iv. admission history including previous palliative care encounters and C notes, v.advance care planning documents including HCP and MOLST forms in Alpha. Part of Palliative Medicine extended evaluation and management also involves coordination of care with our IDT, the primary and consulting tonja, and unit CM/SW and Hospice if eligible. Recommendations based on the information gathered and discussed are outline in the AP of our notes.    ************************************************************************  Care coordination of SOFIA SCHAEFFER was communicated with the interdisciplinary team during IDT rounds and with the primary team.

## 2020-09-17 NOTE — CHART NOTE - NSCHARTNOTEFT_GEN_A_CORE
************************************************************************  PALLIATIVE MEDICINE COORDINATION OF CARE NOTE FOR SOFIA SCHAEFFER  [x] Inpatient Consult  [ ] Other:  ************************************************************************  SUMMARY OF RECOMMENDATIONS:    - Chart reviewed. Discussed in IDT. Discussed with primary team. Discussed with outpatient Oncology office. Tried to visit patient this AM but she was in XRT.   - I spoke the patient's daughter and HCP, Edward @ [phone]. We talked about the patient's diagnosis, prognosis, and also I brought up goals of care including hospice:    1) Edward understands the cancer is Stage 4  2) Edward has been thinking about end of life concerns including hospice. I gave her a brief overview of what it entails.   3) Edward says the un-answered piece is if there is more treatment being offered. She had tried to call Dr. Joseph. I told her I would try to reach out to the Oncology group prior to advancing [facility] talks  4) I got in touch with Scott (Dr. Joseph's PA). More line of chemo are being offered. In the end, I said I would hold off on further hospice conversations so as not to confuse/ add on to the anxiety of the patient.   5) I called Edward back to let her know about the plan for more chemo. Scott was calling Edward during our conversation so I let them connect.     > SYMPTOM MANAGEMENT: Edward thinks the pain is getting better. She thinks her mom is more anxious than in pain. Patient took 4 PRN's past 24h. Edward agrees to keep patient on same regimen.   > GOALS OF CARE: Pt wants to continue XRT, PT and to get stronger (back on her feet). Dr. Joseph's group is offering more treatment. After discussing with Edward, I will hold off on advancing [facility] conversations.   > CODE STATUS: FULL  > HCP/ SURROGATE: Edward (daughter)  > Communicated with primary team. We will continue to follow with you.     ************************************************************************  HPI:  65yo F w/ PMHx of stage IV lung adenocarcinoma ( dx 2017) s/p RLL lobectomy with recurrence in May 2020 s/p palliative radiation to clavicle and spine and 1 round of carbo/pemextred/pembrolizumab chemo 7/31, hypothyroidism, anxiety, recent admission for pathologic L femoral neck fracture s/p left QASIM on 8/18 presented from [facility] as a transfer for RT.  Patient was initially admitted to [facility] for desat to 80s and anemia. Patient placed on 3L NC and recuperated. CTA w/o signs of PE but identifying pleural effusion. US b/l LE showing L common femoral DVT. Vascular surgery, ortho, oncology, pulm and palliative service consulted there. While admitted there, patient obtained therapeutic/diagnostic thoracentesis. 9/9 switched to lovenox 90 qd. Rad onc consulted, pt would like inpatient radiology onc tx to lesions on her left femur and acetabulum. and as a result she was trasnferred to [facility]. (10 Sep 2020 20:42)    MEDICATIONS  (STANDING):  dexAMETHasone  Injectable 2 milliGRAM(s) IV Push two times a day  enoxaparin Injectable 90 milliGRAM(s) SubCutaneous two times a day  levothyroxine 175 MICROGram(s) Oral daily  liothyronine 5 MICROGram(s) Oral daily  melatonin 3 milliGRAM(s) Oral at bedtime  methadone    Tablet 5 milliGRAM(s) Oral two times a day  metoprolol succinate ER 50 milliGRAM(s) Oral daily  pantoprazole    Tablet 40 milliGRAM(s) Oral before breakfast  polyethylene glycol 3350 17 Gram(s) Oral two times a day  senna 2 Tablet(s) Oral at bedtime    MEDICATIONS  (PRN):  acetaminophen   Tablet .. 650 milliGRAM(s) Oral every 6 hours PRN Temp greater or equal to 38C (100.4F), Mild Pain (1 - 3)  ALPRAZolam 0.25 milliGRAM(s) Oral two times a day PRN anxiety  HYDROmorphone   Tablet 3 milliGRAM(s) Oral every 4 hours PRN Moderate and Severe Pain (7 - 10)    ************************************************************************  MEDICATION REVIEW:  --- Pls refer to current medicatons in the body of this note  ISTOP REFERENCE: [id number]    Patient Name: Sofia SchaefferBirth Date: 1955  Address: [address] [place] [id number]: Female  Rx Written	Rx Dispensed	Drug	Quantity	Days Supply	Prescriber Name	Payment Method	Dispenser  07/07/2020	07/07/2020	fentanyl 50 mcg/hr patch	10	30	Scott Olsen	Medicaid	[id number]  07/06/2020	07/06/2020	tramadol hcl 50 mg tablet	30	7	Chris Gonzales DO	Medicaid	[id number]  07/06/2020	07/06/2020	morphine sulfate ir 15 mg tab	56	7	Scott Olsen	[id number]  06/12/2020	06/12/2020	alprazolam 0.25 mg tablet	15	15	Christy Salinas	Medicaid	[id number]  06/03/2020	06/08/2020	diazepam 5 mg tablet	10	10	Elvira Joseph MD	Medicaid	[id number]  05/26/2020	05/28/2020	diazepam 5 mg tablet	2	2	Soctt Olsen	Medicaid	[id number]    --- PRN usage:  Patient took 4 PRN's the past 24h (9/16)  Patient took 3 PRN's of Dilaudid 3mg PO + Dilaudid 1mg IV x 1 from 7am to 7am (past 24h) 9/15    ------------------------------------------------------------------------  COORDINATION OF CARE:  --- Palliative Care consulted for: Pain management  --- Patient assessed: 9/16  --- Patient previously seen by Palliative Care service: NO  ADVANCE CARE PLANNING  --- Code status: FULL  --- MOLST reviewed in chart: NONE  --- HCP/ Surrogate: EDWARD  --- GOC document found in Alpha: NONE  --- HCP/ Living will/ Other advanced directives in Alpha: YES  CARE PROVIDER DOCUMENTATION:  --- Dr. Joseph's office: Offering more lines of chemo. Will hold off on advancing hospice talks.   --- Rad-Onc: XRT to L pelvis  --- SW/CM notes: Continues to ff for dc planning  --- PT recs: PT inpatient ongoing  PLAN OF CARE  --- Known admissions in past year: 3  --- Current admit date: 9/10  --- LOS: 7  --- LACE score: 14  --- Current dispo plan: TO BE DETERMINED  ------------------------------------------------------------------------  --- Chart reviewed: 30 Minutes [including time used to gather, review and transfer data to this note]  --- Start: 12:30  --- End: 1:00  Prolonged services rendered, as part of this patient's care provided by Palliative Medicine, include: i.chart review for provider and ancillary service documentation, ii.pertinent diagnostics including laboratory and imaging studies,iii. medication review including PRN use, iv. admission history including previous palliative care encounters and GO notes, v.advance care planning documents including HCP and MOLST forms in Alpha. Part of Palliative Medicine extended evaluation and management also involves coordination of care with our IDT, the primary and consulting tonja, and unit CM/SW and Hospice if eligible. Recommendations based on the information gathered and discussed are outline in the AP of our notes.      Due to visitation restrictions in the hospital in light of COVID pandemic, all discussions with the patient/ patient's healthcare proxy or surrogate have been done via telehealth. This is to prevent spread of infection within the hospital and out in the community. Total time discssing advance care planning, goals of care discussions, and discussions about code status and hospice = 20 mins (11:00 to 11:20)    ************************************************************************  Care coordination of SOFIA SCHAEFFER was communicated with the interdisciplinary team during IDT rounds and with the primary team.

## 2020-09-17 NOTE — DIETITIAN INITIAL EVALUATION ADULT. - PROBLEM SELECTOR PLAN 2
Patient has stage 4 lung adenocarcinoma (RLL lobectomy in 2017, recurrence in May 2020 s/p palliative rad and chemo), CTA chest/ abd/ pelvis show increased R pleural effusion from prior, ill defined R hilar mass, hepatic/ L adrenal and extensive bony metastases new from 2019, new R renal lesion. Screw fixated pathologic L hip fxr. L supracondylar distal femur lesion     - Rad onc consulted for directed radiation to supracondylar distal femur lesion at [facility]. Appreciate recs.  - f/u oncology regarding further treatment options  -As per outpatient Onc, would like to continue chemo, but patient needs to be able to sit up in a chair without significant pain for at least 3-4 hours to do so. Patient needs further rehab prior to restarting chemo. Will follow with [facility], [place] [facility] [place]

## 2020-09-17 NOTE — DIETITIAN INITIAL EVALUATION ADULT. - SIGNS/SYMPTOMS
<75% estimated needs PTA, wt loss 14.5%ybjd3eoumlp/7%over 6months, +1 edema left leg, ankle as above stage IV lung adenocarcinoma, s/p RT

## 2020-09-17 NOTE — PROGRESS NOTE ADULT - SUBJECTIVE AND OBJECTIVE BOX
[facility] Division of Hospital Medicine  Shanon Hodges MD  Pager [phone]    Patient is a 64y old  Female who presents with a chief complaint of Transferred from [facility] to [facility] for potential RT       SUBJECTIVE / OVERNIGHT EVENTS: reports better pain control;       MEDICATIONS  (STANDING):  dexAMETHasone  Injectable 2 milliGRAM(s) IV Push two times a day  enoxaparin Injectable 90 milliGRAM(s) SubCutaneous two times a day  levothyroxine 175 MICROGram(s) Oral daily  liothyronine 5 MICROGram(s) Oral daily  melatonin 3 milliGRAM(s) Oral at bedtime  methadone    Tablet 5 milliGRAM(s) Oral two times a day  metoprolol succinate ER 50 milliGRAM(s) Oral daily  pantoprazole    Tablet 40 milliGRAM(s) Oral before breakfast  polyethylene glycol 3350 17 Gram(s) Oral two times a day  senna 2 Tablet(s) Oral at bedtime    MEDICATIONS  (PRN):  acetaminophen   Tablet .. 650 milliGRAM(s) Oral every 6 hours PRN Temp greater or equal to 38C (100.4F), Mild Pain (1 - 3)  ALPRAZolam 0.25 milliGRAM(s) Oral two times a day PRN anxiety  HYDROmorphone   Tablet 3 milliGRAM(s) Oral every 4 hours PRN Moderate and Severe Pain (7 - 10)    PHYSICAL EXAM:  Vital Signs Last 24 Hrs  T(F): 97.7 (17 Sep 2020 05:10), Max: 97.7 (16 Sep 2020 21:16)  HR: 77 (17 Sep 2020 05:10) (76 - 77)  BP: 136/61 (17 Sep 2020 05:10) (136/61 - 149/83)  RR: 16 (17 Sep 2020 05:10) (16 - 16)  SpO2: 97% (17 Sep 2020 05:10) (97% - 99%)    CONSTITUTIONAL: NAD  ENMT: Moist oral mucosa  RESPIRATORY: Normal respiratory effort;   CARDIOVASCULAR: No lower extremity edema;   ABDOMEN: Nontender to palpation, normoactive bowel sounds  MUSCULOSKELETAL:  LLE immobilizer in place  PSYCH:  affect appropriate  NEUROLOGY: no gross sensory deficits       LABS:                        10.7   14.02 )-----------( 311      ( 17 Sep 2020 05:45 )             35.0     09-17    137  |  94<L>  |  12  ----------------------------<  103<H>  4.2   |  27  |  0.38<L>    Ca    10.0      17 Sep 2020 05:45    TPro  7.4  /  Alb  3.5  /  TBili  0.7  /  DBili  x   /  AST  15  /  ALT  18  /  AlkPhos  165<H>  09-17

## 2020-09-17 NOTE — CHART NOTE - TREATMENT: THE FOLLOWING DIET HAS BEEN RECOMMENDED
Please see full Dietitian Initial Eval completed in Documents Section of electronic chart for recommendations 9/17/20

## 2020-09-17 NOTE — PROGRESS NOTE ADULT - PROBLEM SELECTOR PLAN 1
plan for RT to L femur, start RT for 5 sessions 3/5 so far  await rad onc plan  apprec [facility] f.u for pain management

## 2020-09-17 NOTE — DIETITIAN INITIAL EVALUATION ADULT. - PROBLEM SELECTOR PLAN 3
Patient found to have L common femoral DVT on CT  - VA duplex showing L common femoral vein DVT  - c/w lovenox for DVT

## 2020-09-17 NOTE — DIETITIAN INITIAL EVALUATION ADULT. - PROBLEM SELECTOR PLAN 1
Recent total hip replacement for pathological L femoral neck fracture (8/18/2020), currently with back and L hip pain. Likely partially 2/2 acute DVT, recent surgery and sacral lesion that is likely contributing to her pain.  - pain control with dilaudid IV and morphine, monitor for sedation.   - palliative following, recommending decadron IV BID for bone pain.  -Awaiting plan for radiation to L femur/acetabulum lesions

## 2020-09-17 NOTE — DIETITIAN INITIAL EVALUATION ADULT. - PERTINENT LABORATORY DATA
09-17 Na137 mmol/L Glu 103 mg/dL<H> K+ 4.2 mmol/L Cr  0.38 mg/dL<L> BUN 12 mg/dL 09-14 Phos 4.1 mg/dL 09-17 Alb 3.5 g/dL

## 2020-09-17 NOTE — DIETITIAN INITIAL EVALUATION ADULT. - OTHER INFO
65y/o Female with medical history of stage IV lung adenocarcinoma ( dx 2017) s/p RLL lobectomy with recurrence in May 2020 s/p palliative radiation to clavicle and spine and 1 round of carbo/pemextred/pembrolizumab chemo 7/31, hypothyroidism, anxiety, recent admission for pathologic L femoral neck fracture s/p left QASIM on 8/18 presented from [facility] as a transfer for RT per chart review.     RD met with patient at bedside. Patient reports appetite improved, po intake ~75% at this time-observed completed breakfast tray at bedside. Patient denies any nausea/vomiting/diarrhea/constipation or difficulty chewing and swallowing. Last documented bowel movement on 9/16. Allergic to Bananas. No other food allergy reported.  Patient confirms during previous admission had diminished appetite and PO intake, however have no improved. S/p Thoracentesis on 9/8 200mL fluid removed. Patient reports weight loss over the year, usual weight of 227lbs stable till Feb 2020 then downtrending to 210lbs on 8/2020 and now 194lbs (9/10) this admission. Indicative of 17lbs/7% weight loss over 6 months and 14.5% over 7 months. Weight loss likely related to combination of malignancy and fluid related. Discussed strategies to increase kcal/protein intake. Importance of adequate kcal and protein intake emphasized. RD remains available, patient made aware. Patient also accepting of po supplement Ensure Enlive, suggested patient to consume meals first and po supplement as added calories and protein.

## 2020-09-17 NOTE — DIETITIAN INITIAL EVALUATION ADULT. - CONTINUE CURRENT NUTRITION CARE PLAN
1. Continue Regular diet 2. Please add PO supplement Ensure Enlive 240mls 2x daily (700kcal, 40g protein) for added calories and protein 3. Monitor weights, labs, BM's, skin integrity, p.o. intake. 4. Please Encourage po intake, assist with meals and menu selections, provide alternatives PRN.

## 2020-09-17 NOTE — DIETITIAN INITIAL EVALUATION ADULT. - PERTINENT MEDS FT
dexAMETHasone  Injectable  levothyroxine  liothyronine  melatonin  methadone    Tablet  metoprolol succinate ER  pantoprazole    Tablet  polyethylene glycol 3350  senna

## 2020-09-17 NOTE — DIETITIAN INITIAL EVALUATION ADULT. - ETIOLOGY
patient meets criteria for moderate malnutrition in the context of chronic illness (Stage IV lung cancer) S/p RT increased demand for nutrient needs

## 2020-09-18 NOTE — PROGRESS NOTE ADULT - SUBJECTIVE AND OBJECTIVE BOX
[facility] Division of Hospital Medicine  Shanon Hodges MD  Pager [phone]    Patient is a 64y old  Female who presents with a chief complaint of Transferred from [facility] to [facility] for potential RT       SUBJECTIVE / OVERNIGHT EVENTS: in good spirits; pain with good control      MEDICATIONS  (STANDING):  dexAMETHasone  Injectable 2 milliGRAM(s) IV Push two times a day  enoxaparin Injectable 90 milliGRAM(s) SubCutaneous two times a day  levothyroxine 175 MICROGram(s) Oral daily  liothyronine 5 MICROGram(s) Oral daily  melatonin 3 milliGRAM(s) Oral at bedtime  methadone    Tablet 5 milliGRAM(s) Oral two times a day  metoprolol succinate ER 50 milliGRAM(s) Oral daily  pantoprazole    Tablet 40 milliGRAM(s) Oral before breakfast  polyethylene glycol 3350 17 Gram(s) Oral two times a day  senna 2 Tablet(s) Oral at bedtime    MEDICATIONS  (PRN):  acetaminophen   Tablet .. 650 milliGRAM(s) Oral every 6 hours PRN Temp greater or equal to 38C (100.4F), Mild Pain (1 - 3)  ALPRAZolam 0.25 milliGRAM(s) Oral two times a day PRN anxiety  HYDROmorphone   Tablet 3 milliGRAM(s) Oral every 4 hours PRN Moderate and Severe Pain (7 - 10)      PHYSICAL EXAM:  Vital Signs Last 24 Hrs  T(F): 97.9 (18 Sep 2020 06:10), Max: 98.1 (17 Sep 2020 21:27)  HR: 80 (18 Sep 2020 06:10) (78 - 85)  BP: 151/77 (18 Sep 2020 06:10) (127/69 - 151/77)  RR: 17 (18 Sep 2020 06:10) (16 - 17)  SpO2: 94% (18 Sep 2020 06:10) (94% - 100%)    CONSTITUTIONAL: NAD  RESPIRATORY: Normal respiratory effort;   CARDIOVASCULAR:  No lower extremity edema;   MUSCULOSKELETAL:  LLE immobilizer in place  PSYCH: Affect appropriate  NEUROLOGY: no gross sensory deficits       LABS:                        10.5   12.61 )-----------( 321      ( 18 Sep 2020 06:45 )             35.4     09-18    136  |  92<L>  |  13  ----------------------------<  100<H>  3.9   |  29  |  0.36<L>    Ca    9.7      18 Sep 2020 06:45    TPro  7.3  /  Alb  3.5  /  TBili  0.7  /  DBili  x   /  AST  14  /  ALT  18  /  AlkPhos  168<H>  09-18

## 2020-09-18 NOTE — DISCHARGE NOTE PROVIDER - CARE PROVIDERS DIRECT ADDRESSES
,[email or web address]

## 2020-09-18 NOTE — DISCHARGE NOTE PROVIDER - PROVIDER TOKENS
PROVIDER:[TOKEN:[[id number]]] PROVIDER:[TOKEN:[[id number]]],PROVIDER:[TOKEN:[[id number]]]

## 2020-09-18 NOTE — DISCHARGE NOTE PROVIDER - REASON FOR ADMISSION
Transferred from [facility] to [facility] for potential RT Transferred from [facility] to [facility] for RT

## 2020-09-18 NOTE — CHART NOTE - NSCHARTNOTEFT_GEN_A_CORE
************************************************************************  PALLIATIVE MEDICINE COORDINATION OF CARE NOTE FOR SOFIA SCHAEFFER  [x] Inpatient Consult  [ ] Other:  ************************************************************************  SUMMARY OF RECOMMENDATIONS:    - Chart reviewed. Discussed in IDT. Discussed with primary team. Also reached out to outpatient Palliative Care office to help setup pain management, especially Methadone  - Patient briefly seen as she was on the way to XRT. Very upbeat, no complaints of pain.   - She only took 3 PRN's    > SYMPTOM MANAGEMENT: Continue Methadone 5mg PO BID + Dilaudid 3mg PO q4h PRN  > GOC: As per my note yesterday, holding off on GOC discussions as Onc planning to offer more treatment  > CODE: Full    We are signing off but pls reconsult for any q's or c's. On discharge, can refer to our outpatient Palliative Care office to manage pain especially Methadone:    Oly Romero MD  Phone: [phone]  [address] [place] [address], [address], [place] [address]    ************************************************************************  HPI:  65yo F w/ PMHx of stage IV lung adenocarcinoma ( dx 2017) s/p RLL lobectomy with recurrence in May 2020 s/p palliative radiation to clavicle and spine and 1 round of carbo/pemextred/pembrolizumab chemo 7/31, hypothyroidism, anxiety, recent admission for pathologic L femoral neck fracture s/p left QASIM on 8/18 presented from [facility] as a transfer for RT.  Patient was initially admitted to [facility] for desat to 80s and anemia. Patient placed on 3L NC and recuperated. CTA w/o signs of PE but identifying pleural effusion. US b/l LE showing L common femoral DVT. Vascular surgery, ortho, oncology, pulm and palliative service consulted there. While admitted there, patient obtained therapeutic/diagnostic thoracentesis. 9/9 switched to lovenox 90 qd. Rad onc consulted, pt would like inpatient radiology onc tx to lesions on her left femur and acetabulum. and as a result she was trasnferred to [facility]. (10 Sep 2020 20:42)    MEDICATIONS  (STANDING):  dexAMETHasone  Injectable 2 milliGRAM(s) IV Push two times a day  enoxaparin Injectable 90 milliGRAM(s) SubCutaneous two times a day  levothyroxine 175 MICROGram(s) Oral daily  liothyronine 5 MICROGram(s) Oral daily  melatonin 3 milliGRAM(s) Oral at bedtime  methadone    Tablet 5 milliGRAM(s) Oral two times a day  metoprolol succinate ER 50 milliGRAM(s) Oral daily  pantoprazole    Tablet 40 milliGRAM(s) Oral before breakfast  polyethylene glycol 3350 17 Gram(s) Oral two times a day  senna 2 Tablet(s) Oral at bedtime    MEDICATIONS  (PRN):  acetaminophen   Tablet .. 650 milliGRAM(s) Oral every 6 hours PRN Temp greater or equal to 38C (100.4F), Mild Pain (1 - 3)  ALPRAZolam 0.25 milliGRAM(s) Oral two times a day PRN anxiety  HYDROmorphone   Tablet 3 milliGRAM(s) Oral every 4 hours PRN Moderate and Severe Pain (7 - 10)    ************************************************************************  MEDICATION REVIEW:  --- Pls refer to current medicatons in the body of this note  ISTOP REFERENCE: [id number]    Patient Name: Sofia SchaefferBirth Date: 1955  Address: [address] [place] [address] [place] [address]Bwl: Female  Rx Written	Rx Dispensed	Drug	Quantity	Days Supply	Prescriber Name	Payment Method	Dispenser  07/07/2020	07/07/2020	fentanyl 50 mcg/hr patch	10	30	Scott Olsen	Medicaid	[id number]  07/06/2020	07/06/2020	tramadol hcl 50 mg tablet	30	7	Christian Chrisjulio cesar Diehl DO	Medicaid	[facility][id number]  07/06/2020	07/06/2020	morphine sulfate ir 15 mg tab	56	7	Scott Olsen	[facility] #[id number]  06/12/2020	06/12/2020	alprazolam 0.25 mg tablet	15	15	Christy Salinas	Medicaid	[facility][id number]  06/03/2020	06/08/2020	diazepam 5 mg tablet	10	10	Elvira Joseph MD	Medicaid	[facility][id number]  05/26/2020	05/28/2020	diazepam 5 mg tablet	2	2	Scott Olsen	Medicaid	[facility][id number]    --- PRN usage:  Patient took 3 PRN's (9/17)  Patient took 4 PRN's the past 24h (9/16)  Patient took 3 PRN's of Dilaudid 3mg PO + Dilaudid 1mg IV x 1 from 7am to 7am (past 24h) 9/15    ------------------------------------------------------------------------  COORDINATION OF CARE:  --- Palliative Care consulted for: Pain management  --- Patient assessed: 9/16  --- Patient previously seen by Palliative Care service: NO  ADVANCE CARE PLANNING  --- Code status: FULL  --- MOLST reviewed in chart: NONE  --- HCP/ Surrogate: EDWARD  --- [facility] document found in Alpha: NONE  --- HCP/ Living will/ Other advanced directives in Alpha: YES  CARE PROVIDER DOCUMENTATION:  --- SW/CM: ZACHARY placement  --- Nutrition: Moderate protein-calorie malnutrition  --- Dr. Joseph's office: Offering more lines of chemo. Will hold off on advancing hospice talks.   --- Rad-Onc: XRT to L pelvis  --- PT recs: PT inpatient ongoing  PLAN OF CARE  --- Known admissions in past year: 3  --- Current admit date: 9/10  --- LOS: 8  --- LACE score: 14  --- Current dispo plan: TO BE DETERMINED  ------------------------------------------------------------------------  --- Chart reviewed: 30 Minutes [including time used to gather, review and transfer data to this note]  --- Start: 1:00  --- End: 1:30  Prolonged services rendered, as part of this patient's care provided by Palliative Medicine, include: i.chart review for provider and ancillary service documentation, ii.pertinent diagnostics including laboratory and imaging studies,iii. medication review including PRN use, iv. admission history including previous palliative care encounters and GOC notes, v.advance care planning documents including HCP and MOLST forms in Alpha. Part of Palliative Medicine extended evaluation and management also involves coordination of care with our IDT, the primary and consulting tonja, and unit CM/SW and Hospice if eligible. Recommendations based on the information gathered and discussed are outline in the AP of our notes.      ************************************************************************  Care coordination of SOFIAJEANNETTE SCHAEFFER was communicated with the interdisciplinary team during IDT rounds and with the primary team.

## 2020-09-18 NOTE — DISCHARGE NOTE PROVIDER - NSDCMRMEDTOKEN_GEN_ALL_CORE_FT
acetaminophen 325 mg oral tablet: 2 tab(s) orally every 6 hours, As needed, Temp greater or equal to 38C (100.4F), Mild Pain (1 - 3)  ALPRAZolam 0.25 mg oral tablet: 1 tab(s) orally 2 times a day, As needed, anxiety  ALPRAZolam 0.25 mg oral tablet: 1 tab(s) orally once a day (at bedtime) - anxiety  dexamethasone: 2 milligram(s) intravenous 2 times a day  Dilaudid: 3 milligram(s) orally every 4 hours     ELIXER LIQUID FORM  enoxaparin: 90 milligram(s) subcutaneous 2 times a day  HYDROmorphone: 1 milligram(s) intravenous every 4 hours, As Needed  levothyroxine 175 mcg (0.175 mg) oral tablet: 1 tab(s) orally once a day  lidocaine 5% topical film: Apply topically to affected area once a day  liothyronine 5 mcg oral tablet: 1 tab(s) orally once a day  Maalox Plus 200 mg-200 mg-20 mg/5 mL oral suspension: 30 milliliter(s) orally every 6 hours, As Needed  melatonin 5 mg oral tablet: 1 tab(s) orally once a day (at bedtime)  metoprolol succinate 50 mg oral tablet, extended release: 1 tab(s) orally once a day  morphine 30 mg/8 to 12 hr oral tablet, extended release: 1 tab(s) orally 2 times a day  naloxone: 0.2 milligram(s) intravenously every 3 hours, As Needed  polyethylene glycol 3350 oral powder for reconstitution: 17 gram(s) orally once a day  Protonix 40 mg oral delayed release tablet: 1 tab(s) orally once a day (before a meal)  senna oral tablet: 2 tab(s) orally once a day (at bedtime)   acetaminophen 325 mg oral tablet: 2 tab(s) orally every 6 hours, As needed, Temp greater or equal to 38C (100.4F), Mild Pain (1 - 3)  ALPRAZolam 0.25 mg oral tablet: 1 tab(s) orally 2 times a day, As needed, anxiety  dexamethasone 2 mg oral tablet: 1 tab(s) orally once a day  Last dose on Oct 5 th  enoxaparin: 90 milligram(s) subcutaneous 2 times a day  HYDROmorphone: 3 milligram(s) orally every 4 hours, As Needed moderate and severe pain  ipratropium-albuterol 0.5 mg-2.5 mg/3 mLinhalation solution: 3 milliliter(s) inhaled every 6 hours, As needed, Shortness of Breath and/or Wheezing  lactulose 10 g/15 mL oral syrup: 30 milliliter(s) orally 2 times a day  levothyroxine 175 mcg (0.175 mg) oral tablet: 1 tab(s) orally once a day  liothyronine 5 mcg oral tablet: 1 tab(s) orally once a day  melatonin 3 mg oral tablet: 1 tab(s) orally once a day (at bedtime)  methadone 5 mg oral tablet: 1 tab(s) orally 2 times a day  metoprolol succinate 50 mg oral tablet, extended release: 1 tab(s) orally once a day  naloxone 4 mg/0.1 mL nasal spray: 1 spray(s) nasal q 2-3 min alternating between nostrils   Protonix 40 mg oral delayed release tablet: 1 tab(s) orally once a day  Protonix 40 mg oral delayed release tablet: 1 tab(s) orally once a day (before a meal)  senna oral tablet: 2 tab(s) orally once a day (at bedtime)

## 2020-09-18 NOTE — DISCHARGE NOTE PROVIDER - HOSPITAL COURSE
64yoF w/ PMHx of stage IV lung adenocarcinoma ( dx 2017) s/p RLL lobectomy with recurrence in May 2020 s/p palliative radiation to clavicle and spine and 1 round of carbo/pemextred/pembrolizumab chemo 7/31, hypothyroidism, anxiety, recent admission for pathologic L femoral neck fracture s/p left QASIM on 8/18 discharged to [facility] on xarelto 10mg or DVT ppx, p/t OSH w/ worsening L hip pain and hypoxia, found to have acute LLE DVT and worsening hypoxic respiratory failure 2/2 lung cancer with worsening R sided effusion.   Transferred to [facility] for Palliative RT     Hospital Course  Hip pain, left.   -Recent total hip replacement for pathological L femoral neck fracture (8/18/2020), currently with back and L hip pain. Likely partially 2/2 acute DVT, recent surgery and sacral lesion that is likely contributing to her pain.  - Received dilaudid IV and morphine for pain   - palliative followed & patient received decadron IV BID for bone pain.  -Rad/Onc consulted-->s/p sim 9/14 -9/21 for Left Hip/Femur RT (5 fx Total)    Stage IV adenocarcinoma of lung. Patient has stage 4 lung adenocarcinoma (RLL lobectomy in 2017, recurrence in May 2020 s/p palliative rad and chemo)  -CTA chest/ abd/ pelvis show increased R pleural effusion from prior, ill defined R hilar mass, hepatic/ L adrenal and extensive bony metastases new from 2019, new R renal lesion. Screw fixated pathologic L hip fxr. L supracondylar distal femur lesion   -Pt is s/p Thoracentesis on 9/8, 200ml fluid removed, cytopathology suspicious for adenoca.  -As per outpatient Onc, would like to continue chemo, but patient needs to be able to sit up in a chair without significant pain for at least 3-4 hours to do so. Patient needs further rehab prior to restarting chemo. Will follow with [facility] upon discharge, [place] [facility] [place]    DVT of left lower extremity  -Patient found to have L common femoral DVT on CT  -c/w Lovenox    Acute respiratory failure with hypoxia.    -Patient presented with acute hypoxic respiratory failure in setting of extensive lung cancer, increasing pleural effusion with associated atelectasis; ?R pleural thickening. No PE noted on imaging.   - c/w supplemental O2, wean as tolerated  - c/w incentive spirometry    Anemia.  -Patient labs show anemia with slow decline since early this year.   -7.4 on admission at [facility] FOBT (-); ?2/2 malignancy, chemo, or anemia of chronic disease.  -s/p 1U pRBC at [facility], Hgb now stable    Hypothyroid  -c/w home levothyroxine and liothyronine.      Dispo- On ___ this case was reviewed with  ____, the patient is medically stable and optimized for discharge. All medications were reviewed and prescriptions were sent to mutually agreed upon pharmacy.   64yoF w/ PMHx of stage IV lung adenocarcinoma ( dx 2017) s/p RLL lobectomy with recurrence in May 2020 s/p palliative radiation to clavicle and spine and 1 round of carbo/pemextred/pembrolizumab chemo 7/31, hypothyroidism, anxiety, recent admission for pathologic L femoral neck fracture s/p left QASIM on 8/18 discharged to [facility] on xarelto 10mg or DVT ppx, p/t OSH w/ worsening L hip pain and hypoxia, found to have acute LLE DVT and worsening hypoxic respiratory failure 2/2 lung cancer with worsening R sided effusion.   Transferred to [facility] for Palliative RT     Hospital Course  Hip pain, left.   -Recent total hip replacement for pathological L femoral neck fracture (8/18/2020), currently with back and L hip pain. Likely partially 2/2 acute DVT, recent surgery and sacral lesion that is likely contributing to her pain.  - Received dilaudid IV and morphine for pain   - palliative followed & patient received decadron IV BID for bone pain.  -Rad/Onc consulted-->s/p sim 9/14 -9/21 for Left Hip/Femur RT (5 fx Total)    Stage IV adenocarcinoma of lung. Patient has stage 4 lung adenocarcinoma (RLL lobectomy in 2017, recurrence in May 2020 s/p palliative rad and chemo)  -CTA chest/ abd/ pelvis show increased R pleural effusion from prior, ill defined R hilar mass, hepatic/ L adrenal and extensive bony metastases new from 2019, new R renal lesion. Screw fixated pathologic L hip fxr. L supracondylar distal femur lesion   -Pt is s/p Thoracentesis on 9/8, 200ml fluid removed, cytopathology suspicious for adenoca.  -As per outpatient Onc, would like to continue chemo, but patient needs to be able to sit up in a chair without significant pain for at least 3-4 hours to do so. Patient needs further rehab prior to restarting chemo. Will follow with [facility] upon discharge, f/u with [facility] [place] [place] Dr. Joseph    DVT of left lower extremity  -Patient found to have L common femoral DVT on CT  -c/w Lovenox    Acute respiratory failure with hypoxia.    -Patient presented with acute hypoxic respiratory failure in setting of extensive lung cancer, increasing pleural effusion with associated atelectasis; ?R pleural thickening. No PE noted on imaging.   - c/w supplemental O2, wean as tolerated  - c/w incentive spirometry    Anemia.  -Patient labs show anemia with slow decline since early this year.   -7.4 on admission at [facility]. FOBT (-); ?2/2 malignancy, chemo, or anemia of chronic disease.  -s/p 1U pRBC at [facility], Hgb now stable    Hypothyroid  -c/w home levothyroxine and liothyronine.      Dispo- On 9/25/2020 this case was reviewed with Dr. Calvo , the patient is medically stable and optimized for discharge. 64yoF w/ PMHx of stage IV lung adenocarcinoma ( dx 2017) s/p RLL lobectomy with recurrence in May 2020 s/p palliative radiation to clavicle and spine and 1 round of carbo/pemextred/pembrolizumab chemo 7/31, hypothyroidism, anxiety, recent admission for pathologic L femoral neck fracture s/p left QASIM on 8/18 discharged to [facility] on xarelto 10mg or DVT ppx, p/t OSH w/ worsening L hip pain and hypoxia, found to have acute LLE DVT and worsening hypoxic respiratory failure 2/2 lung cancer with worsening R sided effusion.   Transferred to [facility] for Palliative RT     Hospital Course  Hip pain, left.   -Recent total hip replacement for pathological L femoral neck fracture (8/18/2020), currently with back and L hip pain. Likely partially 2/2 acute DVT, recent surgery and sacral lesion that is likely contributing to her pain.  - Received dilaudid IV and morphine for pain   - palliative followed & patient received decadron IV BID for bone pain.  -Rad/Onc consulted-->s/p sim 9/14 -9/21 for Left Hip/Femur RT (5 fx Total)    Stage IV adenocarcinoma of lung. Patient has stage 4 lung adenocarcinoma (RLL lobectomy in 2017, recurrence in May 2020 s/p palliative rad and chemo)  -CTA chest/ abd/ pelvis show increased R pleural effusion from prior, ill defined R hilar mass, hepatic/ L adrenal and extensive bony metastases new from 2019, new R renal lesion. Screw fixated pathologic L hip fxr. L supracondylar distal femur lesion   -Pt is s/p Thoracentesis on 9/8, 200ml fluid removed, cytopathology suspicious for adenoca.  -As per outpatient Onc, would like to continue chemo, but patient needs to be able to sit up in a chair without significant pain for at least 3-4 hours to do so. Patient needs further rehab prior to restarting chemo. Will follow with [facility] upon discharge, f/u with [facility] Dr. Joseph    DVT of left lower extremity  -Patient found to have L common femoral DVT on CT  -c/w Lovenox    Acute respiratory failure with hypoxia.    -Patient presented with acute hypoxic respiratory failure in setting of extensive lung cancer, increasing pleural effusion with associated atelectasis; ?R pleural thickening. No PE noted on imaging.   - c/w supplemental O2, wean as tolerated  - c/w incentive spirometry    Anemia.  -Patient labs show anemia with slow decline since early this year.   -7.4 on admission at [facility]. FOBT (-); ?2/2 malignancy, chemo, or anemia of chronic disease.  -s/p 1U pRBC at [facility], Hgb now stable    Hypothyroid  -c/w home levothyroxine and liothyronine.      Dispo- On 9/25/2020 this case was reviewed with Dr. Calvo , the patient is medically stable and optimized for discharge.    Attending addendum:  This is a 64F with PMH of metastatic lung ca who was transferred from [facility] for RT. Pt completed 5 fractions of RT to the left hip/femur w/o issues. She was followed by rad onc, onc, ortho, as well as pall care. Course complicated by worsening right thigh pain. Imaging was concerning for metastatic focus which is contributing to pain. Per rad onc/ortho no plans for acute intervention. Pall care also made final recs re: pain regimen. No changes at this time. Hospitalization prolonged due to need for additional workup as well as further discussion w/ family re: placement. Family wanted to inquire on inpatient chemotherapy. However after extension dsicuss w/ oncologist - IP chemo would not be ideal given her current performance status, recent RT. Recommendation was for further rehab optimization. Further eval to be pursued as OP for chemo. Hospice is an option available once pt amendable, but for now, family wants to continue with treatment. At this point, there is no further need for pt to remain in the inpt setting. She will be DC to [facility] with plans for close OP f/u. 45 minutes spent preparing DC, counseling pt, and coordination of care.

## 2020-09-18 NOTE — DISCHARGE NOTE PROVIDER - CARE PROVIDER_API CALL
Elvira Joseph  MEDICAL ONCOLOGY  [facility], [address] [place] [address]  Phone: [phone]  Fax: [phone]  Follow Up Time:    Elvira Joseph  MEDICAL ONCOLOGY  [facility], [address] [place] [address]  Phone: [phone]  Fax: [phone]  Follow Up Time:     Augustine Andres  HEMATOLOGY  [address] [place] [id number]  Phone: [phone]  Fax: [phone]  Follow Up Time:

## 2020-09-18 NOTE — DISCHARGE NOTE PROVIDER - NSDCCPCAREPLAN_GEN_ALL_CORE_FT
PRINCIPAL DISCHARGE DIAGNOSIS  Diagnosis: Adenocarcinoma of lung, stage 4, unspecified laterality  Assessment and Plan of Treatment: You completed 5 fractions of radiation. Continue physical therapy at Rehab facility.  Follow up w/ Dr. Joseph for outpatient chemotherapy once you are able to sit up in a chair without significant pain for at least 3-4 hours.      SECONDARY DISCHARGE DIAGNOSES  Diagnosis: Respiratory failure  Assessment and Plan of Treatment: Likely due to lung cancer. Continue supplemental oxygen as needed.    Diagnosis: Hypothyroidism  Assessment and Plan of Treatment: Continue levothyroxine and cytomel daily.    Diagnosis: Anemia  Assessment and Plan of Treatment: Follow-up with your outpatient provider if further treatment is warranted. Monitor for signs/symptoms indicating worsening of disease, such as, easy bleeding/bruising, pale skin, fatigue, dizziness, increased heart rate, or chest pain.      Diagnosis: Acute deep vein thrombosis (DVT) of distal vein of lower extremity, unspecified laterality  Assessment and Plan of Treatment: Continue lovenox as precribed.    Diagnosis: Acute pain  Assessment and Plan of Treatment: Continue ___ for pain control. You recieved IV decadron for bone pain.  Continue physical therapy at Rehab to improve your strength.        PRINCIPAL DISCHARGE DIAGNOSIS  Diagnosis: Adenocarcinoma of lung, stage 4, unspecified laterality  Assessment and Plan of Treatment: You completed 5 fractions of radiation. Continue physical therapy at Rehab facility.  Follow up at [facility] with Dr. Andres  Follow up w/ Dr. Joseph for outpatient chemotherapy once you are able to sit up in a chair without significant pain for at least 3-4 hours.      SECONDARY DISCHARGE DIAGNOSES  Diagnosis: Respiratory failure  Assessment and Plan of Treatment: Likely due to lung cancer. Continue supplemental oxygen as needed.    Diagnosis: Hypothyroidism  Assessment and Plan of Treatment: Continue levothyroxine and cytomel daily.    Diagnosis: Anemia  Assessment and Plan of Treatment: Follow-up with your outpatient provider if further treatment is warranted. Monitor for signs/symptoms indicating worsening of disease, such as, easy bleeding/bruising, pale skin, fatigue, dizziness, increased heart rate, or chest pain.  You will need a CBC in 1 week to trend your CBC.      Diagnosis: Acute pain  Assessment and Plan of Treatment: Continue Dilaudid for pain control. You recieved IV decadron for bone pain.  Continue physical therapy at Rehab to improve your strength.       Diagnosis: Acute deep vein thrombosis (DVT) of distal vein of lower extremity, unspecified laterality  Assessment and Plan of Treatment: Continue lovenox as precribed.

## 2020-09-18 NOTE — DISCHARGE NOTE PROVIDER - NSDCFUADDAPPT_GEN_ALL_CORE_FT
If you are in need of a general medicine physician and post-discharge medical follow-up for further care/recommendations you may contact the [facility] for an appointment [phone]

## 2020-09-18 NOTE — PROGRESS NOTE ADULT - PROBLEM SELECTOR PLAN 1
plan for RT to L femur, start RT for 5 sessions 3/5 so far  await rad onc plan  apprec [facility] f.u for pain management

## 2020-09-18 NOTE — DISCHARGE NOTE PROVIDER - NSDCFUSCHEDAPPT_GEN_ALL_CORE_FT
NADER SCHAEFFER ; 09/25/2020 ; [facility]  NADER SCHAEFFER ; 09/25/2020 ; [facility]  NADER SCHAEFFER ; 11/12/2020 ; NP Endocrin [address] NADER SCHAEFFER ; 11/04/2020 ; [facility]  NADER SCHAEFFER ; 11/12/2020 ; [facility] [address]

## 2020-09-18 NOTE — PROGRESS NOTE ADULT - ATTENDING COMMENTS
rehab once RT complete
Stage IV adenocarcinoma of lung- bone mets, s/p L THR after pathologic fracture  LLE DVT- On subcut Lovenox  Pleural effusion- s/p thoracentesis with 200ml fluid removed  Pain control- palliative care recs appreciated- on MS Contin, decadron, and prn Dilaudid  Transfer to [facility] for inpatient XRT  Oncology following- patient wishes to transfer onc care to [facility]
await pall care f/u for discussion regarding poss med adjustment
d/w pt about being able to sit in a chair 3-4 hours for chemo; states she is only up in a chair for about 20 min  d/w pt importance of rehab after RT for strength training to be able to sit for chemo, she agrees
pt will need to go to rehab likely before chemo resumes as pt needs to be able to sit in a chair for 3-4 hours
rehab once RT complete

## 2020-09-19 NOTE — PROGRESS NOTE ADULT - SUBJECTIVE AND OBJECTIVE BOX
PROGRESS NOTE:     Patient is a 64y old  Female who presents with a chief complaint of Transferred from [facility] to [facility] for potential RT (18 Sep 2020 16:12)    SUBJECTIVE / OVERNIGHT EVENTS: Patient seen and evaluated at bedside. Alert, awake, communicative, denies any current complaints. No overnight events.     ADDITIONAL REVIEW OF SYSTEMS:    MEDICATIONS  (STANDING):  dexAMETHasone  Injectable 2 milliGRAM(s) IV Push two times a day  enoxaparin Injectable 90 milliGRAM(s) SubCutaneous two times a day  levothyroxine 175 MICROGram(s) Oral daily  liothyronine 5 MICROGram(s) Oral daily  melatonin 3 milliGRAM(s) Oral at bedtime  methadone    Tablet 5 milliGRAM(s) Oral two times a day  metoprolol succinate ER 50 milliGRAM(s) Oral daily  pantoprazole    Tablet 40 milliGRAM(s) Oral before breakfast  polyethylene glycol 3350 17 Gram(s) Oral two times a day  senna 2 Tablet(s) Oral at bedtime    MEDICATIONS  (PRN):  acetaminophen   Tablet .. 650 milliGRAM(s) Oral every 6 hours PRN Temp greater or equal to 38C (100.4F), Mild Pain (1 - 3)  ALPRAZolam 0.25 milliGRAM(s) Oral two times a day PRN anxiety  HYDROmorphone   Tablet 3 milliGRAM(s) Oral every 4 hours PRN Moderate and Severe Pain (7 - 10)    CAPILLARY BLOOD GLUCOSE    I&O's Summary    18 Sep 2020 07:01  -  19 Sep 2020 07:00  --------------------------------------------------------  IN: 720 mL / OUT: 0 mL / NET: 720 mL    PHYSICAL EXAM:  Vital Signs Last 24 Hrs  T(C): 36.8 (19 Sep 2020 14:36), Max: 36.8 (19 Sep 2020 14:36)  T(F): 98.2 (19 Sep 2020 14:36), Max: 98.2 (19 Sep 2020 14:36)  HR: 84 (19 Sep 2020 14:36) (70 - 84)  BP: 119/63 (19 Sep 2020 14:36) (115/60 - 127/64)  BP(mean): --  RR: 15 (19 Sep 2020 14:36) (15 - 19)  SpO2: 100% (19 Sep 2020 14:36) (100% - 100%)    CONSTITUTIONAL: NAD, well-developed, elderly female sitting up in bed.   RESPIRATORY: Normal respiratory effort; lungs are clear to auscultation bilaterally  CARDIOVASCULAR: Regular rate and rhythm, normal S1 and S2, no murmur/rub/gallop; No lower extremity edema;   ABDOMEN: Nontender to palpation, normoactive bowel sounds,   MUSCLOSKELETAL: no clubbing or cyanosis of digits; no joint swelling or tenderness to palpation  PSYCH: A+O to person, place, and time; affect appropriate    LABS: reviewed                         10.0   13.10 )-----------( 266      ( 19 Sep 2020 06:10 )             32.7     09-19    133<L>  |  92<L>  |  12  ----------------------------<  109<H>  4.0   |  26  |  0.36<L>    Ca    9.9      19 Sep 2020 06:10  Mg     2.0     09-19    TPro  7.3  /  Alb  3.5  /  TBili  0.7  /  DBili  x   /  AST  14  /  ALT  18  /  AlkPhos  168<H>  09-18    RADIOLOGY & ADDITIONAL TESTS:  Results Reviewed:   Imaging Personally Reviewed:  Electrocardiogram Personally Reviewed:    COORDINATION OF CARE:  Care Discussed with Consultants/Other Providers [Y/N]:  Prior or Outpatient Records Reviewed [Y/N]:

## 2020-09-19 NOTE — PROGRESS NOTE ADULT - PROBLEM SELECTOR PLAN 1
plan for RT to L femur, start RT for 5 sessions 3/5 so far  await rad onc plan  [facility] f[facility] for pain management

## 2020-09-20 NOTE — PROGRESS NOTE ADULT - SUBJECTIVE AND OBJECTIVE BOX
PROGRESS NOTE:     Patient is a 64y old  Female who presents with a chief complaint of Transferred [facility] for potential RT (18 Sep 2020 16:12)    SUBJECTIVE / OVERNIGHT EVENTS: Patient seen and evaluated at bedside. Alert, awake, communicative, denies any current complaints. No overnight events.     ADDITIONAL REVIEW OF SYSTEMS:    MEDICATIONS  (STANDING):  dexAMETHasone  Injectable 2 milliGRAM(s) IV Push two times a day  enoxaparin Injectable 90 milliGRAM(s) SubCutaneous two times a day  levothyroxine 175 MICROGram(s) Oral daily  liothyronine 5 MICROGram(s) Oral daily  melatonin 3 milliGRAM(s) Oral at bedtime  methadone    Tablet 5 milliGRAM(s) Oral two times a day  metoprolol succinate ER 50 milliGRAM(s) Oral daily  pantoprazole    Tablet 40 milliGRAM(s) Oral before breakfast  polyethylene glycol 3350 17 Gram(s) Oral two times a day  senna 2 Tablet(s) Oral at bedtime    MEDICATIONS  (PRN):  acetaminophen   Tablet .. 650 milliGRAM(s) Oral every 6 hours PRN Temp greater or equal to 38C (100.4F), Mild Pain (1 - 3)  albuterol/ipratropium for Nebulization 3 milliLiter(s) Nebulizer every 6 hours PRN Shortness of Breath and/or Wheezing  ALPRAZolam 0.25 milliGRAM(s) Oral two times a day PRN anxiety  HYDROmorphone   Tablet 3 milliGRAM(s) Oral every 4 hours PRN Moderate and Severe Pain (7 - 10)    CAPILLARY BLOOD GLUCOSE  I&O's Summary    PHYSICAL EXAM:  Vital Signs Last 24 Hrs  T(C): 36.7 (20 Sep 2020 15:00), Max: 36.9 (20 Sep 2020 05:59)  T(F): 98 (20 Sep 2020 15:00), Max: 98.5 (20 Sep 2020 05:59)  HR: 96 (20 Sep 2020 15:00) (88 - 96)  BP: 122/75 (20 Sep 2020 15:00) (122/75 - 147/63)  BP(mean): --  RR: 18 (20 Sep 2020 15:00) (18 - 19)  SpO2: 98% (20 Sep 2020 15:00) (97% - 99%)    CONSTITUTIONAL: NAD, well-developed, elderly female sitting up in bed.   RESPIRATORY: Normal respiratory effort; lungs are clear to auscultation bilaterally  CARDIOVASCULAR: Regular rate and rhythm, normal S1 and S2, no murmur/rub/gallop; No lower extremity edema;   ABDOMEN: Nontender to palpation, normoactive bowel sounds,   MUSCLOSKELETAL: no clubbing or cyanosis of digits; no joint swelling or tenderness to palpation  PSYCH: A+O to person, place, and time; affect appropriate    LABS: reviewed                         10.1   14.03 )-----------( 277      ( 20 Sep 2020 05:30 )             34.1     09-20    136  |  93<L>  |  10  ----------------------------<  94  4.3   |  27  |  0.33<L>    Ca    10.1      20 Sep 2020 05:30  Mg     2.0     09-20    RADIOLOGY & ADDITIONAL TESTS:  Results Reviewed:   Imaging Personally Reviewed:  Electrocardiogram Personally Reviewed:    COORDINATION OF CARE:  Care Discussed with Consultants/Other Providers [Y/N]:  Prior or Outpatient Records Reviewed [Y/N]:

## 2020-09-20 NOTE — PROGRESS NOTE ADULT - PROBLEM SELECTOR PLAN 1
Plan for RT to L femur, start RT for 5 sessions 3/5 so far  Await rad onc plan.   Apprec pall care f.u for pain management.  Oxygen suppl as needed via NC. Periodically on 1-2L.

## 2020-09-21 NOTE — PROGRESS NOTE ADULT - PROBLEM SELECTOR PLAN 1
RT of left femur completed.   Apprec pall care f.u for pain management.  Oxygen suppl as needed via NC. Periodically on 1-2L. RT of left femur - last fracture to be delivered on 9/23.  Apprec pall care f.u for pain management.  Oxygen suppl as needed via NC. Periodically on 1-2L.

## 2020-09-21 NOTE — PROGRESS NOTE ADULT - PROBLEM SELECTOR PLAN 4
DVT ppx: Lovenox BID therapeutic   Dispo: to rehab today. DVT ppx: Lovenox BID therapeutic   Dispo: to rehab after RT completes. DVT ppx: Lovenox BID therapeutic   Dispo: to rehab after RT completes.    Communication: Spoke w/ dtr - updated on current status and POC. She inquired on potential chemo therapy while she is here in the hospital. Per onc assessment - they advised senior care f/u up on DC. Will touch base w/ onc for further information.

## 2020-09-21 NOTE — PROGRESS NOTE ADULT - PROBLEM SELECTOR PLAN 2
Cont treatment dose lovenox as below for identified DVT Cont treatment dose lovenox as below for identified DVT. Will put RN communication to teach pt to self-administer.

## 2020-09-21 NOTE — PROGRESS NOTE ADULT - SUBJECTIVE AND OBJECTIVE BOX
SUBJECTIVE:  Follow up for radiation therapy.    The patient was seen and evaluated. She returned from RT. She is complaining of severe pain, awaiting nurse to return w/ her meds. Denies any NV, SOB, CP.       ROS: All systems negative except as noted.      Vital Signs Last 24 Hrs  T(C): 36.4 (21 Sep 2020 05:38), Max: 36.8 (20 Sep 2020 22:22)  T(F): 97.5 (21 Sep 2020 05:38), Max: 98.2 (20 Sep 2020 22:22)  HR: 72 (21 Sep 2020 05:38) (72 - 96)  BP: 113/64 (21 Sep 2020 05:38) (113/64 - 122/75)  BP(mean): --  RR: 18 (21 Sep 2020 05:38) (18 - 18)  SpO2: 100% (21 Sep 2020 05:38) (98% - 100%)      PHYSICAL EXAM:  Gen- In bed, comfortable, NAD. Appears stated age.  Eyes- EOMI, PERRLA, nonicteric.  EMNT- Fair dentition. MMM. No tonsilar exudates. No posterior pharynx erythema.  Neck- Supple. No masses. No tracheal deviation.  Resp- CTAB, good effort. No r/r/w. No accessory muscle use.  CVS- RRR, S1S2, no g/r/m. No LE edema.  GI- Soft abd, NT, ND, +BSx4. No HSM.  MSK- No C/C. Left knee in brace/immobilizer in place. No crepitus.  Neuro- CN II-XII intact. Speech fluent/face symmetric. Sensation intact.  Skin- No rashes/ulcers. Warm/moist.  Psych- AAOx3. Appropriate mood/affect.      MEDICATION:  MEDICATIONS  (STANDING):  enoxaparin Injectable 90 milliGRAM(s) SubCutaneous two times a day  levothyroxine 175 MICROGram(s) Oral daily  liothyronine 5 MICROGram(s) Oral daily  melatonin 3 milliGRAM(s) Oral at bedtime  methadone    Tablet 5 milliGRAM(s) Oral two times a day  metoprolol succinate ER 50 milliGRAM(s) Oral daily  pantoprazole    Tablet 40 milliGRAM(s) Oral before breakfast  polyethylene glycol 3350 17 Gram(s) Oral two times a day  senna 2 Tablet(s) Oral at bedtime    MEDICATIONS  (PRN):  acetaminophen   Tablet .. 650 milliGRAM(s) Oral every 6 hours PRN Temp greater or equal to 38C (100.4F), Mild Pain (1 - 3)  albuterol/ipratropium for Nebulization 3 milliLiter(s) Nebulizer every 6 hours PRN Shortness of Breath and/or Wheezing  ALPRAZolam 0.25 milliGRAM(s) Oral two times a day PRN anxiety  HYDROmorphone   Tablet 3 milliGRAM(s) Oral every 4 hours PRN Moderate and Severe Pain (7 - 10)            LABORATORY:                          10.6   11.78 )-----------( 262      ( 21 Sep 2020 05:50 )             35.6     09-21    136  |  93<L>  |  9   ----------------------------<  119<H>  4.6   |  29  |  0.35<L>    Ca    10.8<H>      21 Sep 2020 05:50  Mg     2.0     09-21

## 2020-09-22 NOTE — PROGRESS NOTE ADULT - PROBLEM SELECTOR PLAN 3
LLE pop DVT  Cont treatment dose lovenox XR of femur/hip. Reassess. No changes to pain regimen as per pall care. Appreciate f/u.

## 2020-09-22 NOTE — PROGRESS NOTE ADULT - PROBLEM SELECTOR PLAN 1
.  > Pt still has some forgetfulness, confusion. According to Kena, this is chronic.  > Continue clear communication as you are  > Pls make sure Kena is updated about her care

## 2020-09-22 NOTE — PROGRESS NOTE ADULT - ASSESSMENT
65yo F w/ PMHx of stage IV lung adenocarcinoma ( dx 2017) s/p RLL lobectomy with recurrence in May 2020 s/p palliative radiation to clavicle and spine and 1 round of carbo/pemextred/pembrolizumab chemo 7/31, hypothyroidism, anxiety, recent admission for pathologic L femoral neck fracture s/p left QASIM on 8/18 presented from [facility] as a [facility] for RT. While here, the pt was followed by rad onc and palliative care. RT due to complete on 9/23. Methadone also started for pain control. Onc saw pt today  with further recs to follow. Will await completion of RT and anticipate dispo to [facility].

## 2020-09-22 NOTE — PROGRESS NOTE ADULT - PROBLEM SELECTOR PLAN 4
-Will trend. Somewhat dry on exam.   -IVF   -Will check PTH, PTHrp, Vit D level  -Trend. LLE pop DVT  Cont treatment dose lovenox

## 2020-09-22 NOTE — PROGRESS NOTE ADULT - SUBJECTIVE AND OBJECTIVE BOX
SUBJECTIVE:  Follow up for lung adenocarcinoma.    The patient was seen and evaluated at bedside. Returned from RT. Has mild pain, improved from yesterday. Still constipated. No BM for 4 days.      ROS: All systems negative except as noted.      Vital Signs Last 24 Hrs  T(C): 37.1 (22 Sep 2020 05:04), Max: 37.1 (22 Sep 2020 05:04)  T(F): 98.7 (22 Sep 2020 05:04), Max: 98.7 (22 Sep 2020 05:04)  HR: 88 (22 Sep 2020 05:04) (73 - 104)  BP: 128/87 (22 Sep 2020 05:04) (128/87 - 145/61)  BP(mean): --  RR: 19 (22 Sep 2020 05:04) (18 - 19)  SpO2: 99% (22 Sep 2020 05:04) (99% - 100%)      PHYSICAL EXAM:  Gen- In bed, comfortable, NAD. Appears stated age.  Resp- CTAB, good effort. No r/r/w. No accessory muscle use.  CVS- RRR, S1S2, no g/r/m. No LE edema.  GI- Soft abd, NT, ND, +BSx4. No HSM.  MSK- No C/C. Left knee in brace/immobilizer in place. No crepitus.  Neuro- CN II-XII intact. Speech fluent/face symmetric. Sensation intact.  Skin- No rashes/ulcers. Warm/moist.  Psych- AAOx3. Appropriate mood/affect.        MEDICATION:  MEDICATIONS  (STANDING):  dexAMETHasone     Tablet 2 milliGRAM(s) Oral daily  enoxaparin Injectable 90 milliGRAM(s) SubCutaneous two times a day  levothyroxine 175 MICROGram(s) Oral daily  liothyronine 5 MICROGram(s) Oral daily  melatonin 3 milliGRAM(s) Oral at bedtime  methadone    Tablet 5 milliGRAM(s) Oral two times a day  metoprolol succinate ER 50 milliGRAM(s) Oral daily  pantoprazole    Tablet 40 milliGRAM(s) Oral before breakfast  polyethylene glycol 3350 17 Gram(s) Oral two times a day  senna 2 Tablet(s) Oral at bedtime    MEDICATIONS  (PRN):  acetaminophen   Tablet .. 650 milliGRAM(s) Oral every 6 hours PRN Temp greater or equal to 38C (100.4F), Mild Pain (1 - 3)  albuterol/ipratropium for Nebulization 3 milliLiter(s) Nebulizer every 6 hours PRN Shortness of Breath and/or Wheezing  ALPRAZolam 0.25 milliGRAM(s) Oral two times a day PRN anxiety  HYDROmorphone   Tablet 3 milliGRAM(s) Oral every 4 hours PRN Moderate and Severe Pain (7 - 10)        LABORATORY:                          10.1   14.17 )-----------( 261      ( 22 Sep 2020 05:30 )             33.6     09-22    134<L>  |  93<L>  |  10  ----------------------------<  165<H>  4.0   |  25  |  0.34<L>    Ca    11.0<H>      22 Sep 2020 05:30  Phos  3.5     09-22  Mg     1.7     09-22

## 2020-09-22 NOTE — CONSULT NOTE ADULT - ASSESSMENT
Impression:  64F h/o stage IV lung adenocarcinoma (dx 2017) s/p RLL lobectomy with recurrence in May 2020 s/p palliative radiation to clavicle and spine and 1 round of carbo/pemextred/pembrolizumab chemo 7/31, hypothyroidism, anxiety, recent admission for pathologic L femoral neck fracture s/p left QASIM on 8/18 transferred from [facility] for RT. Started RT 9/15/20 for 5 sessions.    Recommendations:   - Initially Dx w/ Stage IA adenocarcinoma in 3/2017 s/p R lower lobectomy. She was placed on surveillance and was found to have evidence of recurrence in 5/2020 with metastatic disease to bone. PD-L1 < 1%. s/p 1st cycle of palliative chemo with Pemetrexed/pembrolizumab/carboplatin 7/31.   - Patient has pathologic Fx of clavicle s/p palliative RT to the L clavicle, T6-T8 spine, and T12-L2 spine.   - s/p L hemiarthroplasty on 8/18 for acute impacted left femoral neck fracture 2/2 bone mets.  - Now s/p 4 of 5 RT sessions, last session will be 9/23/20.  - Patient to follow up at [facility] with Dr. Andres    ***Note is incomplete. Will discuss plan with fellow and attending.     Umair Rangel MD  Internal Medicine PGY-2  [phone] / [phone] Impression:  64F h/o stage IV lung adenocarcinoma (dx 2017) s/p RLL lobectomy with recurrence in May 2020 s/p palliative radiation to clavicle and spine and 1 round of carbo/pemextred/pembrolizumab chemo 7/31, hypothyroidism, anxiety, recent admission for pathologic L femoral neck fracture s/p left QASIM on 8/18 transferred from [facility] for RT. Started RT 9/15/20 for 5 sessions.    Recommendations:   #LE DVT  - Known DVT of left femoral vein on therapeutic Lovenox 90mg subQ BID  - Now with mottled skin of right anterior thigh and tenderness to palpation of that area, but no swelling noted. Given malignancy and recent immobility, high suspicion for DVT of RLE.  - Recommend repeating LE Doppler    #Lung Adenocarcinoma  - Initially Dx w/ Stage IA adenocarcinoma in 3/2017 s/p R lower lobectomy. She was placed on surveillance and was found to have evidence of recurrence in 5/2020 with metastatic disease to bone. PD-L1 < 1%. s/p 1st cycle of palliative chemo with Pemetrexed/pembrolizumab/carboplatin 7/31.   - Patient has pathologic Fx of clavicle s/p palliative RT to the L clavicle, T6-T8 spine, and T12-L2 spine.   - s/p L hemiarthroplasty on 8/18 for acute impacted left femoral neck fracture 2/2 bone mets.  - Now s/p 4 of 5 RT sessions, last session will be 9/23/20.  - No plans for any chemotherapy inpatient.  - Patient to follow up at [facility] with Dr. Andres    Plan discussed with Dr. O[place].    Umair Rangel MD  Internal Medicine PGY-2  [phone] / [phone]

## 2020-09-22 NOTE — PROGRESS NOTE ADULT - PROBLEM SELECTOR PLAN 2
.  1) Left hip pain due to progression of metastatic disease, L hip, h/o pathologic fx s/p surgery and XRT  2) RIGHT THIGH PAIN MSK  3) Centralized pain 2/2 anxiety    - See below for high risk medication use and risk factor for dependency and chemical coping  - Monitoring for opioid induced neurotoxicity    Current meds:   Methadone 5mg PO BID  Dilaudid 3mg PO q4h PRN    > No changes to pain regimen. Her cancer-related pain is WELL CONTROLLED.  > Recommend medical team to evaluate right thigh pain  > Patient has centralized pain due to anxiety. She is clock-watching. Pls get Behavioral Health if the patient will likely stay for a prolonged period in the hospital. Otherwise, Kena is trying to find a Psychiatrist her mom can see long-term

## 2020-09-22 NOTE — PROGRESS NOTE ADULT - ASSESSMENT
64yoF w/ PMHx of stage IV lung adenocarcinoma ( dx 2017) s/p RLL lobectomy with recurrence in May 2020 s/p palliative radiation to clavicle and spine and 1 round of carbo/pemextred/pembrolizumab chemo 7/31, recent admission for pathologic L femoral neck fracture s/p left QASIM on 8/18, presented from [facility] for left hip pain, hypoxemia and anemia. Transferred to [facility] for XRT. Palliative Medicine was consulted to evaluate and manage complex symptomatology related to the patient's life-limiting illness.

## 2020-09-22 NOTE — CONSULT NOTE ADULT - ATTENDING COMMENTS
Pt seen examined and d/w heme onc resident. AGree with A/P as above. H/O met NSCLCa with clinical course as above revd with pt at bedside. Transfered from [facility] for XRT at [facility]. She deneis any specific complaint at this time. PE remarkable for sclerae anicteric, Lung sclear, Heart RRR S1 S@ Abd NABS soft / NT, no HSM/ masses. LLE with soft immod cast. RLE with some mild skin mottling distal LL; warm to touch, no swelling or palp cord / tenderness. A/P Agree with A/P as above. To complete XRT tomorrow. Has known LLE DVT an don Lovenox but in light of having met ca, prior DVT and  what seems to be new skin mottling have recommended we get a RLE duplex doppler to r/o DVY while on Lovenox. Further systemic anti-ca therapy as outpt - pt requesting transferring her care to the [facility] - will coordinate

## 2020-09-22 NOTE — CONSULT NOTE ADULT - SUBJECTIVE AND OBJECTIVE BOX
Oncology Consult Note    HPI:  63yo F w/ PMHx of stage IV lung adenocarcinoma ( dx 2017) s/p RLL lobectomy with recurrence in May 2020 s/p palliative radiation to clavicle and spine and 1 round of carbo/pemextred/pembrolizumab chemo 7/31, hypothyroidism, anxiety, recent admission for pathologic L femoral neck fracture s/p left QASIM on 8/18 presented [facility] as a [facility] for RT.  Patient was initially admitted to [facility] for desat to 80s and anemia. Patient placed on 3L NC and recuperated. CTA w/o signs of PE but identifying pleural effusion. US b/l LE showing L common femoral DVT. Vascular surgery, ortho, oncology, pulm and palliative service consulted there. While admitted there, patient obtained therapeutic/diagnostic thoracentesis. 9/9 switched to lovenox 90 qd. Rad onc consulted, pt would like inpatient radiology onc tx to lesions on her left femur and aetabulum. and as a result she was trasnferred to [facility]. (10 Sep 2020 20:42)    Oncology history  3/24/17 stage IA adenocarcinoma s/p RLL lobectomy and mediastinal LN dissection.  5/12/20 CT chest: increased focal thickening along the suture line, findings concerning for recurrence.  5/28/20 PET CT: RLL suture margin masslike soft tissue thickening 3x1.8cm suspicious for malignancy. Rt hilar and subcarinal LN likely metastasis. Multiple FDG avid lytic lesions suspicious for metastatic disease.  6/23/20 Rt lung biopsy confirms adenocarcinoma, PDL1 <1%.  7/17/20 MRI head w/ IV contrast negative for metastasis.  Found to have a pathologic fracture of the L clavicle in 7/2020. She subsequently underwent palliative RT to the L clavicle, T6-T8 spine, and T12-L2 spine.   7/31/20 1st round of palliative chemo w/ PEM/PEM/Carbo with plan for total of 4C followed by maintenance Pem/pem.  8/18/20 acute impacted left femoral neck fracture, s/p L hemiarthroplasty, path confirms metastatic poorly differentiated adenocarcinoma consistent with lung primary  9/15/20 Started RT, plan for 5 sessions (last session 9/23/20)      Allergies    Bananas (Headache)  Bananas (Other)  latex (Rash)  latex (Rash (Mild))  opioid-like analgesics (Urticaria)  penicillin (Angioedema)  penicillin (Swelling (Mild to Mod))    Intolerances        MEDICATIONS  (STANDING):  dexAMETHasone     Tablet 2 milliGRAM(s) Oral daily  enoxaparin Injectable 90 milliGRAM(s) SubCutaneous two times a day  levothyroxine 175 MICROGram(s) Oral daily  liothyronine 5 MICROGram(s) Oral daily  melatonin 3 milliGRAM(s) Oral at bedtime  methadone    Tablet 5 milliGRAM(s) Oral two times a day  metoprolol succinate ER 50 milliGRAM(s) Oral daily  pantoprazole    Tablet 40 milliGRAM(s) Oral before breakfast  polyethylene glycol 3350 17 Gram(s) Oral two times a day  senna 2 Tablet(s) Oral at bedtime    MEDICATIONS  (PRN):  acetaminophen   Tablet .. 650 milliGRAM(s) Oral every 6 hours PRN Temp greater or equal to 38C (100.4F), Mild Pain (1 - 3)  albuterol/ipratropium for Nebulization 3 milliLiter(s) Nebulizer every 6 hours PRN Shortness of Breath and/or Wheezing  ALPRAZolam 0.25 milliGRAM(s) Oral two times a day PRN anxiety  HYDROmorphone   Tablet 3 milliGRAM(s) Oral every 4 hours PRN Moderate and Severe Pain (7 - 10)      PAST MEDICAL & SURGICAL HISTORY:  Stage IV adenocarcinoma of lung    Genital herpes    Drug abuse  Back in the 70&#x27;s and 80&#x27;s (Cocaine)    Lung cancer  with mets    LAMAR on CPAP    Anxiety    Hypothyroid    History of total hip replacement, left  THR 8/18/2020    S/P partial lobectomy of lung  Right lower lobe    History of bunionectomy    Status post lobectomy of lung  3/2017 (Right lung)    S/P bunionectomy  with revision        FAMILY HISTORY:  Family history of stroke or transient ischemic attack in father    Family history of hypothyroidism    Family history of hypertension    Family history of stroke        SOCIAL HISTORY: No tobacco, EtOH, or illicit drug use.    REVIEW OF SYSTEMS:    CONSTITUTIONAL: No weakness, fevers or chills  EYES/ENT: No visual changes;  No vertigo or throat pain   NECK: No pain or stiffness  RESPIRATORY: No cough, wheezing, hemoptysis; No shortness of breath  CARDIOVASCULAR: No chest pain or palpitations  GASTROINTESTINAL: No abdominal or epigastric pain. No nausea, vomiting, or hematemesis; No diarrhea or constipation. No melena or hematochezia.  GENITOURINARY: No dysuria, frequency or hematuria  NEUROLOGICAL: No numbness or weakness  SKIN: No itching, burning, rashes, or lesions   All other review of systems is negative unless indicated above.        T(F): 98.7 (09-22-20 @ 05:04), Max: 98.7 (09-22-20 @ 05:04)  HR: 88 (09-22-20 @ 05:04)  BP: 128/87 (09-22-20 @ 05:04)  RR: 19 (09-22-20 @ 05:04)  SpO2: 99% (09-22-20 @ 05:04)  Wt(kg): --    GENERAL: NAD, well-developed  HEAD:  Atraumatic, Normocephalic  EYES: EOMI, PERRLA, conjunctiva and sclera clear  NECK: Supple, No JVD  CHEST/LUNG: Clear to auscultation bilaterally; No wheeze  HEART: Regular rate and rhythm; No murmurs, rubs, or gallops  ABDOMEN: Soft, Nontender, Nondistended; Bowel sounds present  EXTREMITIES:  2+ Peripheral Pulses, No clubbing, cyanosis, or edema  NEUROLOGY: non-focal  SKIN: No rashes or lesions    LABS:               10.1   14.17 )-----------( 261      ( 22 Sep 2020 05:30 )             33.6     09-22    134<L>  |  93<L>  |  10  ----------------------------<  165<H>  4.0   |  25  |  0.34<L>    Ca    11.0<H>      22 Sep 2020 05:30  Phos  3.5     09-22  Mg     1.7     09-22      Phosphorus Level, Serum: 3.5 mg/dL (09-22 @ 05:30)  Magnesium, Serum: 1.7 mg/dL (09-22 @ 05:30)      Radiology & Additional Testing: Oncology Consult Note    HPI:  63yo F w/ PMHx of stage IV lung adenocarcinoma ( dx 2017) s/p RLL lobectomy with recurrence in May 2020 s/p palliative radiation to clavicle and spine and 1 round of carbo/pemextred/pembrolizumab chemo 7/31, hypothyroidism, anxiety, recent admission for pathologic L femoral neck fracture s/p left QASIM on 8/18 presented from [facility] as a [facility] for [facility].  Patient was initially admitted to [facility] for desat to 80s and anemia. Patient placed on 3L NC and recuperated. CTA w/o signs of PE but identifying pleural effusion. US b/l LE showing L common femoral DVT. Vascular surgery, ortho, oncology, pulm and palliative service consulted there. While admitted there, patient obtained therapeutic/diagnostic thoracentesis. 9/9 switched to lovenox 90 qd. Rad onc consulted, pt would like inpatient radiology onc tx to lesions on her left femur and aetabulum. and as a result she was trasnferred to [facility]. (10 Sep 2020 20:42)    Oncology history  3/24/17 stage IA adenocarcinoma s/p RLL lobectomy and mediastinal LN dissection.  5/12/20 CT chest: increased focal thickening along the suture line, findings concerning for recurrence.  5/28/20 PET CT: RLL suture margin masslike soft tissue thickening 3x1.8cm suspicious for malignancy. Rt hilar and subcarinal LN likely metastasis. Multiple FDG avid lytic lesions suspicious for metastatic disease.  6/23/20 Rt lung biopsy confirms adenocarcinoma, PDL1 <1%.  7/17/20 MRI head w/ IV contrast negative for metastasis.  Found to have a pathologic fracture of the L clavicle in 7/2020. She subsequently underwent palliative RT to the L clavicle, T6-T8 spine, and T12-L2 spine.   7/31/20 1st round of palliative chemo w/ PEM/PEM/Carbo with plan for total of 4C followed by maintenance Pem/pem.  8/18/20 acute impacted left femoral neck fracture, s/p L hemiarthroplasty, path confirms metastatic poorly differentiated adenocarcinoma consistent with lung primary  9/15/20 Started RT, plan for 5 sessions (last session 9/23/20)      Allergies    Bananas (Headache)  Bananas (Other)  latex (Rash)  latex (Rash (Mild))  opioid-like analgesics (Urticaria)  penicillin (Angioedema)  penicillin (Swelling (Mild to Mod))    Intolerances        MEDICATIONS  (STANDING):  dexAMETHasone     Tablet 2 milliGRAM(s) Oral daily  enoxaparin Injectable 90 milliGRAM(s) SubCutaneous two times a day  levothyroxine 175 MICROGram(s) Oral daily  liothyronine 5 MICROGram(s) Oral daily  melatonin 3 milliGRAM(s) Oral at bedtime  methadone    Tablet 5 milliGRAM(s) Oral two times a day  metoprolol succinate ER 50 milliGRAM(s) Oral daily  pantoprazole    Tablet 40 milliGRAM(s) Oral before breakfast  polyethylene glycol 3350 17 Gram(s) Oral two times a day  senna 2 Tablet(s) Oral at bedtime    MEDICATIONS  (PRN):  acetaminophen   Tablet .. 650 milliGRAM(s) Oral every 6 hours PRN Temp greater or equal to 38C (100.4F), Mild Pain (1 - 3)  albuterol/ipratropium for Nebulization 3 milliLiter(s) Nebulizer every 6 hours PRN Shortness of Breath and/or Wheezing  ALPRAZolam 0.25 milliGRAM(s) Oral two times a day PRN anxiety  HYDROmorphone   Tablet 3 milliGRAM(s) Oral every 4 hours PRN Moderate and Severe Pain (7 - 10)      PAST MEDICAL & SURGICAL HISTORY:  Stage IV adenocarcinoma of lung    Genital herpes    Drug abuse  Back in the 70&#x27;s and 80&#x27;s (Cocaine)    Lung cancer  with mets    LAMAR on CPAP    Anxiety    Hypothyroid    History of total hip replacement, left  THR 8/18/2020    S/P partial lobectomy of lung  Right lower lobe    History of bunionectomy    Status post lobectomy of lung  3/2017 (Right lung)    S/P bunionectomy  with revision        FAMILY HISTORY:  Family history of stroke or transient ischemic attack in father    Family history of hypothyroidism    Family history of hypertension    Family history of stroke        SOCIAL HISTORY: No tobacco, EtOH, or illicit drug use.    REVIEW OF SYSTEMS:    CONSTITUTIONAL: No fevers or chills  EYES/ENT: No visual changes;  No vertigo or throat pain   NECK: No pain or stiffness  RESPIRATORY: No cough, wheezing, hemoptysis; No shortness of breath  CARDIOVASCULAR: No chest pain or palpitations  GASTROINTESTINAL: No abdominal or epigastric pain. No nausea, vomiting, or hematemesis; No diarrhea or constipation. No melena or hematochezia.  GENITOURINARY: No dysuria, frequency or hematuria  NEUROLOGICAL: No numbness or weakness  SKIN: +dark rash of right thigh  All other review of systems is negative unless indicated above.        T(F): 98.7 (09-22-20 @ 05:04), Max: 98.7 (09-22-20 @ 05:04)  HR: 88 (09-22-20 @ 05:04)  BP: 128/87 (09-22-20 @ 05:04)  RR: 19 (09-22-20 @ 05:04)  SpO2: 99% (09-22-20 @ 05:04)  Wt(kg): --    GENERAL: NAD, well-developed  HEAD:  Atraumatic, Normocephalic  CHEST/LUNG: Clear to auscultation bilaterally; No wheeze  HEART: Regular rate and rhythm; No murmurs, rubs, or gallops  ABDOMEN: Soft, Nontender, Nondistended; Bowel sounds present  EXTREMITIES: Braced LLE. Mottled skin of anterior right thigh down to the knee. 2+ peripheral pulses b/l  NEUROLOGY: Decreased ROM of LLE due to pain  SKIN: No rashes or lesions    LABS:               10.1   14.17 )-----------( 261      ( 22 Sep 2020 05:30 )             33.6     09-22    134<L>  |  93<L>  |  10  ----------------------------<  165<H>  4.0   |  25  |  0.34<L>    Ca    11.0<H>      22 Sep 2020 05:30  Phos  3.5     09-22  Mg     1.7     09-22      Phosphorus Level, Serum: 3.5 mg/dL (09-22 @ 05:30)  Magnesium, Serum: 1.7 mg/dL (09-22 @ 05:30)      Radiology & Additional Testing:

## 2020-09-22 NOTE — PROGRESS NOTE ADULT - PROBLEM SELECTOR PLAN 5
DVT ppx: Lovenox BID therapeutic   Dispo: to rehab after RT completes. Needs COVID swab today.  Communication:  -Spoke w/ dtr Kena - [phone]. Updated on POC. All questions/concerns addressed. Will f/u again this afternoon. -Will trend. Somewhat dry on exam.   -IVF   -Will check PTH, PTHrp, Vit D level  -Trend.

## 2020-09-22 NOTE — PROGRESS NOTE ADULT - PROBLEM SELECTOR PROBLEM 4
Hypercalcemia Acute deep vein thrombosis (DVT) of distal vein of lower extremity, unspecified laterality

## 2020-09-22 NOTE — PROGRESS NOTE ADULT - PROBLEM SELECTOR PLAN 1
RT of left femur - last fraction to be delivered on 9/23.  Apprec pall care f.u for pain management.  Oxygen supply as needed via NC. Periodically on 1-2L. RT of left femur - last fraction to be delivered on 9/23.  Appreciate pall care input.   F/u w/ onc for further recs. Dtr hoping for inpatient chemo?

## 2020-09-22 NOTE — PROGRESS NOTE ADULT - SUBJECTIVE AND OBJECTIVE BOX
Re-consulted for PAIN MANAGEMENT    9/22:  - Chart reviewed. Patient discussed in IDT. Patient seen and examined.   - I visited the patient this morning and Edward (HCP and daughter) was on speakerphone. The patient was pleasant and comfortable. No clinical signs of distress.  - The patient reported good pain control during my visit. She did say that every 4-5pm she would always get RIGHT THIGH PAIN.   - We also talked about GOC again. Edward is interested in more DMT. She is actually requesting for inpatient chemotherapy. She knows that hospice will be available if the patient is ready, but that will require a lot of discussions and a longitudinal relationship with Palliative Care    Allergies  Bananas (Headache)  Bananas (Other)  latex (Rash)  latex (Rash (Mild))  opioid-like analgesics (Urticaria)  penicillin (Angioedema)  penicillin (Swelling (Mild to Mod))    MEDICATIONS  (STANDING):  dexAMETHasone     Tablet 2 milliGRAM(s) Oral daily  enoxaparin Injectable 90 milliGRAM(s) SubCutaneous two times a day  levothyroxine 175 MICROGram(s) Oral daily  liothyronine 5 MICROGram(s) Oral daily  melatonin 3 milliGRAM(s) Oral at bedtime  methadone    Tablet 5 milliGRAM(s) Oral two times a day  metoprolol succinate ER 50 milliGRAM(s) Oral daily  pantoprazole    Tablet 40 milliGRAM(s) Oral before breakfast  polyethylene glycol 3350 17 Gram(s) Oral two times a day  senna 2 Tablet(s) Oral at bedtime  sodium chloride 0.9%. 1000 milliLiter(s) (100 mL/Hr) IV Continuous <Continuous>    MEDICATIONS  (PRN):  acetaminophen   Tablet .. 650 milliGRAM(s) Oral every 6 hours PRN Temp greater or equal to 38C (100.4F), Mild Pain (1 - 3)  albuterol/ipratropium for Nebulization 3 milliLiter(s) Nebulizer every 6 hours PRN Shortness of Breath and/or Wheezing  ALPRAZolam 0.25 milliGRAM(s) Oral two times a day PRN anxiety  HYDROmorphone   Tablet 3 milliGRAM(s) Oral every 4 hours PRN Moderate and Severe Pain (7 - 10)      ITEMS UNCHECKED ARE NOT PRESENT    PRESENT SYMPTOMS: []Unable to obtain due to poor mentation   Source if other than patient:  []Family   []Team     PAIN ASSESSMENT:   Site- LEFT HIP/ RIGHT THIGH  Onset- ACUTE ON CHRONIC  Character- SHARP  Radiation- NONE  Associated symptoms- NONE  Timing and duration- INTERMITTENT SPIKES THROUGHOUT THE DAY/ 4-5 PM DAILY  Exacerbating factors- "RUNNING OUT OF PAIN MEDS"  Severity- MOD TO SEVERE    Effect on QOL- SIGNIFICANTLY INTERFERES WITH ADL'S  PAIN AD Score: 0    Dyspnea:  Yes [ ] No [X ] - [ ]Mild [ ]Moderate [ ]Severe  Anxiety:    Yes [ ] No [ X] - [ ]Mild [ ]Moderate [ ]Severe  Fatigue:    Yes [ ] No [X ] - [ ]Mild [ ]Moderate [ ]Severe  Nausea:    Yes [ ] No [ X] - [ ]Mild [ ]Moderate [ ]Severe                         Loss of appetite: Yes [ ] No [X ] - [ ]Mild [ ]Moderate [ ]Severe             Constipation:  Yes [ ] No [X ] - [ ]Mild [ ]Moderate [ ]Severe  Grief: Yes [ ] No [X ]     [X ]All other review of systems negative, including: weakness, cough, colds, blurry vision, headaches, dysuria, pruritus, palpitations, muscle cramps, easy bruising, epistaxis, rashes    Palliative Performance Status Version 2:  40%    Vital Signs Last 24 Hrs  T(C): 37.1 (22 Sep 2020 05:04), Max: 37.1 (22 Sep 2020 05:04)  T(F): 98.7 (22 Sep 2020 05:04), Max: 98.7 (22 Sep 2020 05:04)  HR: 88 (22 Sep 2020 05:04) (88 - 104)  BP: 128/87 (22 Sep 2020 05:04) (128/87 - 145/61)  BP(mean): --  RR: 19 (22 Sep 2020 05:04) (18 - 19)  SpO2: 99% (22 Sep 2020 05:04) (99% - 100%)    GEN: Awake, Coherent, Oriented x3, NOT IN DISTRESS AND VERY COMFORTABLE  HEAD: Normocephalic and atraumatic,   EYES: Anicteric sclerae, EOM's grossly intact  NECK: No JVD, no thyromegaly  PULM: Comfortable work of breathing, clear BS  CV: Pulses 2+ symmetric bilaterally, regular rate and rhythm  ABD: Not distended, soft, non-tender,   EXT: No edema, No deformities- LLE BRACE  PSYCH: Mildly anxious  NEURO: No facial asymmetry, no tremors, no observed movement disorders  SKIN: No rashes or lesions on exposed skin, No jaundice      LABS:     09-22    134<L>  |  93<L>  |  10  ----------------------------<  165<H>  4.0   |  25  |  0.34<L>    Ca    11.0<H>      22 Sep 2020 05:30  Phos  3.5     09-22  Mg     1.7     09-22    ************************************************************************  PALLIATIVE MEDICINE COORDINATION OF CARE NOTE FOR SOFIA SCHAEFFER  [x] Inpatient Consult  [ ] Other:  ************************************************************************  MEDICATION REVIEW:  --- Pls refer to current medicatons in the body of this note  ISTOP REFERENCE: [id number]    Patient Name: Sofia SchaefferBirth Date: 1955  Address: [address] [place] [id number]: Female  Rx Written	Rx Dispensed	Drug	Quantity	Days Supply	Prescriber Name	Payment Method	Dispenser  07/07/2020	07/07/2020	fentanyl 50 mcg/hr patch	10	30	Scott Olsen	Medicaid	[id number]  07/06/2020	07/06/2020	tramadol hcl 50 mg tablet	30	7	Chris Gonzales DO	Medicaid	[id number]  07/06/2020	07/06/2020	morphine sulfate ir 15 mg tab	56	7	Scott Olsen	[id number]  06/12/2020	06/12/2020	alprazolam 0.25 mg tablet	15	15	Christy Salinas	Medicaid	[id number]  06/03/2020	06/08/2020	diazepam 5 mg tablet	10	10	Elvira Joseph MD	Medicaid	[id number]  05/26/2020	05/28/2020	diazepam 5 mg tablet	2	2	EltonScott regalado LESLIE	Medicaid	[id number]    --- PRN usage: Patient has been taking 3-4 PRN's daily    ------------------------------------------------------------------------  COORDINATION OF CARE:  --- Palliative Care consulted for: Pain management  --- Patient assessed: 9/22  --- Patient previously seen by Palliative Care service: NO  ADVANCE CARE PLANNING  --- Code status: FULL  --- MOLST reviewed in chart: NONE  --- HCP/ Surrogate: EDWARD  --- [facility] document found in Alpha: NONE  --- HCP/ Living will/ Other advanced directives in Alpha: YES  CARE PROVIDER DOCUMENTATION:  --- SW/CM: Planning d/c to [facility]  --- ONC: consulted for inpatient chemo (if possible)  --- Malnutrition: moderate protein- calorie malnutrition  --- Dietician: Continue regular diet + Ensure 2x daily  --- Rad-Onc: XRT to L pelvis  --- PT recs: PT inpatient ongoing  PLAN OF CARE  --- Known admissions in past year: 3  --- Current admit date: 9/10  --- LOS: 12  --- LACE score: 14  --- Current dispo plan: [facility]  ------------------------------------------------------------------------  --- Chart reviewed: 30 Minutes [including time used to gather, review and transfer data to this note]  --- Start: 8:30  --- End: 9:00  Prolonged services rendered, as part of this patient's care provided by Palliative Medicine, include: i.chart review for provider and ancillary service documentation, ii.pertinent diagnostics including laboratory and imaging studies,iii. medication review including PRN use, iv. admission history including previous palliative care encounters and GOC notes, v.advance care planning documents including HCP and MOLST forms in Alpha. Part of Palliative Medicine extended evaluation and management also involves coordination of care with our IDT, the primary and consulting tonja, and unit CM/SW and Hospice if eligible. Recommendations based on the information gathered and discussed are outline in the AP of our notes.    ************************************************************************  Care coordination of SOFIA SCHAEFFER was communicated with the interdisciplinary team during IDT rounds and with the primary team.

## 2020-09-22 NOTE — PROGRESS NOTE ADULT - PROBLEM SELECTOR PROBLEM 3
Acute deep vein thrombosis (DVT) of distal vein of lower extremity, unspecified laterality Right thigh pain

## 2020-09-23 NOTE — PROGRESS NOTE ADULT - PROBLEM SELECTOR PLAN 5
-Still no BM. Cont senna. S/p ducolax supp.  -Dc miralax. Switch to lactulose.  -Will given enema today. Reassess.

## 2020-09-23 NOTE — CHART NOTE - NSCHARTNOTEFT_GEN_A_CORE
Was called by Dr. Byers of radiology on Right hip and femur x-ray showing small metastatic lesions. recommend   calling ortho and RT if there are any interventions warranted. Pt currently receiving RT to left hip and femur.    I was also consulted by vascular lab US lower extremity revealing Left common vein clot. Pt currently on therapeutic   Lovenox attending made aware of results.

## 2020-09-23 NOTE — PROGRESS NOTE ADULT - ASSESSMENT
63yo F w/ PMHx of stage IV lung adenocarcinoma ( dx 2017) s/p RLL lobectomy with recurrence in May 2020 s/p palliative radiation to clavicle and spine and 1 round of carbo/pemextred/pembrolizumab chemo 7/31, hypothyroidism, anxiety, recent admission for pathologic L femoral neck fracture s/p left QASIM on 8/18 presented from [facility] as a transfer for RT. While here, the pt was followed by rad onc and palliative care. RT due to complete on 9/23. Methadone also started for pain control. Onc evaluated pt. No plans to initiate chemo on inpatient basis. Given new right thigh pain - XR was obtained. Films reviewed. There is concern for bone mets as culprit for pain. Requested rad onc and ortho to look into those lesions for potential interventions. Remains hospitalized pending further eval-

## 2020-09-23 NOTE — PROGRESS NOTE ADULT - PROBLEM SELECTOR PLAN 1
RT of LEFT femur/hip - 5 out of 5 fractions received.   New right-sided femur lesions noted - await rad onc/ortho input for further intervention.  Appreciate pall care input. Pain meds as rec.  Onc input appreciated. No plans for inpatient RT.

## 2020-09-23 NOTE — CONSULT NOTE ADULT - SUBJECTIVE AND OBJECTIVE BOX
63yo F w/ PMHx of stage IV lung adenocarcinoma ( dx 2017) s/p RLL lobectomy with recurrence in May 2020 s/p palliative radiation to clavicle and spine and 1 round of carbo/pemextred/pembrolizumab chemo 7/31, hypothyroidism, anxiety, recent admission for pathologic L femoral neck fracture s/p left QASIM on 8/18 presented from [facility] as a transfer for RT.  Patient was initially admitted to [facility] for desat to 80s and anemia. Patient placed on 3L NC and recuperated. CTA w/o signs of PE but identifying pleural effusion. US b/l LE showing L common femoral DVT. Vascular surgery, ortho, oncology, pulm and palliative service consulted there. While admitted there, patient obtained therapeutic/diagnostic thoracentesis and switched to lovenox 90. Rad onc consulted, pt would like inpatient radiology onc tx to lesions on her left femur and aetabulum. and as a result she was trasnferred to [facility]. Patient to received her last RT treatment today 9/23/20. States that about 3-4 days ago, she felt onset of right lower extremity / thigh pain. Denies any trauma, fall, injury. Denies any fevers, chills, night sweats. Xrays of right hip/femur obtained and found to have metastatis lesions noted.     PAST MEDICAL & SURGICAL HISTORY:  Stage IV adenocarcinoma of lung  Genital herpes  Drug abuse  Back in the 70&#x27;s and 80&#x27;s (Cocaine)  Lung cancer  with mets  LAMAR on CPAP  Anxiety  Hypothyroid  History of total hip replacement, left  THR 8/18/2020  S/P partial lobectomy of lung  Right lower lobe  History of bunionectomy  Status post lobectomy of lung  3/2017 (Right lung)  S/P bunionectomy  with revision      Allergies    Bananas (Headache)  Bananas (Other)  latex (Rash)  latex (Rash (Mild))  opioid-like analgesics (Urticaria)  penicillin (Angioedema)  penicillin (Swelling (Mild to Mod))                            10.1   14.17 )-----------( 261      ( 22 Sep 2020 05:30 )             33.6     23 Sep 2020 05:15    134    |  93     |  10     ----------------------------<  131    4.2     |  25     |  0.35     Ca    10.7       23 Sep 2020 05:15  Phos  3.3       23 Sep 2020 05:15  Mg     1.7       23 Sep 2020 05:15    TPro  x      /  Alb  2.7    /  TBili  x      /  DBili  x      /  AST  x      /  ALT  x      /  AlkPhos  x      23 Sep 2020 05:15      Vital Signs Last 24 Hrs  T(C): 36.9 (09-23-20 @ 06:42), Max: 37 (09-22-20 @ 22:00)  T(F): 98.4 (09-23-20 @ 06:42), Max: 98.6 (09-22-20 @ 22:00)  HR: 92 (09-23-20 @ 06:42) (92 - 99)  BP: 112/59 (09-23-20 @ 06:42) (112/59 - 120/72)  BP(mean): --  RR: 18 (09-23-20 @ 06:42) (18 - 18)  SpO2: 99% (09-23-20 @ 06:42) (99% - 99%)    < from: Xray Hip 1 View, Right (09.22.20 @ 18:12) >  IMPRESSION:  Focal small ovoid lucency medially in distal third of right femoral shaft with associated slight endosteal scalloping measuring approximately 1.3 cm craniocaudad by 0.9 cm AP suspicious for metastatic lesion. More subtle faint similar dimension ovoid lytic metastatic lesion also present in medial intertrochanteric/basicervical region along the posterior ridge. Both lesions better appreciated on CT. No additional focal suspicious lesions in the remaining imaged regions.    No current fractures dislocations or impending pathologic fractures.    Preserved right hip and knee joint spaces.    Small superior patellar enthesophyte otherwise unremarkable quadriceps and patellar tendon shadows.    Discussed with NICOLE Dominguez on 9/23/2020 [id number] with read back.    < end of copied text >      Physical Exam  General: NAD, Alert, Awake and oriented  Neurologic: No gross deficits, moving all 4s. LLE: in brace  BL LE: Motor intact 5/5 EHL/FHL/TA/GS. Sensation intact to light tough in the distal extremities.  RLE: skin intact. DP/PT pulses intact. no tenderness to palpation over the hip or femur.   Able to flex knee and hip without difficulty or pain.  painless ROM at ankle and toes with. **Positive/Negative log-roll and heel strike. **Unable/Able to actively SLR. SILT toes 1-5. 2+ DP/PT pulses with brisk cap refill distally. Compartments soft and compressible.     A/P: 64y Female with R Hip/Femur metastatic lesions    -Discussed care with Attending Dr Ramírez,, awaiting further recommendations     65yo F w/ PMHx of stage IV lung adenocarcinoma ( dx 2017) s/p RLL lobectomy with recurrence in May 2020 s/p palliative radiation to clavicle and spine and 1 round of carbo/pemextred/pembrolizumab chemo 7/31, hypothyroidism, anxiety, recent admission for pathologic L femoral neck fracture s/p left QASIM on 8/18 presented [facility] for RT.  Patient was initially admitted to [facility] for desat to 80s and anemia. Patient placed on 3L NC and recuperated. CTA w/o signs of PE but identifying pleural effusion. US b/l LE showing L common femoral DVT. Vascular surgery, ortho, oncology, pulm and palliative service consulted there. While admitted there, patient obtained therapeutic/diagnostic thoracentesis and switched to lovenox 90. Rad onc consulted, pt would like inpatient radiology onc tx to lesions on her left femur and aetabulum. and as a result she was tras[facility]ferred to [facility]. Patient to received her last RT treatment today 9/23/20. States that about 3-4 days ago, she felt onset of right lower extremity / thigh pain. Denies any trauma, fall, injury. Denies any fevers, chills, night sweats. Xrays of right hip/femur obtained and found to have metastatis lesions noted.     PAST MEDICAL & SURGICAL HISTORY:  Stage IV adenocarcinoma of lung  Genital herpes  Drug abuse  Back in the 70&#x27;s and 80&#x27;s (Cocaine)  Lung cancer  with mets  LAMAR on CPAP  Anxiety  Hypothyroid  History of total hip replacement, left  THR 8/18/2020  S/P partial lobectomy of lung  Right lower lobe  History of bunionectomy  Status post lobectomy of lung  3/2017 (Right lung)  S/P bunionectomy  with revision      Allergies    Bananas (Headache)  Bananas (Other)  latex (Rash)  latex (Rash (Mild))  opioid-like analgesics (Urticaria)  penicillin (Angioedema)  penicillin (Swelling (Mild to Mod))                            10.1   14.17 )-----------( 261      ( 22 Sep 2020 05:30 )             33.6     23 Sep 2020 05:15    134    |  93     |  10     ----------------------------<  131    4.2     |  25     |  0.35     Ca    10.7       23 Sep 2020 05:15  Phos  3.3       23 Sep 2020 05:15  Mg     1.7       23 Sep 2020 05:15    TPro  x      /  Alb  2.7    /  TBili  x      /  DBili  x      /  AST  x      /  ALT  x      /  AlkPhos  x      23 Sep 2020 05:15      Vital Signs Last 24 Hrs  T(C): 36.9 (09-23-20 @ 06:42), Max: 37 (09-22-20 @ 22:00)  T(F): 98.4 (09-23-20 @ 06:42), Max: 98.6 (09-22-20 @ 22:00)  HR: 92 (09-23-20 @ 06:42) (92 - 99)  BP: 112/59 (09-23-20 @ 06:42) (112/59 - 120/72)  BP(mean): --  RR: 18 (09-23-20 @ 06:42) (18 - 18)  SpO2: 99% (09-23-20 @ 06:42) (99% - 99%)    < from: Xray Hip 1 View, Right (09.22.20 @ 18:12) >  IMPRESSION:  Focal small ovoid lucency medially in distal third of right femoral shaft with associated slight endosteal scalloping measuring approximately 1.3 cm craniocaudad by 0.9 cm AP suspicious for metastatic lesion. More subtle faint similar dimension ovoid lytic metastatic lesion also present in medial intertrochanteric/basicervical region along the posterior ridge. Both lesions better appreciated on CT. No additional focal suspicious lesions in the remaining imaged regions.    No current fractures dislocations or impending pathologic fractures.    Preserved right hip and knee joint spaces.    Small superior patellar enthesophyte otherwise unremarkable quadriceps and patellar tendon shadows.    Discussed with NICOLE Dominguez on 9/23/2020 [id number] with read back.    < end of copied text >      Physical Exam  General: NAD, Alert, Awake and oriented  Neurologic: No gross deficits, moving all 4s. LLE: in brace  BL LE: Motor intact 5/5 EHL/FHL/TA/GS. Sensation intact to light tough in the distal extremities.  RLE: skin intact. DP/PT pulses intact. no tenderness to palpation over the hip or femur.   Able to flex knee and hip without difficulty or pain.    A/P: 64y Female with R Hip/Femur metastatic lesions  - pain control   - WBAT   - imaging reviewed by Dr Ramírez. No orthopaedic surgical intervention required at moment.   - no inpatient XRT/Radiation at moment for right lower extremity (hip or femur).   -Please follow up with Dr Ramírez in office in 1 week. Call office to make an appointment. [phone]

## 2020-09-23 NOTE — PROGRESS NOTE ADULT - PROBLEM SELECTOR PLAN 3
US showed DVT on the left leg. None on the right.   Cont treatment dose lovenox. Pt states her  will administer lovenox upon DC to home.

## 2020-09-23 NOTE — CONSULT NOTE ADULT - CONSULT REASON
RLE pain - lesions in shaft of femur/hip
Metastatic Lung Cancer
Palliative Medicine was consulted to evaluate and manage complex symptomatology related to the patient's life-limiting illness

## 2020-09-23 NOTE — PROGRESS NOTE ADULT - PROBLEM SELECTOR PLAN 4
-Cont IVF for now  -PTH low. Suspect malignancy related hypercal.   -F/u PTHrp.  -Trend. Will d/w onc re: bisphos.

## 2020-09-23 NOTE — PROGRESS NOTE ADULT - SUBJECTIVE AND OBJECTIVE BOX
SUBJECTIVE:  Follow up for lung adenoca, radiation.    The patient was seen and evaluated at bedside this AM. no o/n events. Pain fairly well controlled at this point. No fevers/chills, SOB, Cp, NV, abd pain, diarrhea, dysuria. Still has not had BM. Tolerating PO intake w/o issue.      ROS: All systems negative except as noted.      Vital Signs Last 24 Hrs  T(C): 36.4 (23 Sep 2020 14:13), Max: 37 (22 Sep 2020 22:00)  T(F): 97.5 (23 Sep 2020 14:13), Max: 98.6 (22 Sep 2020 22:00)  HR: 91 (23 Sep 2020 14:13) (91 - 98)  BP: 119/65 (23 Sep 2020 14:13) (112/59 - 120/72)  BP(mean): --  RR: 18 (23 Sep 2020 14:13) (18 - 18)  SpO2: 99% (23 Sep 2020 14:13) (99% - 99%)      PHYSICAL EXAM:  Gen- In bed, comfortable, NAD. Appears stated age.  Eyes- EOMI, PERRLA, nonicteric.  EMNT- MMM. no exudates. No pharyngeal erythema. Fair dentition.   Neck- Supple. No masses.   Resp- CTAB, good effort. No r/r/w. No accessory muscle use.  CVS- RRR, S1S2, no g/r/m. No LE edema.  GI- Soft abd, NT, ND, +BSx4. No HSM.  MSK- No C/C. Left knee in brace/immobilizer in place. No crepitus.  Neuro- CN II-XII intact. Speech fluent/face symmetric. Sensation intact.  Skin- No rashes/ulcers. Warm/moist.  Psych- AAOx3. Appropriate mood/affect.      MEDICATION:  MEDICATIONS  (STANDING):  dexAMETHasone     Tablet 2 milliGRAM(s) Oral daily  enoxaparin Injectable 90 milliGRAM(s) SubCutaneous two times a day  levothyroxine 175 MICROGram(s) Oral daily  liothyronine 5 MICROGram(s) Oral daily  melatonin 3 milliGRAM(s) Oral at bedtime  methadone    Tablet 5 milliGRAM(s) Oral two times a day  metoprolol succinate ER 50 milliGRAM(s) Oral daily  pantoprazole    Tablet 40 milliGRAM(s) Oral before breakfast  polyethylene glycol 3350 17 Gram(s) Oral two times a day  senna 2 Tablet(s) Oral at bedtime  sodium chloride 0.9%. 1000 milliLiter(s) (100 mL/Hr) IV Continuous <Continuous>    MEDICATIONS  (PRN):  acetaminophen   Tablet .. 650 milliGRAM(s) Oral every 6 hours PRN Temp greater or equal to 38C (100.4F), Mild Pain (1 - 3)  albuterol/ipratropium for Nebulization 3 milliLiter(s) Nebulizer every 6 hours PRN Shortness of Breath and/or Wheezing  ALPRAZolam 0.25 milliGRAM(s) Oral two times a day PRN anxiety  HYDROmorphone   Tablet 3 milliGRAM(s) Oral every 4 hours PRN Moderate and Severe Pain (7 - 10)        LABORATORY:                          10.1   14.17 )-----------( 261      ( 22 Sep 2020 05:30 )             33.6     09-23    134<L>  |  93<L>  |  10  ----------------------------<  131<H>  4.2   |  25  |  0.35<L>    Ca    10.7<H>      23 Sep 2020 05:15  Phos  3.3     09-23  Mg     1.7     09-23    TPro  x   /  Alb  2.7<L>  /  TBili  x   /  DBili  x   /  AST  x   /  ALT  x   /  AlkPhos  x   09-23        RADIOLOGY:  < from: Xray Hip 1 View, Right (09.22.20 @ 18:12) >  IMPRESSION:  Focal small ovoid lucency medially in distal third of right femoral shaft with associated slight endosteal scalloping measuring approximately 1.3 cm craniocaudad by 0.9 cm AP suspicious for metastatic lesion. More subtle faint similar dimension ovoid lytic metastatic lesion also present in medial intertrochanteric/basicervical region along the posterior ridge. Both lesions better appreciated on CT. No additional focal suspicious lesions in the remaining imaged regions.    No current fractures dislocations or impending pathologic fractures.    Preserved right hip and knee joint spaces.    Small superior patellar enthesophyte otherwise unremarkable quadriceps and patellar tendon shadows.    Discussed with NICOLE Dominguez on 9/23/2020 [id number] with read back.    < end of copied text >    US< from: US Duplex Venous Lower Ext Complete, Bilateral (09.23.20 @ 10:21) >  IMPRESSION:  Left common femoral vein DVT.    < end of copied text >

## 2020-09-23 NOTE — CONSULT NOTE ADULT - REASON FOR ADMISSION
Transferred from [facility] to [facility] for potential RT
Transferred from [facility] to [facility] for potential RT
Transferred from [facility] to [facility] for potential RT

## 2020-09-24 NOTE — PROGRESS NOTE ADULT - PROBLEM SELECTOR PROBLEM 7
Palliative care encounter
Preventive measure

## 2020-09-24 NOTE — PROGRESS NOTE ADULT - PROBLEM SELECTOR PLAN 7
.  Thank you for allowing us to participate in your patient's care. We will continue to follow with you. Please page [phone] or contact us via Microsoft Teams for any q's or c's.     Trevor Rivera  Palliative Medicine
.  Thank you for allowing us to participate in your patient's care. We will continue to follow with you. Please page [phone] or contact us via [employer] Teams for any q's or c's.     Trevor Rivera  Palliative Medicine
.  Thank you for allowing us to participate in your patient's care. We will continue to follow with you. Please page [phone] or contact us via [employer] Teams for any q's or c's.     Trevor Rivera  Palliative Medicine
.  Complex medical decision making / symptom management in the setting of metastatic lung cancer. Will continue to follow for ongoing GOC discussion / titration of pain regimen. Emotional support provided, questions answered.    Active Psychosocial Referrals: HUONG PAYNE    For new or uncontrolled symptoms, please page the Palliative Medicine team ([phone] #[phone]). The service is available 24/7 (including nights & weekends) to provide symptom management recommendations over the phone as appropriate
DVT ppx: Lovenox BID therapeutic   Dispo: To rehab after RT completes. Needs COVID swab today.  Communication:  -Spoke w/ dtr Kena - [phone]. Updated on POC. All questions/concerns addressed. Will f/u again this afternoon.

## 2020-09-24 NOTE — PROGRESS NOTE ADULT - PROBLEM SELECTOR PLAN 6
.  # Code: FULL  # HCP: EDWARD ARGUELLO (DAUGHTER) HCP FORM IN THE CHART    9/24  > Full code  > Plan remains to pursue chemotherapy    Due to visitation restrictions in the hospital in light of COVID pandemic, all discussions with the patient/ patient's healthcare proxy or surrogate have been done via telehealth. This is to prevent spread of infection within the hospital and out in the community. Total time discussing advance care planning, goals of care discussions, and discussions about code status and hospice = 46 mins (10:30 to 11:16)
.  # Code: FULL  # HCP: EDWARD ARGUELLO (DAUGHTER) HCP FORM IN THE CHART
.  # Code: FULL  # HCP: EDWARD ARGUELLO (DAUGHTER) HCP FORM IN THE CHART    9/22  > Not interested in hospice currently  > Wants inpatient chemo if possible  > Goal is still to get better, ambulate    Total face to face time discussing goals of care, advance care planning, code status and hospice = 16 mins (10:30 to 10:46)
DVT ppx: Lovenox BID therapeutic   Dispo: New lesions on right leg - await ortho and rad onc recs.  Communication:  -Spoke w/ dtr Kena - [phone]. Updated on POC. All questions/concerns addressed.
.  # Code: FULL  # HCP: EDWARD ARGUELLO (DAUGHTER) HCP FORM IN THE CHART
Cont miralax, senna.  Dulcolax suppository today.

## 2020-09-24 NOTE — CHART NOTE - NSCHARTNOTEFT_GEN_A_CORE
Source: Patient [X ]    Family [ ]     other [X ] electronic chart, RN     Diet : Diet, Regular:   Supplement Feeding Modality:  Oral  Ensure Enlive Cans or Servings Per Day:  1       Frequency:  Two Times a day (09-18-20 @ 11:32)    Nutrition Follow-up Note. Patient currently on regular diet, tolerating well. Had breakfast ~75%. Complaining of pain, RN and MD aware. Patient denies any nausea/vomiting/diarrhea/constipation or difficulty chewing and swallowing. Good po intake as tolerated encouraged. RD remains available, patient and RN made aware.     Weight (kg): 88.133 (06 Sep 2020 22:25)        Pertinent Medications: dexAMETHasone     Tablet  lactulose Syrup  levothyroxine  liothyronine  melatonin  methadone    Tablet  metoprolol succinate ER  pantoprazole    Tablet  saline laxative (FLEET) Rectal Enema  senna  sodium chloride 0.9%.    Pertinent Labs:  09-24 Na140 mmol/L Glu 110 mg/dL<H> K+ 3.8 mmol/L Cr  0.29 mg/dL<L> BUN 8 mg/dL 09-24 Phos 3.9 mg/dL 09-23 Alb 2.7 g/dL<L>      Skin: Left hip surgical incision.   No edema noted.     Estimated Needs:   [X] no change since previous assessment  [ ] recalculated:       Previous Nutrition Diagnosis:     [X ] Malnutrition, Moderate     Nutrition Diagnosis is [X] ongoing  [ ] resolved [ ] not applicable       Additional Recommendations:   1. Continue current diet order, which remains appropriate at this time.   2. Monitor weights, labs, BM's, skin integrity, p.o. intake.   3. Please Encourage po intake, assist with meals and menu selections, provide alternatives PRN.   4. Pain management to improve appetite and po intake.

## 2020-09-24 NOTE — PROGRESS NOTE ADULT - SUBJECTIVE AND OBJECTIVE BOX
Re-consulted for PAIN MANAGEMENT    9/24:  - Chart reviewed. Patient discussed in IDT. Patient seen and examined. Discussed with primary team.  - Patient very emotional during my visit. Claims LLE pain worsening and Dilaudid does not work. Bargained that she just wants an increase in dose for 1 day --> red flag for addiction/ chemical coping.   - Apparently, patient has been lashing out at  Yehuda and daughter Kena as they have "not advocated for increasing her Ativan or Dilaudid".   - Spoke to Yehuda and Kena at length, separately. We again talked about her diagnosis, prognosis, and hospice:     1) I clarified again that this is Stage 4 cancer. There is no definitive cure for this.  2) Kena was very upset that the patient was not getting inpatient chemotherapy. We had a long talk about this as reportedly she was told by Onc that the reason she could not get it was "because of finances"  3) Additionally, Kena keeps on hearing there is no physical barrier to the patient receiving chemo. This frustrates her.  4) I clarified that the reason we want pain control + XRT + rehab is to improve the patient's functional status so she can tolerate chemo. Giving a debilitated patient chemo right now is likely going to harm her more than help her  5) Yehuda and Kena understand and want to remain firm about the pain management. They understand the patient is coping chemically and want to provide her with the least amount of opioids possible.     Allergies  Bananas (Headache)  Bananas (Other)  latex (Rash)  latex (Rash (Mild))  opioid-like analgesics (Urticaria)  penicillin (Angioedema)  penicillin (Swelling (Mild to Mod))    MEDICATIONS  (STANDING):  dexAMETHasone     Tablet 2 milliGRAM(s) Oral daily  enoxaparin Injectable 90 milliGRAM(s) SubCutaneous two times a day  lactulose Syrup 20 Gram(s) Oral two times a day  levothyroxine 175 MICROGram(s) Oral daily  liothyronine 5 MICROGram(s) Oral daily  melatonin 3 milliGRAM(s) Oral at bedtime  methadone    Tablet 5 milliGRAM(s) Oral two times a day  metoprolol succinate ER 50 milliGRAM(s) Oral daily  pantoprazole    Tablet 40 milliGRAM(s) Oral before breakfast  saline laxative (FLEET) Rectal Enema 1 Enema Rectal once  senna 2 Tablet(s) Oral at bedtime  sodium chloride 0.9%. 1000 milliLiter(s) (100 mL/Hr) IV Continuous <Continuous>    MEDICATIONS  (PRN):  acetaminophen   Tablet .. 650 milliGRAM(s) Oral every 6 hours PRN Temp greater or equal to 38C (100.4F), Mild Pain (1 - 3)  albuterol/ipratropium for Nebulization 3 milliLiter(s) Nebulizer every 6 hours PRN Shortness of Breath and/or Wheezing  ALPRAZolam 0.25 milliGRAM(s) Oral two times a day PRN anxiety  HYDROmorphone   Tablet 3 milliGRAM(s) Oral every 4 hours PRN Moderate and Severe Pain (7 - 10)    ITEMS UNCHECKED ARE NOT PRESENT    PRESENT SYMPTOMS: []Unable to obtain due to poor mentation   Source if other than patient:  []Family   []Team     PAIN ASSESSMENT:   Site- LEFT HIP/ RIGHT THIGH  Onset- ACUTE ON CHRONIC  Character- SHARP  Radiation- NONE  Associated symptoms- NONE  Timing and duration- INTERMITTENT SPIKES THROUGHOUT THE DAY/ 4-5 PM DAILY  Exacerbating factors- "RUNNING OUT OF PAIN MEDS"  Severity- MOD TO SEVERE    Effect on QOL- SIGNIFICANTLY INTERFERES WITH ADL'S  PAIN AD Score: 0    Dyspnea:  Yes [ ] No [X ] - [ ]Mild [ ]Moderate [ ]Severe  Anxiety:    Yes [ ] No [ X] - [ ]Mild [ ]Moderate [ ]Severe  Fatigue:    Yes [ ] No [X ] - [ ]Mild [ ]Moderate [ ]Severe  Nausea:    Yes [ ] No [ X] - [ ]Mild [ ]Moderate [ ]Severe                         Loss of appetite: Yes [ ] No [X ] - [ ]Mild [ ]Moderate [ ]Severe             Constipation:  Yes [ ] No [X ] - [ ]Mild [ ]Moderate [ ]Severe  Grief: Yes [ ] No [X ]     [X ]All other review of systems negative, including: weakness, cough, colds, blurry vision, headaches, dysuria, pruritus, palpitations, muscle cramps, easy bruising, epistaxis, rashes    Palliative Performance Status Version 2:  40%    Vital Signs Last 24 Hrs  T(C): 37.1 (22 Sep 2020 05:04), Max: 37.1 (22 Sep 2020 05:04)  T(F): 98.7 (22 Sep 2020 05:04), Max: 98.7 (22 Sep 2020 05:04)  HR: 88 (22 Sep 2020 05:04) (88 - 104)  BP: 128/87 (22 Sep 2020 05:04) (128/87 - 145/61)  BP(mean): --  RR: 19 (22 Sep 2020 05:04) (18 - 19)  SpO2: 99% (22 Sep 2020 05:04) (99% - 100%)    GEN: Awake, LABILE MOOD  HEAD: Normocephalic and atraumatic,   EYES: Anicteric sclerae, EOM's grossly intact  NECK: No JVD, no thyromegaly  PULM: Comfortable work of breathing, clear BS  CV: Pulses 2+ symmetric bilaterally, regular rate and rhythm  ABD: Not distended, soft, non-tender,   EXT: No edema, No deformities- LLE BRACE  PSYCH: Mildly anxious  NEURO: No facial asymmetry, no tremors, no observed movement disorders  SKIN: No rashes or lesions on exposed skin, No jaundice      LABS:     09-24    140  |  98  |  8   ----------------------------<  110<H>  3.8   |  28  |  0.29<L>    Ca    10.1      24 Sep 2020 07:05  Phos  3.9     09-24  Mg     1.7     09-24    TPro  x   /  Alb  2.7<L>  /  TBili  x   /  DBili  x   /  AST  x   /  ALT  x   /  AlkPhos  x   09-23

## 2020-09-24 NOTE — PROGRESS NOTE ADULT - PROBLEM SELECTOR PLAN 3
-Improved w/ fluids.  -PTH low. Suspect malignancy related hypercal.   -F/u PTHrp.  -Trend. Will d/w onc re: bisphos.

## 2020-09-24 NOTE — CHART NOTE - NSCHARTNOTEFT_GEN_A_CORE
right femur x-rays from 9/22 reviewed this morning with dr Jamil Shaw- there is a 1.3 cm lesion at the intersection of the mid and distal thirds of the right femur with subtle scalloping, no fracture- not clear if this is the source of the right thigh pain- I agree with ortho note from 9/23 that no urgent inpt radiation is required for this lesion; short term follow up with Dr Maribel Ramírez as per the ortho note- if necessary, outpatient radiation with one of my colleagues at [facility] () can be arranged    Maxx Brennan MD  cell

## 2020-09-24 NOTE — PROGRESS NOTE ADULT - PROBLEM SELECTOR PLAN 2
.  1) Left hip pain due to progression of metastatic disease, L hip, h/o pathologic fx s/p surgery and XRT  2) RIGHT THIGH PAIN 2/2 METS  3) Centralized pain 2/2 anxiety    - See below for high risk medication use and risk factor for dependency and chemical coping  - Monitoring for opioid induced neurotoxicity    Current meds:   Methadone 5mg PO BID  Dilaudid 3mg PO q4h PRN    > Cancer-related pain is controlled  > Complaints of pain driven by anxiety and addiction. Kena and Yehuda are in agreement to remain firm with her pain management plan.

## 2020-09-24 NOTE — PROGRESS NOTE ADULT - ASSESSMENT
63yo F w/ PMHx of stage IV lung adenocarcinoma ( dx 2017) s/p RLL lobectomy with recurrence in May 2020 s/p palliative radiation to clavicle and spine and 1 round of carbo/pemextred/pembrolizumab chemo 7/31, hypothyroidism, anxiety, recent admission for pathologic L femoral neck fracture s/p left QASIM on 8/18 presented [facility] for [facility]. While here, the pt was followed by rad onc and palliative care. RT due to complete on 9/23. Methadone also started for pain control. Onc evaluated pt. No plans to initiate chemo on inpatient basis. Given new right thigh pain - XR was obtained. Films reviewed. There is concern for bone mets as culprit for pain. Per rad onc and ortho no plans for acute intervention. Remains hospitalized pending [facility].

## 2020-09-24 NOTE — PROGRESS NOTE ADULT - PROBLEM SELECTOR PLAN 5
.  > Discussed with Kandace, Oncology, and primary team = no inpatient chemo 2/2 poor functional status  > Plan is for rehab and then chemo

## 2020-09-24 NOTE — CHART NOTE - NSCHARTNOTESELECT_GEN_ALL_CORE
Malnutrition Alert/Malnutrition Notification
Event Note/ACP-NP Note
Event Note/ACP-NP note
Event Note/Palliative
Event Note/Palliative Care
Nutrition Follow-up Note-Registered Dietitian Note/Nutrition Services
Off Service Note/Palliative Care
Palliative Care/Event Note
Rad Onc/Event Note
rad onc/Event Note

## 2020-09-24 NOTE — PROGRESS NOTE ADULT - PROBLEM SELECTOR PROBLEM 6
Advance care planning
Preventive measure
Advance care planning
Constipation

## 2020-09-24 NOTE — PROGRESS NOTE ADULT - PROBLEM SELECTOR PLAN 5
DVT ppx: Lovenox BID therapeutic   Dispo: Await dtr decision re: placement. Need to discuss w/ onc re: plan to consider between hospice v. rehab.  Communication:  -Spoke w/ dtr Kena - [phone]. Updated on POC. All questions/concerns addressed.

## 2020-09-24 NOTE — PROGRESS NOTE ADULT - ASSESSMENT
64yoF w/ PMHx of stage IV lung adenocarcinoma ( dx 2017) s/p RLL lobectomy with recurrence in May 2020 s/p palliative radiation to clavicle and spine and 1 round of carbo/pemextred/pembrolizumab chemo 7/31, recent admission for pathologic L femoral neck fracture s/p left QASIM on 8/18, presented from [facility] for left hip pain, hypoxemia and anemia. Transferred to [facility] for XRT. Palliative Medicine was consulted to evaluate and manage complex symptomatology related to the patient's life-limiting illness.

## 2020-09-24 NOTE — PROGRESS NOTE ADULT - SUBJECTIVE AND OBJECTIVE BOX
SUBJECTIVE:  Follow up for stage IV lung adenocarcinoma    The patient was seen and evaluated at bedside this AM. Reported sudden onset of b/l thigh pain that started around 6AM. Sx persist. Reportedly not alleviated w/ PO pain meds. Denies any f/c, NV, SOB, CP. She did have a BM yesterday.       ROS: All systems negative except as noted.      Vital Signs Last 24 Hrs  T(C): 36.6 (24 Sep 2020 07:32), Max: 36.6 (24 Sep 2020 07:32)  T(F): 97.9 (24 Sep 2020 07:32), Max: 97.9 (24 Sep 2020 07:32)  HR: 83 (24 Sep 2020 07:32) (83 - 91)  BP: 116/61 (24 Sep 2020 07:32) (116/61 - 133/67)  BP(mean): --  RR: 16 (24 Sep 2020 07:32) (16 - 18)  SpO2: 95% (24 Sep 2020 07:32) (95% - 99%)      PHYSICAL EXAM:  Gen- In bed, comfortable, NAD. Appears stated age.  Eyes- EOMI, PERRLA, nonicteric.  EMNT- MMM. no exudates. No pharyngeal erythema. Fair dentition.   Neck- Supple. No masses.   Resp- CTAB, good effort. No r/r/w. No accessory muscle use.  CVS- RRR, S1S2, no g/r/m. No LE edema.  GI- Soft abd, NT, ND, +BSx4. No HSM.  MSK- No C/C. Left knee in brace/immobilizer in place. No crepitus.  Neuro- CN II-XII intact. Speech fluent/face symmetric. Sensation intact.  Skin- No rashes/ulcers. Warm/moist.  Psych- AAOx3. Appropriate mood/affect.      MEDICATION:  MEDICATIONS  (STANDING):  dexAMETHasone     Tablet 2 milliGRAM(s) Oral daily  enoxaparin Injectable 90 milliGRAM(s) SubCutaneous two times a day  lactulose Syrup 20 Gram(s) Oral two times a day  levothyroxine 175 MICROGram(s) Oral daily  liothyronine 5 MICROGram(s) Oral daily  melatonin 3 milliGRAM(s) Oral at bedtime  methadone    Tablet 5 milliGRAM(s) Oral two times a day  metoprolol succinate ER 50 milliGRAM(s) Oral daily  pantoprazole    Tablet 40 milliGRAM(s) Oral before breakfast  saline laxative (FLEET) Rectal Enema 1 Enema Rectal once  senna 2 Tablet(s) Oral at bedtime  sodium chloride 0.9%. 1000 milliLiter(s) (100 mL/Hr) IV Continuous <Continuous>    MEDICATIONS  (PRN):  acetaminophen   Tablet .. 650 milliGRAM(s) Oral every 6 hours PRN Temp greater or equal to 38C (100.4F), Mild Pain (1 - 3)  albuterol/ipratropium for Nebulization 3 milliLiter(s) Nebulizer every 6 hours PRN Shortness of Breath and/or Wheezing  ALPRAZolam 0.25 milliGRAM(s) Oral two times a day PRN anxiety  HYDROmorphone   Tablet 3 milliGRAM(s) Oral every 4 hours PRN Moderate and Severe Pain (7 - 10)        LABORATORY:                          7.4    10.43 )-----------( 219      ( 24 Sep 2020 07:05 )             24.2     09-24    140  |  98  |  8   ----------------------------<  110<H>  3.8   |  28  |  0.29<L>    Ca    10.1      24 Sep 2020 07:05  Phos  3.9     09-24  Mg     1.7     09-24    TPro  x   /  Alb  2.7<L>  /  TBili  x   /  DBili  x   /  AST  x   /  ALT  x   /  AlkPhos  x   09-23

## 2020-09-24 NOTE — PROGRESS NOTE ADULT - PROBLEM SELECTOR PLAN 1
RT of LEFT femur/hip - 5 out of 5 fractions received.   New right-sided femur lesions noted - no plans for acute intervention per rad onc/ortho.  Appreciate pall care input. Pain meds as rec.  Onc input appreciated. No plans for inpatient RT.  -Spoke w/ dtr at length - she would like to f/u with oncology to further discuss prognosis and treatment options. Option of hospice was presented and something that could be considered. However, she made it clear that the family cannot move forward with a decision until they have more information.

## 2020-09-25 NOTE — PROGRESS NOTE ADULT - PROVIDER SPECIALTY LIST ADULT
Hospitalist
Palliative Care
Hospitalist
Palliative Care

## 2020-09-25 NOTE — PROGRESS NOTE ADULT - ASSESSMENT
63yo F w/ PMHx of stage IV lung adenocarcinoma ( dx 2017) s/p RLL lobectomy with recurrence in May 2020 s/p palliative radiation to clavicle and spine and 1 round of carbo/pemextred/pembrolizumab chemo 7/31, hypothyroidism, anxiety, recent admission for pathologic L femoral neck fracture s/p left QASIM on 8/18 [facility]. While here, the pt was followed by rad onc and palliative care. RT due to complete on 9/23. Methadone also started for pain control. Onc evaluated pt. No plans to initiate chemo on inpatient basis. Given new right thigh pain - XR was obtained. Films reviewed. There is concern for bone mets as culprit for pain. Per rad onc and ortho no plans for acute intervention. Palliative care follow up is noted. As discussed with family, oncology - no plans to initiate IP chemo due to performance status and recent RT. Optimal approach at this time is to discharge pt to rehab for further optimization prior to reassessment for chemo. This will take place as OP. POC reviewed on IDRs - pt will be disharged to [facility] today.

## 2020-09-25 NOTE — PROGRESS NOTE ADULT - PROBLEM SELECTOR PROBLEM 1
Other encephalopathy
Adenocarcinoma of lung, stage 4, unspecified laterality
Other encephalopathy
Other encephalopathy
Pain of left lower extremity
Adenocarcinoma of lung, stage 4, unspecified laterality
Adenocarcinoma of lung, stage 4, unspecified laterality
Other encephalopathy
Adenocarcinoma of lung, stage 4, unspecified laterality
Adenocarcinoma of lung, stage 4, unspecified laterality

## 2020-09-25 NOTE — PROGRESS NOTE ADULT - NUTRITIONAL ASSESSMENT
This patient has been assessed with a concern for Malnutrition and has been determined to have a diagnosis/diagnoses of Moderate protein-calorie malnutrition.    This patient is being managed with:   Diet Regular-  Supplement Feeding Modality:  Oral  Ensure Enlive Cans or Servings Per Day:  1       Frequency:  Two Times a day  Entered: Sep 18 2020 11:32AM    Diet Regular-  Supplement Feeding Modality:  Oral  Ensure Enlive Cans or Servings Per Day:  2       Frequency:  Daily  Entered: Sep 17 2020  3:24PM    The following pending diet order is being considered for treatment of Moderate protein-calorie malnutrition:null
This patient has been assessed with a concern for Malnutrition and has been determined to have a diagnosis/diagnoses of Moderate protein-calorie malnutrition.    This patient is being managed with:   Diet Regular-  Supplement Feeding Modality:  Oral  Ensure Enlive Cans or Servings Per Day:  2       Frequency:  Daily  Entered: Sep 17 2020  3:24PM    Diet Regular-  Entered: Sep 10 2020  5:29PM    The following pending diet order is being considered for treatment of Moderate protein-calorie malnutrition:null
This patient has been assessed with a concern for Malnutrition and has been determined to have a diagnosis/diagnoses of Moderate protein-calorie malnutrition.    This patient is being managed with:   Diet Regular-  Entered: Sep 10 2020  5:29PM    
This patient has been assessed with a concern for Malnutrition and has been determined to have a diagnosis/diagnoses of Moderate protein-calorie malnutrition.    This patient is being managed with:   Diet Regular-  Supplement Feeding Modality:  Oral  Ensure Enlive Cans or Servings Per Day:  1       Frequency:  Two Times a day  Entered: Sep 18 2020 11:32AM    Diet Regular-  Supplement Feeding Modality:  Oral  Ensure Enlive Cans or Servings Per Day:  2       Frequency:  Daily  Entered: Sep 17 2020  3:24PM    The following pending diet order is being considered for treatment of Moderate protein-calorie malnutrition:null
This patient has been assessed with a concern for Malnutrition and has been determined to have a diagnosis/diagnoses of Moderate protein-calorie malnutrition.    This patient is being managed with:   Diet Regular-  Supplement Feeding Modality:  Oral  Ensure Enlive Cans or Servings Per Day:  1       Frequency:  Two Times a day  Entered: Sep 18 2020 11:32AM    Diet Regular-  Supplement Feeding Modality:  Oral  Ensure Enlive Cans or Servings Per Day:  2       Frequency:  Daily  Entered: Sep 17 2020  3:24PM    The following pending diet order is being considered for treatment of Moderate protein-calorie malnutrition  This patient has been assessed with a concern for Malnutrition and has been determined to have a diagnosis/diagnoses of Moderate protein-calorie malnutrition.    This patient is being managed with:   Diet Regular-  Supplement Feeding Modality:  Oral  Ensure Enlive Cans or Servings Per Day:  1       Frequency:  Two Times a day  Entered: Sep 18 2020 11:32AM    Diet Regular-  Supplement Feeding Modality:  Oral  Ensure Enlive Cans or Servings Per Day:  2       Frequency:  Daily  Entered: Sep 17 2020  3:24PM    The following pending diet order is being considered for treatment of Moderate protein-calorie malnutrition:null
This patient has been assessed with a concern for Malnutrition and has been determined to have a diagnosis/diagnoses of Moderate protein-calorie malnutrition.    This patient is being managed with:   Diet Regular-  Supplement Feeding Modality:  Oral  Ensure Enlive Cans or Servings Per Day:  1       Frequency:  Two Times a day  Entered: Sep 18 2020 11:32AM    Diet Regular-  Supplement Feeding Modality:  Oral  Ensure Enlive Cans or Servings Per Day:  2       Frequency:  Daily  Entered: Sep 17 2020  3:24PM    The following pending diet order is being considered for treatment of Moderate protein-calorie malnutrition:null
This patient has been assessed with a concern for Malnutrition and has been determined to have a diagnosis/diagnoses of Moderate protein-calorie malnutrition.    This patient is being managed with:   Diet Regular-  Supplement Feeding Modality:  Oral  Ensure Enlive Cans or Servings Per Day:  1       Frequency:  Two Times a day  Entered: Sep 18 2020 11:32AM    Diet Regular-  Supplement Feeding Modality:  Oral  Ensure Enlive Cans or Servings Per Day:  2       Frequency:  Daily  Entered: Sep 17 2020  3:24PM    The following pending diet order is being considered for treatment of Moderate protein-calorie malnutrition:null

## 2020-09-25 NOTE — DISCHARGE NOTE NURSING/CASE MANAGEMENT/SOCIAL WORK - NSDCFUADDAPPT_GEN_ALL_CORE_FT
If you are in need of a general medicine physician and post-discharge medical follow-up for further care/recommendations you may contact the [facility] for an appointment [phone]

## 2020-09-25 NOTE — PROGRESS NOTE ADULT - PROBLEM SELECTOR PLAN 3
-Continue to encourage PO intake. Ca level has normalized.  -PTH low. Suspect malignancy related hypercal.   -F/u PTHrp.  -Hypomagnesemia - will replete w/ mag sulfate today.

## 2020-09-25 NOTE — PROGRESS NOTE ADULT - PROBLEM SELECTOR PROBLEM 2
Acute pain
Constipation
Suspected pulmonary embolism
Right thigh pain
Acute deep vein thrombosis (DVT) of distal vein of lower extremity, unspecified laterality
Acute pain
Acute deep vein thrombosis (DVT) of distal vein of lower extremity, unspecified laterality
Suspected pulmonary embolism

## 2020-09-25 NOTE — PROGRESS NOTE ADULT - SUBJECTIVE AND OBJECTIVE BOX
SUBJECTIVE:  Follow up for ________.      ROS: All systems negative except as noted.      Vital Signs Last 24 Hrs  T(C): 36.6 (25 Sep 2020 06:06), Max: 36.7 (24 Sep 2020 22:34)  T(F): 97.9 (25 Sep 2020 06:06), Max: 98 (24 Sep 2020 22:34)  HR: 94 (25 Sep 2020 06:06) (87 - 95)  BP: 115/61 (25 Sep 2020 06:06) (115/61 - 136/84)  BP(mean): --  RR: 18 (25 Sep 2020 06:06) (16 - 18)  SpO2: 100% (25 Sep 2020 06:06) (99% - 100%)      PHYSICAL EXAM:              MEDICATION:  MEDICATIONS  (STANDING):  dexAMETHasone     Tablet 2 milliGRAM(s) Oral daily  enoxaparin Injectable 90 milliGRAM(s) SubCutaneous two times a day  lactulose Syrup 20 Gram(s) Oral two times a day  levothyroxine 175 MICROGram(s) Oral daily  liothyronine 5 MICROGram(s) Oral daily  melatonin 3 milliGRAM(s) Oral at bedtime  methadone    Tablet 5 milliGRAM(s) Oral two times a day  metoprolol succinate ER 50 milliGRAM(s) Oral daily  pantoprazole    Tablet 40 milliGRAM(s) Oral before breakfast  senna 2 Tablet(s) Oral at bedtime  sodium chloride 0.9%. 1000 milliLiter(s) (100 mL/Hr) IV Continuous <Continuous>    MEDICATIONS  (PRN):  acetaminophen   Tablet .. 650 milliGRAM(s) Oral every 6 hours PRN Temp greater or equal to 38C (100.4F), Mild Pain (1 - 3)  albuterol/ipratropium for Nebulization 3 milliLiter(s) Nebulizer every 6 hours PRN Shortness of Breath and/or Wheezing  ALPRAZolam 0.25 milliGRAM(s) Oral two times a day PRN anxiety  HYDROmorphone   Tablet 3 milliGRAM(s) Oral every 4 hours PRN Moderate and Severe Pain (7 - 10)            LABORATORY:                          7.8    9.18  )-----------( 185      ( 25 Sep 2020 06:30 )             26.2     09-25    134<L>  |  97<L>  |  5<L>  ----------------------------<  93  3.5   |  24  |  0.25<L>    Ca    10.1      25 Sep 2020 06:30  Phos  3.8     09-25  Mg     1.5     09-25                         SUBJECTIVE:  Follow up for breast ca.    The patient was seen and evaluated at bedside this AM. No o/n events. Moved bowels yesterday. States she woke up with thigh pain this AM. Otherwise, no fevers, chills, NV, SOB, CP, abd pain.       ROS: All systems negative except as noted.      Vital Signs Last 24 Hrs  T(C): 36.6 (25 Sep 2020 06:06), Max: 36.7 (24 Sep 2020 22:34)  T(F): 97.9 (25 Sep 2020 06:06), Max: 98 (24 Sep 2020 22:34)  HR: 94 (25 Sep 2020 06:06) (87 - 95)  BP: 115/61 (25 Sep 2020 06:06) (115/61 - 136/84)  BP(mean): --  RR: 18 (25 Sep 2020 06:06) (16 - 18)  SpO2: 100% (25 Sep 2020 06:06) (99% - 100%)      PHYSICAL EXAM:  Gen- In bed, appeared comfortable despite stating she is in pain, NAD. Appears stated age.  Eyes- EOMI, PERRLA, nonicteric.  EMNT- MMM. no exudates. No pharyngeal erythema. Fair dentition.   Neck- Supple. No masses.   Resp- CTAB, good effort. No r/r/w. No accessory muscle use.  CVS- RRR, S1S2, no g/r/m. No LE edema.  GI- Soft abd, NT, ND, +BSx4. No HSM.  MSK- No C/C. Left knee in brace/immobilizer in place. No crepitus.  Neuro- CN II-XII intact. Speech fluent/face symmetric. Sensation intact.  Skin- No rashes/ulcers. Warm/moist.  Psych- AAOx3. Appropriate mood/affect.      MEDICATION:  MEDICATIONS  (STANDING):  dexAMETHasone     Tablet 2 milliGRAM(s) Oral daily  enoxaparin Injectable 90 milliGRAM(s) SubCutaneous two times a day  lactulose Syrup 20 Gram(s) Oral two times a day  levothyroxine 175 MICROGram(s) Oral daily  liothyronine 5 MICROGram(s) Oral daily  melatonin 3 milliGRAM(s) Oral at bedtime  methadone    Tablet 5 milliGRAM(s) Oral two times a day  metoprolol succinate ER 50 milliGRAM(s) Oral daily  pantoprazole    Tablet 40 milliGRAM(s) Oral before breakfast  senna 2 Tablet(s) Oral at bedtime  sodium chloride 0.9%. 1000 milliLiter(s) (100 mL/Hr) IV Continuous <Continuous>    MEDICATIONS  (PRN):  acetaminophen   Tablet .. 650 milliGRAM(s) Oral every 6 hours PRN Temp greater or equal to 38C (100.4F), Mild Pain (1 - 3)  albuterol/ipratropium for Nebulization 3 milliLiter(s) Nebulizer every 6 hours PRN Shortness of Breath and/or Wheezing  ALPRAZolam 0.25 milliGRAM(s) Oral two times a day PRN anxiety  HYDROmorphone   Tablet 3 milliGRAM(s) Oral every 4 hours PRN Moderate and Severe Pain (7 - 10)        LABORATORY:                          7.8    9.18  )-----------( 185      ( 25 Sep 2020 06:30 )             26.2     09-25    134<L>  |  97<L>  |  5<L>  ----------------------------<  93  3.5   |  24  |  0.25<L>    Ca    10.1      25 Sep 2020 06:30  Phos  3.8     09-25  Mg     1.5     09-25

## 2020-09-25 NOTE — DISCHARGE NOTE NURSING/CASE MANAGEMENT/SOCIAL WORK - PATIENT PORTAL LINK FT
You can access the FollowMyHealth Patient Portal offered by [facility] by registering at the following website: [email or web address]. By joining [facility]’s FollowMyHealth portal, you will also be able to view your health information using other applications (apps) compatible with our system.

## 2020-09-25 NOTE — PROGRESS NOTE ADULT - PROBLEM SELECTOR PLAN 1
RT of LEFT femur/hip - 5 out of 5 fractions received.   New right-sided femur lesions noted - no plans for acute intervention per rad onc/ortho.  Appreciate pall care input. Pain meds as rec - would not escalate dosage.  Onc input appreciated. No plans for inpatient chemo. Pt needs further optimization at rehab prior to reevaluation for chemo, which will be pursued on an outpatient basis.

## 2020-09-25 NOTE — PROGRESS NOTE ADULT - REASON FOR ADMISSION
Transferred from [facility] to [facility] for potential RT
Transferred from NS to [facility] for potential RT
Transferred from NS to [facility] for potential RT
Transferred from NS to [facility] for potential RT
Transferred from NS to [facility] for potential RT
Transferred from NS to [facility] for potential RT
Transferred from NS to [facility] for potential RT
Transferred from NS to [facility] for potential RT
Transferred from NS to [facility] for potential RT
Transferred from NS to [facility] for potential RT
Transferred from NS to [facility] for potential RT
Transferred from NS to [facility] for potential RT
Transferred from NS to [facility] for potential RT
Transferred from NS to [facility] for potential RT
Transferred from NS to [facility] for potential RT
Transferred from NS to [facility] for potential RT
Transferred from NS to [facility] for potential RT
Transferred from NS to [facility] for potential RT
Transferred from NS to [facility] for potential RT

## 2020-09-29 NOTE — ED PROVIDER NOTE - CPE EDP GASTRO NORM
normal...
Implemented All Universal Safety Interventions:  [place] to call system. Call bell, personal items and telephone within reach. Instruct patient to call for assistance. Room bathroom lighting operational. Non-slip footwear when patient is off stretcher. Physically safe environment: no spills, clutter or unnecessary equipment. Stretcher in lowest position, wheels locked, appropriate side rails in place.

## 2020-09-30 NOTE — ED ADULT NURSE NOTE - NSIMPLEMENTINTERV_GEN_ALL_ED
Implemented All Fall Risk Interventions:  [place] to call system. Call bell, personal items and telephone within reach. Instruct patient to call for assistance. Room bathroom lighting operational. Non-slip footwear when patient is off stretcher. Physically safe environment: no spills, clutter or unnecessary equipment. Stretcher in lowest position, wheels locked, appropriate side rails in place. Provide visual cue, wrist band, yellow gown, etc. Monitor gait and stability. Monitor for mental status changes and reorient to person, place, and time. Review medications for side effects contributing to fall risk. Reinforce activity limits and safety measures with patient and family.

## 2020-09-30 NOTE — ED ADULT NURSE NOTE - OBJECTIVE STATEMENT
Pt appearing ill, states she has pain in her groin area, as per EMS they were called for possible DVT. Pt is a poor historian, in need of constant reorientation. Pt is alert and oriented x3 but has periods of lethargy.   Pt has palpable pulses in all extremities, mild edema noted in left foot. Pt Denies SOB, CP, palpatations.

## 2020-09-30 NOTE — ED PROVIDER NOTE - ATTENDING CONTRIBUTION TO CARE
Candida: I performed a face to face bedside interview with patient regarding history of present illness, review of symptoms and past medical history. I completed an independent physical exam.  I have discussed patient's plan of care with advanced care provider.   I agree with note as stated above including HISTORY OF PRESENT ILLNESS, HIV, PAST MEDICAL/SURGICAL/FAMILY/SOCIAL HISTORY, ALLERGIES AND HOME MEDICATIONS, REVIEW OF SYSTEMS, PHYSICAL EXAM, MEDICAL DECISION MAKING and any PROGRESS NOTES during the time I functioned as the attending physician for this patient  unless otherwise noted. My brief assessment is as follows: Pt with stage IV lung cancer, diagnosed with DVT 9/25 at [facility], sent to [facility] ED from [place] [place] for DVT. Patient states she was woken up and sent to the ED. Patient with no current complaints. Called [facility] and the nurse confirmed that patient has been receiving her lovenox. Unsure why patient was sent to the ED. Called doctor from [place] multiple times. Will send rapid covid. If no response from [facility] doctor will send patient back to [place] as patient is receiving proper treatment for DVT and has no current complaints.

## 2020-09-30 NOTE — ED PROVIDER NOTE - NEUROLOGICAL, MLM
Alert and oriented, no focal deficits, no motor or sensory deficits. Alert and oriented, no focal deficits,

## 2020-09-30 NOTE — ED PROVIDER NOTE - NSFOLLOWUPINSTRUCTIONS_ED_ALL_ED_FT
Deep Vein Thrombosis    A deep vein thrombosis (DVT) is a blood clot (thrombus) that usually occurs in a deep, larger vein of the lower leg or the pelvis, or in an upper extremity such as the arm. These are dangerous and can lead to serious and even life-threatening complications if the clot travels to the lungs. Symptoms include swelling of the arm or leg, warmth and redness of the arm or leg, and pain. Treatment may include taking a blood thinning medication; make sure to take anything prescribed as instructed.    SEEK IMMEDIATE MEDICAL CARE IF YOU HAVE ANY OF THE FOLLOWING SYMPTOMS: shortness of breath, chest pain, rapid or irregular heartbeat, lightheadedness/dizziness, coughing up blood, or any bleeding in your vomit, stool, or urine. These symptoms may represent a serious problem that is an emergency. Do not wait to see if the symptoms will go away. Call 911 and do not drive yourself to the hospital.    Continue taking Lovenox as previously prescribed

## 2020-09-30 NOTE — ED PROVIDER NOTE - MUSCULOSKELETAL, MLM
Spine appears normal, range of motion is not limited, no muscle or joint tenderness Spine appears normal, no muscle or joint tenderness

## 2020-09-30 NOTE — ED PROVIDER NOTE - OBJECTIVE STATEMENT
Pt is a 63 y/o f, PMH significant forstage IV lung adenocarcinoma ( dx 2017) s/p RLL lobectomy with recurrence in May 2020 s/p palliative radiation to clavicle and spine, hypothyroidism, anxiety, LLE DVT ( on Lovenox ), presents to ED from [facility] for LLE DVT finding. Pt states she was woken up this morning and told she was being sent to the ED for a findings of a DVT in her LLE. Pt currently on Lovenox BID for DVT diagnosed at [facility] on 9/23/20 while she was admitted receiving palliative RT. Case discussed with FELIPA Acharya from [facility] who states pt is taking lovenox BID as previouslt prescribed and was told to send the pt to ED by pts PMD Dr. Adolfo Marte for DVT findings. Pt has no complaints at this time. Pt is a 65 y/o f, PMH significant for stage IV lung adenocarcinoma ( dx 2017) s/p RLL lobectomy with recurrence in May 2020 s/p palliative radiation to clavicle and spine, hypothyroidism, anxiety, LLE DVT ( on Lovenox ), presents to ED from [facility] for LLE DVT finding. Pt states she was woken up this morning and told she was being sent to the ED for a findings of a DVT in her LLE. Pt currently on Lovenox BID for DVT diagnosed at [facility] on 9/23/20 while she was admitted receiving palliative RT. Case discussed with FELIPA Acharya from [facility] who states pt is taking lovenox BID as previously prescribed and was told to send the pt to ED by pts PMD Dr. Adolfo Marte for DVT findings. Pt has no complaints at this time.

## 2020-09-30 NOTE — ED ADULT TRIAGE NOTE - CHIEF COMPLAINT QUOTE
Pt sent in from [facility], pt states "I don't know why Im here nothings bothering me, I have stage 4 Lung CA." EMS states unknown why they were called, Ame Vazquez paperwork stating "abnormal doppler lower left leg possible DVT."

## 2020-09-30 NOTE — ED PROVIDER NOTE - PROGRESS NOTE DETAILS
Candida MARTINES: Dr Marte called multiple times with no response, multiple pages sent. Discussed with patient's daughter, she does not understand why patient was sent to the ED, Patient has known DVT and is receiving her lovenox. PA NOTE: COVID PCR negative. Pt stable for DC back to facility. Case discussed with FELIPA Acharya.

## 2020-09-30 NOTE — ED PROVIDER NOTE - CLINICAL SUMMARY MEDICAL DECISION MAKING FREE TEXT BOX
Pt sent in from [facility] for findings for LLE DVT under recommendation of Dr. Marte. Pt currently receiving treatment for LLE DVT at facility. Pages placed to Dr. Olsen service to discuss reasoning for sending pt to ED. Awaiting call back. Will COVID test pt in anticipation for return to facility. Pt w/o acute complaints, will continue to monitor.

## 2020-09-30 NOTE — ED PROVIDER NOTE - PATIENT PORTAL LINK FT
You can access the FollowMyHealth Patient Portal offered by [facility] by registering at the following website: [email or web address]. By joining [facility]’s FollowMyHealth portal, you will also be able to view your health information using other applications (apps) compatible with our system.

## 2020-10-01 NOTE — HISTORY OF PRESENT ILLNESS
[Home] : at home, [unfilled] , at the time of the visit. [Medical Office: ([facility])___] : at the medical office located in  [FreeTextEntry3] : daughter Kena [FreeTextEntry1] : \par This 64 year-old woman is being seen for re-evaluation  for further palliative RT for metastatic disease of the right hip.  She completed post op palliative radiation therapy to the left clavicle, T6 - T8 spine, T-12 - L2 spine, and left hip/femur for metastatic adenocarcinoma.  She is s/p right lower lobectomy in 2017. \par \par Ms. Paula had a recent pathological fracture of the left femoral neck and left acetabulum.  On 8/18/2020 biopsy was done by Dr. Ramírez on both the left acetabulum and left femoral neck.  Pathology -- metastatic poorly differentiated adenocarcinoma consistent with lung primary.  She is s/p complex left QASIM reconstruction on 8/18/2020.She subsequently had post op RT of 2000cGy in 5 fx by Dr. Brennan at [facility].\par \par Follows with Dr. Joseph.\par

## 2020-10-01 NOTE — VITALS
[Maximal Pain Intensity: 3/10] : 3/10 [Least Pain Intensity: 1/10] : 1/10 [Pain Location: ___] : Pain Location: [unfilled] [NoTreatment Scheduled] : no treatment scheduled [60: Requires occasional assistance, but is able to care for most of his/her needs] : 60: Requires occasional assistance, but is able to care for most of his/her needs [ECOG Performance Status: 3 - Capable of only limited self care, confined to bed or chair more than 50% of waking hours] : Performance Status: 3 - Capable of only limited self care, confined to bed or chair more than 50% of waking hours

## 2020-10-01 NOTE — REVIEW OF SYSTEMS
[Negative] : Allergic/Immunologic [FreeTextEntry2] : sedated per daughter having been transferred from rehab center ([facility]) to hospital last night for management of a clot. [FreeTextEntry9] : pt has some pain in right hip per daughter.

## 2020-10-01 NOTE — REASON FOR VISIT
[Bone Metastasis] : bone metastasis [Re-evaluation] : re-evaluation for [Family Member] : family member

## 2020-10-01 NOTE — DISEASE MANAGEMENT
[LEIDA] : LEIDA [Clinical] : TNM Stage: c [FreeTextEntry4] : metastatic adenocarcinoma [TTNM] : 1a [NTNM] : 0 [MTNM] : 1

## 2020-10-01 NOTE — ASSESSMENT
[Metastatic disease without local control] : Metastatic disease without local control [FreeTextEntry1] : patient being evaluated for management of clot and possible enrollment in hospice. Moderately symptomatic metastatic bony disease in right hip.

## 2020-10-15 DIAGNOSIS — Z71.89 OTHER SPECIFIED COUNSELING: ICD-10-CM

## 2020-10-15 PROBLEM — E03.9 HYPOTHYROIDISM, UNSPECIFIED: Chronic | Status: INACTIVE | Noted: 2020-01-01 | Resolved: 2020-01-01

## 2020-10-19 ENCOUNTER — NON-APPOINTMENT (OUTPATIENT)
Age: 65
End: 2020-10-19

## 2020-10-29 PROCEDURE — 82945 GLUCOSE OTHER FLUID: CPT

## 2020-10-29 PROCEDURE — 71275 CT ANGIOGRAPHY CHEST: CPT

## 2020-10-29 PROCEDURE — 70450 CT HEAD/BRAIN W/O DYE: CPT

## 2020-10-29 PROCEDURE — 84295 ASSAY OF SERUM SODIUM: CPT

## 2020-10-29 PROCEDURE — 93926 LOWER EXTREMITY STUDY: CPT

## 2020-10-29 PROCEDURE — 86140 C-REACTIVE PROTEIN: CPT

## 2020-10-29 PROCEDURE — 88341 IMHCHEM/IMCYTCHM EA ADD ANTB: CPT

## 2020-10-29 PROCEDURE — 80053 COMPREHEN METABOLIC PANEL: CPT

## 2020-10-29 PROCEDURE — 87070 CULTURE OTHR SPECIMN AEROBIC: CPT

## 2020-10-29 PROCEDURE — 76377 3D RENDER W/INTRP POSTPROCES: CPT

## 2020-10-29 PROCEDURE — 84484 ASSAY OF TROPONIN QUANT: CPT

## 2020-10-29 PROCEDURE — 36430 TRANSFUSION BLD/BLD COMPNT: CPT

## 2020-10-29 PROCEDURE — 89051 BODY FLUID CELL COUNT: CPT

## 2020-10-29 PROCEDURE — 85610 PROTHROMBIN TIME: CPT

## 2020-10-29 PROCEDURE — 84478 ASSAY OF TRIGLYCERIDES: CPT

## 2020-10-29 PROCEDURE — 99285 EMERGENCY DEPT VISIT HI MDM: CPT | Mod: 25

## 2020-10-29 PROCEDURE — 83735 ASSAY OF MAGNESIUM: CPT

## 2020-10-29 PROCEDURE — P9016: CPT

## 2020-10-29 PROCEDURE — 87086 URINE CULTURE/COLONY COUNT: CPT

## 2020-10-29 PROCEDURE — 80048 BASIC METABOLIC PNL TOTAL CA: CPT

## 2020-10-29 PROCEDURE — 82803 BLOOD GASES ANY COMBINATION: CPT

## 2020-10-29 PROCEDURE — 96374 THER/PROPH/DIAG INJ IV PUSH: CPT | Mod: XU

## 2020-10-29 PROCEDURE — 72170 X-RAY EXAM OF PELVIS: CPT

## 2020-10-29 PROCEDURE — 82947 ASSAY GLUCOSE BLOOD QUANT: CPT

## 2020-10-29 PROCEDURE — 82330 ASSAY OF CALCIUM: CPT

## 2020-10-29 PROCEDURE — 86900 BLOOD TYPING SEROLOGIC ABO: CPT

## 2020-10-29 PROCEDURE — 73552 X-RAY EXAM OF FEMUR 2/>: CPT

## 2020-10-29 PROCEDURE — 82435 ASSAY OF BLOOD CHLORIDE: CPT

## 2020-10-29 PROCEDURE — 84100 ASSAY OF PHOSPHORUS: CPT

## 2020-10-29 PROCEDURE — 88112 CYTOPATH CELL ENHANCE TECH: CPT

## 2020-10-29 PROCEDURE — 86901 BLOOD TYPING SEROLOGIC RH(D): CPT

## 2020-10-29 PROCEDURE — 86769 SARS-COV-2 COVID-19 ANTIBODY: CPT

## 2020-10-29 PROCEDURE — 82042 OTHER SOURCE ALBUMIN QUAN EA: CPT

## 2020-10-29 PROCEDURE — 74177 CT ABD & PELVIS W/CONTRAST: CPT

## 2020-10-29 PROCEDURE — 86850 RBC ANTIBODY SCREEN: CPT

## 2020-10-29 PROCEDURE — 81001 URINALYSIS AUTO W/SCOPE: CPT

## 2020-10-29 PROCEDURE — 85027 COMPLETE CBC AUTOMATED: CPT

## 2020-10-29 PROCEDURE — 85014 HEMATOCRIT: CPT

## 2020-10-29 PROCEDURE — 85730 THROMBOPLASTIN TIME PARTIAL: CPT

## 2020-10-29 PROCEDURE — 87205 SMEAR GRAM STAIN: CPT

## 2020-10-29 PROCEDURE — 87075 CULTR BACTERIA EXCEPT BLOOD: CPT

## 2020-10-29 PROCEDURE — 82553 CREATINE MB FRACTION: CPT

## 2020-10-29 PROCEDURE — 85652 RBC SED RATE AUTOMATED: CPT

## 2020-10-29 PROCEDURE — 83605 ASSAY OF LACTIC ACID: CPT

## 2020-10-29 PROCEDURE — 83986 ASSAY PH BODY FLUID NOS: CPT

## 2020-10-29 PROCEDURE — 71045 X-RAY EXAM CHEST 1 VIEW: CPT

## 2020-10-29 PROCEDURE — 93970 EXTREMITY STUDY: CPT

## 2020-10-29 PROCEDURE — 82272 OCCULT BLD FECES 1-3 TESTS: CPT

## 2020-10-29 PROCEDURE — 83615 LACTATE (LD) (LDH) ENZYME: CPT

## 2020-10-29 PROCEDURE — 93005 ELECTROCARDIOGRAM TRACING: CPT

## 2020-10-29 PROCEDURE — U0003: CPT

## 2020-10-29 PROCEDURE — 84132 ASSAY OF SERUM POTASSIUM: CPT

## 2020-10-29 PROCEDURE — 88305 TISSUE EXAM BY PATHOLOGIST: CPT

## 2020-10-29 PROCEDURE — 84157 ASSAY OF PROTEIN OTHER: CPT

## 2020-10-29 PROCEDURE — 85018 HEMOGLOBIN: CPT

## 2020-10-29 PROCEDURE — 87102 FUNGUS ISOLATION CULTURE: CPT

## 2020-10-29 PROCEDURE — 73502 X-RAY EXAM HIP UNI 2-3 VIEWS: CPT

## 2020-10-29 PROCEDURE — 86923 COMPATIBILITY TEST ELECTRIC: CPT

## 2020-10-29 PROCEDURE — 87040 BLOOD CULTURE FOR BACTERIA: CPT

## 2020-10-29 PROCEDURE — 88342 IMHCHEM/IMCYTCHM 1ST ANTB: CPT

## 2020-11-04 ENCOUNTER — APPOINTMENT (OUTPATIENT)
Dept: HEMATOLOGY ONCOLOGY | Facility: CLINIC | Age: 65
End: 2020-11-04

## 2020-11-12 ENCOUNTER — APPOINTMENT (OUTPATIENT)
Dept: ENDOCRINOLOGY | Facility: CLINIC | Age: 65
End: 2020-11-12

## 2020-11-25 NOTE — PROGRESS NOTE ADULT - PROBLEM SELECTOR PROBLEM 2
Admit Date: 11/23/2020    Subjective:     Patient states she is feeling off balance when she walks. No complaints of left hand weakness or numbness. Scheduled Meds:   aspirin  81 mg Oral Daily    rosuvastatin  5 mg Oral Nightly    amLODIPine  5 mg Oral Daily    ezetimibe  10 mg Oral Daily    sodium chloride flush  10 mL Intravenous 2 times per day    enoxaparin  40 mg Subcutaneous Daily     Continuous Infusions:  PRN Meds:regadenoson, sodium chloride flush, acetaminophen **OR** acetaminophen, polyethylene glycol, promethazine **OR** ondansetron, hydrALAZINE      Objective:     I/O last 3 completed shifts: In: 180 [P.O.:180]  Out: -   No intake/output data recorded.   /68   Pulse 68   Temp 98 °F (36.7 °C) (Temporal)   Resp 18   Ht 5' 2\" (1.575 m)   Wt 167 lb 1.7 oz (75.8 kg)   LMP  (LMP Unknown)   SpO2 97%   BMI 30.56 kg/m²     LUNGS:  No increased work of breathing, good air exchange, clear to auscultation bilaterally, no crackles or wheezing  CARDIOVASCULAR:  RRR with no murmurs, no gallops, no rubs  ABDOMEN:  Flat, soft, non-tender  MUSCULOSKELETAL:  No cyanosis, no edema, no clubbing    Data Review  CBC:   Recent Labs     11/23/20  1110 11/24/20  0531   WBC 4.7 5.5   HGB 13.9 12.5    265     BMP:    Recent Labs     11/23/20  1056 11/23/20  1110 11/24/20  0531   NA  --  141 144   K  --  3.9 3.7   CL  --  106 108*   CO2  --  24 25   BUN  --  16 18   CREATININE  --  0.7 0.8   GLUCOSE 104 104* 90     Coagulation:   Lab Results   Component Value Date    INR 1.0 11/23/2020    APTT 33.4 11/23/2020     Cardiac markers:   Lab Results   Component Value Date    CKMB <1.0 11/13/2017     Lab Results   Component Value Date    LABALBU 3.5 11/24/2020     Lab Results   Component Value Date    ALT 6 11/24/2020    AST 10 11/24/2020    ALKPHOS 81 11/24/2020    BILITOT 0.4 11/24/2020         Assessment:     Patient Active Problem List    Diagnosis Date Noted    Acute CVA [facility]cerebrovascular accident) ([facility] [address] 11/23/2020     Priority: High    Stenosis of right carotid artery 11/23/2020     Priority: High    Hypertensive emergency 11/23/2020     Priority: High    Diastolic dysfunction      Priority: Medium    Personal history of hydronephrosis 02/06/2020     Priority: Low    Hiatal hernia with GERD 02/06/2020     Priority: Low    Pure hypercholesterolemia      Priority: Low    Moderate obesity      Priority: Low    Mixed hyperlipidemia      Priority: Low    Essential hypertension 11/04/2016     Priority: Low    Gastroesophageal reflux disease without esophagitis 11/04/2016     Priority: Low    Anxiety      Priority: Low         MRI showing 3 mm acute CVA in R MCA territory of right parietal lobe    Plan:     Stress test as planned. Then vascular surgery to advise if carotid endarterectomy may be done. PT and OT to work at balance. As noted, yesterday, patient refuses to take Atorvastatin due to past adverse side effects. Rosuvastatin has been prescribed. Sinus tachycardia General

## 2021-01-08 NOTE — PHYSICAL THERAPY INITIAL EVALUATION ADULT - PLANNED THERAPY INTERVENTIONS, PT EVAL
Dr. Grabiel Zapata
balance training/bed mobility training/gait training/strengthening/transfer training

## 2021-02-19 NOTE — PROGRESS NOTE ADULT - SUBJECTIVE AND OBJECTIVE BOX
Authored by Daniela Benson MS4 pager [phone]    Patient is a 64y old  Female who presents with a chief complaint of Left hip pain and fall (16 Aug 2020 06:34)      OVERNIGHT EVENTS: Pt had episode of bowel incontinence this am, did not feel like she had to go but nurse states she had a small BM in bed. Currently denies fever, chills, nausea, vomiting, constipation, and diarrhea.      MEDICATIONS  (STANDING):  ALPRAZolam 0.25 milliGRAM(s) Oral two times a day  enoxaparin Injectable 40 milliGRAM(s) SubCutaneous daily  fentaNYL   Patch  25 MICROgram(s)/Hr 1 Patch Transdermal every 72 hours  lactated ringers. 1000 milliLiter(s) (125 mL/Hr) IV Continuous <Continuous>  levothyroxine 175 MICROGram(s) Oral daily  liothyronine 5 MICROGram(s) Oral daily  nystatin Powder 1 Application(s) Topical two times a day    MEDICATIONS  (PRN):  diphenhydrAMINE 25 milliGRAM(s) Oral every 4 hours PRN Rash and/or Itching  HYDROmorphone   Tablet 2 milliGRAM(s) Oral every 4 hours PRN Moderate Pain (4 - 6)  HYDROmorphone   Tablet 4 milliGRAM(s) Oral every 4 hours PRN Severe Pain (7 - 10)  ketorolac   Injectable 15 milliGRAM(s) IV Push every 6 hours PRN Severe Pain (7 - 10)  polyethylene glycol 3350 17 Gram(s) Oral daily PRN Constipation  senna 2 Tablet(s) Oral at bedtime PRN Constipation      Vital Signs Last 24 Hrs  T(C): 36.7 (17 Aug 2020 04:50), Max: 36.7 (17 Aug 2020 04:50)  T(F): 98 (17 Aug 2020 04:50), Max: 98 (17 Aug 2020 04:50)  HR: 118 (17 Aug 2020 04:50) (118 - 139)  BP: 105/75 (17 Aug 2020 04:50) (105/75 - 126/68)  RR: 18 (17 Aug 2020 04:50) (18 - 18)  SpO2: 96% (17 Aug 2020 04:50) (93% - 96%)      PHYSICAL EXAM  CONSTITUTIONAL: NAD, appears uncomfortable  RESPIRATORY: Normal respiratory effort; lungs are clear to auscultation bilaterally; no rales, wheezes, or rhonchi  CARDIOVASCULAR: Regular rate and rhythm, normal S1 and S2, no murmur/rub/gallop; mild lower extremity edema; Peripheral pulses are 2+ bilaterally  ABDOMEN: soft, Nontender to palpation, nondistended, normoactive bowel sounds  MUSCULOSKELETAL: Left hip externally rotated  PSYCH: A+O to person, place, and time; affect appropriate  SKIN: Erythematous, itchy rash with clear margins under right breast extending from mediastinum to axilla; patient states similar rash on buttocks but unable to assess given left hip fracture      LABS:    Urinalysis Basic - ( 15 Aug 2020 14:16 )    Color: Light Orange / Appearance: Turbid / S.026 / pH: x  Gluc: x / Ketone: Negative  / Bili: Negative / Urobili: 4 mg/dL   Blood: x / Protein: Trace / Nitrite: Positive   Leuk Esterase: Moderate / RBC: 9 /hpf / WBC 6 /HPF   Sq Epi: x / Non Sq Epi: 3 /hpf / Bacteria: Many      IMAGING  Xrays performed at [facility] MRN # [id number] Authored by Daniela Benson MS4 pager [phone]    Patient is a 64y old  Female who presents with a chief complaint of Left hip pain and fall (16 Aug 2020 06:34)      OVERNIGHT EVENTS: Pt had episode of bowel incontinence this am, did not feel like she had to go but nurse states she had a small BM in bed. She says she has had pain in the left hip to knee but now is better controlled. She required dilaudid 4mg at 8pm and again at 6am. She currently denies fever, chills, chest pain, SOB, nausea, vomiting, constipation, and diarrhea. She denies burning with urination but does feel some discomfort with the condom catheter in place (she describes the application of the condom cath to be irritating to the skin).      MEDICATIONS  (STANDING):  ALPRAZolam 0.25 milliGRAM(s) Oral two times a day  enoxaparin Injectable 40 milliGRAM(s) SubCutaneous daily  fentaNYL   Patch  25 MICROgram(s)/Hr 1 Patch Transdermal every 72 hours  lactated ringers. 1000 milliLiter(s) (125 mL/Hr) IV Continuous <Continuous>  levothyroxine 175 MICROGram(s) Oral daily  liothyronine 5 MICROGram(s) Oral daily  nystatin Powder 1 Application(s) Topical two times a day    MEDICATIONS  (PRN):  diphenhydrAMINE 25 milliGRAM(s) Oral every 4 hours PRN Rash and/or Itching  HYDROmorphone   Tablet 2 milliGRAM(s) Oral every 4 hours PRN Moderate Pain (4 - 6)  HYDROmorphone   Tablet 4 milliGRAM(s) Oral every 4 hours PRN Severe Pain (7 - 10)  ketorolac   Injectable 15 milliGRAM(s) IV Push every 6 hours PRN Severe Pain (7 - 10)  polyethylene glycol 3350 17 Gram(s) Oral daily PRN Constipation  senna 2 Tablet(s) Oral at bedtime PRN Constipation      Vital Signs Last 24 Hrs  T(C): 36.7 (17 Aug 2020 04:50), Max: 36.7 (17 Aug 2020 04:50)  T(F): 98 (17 Aug 2020 04:50), Max: 98 (17 Aug 2020 04:50)  HR: 118 (17 Aug 2020 04:50) (118 - 139)  BP: 105/75 (17 Aug 2020 04:50) (105/75 - 126/68)  RR: 18 (17 Aug 2020 04:50) (18 - 18)  SpO2: 96% (17 Aug 2020 04:50) (93% - 96%)      PHYSICAL EXAM  CONSTITUTIONAL: NAD, appears uncomfortable  RESPIRATORY: Normal respiratory effort; lungs are clear to auscultation bilaterally; no rales, wheezes, or rhonchi  CARDIOVASCULAR: Regular rate and rhythm, normal S1 and S2, no murmur/rub/gallop; mild lower extremity edema; Peripheral pulses are 2+ bilaterally  ABDOMEN: soft, Nontender to palpation, nondistended, normoactive bowel sounds  MUSCULOSKELETAL: Left hip externally rotated  PSYCH: A+O to person, place, and time; affect appropriate  SKIN: Erythematous, itchy rash with clear margins under right breast extending from mediastinum to axilla; patient states similar rash on buttocks but unable to assess given left hip fracture      LABS:                      10.0   1.79  )-----------( 73    ANC 1342.5             30.4     134<L>  |  95<L>  |  11  ----------------------------<  149<H>  3.4<L>   |  26  |  0.35<L>    Ca    9.0        Phos  3.4       Mg     2.0         TPro  5.7<L>  /  Alb  3.3  /  TBili  1.7<H>  /  DBili  x   /  AST  21  /  ALT  37  /  AlkPhos  141<H>    PT: 13.6 sec;   INR: 1.15 ratio;  PTT:30.8 sec      Urinalysis Basic - ( 15 Aug 2020 14:16 )  Color: Light Orange / Appearance: Turbid / S.026 / pH: x  Gluc: x / Ketone: Negative  / Bili: Negative / Urobili: 4 mg/dL   Blood: x / Protein: Trace / Nitrite: Positive   Leuk Esterase: Moderate / RBC: 9 /hpf / WBC 6 /HPF   Sq Epi: x / Non Sq Epi: 3 /hpf / Bacteria: Many      IMAGING    Xrays performed at [facility] MRN # [id number]    Pending NM bone scan today (4) walks frequently

## 2021-06-21 NOTE — PROGRESS NOTE ADULT - PROBLEM SELECTOR PROBLEM 2
carenotes on cranioplasty, d/c medications, caring for incision action plan, Pathologic fracture of femoral neck, left, initial encounter craniectomy/Yes

## 2021-07-24 NOTE — PROGRESS NOTE ADULT - SUBJECTIVE AND OBJECTIVE BOX
INTERVAL HPI/OVERNIGHT EVENTS: 63 yo Female Patient admitted with Metastatic Lung to Spine and Bone with intractable pain  F/U Note  No new events overnight  Patient in good mood, talkative and engaging  Feeling better since Steroids were ordered for pain relief.  Appetite improving, constipation still problematic  Functional status fair, still not able to get OOB/CH or BR due to severe exacerbation of pain  RT treatments completed on Friday.  Discharge planning for [facility]  ROS as described above    64y old  Female who presents with a chief complaint of Lung metastasis with hypoxemia (20 Jul 2020 10:28)  Present Symptoms:     Dyspnea: 0 1   Nausea/Vomiting:  No  Anxiety:  Yes   Depression: No  Fatigue:  No   Energy level improved on steroids  Loss of appetite:  No    Pain: L clavicle fracture and Back pain            Character-            Duration-            Effect-            Factors-            Frequency-            Location-            Severity-moderate    Review of Systems: Reviewed                      All others negative    MEDICATIONS  (STANDING):  atorvastatin 10 milliGRAM(s) Oral at bedtime  bisacodyl Suppository 10 milliGRAM(s) Rectal daily  celecoxib 200 milliGRAM(s) Oral two times a day  dexAMETHasone  Injectable 4 milliGRAM(s) IV Push every 6 hours  enoxaparin Injectable 40 milliGRAM(s) SubCutaneous daily  fentaNYL   Patch  50 MICROgram(s)/Hr 1 Patch Transdermal every 72 hours  gabapentin 300 milliGRAM(s) Oral every 8 hours  lactulose Retention Enema 200 Gram(s) Rectal daily  levothyroxine 175 MICROGram(s) Oral daily  lidocaine   Patch 2 Patch Transdermal daily  liothyronine 5 MICROGram(s) Oral daily  magnesium hydroxide Suspension 30 milliLiter(s) Oral daily  naloxegol 25 milliGRAM(s) Oral daily  polyethylene glycol 3350 17 Gram(s) Oral at bedtime    MEDICATIONS  (PRN):  acetaminophen   Tablet .. 650 milliGRAM(s) Oral every 6 hours PRN Temp greater or equal to 38C (100.4F), Mild Pain (1 - 3)  diphenhydrAMINE 25 milliGRAM(s) Oral every 6 hours PRN Rash and/or Itching  HYDROmorphone   Tablet 2 milliGRAM(s) Oral every 4 hours PRN Moderate Pain (4 - 6)  HYDROmorphone   Tablet 4 milliGRAM(s) Oral every 3 hours PRN Severe Pain (7 - 10)  metoclopramide  IVPB 5 milliGRAM(s) IV Intermittent every 6 hours PRN nausea vomiting  ondansetron Injectable 4 milliGRAM(s) IV Push every 6 hours PRN Nausea and/or Vomiting  simethicone 80 milliGRAM(s) Chew every 6 hours PRN Gas  sodium chloride 0.65% Nasal 1 Spray(s) Both Nostrils every 6 hours PRN Nasal Congestion  tiZANidine 2 milliGRAM(s) Oral every 8 hours PRN muscle spasms    LABS:    I&O's Summary  RADIOLOGY & ADDITIONAL STUDIES:    ADVANCE DIRECTIVES:   DNR  NO  Completed on:                     MOLST  NO   Completed on:  Living Will   NO   Completed on: DISCHARGE

## 2021-08-26 NOTE — HISTORY OF PRESENT ILLNESS
[Cold Symptoms] : cold symptoms Add 52 Modifier (Optional): no [Moderate] : moderate Number Of Freeze-Thaw Cycles: 1 freeze-thaw cycle [___ Days ago] : [unfilled] days ago [Gradual] : gradually [Constant] : constant [Congestion] : congestion [Cough] : cough [Sore Throat] : sore throat Show Applicator Variable?: Yes [OTC Remedies] : OTC remedies [At Night] : at night [Stable] : stable Detail Level: Detailed [Wheezing] : no wheezing [Anorexia] : no anorexia [Chills] : no chills Post-Care Instructions: I reviewed with the patient in detail post-care instructions. Patient is to wear sunprotection, and avoid picking at any of the treated lesions. Pt may apply Vaseline to crusted or scabbing areas. [Earache] : no earache [Fatigue] : not fatigue [Shortness Of Breath] : no shortness of breath Consent: The patient's consent was obtained including but not limited to risks of crusting, scabbing, blistering, scarring, darker or lighter pigmentary change, recurrence, incomplete removal and infection. [Headache] : no headache [Fever] : no fever Duration Of Freeze Thaw-Cycle (Seconds): 0 Medical Necessity Clause: This procedure was medically necessary because the lesions that were treated were: Medical Necessity Information: It is in your best interest to select a reason for this procedure from the list below. All of these items fulfill various CMS LCD requirements except the new and changing color options.

## 2021-09-03 ENCOUNTER — NON-APPOINTMENT (OUTPATIENT)
Age: 66
End: 2021-09-03

## 2021-11-16 NOTE — ASU PATIENT PROFILE, ADULT - TOBACCO FREE, LONGEST PERIOD, PROFILE
Initial Anesthesia Post-op Note    Patient: Whitney Tsai  Procedure(s) Performed: LAPAROSCOPY, DIAGNOSTIC, LEFT OOPHORECTOMY  Anesthesia type: General    Vitals Value Taken Time   Temp 98.5 11/15/21 1859   Pulse 65 11/15/21 1858   Resp 18 11/15/21 1859   SpO2 94 % 11/15/21 1858   /91 11/15/21 1855   Vitals shown include unvalidated device data.      Patient Location: PACU Phase 1  Level of Consciousness: participates in exam  Respiratory Status: spontaneous ventilation and face mask  Cardiovascular blood pressure returned to baseline  Hydration: euvolemic  Pain Management: well controlled  Handoff: Handoff to receiving clinician was performed and questions were answered  Vomiting: none  Nausea: None  Airway Patency:patent  Post-op Assessment: awake, alert, appropriately conversant, or baseline and no complications      No complications documented.  
quit

## 2021-11-23 NOTE — ED ADULT NURSE NOTE - NS ED NURSE LEVEL OF CONSCIOUSNESS ORIENTATION
Oriented - self; Oriented - place; Oriented - time Island Pedicle Flap-Requiring Vessel Identification Text: The defect edges were debeveled with a #15 scalpel blade.  Given the location of the defect, shape of the defect and the proximity to free margins an island pedicle advancement flap was deemed most appropriate.  Using a sterile surgical marker, an appropriate advancement flap was drawn, based on the axial vessel mentioned above, incorporating the defect, outlining the appropriate donor tissue and placing the expected incisions within the relaxed skin tension lines where possible.    The area thus outlined was incised deep to adipose tissue with a #15 scalpel blade.  The skin margins were undermined to an appropriate distance in all directions around the primary defect and laterally outward around the island pedicle utilizing iris scissors.  There was minimal undermining beneath the pedicle flap.

## 2022-01-01 NOTE — H&P ADULT - PROBLEM SELECTOR PROBLEM 6
Pediatric Well Child Exam: 8-28 days    Chief Complaint   Patient presents with   • Well Infant Birth to 23 Months       SUBJECTIVE:  Toni Gomez is a 3 week old male who presents for an 8-28 day old well child exam.  Patient presents with Mother.    Preferred method of results communication:    Cell Phone:   Telephone Information:   Mobile [phone]     Okay to leave a message containing results? Yes    CONCERNS RAISED TODAY:  spitting up    SLEEP PATTERN: wakes every 3 hours to eat, stays up for 30-40 minutes then falls back to sleep.    DIET/GI:  FEEDING: Formula:  Gentle ease by Enfamil,  4 oz every 3 hrs   WATER SUPPLY AT HOME: bottled.  STOOLS:1- 2 / day.           Prophylactic measure Anemia

## 2022-01-01 NOTE — PROGRESS NOTE ADULT - ASSESSMENT
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    L Femur head Pathologic Fracture  Treat Acute Pain  > Fentanyl patch to 37mcg/hr  Using Dilaudid PO- minimal breakthrough doses  Multimodal approach  Refusing orthopedic surgery at this time  PWB LLE   PT OOB to stand and try sitting    Opioid Induced Constipation  Passed a large stool liquid and formed  After aggressive bowel regime  Would do better with Movantic every other day    GOC  Patient planning on returning to [facility], when medically optomized  Patient waiting for Dr. Joseph to visit and discuss her plan of care  Will their be any Radiation to her Hip  She pklans on continuing Chemotherapy (2) soft/nontender

## 2022-02-03 NOTE — ED PROVIDER NOTE - EYES NEGATIVE STATEMENT, MLM
no discharge, no irritation, no pain, no redness, and no visual changes.
impairments found/functional limitations in following categories/risk reduction/prevention/rehab potential/therapy frequency/predicted duration of therapy intervention

## 2022-02-08 NOTE — ED ADULT TRIAGE NOTE - BP NONINVASIVE DIASTOLIC (MM HG)
89 Provider Procedure Text (A): After obtaining clear surgical margins the defect was repaired by another provider.

## 2022-03-16 NOTE — PROGRESS NOTE ADULT - ATTENDING COMMENTS
DATE OF SERVICE: 03/16/2022

ADMIT DATE: 03/16/2022

CHIEF COMPLAINT:  Left ankle ganglion cyst.



HISTORY OF PRESENT ILLNESS:  The patient is a pleasant 38-year-old female who is

here today for elective surgery on a left lateral aspect ganglion cyst on her 

left ankle.  We have been requested to evaluate the patient prior to surgery.



PAST MEDICAL HISTORY:  Cold urticaria.



ALLERGIES:  SULFA.



FAMILY HISTORY:  Hypertension.



SOCIAL HISTORY:  She does not drink, smoke or take drugs.  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

mom.



MEDICATIONS:  Reviewed, please refer to the MRAD.



REVIEW OF SYSTEMS:

GENERAL:  No history of weight change, weakness or fevers.

SKIN:  No bruising, hair changes or rashes.

EYES:  No blurred, double or loss of vision.

NOSE AND THROAT:  No history of nosebleeds, hoarseness or sore throat.

HEART:  No history of palpitations, chest pain or shortness of breath on 

exertion.

LUNGS:  Denies cough, hemoptysis, wheezing or shortness of breath.

GASTROINTESTINAL:  Denies changes in appetite, nausea, vomiting, diarrhea or 

constipation.

GENITOURINARY:  No history of frequency, urgency, hesitancy or nocturia.

NEUROLOGIC:  Denies history of numbness, tingling, tremor or weakness.

PSYCHIATRIC:  No history of panic, anxiety or depression.

ENDOCRINE:  No history of heat or cold intolerance, polyuria or polydipsia.

EXTREMITIES:  Denies muscle weakness, joint pain, pain on walking or stiffness. 



PHYSICAL EXAMINATION:

VITALS:  Within normal limits and are stable.

GENERAL:  No apparent distress.  Alert and oriented.

HEENT:  Normal cephalic atraumatic, external auditory canals are patent

EYES:  Extraocular muscles are intact, pupils are equally round and reactive to 

light and accommodation

MUSCULOSKELETAL:  Well developed, well nourished, good range of motion

ENDOCRINE:  No thyromegaly was palpated

LYMPHATICS:  No cervical chain or axillary nodes were noted

HEMATOPOIETIC:  No bruising

NECK:  Supple, no JVD, no thyromegaly was noted.

LUNGS:  Clear to auscultation in all lung fields without rhonchi or wheezing.

HEART:  RRR, S1, S2 present.  Peripheral pulses intact, no obvious murmurs were 

noted.

ABDOMEN:  Soft, nontender.  Positive bowel sounds no organomegaly, normal bowel 

sounds.

EXTREMITIES:  She has a ganglion cyst on the lateral aspect of her left ankle.

NEUROLOGIC:  Normal speech, normal tone.  A and O x 3, moves all extremities, no

obvious focal deficits.

PSYCHIATRIC:  Normal affect, normal mood.  Stable.

SKIN:  No ulcerations or rashes, good skin turgor, no jaundice.

VASCULAR:  Good capillary refill, neurovascular bundle appears to be intact.



ASSESSMENT AND PLAN:  Left ankle ganglion cyst.  Clinically, she seems stable 

for surgery.  Postoperatively, she will need wound care and close followup with 

her podiatrist.



Thank you very much for allowing us to participate in the care of this nice 

lady.







ALEXIS

DR: Andi   DD: 03/16/2022 07:42

DT: 03/16/2022 08:13   TID: [id number] Pt seen and examined. Stage IV NSCLC, now with loculated/septated pleural effusion which is likely malignant. Resp status remains stable, no acute intervention required at this time. FUP Cxs and cytopath. Pt seen and examined. Stage IV NSCLC, now with loculated/septated pleural effusion s/p thoracentesis. Pleural fluid exudative with low pH and low glucose. No clinical signs of infection. The pleural fluid findings are likely 2/2 malignancy. Resp status remains stable, no acute intervention required at this time. FUP Cxs and cytopath.

## 2022-05-06 NOTE — ED PROVIDER NOTE - PROGRESS NOTE ADDITIONAL1
H/o nasal congestion/sinusitis about 2 weeks ago  Was treated with Z-pack and OTC remedies with partial temporarily relief  Symptoms came back a week ago  On the exam: VS are stable, nasal cavities are erythematous, edematous, with purulent discharge, pharynx is erythematous; nasal maxillary sinuses tenderness with palpation, increased pressure with leaning forward  Authorized: Vibramycin 100 mg BId  Use OTC saline nasal spray/drops  Instructed to keep herself well hydrated, use humidifier, get plenty of rest  F/u in 1 week of sooner if no relief     Additional Progress Note...

## 2022-06-27 NOTE — ED ADULT TRIAGE NOTE - NS ED NURSE DIRECT TO ROOM YN
Impression: Ectropion of eye: H02.109.
- h/o gold weight Plan: Has some tearing in setting or RLL ectropion.   Improved after plastics eval Yes

## 2022-08-03 NOTE — DISCHARGE NOTE PROVIDER - EXTENDED VTE YES NO FOR MLM ENOXAPARIN
Chief Complaint   Patient presents with    Neck Pain         [facility] Patient complains of neck pain and chronic headaches. She has had cervical injections, RFA and PT with mild relief. She finds it difficult to turn her head and states \"my head feels heavy at times\". MRI completed last month with multilevel degenerative change and moderate spinal canal narrowing at C5-6 and C6-7. Prominent pannus formation at C1-2 with edema in the dens, likely degenerative. Neck Pain   This is a chronic problem. The current episode started more than 1 month ago. The problem occurs constantly. The problem has been gradually worsening. The pain is associated with nothing. The pain is present in the right side and occipital region. The quality of the pain is described as shooting and stabbing. The pain is at a severity of 8/10. The pain is severe. The symptoms are aggravated by stress and position. The pain is Worse during the day. Stiffness is present All day. Associated symptoms include headaches. Pertinent negatives include no chest pain, fever, pain with swallowing or trouble swallowing. She has tried heat, ice, home exercises, muscle relaxants, bed rest and acetaminophen for the symptoms. The treatment provided mild relief. Patient denies any new neurological symptoms. No bowel or bladder incontinence, no weakness, and no falling. Past Medical History:   Diagnosis Date    Arthritis     Diabetes mellitus ([address]     High cholesterol     Migraines     Vision abnormalities        Past Surgical History:   Procedure Laterality Date    APPENDECTOMY  1949    CHOLECYSTECTOMY  1971    COLONOSCOPY  10/12/07    HYSTERECTOMY (CERVIX STATUS UNKNOWN)  1983    Ovaries removed    ORIF HUMERUS DECOMPRESSION Left 2010    PAIN MANAGEMENT PROCEDURE      SALPINGO-OOPHORECTOMY  1983    TONSILLECTOMY AND ADENOIDECTOMY  1962       Allergies   Allergen Reactions    Aspirin      Intolerance.  Upsets stomach    Duricef [Cefadroxil] Hives Ovarian Cancer Neg Hx      Labor Neg Hx     Spont Abortions Neg Hx     Stroke Neg Hx        Social History     Socioeconomic History    Marital status:      Spouse name: Not on file    Number of children: Not on file    Years of education: Not on file    Highest education level: Not on file   Occupational History    Not on file   Tobacco Use    Smoking status: Never    Smokeless tobacco: Never   Vaping Use    Vaping Use: Never used   Substance and Sexual Activity    Alcohol use: No     Alcohol/week: 0.0 standard drinks    Drug use: No    Sexual activity: Yes     Partners: Male     Birth control/protection: Surgical, Post-menopausal     Comment: hysterectomy BSO   Other Topics Concern    Not on file   Social History Narrative    Not on file     Social Determinants of Health     Financial Resource Strain: Not on file   Food Insecurity: Not on file   Transportation Needs: Not on file   Physical Activity: Not on file   Stress: Not on file   Social Connections: Not on file   Intimate Partner Violence: Not on file   Housing Stability: Not on file       Review of Systems:  Review of Systems   Constitutional: Negative for chills and fever. HENT:  Negative for trouble swallowing. Cardiovascular:  Negative for chest pain and palpitations. Respiratory:  Negative for cough and shortness of breath. Musculoskeletal:  Positive for neck pain. Gastrointestinal:  Negative for constipation. Neurological:  Positive for headaches. Negative for disturbances in coordination and loss of balance. Physical Exam:  BP (!) 140/56   Pulse 64   Temp 97.1 °F (36.2 °C) (Temporal)   LMP  (LMP Unknown)   SpO2 99%     Physical Exam  HENT:      Head: Normocephalic. Pulmonary:      Effort: Pulmonary effort is normal.   Musculoskeletal:      Cervical back: Tenderness present. Pain with movement present. Decreased range of motion. Skin:     General: Skin is warm and dry.    Neurological:      Mental Status: She is alert and oriented to person, place, and time. Record/Diagnostics Review:    FINDINGS:   BONES/ALIGNMENT: There is straightening of the normal cervical lordosis. No   significant listhesis. Vertebral body heights are maintained. There is   minimal edema in the dens, likely degenerative. There is prominent pannus   formation at C1-2. SPINAL CORD: No abnormal cord signal is seen. SOFT TISSUES: No paraspinal mass identified. C2-C3: There is no significant disc protrusion, spinal canal stenosis or   neural foraminal narrowing. C3-C4: Posterior disc osteophyte complex ligamentum flavum hypertrophy with   mild spinal canal stenosis. Moderate bilateral neural foraminal narrowing. C4-C5: Posterior disc osteophyte complex without significant spinal canal   stenosis. Mild bilateral neural foramina. C5-C6: Posterior disc osteophyte complex and ligamentum flavum hypertrophy   with moderate spinal canal stenosis. Mild bilateral neural foraminal   narrowing. C6-C7: Posterior disc osteophyte complex and ligamentum flavum hypertrophy   with moderate spinal canal stenosis. Mild bilateral neural foraminal   narrowing. C7-T1: Posterior disc osteophyte complex with minimal spinal canal stenosis. Mild left neural foraminal narrowing. Impression   1. Multilevel degenerative change with moderate spinal canal narrowing at   C5-6 and C6-7. There is mild spinal canal narrowing at C3-4 and C7-T1.   2. Multilevel neural foraminal narrowing as above. 3. Prominent pannus formation at C1-2 with edema in the dens, likely   degenerative.            Assessment:  Problem List Items Addressed This Visit       Cervicalgia    Relevant Orders    XR CERVICAL SPINE FLEXION AND EXTENSION    Sarah Tobar DO, Neurosurgery, [facility]    Spinal stenosis of lumbar region with neurogenic claudication - Primary (Chronic)    Cervical spondylosis without myelopathy (Chronic) Relevant Orders    Sarah Owusu DO, Neurosurgery, [employer]          Treatment Plan:  Cervical spine MRI reviewed with patient and questions answered  Will obtain cervical XR flexion extension  Referral to neurosurgery given to patient  Follow up in four weeks    I have reviewed the chief complaint and history of present illness (including ROS and PFSH) and vital documentation by my staff and I agree with their documentation and have added where applicable. ,

## 2022-12-15 NOTE — ED PROVIDER NOTE - CPE EDP EYES NORM
normal... Calcipotriene Counseling:  I discussed with the patient the risks of calcipotriene including but not limited to erythema, scaling, itching, and irritation.

## 2022-12-16 NOTE — PATIENT PROFILE ADULT - NSASFALLNEEDSASSIST_GEN_A_NUR
Patient informed and given name and number of Dr Angela Hernández. He requests a referral to continue his physical therapy that was begun in the hospital as outpatient. He needs it for neuropathy and balance.   Order pended
Please let patient know he will need to follow up with cardiology. I have placed referral to Dr. Shaina Covington as the MRI did show some cardiac abnormalities. Please have him set up appt. Thanks.
Pt calling for results of MRI Chest done 12-7 at [facility]
yes

## 2023-01-12 NOTE — H&P ADULT - ATTENDING COMMENTS
PT DAILY TREATMENT NOTE     Patient Name: Reymundo Tavares  Date:2021  : 1937  [x]  Patient  Verified  [place]or: [place] MED[place]ARE / Plan: [place] MEDICARE PART A & B / Product Type: Medicare /    In time:1116  Out time:1206  Total Treatment Time (min): 50  Visit #: 8 of 12    Medicare/BCBS Only   Total Timed Codes (min):  40 1:1 Treatment Time:  38       Treatment Area: Presence of left artificial hip joint [Z96.642]  Pain in left hip [M25.552]    SUBJECTIVE  Pain Level (0-10 scale): 4-5  Any medication changes, allergies to medications, adverse drug reactions, diagnosis change, or new procedure performed?: [x] No    [] Yes (see summary sheet for update)  Subjective functional status/changes:   [] No changes reported  \"I am doing ok.  It has been a month, I thought I would be doing better than I am .\"    OBJECTIVE    Modality rationale: decrease pain and increase tissue extensibility to improve the patients ability to tolerate ADLS and activities   Min Type Additional Details    [] Estim:  []Unatt       []IFC  []Premod                        []Other:  []w/ice   []w/heat  Position:  Location:    [] Estim: []Att    []TENS instruct  []NMES                    []Other:  []w/US   []w/ice   []w/heat  Position:  Location:    []  Traction: [] Cervical       []Lumbar                       [] Prone          []Supine                       []Intermittent   []Continuous Lbs:  [] before manual  [] after manual    []  Ultrasound: []Continuous   [] Pulsed                           []1MHz   []3MHz W/cm2:  Location:    []  Iontophoresis with dexamethasone         Location: [] Take home patch   [] In clinic   10 [x]  Ice post      []  heat  []  Ice massage  []  Laser   []  Anodyne Position:reclined  Location:left hip     []  Laser with stim  []  Other:  Position:  Location:    []  Vasopneumatic Device    []  Right     []  Left  Pre-treatment girth:  Post-treatment girth:  Measured at (location):  Pressure:       [] lo [] med [] hi   Temperature: [] lo [] med [] hi   [] Skin assessment post-treatment:  []intact []redness- no adverse reaction    []redness  adverse reaction:          24 min Therapeutic Exercise:  [x] See flow sheet :   Rationale: increase ROM, increase strength and improve coordination to improve the patients ability to tolerate ADLS and activities    8 min Therapeutic Activity:  [x]  See flow sheet :   Rationale: increase ROM, increase strength and improve coordination  to improve the patients ability to tolerate ADLS and activities        8 min Gait Training:  50X2 190X1___ feet with Almita Jay SC__ device on level surfaces with __SBA/S_ level of assist   Rationale: With   [] TE   [] TA   [] neuro   [] other: Patient Education: [x] Review HEP    [] Progressed/Changed HEP based on:   [] positioning   [] body mechanics   [] transfers   [] heat/ice application    [] other:      Other Objective/Functional Measures: VC exercises and tech. Exercises initiated by exercise tech. Pain Level (0-10 scale) post treatment: <4-5    ASSESSMENT/Changes in Function: patient frustrated with what she feels like is slow progress- somewhat attributed to pain and also weakness. Patient will continue to benefit from skilled PT services to modify and progress therapeutic interventions, address functional mobility deficits, address ROM deficits, address strength deficits, analyze and address soft tissue restrictions, analyze and cue movement patterns, analyze and modify body mechanics/ergonomics, assess and modify postural abnormalities, address imbalance/dizziness and instruct in home and community integration to attain remaining goals.      [x]  See Plan of Care  []  See progress note/recertification  []  See Discharge Summary         Progress towards goals / Updated goals:     1 patient will have established and be I with HEP to aid with I and self management at discharge              EVAL issued HEP              CURRENT  progrssing 6/25/21              2 patient will have pain 3/10 to aid with increase tolerance to usual activities at home               EVAL 5/10 and quite a bit of difficulty              CURRENT  4-5 6/25/21  Long Term Goals: To be accomplished in 12 treatments :     1 patient will have pain 1/10 to aid with increase tolerance to usual activities at home               EVAL 5/10 and quite a bit of difficulty              CURRENT 4-5 6/25/21              2 patient will have FOTO 57 to achieve projected increase in tolerance to regular activities and ADLS              EVAL 35, quite a bit of difficulty              CURRENT ongoing NA 6/25/21              3 patient will ambulate 570 feet with least restrictive device and no LOB for carryover to home and community activities              EVAL Right SC 50 feet.  SBA              CURRENT 300 ft with SPC Sup   180  SC  6/23 50 'X2 and 190'X1 with SBA/S and right SC 6/25/21              4 patient will report overall 50% improvement to aid with increase tolerance to light activities at home              EVAL moderate difficulty              CURRENT ongoing NA for % 6/25/21       PLAN  [x]  Upgrade activities as tolerated     [x]  Continue plan of care  []  Update interventions per flow sheet       []  Discharge due to:_  []  Other:_      Adeel Seat, PT 6/25/2021  11:26 AM    Future Appointments   Date Time Provider Mariana Gray   6/28/2021 10:30 AM Aniceto Pang PTA [facility]   6/30/2021  9:00 AM Zenia Means, PTA [facility]   7/2/2021 10:50 AM Etta Medina PA-C [facility]   7/2/2021  1:30 PM Edel Givens, PT [facility]   10/7/2021  8:45 AM [facility]OC NURSE VISIT [facility] AM[facility]   10/13/2021  8:40 AM Autumn Quijano MD [facility] AMB   5/3/2022  8:00 AM [facility] IMAGING 2 [facility] AMB No Pt seen and examined. 64F with recurrent stage IV lung ca, with diffuse mets, recent admission for pathological L femoral fx s/p QASIM; returning now for worsening back and L hip pain, worsening anemia and noted to be hypoxic en route with EMS. Found with fever, acute LLE DVT, worsening R sided pleural effusion, no PE. Received 1u PRBC in ED.   #Acute LLE DVT - heparin gtt, transition to PO when able, monitor CBC closely; vascular following if IVC filter indicated   #Acute hypoxia - likely multifactorial from worsening effusion, atelectasis, lung cancer; currently stable on 3LNC. Pulm consult for thoracentesis, c/w O2 and wean as tolerated   #SIRS - unclear if 2/2 DVT vs malignancy vs infection; no clear source of infection at this time, will defer abx for now, f/u cultures. monitor closely, low threshold to start abx  #Acute on chronic L hip pain - multifactorial, pain control with IV dilaudid and fentanyl; palliative consult (was following on prior admission for pain management)  #Stage IV lung ca - worsening and extensive mets, rad/onc consult for subcondylar lesion, oncology consult  #Anemia - slowly downtrending Hb in past week, FOBT (-); s/p 1u PRBC, monitor closely

## 2023-03-01 NOTE — PROGRESS NOTE ADULT - PROBLEM SELECTOR PROBLEM 7
"Prior Authorization Request   Who s requesting:  Pharmacy  Pharmacy Name and Location: [facility]  Medication Name: Tramadol  Insurance Plan: [employer]  Key: None,\"   "
Hypothyroid

## 2023-05-12 NOTE — DISCHARGE NOTE PROVIDER - CARE PROVIDER_API CALL
Jesus Alberto Keith  RADIATION ONCOLOGY  [address] [place] [address]  Phone: [phone]  Fax: [phone]  Follow Up Time:     Elvira Joseph  MEDICAL ONCOLOGY  [address] [place] [address]  Phone: [phone]  Fax: [phone]  Follow Up Time:     Rohan Bradley  SURGERY  [address] [place] [address]  Phone: [phone]  Fax: [phone]  Follow Up Time:     Senia Espino  NP [place] [facility]  [address] [address]  Phone: [phone]  Fax: [phone]  Follow Up Time: Sepsis Criteria were met:

## 2023-07-24 NOTE — H&P PST ADULT - NSCAFFEINETYPE_GEN_ALL_CORE_SD
Patient will now be out of on 8/11/23 and will not be back and available until the week of 8/21/23. Patient is open to going to [place] or [place]. Please advise.   coffee

## 2023-08-04 NOTE — END OF VISIT
Pt's last charted BM was 8/1. States she normally goes every day to every other day. She states that she has gone, but it was not charted. Pt refused miralax at this time. [Time Spent: ___ minutes] : I have spent [unfilled] minutes of time on the encounter.

## 2023-08-29 NOTE — ED PROVIDER NOTE - CROS ED EYES ALL NEG
[FreeTextEntry8] : Presents with a 2-3-week history of lower abdominal cramping with bloating and intermittent diarrhea.  No associated fever or chills.  His appetite has been good and he has been careful with what has been eating.  Was using Wegovy but his last injection was almost 2 weeks ago.  He denies any upper abdominal pain.  Has been very gassy and has been belching as well. No recent antibiotic use.
negative...

## 2023-09-08 NOTE — H&P PST ADULT - NSANTHNECKRD_ENT_A_CORE
Patient would like to change his medication glimepiride (AMARYL) 1 MG tablet it's causing him problems he also need his atorvastatin (LIPITOR) 20 MG tablet refilled please give him a call @ [phone] > 16 inches

## 2023-10-26 NOTE — PRE-ANESTHESIA EVALUATION ADULT - HEIGHT IN FEET
Subjective   Patient ID [id number]   Alexandra Parada is a 32 y.o.  at 29w5d with a working estimated date of delivery of 2024, by Last Menstrual Period who presents for a routine prenatal visit. She denies vaginal bleeding, leakage of fluid, decreased fetal movements, or contractions.    Her pregnancy is complicated by:  Anemia, started on iron at lastivisit    Objective   Physical Exam  Weight: 75.8 kg (167 lb)  Expected Total Weight Gain: 7 kg (15 lb)-11.5 kg (25 lb)   Pregravid BMI: 25.84  BP: 96/54  Fetal Heart Rate: 155 Fundal Height (cm): 29 cm               Prenatal Labs  Urine dip:  Results for orders placed or performed in visit on 10/26/23   POCT UA Automated manually resulted   Result Value Ref Range    POC Color, Urine Yellow Straw, Yellow, Light-Yellow    POC Appearance, Urine Clear Clear    POC Specific Gravity, Urine 1.010 1.005 - 1.035    POC PH, Urine 7.0 No Reference Range Established PH    POC Protein, Urine NEGATIVE NEGATIVE, 30 (1+) mg/dl    POC Glucose, Urine NEGATIVE NEGATIVE mg/dl    POC Blood, Urine NEGATIVE NEGATIVE    POC Ketones, Urine NEGATIVE NEGATIVE mg/dl    POC Bilirubin, Urine NEGATIVE NEGATIVE    POC Urobilinogen, Urine 0.2 0.2, 1.0 EU/DL    Poc Nitrate, Urine NEGATIVE NEGATIVE    POC Leukocytes, Urine TRACE (A) NEGATIVE         Imaging  revd    Assessment/Plan   Problem List Items Addressed This Visit    None  Visit Diagnoses         Codes    Supervision of other normal pregnancy, antepartum    -  Primary Z34.80    Relevant Orders    POCT UA Automated manually resulted (Completed)    Encounter for immunization     Z23    Relevant Orders    Tdap vaccine, age 7 years and older (Completed)          Flu shot given at prev visist  Tdap today  Continue prenatal vitamin.  Labs reviewed.  Rhogam not indicated  GTT done and passed.  Started on iron  Follow up in 2 weeks for a routine prenatal visit.  
5
5

## 2023-11-08 NOTE — PATIENT PROFILE ADULT. - FALL HARM RISK TYPE OF ASSESSMENT
Finasteride Male Counseling: Finasteride Counseling:  I discussed with the patient the risks of use of finasteride including but not limited to decreased libido, decreased ejaculate volume, gynecomastia, and depression. Women should not handle medication.  All of the patient's questions and concerns were addressed. Finasteride Counseling:  I discussed with the patient the risks of use of finasteride including but not limited to decreased libido, decreased ejaculate volume, gynecomastia, and depression. Women should not handle medication.  All of the patient's questions and concerns were addressed. Admission

## 2023-12-28 NOTE — ED ADULT NURSE NOTE - NEURO BEHAVIOR
[FreeTextEntry1] : I, Bradylisa Luis Armando, acted solely as a scribe for Dr. John Villatoro M.D. on this date 12/28/2023.   All medical record entries made by the Scribe were at my, Dr. John Villatoro M.D., direction and personally dictated by me on 12/28/2023. I have reviewed the chart and agree that the record accurately reflects my personal performance of the history, physical exam, assessment and plan. I have also personally directed, reviewed, and agreed with the chart.  calm

## 2024-01-23 NOTE — DISCHARGE NOTE ADULT - NURSING SECTION COMPLETE
Pt still internally stimulated, thinks public safety is talking about him and calling his name. PS on standby, will continue to monitor   Patient/Caregiver provided printed discharge information.

## 2024-03-26 NOTE — H&P PST ADULT - NS PRO FEM REPRO PAP RESULTS
At 1400  pt denies further c/o nausea  no diarrhea since took Imodium at home,  taking oral fluids,  ambulated out of unit with steady gait accompanied by family  
normal

## 2024-07-07 NOTE — PHYSICAL THERAPY INITIAL EVALUATION ADULT - PREDICTED DURATION OF THERAPY (DAYS/WKS), PT EVAL
GENERAL: Arousable to physical touch only  HEENT:  Atraumatic  CHEST/LUNG: +Coarse lung sounds b/l. Chest rise equal bilaterally.   HEART: Regular rate and rhythm normal s1/s2 no m/r/g  ABDOMEN: +mild diffuse ttp. no rebound guarding rigidity or masses  EXTREMITIES:  Extremities warm  PSYCH: Somnolent, arousable  SKIN: No obvious rashes or lesions  MSK: able to range neck to the left and right/ No midline spinal TTP/ No swelling, redness, pain or discharge from   NEUROLOGY: no facial droop. moving extremities spontanously. Pupils PERRL
1-2 weeks

## 2024-07-30 NOTE — PROGRESS NOTE ADULT - PROBLEM SELECTOR PLAN 3
How Did The Scalp Condition Occur?: gradual in onset  (over a period of years) How Severe Is The Scalp Condition?: mild LLE pop DVT  Cont treatement dose lovenox

## 2024-08-20 NOTE — H&P PST ADULT - NS PRO FEM REPRO HEALTH SCREEN
h/o HTN, Hyperlipidemia, CAD, CABG x 4- Maintained on meds. mammogram s/p CABG, midchest line scar noted

## 2024-11-01 NOTE — PATIENT PROFILE ADULT - ARRIVAL FROM
Lov 9/27/2024    Future Appointments   Date Time Provider Department Center   4/4/2025 12:20 PM Amanda Michaels APRN - CNP [facility]        [facility]